# Patient Record
Sex: MALE | Race: WHITE | Employment: OTHER | URBAN - METROPOLITAN AREA
[De-identification: names, ages, dates, MRNs, and addresses within clinical notes are randomized per-mention and may not be internally consistent; named-entity substitution may affect disease eponyms.]

---

## 2023-10-05 PROBLEM — F17.200 TOBACCO DEPENDENCE WITH CURRENT USE: Status: ACTIVE | Noted: 2023-10-05

## 2023-10-05 PROBLEM — E07.9 THYROID MASS: Status: ACTIVE | Noted: 2023-10-05

## 2023-10-05 PROBLEM — Z89.512 STATUS POST BELOW-KNEE AMPUTATION OF LEFT LOWER EXTREMITY (MULTI): Status: ACTIVE | Noted: 2023-10-05

## 2023-10-05 PROBLEM — E78.2 MIXED HYPERLIPIDEMIA: Status: ACTIVE | Noted: 2023-10-05

## 2023-10-05 PROBLEM — I25.10 CORONARY ARTERY DISEASE INVOLVING NATIVE CORONARY ARTERY WITHOUT ANGINA PECTORIS: Status: ACTIVE | Noted: 2023-10-05

## 2023-10-05 PROBLEM — I50.21: Status: ACTIVE | Noted: 2023-10-05

## 2023-10-05 PROBLEM — I73.9 PAD (PERIPHERAL ARTERY DISEASE) (CMS-HCC): Status: ACTIVE | Noted: 2023-10-05

## 2023-10-05 RX ORDER — TORSEMIDE 20 MG/1
20 TABLET ORAL DAILY PRN
COMMUNITY
End: 2024-03-26 | Stop reason: HOSPADM

## 2023-10-05 RX ORDER — LINAGLIPTIN 5 MG/1
5 TABLET, FILM COATED ORAL DAILY
Status: ON HOLD | COMMUNITY
End: 2024-04-18 | Stop reason: SINTOL

## 2023-10-05 RX ORDER — LISINOPRIL 5 MG/1
5 TABLET ORAL NIGHTLY
COMMUNITY
End: 2024-03-26 | Stop reason: HOSPADM

## 2023-10-05 RX ORDER — OMEPRAZOLE 40 MG/1
40 CAPSULE, DELAYED RELEASE ORAL 2 TIMES DAILY
COMMUNITY
End: 2024-03-26 | Stop reason: HOSPADM

## 2023-10-05 RX ORDER — CARVEDILOL 12.5 MG/1
12.5 TABLET ORAL 2 TIMES DAILY
Status: ON HOLD | COMMUNITY
End: 2024-04-18 | Stop reason: ALTCHOICE

## 2023-10-05 RX ORDER — LISINOPRIL 40 MG/1
1 TABLET ORAL DAILY
COMMUNITY
End: 2023-10-30 | Stop reason: HOSPADM

## 2023-10-05 RX ORDER — ACETAMINOPHEN 500 MG
500 TABLET ORAL EVERY 8 HOURS PRN
COMMUNITY
End: 2024-05-01 | Stop reason: HOSPADM

## 2023-10-05 RX ORDER — METFORMIN HYDROCHLORIDE 1000 MG/1
1000 TABLET ORAL DAILY
COMMUNITY
End: 2023-10-30 | Stop reason: HOSPADM

## 2023-10-05 RX ORDER — SPIRONOLACTONE 25 MG/1
1 TABLET ORAL DAILY
COMMUNITY
End: 2023-10-30 | Stop reason: HOSPADM

## 2023-10-05 RX ORDER — ACETAMINOPHEN 500 MG
5 TABLET ORAL NIGHTLY
COMMUNITY

## 2023-10-05 RX ORDER — ASPIRIN 81 MG/1
1 TABLET ORAL DAILY
COMMUNITY
End: 2024-05-01 | Stop reason: HOSPADM

## 2023-10-05 RX ORDER — ATORVASTATIN CALCIUM 80 MG/1
1 TABLET, FILM COATED ORAL DAILY
COMMUNITY
End: 2024-05-01 | Stop reason: HOSPADM

## 2023-10-05 RX ORDER — GABAPENTIN 400 MG/1
400 CAPSULE ORAL 3 TIMES DAILY
Status: ON HOLD | COMMUNITY
End: 2024-03-21 | Stop reason: SDUPTHER

## 2023-10-05 RX ORDER — METFORMIN HYDROCHLORIDE 1000 MG/1
0.5 TABLET ORAL
COMMUNITY
End: 2024-03-26 | Stop reason: HOSPADM

## 2023-10-24 ENCOUNTER — HOSPITAL ENCOUNTER (EMERGENCY)
Facility: HOSPITAL | Age: 53
Discharge: LEFT BEFORE DISCHARGE | End: 2023-10-24
Attending: EMERGENCY MEDICINE
Payer: MEDICAID

## 2023-10-24 ENCOUNTER — APPOINTMENT (OUTPATIENT)
Dept: RADIOLOGY | Facility: HOSPITAL | Age: 53
End: 2023-10-24
Payer: MEDICAID

## 2023-10-24 VITALS
DIASTOLIC BLOOD PRESSURE: 74 MMHG | TEMPERATURE: 97.3 F | HEIGHT: 69 IN | OXYGEN SATURATION: 99 % | SYSTOLIC BLOOD PRESSURE: 109 MMHG | WEIGHT: 122 LBS | BODY MASS INDEX: 18.07 KG/M2 | HEART RATE: 102 BPM | RESPIRATION RATE: 18 BRPM

## 2023-10-24 DIAGNOSIS — R74.8 ELEVATION OF CARDIAC ENZYMES: ICD-10-CM

## 2023-10-24 DIAGNOSIS — R73.9 HYPERGLYCEMIA: ICD-10-CM

## 2023-10-24 DIAGNOSIS — R93.89 ABNORMAL CT OF THE CHEST: ICD-10-CM

## 2023-10-24 DIAGNOSIS — I50.9 ACUTE ON CHRONIC CONGESTIVE HEART FAILURE, UNSPECIFIED HEART FAILURE TYPE (MULTI): Primary | ICD-10-CM

## 2023-10-24 LAB
ALBUMIN SERPL BCP-MCNC: 3.7 G/DL (ref 3.4–5)
ALP SERPL-CCNC: 119 U/L (ref 33–120)
ALT SERPL W P-5'-P-CCNC: 22 U/L (ref 10–52)
ANION GAP SERPL CALC-SCNC: 16 MMOL/L (ref 10–20)
APPEARANCE UR: CLEAR
AST SERPL W P-5'-P-CCNC: 17 U/L (ref 9–39)
BASOPHILS # BLD AUTO: 0.03 X10*3/UL (ref 0–0.1)
BASOPHILS NFR BLD AUTO: 0.4 %
BILIRUB SERPL-MCNC: 1.6 MG/DL (ref 0–1.2)
BILIRUB UR STRIP.AUTO-MCNC: NEGATIVE MG/DL
BNP SERPL-MCNC: 2112 PG/ML (ref 0–99)
BUN SERPL-MCNC: 36 MG/DL (ref 6–23)
CALCIUM SERPL-MCNC: 9.3 MG/DL (ref 8.6–10.3)
CARDIAC TROPONIN I PNL SERPL HS: 254 NG/L (ref 0–20)
CARDIAC TROPONIN I PNL SERPL HS: 281 NG/L (ref 0–20)
CHLORIDE SERPL-SCNC: 83 MMOL/L (ref 98–107)
CO2 SERPL-SCNC: 32 MMOL/L (ref 21–32)
COLOR UR: ABNORMAL
CREAT SERPL-MCNC: 1.12 MG/DL (ref 0.5–1.3)
D DIMER PPP FEU-MCNC: 1299 NG/ML FEU
EOSINOPHIL # BLD AUTO: 0.01 X10*3/UL (ref 0–0.7)
EOSINOPHIL NFR BLD AUTO: 0.1 %
ERYTHROCYTE [DISTWIDTH] IN BLOOD BY AUTOMATED COUNT: 17.4 % (ref 11.5–14.5)
GFR SERPL CREATININE-BSD FRML MDRD: 79 ML/MIN/1.73M*2
GLUCOSE SERPL-MCNC: 576 MG/DL (ref 74–99)
GLUCOSE UR STRIP.AUTO-MCNC: ABNORMAL MG/DL
HCT VFR BLD AUTO: 28.6 % (ref 41–52)
HGB BLD-MCNC: 9.5 G/DL (ref 13.5–17.5)
HOLD SPECIMEN: NORMAL
IMM GRANULOCYTES # BLD AUTO: 0.02 X10*3/UL (ref 0–0.7)
IMM GRANULOCYTES NFR BLD AUTO: 0.2 % (ref 0–0.9)
KETONES UR STRIP.AUTO-MCNC: NEGATIVE MG/DL
LEUKOCYTE ESTERASE UR QL STRIP.AUTO: NEGATIVE
LIPASE SERPL-CCNC: 25 U/L (ref 9–82)
LYMPHOCYTES # BLD AUTO: 1.51 X10*3/UL (ref 1.2–4.8)
LYMPHOCYTES NFR BLD AUTO: 18.3 %
MAGNESIUM SERPL-MCNC: 2.01 MG/DL (ref 1.6–2.4)
MCH RBC QN AUTO: 23.3 PG (ref 26–34)
MCHC RBC AUTO-ENTMCNC: 33.2 G/DL (ref 32–36)
MCV RBC AUTO: 70 FL (ref 80–100)
MONOCYTES # BLD AUTO: 0.51 X10*3/UL (ref 0.1–1)
MONOCYTES NFR BLD AUTO: 6.2 %
NEUTROPHILS # BLD AUTO: 6.15 X10*3/UL (ref 1.2–7.7)
NEUTROPHILS NFR BLD AUTO: 74.8 %
NITRITE UR QL STRIP.AUTO: NEGATIVE
NRBC BLD-RTO: 0 /100 WBCS (ref 0–0)
PH UR STRIP.AUTO: 8 [PH]
PLATELET # BLD AUTO: 354 X10*3/UL (ref 150–450)
PMV BLD AUTO: 10 FL (ref 7.5–11.5)
POTASSIUM SERPL-SCNC: 3.8 MMOL/L (ref 3.5–5.3)
PROT SERPL-MCNC: 7 G/DL (ref 6.4–8.2)
PROT UR STRIP.AUTO-MCNC: NEGATIVE MG/DL
RBC # BLD AUTO: 4.08 X10*6/UL (ref 4.5–5.9)
RBC # UR STRIP.AUTO: NEGATIVE /UL
SARS-COV-2 RNA RESP QL NAA+PROBE: NOT DETECTED
SODIUM SERPL-SCNC: 127 MMOL/L (ref 136–145)
SP GR UR STRIP.AUTO: 1.01
UROBILINOGEN UR STRIP.AUTO-MCNC: <2 MG/DL
WBC # BLD AUTO: 8.2 X10*3/UL (ref 4.4–11.3)

## 2023-10-24 PROCEDURE — 36415 COLL VENOUS BLD VENIPUNCTURE: CPT | Performed by: EMERGENCY MEDICINE

## 2023-10-24 PROCEDURE — 2550000001 HC RX 255 CONTRASTS: Performed by: EMERGENCY MEDICINE

## 2023-10-24 PROCEDURE — 85025 COMPLETE CBC W/AUTO DIFF WBC: CPT | Performed by: EMERGENCY MEDICINE

## 2023-10-24 PROCEDURE — 81003 URINALYSIS AUTO W/O SCOPE: CPT | Performed by: EMERGENCY MEDICINE

## 2023-10-24 PROCEDURE — 83880 ASSAY OF NATRIURETIC PEPTIDE: CPT | Performed by: EMERGENCY MEDICINE

## 2023-10-24 PROCEDURE — 84484 ASSAY OF TROPONIN QUANT: CPT | Performed by: EMERGENCY MEDICINE

## 2023-10-24 PROCEDURE — 87635 SARS-COV-2 COVID-19 AMP PRB: CPT | Performed by: EMERGENCY MEDICINE

## 2023-10-24 PROCEDURE — 99284 EMERGENCY DEPT VISIT MOD MDM: CPT | Mod: CS | Performed by: EMERGENCY MEDICINE

## 2023-10-24 PROCEDURE — 85379 FIBRIN DEGRADATION QUANT: CPT | Performed by: EMERGENCY MEDICINE

## 2023-10-24 PROCEDURE — 2500000001 HC RX 250 WO HCPCS SELF ADMINISTERED DRUGS (ALT 637 FOR MEDICARE OP): Performed by: EMERGENCY MEDICINE

## 2023-10-24 PROCEDURE — 71275 CT ANGIOGRAPHY CHEST: CPT | Mod: FR

## 2023-10-24 PROCEDURE — 71045 X-RAY EXAM CHEST 1 VIEW: CPT | Mod: FOREIGN READ | Performed by: RADIOLOGY

## 2023-10-24 PROCEDURE — 71275 CT ANGIOGRAPHY CHEST: CPT | Mod: FOREIGN READ | Performed by: RADIOLOGY

## 2023-10-24 PROCEDURE — 99285 EMERGENCY DEPT VISIT HI MDM: CPT | Mod: 25,CS

## 2023-10-24 PROCEDURE — 83690 ASSAY OF LIPASE: CPT | Performed by: EMERGENCY MEDICINE

## 2023-10-24 PROCEDURE — 71045 X-RAY EXAM CHEST 1 VIEW: CPT | Mod: FY,FR

## 2023-10-24 PROCEDURE — 83735 ASSAY OF MAGNESIUM: CPT | Performed by: EMERGENCY MEDICINE

## 2023-10-24 PROCEDURE — 80053 COMPREHEN METABOLIC PANEL: CPT | Performed by: EMERGENCY MEDICINE

## 2023-10-24 RX ORDER — NAPROXEN SODIUM 220 MG/1
324 TABLET, FILM COATED ORAL ONCE
Status: COMPLETED | OUTPATIENT
Start: 2023-10-24 | End: 2023-10-24

## 2023-10-24 RX ORDER — DEXTROSE 50 % IN WATER (D50W) INTRAVENOUS SYRINGE
25
Status: DISCONTINUED | OUTPATIENT
Start: 2023-10-24 | End: 2023-10-24 | Stop reason: HOSPADM

## 2023-10-24 RX ORDER — IPRATROPIUM BROMIDE AND ALBUTEROL SULFATE 2.5; .5 MG/3ML; MG/3ML
3 SOLUTION RESPIRATORY (INHALATION)
Status: DISCONTINUED | OUTPATIENT
Start: 2023-10-24 | End: 2023-10-24 | Stop reason: HOSPADM

## 2023-10-24 RX ORDER — DEXTROSE MONOHYDRATE 100 MG/ML
0.3 INJECTION, SOLUTION INTRAVENOUS ONCE AS NEEDED
Status: DISCONTINUED | OUTPATIENT
Start: 2023-10-24 | End: 2023-10-24 | Stop reason: HOSPADM

## 2023-10-24 RX ADMIN — IOHEXOL 75 ML: 350 INJECTION, SOLUTION INTRAVENOUS at 14:47

## 2023-10-24 RX ADMIN — ASPIRIN 324 MG: 81 TABLET, CHEWABLE ORAL at 15:04

## 2023-10-24 ASSESSMENT — COLUMBIA-SUICIDE SEVERITY RATING SCALE - C-SSRS
2. HAVE YOU ACTUALLY HAD ANY THOUGHTS OF KILLING YOURSELF?: NO
1. IN THE PAST MONTH, HAVE YOU WISHED YOU WERE DEAD OR WISHED YOU COULD GO TO SLEEP AND NOT WAKE UP?: NO
6. HAVE YOU EVER DONE ANYTHING, STARTED TO DO ANYTHING, OR PREPARED TO DO ANYTHING TO END YOUR LIFE?: NO

## 2023-10-24 ASSESSMENT — LIFESTYLE VARIABLES
HAVE YOU EVER FELT YOU SHOULD CUT DOWN ON YOUR DRINKING: NO
EVER HAD A DRINK FIRST THING IN THE MORNING TO STEADY YOUR NERVES TO GET RID OF A HANGOVER: NO
HAVE PEOPLE ANNOYED YOU BY CRITICIZING YOUR DRINKING: NO
EVER FELT BAD OR GUILTY ABOUT YOUR DRINKING: NO
REASON UNABLE TO ASSESS: NO

## 2023-10-24 ASSESSMENT — PAIN SCALES - GENERAL
PAINLEVEL_OUTOF10: 0 - NO PAIN
PAINLEVEL_OUTOF10: 0 - NO PAIN

## 2023-10-24 ASSESSMENT — PAIN - FUNCTIONAL ASSESSMENT: PAIN_FUNCTIONAL_ASSESSMENT: 0-10

## 2023-10-24 NOTE — ED NOTES
Pt declined insulin shot and wanted to leave ed. Pt was a and o x3.     Jamey Powers RN  10/24/23 6287

## 2023-10-24 NOTE — DISCHARGE INSTRUCTIONS
Follow up with your primary care doctor and cardiology. Return to the ER if symptoms worsen or change. We recommended admission as we are concerned for abnormal findings related to your heart and your symptoms. You have chosen to leave against medical advice. Risk of leaving against medical advice include death, disability, worsening symptoms, and delayed diagnosis. If you change  your mind at anytime you can return to the ER for further evaluation.

## 2023-10-24 NOTE — ED PROVIDER NOTES
53-year-old male presents emergency department with chief complaint of shortness of breath.  Patient reports his symptoms have been progressive since last night.  He states they are worse when he lies flat.  He also reports chest congestion.  He denies fevers.  He reports dry cough.  Denies any chest pain.  No abdominal pain, nausea, vomiting, dysuria, diarrhea, black or bloody stools.  He does report some constipation.  He reports tobacco use.  Patient also states that he believes he has increased fluid on him.  He states that he uses his torsemide as needed and took 2 doses since last night.  Patient admits to tobacco use. Chart review shows significant past medical history for CHF, peripheral arterial disease, left BKA, GERD, hyperlipidemia, anxiety, and diabetes.  He is not on any anticoagulants.  Denies history of DVT or PE.      History provided by:  Patient   used: No           ------------------------------------------------------------------------------------------------------------------------------------------    VS: As documented in the triage note and EMR flowsheet from this visit were reviewed.    Review of Systems  Constitutional: no fever, chills reports malaise  Eyes: no redness, discharge, pain  HENT: no sore throat, nose bleeds, admits to congestion  Cardiovascular: no chest pain, palpitations reports feeling his legs are more swollen than normal  Respiratory: Admits to shortness of breath, dry cough, and chest congestion  GI: no nausea, diarrhea, pain, vomiting,  BRBPR, melena reports constipation  : no dysuria, frequency, hematuria  Musculoskeletal: no neck pain, stiffness,  no joint deformity, swelling patient has left BKA  Skin: no rash, erythema, wounds  Neurological: no headache, dizziness, lightheadedness, weakness, numbness, or tingling  Psychiatric: no suicidal thoughts, confusion, agitation  Metabolic: no polyuria or polydipsia  Hematologic: no increased bleeding or  bruising  Immunology: No immunocompromise state    [facility]  Nursing notes reviewed and confirmed by me.  Chart review performed including medications, allergies, and medical, surgical, and family history  Visit Vitals  /74 (BP Location: Left arm, Patient Position: Lying)   Pulse 102   Temp 36.3 °C (97.3 °F) (Temporal)   Resp 18     Physical Exam   No past medical history on file.   No past surgical history on file.   Social History     Socioeconomic History    Marital status: Single     Spouse name: Not on file    Number of children: Not on file    Years of education: Not on file    Highest education level: Not on file   Occupational History    Not on file   Tobacco Use    Smoking status: Not on file    Smokeless tobacco: Not on file   Substance and Sexual Activity    Alcohol use: Not on file    Drug use: Not on file    Sexual activity: Not on file   Other Topics Concern    Not on file   Social History Narrative    Not on file     Social Determinants of Health     Financial Resource Strain: Not on file   Food Insecurity: Not on file   Transportation Needs: Not on file   Physical Activity: Not on file   Stress: Not on file   Social Connections: Not on file   Intimate Partner Violence: Not on file   Housing Stability: Not on file      ------------------------------------------------------------------------------------------------------------------------------------------  CT angio chest for pulmonary embolism   Final Result   1.No evidence of acute pulmonary embolus or thoracic aortic   aneurysm or dissection.   2.Mild cardiomegaly with no pericardial effusion.   3.Mediastinal adenopathy. Follow-up is suggested.   4.Enlarged heterogeneous right lobe of the thyroid. Recommend   correlation with thyroid ultrasound.   5.There is a 5 x 2 mm perifissural nodule along in the right   middle lobe adjacent to the minor fissure, likely benign.   Signed by Radha Casanova,       XR chest 1 view   Final Result   Borderline  heart size. No acute cardiopulmonary abnormality.   Signed by Evans Amaya MD         Labs Reviewed   CBC WITH AUTO DIFFERENTIAL - Abnormal       Result Value    WBC 8.2      nRBC 0.0      RBC 4.08 (*)     Hemoglobin 9.5 (*)     Hematocrit 28.6 (*)     MCV 70 (*)     MCH 23.3 (*)     MCHC 33.2      RDW 17.4 (*)     Platelets 354      MPV 10.0      Neutrophils % 74.8      Immature Granulocytes %, Automated 0.2      Lymphocytes % 18.3      Monocytes % 6.2      Eosinophils % 0.1      Basophils % 0.4      Neutrophils Absolute 6.15      Immature Granulocytes Absolute, Automated 0.02      Lymphocytes Absolute 1.51      Monocytes Absolute 0.51      Eosinophils Absolute 0.01      Basophils Absolute 0.03     COMPREHENSIVE METABOLIC PANEL - Abnormal    Glucose 576 (*)     Sodium 127 (*)     Potassium 3.8      Chloride 83 (*)     Bicarbonate 32      Anion Gap 16      Urea Nitrogen 36 (*)     Creatinine 1.12      eGFR 79      Calcium 9.3      Albumin 3.7      Alkaline Phosphatase 119      Total Protein 7.0      AST 17      Bilirubin, Total 1.6 (*)     ALT 22     D-DIMER, VTE EXCLUSION - Abnormal    D-Dimer, Quantitative VTE Exclusion 1,299 (*)     Narrative:     The VTE Exclusion D-Dimer assay is reported in ng/mL Fibrinogen Equivalent Units (FEU).    Per 's instructions for use, a value of less than 500 ng/mL (FEU) may help to exclude DVT or PE in outpatients when the assay is used with a clinical pretest probability assessment.(AEM[facility] must utilize and document eCalc 'Wells Score Deep Vein Thrombosis Risk' for DVT exclusion only. Emergency Department should utilize  Guidelines for Emergency Department Use of the VTE Exclusion D-Dimer and Clinical Pretest probability assessment model for DVT or PE exclusion.)   B-TYPE NATRIURETIC PEPTIDE - Abnormal    BNP 2,112 (*)     Narrative:        <100 pg/mL - Heart failure unlikely  100-299 pg/mL - Intermediate probability of acute heart                  failure  exacerbation. Correlate with clinical                  context and patient history.    >=300 pg/mL - Heart Failure likely. Correlate with clinical                  context and patient history.    BNP testing is performed using different testing methodology at [facility] than at other system hospitals. Direct result comparisons should only be made within the same method.      SERIAL TROPONIN-INITIAL - Abnormal    Troponin I, High Sensitivity 254 (*)     Narrative:     Less than 99th percentile of normal range cutoff-  Female and children under 18 years old <14 ng/L; Male <21 ng/L: Negative  Repeat testing should be performed if clinically indicated.     Female and children under 18 years old 14-50 ng/L; Male 21-50 ng/L:  Consistent with possible cardiac damage and possible increased clinical   risk. Serial measurements may help to assess extent of myocardial damage.     >50 ng/L: Consistent with cardiac damage, increased clinical risk and  myocardial infarction. Serial measurements may help assess extent of   myocardial damage.      NOTE: Children less than 1 year old may have higher baseline troponin   levels and results should be interpreted in conjunction with the overall   clinical context.     NOTE: Troponin I testing is performed using a different   testing methodology at [facility] than at other   [facility]. Direct result comparisons should only   be made within the same method.   URINALYSIS WITH REFLEX MICROSCOPIC AND CULTURE - Abnormal    Color, Urine Straw      Appearance, Urine Clear      Specific Gravity, Urine 1.012      pH, Urine 8.0      Protein, Urine NEGATIVE      Glucose, Urine >=500 (3+) (*)     Blood, Urine NEGATIVE      Ketones, Urine NEGATIVE      Bilirubin, Urine NEGATIVE      Urobilinogen, Urine <2.0      Nitrite, Urine NEGATIVE      Leukocyte Esterase, Urine NEGATIVE     SERIAL TROPONIN, 1 HOUR - Abnormal    Troponin I, High Sensitivity 281 (*)      Narrative:     Less than 99th percentile of normal range cutoff-  Female and children under 18 years old <14 ng/L; Male <21 ng/L: Negative  Repeat testing should be performed if clinically indicated.     Female and children under 18 years old 14-50 ng/L; Male 21-50 ng/L:  Consistent with possible cardiac damage and possible increased clinical   risk. Serial measurements may help to assess extent of myocardial damage.     >50 ng/L: Consistent with cardiac damage, increased clinical risk and  myocardial infarction. Serial measurements may help assess extent of   myocardial damage.      NOTE: Children less than 1 year old may have higher baseline troponin   levels and results should be interpreted in conjunction with the overall   clinical context.     NOTE: Troponin I testing is performed using a different   testing methodology at [facility] than at [facility]. Direct result comparisons should only   be made within the same method.   MAGNESIUM - Normal    Magnesium 2.01     LIPASE - Normal    Lipase 25      Narrative:     Venipuncture immediately after or during the administration of Metamizole may lead to falsely low results. Testing should be performed immediately prior to Metamizole dosing.   SARS-COV-2 PCR, SYMPTOMATIC - Normal    Coronavirus 2019, PCR Not Detected      Narrative:     This assay has received FDA Emergency Use Authorization (EUA) and is only authorized for the duration of time that circumstances exist to justify the authorization of the emergency use of in vitro diagnostic tests for the detection of SARS-CoV-2 virus and/or diagnosis of COVID-19 infection under section 564(b)(1) of the Act, 21 U.S.C. 360bbb-3(b)(1). This assay is an in vitro diagnostic nucleic acid amplification test for the qualitative detection of SARS-CoV-2 from nasopharyngeal specimens and has been validated for use at [facility]. Negative results do not preclude COVID-19  infections and should not be used as the sole basis for diagnosis, treatment, or other management decisions.     TROPONIN SERIES- (INITIAL, 1 HR)    Narrative:     The following orders were created for panel order Troponin I Series, High Sensitivity (0, 1 HR).  Procedure                               Abnormality         Status                     ---------                               -----------         ------                     Troponin I, High Sensiti...[[id number]]  Abnormal            Final result               Troponin, High Sensitivi...[[id number]]  Abnormal            Final result                 Please view results for these tests on the individual orders.   URINALYSIS WITH REFLEX MICROSCOPIC AND CULTURE    Narrative:     The following orders were created for panel order Urinalysis with Reflex Microscopic and Culture.  Procedure                               Abnormality         Status                     ---------                               -----------         ------                     Urinalysis with Reflex M...[[id number]]  Abnormal            Final result               Extra Urine Gray Tube[[id number]]                            Final result                 Please view results for these tests on the individual orders.   EXTRA URINE GRAY TUBE    Extra Tube Hold for add-ons.          Medical Decision Making  EKG interpreted by ED physician: Sinus tachycardia rate of 107.  DC within normal range.  QRS and QTc do appear to be prolonged at 130 and 536.  No significant ST elevations or depressions.  No significant Q waves.  Good R wave progression.  Normal axis.  EKG is consistent with a branch block.     Repeat EKG interpreted by ED physician: Sinus tachycardia with a rate of 106.  DC within normal range.  QRS and QTc are prolonged and EKG is consistent with a right bundle branch block.  Occasional PVC appreciated.  No significant ST elevations or depressions.  No significant Q waves.  Good R wave  progression.  Normal axis.  EKG is not significantly changed when compared to previous.    53-year-old male presents emergency department with complaints of shortness of breath, cough, chest congestion, and reported worsening peripheral edema.  Given his presenting complaints a thorough work-up was obtained.  COVID testing negative.  BNP was significantly elevated at greater than 2000.  Chest imaging did not show significant pulmonary edema though I do suspect CHF is contributing to the patient's symptoms.  D-dimer was also significantly elevated subsequent CT PE study does not show pulmonary embolus, but identified mediastinal adenopathy, lung nodule, and enlarged thyroid.  I advised patient of these findings and he states that he has been advised of these in the past.  I recommended follow-up with his primary care doctor regarding these abnormalities.  CBC shows mild anemia at baseline and no significant leukocytosis patient does not have findings of sepsis.  UA does not show obvious finding of infection.  Cardiac enzyme was significantly elevated at 254 and increasing to 281.  Patient given aspirin.  CMP shows significant leukocytosis, but without other significant metabolic abnormalities.  No findings concerning for DKA.  Lipase within normal range I do not suspect pancreatitis.  Given the patient's elevated cardiac enzyme as well as findings of elevated BNP I am concerned for CHF exacerbation as well as possible NSTEMI.  I discussed these findings with the patient and recommended admission to the hospital for further cardiac evaluation.  However, patient declined this stating he does not wish to be admitted to the hospital and prefers natural treatments that he does at home.  I did offer patient insulin subcu for further treatment of his hyperglycemia which she initially was agreeable to, but nursing staff states he declined.  Patient wishes to leave AMA.  He/she appeared rational, alert, appropriate, and  exhibited no signs/symptoms of psychosis or suicidal ideation.  Patient has the capacity to make this medical decision.  Patient was aware of his/her suspected diagnosis as suggested by the initial screening exam and acknowledged their understanding for my recommendations (hospitalization, transfer, further testing, medical treatment).  Risks of refusal of recommended care were disclosed to the patient including, but not limited to death, permanent mental or physical impairment, loss of current lifestyle or paralysis.  Welcomed to return to the ED at any time regardless of their ability to pay and was provided follow up instructions.  The patient will leave A at this time.          Diagnoses as of 10/24/23 1714   Abnormal CT of the chest   Hyperglycemia   Elevation of cardiac enzymes   Acute on chronic congestive heart failure, unspecified heart failure type (CMS/HCC)      1. Acute on chronic congestive heart failure, unspecified heart failure type (CMS/HCC)        2. Abnormal CT of the chest        3. Hyperglycemia        4. Elevation of cardiac enzymes           Procedures     This note was dictated using dragon software and may contain errors related to dictation interpretation errors.      Luis Davies, DO  10/24/23 1714

## 2023-10-25 ENCOUNTER — APPOINTMENT (OUTPATIENT)
Dept: RADIOLOGY | Facility: HOSPITAL | Age: 53
End: 2023-10-25
Payer: MEDICAID

## 2023-10-25 ENCOUNTER — HOSPITAL ENCOUNTER (INPATIENT)
Facility: HOSPITAL | Age: 53
LOS: 1 days | Discharge: LEFT BEFORE DISCHARGE | End: 2023-10-25
Attending: STUDENT IN AN ORGANIZED HEALTH CARE EDUCATION/TRAINING PROGRAM | Admitting: INTERNAL MEDICINE
Payer: MEDICAID

## 2023-10-25 VITALS
BODY MASS INDEX: 18.07 KG/M2 | RESPIRATION RATE: 148 BRPM | TEMPERATURE: 97.9 F | SYSTOLIC BLOOD PRESSURE: 110 MMHG | OXYGEN SATURATION: 97 % | HEIGHT: 69 IN | DIASTOLIC BLOOD PRESSURE: 71 MMHG | WEIGHT: 122 LBS | HEART RATE: 104 BPM

## 2023-10-25 DIAGNOSIS — J44.1 COPD EXACERBATION (MULTI): Primary | ICD-10-CM

## 2023-10-25 DIAGNOSIS — I50.9 ACUTE ON CHRONIC CONGESTIVE HEART FAILURE, UNSPECIFIED HEART FAILURE TYPE (MULTI): ICD-10-CM

## 2023-10-25 PROBLEM — N17.9 AKI (ACUTE KIDNEY INJURY) (CMS-HCC): Status: ACTIVE | Noted: 2023-10-25

## 2023-10-25 PROBLEM — F41.1 GAD (GENERALIZED ANXIETY DISORDER): Status: ACTIVE | Noted: 2023-03-10

## 2023-10-25 PROBLEM — E11.9 TYPE 2 DIABETES MELLITUS (MULTI): Status: ACTIVE | Noted: 2019-05-29

## 2023-10-25 PROBLEM — I10 PRIMARY HYPERTENSION: Status: ACTIVE | Noted: 2019-05-29

## 2023-10-25 PROBLEM — I70.229 CRITICAL LOWER LIMB ISCHEMIA (MULTI): Status: ACTIVE | Noted: 2021-10-26

## 2023-10-25 PROBLEM — R10.13 DYSPEPSIA: Status: ACTIVE | Noted: 2021-08-13

## 2023-10-25 PROBLEM — F43.10 PTSD (POST-TRAUMATIC STRESS DISORDER): Status: ACTIVE | Noted: 2023-03-10

## 2023-10-25 PROBLEM — J44.9 CHRONIC OBSTRUCTIVE PULMONARY DISEASE, UNSPECIFIED (MULTI): Status: ACTIVE | Noted: 2022-08-23

## 2023-10-25 PROBLEM — F33.2 SEVERE EPISODE OF RECURRENT MAJOR DEPRESSIVE DISORDER, WITHOUT PSYCHOTIC FEATURES (MULTI): Status: ACTIVE | Noted: 2023-03-10

## 2023-10-25 PROBLEM — F43.25 ACUTE ADJUSTMENT DISORDER WITH MIXED DISTURBANCE OF EMOTIONS AND CONDUCT: Status: ACTIVE | Noted: 2022-12-12

## 2023-10-25 PROBLEM — K21.9 GERD (GASTROESOPHAGEAL REFLUX DISEASE): Status: ACTIVE | Noted: 2021-08-12

## 2023-10-25 LAB
ANION GAP SERPL CALC-SCNC: 20 MMOL/L (ref 10–20)
BASOPHILS # BLD AUTO: 0.03 X10*3/UL (ref 0–0.1)
BASOPHILS NFR BLD AUTO: 0.3 %
BNP SERPL-MCNC: 2131 PG/ML (ref 0–99)
BUN SERPL-MCNC: 47 MG/DL (ref 6–23)
CALCIUM SERPL-MCNC: 9.6 MG/DL (ref 8.6–10.3)
CARDIAC TROPONIN I PNL SERPL HS: 460 NG/L (ref 0–20)
CARDIAC TROPONIN I PNL SERPL HS: 693 NG/L (ref 0–20)
CHLORIDE SERPL-SCNC: 81 MMOL/L (ref 98–107)
CO2 SERPL-SCNC: 28 MMOL/L (ref 21–32)
CREAT SERPL-MCNC: 1.5 MG/DL (ref 0.5–1.3)
EOSINOPHIL # BLD AUTO: 0.01 X10*3/UL (ref 0–0.7)
EOSINOPHIL NFR BLD AUTO: 0.1 %
ERYTHROCYTE [DISTWIDTH] IN BLOOD BY AUTOMATED COUNT: 17.4 % (ref 11.5–14.5)
GFR SERPL CREATININE-BSD FRML MDRD: 55 ML/MIN/1.73M*2
GLUCOSE BLD MANUAL STRIP-MCNC: 381 MG/DL (ref 74–99)
GLUCOSE BLD MANUAL STRIP-MCNC: 512 MG/DL (ref 74–99)
GLUCOSE SERPL-MCNC: 581 MG/DL (ref 74–99)
HCT VFR BLD AUTO: 31.1 % (ref 41–52)
HGB BLD-MCNC: 10.1 G/DL (ref 13.5–17.5)
IMM GRANULOCYTES # BLD AUTO: 0.04 X10*3/UL (ref 0–0.7)
IMM GRANULOCYTES NFR BLD AUTO: 0.4 % (ref 0–0.9)
LYMPHOCYTES # BLD AUTO: 1.53 X10*3/UL (ref 1.2–4.8)
LYMPHOCYTES NFR BLD AUTO: 15.7 %
MAGNESIUM SERPL-MCNC: 2.04 MG/DL (ref 1.6–2.4)
MCH RBC QN AUTO: 23.2 PG (ref 26–34)
MCHC RBC AUTO-ENTMCNC: 32.5 G/DL (ref 32–36)
MCV RBC AUTO: 71 FL (ref 80–100)
MONOCYTES # BLD AUTO: 0.58 X10*3/UL (ref 0.1–1)
MONOCYTES NFR BLD AUTO: 5.9 %
NEUTROPHILS # BLD AUTO: 7.57 X10*3/UL (ref 1.2–7.7)
NEUTROPHILS NFR BLD AUTO: 77.6 %
NRBC BLD-RTO: 0 /100 WBCS (ref 0–0)
PLATELET # BLD AUTO: 372 X10*3/UL (ref 150–450)
PMV BLD AUTO: 10 FL (ref 7.5–11.5)
POTASSIUM SERPL-SCNC: 4.2 MMOL/L (ref 3.5–5.3)
RBC # BLD AUTO: 4.36 X10*6/UL (ref 4.5–5.9)
SODIUM SERPL-SCNC: 125 MMOL/L (ref 136–145)
WBC # BLD AUTO: 9.8 X10*3/UL (ref 4.4–11.3)

## 2023-10-25 PROCEDURE — 36415 COLL VENOUS BLD VENIPUNCTURE: CPT | Performed by: STUDENT IN AN ORGANIZED HEALTH CARE EDUCATION/TRAINING PROGRAM

## 2023-10-25 PROCEDURE — 94640 AIRWAY INHALATION TREATMENT: CPT

## 2023-10-25 PROCEDURE — 85025 COMPLETE CBC W/AUTO DIFF WBC: CPT | Performed by: STUDENT IN AN ORGANIZED HEALTH CARE EDUCATION/TRAINING PROGRAM

## 2023-10-25 PROCEDURE — 2500000001 HC RX 250 WO HCPCS SELF ADMINISTERED DRUGS (ALT 637 FOR MEDICARE OP): Performed by: STUDENT IN AN ORGANIZED HEALTH CARE EDUCATION/TRAINING PROGRAM

## 2023-10-25 PROCEDURE — 83735 ASSAY OF MAGNESIUM: CPT | Performed by: STUDENT IN AN ORGANIZED HEALTH CARE EDUCATION/TRAINING PROGRAM

## 2023-10-25 PROCEDURE — 71046 X-RAY EXAM CHEST 2 VIEWS: CPT | Mod: FOREIGN READ | Performed by: RADIOLOGY

## 2023-10-25 PROCEDURE — 2500000004 HC RX 250 GENERAL PHARMACY W/ HCPCS (ALT 636 FOR OP/ED): Performed by: NURSE PRACTITIONER

## 2023-10-25 PROCEDURE — 96375 TX/PRO/DX INJ NEW DRUG ADDON: CPT

## 2023-10-25 PROCEDURE — 1200000002 HC GENERAL ROOM WITH TELEMETRY DAILY

## 2023-10-25 PROCEDURE — G0378 HOSPITAL OBSERVATION PER HR: HCPCS

## 2023-10-25 PROCEDURE — 96365 THER/PROPH/DIAG IV INF INIT: CPT

## 2023-10-25 PROCEDURE — 71046 X-RAY EXAM CHEST 2 VIEWS: CPT | Mod: FY

## 2023-10-25 PROCEDURE — 2500000002 HC RX 250 W HCPCS SELF ADMINISTERED DRUGS (ALT 637 FOR MEDICARE OP, ALT 636 FOR OP/ED): Performed by: STUDENT IN AN ORGANIZED HEALTH CARE EDUCATION/TRAINING PROGRAM

## 2023-10-25 PROCEDURE — 82947 ASSAY GLUCOSE BLOOD QUANT: CPT

## 2023-10-25 PROCEDURE — 83880 ASSAY OF NATRIURETIC PEPTIDE: CPT | Performed by: STUDENT IN AN ORGANIZED HEALTH CARE EDUCATION/TRAINING PROGRAM

## 2023-10-25 PROCEDURE — 99285 EMERGENCY DEPT VISIT HI MDM: CPT | Mod: 25 | Performed by: STUDENT IN AN ORGANIZED HEALTH CARE EDUCATION/TRAINING PROGRAM

## 2023-10-25 PROCEDURE — 2500000002 HC RX 250 W HCPCS SELF ADMINISTERED DRUGS (ALT 637 FOR MEDICARE OP, ALT 636 FOR OP/ED): Performed by: NURSE PRACTITIONER

## 2023-10-25 PROCEDURE — 99223 1ST HOSP IP/OBS HIGH 75: CPT | Performed by: NURSE PRACTITIONER

## 2023-10-25 PROCEDURE — 80048 BASIC METABOLIC PNL TOTAL CA: CPT | Performed by: STUDENT IN AN ORGANIZED HEALTH CARE EDUCATION/TRAINING PROGRAM

## 2023-10-25 PROCEDURE — 2500000004 HC RX 250 GENERAL PHARMACY W/ HCPCS (ALT 636 FOR OP/ED): Performed by: STUDENT IN AN ORGANIZED HEALTH CARE EDUCATION/TRAINING PROGRAM

## 2023-10-25 PROCEDURE — 96366 THER/PROPH/DIAG IV INF ADDON: CPT

## 2023-10-25 PROCEDURE — 84484 ASSAY OF TROPONIN QUANT: CPT | Performed by: STUDENT IN AN ORGANIZED HEALTH CARE EDUCATION/TRAINING PROGRAM

## 2023-10-25 RX ORDER — IPRATROPIUM BROMIDE AND ALBUTEROL SULFATE 2.5; .5 MG/3ML; MG/3ML
3 SOLUTION RESPIRATORY (INHALATION)
Status: DISCONTINUED | OUTPATIENT
Start: 2023-10-25 | End: 2023-10-25

## 2023-10-25 RX ORDER — INSULIN LISPRO 100 [IU]/ML
0-10 INJECTION, SOLUTION INTRAVENOUS; SUBCUTANEOUS
Status: DISCONTINUED | OUTPATIENT
Start: 2023-10-25 | End: 2023-10-25 | Stop reason: HOSPADM

## 2023-10-25 RX ORDER — DEXTROSE 50 % IN WATER (D50W) INTRAVENOUS SYRINGE
25
Status: DISCONTINUED | OUTPATIENT
Start: 2023-10-25 | End: 2023-10-25

## 2023-10-25 RX ORDER — DEXTROSE 50 % IN WATER (D50W) INTRAVENOUS SYRINGE
25
Status: DISCONTINUED | OUTPATIENT
Start: 2023-10-25 | End: 2023-10-25 | Stop reason: HOSPADM

## 2023-10-25 RX ORDER — MAGNESIUM SULFATE HEPTAHYDRATE 40 MG/ML
2 INJECTION, SOLUTION INTRAVENOUS ONCE
Status: COMPLETED | OUTPATIENT
Start: 2023-10-25 | End: 2023-10-25

## 2023-10-25 RX ORDER — IPRATROPIUM BROMIDE AND ALBUTEROL SULFATE 2.5; .5 MG/3ML; MG/3ML
3 SOLUTION RESPIRATORY (INHALATION) 3 TIMES DAILY
Status: DISCONTINUED | OUTPATIENT
Start: 2023-10-25 | End: 2023-10-25 | Stop reason: HOSPADM

## 2023-10-25 RX ORDER — DEXTROSE MONOHYDRATE 100 MG/ML
0.3 INJECTION, SOLUTION INTRAVENOUS ONCE AS NEEDED
Status: DISCONTINUED | OUTPATIENT
Start: 2023-10-25 | End: 2023-10-25 | Stop reason: HOSPADM

## 2023-10-25 RX ORDER — INSULIN LISPRO 100 [IU]/ML
0-5 INJECTION, SOLUTION INTRAVENOUS; SUBCUTANEOUS
Status: DISCONTINUED | OUTPATIENT
Start: 2023-10-25 | End: 2023-10-25

## 2023-10-25 RX ORDER — IPRATROPIUM BROMIDE AND ALBUTEROL SULFATE 2.5; .5 MG/3ML; MG/3ML
6 SOLUTION RESPIRATORY (INHALATION) ONCE
Status: COMPLETED | OUTPATIENT
Start: 2023-10-25 | End: 2023-10-25

## 2023-10-25 RX ORDER — ALBUTEROL SULFATE 0.83 MG/ML
2.5 SOLUTION RESPIRATORY (INHALATION) EVERY 6 HOURS PRN
Status: DISCONTINUED | OUTPATIENT
Start: 2023-10-25 | End: 2023-10-25 | Stop reason: HOSPADM

## 2023-10-25 RX ORDER — FUROSEMIDE 10 MG/ML
60 INJECTION INTRAMUSCULAR; INTRAVENOUS ONCE
Status: COMPLETED | OUTPATIENT
Start: 2023-10-25 | End: 2023-10-25

## 2023-10-25 RX ORDER — DEXTROSE MONOHYDRATE 100 MG/ML
0.3 INJECTION, SOLUTION INTRAVENOUS ONCE AS NEEDED
Status: DISCONTINUED | OUTPATIENT
Start: 2023-10-25 | End: 2023-10-25

## 2023-10-25 RX ORDER — PREDNISONE 20 MG/1
40 TABLET ORAL DAILY
Status: DISCONTINUED | OUTPATIENT
Start: 2023-10-25 | End: 2023-10-25 | Stop reason: HOSPADM

## 2023-10-25 RX ORDER — POTASSIUM CHLORIDE 1.5 G/1.58G
40 POWDER, FOR SOLUTION ORAL ONCE
Status: COMPLETED | OUTPATIENT
Start: 2023-10-25 | End: 2023-10-25

## 2023-10-25 RX ADMIN — MAGNESIUM SULFATE HEPTAHYDRATE 2 G: 40 INJECTION, SOLUTION INTRAVENOUS at 04:30

## 2023-10-25 RX ADMIN — INSULIN LISPRO 5 UNITS: 100 INJECTION, SOLUTION INTRAVENOUS; SUBCUTANEOUS at 09:28

## 2023-10-25 RX ADMIN — METHYLPREDNISOLONE SODIUM SUCCINATE 125 MG: 125 INJECTION, POWDER, FOR SOLUTION INTRAMUSCULAR; INTRAVENOUS at 02:57

## 2023-10-25 RX ADMIN — FUROSEMIDE 60 MG: 10 INJECTION, SOLUTION INTRAMUSCULAR; INTRAVENOUS at 04:28

## 2023-10-25 RX ADMIN — IPRATROPIUM BROMIDE AND ALBUTEROL SULFATE 6 ML: 2.5; .5 SOLUTION RESPIRATORY (INHALATION) at 02:57

## 2023-10-25 RX ADMIN — IPRATROPIUM BROMIDE AND ALBUTEROL SULFATE 3 ML: 2.5; .5 SOLUTION RESPIRATORY (INHALATION) at 08:50

## 2023-10-25 RX ADMIN — PREDNISONE 40 MG: 20 TABLET ORAL at 09:25

## 2023-10-25 RX ADMIN — POTASSIUM CHLORIDE 40 MEQ: 1.5 POWDER, FOR SOLUTION ORAL at 04:30

## 2023-10-25 RX ADMIN — INSULIN HUMAN 10 UNITS: 100 INJECTION, SOLUTION PARENTERAL at 04:26

## 2023-10-25 RX ADMIN — INSULIN LISPRO 10 UNITS: 100 INJECTION, SOLUTION INTRAVENOUS; SUBCUTANEOUS at 13:30

## 2023-10-25 ASSESSMENT — PAIN SCALES - GENERAL: PAINLEVEL_OUTOF10: 0 - NO PAIN

## 2023-10-25 ASSESSMENT — COLUMBIA-SUICIDE SEVERITY RATING SCALE - C-SSRS
2. HAVE YOU ACTUALLY HAD ANY THOUGHTS OF KILLING YOURSELF?: NO
6. HAVE YOU EVER DONE ANYTHING, STARTED TO DO ANYTHING, OR PREPARED TO DO ANYTHING TO END YOUR LIFE?: NO
1. IN THE PAST MONTH, HAVE YOU WISHED YOU WERE DEAD OR WISHED YOU COULD GO TO SLEEP AND NOT WAKE UP?: NO

## 2023-10-25 ASSESSMENT — LIFESTYLE VARIABLES
EVER HAD A DRINK FIRST THING IN THE MORNING TO STEADY YOUR NERVES TO GET RID OF A HANGOVER: NO
HAVE YOU EVER FELT YOU SHOULD CUT DOWN ON YOUR DRINKING: NO
EVER FELT BAD OR GUILTY ABOUT YOUR DRINKING: NO
HAVE PEOPLE ANNOYED YOU BY CRITICIZING YOUR DRINKING: NO
REASON UNABLE TO ASSESS: NO

## 2023-10-25 ASSESSMENT — PAIN - FUNCTIONAL ASSESSMENT: PAIN_FUNCTIONAL_ASSESSMENT: 0-10

## 2023-10-25 NOTE — ED PROVIDER NOTES
HPI   Chief Complaint   Patient presents with    Shortness of Breath     Pt seen earlier today for same complaint. Pt states he cannot breath when he lays down. Pt in no respiratory distress at this time. Pt will randomly force himself to hyperventilate stating he is short of breath and then after 30 seconds breaths normally.       Patient was seen earlier yesterday and came back to the emergency department with complaints of persistent shortness of breath.  Has not been compliant with his torsemide.  Tells me he has a history of pulmonary edema and congestive heart failure.  States that he does not take any anticoagulation currently.  No history of DVT or PE.  Denies any new leg swelling.  He has had left low knee amputation from nonhealing ulcerations.  Otherwise is a diabetic as well from what he tells me.  Still persistent smoker and used to smoke 2 packs of cigarettes per day.  Does not do any breathing treatments or inhaler therapy at home.                          No data recorded                Patient History   Past Medical History:   Diagnosis Date    CHF (congestive heart failure) (CMS/[facility])     Diabetes mellitus (CMS/[facility])      History reviewed. No pertinent surgical history.  No family history on file.  Social History     Tobacco Use    Smoking status: Every Day     Packs/day: .5     Types: Cigarettes    Smokeless tobacco: Never   Substance Use Topics    Alcohol use: Never    Drug use: Never       Physical Exam   ED Triage Vitals [10/25/23 0005]   Temp Heart Rate Resp BP   36.6 °C (97.9 °F) (!) 115 20 124/64      SpO2 Temp Source Heart Rate Source Patient Position   98 % Temporal -- Sitting      BP Location FiO2 (%)     Right arm --       Physical Exam  Vitals and nursing note reviewed.   Constitutional:       General: He is not in acute distress.     Appearance: He is well-developed.   HENT:      Head: Normocephalic and atraumatic.      Right Ear: External ear normal.      Left Ear: External ear normal.    Eyes:      Extraocular Movements: Extraocular movements intact.      Conjunctiva/sclera: Conjunctivae normal.      Pupils: Pupils are equal, round, and reactive to light.   Neck:      Comments: No JVD  Cardiovascular:      Rate and Rhythm: Normal rate and regular rhythm.      Heart sounds: No murmur heard.  Pulmonary:      Effort: Pulmonary effort is normal. No respiratory distress.      Breath sounds: Examination of the right-middle field reveals rhonchi and rales. Examination of the left-middle field reveals rhonchi and rales. Examination of the right-lower field reveals rhonchi and rales. Examination of the left-lower field reveals rhonchi and rales. Rhonchi and rales present.      Comments: Saturating in the mid to high 90s on room air.  No conversational dyspnea.  Abdominal:      Palpations: Abdomen is soft.      Tenderness: There is no abdominal tenderness. There is no guarding or rebound.   Musculoskeletal:         General: No swelling.      Cervical back: Neck supple.      Comments: Left below-knee amputation.  Peripheral pulses in the right lower extremity and upper extremities are palpable and symmetric.   Skin:     General: Skin is warm and dry.      Capillary Refill: Capillary refill takes less than 2 seconds.   Neurological:      General: No focal deficit present.      Mental Status: He is alert and oriented to person, place, and time. Mental status is at baseline.   Psychiatric:         Mood and Affect: Mood normal.         ED Course & MDM   Diagnoses as of 10/25/23 0541   COPD exacerbation (CMS/HCC)   Acute on chronic congestive heart failure, unspecified heart failure type (CMS/HCC)       Medical Decision Making    Patient's work-up is consistent with CHF exacerbation.  However, I was also able to auscultate rhonchi on his examination I gave him Solu-Medrol, breathing treatments and magnesium with improvement of his symptoms.  His initial EKG interpreted by myself as limited by baseline artifact.   However, it did show a sinus tachycardic rhythm with heart rate 108,  6 repeat is 132 QTc 514.  There was occasional PVCs noted.  It showed normal axis.  No STEMI criteria noted.  I repeated the patient's EKG and it shows similar heart rate intervals without dynamic change.  Baseline artifact has resolved.  No STEMI criteria noted on this EKG.  T wave inversions noted in leads V1 through V3 likely consistent with left atrial enlargement.  He did have a echocardiogram done several months ago that showed an EF of approximately 40 to 45%.  I diurese the patient with Lasix 60 mg IV in the ER.  Not requiring oxygen therapy.  Given potassium supplementation and also given low-dose subcutaneous insulin for hyperglycemia.  Has a normal anion gap and bicarb and does not show signs consistent with DKA.    Cardiac enzymes are elevated and trending up but otherwise is asymptomatic from a chest pain point of view.  I believe this is more type II demand ischemia from his CHF.  Discussed the case with the hospitalist will be admitting at this time.  Remains hemodynamically stable.  Procedure  Procedures     Nurys Carrasco MD  10/25/23 5153

## 2023-10-25 NOTE — H&P
Medical Group History and Physical    ASSESSMENT & PLAN:       Acute on chronic systolic congestive heart failure   - Acute decompensation, volume overload, weight gain ? elevated BNP, elevated troponin, no complaints of chest pain, EKG stable, consult cardiology.  - Prescribed torsemide per cardiology - refuses to take regularly; last/first dose on 10/24  - stat Lasix 60 IV   - Daily dosing of diuretics -home meds: torsemide 20mg po  - Decrease home BB by 50% for hospital admission  - Strict I's and O's and daily weights   - Send sputum culture  - low-sodium diet  - monitor renal function  - Last echo:  EF 40%, with only moderate regurgitation of the mitral valve.    PIPPA  Likely d/t volume overload caused by acute congestive heart failure exacerbation  - Monitor renal function during aggressive diuresis  - Send urine electrolytes  - Baseline creatinine ~1.1; on admission creatinine 1.5  - Strict I's and O's, daily weights    IDDM 2  - Accu-Checks and SSI  - Hold home oral diabetic meds  - A1c   - Diabetic diet    Nicotine dependence  Encourage smoking cessation    VTE Prophylaxis: Heparin SQ, SCDs and ambulation    Ofe Lewis, SHANDA-CNP    HISTORY OF PRESENT ILLNESS:   Chief Complaint: Shortness of breath with exertion    History Of Present Illness:    Hitesh Jurado is a 53 y.o. male with a significant past medical history of acute systolic heart failure, PAD s/p left BKA, CAD, HLD, thyroid disease, nicotine dependency, HTN, DM 2, PTSD, and JONA who presents to [facility] ED with complaints of worsening shortness of breath over the past several days, denies any fever or chills.  Endorses orthopnea, denies peripheral edema, wheezing, frequent cough but  does have increased thin, clear sputum production. Follows with cardiology outpatient for heart failure, however does not take medications as prescribed, patient is more interested in homeopathic medicine.    VSS, sinus tachycardia rates in the 110s, BP  stable, labs show evidence of hyperglycemia glucose 581 without evidence of DKA/HHNK, sodium 125 [pseudohyponatremia], BUN 47, creatinine 1.50, GFR 55 - PIPPA is present; BNP 2,131, magnesium within normal limits, CBC with differential does not show evidence of leukocytosis, H&H 10/31 platelets 372; troponin [id number] respectively - trend to plateau, EKG does not show ischemic changes, rate is well controlled      Review of systems: 10 point review of systems is otherwise negative except as mentioned above.    PAST HISTORIES:       Past Medical History:  Medical Problems       Problem List       * (Principal) COPD exacerbation (CMS/[facility])    ACC/AHA stage C acute systolic heart failure (CMS/[facility])    Status post below-knee amputation of left lower extremity (CMS/[facility])    PAD (peripheral artery disease) (CMS/[facility])    Coronary artery disease involving native coronary artery without angina pectoris    Mixed hyperlipidemia    Thyroid mass    Tobacco dependence with current use    PIPPA (acute kidney injury) (CMS/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)    Overview Signed 10/25/2023  5:33 AM by HEENA Marin     Formatting of this note might be different from the original. Comment on above: CONGESTIVE HEART FAILURE         GERD (gastroesophageal reflux disease)    Dyspepsia    Overview Signed 10/25/2023  5:33 AM by HEENA Marin     Formatting of this note might be different from the original. Added automatically from request for surgery [id number]         Critical lower limb ischemia (CMS/[facility])    Overview Signed 10/25/2023  5:33 AM by HEENA Marin     Formatting of this note might be different from the original. Added automatically from request for surgery [id number] Formatting of this note might be different from the original. Formatting of this note might be different from the original. Added automatically  "from request for surgery [id number]         Chronic obstructive pulmonary disease, unspecified (CMS/HCC)    Primary hypertension    PTSD (post-traumatic stress disorder)    JONA (generalized anxiety disorder)    Severe episode of recurrent major depressive disorder, without psychotic features (CMS/HCC)    Type 2 diabetes mellitus (CMS/HCC)           Past Surgical History:  History reviewed. No pertinent surgical history.       Social History:  He reports that he has been smoking cigarettes. He has been smoking an average of .5 packs per day. He has never used smokeless tobacco. He reports that he does not drink alcohol and does not use drugs.    Family History:  No family history on file.     Allergies:  Amoxicillin    OBJECTIVE:       Last Recorded Vitals:  Vitals:    10/25/23 0005 10/25/23 0241 10/25/23 0332   BP: 124/64 133/73 128/80   BP Location: Right arm Left arm Left arm   Patient Position: Sitting Sitting    Pulse: (!) 115 110 (!) 116   Resp: 20 18 18   Temp: 36.6 °C (97.9 °F)     TempSrc: Temporal     SpO2: 98% 97% 94%   Weight: 55.3 kg (122 lb)     Height: 1.753 m (5' 9\")         Last I/O:  No intake/output data recorded.    Physical Exam  Vitals and nursing note reviewed.   Constitutional:       General: He is awake.      Appearance: Normal appearance.   HENT:      Head: Normocephalic and atraumatic.      Nose: Nose normal.      Mouth/Throat:      Mouth: Mucous membranes are dry.   Eyes:      Extraocular Movements: Extraocular movements intact.      Conjunctiva/sclera: Conjunctivae normal.      Pupils: Pupils are equal, round, and reactive to light.   Cardiovascular:      Rate and Rhythm: Normal rate and regular rhythm.      Pulses: Normal pulses.      Heart sounds: Normal heart sounds.   Pulmonary:      Effort: Pulmonary effort is normal.      Breath sounds: Normal breath sounds.      Comments: Diminished bases bilaterally, not hypoxic or requiring supplemental O2 at this time.  Abdominal:      General: " Abdomen is flat. Bowel sounds are normal.      Palpations: Abdomen is soft.   Musculoskeletal:         General: Normal range of motion.      Cervical back: Normal range of motion and neck supple.      Right lower leg: No edema.   Skin:     General: Skin is warm and dry.      Capillary Refill: Capillary refill takes less than 2 seconds.   Neurological:      General: No focal deficit present.      Mental Status: He is alert and oriented to person, place, and time. Mental status is at baseline.   Psychiatric:         Mood and Affect: Mood normal.         Behavior: Behavior normal. Behavior is cooperative.         Thought Content: Thought content normal.         Judgment: Judgment normal.           Scheduled Medications     PRN Medications  PRN medications: dextrose 10 % in water (D10W), dextrose, glucagon  Continuous Medications       Outpatient Medications:  Prior to Admission medications    Medication Sig Start Date End Date Taking? Authorizing Provider   acetaminophen (Tylenol) 500 mg tablet Take 1 tablet (500 mg) by mouth every 8 hours if needed.    Historical Provider, MD   aspirin 81 mg EC tablet Take 1 tablet (81 mg) by mouth once daily.    Historical Provider, MD   atorvastatin (Lipitor) 80 mg tablet Take 1 tablet (80 mg) by mouth once daily.    Historical Provider, MD   carvedilol (Coreg) 12.5 mg tablet Take 1 tablet (12.5 mg) by mouth twice a day.    Historical Provider, MD   empagliflozin (Jardiance) 10 mg Take 1 tablet (10 mg) by mouth once daily.    Historical Provider, MD   gabapentin (Neurontin) 400 mg capsule Take 1 capsule (400 mg) by mouth 3 times a day.    Historical Provider, MD   linaGLIPtin (Tradjenta) 5 mg tablet Take 1 tablet (5 mg) by mouth once daily.    Historical Provider, MD   linaGLIPtin (Tradjenta) 5 mg tablet Take 1 tablet (5 mg) by mouth once daily.    Historical Provider, MD   lisinopril 40 mg tablet Take 1 tablet (40 mg) by mouth once daily.    Historical Provider, MD   lisinopril 5  mg tablet Take 1 tablet (5 mg) by mouth once daily.    Historical Provider, MD   melatonin 5 mg tablet Take 1 tablet (5 mg) by mouth once daily at bedtime.    Historical Provider, MD   metFORMIN (Glucophage) 1,000 mg tablet Take 0.5 tablets (500 mg) by mouth twice a day.    Historical Provider, MD   metFORMIN (Glucophage) 1,000 mg tablet Take 1 tablet (1,000 mg) by mouth once daily.    Historical Provider, MD   omeprazole (PriLOSEC) 40 mg DR capsule Take 1 capsule (40 mg) by mouth twice a day.    Historical Provider, MD   spironolactone (Aldactone) 25 mg tablet Take 1 tablet (25 mg) by mouth once daily.    Historical Provider, MD   torsemide (Demadex) 20 mg tablet Take 1 tablet (20 mg) by mouth once daily as needed.    Historical Provider, MD       LABS AND IMAGING:     Labs:  Results for orders placed or performed during the hospital encounter of 10/25/23 (from the past 24 hour(s))   CBC and Auto Differential   Result Value Ref Range    WBC 9.8 4.4 - 11.3 x10*3/uL    nRBC 0.0 0.0 - 0.0 /100 WBCs    RBC 4.36 (L) 4.50 - 5.90 x10*6/uL    Hemoglobin 10.1 (L) 13.5 - 17.5 g/dL    Hematocrit 31.1 (L) 41.0 - 52.0 %    MCV 71 (L) 80 - 100 fL    MCH 23.2 (L) 26.0 - 34.0 pg    MCHC 32.5 32.0 - 36.0 g/dL    RDW 17.4 (H) 11.5 - 14.5 %    Platelets 372 150 - 450 x10*3/uL    MPV 10.0 7.5 - 11.5 fL    Neutrophils % 77.6 40.0 - 80.0 %    Immature Granulocytes %, Automated 0.4 0.0 - 0.9 %    Lymphocytes % 15.7 13.0 - 44.0 %    Monocytes % 5.9 2.0 - 10.0 %    Eosinophils % 0.1 0.0 - 6.0 %    Basophils % 0.3 0.0 - 2.0 %    Neutrophils Absolute 7.57 1.20 - 7.70 x10*3/uL    Immature Granulocytes Absolute, Automated 0.04 0.00 - 0.70 x10*3/uL    Lymphocytes Absolute 1.53 1.20 - 4.80 x10*3/uL    Monocytes Absolute 0.58 0.10 - 1.00 x10*3/uL    Eosinophils Absolute 0.01 0.00 - 0.70 x10*3/uL    Basophils Absolute 0.03 0.00 - 0.10 x10*3/uL   B-type natriuretic peptide   Result Value Ref Range    BNP 2,131 (H) 0 - 99 pg/mL   Basic metabolic  panel   Result Value Ref Range    Glucose 581 (HH) 74 - 99 mg/dL    Sodium 125 (L) 136 - 145 mmol/L    Potassium 4.2 3.5 - 5.3 mmol/L    Chloride 81 (L) 98 - 107 mmol/L    Bicarbonate 28 21 - 32 mmol/L    Anion Gap 20 10 - 20 mmol/L    Urea Nitrogen 47 (H) 6 - 23 mg/dL    Creatinine 1.50 (H) 0.50 - 1.30 mg/dL    eGFR 55 (L) >60 mL/min/1.73m*2    Calcium 9.6 8.6 - 10.3 mg/dL   Troponin I, High Sensitivity, Initial   Result Value Ref Range    Troponin I, High Sensitivity 460 (HH) 0 - 20 ng/L   Troponin, High Sensitivity, 1 Hour   Result Value Ref Range    Troponin I, High Sensitivity 693 (HH) 0 - 20 ng/L   Magnesium   Result Value Ref Range    Magnesium 2.04 1.60 - 2.40 mg/dL        Imaging:  XR chest 2 views   Final Result   Above findings likely related to interstitial edema from CHF.  This   has progressed since previous exam.   Signed by Mayur Melo DO

## 2023-10-26 ENCOUNTER — APPOINTMENT (OUTPATIENT)
Dept: RADIOLOGY | Facility: HOSPITAL | Age: 53
End: 2023-10-26
Payer: MEDICAID

## 2023-10-26 ENCOUNTER — HOSPITAL ENCOUNTER (OUTPATIENT)
Dept: CARDIOLOGY | Facility: HOSPITAL | Age: 53
Discharge: HOME | End: 2023-10-26
Payer: MEDICAID

## 2023-10-26 ENCOUNTER — HOSPITAL ENCOUNTER (INPATIENT)
Facility: HOSPITAL | Age: 53
LOS: 4 days | Discharge: HOME | End: 2023-10-30
Attending: STUDENT IN AN ORGANIZED HEALTH CARE EDUCATION/TRAINING PROGRAM | Admitting: STUDENT IN AN ORGANIZED HEALTH CARE EDUCATION/TRAINING PROGRAM
Payer: MEDICAID

## 2023-10-26 DIAGNOSIS — R73.9 HYPERGLYCEMIA: ICD-10-CM

## 2023-10-26 DIAGNOSIS — J03.90 TONSILLITIS: ICD-10-CM

## 2023-10-26 DIAGNOSIS — I50.9 ACUTE ON CHRONIC CONGESTIVE HEART FAILURE, UNSPECIFIED HEART FAILURE TYPE (MULTI): ICD-10-CM

## 2023-10-26 DIAGNOSIS — I21.4 NSTEMI (NON-ST ELEVATED MYOCARDIAL INFARCTION) (MULTI): ICD-10-CM

## 2023-10-26 DIAGNOSIS — E05.90 HYPERTHYROIDISM: ICD-10-CM

## 2023-10-26 DIAGNOSIS — R79.89 ELEVATED TROPONIN: Primary | ICD-10-CM

## 2023-10-26 LAB
ALBUMIN SERPL BCP-MCNC: 3.5 G/DL (ref 3.4–5)
ALP SERPL-CCNC: 104 U/L (ref 33–120)
ALT SERPL W P-5'-P-CCNC: 22 U/L (ref 10–52)
ANION GAP SERPL CALC-SCNC: 15 MMOL/L (ref 10–20)
AST SERPL W P-5'-P-CCNC: 19 U/L (ref 9–39)
ATRIAL RATE: 113 BPM
BASOPHILS # BLD AUTO: 0.01 X10*3/UL (ref 0–0.1)
BASOPHILS NFR BLD AUTO: 0.1 %
BILIRUB SERPL-MCNC: 1.6 MG/DL (ref 0–1.2)
BNP SERPL-MCNC: 2850 PG/ML (ref 0–99)
BUN SERPL-MCNC: 64 MG/DL (ref 6–23)
CALCIUM SERPL-MCNC: 8.7 MG/DL (ref 8.6–10.3)
CARDIAC TROPONIN I PNL SERPL HS: 1417 NG/L (ref 0–20)
CARDIAC TROPONIN I PNL SERPL HS: 1590 NG/L (ref 0–20)
CARDIAC TROPONIN I PNL SERPL HS: 1590 NG/L (ref 0–20)
CHLORIDE SERPL-SCNC: 81 MMOL/L (ref 98–107)
CHLORIDE UR-SCNC: <15 MMOL/L
CHLORIDE/CREATININE (MMOL/G) IN URINE: NORMAL
CO2 SERPL-SCNC: 30 MMOL/L (ref 21–32)
CREAT SERPL-MCNC: 1.83 MG/DL (ref 0.5–1.3)
CREAT UR-MCNC: 39.1 MG/DL (ref 20–370)
EOSINOPHIL # BLD AUTO: 0 X10*3/UL (ref 0–0.7)
EOSINOPHIL NFR BLD AUTO: 0 %
ERYTHROCYTE [DISTWIDTH] IN BLOOD BY AUTOMATED COUNT: 17.4 % (ref 11.5–14.5)
GFR SERPL CREATININE-BSD FRML MDRD: 44 ML/MIN/1.73M*2
GLUCOSE BLD MANUAL STRIP-MCNC: 219 MG/DL (ref 74–99)
GLUCOSE BLD MANUAL STRIP-MCNC: 260 MG/DL (ref 74–99)
GLUCOSE BLD MANUAL STRIP-MCNC: 273 MG/DL (ref 74–99)
GLUCOSE BLD MANUAL STRIP-MCNC: 273 MG/DL (ref 74–99)
GLUCOSE BLD MANUAL STRIP-MCNC: 287 MG/DL (ref 74–99)
GLUCOSE BLD MANUAL STRIP-MCNC: 506 MG/DL (ref 74–99)
GLUCOSE SERPL-MCNC: 577 MG/DL (ref 74–99)
HCT VFR BLD AUTO: 27.3 % (ref 41–52)
HGB BLD-MCNC: 9 G/DL (ref 13.5–17.5)
IMM GRANULOCYTES # BLD AUTO: 0.05 X10*3/UL (ref 0–0.7)
IMM GRANULOCYTES NFR BLD AUTO: 0.4 % (ref 0–0.9)
LYMPHOCYTES # BLD AUTO: 1.2 X10*3/UL (ref 1.2–4.8)
LYMPHOCYTES NFR BLD AUTO: 9.1 %
MCH RBC QN AUTO: 23.3 PG (ref 26–34)
MCHC RBC AUTO-ENTMCNC: 33 G/DL (ref 32–36)
MCV RBC AUTO: 71 FL (ref 80–100)
MONOCYTES # BLD AUTO: 1.15 X10*3/UL (ref 0.1–1)
MONOCYTES NFR BLD AUTO: 8.8 %
NEUTROPHILS # BLD AUTO: 10.73 X10*3/UL (ref 1.2–7.7)
NEUTROPHILS NFR BLD AUTO: 81.6 %
NRBC BLD-RTO: 0 /100 WBCS (ref 0–0)
P AXIS: 99 DEGREES
P OFFSET: 188 MS
P ONSET: 137 MS
PLATELET # BLD AUTO: 357 X10*3/UL (ref 150–450)
PMV BLD AUTO: 10.4 FL (ref 7.5–11.5)
POTASSIUM SERPL-SCNC: 5.1 MMOL/L (ref 3.5–5.3)
POTASSIUM UR-SCNC: 37 MMOL/L
POTASSIUM/CREAT UR-RTO: 95 MMOL/G CREAT
PR INTERVAL: 156 MS
PROT SERPL-MCNC: 6.5 G/DL (ref 6.4–8.2)
Q ONSET: 215 MS
QRS COUNT: 18 BEATS
QRS DURATION: 128 MS
QT INTERVAL: 368 MS
QTC CALCULATION(BAZETT): 504 MS
QTC FREDERICIA: 454 MS
R AXIS: 47 DEGREES
RBC # BLD AUTO: 3.87 X10*6/UL (ref 4.5–5.9)
SODIUM SERPL-SCNC: 121 MMOL/L (ref 136–145)
SODIUM UR-SCNC: 24 MMOL/L
SODIUM/CREAT UR-RTO: 61 MMOL/G CREAT
T AXIS: -49 DEGREES
T OFFSET: 399 MS
T4 FREE SERPL-MCNC: 3.97 NG/DL (ref 0.61–1.12)
TSH SERPL-ACNC: <0.01 MIU/L (ref 0.44–3.98)
VENTRICULAR RATE: 113 BPM
WBC # BLD AUTO: 13.1 X10*3/UL (ref 4.4–11.3)

## 2023-10-26 PROCEDURE — 84484 ASSAY OF TROPONIN QUANT: CPT | Performed by: STUDENT IN AN ORGANIZED HEALTH CARE EDUCATION/TRAINING PROGRAM

## 2023-10-26 PROCEDURE — 96375 TX/PRO/DX INJ NEW DRUG ADDON: CPT

## 2023-10-26 PROCEDURE — 83880 ASSAY OF NATRIURETIC PEPTIDE: CPT | Performed by: STUDENT IN AN ORGANIZED HEALTH CARE EDUCATION/TRAINING PROGRAM

## 2023-10-26 PROCEDURE — 85025 COMPLETE CBC W/AUTO DIFF WBC: CPT | Performed by: STUDENT IN AN ORGANIZED HEALTH CARE EDUCATION/TRAINING PROGRAM

## 2023-10-26 PROCEDURE — 2500000004 HC RX 250 GENERAL PHARMACY W/ HCPCS (ALT 636 FOR OP/ED): Performed by: STUDENT IN AN ORGANIZED HEALTH CARE EDUCATION/TRAINING PROGRAM

## 2023-10-26 PROCEDURE — 93010 ELECTROCARDIOGRAM REPORT: CPT | Performed by: INTERNAL MEDICINE

## 2023-10-26 PROCEDURE — 99285 EMERGENCY DEPT VISIT HI MDM: CPT | Performed by: STUDENT IN AN ORGANIZED HEALTH CARE EDUCATION/TRAINING PROGRAM

## 2023-10-26 PROCEDURE — 96374 THER/PROPH/DIAG INJ IV PUSH: CPT

## 2023-10-26 PROCEDURE — 84133 ASSAY OF URINE POTASSIUM: CPT | Performed by: NURSE PRACTITIONER

## 2023-10-26 PROCEDURE — 2500000002 HC RX 250 W HCPCS SELF ADMINISTERED DRUGS (ALT 637 FOR MEDICARE OP, ALT 636 FOR OP/ED): Performed by: STUDENT IN AN ORGANIZED HEALTH CARE EDUCATION/TRAINING PROGRAM

## 2023-10-26 PROCEDURE — 84481 FREE ASSAY (FT-3): CPT | Mod: ELYLAB | Performed by: NURSE PRACTITIONER

## 2023-10-26 PROCEDURE — 82947 ASSAY GLUCOSE BLOOD QUANT: CPT

## 2023-10-26 PROCEDURE — 99223 1ST HOSP IP/OBS HIGH 75: CPT | Performed by: NURSE PRACTITIONER

## 2023-10-26 PROCEDURE — 71045 X-RAY EXAM CHEST 1 VIEW: CPT | Mod: FY,FR

## 2023-10-26 PROCEDURE — 71045 X-RAY EXAM CHEST 1 VIEW: CPT | Mod: FOREIGN READ | Performed by: RADIOLOGY

## 2023-10-26 PROCEDURE — 94640 AIRWAY INHALATION TREATMENT: CPT | Performed by: STUDENT IN AN ORGANIZED HEALTH CARE EDUCATION/TRAINING PROGRAM

## 2023-10-26 PROCEDURE — 99233 SBSQ HOSP IP/OBS HIGH 50: CPT | Performed by: STUDENT IN AN ORGANIZED HEALTH CARE EDUCATION/TRAINING PROGRAM

## 2023-10-26 PROCEDURE — 99223 1ST HOSP IP/OBS HIGH 75: CPT | Performed by: INTERNAL MEDICINE

## 2023-10-26 PROCEDURE — 84443 ASSAY THYROID STIM HORMONE: CPT | Performed by: NURSE PRACTITIONER

## 2023-10-26 PROCEDURE — 84439 ASSAY OF FREE THYROXINE: CPT | Performed by: NURSE PRACTITIONER

## 2023-10-26 PROCEDURE — 36415 COLL VENOUS BLD VENIPUNCTURE: CPT | Performed by: STUDENT IN AN ORGANIZED HEALTH CARE EDUCATION/TRAINING PROGRAM

## 2023-10-26 PROCEDURE — 94640 AIRWAY INHALATION TREATMENT: CPT

## 2023-10-26 PROCEDURE — 80053 COMPREHEN METABOLIC PANEL: CPT | Performed by: STUDENT IN AN ORGANIZED HEALTH CARE EDUCATION/TRAINING PROGRAM

## 2023-10-26 PROCEDURE — 1200000002 HC GENERAL ROOM WITH TELEMETRY DAILY

## 2023-10-26 PROCEDURE — 93005 ELECTROCARDIOGRAM TRACING: CPT

## 2023-10-26 RX ORDER — INSULIN LISPRO 100 [IU]/ML
0-10 INJECTION, SOLUTION INTRAVENOUS; SUBCUTANEOUS
Status: DISCONTINUED | OUTPATIENT
Start: 2023-10-26 | End: 2023-10-30 | Stop reason: HOSPADM

## 2023-10-26 RX ORDER — FUROSEMIDE 10 MG/ML
40 INJECTION INTRAMUSCULAR; INTRAVENOUS ONCE
Status: COMPLETED | OUTPATIENT
Start: 2023-10-26 | End: 2023-10-26

## 2023-10-26 RX ORDER — SODIUM CHLORIDE 9 MG/ML
75 INJECTION, SOLUTION INTRAVENOUS CONTINUOUS
Status: DISCONTINUED | OUTPATIENT
Start: 2023-10-26 | End: 2023-10-27

## 2023-10-26 RX ORDER — DEXTROSE 50 % IN WATER (D50W) INTRAVENOUS SYRINGE
25
Status: DISCONTINUED | OUTPATIENT
Start: 2023-10-26 | End: 2023-10-30 | Stop reason: HOSPADM

## 2023-10-26 RX ORDER — DEXTROSE MONOHYDRATE 100 MG/ML
0.3 INJECTION, SOLUTION INTRAVENOUS ONCE AS NEEDED
Status: DISCONTINUED | OUTPATIENT
Start: 2023-10-26 | End: 2023-10-30 | Stop reason: HOSPADM

## 2023-10-26 RX ORDER — IPRATROPIUM BROMIDE AND ALBUTEROL SULFATE 2.5; .5 MG/3ML; MG/3ML
3 SOLUTION RESPIRATORY (INHALATION) 3 TIMES DAILY
Status: DISCONTINUED | OUTPATIENT
Start: 2023-10-26 | End: 2023-10-29

## 2023-10-26 RX ORDER — SPIRONOLACTONE 25 MG/1
25 TABLET ORAL DAILY
Status: DISCONTINUED | OUTPATIENT
Start: 2023-10-26 | End: 2023-10-29

## 2023-10-26 RX ORDER — ACETAMINOPHEN 500 MG
5 TABLET ORAL NIGHTLY
Status: DISCONTINUED | OUTPATIENT
Start: 2023-10-26 | End: 2023-10-30 | Stop reason: HOSPADM

## 2023-10-26 RX ORDER — ALBUTEROL SULFATE 0.83 MG/ML
2.5 SOLUTION RESPIRATORY (INHALATION) EVERY 6 HOURS PRN
Status: DISCONTINUED | OUTPATIENT
Start: 2023-10-26 | End: 2023-10-28

## 2023-10-26 RX ORDER — PANTOPRAZOLE SODIUM 40 MG/1
40 TABLET, DELAYED RELEASE ORAL
Status: DISCONTINUED | OUTPATIENT
Start: 2023-10-27 | End: 2023-10-30 | Stop reason: HOSPADM

## 2023-10-26 RX ORDER — ALBUTEROL SULFATE 0.83 MG/ML
2.5 SOLUTION RESPIRATORY (INHALATION) EVERY 6 HOURS PRN
Status: DISCONTINUED | OUTPATIENT
Start: 2023-10-26 | End: 2023-10-30 | Stop reason: HOSPADM

## 2023-10-26 RX ORDER — ATORVASTATIN CALCIUM 80 MG/1
80 TABLET, FILM COATED ORAL DAILY
Status: DISCONTINUED | OUTPATIENT
Start: 2023-10-26 | End: 2023-10-30 | Stop reason: HOSPADM

## 2023-10-26 RX ORDER — CARVEDILOL 12.5 MG/1
12.5 TABLET ORAL
Status: DISCONTINUED | OUTPATIENT
Start: 2023-10-26 | End: 2023-10-29

## 2023-10-26 RX ORDER — ASPIRIN 81 MG/1
81 TABLET ORAL DAILY
Status: DISCONTINUED | OUTPATIENT
Start: 2023-10-26 | End: 2023-10-30 | Stop reason: HOSPADM

## 2023-10-26 RX ORDER — GABAPENTIN 400 MG/1
400 CAPSULE ORAL 3 TIMES DAILY
Status: DISCONTINUED | OUTPATIENT
Start: 2023-10-26 | End: 2023-10-29

## 2023-10-26 RX ORDER — FUROSEMIDE 10 MG/ML
40 INJECTION INTRAMUSCULAR; INTRAVENOUS EVERY 12 HOURS
Status: DISCONTINUED | OUTPATIENT
Start: 2023-10-26 | End: 2023-10-29

## 2023-10-26 RX ADMIN — IPRATROPIUM BROMIDE AND ALBUTEROL SULFATE 3 ML: 2.5; .5 SOLUTION RESPIRATORY (INHALATION) at 15:24

## 2023-10-26 RX ADMIN — IPRATROPIUM BROMIDE AND ALBUTEROL SULFATE 3 ML: 2.5; .5 SOLUTION RESPIRATORY (INHALATION) at 19:33

## 2023-10-26 RX ADMIN — FUROSEMIDE 40 MG: 10 INJECTION, SOLUTION INTRAMUSCULAR; INTRAVENOUS at 04:52

## 2023-10-26 RX ADMIN — INSULIN HUMAN 10 UNITS: 100 INJECTION, SOLUTION PARENTERAL at 04:52

## 2023-10-26 SDOH — SOCIAL STABILITY: SOCIAL INSECURITY: ARE THERE ANY APPARENT SIGNS OF INJURIES/BEHAVIORS THAT COULD BE RELATED TO ABUSE/NEGLECT?: NO

## 2023-10-26 SDOH — SOCIAL STABILITY: SOCIAL INSECURITY: HAVE YOU HAD THOUGHTS OF HARMING ANYONE ELSE?: NO

## 2023-10-26 SDOH — SOCIAL STABILITY: SOCIAL INSECURITY: HAS ANYONE EVER THREATENED TO HURT YOUR FAMILY OR YOUR PETS?: NO

## 2023-10-26 SDOH — SOCIAL STABILITY: SOCIAL INSECURITY: WERE YOU ABLE TO COMPLETE ALL THE BEHAVIORAL HEALTH SCREENINGS?: YES

## 2023-10-26 SDOH — SOCIAL STABILITY: SOCIAL INSECURITY: DOES ANYONE TRY TO KEEP YOU FROM HAVING/CONTACTING OTHER FRIENDS OR DOING THINGS OUTSIDE YOUR HOME?: NO

## 2023-10-26 SDOH — SOCIAL STABILITY: SOCIAL INSECURITY: DO YOU FEEL ANYONE HAS EXPLOITED OR TAKEN ADVANTAGE OF YOU FINANCIALLY OR OF YOUR PERSONAL PROPERTY?: NO

## 2023-10-26 SDOH — SOCIAL STABILITY: SOCIAL INSECURITY: ARE YOU OR HAVE YOU BEEN THREATENED OR ABUSED PHYSICALLY, EMOTIONALLY, OR SEXUALLY BY ANYONE?: NO

## 2023-10-26 SDOH — SOCIAL STABILITY: SOCIAL INSECURITY: ABUSE: ADULT

## 2023-10-26 SDOH — SOCIAL STABILITY: SOCIAL INSECURITY: DO YOU FEEL UNSAFE GOING BACK TO THE PLACE WHERE YOU ARE LIVING?: NO

## 2023-10-26 ASSESSMENT — ACTIVITIES OF DAILY LIVING (ADL)
WALKS IN HOME: NEEDS ASSISTANCE
DRESSING YOURSELF: INDEPENDENT
PATIENT'S MEMORY ADEQUATE TO SAFELY COMPLETE DAILY ACTIVITIES?: YES
TOILETING: INDEPENDENT
HEARING - RIGHT EAR: FUNCTIONAL
FEEDING YOURSELF: INDEPENDENT
LACK_OF_TRANSPORTATION: NO
ASSISTIVE_DEVICE: WHEELCHAIR;PROSTHESIS
GROOMING: INDEPENDENT
HEARING - LEFT EAR: FUNCTIONAL
ADEQUATE_TO_COMPLETE_ADL: YES
JUDGMENT_ADEQUATE_SAFELY_COMPLETE_DAILY_ACTIVITIES: YES
BATHING: INDEPENDENT

## 2023-10-26 ASSESSMENT — PAIN SCALES - GENERAL
PAINLEVEL_OUTOF10: 0 - NO PAIN
PAINLEVEL_OUTOF10: 0 - NO PAIN
PAINLEVEL_OUTOF10: 2
PAINLEVEL_OUTOF10: 2

## 2023-10-26 ASSESSMENT — COGNITIVE AND FUNCTIONAL STATUS - GENERAL
DAILY ACTIVITIY SCORE: 24
WALKING IN HOSPITAL ROOM: A LITTLE
PATIENT BASELINE BEDBOUND: NO
CLIMB 3 TO 5 STEPS WITH RAILING: A LITTLE
MOBILITY SCORE: 21
STANDING UP FROM CHAIR USING ARMS: A LITTLE
WALKING IN HOSPITAL ROOM: A LITTLE
CLIMB 3 TO 5 STEPS WITH RAILING: A LITTLE
STANDING UP FROM CHAIR USING ARMS: A LITTLE
DAILY ACTIVITIY SCORE: 24
MOBILITY SCORE: 21

## 2023-10-26 ASSESSMENT — LIFESTYLE VARIABLES
HOW OFTEN DO YOU HAVE 6 OR MORE DRINKS ON ONE OCCASION: NEVER
HAVE PEOPLE ANNOYED YOU BY CRITICIZING YOUR DRINKING: NO
EVER FELT BAD OR GUILTY ABOUT YOUR DRINKING: NO
EVER HAD A DRINK FIRST THING IN THE MORNING TO STEADY YOUR NERVES TO GET RID OF A HANGOVER: NO
SKIP TO QUESTIONS 9-10: 1
REASON UNABLE TO ASSESS: YES
AUDIT-C TOTAL SCORE: 0
HOW MANY STANDARD DRINKS CONTAINING ALCOHOL DO YOU HAVE ON A TYPICAL DAY: PATIENT DOES NOT DRINK
HAVE YOU EVER FELT YOU SHOULD CUT DOWN ON YOUR DRINKING: NO
AUDIT-C TOTAL SCORE: 0
HOW OFTEN DO YOU HAVE A DRINK CONTAINING ALCOHOL: NEVER

## 2023-10-26 ASSESSMENT — COLUMBIA-SUICIDE SEVERITY RATING SCALE - C-SSRS

## 2023-10-26 ASSESSMENT — PAIN - FUNCTIONAL ASSESSMENT
PAIN_FUNCTIONAL_ASSESSMENT: 0-10

## 2023-10-26 ASSESSMENT — PATIENT HEALTH QUESTIONNAIRE - PHQ9
1. LITTLE INTEREST OR PLEASURE IN DOING THINGS: NOT AT ALL
SUM OF ALL RESPONSES TO PHQ9 QUESTIONS 1 & 2: 0
2. FEELING DOWN, DEPRESSED OR HOPELESS: NOT AT ALL

## 2023-10-26 NOTE — CONSULTS
Reason For Consult  Short of breath    History Of Present Illness  Hitesh Jurado is a 53 y.o. male presenting with history of congestive heart failure and history of smoking admitted with increased shortness of breath..     Past Medical History  He has a past medical history of CHF (congestive heart failure) (CMS/[facility]) and Diabetes mellitus (CMS/[facility]).    Surgical History  Left below-knee amputation, left femoropopliteal bypass, all teeth extraction.      Social History  He reports that he has been smoking cigarettes. He has been smoking an average of .5 packs per day. He has never used smokeless tobacco. He reports that he does not drink alcohol and does not use drugs.    Family History  No family history on file.     Allergies  Amoxicillin    Review of Systems  Review of Systems   Constitutional: Negative.    HENT:  Positive for congestion.    Eyes: Negative.    Respiratory:  Positive for shortness of breath.    Cardiovascular: Negative.    Gastrointestinal: Negative.    Endocrine: Positive for heat intolerance.   Genitourinary: Negative.    Musculoskeletal:  Positive for arthralgias.   Allergic/Immunologic: Negative.    Neurological:  Positive for weakness.   Hematological:  Positive for adenopathy.   Psychiatric/Behavioral:  Positive for sleep disturbance.       Physical Exam  Vitals and nursing note reviewed.   Constitutional:       Appearance: Normal appearance.   HENT:      Head: Normocephalic.      Nose: Nose normal.      Mouth/Throat:      Mouth: Mucous membranes are dry.   Eyes:      Extraocular Movements: Extraocular movements intact.      Pupils: Pupils are equal, round, and reactive to light.   Cardiovascular:      Rate and Rhythm: Tachycardia present.      Pulses: Normal pulses.   Pulmonary:      Breath sounds: Rales present.   Abdominal:      Palpations: Abdomen is soft.   Musculoskeletal:         General: Normal range of motion.      Cervical back: Normal range of motion and neck supple.  "  Skin:     General: Skin is dry.   Neurological:      General: No focal deficit present.      Mental Status: He is alert and oriented to person, place, and time. Mental status is at baseline.   Psychiatric:         Mood and Affect: Mood normal.         Behavior: Behavior normal.           Last Recorded Vitals  Blood pressure 131/74, pulse 98, temperature 36.6 °C (97.9 °F), resp. rate 20, height 1.753 m (5' 9\"), weight 52.1 kg (114 lb 13.8 oz), SpO2 94 %.    Relevant Results  Results for orders placed or performed during the hospital encounter of 10/26/23 (from the past 96 hour(s))   POCT GLUCOSE   Result Value Ref Range    POCT Glucose 506 (H) 74 - 99 mg/dL   CBC and Auto Differential   Result Value Ref Range    WBC 13.1 (H) 4.4 - 11.3 x10*3/uL    nRBC 0.0 0.0 - 0.0 /100 WBCs    RBC 3.87 (L) 4.50 - 5.90 x10*6/uL    Hemoglobin 9.0 (L) 13.5 - 17.5 g/dL    Hematocrit 27.3 (L) 41.0 - 52.0 %    MCV 71 (L) 80 - 100 fL    MCH 23.3 (L) 26.0 - 34.0 pg    MCHC 33.0 32.0 - 36.0 g/dL    RDW 17.4 (H) 11.5 - 14.5 %    Platelets 357 150 - 450 x10*3/uL    MPV 10.4 7.5 - 11.5 fL    Neutrophils % 81.6 40.0 - 80.0 %    Immature Granulocytes %, Automated 0.4 0.0 - 0.9 %    Lymphocytes % 9.1 13.0 - 44.0 %    Monocytes % 8.8 2.0 - 10.0 %    Eosinophils % 0.0 0.0 - 6.0 %    Basophils % 0.1 0.0 - 2.0 %    Neutrophils Absolute 10.73 (H) 1.20 - 7.70 x10*3/uL    Immature Granulocytes Absolute, Automated 0.05 0.00 - 0.70 x10*3/uL    Lymphocytes Absolute 1.20 1.20 - 4.80 x10*3/uL    Monocytes Absolute 1.15 (H) 0.10 - 1.00 x10*3/uL    Eosinophils Absolute 0.00 0.00 - 0.70 x10*3/uL    Basophils Absolute 0.01 0.00 - 0.10 x10*3/uL   Comprehensive metabolic panel   Result Value Ref Range    Glucose 577 (HH) 74 - 99 mg/dL    Sodium 121 (L) 136 - 145 mmol/L    Potassium 5.1 3.5 - 5.3 mmol/L    Chloride 81 (L) 98 - 107 mmol/L    Bicarbonate 30 21 - 32 mmol/L    Anion Gap 15 10 - 20 mmol/L    Urea Nitrogen 64 (H) 6 - 23 mg/dL    Creatinine 1.83 (H) " 0.50 - 1.30 mg/dL    eGFR 44 (L) >60 mL/min/1.73m*2    Calcium 8.7 8.6 - 10.3 mg/dL    Albumin 3.5 3.4 - 5.0 g/dL    Alkaline Phosphatase 104 33 - 120 U/L    Total Protein 6.5 6.4 - 8.2 g/dL    AST 19 9 - 39 U/L    Bilirubin, Total 1.6 (H) 0.0 - 1.2 mg/dL    ALT 22 10 - 52 U/L   B-Type Natriuretic Peptide   Result Value Ref Range    BNP 2,850 (H) 0 - 99 pg/mL   Troponin I, High Sensitivity   Result Value Ref Range    Troponin I, High Sensitivity 1,590 (HH) 0 - 20 ng/L   Troponin I, High Sensitivity   Result Value Ref Range    Troponin I, High Sensitivity 1,417 (HH) 0 - 20 ng/L   POCT GLUCOSE   Result Value Ref Range    POCT Glucose 287 (H) 74 - 99 mg/dL   POCT GLUCOSE   Result Value Ref Range    POCT Glucose 219 (H) 74 - 99 mg/dL   POCT GLUCOSE   Result Value Ref Range    POCT Glucose 273 (H) 74 - 99 mg/dL   POCT GLUCOSE   Result Value Ref Range    POCT Glucose 260 (H) 74 - 99 mg/dL   Urine electrolytes   Result Value Ref Range    Sodium, Urine Random 24 mmol/L    Sodium/Creatinine Ratio 61 Not established. mmol/g Creat    Potassium, Urine Random 37 mmol/L    Potassium/Creatinine Ratio 95 Not established mmol/g Creat    Chloride, Urine Random <15 mmol/L    Chloride/Creatinine Ratio      Creatinine, Urine Random 39.1 20.0 - 370.0 mg/dL   Troponin I, High Sensitivity   Result Value Ref Range    Troponin I, High Sensitivity 1,590 (HH) 0 - 20 ng/L   TSH with reflex to Free T4 if abnormal   Result Value Ref Range    Thyroid Stimulating Hormone <0.01 (L) 0.44 - 3.98 mIU/L   Thyroxine, Free   Result Value Ref Range    Thyroxine, Free 3.97 (H) 0.61 - 1.12 ng/dL    XR chest 1 view    Result Date: 10/26/2023  STUDY: Chest Radiograph;  10/26/2023 2:28 AM INDICATION: Shortness of breath. COMPARISON: 10/25/2023 XR Chest two view ACCESSION NUMBER(S): [id number] ORDERING CLINICIAN: LO SHAW TECHNIQUE:  Frontal chest was obtained at 2:28 hours. FINDINGS: CARDIOMEDIASTINAL SILHOUETTE: Cardiomediastinal silhouette is  borderline in size, unchanged.  LUNGS: Lungs are clear.  ABDOMEN: No remarkable upper abdominal findings.  BONES: No acute osseous changes.    Stable borderline heart size.  Clear lungs.  No evidence of infiltrate or edema. Signed by Yandel Slade MD    XR chest 2 views    Result Date: 10/25/2023  STUDY: Chest Radiographs;  10/25/2023 at 2:55 AM INDICATION: Evaluate for cardiomegaly and worsening pulmonary edema. COMPARISON: CTA chest 10/24/2023, XR chest 10/24/2023, XR chest 11/13/2022, XR chest 2/28/2022. ACCESSION NUMBER(S): [id number] ORDERING CLINICIAN: RAJ OWEN TECHNIQUE:  Frontal and lateral chest. FINDINGS: CARDIOMEDIASTINAL SILHOUETTE: Cardiomediastinal silhouette is enlarged.  Central pulmonary vasculature is slightly prominent.  There are interstitial changes noted throughout both lungs. No localized consolidation is appreciated. ABDOMEN: No remarkable upper abdominal findings.  BONES: No acute osseous changes.    Above findings likely related to interstitial edema from CHF.  This has progressed since previous exam. Signed by Mayur Melo DO    CT angio chest for pulmonary embolism    Result Date: 10/24/2023  STUDY: CT Angiogram of the Chest; 10/24/2023 2:49 PM. INDICATION: Shortness of breath and elevated D-dimer; Evaluate for pulmonary embolism. COMPARISON: Chest radiograph performed the same date. ACCESSION NUMBER(S): [id number] ORDERING CLINICIAN: MARTHA HODGE TECHNIQUE:  CTA of the chest was performed with intravenous contrast. Images are reviewed and processed at a workstation according to the CT angiogram protocol with 3-D and/or MIP post processing imaging generated.  Omnipaque 350, 75 mL was administered intravenously. Automated mA/kV exposure control was utilized and patient examination was performed in strict accordance with principles of ALARA. FINDINGS: Pulmonary arteries are adequately opacified without acute or chronic filling defects.  The thoracic aorta is normal in course  and caliber without dissection or aneurysm. There are scattered vascular calcifications along the arterial tree The heart is mildly enlarged with no pericardial effusion.  Prominent mediastinal lymph nodes are noted with a short axis diameter of up to 1.5 cm. The esophagus is partly air-filled. There is no pleural effusion, pleural thickening, or pneumothorax. The airways are patent. There is a 5 x 2 mm perifissural nodule along in the right middle lobe adjacent to the minor fissure. The right lobe of the thyroid is enlarged and heterogeneous. The thyroid is not included in entirety. Within the upper abdomen there are foci of increased attenuation in both kidneys suggesting possible nonobstructing calculi., The larger in the upper pole of the left kidney measures about 5 mm in diameter. The kidneys are not included in entirety. There are vascular calcifications along the arterial tree.  No suspicious bony lesions.    1.No evidence of acute pulmonary embolus or thoracic aortic aneurysm or dissection. 2.Mild cardiomegaly with no pericardial effusion. 3.Mediastinal adenopathy. Follow-up is suggested. 4.Enlarged heterogeneous right lobe of the thyroid. Recommend correlation with thyroid ultrasound. 5.There is a 5 x 2 mm perifissural nodule along in the right middle lobe adjacent to the minor fissure, likely benign. Signed by Radha Casanova DO    XR chest 1 view    Result Date: 10/24/2023  STUDY: Chest Radiograph;  10/24/2023 13:15 PM. INDICATION: Shortness of breath. COMPARISON: CXR 11/13/2022 ACCESSION NUMBER(S): [id number] ORDERING CLINICIAN: MARTHA HODGE TECHNIQUE:  Frontal chest was obtained at 13:15 hours. FINDINGS: CARDIOMEDIASTINAL SILHOUETTE: The heart is borderline in size.  LUNGS: Lungs are clear.  ABDOMEN: No remarkable upper abdominal findings.  BONES: No acute osseous changes.    Borderline heart size. No acute cardiopulmonary abnormality. Signed by Evans Amaya MD       Current Facility-Administered  Medications:     albuterol 2.5 mg /3 mL (0.083 %) nebulizer solution 2.5 mg, 2.5 mg, nebulization, q6h PRN, Howard Puga MD    albuterol 2.5 mg /3 mL (0.083 %) nebulizer solution 2.5 mg, 2.5 mg, nebulization, q6h PRN, Howard Puga MD    aspirin EC tablet 81 mg, 81 mg, oral, Daily, Howard Puga MD    atorvastatin (Lipitor) tablet 80 mg, 80 mg, oral, Daily, Howard Puga MD    carvedilol (Coreg) tablet 12.5 mg, 12.5 mg, oral, BID with meals, Howard Puga MD    dextrose 10 % in water (D10W) infusion, 0.3 g/kg/hr, intravenous, Once PRN, Howard Puga MD    dextrose 50 % injection 25 g, 25 g, intravenous, q15 min PRN, Howard Puga MD    empagliflozin (Jardiance) tablet 10 mg, 10 mg, oral, Daily, Howard Puga MD    [Held by provider] furosemide (Lasix) injection 40 mg, 40 mg, intravenous, q12h, Howard Puga MD    gabapentin (Neurontin) capsule 400 mg, 400 mg, oral, TID, Howard Puga MD    glucagon (Glucagen) injection 1 mg, 1 mg, intramuscular, q15 min PRN, Howard Puga MD    insulin lispro (HumaLOG) injection 0-10 Units, 0-10 Units, subcutaneous, TID with meals, Howard Puga MD    ipratropium-albuteroL (Duo-Neb) 0.5-2.5 mg/3 mL nebulizer solution 3 mL, 3 mL, nebulization, TID, Howard Puga MD, 3 mL at 10/26/23 1933    linaGLIPtin (Tradjenta) tablet 5 mg, 5 mg, oral, Daily, Howard Puga MD    melatonin tablet 5 mg, 5 mg, oral, Nightly, Howard Puga MD    [START ON 10/27/2023] pantoprazole (ProtoNix) EC tablet 40 mg, 40 mg, oral, Daily before breakfast, Howard Puga MD    perflutren lipid microspheres (Definity) injection 0.5-10 mL of dilution, 0.5-10 mL of dilution, intravenous, Once in imaging, Howard Puga MD    perflutcar protein A microsphere (Optison) injection 0.5 mL, 0.5 mL, intravenous, Once in imaging, Howard Puga MD    sodium chloride 0.9% infusion, 75 mL/hr, intravenous, Continuous, SHANDA Lima-CNP    [Held by  provider] spironolactone (Aldactone) tablet 25 mg, 25 mg, oral, Daily, Howard Puga MD    sulfur hexafluoride microsphr (Lumason) injection 24.28 mg, 2 mL, intravenous, Once in imaging, Howard Puga MD   Assessment/Plan   Acute systolic congestive heart failure patient seen by cardiology on diuretic.  Increased troponin non-ST elevation MI.  Smoking cessation discussed with the patient patient heavy smoker smoked more than 2 pack/day.  Dyspnea on exertion follow-up in the office for complete work-up for obstructive and restrictive lung disease.  Right middle lobe 5 x 2 mm lung nodule follow-up CT scan of the chest patient advised to follow-up in the office for follow-up.  Hypertension.  Dyslipidemia.  Coronary disease.  Diabetes.  Peripheral artery disease.  Left below-knee amputation.  Chronic kidney disease /acute kidney injury.  Hyperthyroidism.  GERD.  DVT prophylaxis.  PT OT.  P.o. diet.    Shamir Sagastume MD

## 2023-10-26 NOTE — H&P
" Medical Group History and Physical    ASSESSMENT & PLAN:     NSTEMI d/t demand ischemia c/b Acute on chronic congestive heart failure  PIPPA on CKD  - baseline Cr 1.1, now 1.83 GFR 44, limit nephro toxic meds, and renally dose any antibiotics if needed  - Consult cardiology - heart failure navigator  - BNP 2100 now 2850, also with worsening renal function  - Send sputum  - Lasix 40 IV ordered in ED,   - Consulted Nephrology for volume management - will need orders for daily diuresis  - non-compliant w HF meds  - troponin [id number] respectively  - decrease BB by 50% for hospital admission  - Send urine electrolytes  - Strict I's and O's, daily weights  - trend labs: trop, renal function, and     DM2 with hyperglycemia  - Pseudohyponatremia [121] d/t glucose [577]  - Accu-Chek and SSI  - Diabetic diet  - Hold oral diabetic medications as patient is likely noncompliant due to financial instability    Nicotine dependence  -Encourage smoking cessation, offer replacement    VTE Prophylaxis: Heparin subcu    SHANDA Marin-CNP    HISTORY OF PRESENT ILLNESS:   Chief Complaint: Shortness of breath    History Of Present Illness:    Hitesh Jurado is a 53 y.o. male with a significant past medical history of NSTEMI, IDDM2, acute on chronic heart failure, PAD, CAD, HLD, nicotine dependence, PTSD, JONA presents for the third day in a row to the emergency room with shortness of breath.  Send sputum, review prior studies, trend labs.    Assessed patient on 10/25 at 5:30 AM at which point he was admitted under internal medicine service, but left AMA because \"he needed to gather supplies\" he is now ready for admission and has his filtered water jug with him, he is not able to drink the water here.  Last echo EF is 40%, with only moderate regurgitation of the mitral valve, home dosing of diuretics show he should be taking: torsemide 20 mg p.o. however patient is noncompliant with medications, encourage daily weights, diet " modification, and fluid restriction.     Mr. Jurado is again presenting with worsening PIPPA; creatinine baseline 1.1 today 1.83 GFR 44; consider nephrology consult for management of fluid volume complicated by CHF, limit nephrotoxic agents, and renally dose antibiotics.     Poorly controlled diabetes, hyperglycemic on presentation w glucose in 500-600s responds well to insulin not in DKA,           Review of systems: 10 point review of systems is otherwise negative except as mentioned above.    PAST HISTORIES:       Past Medical History:  Medical Problems       Problem List       * (Principal) NSTEMI (non-ST elevated myocardial infarction) (CMS/[facility])    ACC/AHA stage C acute systolic heart failure (CMS/[facility])    Status post below-knee amputation of left lower extremity (CMS/HCC)    PAD (peripheral artery disease) (CMS/HCC)    Coronary artery disease involving native coronary artery without angina pectoris    Mixed hyperlipidemia    Thyroid mass    Tobacco dependence with current use    PIPPA (acute kidney injury) (CMS/[facility])    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 Comment on above: CONGESTIVE HEART FAILURE         GERD (gastroesophageal reflux disease)    Dyspepsia    Overview Signed 10/25/2023  5:33 AM by HEENA Marin     Formatting of this note might be different from the original. Added automatically from request for surgery [id number]         Critical lower limb ischemia (CMS/[facility])    Overview Signed 10/25/2023  5:33 AM by HEENA Marin     Formatting of this note might be different from the original. Added automatically from request for surgery [id number] Formatting of this note might be different from the original. Formatting of this note might be different from the original. Added  "automatically from request for surgery [id number]         Chronic obstructive pulmonary disease, unspecified (CMS/HCC)    Primary hypertension    PTSD (post-traumatic stress disorder)    JONA (generalized anxiety disorder)    Severe episode of recurrent major depressive disorder, without psychotic features (CMS/HCC)    Type 2 diabetes mellitus (CMS/HCC)    COPD exacerbation (CMS/HCC)           Past Surgical History:  History reviewed. No pertinent surgical history.       Social History:  He reports that he has been smoking cigarettes. He has been smoking an average of .5 packs per day. He has never used smokeless tobacco. He reports that he does not drink alcohol and does not use drugs.    Family History:  No family history on file.     Allergies:  Amoxicillin    OBJECTIVE:       Last Recorded Vitals:  Vitals:    10/26/23 0200   BP: 102/69   BP Location: Right arm   Patient Position: Sitting   Pulse: 98   Resp: 20   Temp: 36.2 °C (97.2 °F)   TempSrc: Tympanic   SpO2: 98%   Weight: 55.3 kg (122 lb)   Height: 1.753 m (5' 9\")       Last I/O:  No intake/output data recorded.    Physical Exam  Vitals and nursing note reviewed.   Constitutional:       General: He is awake.      Appearance: Normal appearance.   HENT:      Head: Normocephalic and atraumatic.      Mouth/Throat:      Mouth: Mucous membranes are moist.   Eyes:      Extraocular Movements: Extraocular movements intact.      Conjunctiva/sclera: Conjunctivae normal.      Pupils: Pupils are equal, round, and reactive to light.   Cardiovascular:      Rate and Rhythm: Normal rate and regular rhythm.      Pulses: Normal pulses.      Heart sounds: Normal heart sounds.   Pulmonary:      Effort: Pulmonary effort is normal.      Breath sounds: Normal breath sounds.   Abdominal:      General: Abdomen is flat. Bowel sounds are normal.      Palpations: Abdomen is soft.   Musculoskeletal:         General: Normal range of motion.      Cervical back: Normal range of motion and neck " supple.      Right lower leg: No edema.      Comments: Left BKA   Skin:     General: Skin is warm and dry.      Capillary Refill: Capillary refill takes less than 2 seconds.   Neurological:      General: No focal deficit present.      Mental Status: He is alert and oriented to person, place, and time. Mental status is at baseline.   Psychiatric:         Mood and Affect: Mood normal.         Behavior: Behavior normal. Behavior is cooperative.         Thought Content: Thought content normal.         Judgment: Judgment normal.           Scheduled Medications    PRN Medications    Continuous Medications      Outpatient Medications:  Prior to Admission medications    Medication Sig Start Date End Date Taking? Authorizing Provider   acetaminophen (Tylenol) 500 mg tablet Take 1 tablet (500 mg) by mouth every 8 hours if needed.    Historical Provider, MD   aspirin 81 mg EC tablet Take 1 tablet (81 mg) by mouth once daily.    Historical Provider, MD   atorvastatin (Lipitor) 80 mg tablet Take 1 tablet (80 mg) by mouth once daily.    Historical Provider, MD   carvedilol (Coreg) 12.5 mg tablet Take 1 tablet (12.5 mg) by mouth twice a day.    Historical Provider, MD   empagliflozin (Jardiance) 10 mg Take 1 tablet (10 mg) by mouth once daily.    Historical Provider, MD   gabapentin (Neurontin) 400 mg capsule Take 1 capsule (400 mg) by mouth 3 times a day.    Historical Provider, MD   linaGLIPtin (Tradjenta) 5 mg tablet Take 1 tablet (5 mg) by mouth once daily.    Historical Provider, MD   linaGLIPtin (Tradjenta) 5 mg tablet Take 1 tablet (5 mg) by mouth once daily.    Historical Provider, MD   lisinopril 40 mg tablet Take 1 tablet (40 mg) by mouth once daily.    Historical Provider, MD   lisinopril 5 mg tablet Take 1 tablet (5 mg) by mouth once daily.    Historical Provider, MD   melatonin 5 mg tablet Take 1 tablet (5 mg) by mouth once daily at bedtime.    Historical Provider, MD   metFORMIN (Glucophage) 1,000 mg tablet Take 0.5  tablets (500 mg) by mouth twice a day.    Historical Provider, MD   metFORMIN (Glucophage) 1,000 mg tablet Take 1 tablet (1,000 mg) by mouth once daily.    Historical Provider, MD   omeprazole (PriLOSEC) 40 mg DR capsule Take 1 capsule (40 mg) by mouth twice a day.    Historical Provider, MD   spironolactone (Aldactone) 25 mg tablet Take 1 tablet (25 mg) by mouth once daily.    Historical Provider, MD   torsemide (Demadex) 20 mg tablet Take 1 tablet (20 mg) by mouth once daily as needed.    Historical Provider, MD       LABS AND IMAGING:     Labs:  Results for orders placed or performed during the hospital encounter of 10/26/23 (from the past 24 hour(s))   POCT GLUCOSE   Result Value Ref Range    POCT Glucose 506 (H) 74 - 99 mg/dL   CBC and Auto Differential   Result Value Ref Range    WBC 13.1 (H) 4.4 - 11.3 x10*3/uL    nRBC 0.0 0.0 - 0.0 /100 WBCs    RBC 3.87 (L) 4.50 - 5.90 x10*6/uL    Hemoglobin 9.0 (L) 13.5 - 17.5 g/dL    Hematocrit 27.3 (L) 41.0 - 52.0 %    MCV 71 (L) 80 - 100 fL    MCH 23.3 (L) 26.0 - 34.0 pg    MCHC 33.0 32.0 - 36.0 g/dL    RDW 17.4 (H) 11.5 - 14.5 %    Platelets 357 150 - 450 x10*3/uL    MPV 10.4 7.5 - 11.5 fL    Neutrophils % 81.6 40.0 - 80.0 %    Immature Granulocytes %, Automated 0.4 0.0 - 0.9 %    Lymphocytes % 9.1 13.0 - 44.0 %    Monocytes % 8.8 2.0 - 10.0 %    Eosinophils % 0.0 0.0 - 6.0 %    Basophils % 0.1 0.0 - 2.0 %    Neutrophils Absolute 10.73 (H) 1.20 - 7.70 x10*3/uL    Immature Granulocytes Absolute, Automated 0.05 0.00 - 0.70 x10*3/uL    Lymphocytes Absolute 1.20 1.20 - 4.80 x10*3/uL    Monocytes Absolute 1.15 (H) 0.10 - 1.00 x10*3/uL    Eosinophils Absolute 0.00 0.00 - 0.70 x10*3/uL    Basophils Absolute 0.01 0.00 - 0.10 x10*3/uL   Comprehensive metabolic panel   Result Value Ref Range    Glucose 577 (HH) 74 - 99 mg/dL    Sodium 121 (L) 136 - 145 mmol/L    Potassium 5.1 3.5 - 5.3 mmol/L    Chloride 81 (L) 98 - 107 mmol/L    Bicarbonate 30 21 - 32 mmol/L    Anion Gap 15 10 -  20 mmol/L    Urea Nitrogen 64 (H) 6 - 23 mg/dL    Creatinine 1.83 (H) 0.50 - 1.30 mg/dL    eGFR 44 (L) >60 mL/min/1.73m*2    Calcium 8.7 8.6 - 10.3 mg/dL    Albumin 3.5 3.4 - 5.0 g/dL    Alkaline Phosphatase 104 33 - 120 U/L    Total Protein 6.5 6.4 - 8.2 g/dL    AST 19 9 - 39 U/L    Bilirubin, Total 1.6 (H) 0.0 - 1.2 mg/dL    ALT 22 10 - 52 U/L   B-Type Natriuretic Peptide   Result Value Ref Range    BNP 2,850 (H) 0 - 99 pg/mL   Troponin I, High Sensitivity   Result Value Ref Range    Troponin I, High Sensitivity 1,590 (HH) 0 - 20 ng/L   Troponin I, High Sensitivity   Result Value Ref Range    Troponin I, High Sensitivity 1,417 (HH) 0 - 20 ng/L   POCT GLUCOSE   Result Value Ref Range    POCT Glucose 287 (H) 74 - 99 mg/dL        Imaging:  XR chest 1 view   Final Result   Stable borderline heart size.  Clear lungs.  No evidence of infiltrate   or edema.   Signed by Yandel Slade MD

## 2023-10-26 NOTE — CONSULTS
Inpatient consult to Cardiology  Consult performed by: SHANDA Lima-CNP  Consult ordered by: SHANDA Marin-CNP      Reason for consult: Heart failure  Cardiology Consult Note      Date:   10/26/2023  Patient name:  Hitesh Jurado  Date of admission:  10/26/2023  1:53 AM  MRN:   [id number]  YOB: 1970  Time of Consult:  3:08 PM    Consulting Cardiologist: SHANDA Barker, CNP  Primary Cardiologist:   Dr. BONI Naranjo    Referring Provider: Dr. Puga      Admission Diagnosis:     NSTEMI (non-ST elevated myocardial infarction) (CMS/[facility])      History of Present Illness:      Hitesh Jurado is a 53 y.o.  male patient who is being at the request of Dr. Puga for inpatient consultation of CHF. He was admitted on 10/26/2023.  Previous  cardiology records have been reviewed in detail.      Patient presented today to [facility] emergency department 10/25/2023 with chief complaint of shortness of breath and that he cannot breathe when he lays down.  Had left AGAINST MEDICAL ADVICE as he states he forgot to bring his  with him, then presented to the emergency department again on 6/26/2023 with same complaint.  He is admitted to medical service.  He denies chest pain, cough, sputum, fevers or chills palpitations, dizziness, lightheadedness, nausea, vomiting, lower extremity edema, bleeding, difficulty urinating or moving his bowels.  States he has been taking Demadex daily with no significant urine output. He had all of his teeth extracted due to poor dentition history and gingivitis September 18, 2023.  He has history of hyperthyroidism, discusses many homeopathic remedies for his multiple medical issues.  [facility] heart cardiology consult was placed for evaluation of heart failure as patient noted to have elevated troponin and BNP.  At time of cardiology consult in the afternoon of 10/26/2023  patient is without chest pain, shortness of breath.  Telemetry shows sinus tachycardia with heart rates in the low 100's.  He states that he feels better since coming to the hospital.    Per last advanced heart failure note: Patient has history of ischemic cardiomyopathy, systolic heart failure [place] Heart Association stage C follows with advanced heart failure Dr. Philip and Dr. Mauro.  Per last advanced heart failure note patient was first diagnosed with heart disease in 2019 when undergoing surgery for left leg peripheral arterial disease, EF at that time 30%.  Also has history of hyperthyroidism with previously undetectable TSH, level not known  Was evaluated by Dr. Perkins from electrophysiology standpoint 2023, declined ICD implant at that time.    Review of systems negative x12 systems other than mentioned above      Previous cardiac testin2023 transthoracic echocardiogram:  PHYSICIAN INTERPRETATION:  Left Ventricle: Left ventricular systolic function is mildly to moderately decreased, with an estimated ejection fraction of 40%. There are multiple wall motion abnormalities. The left ventricular cavity size is mildly dilated. Spectral Doppler shows a pseudonormal pattern of left ventricular diastolic filling.  Left Atrium: The left atrium is normal in size.  Right Ventricle: The right ventricle is normal in size. There is normal right ventricular global systolic function.  Right Atrium: The right atrium is normal in size.  Aortic Valve: The aortic valve is trileaflet. There is mild aortic valve thickening. There is no evidence of aortic valve stenosis.  The aortic valve dimensionless index is 0.76. There is no evidence of aortic valve regurgitation. The peak instantaneous gradient of the aortic valve is 8.5 mmHg. The mean gradient of the aortic valve is 5.0 mmHg.  Mitral Valve: The mitral valve is mildly thickened. There is no evidence of mitral valve stenosis. There is moderate mitral  valve regurgitation which is centrally directed.  Tricuspid Valve: The tricuspid valve is structurally normal. There is no evidence of tricuspid valve stenosis. There is mild tricuspid regurgitation. The Doppler estimated RVSP is within normal limits at 35.7 mmHg.  Pulmonic Valve: The pulmonic valve is not well visualized. There is no indication of pulmonic valve regurgitation.  Pericardium: There is no pericardial effusion noted.  Aorta: The aortic root is normal.  In comparison to the previous echocardiogram(s): Prior examinations are available and were reviewed for comparison purposes. Compared with echo 6/2022 LVEF has improved, mitral regurgitation improved, tricuspid valve regurgitation new.        CONCLUSIONS:  1. Left ventricular systolic function is mildly to moderately decreased with a 40% estimated ejection fraction.  2. Spectral Doppler shows a pseudonormal pattern of left ventricular diastolic filling.  3. Moderate mitral valve regurgitation.  4. There is No tricuspid stenosis.  5. RVSP within normal limits.  6. Aortic valve stenosis is not present.  7. Compared with echo 6/2022 LVEF has improved, mitral regurgitation improved, tricuspid valve regurgitation new.  8. There are multiple wall motion abnormalities.    Cardiac catheterization 2/11/2021 performed at [facility]:  Very short left main coronary artery  Left anterior descending artery diffuse mild irregularity up to 20% to 30% stenosis.  Small caliber first and second diagonal branch with severe diffuse disease  Circumflex chronically occluded mid circumflex with retrograde filling OM branch from left to left collaterals  Right coronary artery diffuse mild irregularity up to 20% narrowing, 40% distal stenosis, LVEDP 18 mmHg narrowing  Chronically occluded mid circumflex with left to left collaterals    Allergies:     Allergies   Allergen Reactions    Amoxicillin Unknown         Past Medical History:     Past Medical History:    Diagnosis Date    CHF (congestive heart failure) (CMS/[facility])     Diabetes mellitus (CMS/HCC)    Coronary artery disease  Peripheral arterial disease  Thyroid mass  Hyperlipidemia  Status post below knee amputation of left lower extremity  Tobacco dependence with current use  Ischemic cardiomyopathy with chronic systolic heart failure ACC/AHA stage C  Volvulus as a child  Hyperthyroidism  Diabetes with retinopathy  DVT associated with previous PICC line  Mitral valve regurgitation    Past Surgical History:     Left femoropopliteal bypass  Left lower extremity BKA  Teeth extraction      Family History:     Father , history of asbestosis  Mother     Social History:     Current cigarette smoker, estimates 4 to 5 cigarettes a day but admits previous 1 to 2 pack history for many years  Former heavy alcohol use states quit drinking completely 2023  Daily marijuana use  Lives alone currently in [facility], had lived [place] of [place] and several states prior to her current residence.  Has no family members that live nearby, has 2 grown children, daughter that lives in [place] and 1 son in [place]      CURRENT HOSPITAL MEDICATIONS    aspirin, 81 mg, oral, Daily  atorvastatin, 80 mg, oral, Daily  carvedilol, 12.5 mg, oral, BID with meals  empagliflozin, 10 mg, oral, Daily  furosemide, 40 mg, intravenous, q12h  gabapentin, 400 mg, oral, TID  insulin lispro, 0-10 Units, subcutaneous, TID with meals  ipratropium-albuteroL, 3 mL, nebulization, TID  linaGLIPtin, 5 mg, oral, Daily  melatonin, 5 mg, oral, Nightly  [START ON 10/27/2023] pantoprazole, 40 mg, oral, Daily before breakfast  perflutren lipid microspheres, 0.5-10 mL of dilution, intravenous, Once in imaging  perflutren protein A microsphere, 0.5 mL, intravenous, Once in imaging  spironolactone, 25 mg, oral, Daily  sulfur hexafluoride microsphr, 2 mL, intravenous, Once in imaging         Current Outpatient Medications  "  Medication Instructions    acetaminophen (TYLENOL) 500 mg, oral, Every 8 hours PRN    aspirin 81 mg EC tablet 1 tablet, oral, Daily    atorvastatin (Lipitor) 80 mg tablet 1 tablet, oral, Daily    carvedilol (COREG) 12.5 mg, oral, 2 times daily    empagliflozin (JARDIANCE) 10 mg, oral, Daily    gabapentin (NEURONTIN) 400 mg, oral, 3 times daily    linaGLIPtin (Tradjenta) 5 mg tablet 1 tablet, oral, Daily    lisinopril 40 mg tablet 1 tablet, oral, Daily    lisinopril 5 mg, oral, Daily    melatonin 5 mg, oral, Nightly    metFORMIN (Glucophage) 1,000 mg tablet 0.5 tablets, oral, 2 times daily    metFORMIN (GLUCOPHAGE) 1,000 mg, oral, Daily    omeprazole (PRILOSEC) 40 mg, oral, 2 times daily    spironolactone (Aldactone) 25 mg tablet 1 tablet, oral, Daily    torsemide (DEMADEX) 20 mg, oral, Daily PRN    Tradjenta 5 mg, oral, Daily        Review of Systems:      12 point review of systems was obtained in detail and is negative other than that detailed above.    Vital Signs:     Vitals:    10/26/23 0200 10/26/23 1319 10/26/23 1403 10/26/23 1429   BP: 102/69 112/81 112/78 (!) 128/97   BP Location: Right arm      Patient Position: Sitting      Pulse: 98 110 98    Resp: 20 18 18    Temp: 36.2 °C (97.2 °F)   36.2 °C (97.2 °F)   TempSrc: Tympanic      SpO2: 98% 97% 98%    Weight: 55.3 kg (122 lb)      Height: 1.753 m (5' 9\")        No intake or output data in the 24 hours ending 10/26/23 1508    Wt Readings from Last 4 Encounters:   10/26/23 55.3 kg (122 lb)   10/25/23 55.3 kg (122 lb)   10/24/23 55.3 kg (122 lb)   07/21/23 59 kg (130 lb)       Physical Examination:     GENERAL APPEARANCE: Thin appearing male in no acute distress.   CHEST: Symmetric and non-tender.  INTEGUMENT: Skin warm and dry  HEENT: Mucous membranes dry, edentulous   NECK: Supple, no JVD, no bruit.   NEURO/PSHCY: Alert and oriented x3; appropriate behavior and responses and responses  LUNGS: Clear to auscultation bilaterally; normal respiratory " "effort.  HEART: Rate and rhythm tachycardic 2/6, apical murmur  ABDOMEN: Soft, nontender, positive bowel sounds   MUSCULOSKELETAL: Left below the knee amputation with well-healed stump   EXTREMITIES: No lower extremity edema, left below the knee amputation.  2+ radial pulse bilateral, faint palpable right dorsalis pedis pulse       Lab:     CBC:   Lab Results   Component Value Date    WBC 13.1 (H) 10/26/2023    RBC 3.87 (L) 10/26/2023    HGB 9.0 (L) 10/26/2023    HCT 27.3 (L) 10/26/2023     10/26/2023        CMP:    Lab Results   Component Value Date     (L) 10/26/2023    K 5.1 10/26/2023    CL 81 (L) 10/26/2023    CO2 30 10/26/2023    BUN 64 (H) 10/26/2023    CREATININE 1.83 (H) 10/26/2023    GLUCOSE 577 (HH) 10/26/2023    CALCIUM 8.7 10/26/2023           BNP:   Lab Results   Component Value Date    BNP 2,850 (H) 10/26/2023        PT/INR:    No results found for: \"PROTIME\", \"INR\"    HgBA1c:    Lab Results   Component Value Date    HGBA1C 7.9 (H) 12/12/2022       BMP:  Lab Results   Component Value Date     (L) 10/26/2023    K 5.1 10/26/2023    CL 81 (L) 10/26/2023    CO2 30 10/26/2023    BUN 64 (H) 10/26/2023    CREATININE 1.83 (H) 10/26/2023       CBC:  Lab Results   Component Value Date    WBC 13.1 (H) 10/26/2023    RBC 3.87 (L) 10/26/2023    HGB 9.0 (L) 10/26/2023    HCT 27.3 (L) 10/26/2023    MCV 71 (L) 10/26/2023    MCH 23.3 (L) 10/26/2023    MCHC 33.0 10/26/2023    RDW 17.4 (H) 10/26/2023     10/26/2023    MPV 10.4 10/26/2023       Cardiac Enzymes:    Lab Results   Component Value Date    TROPHS 1,417 (HH) 10/26/2023    TROPHS 1,590 (HH) 10/26/2023    TROPHS 693 (HH) 10/25/2023       Hepatic Function Panel:    Lab Results   Component Value Date    ALKPHOS 104 10/26/2023    ALT 22 10/26/2023    AST 19 10/26/2023    PROT 6.5 10/26/2023    BILITOT 1.6 (H) 10/26/2023       Diagnostic Studies:     XR chest 1 view    Result Date: 10/26/2023  STUDY: Chest Radiograph;  10/26/2023 2:28 AM " INDICATION: Shortness of breath. COMPARISON: 10/25/2023 XR Chest two view ACCESSION NUMBER(S): [id number] ORDERING CLINICIAN: LO SHAW TECHNIQUE:  Frontal chest was obtained at 2:28 hours. FINDINGS: CARDIOMEDIASTINAL SILHOUETTE: Cardiomediastinal silhouette is borderline in size, unchanged.  LUNGS: Lungs are clear.  ABDOMEN: No remarkable upper abdominal findings.  BONES: No acute osseous changes.    Stable borderline heart size.  Clear lungs.  No evidence of infiltrate or edema. Signed by Yandel Slade MD      Radiology:     Chest Radiographs;  10/25/2023 at 2:55 AM  INDICATION:  Evaluate for cardiomegaly and worsening pulmonary edema.  COMPARISON:  CTA chest 10/24/2023, XR chest 10/24/2023, XR chest 11/13/2022, XR  chest 2/28/2022.  ACCESSION NUMBER(S):  [id number]  ORDERING CLINICIAN:  RAJ OWEN  TECHNIQUE:  Frontal and lateral chest.   FINDINGS:  CARDIOMEDIASTINAL SILHOUETTE:  Cardiomediastinal silhouette is enlarged.  Central pulmonary  vasculature is slightly prominent.  There are interstitial changes  noted throughout both lungs.  No localized consolidation is appreciated.  ABDOMEN:  No remarkable upper abdominal findings.   BONES:  No acute osseous changes.  IMPRESSION:  Above findings likely related to interstitial edema from CHF.  This  has progressed since previous exam.  Signed by Mayur Melo DO    CT angiogram of chest 10/24/2023:  IMPRESSION:  1.No evidence of acute pulmonary embolus or thoracic aortic  aneurysm or dissection.  2.Mild cardiomegaly with no pericardial effusion.  3.Mediastinal adenopathy. Follow-up is suggested.  4.Enlarged heterogeneous right lobe of the thyroid. Recommend  correlation with thyroid ultrasound.  5.There is a 5 x 2 mm perifissural nodule along in the right  middle lobe adjacent to the minor fissure, likely benign.  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    Chronic congestive heart failure (CMS/[facility])    GERD (gastroesophageal reflux disease)    Dyspepsia    Critical lower limb ischemia (CMS/[facility])    Chronic obstructive pulmonary disease, unspecified (CMS/HCC)    Primary hypertension    PTSD (post-traumatic stress disorder)    JONA (generalized anxiety disorder)    Severe episode of recurrent major depressive disorder, without psychotic features (CMS/[facility])    Type 2 diabetes mellitus (CMS/[facility])    COPD exacerbation (CMS/HCC)    NSTEMI (non-ST elevated myocardial infarction) (CMS/HCC)    Elevated troponin       Assessment:   Chronic systolic heart failure, last EF 40% per echocardiogram July 2023  Ischemic cardiomyopathy  Elevated troponin  Hyponatremia  History of coronary artery disease with occluded circumflex artery  Acute on chronic kidney disease, nephrology has been consulted by primary service  Mitral valve regurgitation  Hyperglycemia, history of diabetes mellitus  History of hyperthyroidism  Peripheral arterial disease with previous left below the knee amputation  COPD with exacerbation  Chronic tobacco use    Plan:   Patient was seen and evaluated in conjunction with Dr. Garcia.  Appears dry on physical examination.  Denies chest pain.  Recommendations:  IV fluids with normal saline at 75 cc an hour overnight  Monitor telemetry  Strict intake output, daily weight  Monitor renal function  Continue beta-blocker, aspirin, atorvastatin, Jardiance  Consult pulmonary for evaluation of COPD  Diabetes management per primary  Obtain TSH, free T3  Katty Rosales,  CNP  Electronically signed by HEENA Lima, on 10/26/2023 at 3:08 PM     Radha Note 10/26/23  I have personally reviewed and edited note to reflect my history, physical, review of testing, assessment, and plan.  Review of systems are negative, noncontributory, as previous mentioned x12 systems.  Exam: Heart-regular, systolic ejection murmur left sternal border, lungs-decreased breath sounds throughout, neck-no JVD extremities-left below the knee amputation with mild stump edema.  No edema right lower extremity   I personally reviewed EKG and chest x-ray:  October 26 chest x-ray:  no acute cardiopulmonary disease  October 26 EKG: Sinus tachycardia, right bundle branch block, PVC, left atrial argument    Assessment:  Dyspnea  Elevated troponin  Chronic systolic congestive heart failure  Ischemic cardiomyopathy EF 40%  Coronary artery disease/chronically occluded left circumflex  Acute on chronic renal failure  Hyponatremia  Mitral valve regurgitation  COPD  Active tobacco abuse  Anemia  Peripheral arterial disease  Diabetes    Recommendations:  Patient does not appear to be volume overloaded on exam.  There is no pulmonary edema or pleural effusions on chest x-ray to explain dyspnea.  Cardiac biomarkers are elevated.  No apparent symptoms of angina.  No acute ischemic EKG abnormalities.  Patient has acute renal failure.  He was using his Demadex this past week 5 days without improvement of symptoms and likely contributed to his dehydration and acute renal failure.  Patient also received contrast for CTA chest which revealed no pulmonary embolus, no pulmonary edema.  At this time we will stop diuretics.  Start normal saline 75 cc/h.  Patient is hyponatremic.  Consult nephrology.  Suspect dyspnea is pulmonary in etiology possible COPD exacerbation.  Consult pulmonary.  Advised smoking cessation.  Continue medical therapy with  aspirin, 81 mg, oral, Daily  atorvastatin, 80 mg, oral, Daily  carvedilol, 12.5  mg, oral, BID with meals  Jardiance  Consider ischemic evaluation.    MLanger

## 2023-10-26 NOTE — PROGRESS NOTES
"Hitesh Jurado is a 53 y.o. male on day 0 of admission presenting with NSTEMI (non-ST elevated myocardial infarction) (CMS/[facility]).    Subjective   Patient seen and examined.  He is upset about the fact that his blood sugar is running high and he has not eaten anything.  Denies any chest pain, shortness of breath is improving, no fevers or chills.       Objective     Physical Exam  Constitutional:       Appearance: Normal appearance. He is ill-appearing.   HENT:      Head: Normocephalic and atraumatic.   Cardiovascular:      Rate and Rhythm: Normal rate and regular rhythm.      Heart sounds: No murmur heard.  Pulmonary:      Effort: No respiratory distress.      Breath sounds: No stridor.      Comments: Decreased breathing sounds bilaterally  Abdominal:      General: There is no distension.      Palpations: Abdomen is soft.      Tenderness: There is no abdominal tenderness.   Musculoskeletal:      Comments: Left below the knee amputation   Neurological:      General: No focal deficit present.      Mental Status: He is alert.   Psychiatric:      Comments: Agitated         Last Recorded Vitals  Blood pressure 112/81, pulse 110, temperature 36.2 °C (97.2 °F), temperature source Tympanic, resp. rate 18, height 1.753 m (5' 9\"), weight 55.3 kg (122 lb), SpO2 97 %.  Intake/Output last 3 Shifts:  No intake/output data recorded.    Relevant Results                             Assessment/Plan   Principal Problem:    NSTEMI (non-ST elevated myocardial infarction) (CMS/[facility])  Active Problems:    Elevated troponin    52-year-old male with past medical history of peripheral vascular disease, smoking, chronic kidney disease, coronary artery disease, congestive heart failure, diabetes type 2, status post amputation of left lower extremity admitted with acute CHF exacerbation and NSTEMI    His blood pressure is running low, creatinine is slightly elevated  We will hold lisinopril  Monitor blood pressure  Troponin is trending down " and is not complaining of chest pain  We will avoid anticoagulation for now  Cardiology consulted  We will hold torsemide, start Lasix 40 mg IV twice daily  Chest x-ray was consistent with pulmonary edema  Nephrology has also been consulted  Repeat echocardiogram  Input output monitoring  We will send another set of troponin  Observe on telemetry  Blood sugars are running high, he has a history of noncompliance, will restart Januvia, linagliptin and start insulin sliding scale  Monitor blood sugars, diabetic diet  For heart failure we will resume Coreg, spironolactone  Was counseled regarding smoking  DVT prophylaxis           Howard Puga MD

## 2023-10-27 ENCOUNTER — APPOINTMENT (OUTPATIENT)
Dept: CARDIOLOGY | Facility: HOSPITAL | Age: 53
End: 2023-10-27
Payer: MEDICAID

## 2023-10-27 LAB
25(OH)D3 SERPL-MCNC: 61 NG/ML (ref 30–100)
AMPHETAMINES UR QL SCN: ABNORMAL
ANION GAP SERPL CALC-SCNC: 19 MMOL/L (ref 10–20)
APPEARANCE UR: CLEAR
BARBITURATES UR QL SCN: ABNORMAL
BENZODIAZ UR QL SCN: ABNORMAL
BILIRUB UR STRIP.AUTO-MCNC: NEGATIVE MG/DL
BUN SERPL-MCNC: 57 MG/DL (ref 6–23)
BZE UR QL SCN: ABNORMAL
CALCIUM SERPL-MCNC: 9.1 MG/DL (ref 8.6–10.3)
CANNABINOIDS UR QL SCN: ABNORMAL
CHLORIDE SERPL-SCNC: 88 MMOL/L (ref 98–107)
CO2 SERPL-SCNC: 31 MMOL/L (ref 21–32)
COLOR UR: YELLOW
CREAT SERPL-MCNC: 1.55 MG/DL (ref 0.5–1.3)
CREAT UR-MCNC: 30 MG/DL (ref 20–370)
EJECTION FRACTION APICAL 4 CHAMBER: 15.8
ERYTHROCYTE [DISTWIDTH] IN BLOOD BY AUTOMATED COUNT: 17.9 % (ref 11.5–14.5)
FENTANYL+NORFENTANYL UR QL SCN: ABNORMAL
GFR SERPL CREATININE-BSD FRML MDRD: 53 ML/MIN/1.73M*2
GLUCOSE BLD MANUAL STRIP-MCNC: 131 MG/DL (ref 74–99)
GLUCOSE BLD MANUAL STRIP-MCNC: 161 MG/DL (ref 74–99)
GLUCOSE BLD MANUAL STRIP-MCNC: 379 MG/DL (ref 74–99)
GLUCOSE BLD MANUAL STRIP-MCNC: 424 MG/DL (ref 74–99)
GLUCOSE SERPL-MCNC: 135 MG/DL (ref 74–99)
GLUCOSE UR STRIP.AUTO-MCNC: NEGATIVE MG/DL
HCT VFR BLD AUTO: 29.2 % (ref 41–52)
HGB BLD-MCNC: 9.5 G/DL (ref 13.5–17.5)
HOLD SPECIMEN: NORMAL
HOLD SPECIMEN: NORMAL
KETONES UR STRIP.AUTO-MCNC: NEGATIVE MG/DL
LEUKOCYTE ESTERASE UR QL STRIP.AUTO: NEGATIVE
MCH RBC QN AUTO: 23.3 PG (ref 26–34)
MCHC RBC AUTO-ENTMCNC: 32.5 G/DL (ref 32–36)
MCV RBC AUTO: 72 FL (ref 80–100)
MICROALBUMIN UR-MCNC: 12.1 MG/L
MICROALBUMIN/CREAT UR: 40.3 UG/MG CREAT
NITRITE UR QL STRIP.AUTO: NEGATIVE
NRBC BLD-RTO: 0 /100 WBCS (ref 0–0)
OPIATES UR QL SCN: ABNORMAL
OXYCODONE+OXYMORPHONE UR QL SCN: ABNORMAL
PCP UR QL SCN: ABNORMAL
PH UR STRIP.AUTO: 7 [PH]
PLATELET # BLD AUTO: 347 X10*3/UL (ref 150–450)
PMV BLD AUTO: 9.9 FL (ref 7.5–11.5)
POTASSIUM SERPL-SCNC: 3.9 MMOL/L (ref 3.5–5.3)
PROT UR STRIP.AUTO-MCNC: NEGATIVE MG/DL
RBC # BLD AUTO: 4.08 X10*6/UL (ref 4.5–5.9)
RBC # UR STRIP.AUTO: NEGATIVE /UL
SODIUM SERPL-SCNC: 134 MMOL/L (ref 136–145)
SP GR UR STRIP.AUTO: 1.01
T3FREE SERPL-MCNC: 7.8 PG/ML (ref 2.3–4.2)
TSH SERPL-ACNC: <0.01 MIU/L (ref 0.44–3.98)
UROBILINOGEN UR STRIP.AUTO-MCNC: 4 MG/DL
WBC # BLD AUTO: 10.4 X10*3/UL (ref 4.4–11.3)

## 2023-10-27 PROCEDURE — 93005 ELECTROCARDIOGRAM TRACING: CPT

## 2023-10-27 PROCEDURE — 36415 COLL VENOUS BLD VENIPUNCTURE: CPT | Performed by: INTERNAL MEDICINE

## 2023-10-27 PROCEDURE — 99233 SBSQ HOSP IP/OBS HIGH 50: CPT | Performed by: STUDENT IN AN ORGANIZED HEALTH CARE EDUCATION/TRAINING PROGRAM

## 2023-10-27 PROCEDURE — 82947 ASSAY GLUCOSE BLOOD QUANT: CPT

## 2023-10-27 PROCEDURE — 2500000002 HC RX 250 W HCPCS SELF ADMINISTERED DRUGS (ALT 637 FOR MEDICARE OP, ALT 636 FOR OP/ED): Performed by: STUDENT IN AN ORGANIZED HEALTH CARE EDUCATION/TRAINING PROGRAM

## 2023-10-27 PROCEDURE — 94640 AIRWAY INHALATION TREATMENT: CPT

## 2023-10-27 PROCEDURE — 93306 TTE W/DOPPLER COMPLETE: CPT

## 2023-10-27 PROCEDURE — 2500000004 HC RX 250 GENERAL PHARMACY W/ HCPCS (ALT 636 FOR OP/ED): Performed by: STUDENT IN AN ORGANIZED HEALTH CARE EDUCATION/TRAINING PROGRAM

## 2023-10-27 PROCEDURE — 82043 UR ALBUMIN QUANTITATIVE: CPT | Performed by: INTERNAL MEDICINE

## 2023-10-27 PROCEDURE — 1200000002 HC GENERAL ROOM WITH TELEMETRY DAILY

## 2023-10-27 PROCEDURE — 96372 THER/PROPH/DIAG INJ SC/IM: CPT | Performed by: STUDENT IN AN ORGANIZED HEALTH CARE EDUCATION/TRAINING PROGRAM

## 2023-10-27 PROCEDURE — 84443 ASSAY THYROID STIM HORMONE: CPT | Performed by: INTERNAL MEDICINE

## 2023-10-27 PROCEDURE — 93306 TTE W/DOPPLER COMPLETE: CPT | Performed by: INTERNAL MEDICINE

## 2023-10-27 PROCEDURE — 85027 COMPLETE CBC AUTOMATED: CPT | Performed by: STUDENT IN AN ORGANIZED HEALTH CARE EDUCATION/TRAINING PROGRAM

## 2023-10-27 PROCEDURE — 80307 DRUG TEST PRSMV CHEM ANLYZR: CPT | Performed by: STUDENT IN AN ORGANIZED HEALTH CARE EDUCATION/TRAINING PROGRAM

## 2023-10-27 PROCEDURE — 80048 BASIC METABOLIC PNL TOTAL CA: CPT | Performed by: STUDENT IN AN ORGANIZED HEALTH CARE EDUCATION/TRAINING PROGRAM

## 2023-10-27 PROCEDURE — 2500000001 HC RX 250 WO HCPCS SELF ADMINISTERED DRUGS (ALT 637 FOR MEDICARE OP): Performed by: NURSE PRACTITIONER

## 2023-10-27 PROCEDURE — 99232 SBSQ HOSP IP/OBS MODERATE 35: CPT | Performed by: INTERNAL MEDICINE

## 2023-10-27 PROCEDURE — 93010 ELECTROCARDIOGRAM REPORT: CPT | Performed by: INTERNAL MEDICINE

## 2023-10-27 PROCEDURE — 81003 URINALYSIS AUTO W/O SCOPE: CPT | Performed by: INTERNAL MEDICINE

## 2023-10-27 PROCEDURE — 2500000004 HC RX 250 GENERAL PHARMACY W/ HCPCS (ALT 636 FOR OP/ED): Performed by: INTERNAL MEDICINE

## 2023-10-27 PROCEDURE — 82306 VITAMIN D 25 HYDROXY: CPT | Performed by: INTERNAL MEDICINE

## 2023-10-27 PROCEDURE — 36415 COLL VENOUS BLD VENIPUNCTURE: CPT | Performed by: STUDENT IN AN ORGANIZED HEALTH CARE EDUCATION/TRAINING PROGRAM

## 2023-10-27 RX ORDER — SODIUM CHLORIDE 9 MG/ML
55 INJECTION, SOLUTION INTRAVENOUS CONTINUOUS
Status: DISCONTINUED | OUTPATIENT
Start: 2023-10-27 | End: 2023-10-28

## 2023-10-27 RX ORDER — LISINOPRIL 5 MG/1
5 TABLET ORAL DAILY
Status: DISCONTINUED | OUTPATIENT
Start: 2023-10-27 | End: 2023-10-29

## 2023-10-27 RX ORDER — CALCIUM CARBONATE 200(500)MG
500 TABLET,CHEWABLE ORAL 4 TIMES DAILY PRN
Status: DISCONTINUED | OUTPATIENT
Start: 2023-10-27 | End: 2023-10-30 | Stop reason: HOSPADM

## 2023-10-27 RX ORDER — METHIMAZOLE 5 MG/1
10 TABLET ORAL EVERY 8 HOURS SCHEDULED
Status: DISCONTINUED | OUTPATIENT
Start: 2023-10-27 | End: 2023-10-29

## 2023-10-27 RX ADMIN — IPRATROPIUM BROMIDE AND ALBUTEROL SULFATE 3 ML: 2.5; .5 SOLUTION RESPIRATORY (INHALATION) at 07:50

## 2023-10-27 RX ADMIN — INSULIN LISPRO 10 UNITS: 100 INJECTION, SOLUTION INTRAVENOUS; SUBCUTANEOUS at 11:56

## 2023-10-27 RX ADMIN — IPRATROPIUM BROMIDE AND ALBUTEROL SULFATE 3 ML: 2.5; .5 SOLUTION RESPIRATORY (INHALATION) at 20:49

## 2023-10-27 RX ADMIN — PERFLUTREN 8 ML OF DILUTION: 6.52 INJECTION, SUSPENSION INTRAVENOUS at 09:55

## 2023-10-27 RX ADMIN — INSULIN LISPRO 2 UNITS: 100 INJECTION, SOLUTION INTRAVENOUS; SUBCUTANEOUS at 17:26

## 2023-10-27 RX ADMIN — SODIUM CHLORIDE 55 ML/HR: 9 INJECTION, SOLUTION INTRAVENOUS at 15:09

## 2023-10-27 RX ADMIN — IPRATROPIUM BROMIDE AND ALBUTEROL SULFATE 3 ML: 2.5; .5 SOLUTION RESPIRATORY (INHALATION) at 16:29

## 2023-10-27 RX ADMIN — ANTACID TABLETS 500 MG: 500 TABLET, CHEWABLE ORAL at 21:11

## 2023-10-27 ASSESSMENT — COGNITIVE AND FUNCTIONAL STATUS - GENERAL
DAILY ACTIVITIY SCORE: 24
STANDING UP FROM CHAIR USING ARMS: A LITTLE
WALKING IN HOSPITAL ROOM: A LITTLE
DAILY ACTIVITIY SCORE: 24
CLIMB 3 TO 5 STEPS WITH RAILING: TOTAL
CLIMB 3 TO 5 STEPS WITH RAILING: TOTAL
WALKING IN HOSPITAL ROOM: A LITTLE
MOBILITY SCORE: 20
MOBILITY SCORE: 19

## 2023-10-27 ASSESSMENT — PAIN SCALES - GENERAL
PAINLEVEL_OUTOF10: 0 - NO PAIN
PAINLEVEL_OUTOF10: 0 - NO PAIN

## 2023-10-27 ASSESSMENT — PAIN - FUNCTIONAL ASSESSMENT: PAIN_FUNCTIONAL_ASSESSMENT: 0-10

## 2023-10-27 NOTE — CONSULTS
Nutrition Note  Reason for Assessment  Reason for Assessment: Dietitian discretion (Low BMI)   Pt unavailable - out of room at time of attempted assessment. All hx obtained via chart review.     Subjective      Food and Nutrient History: Diet hx unknown. No documented meal intakes in EMR this admission. Unable to assess for GI symptoms and difficulty with chewing/swallowing.  Vitamin/Herbal Supplement Use: herbs and alternative medicines, per CHF nurse navigator note    Objective   Per Flowsheet Percent Meal intake:    Dietary Orders (From admission, onward)       Start     Ordered    10/27/23 1245  Oral nutritional supplements  Until discontinued        Question Answer Comment   Deliver with Breakfast    Deliver with Dinner    Select supplement: Glucerna Shake        10/27/23 1244    10/26/23 1520  Adult diet Regular  Diet effective now        Comments: soft   Question:  Diet type  Answer:  Regular    10/26/23 1520    10/26/23 1519  May Participate in Room Service  Once        Question:  .  Answer:  Yes    10/26/23 1519                  Independent  Results from last 7 days   Lab Units 10/27/23  0605 10/26/23  0309 10/25/23  0400 10/25/23  0240 10/24/23  1308   GLUCOSE mg/dL 135* 577*  --  581* 576*   SODIUM mmol/L 134* 121*  --  125* 127*   POTASSIUM mmol/L 3.9 5.1  --  4.2 3.8   CHLORIDE mmol/L 88* 81*  --  81* 83*   CO2 mmol/L 31 30  --  28 32   BUN mg/dL 57* 64*  --  47* 36*   CREATININE mg/dL 1.55* 1.83*  --  1.50* 1.12   EGFR mL/min/1.73m*2 53* 44*  --  55* 79   CALCIUM mg/dL 9.1 8.7  --  9.6 9.3   MAGNESIUM mg/dL  --   --  2.04  --  2.01     Lab Results   Component Value Date    HGBA1C 7.9 (H) 12/12/2022    HGBA1C 7.6 (A) 06/24/2022    HGBA1C 7.2 (H) 01/25/2022     Results from last 7 days   Lab Units 10/27/23  1102 10/27/23  0611 10/26/23  2018 10/26/23  1613 10/26/23  1259 10/26/23  1112 10/26/23  0529 10/26/23  0154   POCT GLUCOSE mg/dL 379* 131* 273* 260* 273* 219* 287* 506*     GI per  "flowsheet:  Gastrointestinal  Gastrointestinal (WDL): Within Defined Limits  Last bowel movement documented:    PMH: Reviewed   Allergies: Amoxicillin     Anthropometrics:  Height: 175.3 cm (5' 9.02\")  Weight: 50.7 kg (111 lb 12.4 oz)  BMI (Calculated): 16.5         IBW: 72.7 kg  %IBW:           Weight History / % Weight Change: Wt hx per EMR records: 57.2 kg on 6/30/23; 59 kg on 7/21/23; 55.3 kg on 10/24/23. Pt with 4.6 kg (8.3%) wt loss x 3 days - question accuracy of measured weights as pt is unlikely to have lost nearly 5 kg in weight unless it is fluid related (pt is on diuretics).  Significant Weight Loss:  (Unable to determine)    Estimated Nutritional Needs:  Total Energy Estimated Needs (kCal): 1775 kCal  Total Estimated Energy Need per Day (kCal/kg): 35 kCal/kg  Method for Estimating Needs: ABW    Total Protein Estimated Needs (g): 61 g  Total Protein Estimated Needs (g/kg): 1.2 g/kg  Method for Estimating Needs: ABW    Total Fluid Estimated Needs (mL): 1521 mL  Total Fluid Estimated Needs (mL/kg): 30 mL/kg  Method for Estimating Needs: ABW    Nutrition Focused Physical Findings:   Orbital Fat Pads: Defer (pt unavailable)    Temporalis: Defer (pt unavailable)    Edema  Edema: none    Skin: Negative  Pain Score: 0 - No pain     Nutrition Diagnosis   Patient has Malnutrition Diagnosis: No (insufficient nutrition hx available to determine)    Patient has Nutrition Diagnosis: Yes  New  Nutrition Diagnosis 1: Underweight  Related to (1): suspected inability to meet increased energy demands of chronic illness  As Evidenced by (1): BMI adjusted for amputation = 17.5 kg/m2                                         Intervention:     Interventions: Meals and snacks, Medical food supplement  Meals and Snacks: Energy-modified diet  Goal: Encourage intake of meals and snacks  Medical Food Supplement: Commercial beverage  Goal: Encourage intake of supplements  Additional Interventions: Reweigh at least every 3-5 " days  Individualized Nutrition Prescription Provided for : Glucerna BID (provides 220 kcal, 10 g protein per serving).    Nutrition Monitoring and Evaluation   Weights  Labs  PO intake    Goals: oral intake >75% of meals, consume oral nutrition supplement, BG  mg/dL, labs WNL, maintain stable weight, and gradual weight gain  Progress towards goals: Partially Met    Education Documentation  No documentation found.    Will assess education needs at follow up.               Time Spent (min): 30 minutes  Last Date of Nutrition Visit: 10/27/23  Nutrition Follow-Up Needed?: 3-5 days  Follow up Comment: Glucerna BID

## 2023-10-27 NOTE — PROGRESS NOTES
"Hitesh Jurado is a 53 y.o. male on day 1 of admission presenting with NSTEMI (non-ST elevated myocardial infarction) (CMS/HCC).  Acute kidney injury and COPD with bronchospasm.    Subjective   Shortness of breath better as per patient.     Objective   Patient is not in acute distress on room air O2 saturation stays mid 90s.    Review of Systems   Review of Systems  Review of Systems   Constitutional: Negative.    HENT:  Positive for congestion.    Eyes: Negative.    Respiratory:  Positive for shortness of breath.    Cardiovascular: Negative.    Gastrointestinal: Negative.    Endocrine: Positive for heat intolerance.   Genitourinary: Negative.    Musculoskeletal:  Positive for arthralgias.   Allergic/Immunologic: Negative.    Neurological:  Positive for weakness.   Hematological:  Positive for adenopathy.   Psychiatric/Behavioral:  Positive for sleep disturbance.  Vital Signs  Blood pressure 119/70, pulse (!) 115, temperature 36.7 °C (98.1 °F), temperature source Temporal, resp. rate 18, height 1.753 m (5' 9.02\"), weight 50.7 kg (111 lb 12.4 oz), SpO2 100 %.    Physical Exam   Appearance: Normal appearance.   HENT:      Head: Normocephalic.      Nose: Nose normal.      Mouth/Throat:      Mouth: Mucous membranes are dry.   Eyes:      Extraocular Movements: Extraocular movements intact.      Pupils: Pupils are equal, round, and reactive to light.   Cardiovascular:      Rate and Rhythm: Tachycardia present.      Pulses: Normal pulses.   Pulmonary:      Breath sounds: Rales present.   Abdominal:      Palpations: Abdomen is soft.   Musculoskeletal:         General: Normal range of motion.      Cervical back: Normal range of motion and neck supple.   Skin:     General: Skin is dry.   Neurological:      General: No focal deficit present.      Mental Status: He is alert and oriented to person, place, and time. Mental status is at baseline.   Psychiatric:         Mood and Affect: Mood normal.         Behavior: Behavior " normal.  Allergic/Immu  Oxygen Therapynologic: Negative.    Neurological:  Positive for weakness.   Hematological:  Positive for adenopathy.   Psychiatric/Behavioral:  Positive for sleep disturbance.  SpO2: 100 %  Medical Gas Therapy: None (Room air)          Intake/Output  I/O last 3 completed shifts:  In: 771.8 (15.2 mL/kg) [P.O.:615; I.V.:156.8 (3.1 mL/kg)]  Out: - (0 mL/kg)   Weight: 50.7 kg     Lines and Tubes:  Peripheral IV 10/26/23 20 G Distal;Upper;Right Arm (Active)   Placement Date/Time: 10/26/23 0452   Size (Gauge): 20 G  Orientation: Distal;Upper;Right  Location: Arm   Number of days: 1       Results for orders placed or performed during the hospital encounter of 10/26/23 (from the past 96 hour(s))   POCT GLUCOSE   Result Value Ref Range    POCT Glucose 506 (H) 74 - 99 mg/dL   CBC and Auto Differential   Result Value Ref Range    WBC 13.1 (H) 4.4 - 11.3 x10*3/uL    nRBC 0.0 0.0 - 0.0 /100 WBCs    RBC 3.87 (L) 4.50 - 5.90 x10*6/uL    Hemoglobin 9.0 (L) 13.5 - 17.5 g/dL    Hematocrit 27.3 (L) 41.0 - 52.0 %    MCV 71 (L) 80 - 100 fL    MCH 23.3 (L) 26.0 - 34.0 pg    MCHC 33.0 32.0 - 36.0 g/dL    RDW 17.4 (H) 11.5 - 14.5 %    Platelets 357 150 - 450 x10*3/uL    MPV 10.4 7.5 - 11.5 fL    Neutrophils % 81.6 40.0 - 80.0 %    Immature Granulocytes %, Automated 0.4 0.0 - 0.9 %    Lymphocytes % 9.1 13.0 - 44.0 %    Monocytes % 8.8 2.0 - 10.0 %    Eosinophils % 0.0 0.0 - 6.0 %    Basophils % 0.1 0.0 - 2.0 %    Neutrophils Absolute 10.73 (H) 1.20 - 7.70 x10*3/uL    Immature Granulocytes Absolute, Automated 0.05 0.00 - 0.70 x10*3/uL    Lymphocytes Absolute 1.20 1.20 - 4.80 x10*3/uL    Monocytes Absolute 1.15 (H) 0.10 - 1.00 x10*3/uL    Eosinophils Absolute 0.00 0.00 - 0.70 x10*3/uL    Basophils Absolute 0.01 0.00 - 0.10 x10*3/uL   Comprehensive metabolic panel   Result Value Ref Range    Glucose 577 (HH) 74 - 99 mg/dL    Sodium 121 (L) 136 - 145 mmol/L    Potassium 5.1 3.5 - 5.3 mmol/L    Chloride 81 (L) 98 - 107  mmol/L    Bicarbonate 30 21 - 32 mmol/L    Anion Gap 15 10 - 20 mmol/L    Urea Nitrogen 64 (H) 6 - 23 mg/dL    Creatinine 1.83 (H) 0.50 - 1.30 mg/dL    eGFR 44 (L) >60 mL/min/1.73m*2    Calcium 8.7 8.6 - 10.3 mg/dL    Albumin 3.5 3.4 - 5.0 g/dL    Alkaline Phosphatase 104 33 - 120 U/L    Total Protein 6.5 6.4 - 8.2 g/dL    AST 19 9 - 39 U/L    Bilirubin, Total 1.6 (H) 0.0 - 1.2 mg/dL    ALT 22 10 - 52 U/L   B-Type Natriuretic Peptide   Result Value Ref Range    BNP 2,850 (H) 0 - 99 pg/mL   Troponin I, High Sensitivity   Result Value Ref Range    Troponin I, High Sensitivity 1,590 (HH) 0 - 20 ng/L   Troponin I, High Sensitivity   Result Value Ref Range    Troponin I, High Sensitivity 1,417 (HH) 0 - 20 ng/L   POCT GLUCOSE   Result Value Ref Range    POCT Glucose 287 (H) 74 - 99 mg/dL   POCT GLUCOSE   Result Value Ref Range    POCT Glucose 219 (H) 74 - 99 mg/dL   POCT GLUCOSE   Result Value Ref Range    POCT Glucose 273 (H) 74 - 99 mg/dL   POCT GLUCOSE   Result Value Ref Range    POCT Glucose 260 (H) 74 - 99 mg/dL   Urine electrolytes   Result Value Ref Range    Sodium, Urine Random 24 mmol/L    Sodium/Creatinine Ratio 61 Not established. mmol/g Creat    Potassium, Urine Random 37 mmol/L    Potassium/Creatinine Ratio 95 Not established mmol/g Creat    Chloride, Urine Random <15 mmol/L    Chloride/Creatinine Ratio      Creatinine, Urine Random 39.1 20.0 - 370.0 mg/dL   Troponin I, High Sensitivity   Result Value Ref Range    Troponin I, High Sensitivity 1,590 (HH) 0 - 20 ng/L   TSH with reflex to Free T4 if abnormal   Result Value Ref Range    Thyroid Stimulating Hormone <0.01 (L) 0.44 - 3.98 mIU/L   Thyroxine, Free   Result Value Ref Range    Thyroxine, Free 3.97 (H) 0.61 - 1.12 ng/dL   Triiodothyronine, Free   Result Value Ref Range    Triiodothyronine, Free 7.8 (H) 2.3 - 4.2 pg/mL   POCT GLUCOSE   Result Value Ref Range    POCT Glucose 273 (H) 74 - 99 mg/dL   CBC   Result Value Ref Range    WBC 10.4 4.4 - 11.3  x10*3/uL    nRBC 0.0 0.0 - 0.0 /100 WBCs    RBC 4.08 (L) 4.50 - 5.90 x10*6/uL    Hemoglobin 9.5 (L) 13.5 - 17.5 g/dL    Hematocrit 29.2 (L) 41.0 - 52.0 %    MCV 72 (L) 80 - 100 fL    MCH 23.3 (L) 26.0 - 34.0 pg    MCHC 32.5 32.0 - 36.0 g/dL    RDW 17.9 (H) 11.5 - 14.5 %    Platelets 347 150 - 450 x10*3/uL    MPV 9.9 7.5 - 11.5 fL   Basic Metabolic Panel   Result Value Ref Range    Glucose 135 (H) 74 - 99 mg/dL    Sodium 134 (L) 136 - 145 mmol/L    Potassium 3.9 3.5 - 5.3 mmol/L    Chloride 88 (L) 98 - 107 mmol/L    Bicarbonate 31 21 - 32 mmol/L    Anion Gap 19 10 - 20 mmol/L    Urea Nitrogen 57 (H) 6 - 23 mg/dL    Creatinine 1.55 (H) 0.50 - 1.30 mg/dL    eGFR 53 (L) >60 mL/min/1.73m*2    Calcium 9.1 8.6 - 10.3 mg/dL   SST TOP   Result Value Ref Range    Extra Tube Hold for add-ons.    POCT GLUCOSE   Result Value Ref Range    POCT Glucose 131 (H) 74 - 99 mg/dL   Transthoracic Echo (TTE) Complete   Result Value Ref Range    LV A4C EF 15.8    POCT GLUCOSE   Result Value Ref Range    POCT Glucose 379 (H) 74 - 99 mg/dL   TSH   Result Value Ref Range    Thyroid Stimulating Hormone <0.01 (L) 0.44 - 3.98 mIU/L   Vitamin D 25-Hydroxy,Total (for eval of Vitamin D levels)   Result Value Ref Range    Vitamin D, 25-Hydroxy, Total 61 30 - 100 ng/mL   POCT GLUCOSE   Result Value Ref Range    POCT Glucose 161 (H) 74 - 99 mg/dL      Relevant Results  Recent Results (from the past 24 hour(s))   POCT GLUCOSE    Collection Time: 10/26/23  8:18 PM   Result Value Ref Range    POCT Glucose 273 (H) 74 - 99 mg/dL   CBC    Collection Time: 10/27/23  6:05 AM   Result Value Ref Range    WBC 10.4 4.4 - 11.3 x10*3/uL    nRBC 0.0 0.0 - 0.0 /100 WBCs    RBC 4.08 (L) 4.50 - 5.90 x10*6/uL    Hemoglobin 9.5 (L) 13.5 - 17.5 g/dL    Hematocrit 29.2 (L) 41.0 - 52.0 %    MCV 72 (L) 80 - 100 fL    MCH 23.3 (L) 26.0 - 34.0 pg    MCHC 32.5 32.0 - 36.0 g/dL    RDW 17.9 (H) 11.5 - 14.5 %    Platelets 347 150 - 450 x10*3/uL    MPV 9.9 7.5 - 11.5 fL   Basic  Metabolic Panel    Collection Time: 10/27/23  6:05 AM   Result Value Ref Range    Glucose 135 (H) 74 - 99 mg/dL    Sodium 134 (L) 136 - 145 mmol/L    Potassium 3.9 3.5 - 5.3 mmol/L    Chloride 88 (L) 98 - 107 mmol/L    Bicarbonate 31 21 - 32 mmol/L    Anion Gap 19 10 - 20 mmol/L    Urea Nitrogen 57 (H) 6 - 23 mg/dL    Creatinine 1.55 (H) 0.50 - 1.30 mg/dL    eGFR 53 (L) >60 mL/min/1.73m*2    Calcium 9.1 8.6 - 10.3 mg/dL   SST TOP    Collection Time: 10/27/23  6:05 AM   Result Value Ref Range    Extra Tube Hold for add-ons.    POCT GLUCOSE    Collection Time: 10/27/23  6:11 AM   Result Value Ref Range    POCT Glucose 131 (H) 74 - 99 mg/dL   Transthoracic Echo (TTE) Complete    Collection Time: 10/27/23 10:39 AM   Result Value Ref Range    LV A4C EF 15.8    POCT GLUCOSE    Collection Time: 10/27/23 11:02 AM   Result Value Ref Range    POCT Glucose 379 (H) 74 - 99 mg/dL   TSH    Collection Time: 10/27/23  2:40 PM   Result Value Ref Range    Thyroid Stimulating Hormone <0.01 (L) 0.44 - 3.98 mIU/L   Vitamin D 25-Hydroxy,Total (for eval of Vitamin D levels)    Collection Time: 10/27/23  2:40 PM   Result Value Ref Range    Vitamin D, 25-Hydroxy, Total 61 30 - 100 ng/mL   POCT GLUCOSE    Collection Time: 10/27/23  4:14 PM   Result Value Ref Range    POCT Glucose 161 (H) 74 - 99 mg/dL        Radiology:  Transthoracic Echo (TTE) Complete    Result Date: 10/27/2023          [place] [id number]  Tel [phone] Fax [phone] TRANSTHORACIC ECHOCARDIOGRAM REPORT  Patient Name:      BROOK Diane Physician:    [id number] Jamey Alejandra DO Study Date:        10/27/2023           Ordering Provider:    [id number] VANESSA CASPER MRN/PID:           [id number]             Fellow: Accession#:        [id number]         Nurse:                Stan Phelps RN Date of  Birth/Age: 1970 / 53 years  Sonographer:          Lauren SEWELL Gender:            M                    Additional Staff: Height:            175.26 cm            Admit Date:           10/26/2023 Weight:            55.34 kg             Admission Status:     Inpatient -                                                               Routine BSA:               1.67 m2              Department Location:  [facility]                                                               Echo Lab Blood Pressure: 128 /97 mmHg Study Type:    TRANSTHORACIC ECHO (TTE) COMPLETE Diagnosis/ICD: Non ST elevation (NSTEMI) myocardial infarction-I21.4 Indication:    NSTEMI CPT Codes:     Echo Complete w Full [id number] Patient History: Smoker:            Current. Pertinent History: CAD, HTN, Hyperlipidemia and PAD. Study Detail: The following Echo studies were performed: 2D, M-Mode, Doppler and               color flow. Technically challenging study due to prominent lung               artifact. Definity used as a contrast agent for endocardial border               definition. Total contrast used for this procedure was 2 mL via IV               push. The patient was awake.  PHYSICIAN INTERPRETATION: Left Ventricle: Left ventricular systolic function is severely decreased, with an estimated ejection fraction of 15-20%. There are multiple wall motion abnormalities. The left ventricular cavity size is normal. Spectral Doppler shows a pseudonormal pattern of left ventricular diastolic filling. LV Wall Scoring: The entire lateral wall, mid and apical inferior wall, apical septal segment, and apex are hypokinetic. All remaining scored segments are normal. Left Atrium: The left atrium is mildly dilated. Right Ventricle: The right ventricle is normal in size. There is normal right ventricular global systolic function. Right Atrium: The right atrium is normal in size. Aortic  Valve: The aortic valve appears structurally normal. The aortic valve appears tricuspid. There is mild aortic valve cusp calcification. There is evidence of mildly elevated transaortic gradients consistent with sclerosis of the aortic valve. There is no evidence of aortic valve regurgitation. The peak instantaneous gradient of the aortic valve is 7.6 mmHg. The mean gradient of the aortic valve is 5.0 mmHg. Mitral Valve: The mitral valve is normal in structure. There is no evidence of mitral valve stenosis. There is normal mitral valve leaflet mobility. There is mild mitral annular calcification. There is moderate to severe mitral valve regurgitation. Tricuspid Valve: The tricuspid valve is structurally normal. There is normal tricuspid valve leaflet mobility. There is moderate to severe tricuspid regurgitation. Pulmonic Valve: The pulmonic valve is structurally normal. There is no indication of pulmonic valve regurgitation. Pericardium: There is no pericardial effusion noted. Aorta: The aortic root is normal. Pulmonary Artery: Pulmonary hypertension is present. The tricuspid regurgitant velocity is 3.77 m/s, and with an estimated right atrial pressure of 8 mmHg, the estimated pulmonary artery pressure is severely elevated with the RVSP at 64.9 mmHg. Systemic Veins: The inferior vena cava appears to be of normal size. In comparison to the previous echocardiogram(s): Prior examinations are available and were reviewed for comparison purposes. The left ventricular function is significantly worse. The left ventricular diastolic function is unchanged.  CONCLUSIONS:  1. Left ventricular systolic function is severely decreased with a 15-20% estimated ejection fraction.  2. Entire lateral wall, mid and apical inferior wall, apical septal segment, and apex are abnormal.  3. Spectral Doppler shows a pseudonormal pattern of left ventricular diastolic filling.  4. There is no evidence of mitral valve stenosis.  5. Moderate to  severe mitral valve regurgitation.  6. Moderate to severe tricuspid regurgitation.  7. Aortic valve sclerosis.  8. Pulmonary hypertension is present.  9. Severely elevated pulmonary artery pressure. 10. There are multiple wall motion abnormalities. RECOMMENDATIONS: Technically suboptimal and limited study, therefore accuracy of above interpretation could be substantially diminished. Clinical correlation is advised. Consider additional imaging modalities if clinically indicated.  QUANTITATIVE DATA SUMMARY: 2D MEASUREMENTS:                           Normal Ranges: Ao Root d:     3.20 cm    (2.0-3.7cm) LAs:           4.60 cm    (2.7-4.0cm) IVSd:          1.00 cm    (0.6-1.1cm) LVPWd:         0.95 cm    (0.6-1.1cm) LVIDd:         5.28 cm    (3.9-5.9cm) LVIDs:         4.89 cm LV Mass Index: 114.9 g/m2 LV % FS        7.4 % LA VOLUME:                               Normal Ranges: LA Vol A4C:        49.4 ml    (22+/-6mL/m2) LA Vol A2C:        81.7 ml LA Vol BP:         68.8 ml LA Vol Index A4C:  29.5ml/m2 LA Vol Index A2C:  48.8 ml/m2 LA Vol Index BP:   41.1 ml/m2 LA Area A4C:       17.3 cm2 LA Area A2C:       24.1 cm2 LA Major Axis A4C: 5.2 cm LA Major Axis A2C: 6.0 cm LA Volume Index:   39.5 ml/m2 LA Vol A4C:        47.0 ml LA Vol A2C:        78.0 ml RA VOLUME BY A/L METHOD:                               Normal Ranges: RA Vol A4C:        48.5 ml    (8.3-19.5ml) RA Vol Index A4C:  29.0 ml/m2 RA Area A4C:       15.5 cm2 RA Major Axis A4C: 4.2 cm AORTA MEASUREMENTS:                    Normal Ranges: Asc Ao, d: 2.40 cm (2.1-3.4cm) LV SYSTOLIC FUNCTION BY 2D PLANIMETRY (MOD):                     Normal Ranges: EF-A4C View: 15.8 % (>=55%) EF-A2C View: 15.4 % EF-Biplane:  16.4 % LV DIASTOLIC FUNCTION:                        Normal Ranges: MV Peak E:    1.20 m/s (0.7-1.2 m/s) MV Peak A:    1.21 m/s (0.42-0.7 m/s) E/A Ratio:    0.99     (1.0-2.2) MV lateral e' 0.08 m/s MV medial e'  0.05 m/s E/e' Ratio:   15.90    (<8.0) MITRAL  VALVE:                 Normal Ranges: MV DT: 143 msec (150-240msec) MITRAL INSUFFICIENCY:                           Normal Ranges: PISA Radius:  0.9 cm MR VTI:       117.00 cm MR Vmax:      498.00 cm/s MR Alias Marbin: 30.3 cm/s MR Volume:    36.23 ml MR Flow Rt:   154.21 ml/s MR EROA:      0.31 cm2 AORTIC VALVE:                                   Normal Ranges: AoV Vmax:                1.38 m/s (<=1.7m/s) AoV Peak P.6 mmHg (<20mmHg) AoV Mean P.0 mmHg (1.7-11.5mmHg) LVOT Max Marbin:            0.69 m/s (<=1.1m/s) AoV VTI:                 19.30 cm (18-25cm) LVOT VTI:                8.11 cm LVOT Diameter:           1.90 cm  (1.8-2.4cm) AoV Area, VTI:           1.19 cm2 (2.5-5.5cm2) AoV Area,Vmax:           1.41 cm2 (2.5-4.5cm2) AoV Dimensionless Index: 0.42  RIGHT VENTRICLE: RV Basal 3.90 cm RV Mid   3.30 cm RV Major 8.2 cm TAPSE:   16.1 mm RV s'    0.11 m/s TRICUSPID VALVE/RVSP:                             Normal Ranges: Peak TR Velocity: 3.77 m/s RV Syst Pressure: 64.9 mmHg (< 30mmHg) IVC Diam:         1.68 cm PULMONIC VALVE:                         Normal Ranges: PV Accel Time: 71 msec  (>120ms) PV Max Marbin:    1.1 m/s  (0.6-0.9m/s) PV Max P.8 mmHg  [id number] Jamey Alejandra DO Electronically signed on 10/27/2023 at 4:13:45 PM  Wall Scoring  ** Final **     XR chest 1 view    Result Date: 10/26/2023  STUDY: Chest Radiograph;  10/26/2023 2:28 AM INDICATION: Shortness of breath. COMPARISON: 10/25/2023 XR Chest two view ACCESSION NUMBER(S): [id number] ORDERING CLINICIAN: JAMEY SHAW TECHNIQUE:  Frontal chest was obtained at 2:28 hours. FINDINGS: CARDIOMEDIASTINAL SILHOUETTE: Cardiomediastinal silhouette is borderline in size, unchanged.  LUNGS: Lungs are clear.  ABDOMEN: No remarkable upper abdominal findings.  BONES: No acute osseous changes.    Stable borderline heart size.  Clear lungs.  No evidence of infiltrate or edema. Signed by Yandel Slade MD   Transthoracic Echo (TTE)  Complete    Result Date: 10/27/2023          [place] [id number]  Tel [phone] Fax [phone] TRANSTHORACIC ECHOCARDIOGRAM REPORT  Patient Name:      BROOK GUZMAN JETHRO Reading Physician:    [id number] Jamey Alejandra  Study Date:        10/27/2023           Ordering Provider:    [id number] VANESSA CASPER MRN/PID:           [id number]             Fellow: Accession#:        [id number]         Nurse:                Stan Phelps RN Date of Birth/Age: 1970 / 53 years  Sonographer:          Lauren SEWELL Gender:            M                    Additional Staff: Height:            175.26 cm            Admit Date:           10/26/2023 Weight:            55.34 kg             Admission Status:     Inpatient -                                                               Routine BSA:               1.67 m2              Department Location:  [facility]                                                               Echo Lab Blood Pressure: 128 /97 mmHg Study Type:    TRANSTHORACIC ECHO (TTE) COMPLETE Diagnosis/ICD: Non ST elevation (NSTEMI) myocardial infarction-I21.4 Indication:    NSTEMI CPT Codes:     Echo Complete w Full Doppler-[id number] Patient History: Smoker:            Current. Pertinent History: CAD, HTN, Hyperlipidemia and PAD. Study Detail: The following Echo studies were performed: 2D, M-Mode, Doppler and               color flow. Technically challenging study due to prominent lung               artifact. Definity used as a contrast agent for endocardial border               definition. Total contrast used for this procedure was 2 mL via IV               push. The patient was awake.  PHYSICIAN INTERPRETATION: Left Ventricle: Left ventricular systolic function is severely decreased,  with an estimated ejection fraction of 15-20%. There are multiple wall motion abnormalities. The left ventricular cavity size is normal. Spectral Doppler shows a pseudonormal pattern of left ventricular diastolic filling. LV Wall Scoring: The entire lateral wall, mid and apical inferior wall, apical septal segment, and apex are hypokinetic. All remaining scored segments are normal. Left Atrium: The left atrium is mildly dilated. Right Ventricle: The right ventricle is normal in size. There is normal right ventricular global systolic function. Right Atrium: The right atrium is normal in size. Aortic Valve: The aortic valve appears structurally normal. The aortic valve appears tricuspid. There is mild aortic valve cusp calcification. There is evidence of mildly elevated transaortic gradients consistent with sclerosis of the aortic valve. There is no evidence of aortic valve regurgitation. The peak instantaneous gradient of the aortic valve is 7.6 mmHg. The mean gradient of the aortic valve is 5.0 mmHg. Mitral Valve: The mitral valve is normal in structure. There is no evidence of mitral valve stenosis. There is normal mitral valve leaflet mobility. There is mild mitral annular calcification. There is moderate to severe mitral valve regurgitation. Tricuspid Valve: The tricuspid valve is structurally normal. There is normal tricuspid valve leaflet mobility. There is moderate to severe tricuspid regurgitation. Pulmonic Valve: The pulmonic valve is structurally normal. There is no indication of pulmonic valve regurgitation. Pericardium: There is no pericardial effusion noted. Aorta: The aortic root is normal. Pulmonary Artery: Pulmonary hypertension is present. The tricuspid regurgitant velocity is 3.77 m/s, and with an estimated right atrial pressure of 8 mmHg, the estimated pulmonary artery pressure is severely elevated with the RVSP at 64.9 mmHg. Systemic Veins: The inferior vena cava appears to be of normal size. In  comparison to the previous echocardiogram(s): Prior examinations are available and were reviewed for comparison purposes. The left ventricular function is significantly worse. The left ventricular diastolic function is unchanged.  CONCLUSIONS:  1. Left ventricular systolic function is severely decreased with a 15-20% estimated ejection fraction.  2. Entire lateral wall, mid and apical inferior wall, apical septal segment, and apex are abnormal.  3. Spectral Doppler shows a pseudonormal pattern of left ventricular diastolic filling.  4. There is no evidence of mitral valve stenosis.  5. Moderate to severe mitral valve regurgitation.  6. Moderate to severe tricuspid regurgitation.  7. Aortic valve sclerosis.  8. Pulmonary hypertension is present.  9. Severely elevated pulmonary artery pressure. 10. There are multiple wall motion abnormalities. RECOMMENDATIONS: Technically suboptimal and limited study, therefore accuracy of above interpretation could be substantially diminished. Clinical correlation is advised. Consider additional imaging modalities if clinically indicated.  QUANTITATIVE DATA SUMMARY: 2D MEASUREMENTS:                           Normal Ranges: Ao Root d:     3.20 cm    (2.0-3.7cm) LAs:           4.60 cm    (2.7-4.0cm) IVSd:          1.00 cm    (0.6-1.1cm) LVPWd:         0.95 cm    (0.6-1.1cm) LVIDd:         5.28 cm    (3.9-5.9cm) LVIDs:         4.89 cm LV Mass Index: 114.9 g/m2 LV % FS        7.4 % LA VOLUME:                               Normal Ranges: LA Vol A4C:        49.4 ml    (22+/-6mL/m2) LA Vol A2C:        81.7 ml LA Vol BP:         68.8 ml LA Vol Index A4C:  29.5ml/m2 LA Vol Index A2C:  48.8 ml/m2 LA Vol Index BP:   41.1 ml/m2 LA Area A4C:       17.3 cm2 LA Area A2C:       24.1 cm2 LA Major Axis A4C: 5.2 cm LA Major Axis A2C: 6.0 cm LA Volume Index:   39.5 ml/m2 LA Vol A4C:        47.0 ml LA Vol A2C:        78.0 ml RA VOLUME BY A/L METHOD:                               Normal Ranges: RA Vol  A4C:        48.5 ml    (8.3-19.5ml) RA Vol Index A4C:  29.0 ml/m2 RA Area A4C:       15.5 cm2 RA Major Axis A4C: 4.2 cm AORTA MEASUREMENTS:                    Normal Ranges: Asc Ao, d: 2.40 cm (2.1-3.4cm) LV SYSTOLIC FUNCTION BY 2D PLANIMETRY (MOD):                     Normal Ranges: EF-A4C View: 15.8 % (>=55%) EF-A2C View: 15.4 % EF-Biplane:  16.4 % LV DIASTOLIC FUNCTION:                        Normal Ranges: MV Peak E:    1.20 m/s (0.7-1.2 m/s) MV Peak A:    1.21 m/s (0.42-0.7 m/s) E/A Ratio:    0.99     (1.0-2.2) MV lateral e' 0.08 m/s MV medial e'  0.05 m/s E/e' Ratio:   15.90    (<8.0) MITRAL VALVE:                 Normal Ranges: MV DT: 143 msec (150-240msec) MITRAL INSUFFICIENCY:                           Normal Ranges: PISA Radius:  0.9 cm MR VTI:       117.00 cm MR Vmax:      498.00 cm/s MR Alias Marbin: 30.3 cm/s MR Volume:    36.23 ml MR Flow Rt:   154.21 ml/s MR EROA:      0.31 cm2 AORTIC VALVE:                                   Normal Ranges: AoV Vmax:                1.38 m/s (<=1.7m/s) AoV Peak P.6 mmHg (<20mmHg) AoV Mean P.0 mmHg (1.7-11.5mmHg) LVOT Max Marbin:            0.69 m/s (<=1.1m/s) AoV VTI:                 19.30 cm (18-25cm) LVOT VTI:                8.11 cm LVOT Diameter:           1.90 cm  (1.8-2.4cm) AoV Area, VTI:           1.19 cm2 (2.5-5.5cm2) AoV Area,Vmax:           1.41 cm2 (2.5-4.5cm2) AoV Dimensionless Index: 0.42  RIGHT VENTRICLE: RV Basal 3.90 cm RV Mid   3.30 cm RV Major 8.2 cm TAPSE:   16.1 mm RV s'    0.11 m/s TRICUSPID VALVE/RVSP:                             Normal Ranges: Peak TR Velocity: 3.77 m/s RV Syst Pressure: 64.9 mmHg (< 30mmHg) IVC Diam:         1.68 cm PULMONIC VALVE:                         Normal Ranges: PV Accel Time: 71 msec  (>120ms) PV Max Marbin:    1.1 m/s  (0.6-0.9m/s) PV Max P.8 mmHg  [id number] Jamey Alejandra DO Electronically signed on 10/27/2023 at 4:13:45 PM  Wall Scoring  ** Final **     XR chest 1 view    Result Date:  10/26/2023  STUDY: Chest Radiograph;  10/26/2023 2:28 AM INDICATION: Shortness of breath. COMPARISON: 10/25/2023 XR Chest two view ACCESSION NUMBER(S): [id number] ORDERING CLINICIAN: LO SHAW TECHNIQUE:  Frontal chest was obtained at 2:28 hours. FINDINGS: CARDIOMEDIASTINAL SILHOUETTE: Cardiomediastinal silhouette is borderline in size, unchanged.  LUNGS: Lungs are clear.  ABDOMEN: No remarkable upper abdominal findings.  BONES: No acute osseous changes.    Stable borderline heart size.  Clear lungs.  No evidence of infiltrate or edema. Signed by Yandel Slade MD    XR chest 2 views    Result Date: 10/25/2023  STUDY: Chest Radiographs;  10/25/2023 at 2:55 AM INDICATION: Evaluate for cardiomegaly and worsening pulmonary edema. COMPARISON: CTA chest 10/24/2023, XR chest 10/24/2023, XR chest 11/13/2022, XR chest 2/28/2022. ACCESSION NUMBER(S): [id number] ORDERING CLINICIAN: RAJ OWEN TECHNIQUE:  Frontal and lateral chest. FINDINGS: CARDIOMEDIASTINAL SILHOUETTE: Cardiomediastinal silhouette is enlarged.  Central pulmonary vasculature is slightly prominent.  There are interstitial changes noted throughout both lungs. No localized consolidation is appreciated. ABDOMEN: No remarkable upper abdominal findings.  BONES: No acute osseous changes.    Above findings likely related to interstitial edema from CHF.  This has progressed since previous exam. Signed by Mayur Melo,     CT angio chest for pulmonary embolism    Result Date: 10/24/2023  STUDY: CT Angiogram of the Chest; 10/24/2023 2:49 PM. INDICATION: Shortness of breath and elevated D-dimer; Evaluate for pulmonary embolism. COMPARISON: Chest radiograph performed the same date. ACCESSION NUMBER(S): [id number] ORDERING CLINICIAN: MARTHA HODGE TECHNIQUE:  CTA of the chest was performed with intravenous contrast. Images are reviewed and processed at a workstation according to the CT angiogram protocol with 3-D and/or MIP post processing imaging  generated.  Omnipaque 350, 75 mL was administered intravenously. Automated mA/kV exposure control was utilized and patient examination was performed in strict accordance with principles of ALARA. FINDINGS: Pulmonary arteries are adequately opacified without acute or chronic filling defects.  The thoracic aorta is normal in course and caliber without dissection or aneurysm. There are scattered vascular calcifications along the arterial tree The heart is mildly enlarged with no pericardial effusion.  Prominent mediastinal lymph nodes are noted with a short axis diameter of up to 1.5 cm. The esophagus is partly air-filled. There is no pleural effusion, pleural thickening, or pneumothorax. The airways are patent. There is a 5 x 2 mm perifissural nodule along in the right middle lobe adjacent to the minor fissure. The right lobe of the thyroid is enlarged and heterogeneous. The thyroid is not included in entirety. Within the upper abdomen there are foci of increased attenuation in both kidneys suggesting possible nonobstructing calculi., The larger in the upper pole of the left kidney measures about 5 mm in diameter. The kidneys are not included in entirety. There are vascular calcifications along the arterial tree.  No suspicious bony lesions.    1.No evidence of acute pulmonary embolus or thoracic aortic aneurysm or dissection. 2.Mild cardiomegaly with no pericardial effusion. 3.Mediastinal adenopathy. Follow-up is suggested. 4.Enlarged heterogeneous right lobe of the thyroid. Recommend correlation with thyroid ultrasound. 5.There is a 5 x 2 mm perifissural nodule along in the right middle lobe adjacent to the minor fissure, likely benign. Signed by Radha Casanova DO    XR chest 1 view    Result Date: 10/24/2023  STUDY: Chest Radiograph;  10/24/2023 13:15 PM. INDICATION: Shortness of breath. COMPARISON: CXR 11/13/2022 ACCESSION NUMBER(S): [id number] ORDERING CLINICIAN: MARTHA HODGE TECHNIQUE:  Frontal chest was  obtained at 13:15 hours. FINDINGS: CARDIOMEDIASTINAL SILHOUETTE: The heart is borderline in size.  LUNGS: Lungs are clear.  ABDOMEN: No remarkable upper abdominal findings.  BONES: No acute osseous changes.    Borderline heart size. No acute cardiopulmonary abnormality. Signed by Eavns Amaya MD    Principal Problem:    NSTEMI (non-ST elevated myocardial infarction) (CMS/Formerly [facility])  Active Problems:    Elevated troponin      Assessment/Plan      Acute systolic congestive heart failure patient seen by cardiology on diuretic.  Increased troponin non-ST elevation MI.  Cardiology on the case.  Smoking cessation discussed with the patient patient heavy smoker smoked more than 2 pack/day.  Dyspnea on exertion follow-up in the office for complete work-up for obstructive and restrictive lung disease.  Right middle lobe 5 x 2 mm lung nodule follow-up CT scan of the chest patient advised to follow-up in the office for follow-up.  Hypertension.  Dyslipidemia.  Coronary disease.  Diabetes.  Peripheral artery disease.  Left below-knee amputation.  Chronic kidney disease /acute kidney injury.  Renal on the case.  Hyperthyroidism.  GERD.  DVT prophylaxis.  PT OT.  P.o. diet  On discharge follow-up for pulmonary in my office .  Shamir Sagastume MD

## 2023-10-27 NOTE — NURSING NOTE
CHF Clinical Nurse Navigator Documentation  Congestive Heart Failure disease education was performed by the Clinical Nurse Navigator with a good understanding: yes  CHF signs and symptoms discussed and when to call cardiologist?  yes  Living With Heart Failure Education booklet?  no  Controlling Heart Failure at Home Education? yes  CHF Education Teaching Tool? yes  ABELINO education modules assigned?  no  Home medication usage?  yes  Nutrition Education? Yes-low sodium   Fluid Restriction Education? yes  Daily Weight Education? Yes-daily weight log provided   Cardiovascular Rehab Referral ordered?  no  Follow-up with Cardiologist after discharge education? yes  Comments: Met with patient at bedside for heart failure education. Patient verbalized understanding, however, he said he is here for diagnostic purposes only and then he will decide on his treatment. He does his won research and finds his own answers. He is non-compliant with his medications, except torsemide which he takes prn, and believes the poison him. He uses herbs and alternative medicine. He is non-compliant with his follow up appointment. His cardiologist is Dr. Mauro and he also sees Dr. Philip. He uses transport through his insurance when he does go to appointments. He lives alone. He smokes about 4 cigarettes a day. Advised smoking cessation. Provided my contact information should any questions or concerns arise.

## 2023-10-27 NOTE — PROGRESS NOTES
[facility] Progress Note               Rounding Cardiologist:  Jamey Garcia DO, MD   Primary Cardiologist: Norris  Date:  10/27/2023  Patient:  Hitesh Jurado  YOB: 1970  MRN:  [id number]   Admit Date:  10/26/2023      SUBJECTIVE    Hitesh Jurado is a 53 y.o.  male patient who is being at the request of Dr. Puga for inpatient consultation of CHF. He was admitted on 10/26/2023.  Previous  cardiology records have been reviewed in detail.       Patient presented today to [facility] emergency department 10/25/2023 with chief complaint of shortness of breath and that he cannot breathe when he lays down.  Had left AGAINST MEDICAL ADVICE as he states he forgot to bring his  with him, then presented to the emergency department again on 6/26/2023 with same complaint.  He is admitted to medical service.  He denies chest pain, cough, sputum, fevers or chills palpitations, dizziness, lightheadedness, nausea, vomiting, lower extremity edema, bleeding, difficulty urinating or moving his bowels.  States he has been taking Demadex daily with no significant urine output. He had all of his teeth extracted due to poor dentition history and gingivitis September 18, 2023.  He has history of hyperthyroidism, discusses many homeopathic remedies for his multiple medical issues.  [facility] heart cardiology consult was placed for evaluation of heart failure as patient noted to have elevated troponin and BNP.  At time of cardiology consult in the afternoon of 10/26/2023 patient is without chest pain, shortness of breath.  Telemetry shows sinus tachycardia with heart rates in the low 100's.  He states that he feels better since coming to the hospital.     Per last advanced heart failure note: Patient has history of ischemic cardiomyopathy, systolic heart failure [place] Heart Association stage C follows with advanced heart failure Dr. Philip  and Dr. Mauro.  Per last advanced heart failure note patient was first diagnosed with heart disease in 2019 when undergoing surgery for left leg peripheral arterial disease, EF at that time 30%.  Also has history of hyperthyroidism with previously undetectable TSH, level not known  Was evaluated by Dr. Perkins from electrophysiology standpoint 2023, declined ICD implant at that time.     Review of systems negative x12 systems other than mentioned above        Previous cardiac testin2023 transthoracic echocardiogram:  PHYSICIAN INTERPRETATION:  Left Ventricle: Left ventricular systolic function is mildly to moderately decreased, with an estimated ejection fraction of 40%. There are multiple wall motion abnormalities. The left ventricular cavity size is mildly dilated. Spectral Doppler shows a pseudonormal pattern of left ventricular diastolic filling.  Left Atrium: The left atrium is normal in size.  Right Ventricle: The right ventricle is normal in size. There is normal right ventricular global systolic function.  Right Atrium: The right atrium is normal in size.  Aortic Valve: The aortic valve is trileaflet. There is mild aortic valve thickening. There is no evidence of aortic valve stenosis.  The aortic valve dimensionless index is 0.76. There is no evidence of aortic valve regurgitation. The peak instantaneous gradient of the aortic valve is 8.5 mmHg. The mean gradient of the aortic valve is 5.0 mmHg.  Mitral Valve: The mitral valve is mildly thickened. There is no evidence of mitral valve stenosis. There is moderate mitral valve regurgitation which is centrally directed.  Tricuspid Valve: The tricuspid valve is structurally normal. There is no evidence of tricuspid valve stenosis. There is mild tricuspid regurgitation. The Doppler estimated RVSP is within normal limits at 35.7 mmHg.  Pulmonic Valve: The pulmonic valve is not well visualized. There is no indication of pulmonic valve  regurgitation.  Pericardium: There is no pericardial effusion noted.  Aorta: The aortic root is normal.  In comparison to the previous echocardiogram(s): Prior examinations are available and were reviewed for comparison purposes. Compared with echo 6/2022 LVEF has improved, mitral regurgitation improved, tricuspid valve regurgitation new.        CONCLUSIONS:  1. Left ventricular systolic function is mildly to moderately decreased with a 40% estimated ejection fraction.  2. Spectral Doppler shows a pseudonormal pattern of left ventricular diastolic filling.  3. Moderate mitral valve regurgitation.  4. There is No tricuspid stenosis.  5. RVSP within normal limits.  6. Aortic valve stenosis is not present.  7. Compared with echo 6/2022 LVEF has improved, mitral regurgitation improved, tricuspid valve regurgitation new.  8. There are multiple wall motion abnormalities.     Cardiac catheterization 2/11/2021 performed at [facility]:  Very short left main coronary artery  Left anterior descending artery diffuse mild irregularity up to 20% to 30% stenosis.  Small caliber first and second diagonal branch with severe diffuse disease  Circumflex chronically occluded mid circumflex with retrograde filling OM branch from left to left collaterals  Right coronary artery diffuse mild irregularity up to 20% narrowing, 40% distal stenosis, LVEDP 18 mmHg narrowing  Chronically occluded mid circumflex with left to left collaterals    October 27 2023  Patient is feeling much better.  He has significant improvement of his breathing with pulmonary treatments.  He denies chest pain, palpitations, nausea, vomiting.  Less edema of his lower extremity stump.      VITALS     Vitals:    10/27/23 0400 10/27/23 0618 10/27/23 0735 10/27/23 0746   BP: 128/88  118/69    BP Location:       Patient Position:       Pulse: 102  (!) 111    Resp: 18  18    Temp: 36.7 °C (98.1 °F)  36.6 °C (97.9 °F)    TempSrc:   Temporal    SpO2: 98%   98% 97%   Weight:  50.7 kg (111 lb 12.4 oz)     Height:           Intake/Output Summary (Last 24 hours) at 10/27/2023 0938  Last data filed at 10/26/2023 2200  Gross per 24 hour   Intake 240 ml   Output --   Net 240 ml           PHYSICAL EXAM   PHYSICAL EXAMINATION:  GENERAL:  Well developed, well nourished, in no acute distress.  CHEST:  Symmetric and nontender.  NEURO/PSYCH:  Alert and oriented times three with approppriate behavior and responses.  NECK:  Supple, no JVD, no bruit.  LUNGS:  Clear to auscultation bilaterally, normal respiratory effort.  HEART:  Rate and rhythm regular with no evident murmur, no gallop appreciated.        There are no rubs, clicks or heaves.  EXTREMITIES:  Warm with good color, no clubbing or cyanosis.  There is no edema noted.  PERIPHERAL VASCULAR:  Pulses present and equally palpable; 2+ throughout.              CBC:   Lab Results   Component Value Date    WBC 10.4 10/27/2023    RBC 4.08 (L) 10/27/2023    HGB 9.5 (L) 10/27/2023    HCT 29.2 (L) 10/27/2023     10/27/2023       CMP:    Lab Results   Component Value Date     (L) 10/27/2023    K 3.9 10/27/2023    CL 88 (L) 10/27/2023    CO2 31 10/27/2023    BUN 57 (H) 10/27/2023    CREATININE 1.55 (H) 10/27/2023    GLUCOSE 135 (H) 10/27/2023    CALCIUM 9.1 10/27/2023       Hepatic Function Panel:    Lab Results   Component Value Date    ALKPHOS 104 10/26/2023    ALT 22 10/26/2023    AST 19 10/26/2023    PROT 6.5 10/26/2023    BILITOT 1.6 (H) 10/26/2023           TSH:    Lab Results   Component Value Date    TSH <0.01 (L) 10/26/2023             RADIOLOGY     XR chest 1 view   Final Result   Stable borderline heart size.  Clear lungs.  No evidence of infiltrate   or edema.   Signed by Yandel Slade MD      Transthoracic Echo (TTE) Complete    (Results Pending)       [unfilled]      CURRENT MEDICATIONS    aspirin, 81 mg, oral, Daily  atorvastatin, 80 mg, oral, Daily  carvedilol, 12.5 mg, oral, BID with meals  empagliflozin, 10  mg, oral, Daily  [Held by provider] furosemide, 40 mg, intravenous, q12h  gabapentin, 400 mg, oral, TID  insulin lispro, 0-10 Units, subcutaneous, TID with meals  ipratropium-albuteroL, 3 mL, nebulization, TID  linaGLIPtin, 5 mg, oral, Daily  melatonin, 5 mg, oral, Nightly  methIMAzole, 10 mg, oral, q8h TIFFANIE  pantoprazole, 40 mg, oral, Daily before breakfast  perflutren lipid microspheres, 0.5-10 mL of dilution, intravenous, Once in imaging  perflutren protein A microsphere, 0.5 mL, intravenous, Once in imaging  [Held by provider] spironolactone, 25 mg, oral, Daily  sulfur hexafluoride microsphr, 2 mL, intravenous, Once in imaging             ASSESSMENT   COPD exacerbation  Elevated troponin-demand ischemia secondary to respiratory distress  Chronic systolic congestive heart failure  Ischemic cardiomyopathy EF 40%  Coronary artery disease/chronically occluded left circumflex  Acute on chronic renal failure  Hyponatremia  Mitral valve regurgitation  Active tobacco abuse  Anemia  Peripheral arterial disease  Diabetes      PLAN       Patient does not appear to be volume overloaded on exam.  There is no pulmonary edema or pleural effusions on chest x-ray to explain dyspnea.  Cardiac biomarkers are elevated.  No apparent symptoms of angina.  No acute ischemic EKG abnormalities.  Patient has acute renal failure.  He was using his Demadex this past week 5 days without improvement of symptoms and likely contributed to his dehydration and acute renal failure.  Patient also received contrast for CTA chest which revealed no pulmonary embolus, no pulmonary edema.  We held diuretics.   Suspect dyspnea is pulmonary in etiology possible COPD exacerbation.  Consult pulmonary.  Advised smoking cessation.  No symptoms of angina.  Advised medical therapy for coronary artery disease.  Continue medical therapy with  aspirin, 81 mg, oral, Daily  atorvastatin, 80 mg, oral, Daily  carvedilol, 12.5 mg, oral, BID with meals  Jardiance 10 mg  daily  Lisinopril 5 mg daily  Demadex as needed    No further inpatient cardiac evaluation necessary at this time  Sign off cardiology.  Please call with any change in cardiac status.  Follow-up with Dr. Philip after discharge    Please do not hesitate to call with questions.  Electronically signed by Jamey Garcia DO, [facility] on 10/27/2023 at 9:38 AM

## 2023-10-27 NOTE — PROGRESS NOTES
"Hitesh Jurado is a 53 y.o. male on day 1 of admission presenting with NSTEMI (non-ST elevated myocardial infarction) (CMS/Formerly [facility]).    Subjective   Patient seen and examined.  Patient he is denying any shortness of breath, chest pain, feels good, no fevers, chills, nausea, vomiting.       Objective     Physical Exam  Constitutional:       Appearance: Normal appearance.   HENT:      Head: Normocephalic and atraumatic.   Cardiovascular:      Rate and Rhythm: Normal rate and regular rhythm.      Heart sounds:      No gallop.   Pulmonary:      Effort: Pulmonary effort is normal. No respiratory distress.      Comments: Decreased breathing sounds bilaterally no crepitations  Abdominal:      Palpations: Abdomen is soft.      Tenderness: There is no abdominal tenderness. There is no guarding.   Neurological:      General: No focal deficit present.      Mental Status: He is alert.         Last Recorded Vitals  Blood pressure 119/70, pulse (!) 115, temperature 36.7 °C (98.1 °F), resp. rate 18, height 1.753 m (5' 9.02\"), weight 50.7 kg (111 lb 12.4 oz), SpO2 94 %.  Intake/Output last 3 Shifts:  I/O last 3 completed shifts:  In: 240 (4.7 mL/kg) [P.O.:240]  Out: - (0 mL/kg)   Weight: 50.7 kg     Relevant Results           This patient currently has cardiac telemetry ordered; if you would like to modify or discontinue the telemetry order, click here to go to the orders activity to modify/discontinue the order.                 Assessment/Plan   Principal Problem:    NSTEMI (non-ST elevated myocardial infarction) (CMS/Formerly [facility])  Active Problems:    Elevated troponin    Troponins have trended down  As per cardiology most likely type II MI/demand ischemia  Not complaining of any chest pain  No ischemia work-up inpatient at this point  Diuretics were discontinued due to elevated creatinine  Was given IV fluids overnight, I will stop.  Sodium went up to 134, his initial sodium which was low at 122 was partially due to elevated blood " glucose of greater than 500 so if we correct for pseudohyponatremia we are still waiting acceptable range of sodium correction  We will continue to monitor  Keep off diuretics for now  Nephrology consulted   Creatinine is down to 1.55  His thyroid functions showed possible hypothyroidism, patient is aware of this fact  Patient believes in some different kind of treatment, I will consult endocrinology did start the patient on methimazole  Blood sugars have improved  Continue current medical management  Supportive care, symptomatic treatment  DVT prophylaxis            Howard Puga MD

## 2023-10-27 NOTE — CONSULTS
[facility]  Consult Note           Reason for Consult: Acute kidney injury with no known chronic kidney disease baseline creatinine should be considered as 1.1 with GFR above 60 however 11 months ago there is documentation in the patient database that she did sustain acute kidney injury and recover creatinine peaked at 1.7 at that time  Requesting Physician:  Dr. Puga   Chief Complaint: Shortness of breath  History Obtained From:  patient, electronic medical record    History of Present Ilness:    53 y.o. year old male who presented to the emergency department for further evaluation and management of shortness of breath the patient was not clear in his information he was mentally occupied with several ideas and distracted what has been mentioned above and what follow has been obtained from the chart review clinical laboratory assessment did show that the patient have acute kidney injury and admitted for further evaluation and management  Past Medical History:    Past Medical History:   Diagnosis Date    CHF (congestive heart failure) (CMS/[facility])     Diabetes mellitus (CMS/[facility])         Past Surgical History:    History reviewed. No pertinent surgical history.     Home Medications:    No current facility-administered medications on file prior to encounter.     Current Outpatient Medications on File Prior to Encounter   Medication Sig Dispense Refill    acetaminophen (Tylenol) 500 mg tablet Take 1 tablet (500 mg) by mouth every 8 hours if needed.      aspirin 81 mg EC tablet Take 1 tablet (81 mg) by mouth once daily.      atorvastatin (Lipitor) 80 mg tablet Take 1 tablet (80 mg) by mouth once daily.      carvedilol (Coreg) 12.5 mg tablet Take 1 tablet (12.5 mg) by mouth twice a day.      empagliflozin (Jardiance) 10 mg Take 1 tablet (10 mg) by mouth once daily.      gabapentin (Neurontin) 400 mg capsule Take 1 capsule (400 mg) by mouth 3 times a day.      linaGLIPtin (Tradjenta) 5 mg tablet Take 1 tablet  (5 mg) by mouth once daily.      linaGLIPtin (Tradjenta) 5 mg tablet Take 1 tablet (5 mg) by mouth once daily.      lisinopril 40 mg tablet Take 1 tablet (40 mg) by mouth once daily.      lisinopril 5 mg tablet Take 1 tablet (5 mg) by mouth once daily.      melatonin 5 mg tablet Take 1 tablet (5 mg) by mouth once daily at bedtime.      metFORMIN (Glucophage) 1,000 mg tablet Take 0.5 tablets (500 mg) by mouth twice a day.      metFORMIN (Glucophage) 1,000 mg tablet Take 1 tablet (1,000 mg) by mouth once daily.      omeprazole (PriLOSEC) 40 mg DR capsule Take 1 capsule (40 mg) by mouth twice a day.      spironolactone (Aldactone) 25 mg tablet Take 1 tablet (25 mg) by mouth once daily.      torsemide (Demadex) 20 mg tablet Take 1 tablet (20 mg) by mouth once daily as needed.         Allergies:  Amoxicillin    Social History:    Social History     Socioeconomic History    Marital status: Single     Spouse name: Not on file    Number of children: Not on file    Years of education: Not on file    Highest education level: Not on file   Occupational History    Not on file   Tobacco Use    Smoking status: Every Day     Packs/day: .5     Types: Cigarettes    Smokeless tobacco: Never   Substance and Sexual Activity    Alcohol use: Never    Drug use: Never    Sexual activity: Defer   Other Topics Concern    Not on file   Social History Narrative    Not on file     Social Determinants of Health     Financial Resource Strain: Low Risk  (10/26/2023)    Overall Financial Resource Strain (CARDIA)     Difficulty of Paying Living Expenses: Not hard at all   Food Insecurity: Not on file   Transportation Needs: No Transportation Needs (10/26/2023)    PRAPARE - Transportation     Lack of Transportation (Medical): No     Lack of Transportation (Non-Medical): No   Physical Activity: Not on file   Stress: Not on file   Social Connections: Not on file   Intimate Partner Violence: Not on file   Housing Stability: High Risk (10/26/2023)     "Housing Stability Vital Sign     Unable to Pay for Housing in the Last Year: No     Number of Places Lived in the Last Year: 3     Unstable Housing in the Last Year: No       Family History:   No family history on file.    Review of Systems:   The patient denies any fever or chills no productive nonproductive cough no nausea emesis or diarrhea no dysuria no near syncope or syncope no chest pain no dyspnea orthopnea presumed nocturnal dyspnea no any other organ system related symptoms      Physical exam:   Constitutional:  VITALS:  /70   Pulse (!) 115   Temp 36.7 °C (98.1 °F)   Resp 18   Ht 1.753 m (5' 9.02\")   Wt 50.7 kg (111 lb 12.4 oz)   SpO2 94%   BMI 16.50 kg/m²      Wt Readings from Last 3 Encounters:   10/27/23 50.7 kg (111 lb 12.4 oz)   10/25/23 55.3 kg (122 lb)   10/24/23 55.3 kg (122 lb)      Gen: alert, awake, nad  Head: atraumatic, normocephalic  Skin: no rash, turgor wnl  Heent:  eomi, mmm  Neck: no bruits or jvd noted, thyroid normal  Lungs:  clear to auscultation  Heart:  regular rate and rhythm, no murmurs  Abdomen:  +bs, soft, nt, nd, no hepatosplenomegaly   Extremities: no edema  Psychiatric: mood and affect appropriate; judgement and insight intact  Musculoskeletal:  Rom, muscular strength intact; digits, nails normal    Data/  CBC:   Lab Results   Component Value Date    WBC 10.4 10/27/2023    RBC 4.08 (L) 10/27/2023    HGB 9.5 (L) 10/27/2023    HCT 29.2 (L) 10/27/2023    MCV 72 (L) 10/27/2023    MCH 23.3 (L) 10/27/2023    MCHC 32.5 10/27/2023    RDW 17.9 (H) 10/27/2023     10/27/2023    MPV 9.9 10/27/2023     BMP:    Lab Results   Component Value Date     (L) 10/27/2023    K 3.9 10/27/2023    CL 88 (L) 10/27/2023    CO2 31 10/27/2023    BUN 57 (H) 10/27/2023    CREATININE 1.55 (H) 10/27/2023    CALCIUM 9.1 10/27/2023    GLUCOSE 135 (H) 10/27/2023     XR chest 1 view  Narrative: STUDY:  Chest Radiograph;  10/26/2023 2:28 AM  INDICATION:  Shortness of " "breath.  COMPARISON:  10/25/2023 XR Chest two view  ACCESSION NUMBER(S):  [id number]  ORDERING CLINICIAN:  LO SHAW  TECHNIQUE:  Frontal chest was obtained at 2:28 hours.  FINDINGS:  CARDIOMEDIASTINAL SILHOUETTE:  Cardiomediastinal silhouette is borderline in size, unchanged.     LUNGS:  Lungs are clear.     ABDOMEN:  No remarkable upper abdominal findings.     BONES:  No acute osseous changes.  Impression: Stable borderline heart size.  Clear lungs.  No evidence of infiltrate  or edema.  Signed by Yandel Slade MD    LFT:   Lab Results   Component Value Date    AST 19 10/26/2023    ALT 22 10/26/2023    ALKPHOS 104 10/26/2023    BILITOT 1.6 (H) 10/26/2023      Urinalysis: No results found for: \"PRIMITIVO\", \"PROTUR\", \"GLUCOSEU\", \"BLOODU\", \"KETONESU\", \"BILIRUBINU\", \"NITRITEU\", \"LEUKOCYTESU\", \"UTPCR\"   Imaging: XR chest 1 view  Narrative: STUDY:  Chest Radiograph;  10/26/2023 2:28 AM  INDICATION:  Shortness of breath.  COMPARISON:  10/25/2023 XR Chest two view  ACCESSION NUMBER(S):  [id number]  ORDERING CLINICIAN:  LO SHAW  TECHNIQUE:  Frontal chest was obtained at 2:28 hours.  FINDINGS:  CARDIOMEDIASTINAL SILHOUETTE:  Cardiomediastinal silhouette is borderline in size, unchanged.     LUNGS:  Lungs are clear.     ABDOMEN:  No remarkable upper abdominal findings.     BONES:  No acute osseous changes.  Impression: Stable borderline heart size.  Clear lungs.  No evidence of infiltrate  or edema.  Signed by Yandel Sldae MD           Assessment/  53 y.o. yo male admitted with subjective complaint of shortness of breath admitted with a diagnosis of acute kidney injury.  Renal comorbidities before admission of  diabetes mellitus .  Pertinent preadmission renal medications of metformin lisinopril torsemide and spironolactone denied any nonsteroidal anti-inflammatory.  Imaging of kidneys has not been ordered.  Urinalysis has not yet ordered   assessment of renal issues include:  Acute kidney injury " multifactorial prerenal azotemia and contrast-induced nephropathy stage II patient did receive a CT scan angio to rule out PE no available  Azotemia:  Baseline Cr 1.1.  CKD stage 2 most likely diabetic nephropathy  Blood pressure: blood pressure controlled  Acid / base / electrolytes: Acid base status ok and no significant electrolyte issues  Volume status:hypovolemic  Proteinuria:urine tests ordered  Anemia:  Observed anemia requiring further management  Ca/Phos/PTH: No available information        Plan/  IV fluid 1 cc/kg/h for the next 24 hours as therapeutic intervention in addition to hold metformin  Diagnostic renal ultrasound urinalysis Spot urine for protein and creatinine  Outpatient follow up from renal standpoint: Dr. Crabtree    Thank you for the consultation.  Please do not hesitate to call with questions.    Francis Crabtree MD

## 2023-10-27 NOTE — CONSULTS
"Inpatient consult to Endocrinology  Consult performed by: Denise Lynch MD  Consult ordered by: Howard Puga MD          Assessment/Plan       Diabetes mellitus type 2 suboptimal control  The patient's lipids were quite elevated and coming in at 576 however have improved to the 100s.  He did also get a dose of prednisone 40 mg once.  It appears there is a monitor issue as per the history in the chart he used to be on Jardiance and linagliptin which might be expensive but he is also on metformin which could be continued even if he leaves as an outpatient as it is less expensive and will help control his glucoses to some extent.  Other cheaper medications can be started once his glucose are more stable and his condition and other medications including steroids are more stable.        Reason For Consult  Uncontrolled diabetes type 2 improving    History Of Present Illness  Hitesh Jurado is a 53 y.o. male with  has a past medical history of CHF (congestive heart failure) (CMS/[facility]) and Diabetes mellitus (CMS/[facility]). presenting with the patient given with extremely elevated glucose of over 500 with pseudohyponatremia.  Since then he has improved glucoses down to the 200s and some in the 100s.  The rest of the HPI is as below.Hitesh Jurado is a 53 y.o. male with a significant past medical history of NSTEMI, IDDM2, acute on chronic heart failure, PAD, CAD, HLD, nicotine dependence, PTSD, JONA presents for the third day in a row to the emergency room with shortness of breath.  Send sputum, review prior studies, trend labs.     Assessed patient on 10/25 at 5:30 AM at which point he was admitted under internal medicine service, but left AMA because \"he needed to gather supplies\" he is now ready for admission and has his filtered water jug with him, he is not able to drink the water here.  Last echo EF is 40%, with only moderate regurgitation of the mitral valve, home dosing of diuretics show he should be " taking: torsemide 20 mg p.o. however patient is noncompliant with medications, encourage daily weights, diet modification, and fluid restriction.      Mr. Jurado is again presenting with worsening PIPPA; creatinine baseline 1.1 today 1.83 GFR 44; consider nephrology consult for management of fluid volume complicated by CHF, limit nephrotoxic agents, and renally dose antibiotics.      Poorly controlled diabetes, hyperglycemic on presentation w glucose in 500-600s responds well to insulin not in DKA,          Current Facility-Administered Medications   Medication Dose Route Frequency Provider Last Rate Last Admin    albuterol 2.5 mg /3 mL (0.083 %) nebulizer solution 2.5 mg  2.5 mg nebulization q6h PRN Howard Puga MD        albuterol 2.5 mg /3 mL (0.083 %) nebulizer solution 2.5 mg  2.5 mg nebulization q6h PRN Howard Puga MD        aspirin EC tablet 81 mg  81 mg oral Daily Howard Puga MD        atorvastatin (Lipitor) tablet 80 mg  80 mg oral Daily Howard Puga MD        carvedilol (Coreg) tablet 12.5 mg  12.5 mg oral BID with meals Howard Puga MD        dextrose 10 % in water (D10W) infusion  0.3 g/kg/hr intravenous Once PRN Howard Puga MD        dextrose 50 % injection 25 g  25 g intravenous q15 min PRN Howard Puga MD        empagliflozin (Jardiance) tablet 10 mg  10 mg oral Daily Howard Puga MD        [Held by provider] furosemide (Lasix) injection 40 mg  40 mg intravenous q12h Howard Puga MD        gabapentin (Neurontin) capsule 400 mg  400 mg oral TID Howard Puga MD        glucagon (Glucagen) injection 1 mg  1 mg intramuscular q15 min PRN Howard Puga MD        insulin lispro (HumaLOG) injection 0-10 Units  0-10 Units subcutaneous TID with meals Howard Puga MD        ipratropium-albuteroL (Duo-Neb) 0.5-2.5 mg/3 mL nebulizer solution 3 mL  3 mL nebulization TID Howard Puga MD   3 mL at 10/27/23 0750    linaGLIPtin (Tradjenta) tablet 5 mg  5  mg oral Daily Howard Puga MD        melatonin tablet 5 mg  5 mg oral Nightly Howard Puga MD        methIMAzole (Tapazole) tablet 10 mg  10 mg oral q8h TIFFANIE Howard Puga MD        pantoprazole (ProtoNix) EC tablet 40 mg  40 mg oral Daily before breakfast Howard Puga MD        perflutren lipid microspheres (Definity) injection 0.5-10 mL of dilution  0.5-10 mL of dilution intravenous Once in imaging Howard Puga MD        perflutren protein A microsphere (Optison) injection 0.5 mL  0.5 mL intravenous Once in imaging Howard Puga MD        [Held by provider] spironolactone (Aldactone) tablet 25 mg  25 mg oral Daily Howard Puga MD        sulfur hexafluoride microsphr (Lumason) injection 24.28 mg  2 mL intravenous Once in imaging Howard Puga MD           No current facility-administered medications on file prior to encounter.     Current Outpatient Medications on File Prior to Encounter   Medication Sig Dispense Refill    acetaminophen (Tylenol) 500 mg tablet Take 1 tablet (500 mg) by mouth every 8 hours if needed.      aspirin 81 mg EC tablet Take 1 tablet (81 mg) by mouth once daily.      atorvastatin (Lipitor) 80 mg tablet Take 1 tablet (80 mg) by mouth once daily.      carvedilol (Coreg) 12.5 mg tablet Take 1 tablet (12.5 mg) by mouth twice a day.      empagliflozin (Jardiance) 10 mg Take 1 tablet (10 mg) by mouth once daily.      gabapentin (Neurontin) 400 mg capsule Take 1 capsule (400 mg) by mouth 3 times a day.      linaGLIPtin (Tradjenta) 5 mg tablet Take 1 tablet (5 mg) by mouth once daily.      linaGLIPtin (Tradjenta) 5 mg tablet Take 1 tablet (5 mg) by mouth once daily.      lisinopril 40 mg tablet Take 1 tablet (40 mg) by mouth once daily.      lisinopril 5 mg tablet Take 1 tablet (5 mg) by mouth once daily.      melatonin 5 mg tablet Take 1 tablet (5 mg) by mouth once daily at bedtime.      metFORMIN (Glucophage) 1,000 mg tablet Take 0.5 tablets (500 mg) by mouth  twice a day.      metFORMIN (Glucophage) 1,000 mg tablet Take 1 tablet (1,000 mg) by mouth once daily.      omeprazole (PriLOSEC) 40 mg DR capsule Take 1 capsule (40 mg) by mouth twice a day.      spironolactone (Aldactone) 25 mg tablet Take 1 tablet (25 mg) by mouth once daily.      torsemide (Demadex) 20 mg tablet Take 1 tablet (20 mg) by mouth once daily as needed.            Review of systems: 10 point review of systems is otherwise negative except as mentioned above.      Past Medical History  He has a past medical history of CHF (congestive heart failure) (CMS/[facility]CC) and Diabetes mellitus (CMS/HCC).    Surgical History  He has no past surgical history on file.     Social History  He reports that he has been smoking cigarettes. He has been smoking an average of .5 packs per day. He has never used smokeless tobacco. He reports that he does not drink alcohol and does not use drugs.    Family History  No family history on file.     Allergies  Amoxicillin    Review of Systems as above otherwise negative  Per HPI rest negative  Past Medical History:   Diagnosis Date    CHF (congestive heart failure) (CMS/HCC)     Diabetes mellitus (CMS/[facility])        History reviewed. No pertinent surgical history.    Social History     Socioeconomic History    Marital status: Single     Spouse name: Not on file    Number of children: Not on file    Years of education: Not on file    Highest education level: Not on file   Occupational History    Not on file   Tobacco Use    Smoking status: Every Day     Packs/day: .5     Types: Cigarettes    Smokeless tobacco: Never   Substance and Sexual Activity    Alcohol use: Never    Drug use: Never    Sexual activity: Defer   Other Topics Concern    Not on file   Social History Narrative    Not on file     Social Determinants of Health     Financial Resource Strain: Low Risk  (10/26/2023)    Overall Financial Resource Strain (CARDIA)     Difficulty of Paying Living Expenses: Not hard at all   Food  "Insecurity: Not on file   Transportation Needs: No Transportation Needs (10/26/2023)    PRAPARE - Transportation     Lack of Transportation (Medical): No     Lack of Transportation (Non-Medical): No   Physical Activity: Not on file   Stress: Not on file   Social Connections: Not on file   Intimate Partner Violence: Not on file   Housing Stability: High Risk (10/26/2023)    Housing Stability Vital Sign     Unable to Pay for Housing in the Last Year: No     Number of Places Lived in the Last Year: 3     Unstable Housing in the Last Year: No        Physical Exam awake alert     ROS, PMH, FH/SH, surgical history and allergies have been reviewed.    Last Recorded Vitals  Blood pressure 118/69, pulse (!) 111, temperature 36.6 °C (97.9 °F), temperature source Temporal, resp. rate 18, height 1.753 m (5' 9\"), weight 50.7 kg (111 lb 12.4 oz), SpO2 97 %.    Relevant Results  Results from last 7 days   Lab Units 10/27/23  0611 10/27/23  0605 10/26/23  2018 10/26/23  1613 10/26/23  1259 10/26/23  1112 10/26/23  0529 10/26/23  0309 10/25/23  0806 10/25/23  0240 10/24/23  1308   POCT GLUCOSE mg/dL 131*  --  273* 260* 273* 219*   < >  --    < >  --   --    GLUCOSE mg/dL  --  135*  --   --   --   --   --  577*  --  581* 576*    < > = values in this interval not displayed.     Lab Results   Component Value Date    HGBA1C 7.9 (H) 12/12/2022          Kevin Lynch MD FACE  Office adryan  [facility]  No department found  [facility]  [facility]  [facility]  [facility] 10  [facility] 10    10/21 - 10/22 10/22 0700 - 10/23 0659 10/23 0700 - 10/24 0659 10/24 0700 - 10/25 0659 10/25 0700 - 10/26 0659 10/26 0700 - 10/27 0659 10/27    8 hr:  15-23 23-07 07-15 15-23 23-07 07-15 15-23 23-07 07-15 15-23 23-07 07-15 15-23 23-07 07-15 15-23 23-07 07-15        Glucose (mg/dl)    Accucheck "   Fasting Glucose   Random Glucose     0 [id number] 0 [id number]    Glucose    FS glucose                     FS glucose     Random glucose          576  581   577   135   Random glucose     Fasting glucose                     Fasting glucose     Blood glucose                     Blood glucose     POCT glucose             512  287 273 273 131   POCT glucose     Potassium    Potassium, Blood          3.8  4.2   5.1   3.9   Potassium, Blood     Medication Dosing    insulin lispro SUBQ (Units)             15        insulin lispro SUBQ (Units)     insulin regular, human INJ (Units)            10   10      insulin regular, human INJ (Units)     Insulins    Dose (units/kg/hr) Insulin                     Dose (units/kg/hr) Insulin     Dose (units/hr) Insulin                     Dose (units/hr) Insulin     insulin regular (Units)                     insulin regular (Units)     Bolus (units) Insulin                     Bolus (units) Insulin     Rate Insulin                     Rate Insulin     Diet    Carbs eaten (g)                     Carbs eaten (g)     Carb intake Insulin Pump                     Carb intake Insulin Pump     Percent of meal eaten                     Percent of meal eaten     Tube Feeding                     Tube Feeding     Intake (mL)                     Intake (mL)     Hypoglycemia treatment                     Hypoglycemia treatment     Hypoglycemia intake                     Hypoglycemia intake     Glucocorticoids    methylPREDNISolone sod succinate (PF) recon soln (mg) (mg)            125         methylPREDNISolone sod succinate (PF) recon soln (mg) (mg)     PredniSONE ORAL (mg)             40        PredniSONE ORAL (mg)     IV Medications            phone - [phone]  Fax - [phone]  Address: [address] [address]  Address: [address] [address] [facility] [address]  10/27/2023  9:19 AM

## 2023-10-28 ENCOUNTER — APPOINTMENT (OUTPATIENT)
Dept: RADIOLOGY | Facility: HOSPITAL | Age: 53
End: 2023-10-28
Payer: MEDICAID

## 2023-10-28 LAB
ANION GAP SERPL CALC-SCNC: 15 MMOL/L (ref 10–20)
ANION GAP SERPL CALC-SCNC: 18 MMOL/L (ref 10–20)
BUN SERPL-MCNC: 38 MG/DL (ref 6–23)
BUN SERPL-MCNC: 41 MG/DL (ref 6–23)
CALCIUM SERPL-MCNC: 8.5 MG/DL (ref 8.6–10.3)
CALCIUM SERPL-MCNC: 9 MG/DL (ref 8.6–10.3)
CHLORIDE SERPL-SCNC: 86 MMOL/L (ref 98–107)
CHLORIDE SERPL-SCNC: 86 MMOL/L (ref 98–107)
CO2 SERPL-SCNC: 22 MMOL/L (ref 21–32)
CO2 SERPL-SCNC: 25 MMOL/L (ref 21–32)
CREAT SERPL-MCNC: 1.07 MG/DL (ref 0.5–1.3)
CREAT SERPL-MCNC: 1.25 MG/DL (ref 0.5–1.3)
ERYTHROCYTE [DISTWIDTH] IN BLOOD BY AUTOMATED COUNT: 17.2 % (ref 11.5–14.5)
EST. AVERAGE GLUCOSE BLD GHB EST-MCNC: 203 MG/DL
GFR SERPL CREATININE-BSD FRML MDRD: 69 ML/MIN/1.73M*2
GFR SERPL CREATININE-BSD FRML MDRD: 83 ML/MIN/1.73M*2
GLUCOSE BLD MANUAL STRIP-MCNC: 137 MG/DL (ref 74–99)
GLUCOSE BLD MANUAL STRIP-MCNC: 269 MG/DL (ref 74–99)
GLUCOSE BLD MANUAL STRIP-MCNC: 329 MG/DL (ref 74–99)
GLUCOSE BLD MANUAL STRIP-MCNC: 88 MG/DL (ref 74–99)
GLUCOSE BLD MANUAL STRIP-MCNC: 98 MG/DL (ref 74–99)
GLUCOSE SERPL-MCNC: 132 MG/DL (ref 74–99)
GLUCOSE SERPL-MCNC: 371 MG/DL (ref 74–99)
HBA1C MFR BLD: 8.7 %
HCT VFR BLD AUTO: 29 % (ref 41–52)
HGB BLD-MCNC: 9.4 G/DL (ref 13.5–17.5)
MCH RBC QN AUTO: 23.3 PG (ref 26–34)
MCHC RBC AUTO-ENTMCNC: 32.4 G/DL (ref 32–36)
MCV RBC AUTO: 72 FL (ref 80–100)
NRBC BLD-RTO: 0 /100 WBCS (ref 0–0)
PLATELET # BLD AUTO: 322 X10*3/UL (ref 150–450)
PMV BLD AUTO: 10.4 FL (ref 7.5–11.5)
POTASSIUM SERPL-SCNC: 3.9 MMOL/L (ref 3.5–5.3)
POTASSIUM SERPL-SCNC: 4.2 MMOL/L (ref 3.5–5.3)
RBC # BLD AUTO: 4.04 X10*6/UL (ref 4.5–5.9)
SODIUM SERPL-SCNC: 122 MMOL/L (ref 136–145)
SODIUM SERPL-SCNC: 122 MMOL/L (ref 136–145)
WBC # BLD AUTO: 9 X10*3/UL (ref 4.4–11.3)

## 2023-10-28 PROCEDURE — 36415 COLL VENOUS BLD VENIPUNCTURE: CPT | Performed by: STUDENT IN AN ORGANIZED HEALTH CARE EDUCATION/TRAINING PROGRAM

## 2023-10-28 PROCEDURE — 99233 SBSQ HOSP IP/OBS HIGH 50: CPT | Performed by: STUDENT IN AN ORGANIZED HEALTH CARE EDUCATION/TRAINING PROGRAM

## 2023-10-28 PROCEDURE — 94640 AIRWAY INHALATION TREATMENT: CPT

## 2023-10-28 PROCEDURE — 94640 AIRWAY INHALATION TREATMENT: CPT | Performed by: STUDENT IN AN ORGANIZED HEALTH CARE EDUCATION/TRAINING PROGRAM

## 2023-10-28 PROCEDURE — 2500000004 HC RX 250 GENERAL PHARMACY W/ HCPCS (ALT 636 FOR OP/ED): Performed by: INTERNAL MEDICINE

## 2023-10-28 PROCEDURE — 2500000002 HC RX 250 W HCPCS SELF ADMINISTERED DRUGS (ALT 637 FOR MEDICARE OP, ALT 636 FOR OP/ED): Performed by: STUDENT IN AN ORGANIZED HEALTH CARE EDUCATION/TRAINING PROGRAM

## 2023-10-28 PROCEDURE — 80048 BASIC METABOLIC PNL TOTAL CA: CPT | Performed by: STUDENT IN AN ORGANIZED HEALTH CARE EDUCATION/TRAINING PROGRAM

## 2023-10-28 PROCEDURE — 83036 HEMOGLOBIN GLYCOSYLATED A1C: CPT | Mod: ELYLAB | Performed by: STUDENT IN AN ORGANIZED HEALTH CARE EDUCATION/TRAINING PROGRAM

## 2023-10-28 PROCEDURE — 80048 BASIC METABOLIC PNL TOTAL CA: CPT | Performed by: INTERNAL MEDICINE

## 2023-10-28 PROCEDURE — 71045 X-RAY EXAM CHEST 1 VIEW: CPT | Mod: FY

## 2023-10-28 PROCEDURE — 2500000001 HC RX 250 WO HCPCS SELF ADMINISTERED DRUGS (ALT 637 FOR MEDICARE OP): Performed by: STUDENT IN AN ORGANIZED HEALTH CARE EDUCATION/TRAINING PROGRAM

## 2023-10-28 PROCEDURE — 71045 X-RAY EXAM CHEST 1 VIEW: CPT | Performed by: RADIOLOGY

## 2023-10-28 PROCEDURE — 2500000001 HC RX 250 WO HCPCS SELF ADMINISTERED DRUGS (ALT 637 FOR MEDICARE OP): Performed by: INTERNAL MEDICINE

## 2023-10-28 PROCEDURE — 82947 ASSAY GLUCOSE BLOOD QUANT: CPT

## 2023-10-28 PROCEDURE — 96372 THER/PROPH/DIAG INJ SC/IM: CPT | Performed by: STUDENT IN AN ORGANIZED HEALTH CARE EDUCATION/TRAINING PROGRAM

## 2023-10-28 PROCEDURE — 85027 COMPLETE CBC AUTOMATED: CPT | Performed by: STUDENT IN AN ORGANIZED HEALTH CARE EDUCATION/TRAINING PROGRAM

## 2023-10-28 PROCEDURE — 1200000002 HC GENERAL ROOM WITH TELEMETRY DAILY

## 2023-10-28 RX ORDER — ALPRAZOLAM 0.25 MG/1
0.25 TABLET ORAL 2 TIMES DAILY PRN
Status: DISCONTINUED | OUTPATIENT
Start: 2023-10-28 | End: 2023-10-30 | Stop reason: HOSPADM

## 2023-10-28 RX ORDER — INSULIN GLARGINE 100 [IU]/ML
20 INJECTION, SOLUTION SUBCUTANEOUS NIGHTLY
Status: DISCONTINUED | OUTPATIENT
Start: 2023-10-28 | End: 2023-10-29

## 2023-10-28 RX ORDER — ALUMINUM HYDROXIDE, MAGNESIUM HYDROXIDE, AND SIMETHICONE 1200; 120; 1200 MG/30ML; MG/30ML; MG/30ML
20 SUSPENSION ORAL 4 TIMES DAILY PRN
Status: DISCONTINUED | OUTPATIENT
Start: 2023-10-28 | End: 2023-10-30 | Stop reason: HOSPADM

## 2023-10-28 RX ORDER — AZITHROMYCIN 250 MG/1
500 TABLET, FILM COATED ORAL
Status: DISCONTINUED | OUTPATIENT
Start: 2023-10-28 | End: 2023-10-30 | Stop reason: HOSPADM

## 2023-10-28 RX ADMIN — ASPIRIN 81 MG: 81 TABLET, COATED ORAL at 09:19

## 2023-10-28 RX ADMIN — IPRATROPIUM BROMIDE AND ALBUTEROL SULFATE 3 ML: 2.5; .5 SOLUTION RESPIRATORY (INHALATION) at 20:04

## 2023-10-28 RX ADMIN — GABAPENTIN 400 MG: 400 CAPSULE ORAL at 17:04

## 2023-10-28 RX ADMIN — LISINOPRIL 5 MG: 5 TABLET ORAL at 09:19

## 2023-10-28 RX ADMIN — ALPRAZOLAM 0.25 MG: 0.25 TABLET ORAL at 06:52

## 2023-10-28 RX ADMIN — EMPAGLIFLOZIN 10 MG: 10 TABLET, FILM COATED ORAL at 09:20

## 2023-10-28 RX ADMIN — SODIUM CHLORIDE 500 ML: 9 INJECTION, SOLUTION INTRAVENOUS at 21:25

## 2023-10-28 RX ADMIN — CARVEDILOL 12.5 MG: 12.5 TABLET, FILM COATED ORAL at 17:03

## 2023-10-28 RX ADMIN — IPRATROPIUM BROMIDE AND ALBUTEROL SULFATE 3 ML: 2.5; .5 SOLUTION RESPIRATORY (INHALATION) at 14:44

## 2023-10-28 RX ADMIN — ATORVASTATIN CALCIUM 80 MG: 80 TABLET, FILM COATED ORAL at 20:51

## 2023-10-28 RX ADMIN — IPRATROPIUM BROMIDE AND ALBUTEROL SULFATE 3 ML: 2.5; .5 SOLUTION RESPIRATORY (INHALATION) at 07:59

## 2023-10-28 RX ADMIN — AZITHROMYCIN 500 MG: 250 TABLET, FILM COATED ORAL at 09:54

## 2023-10-28 RX ADMIN — GABAPENTIN 400 MG: 400 CAPSULE ORAL at 09:20

## 2023-10-28 RX ADMIN — ALUMINUM HYDROXIDE, MAGNESIUM HYDROXIDE, AND SIMETHICONE 20 ML: 200; 200; 20 SUSPENSION ORAL at 11:40

## 2023-10-28 RX ADMIN — INSULIN GLARGINE 20 UNITS: 100 INJECTION, SOLUTION SUBCUTANEOUS at 20:51

## 2023-10-28 RX ADMIN — Medication 5 MG: at 20:51

## 2023-10-28 RX ADMIN — METHIMAZOLE 10 MG: 5 TABLET ORAL at 17:04

## 2023-10-28 RX ADMIN — ALBUTEROL SULFATE 2.5 MG: 2.5 SOLUTION RESPIRATORY (INHALATION) at 02:19

## 2023-10-28 RX ADMIN — METHIMAZOLE 10 MG: 5 TABLET ORAL at 05:54

## 2023-10-28 RX ADMIN — INSULIN LISPRO 8 UNITS: 100 INJECTION, SOLUTION INTRAVENOUS; SUBCUTANEOUS at 06:52

## 2023-10-28 RX ADMIN — CARVEDILOL 12.5 MG: 12.5 TABLET, FILM COATED ORAL at 09:19

## 2023-10-28 ASSESSMENT — PAIN SCALES - GENERAL: PAINLEVEL_OUTOF10: 0 - NO PAIN

## 2023-10-28 NOTE — PROGRESS NOTES
"Hitesh Jurado is a 53 y.o. male on day 2 of admission presenting with NSTEMI (non-ST elevated myocardial infarction) (CMS/HCC).    Subjective   Patient seen and examined.  Says that his throat is clogged and hurting and is having difficulty breathing.  No fevers or chills.  He is medically ready to to get treated today.       Objective     Physical Exam  Physical Exam  Constitutional:       Appearance: Normal appearance.   HENT:      Head: Normocephalic and atraumatic.   Cardiovascular:      Rate and Rhythm: Normal rate and regular rhythm.      Heart sounds:      No gallop.   Pulmonary:      Effort: Pulmonary effort is normal. No respiratory distress.      Comments: Decreased breathing sounds bilaterally no crepitations  Abdominal:      Palpations: Abdomen is soft.      Tenderness: There is no abdominal tenderness. There is no guarding.   Neurological:      General: No focal deficit present.      Mental Status: He is alert.   Last Recorded Vitals  Blood pressure 124/75, pulse 109, temperature 36.5 °C (97.7 °F), temperature source Temporal, resp. rate 20, height 1.753 m (5' 9.02\"), weight 50.7 kg (111 lb 12.4 oz), SpO2 98 %.  Intake/Output last 3 Shifts:  I/O last 3 completed shifts:  In: 854.3 (16.8 mL/kg) [P.O.:615; I.V.:239.3 (4.7 mL/kg)]  Out: 700 (13.8 mL/kg) [Urine:700 (0.4 mL/kg/hr)]  Weight: 50.7 kg     Relevant Results           This patient currently has cardiac telemetry ordered; if you would like to modify or discontinue the telemetry order, click here to go to the orders activity to modify/discontinue the order.                 Assessment/Plan   Principal Problem:    NSTEMI (non-ST elevated myocardial infarction) (CMS/HCC)  Active Problems:    Elevated troponin      Patient was started on normal saline yesterday by nephrology  Sodium has gone down to 122  Part of it is pseudo secondary to hyperglycemia  Patient has history of noncompliance and has been refusing medications  Blood sugar is " rising  We will add Lantus 20 units at bedtime  Started on methimazole for hyperthyroidism  Endo has been consulted  We will stop normal saline  Keep off diuretics for now  Creatinine is stable  Patient has been refusing medications, he has some delusional believes as well  We will consult psych repeat labs tomorrow  Started on azithromycin for tonsillitis  Sore throat lozenges  Supportive care, symptomatic treatment  DVT prophylaxis             Howard Puga MD

## 2023-10-28 NOTE — CONSULTS
Consult was requested for refusal of medication and to rule out delusional belief.  Patient was surprised to see Psychiatrist getting involved in the care without the team explaining to him.  He politely refused to talk to me saying that he is mentally stable and he does not need any psychiatry assessment.  Patient also reported that he has the right to discuss with the doctor and express his believes about the medication treatment.  He said that he never refused to cooperate with any of the care that was recommended for him.  He agreed to work with the doctors and the treatment team.  He also agreed to let the nurses know if he needed any psychiatric intervention or if he changes his mind about the psych eval.    Please reconsult if needed since he is not cooperating at this time      10/28/23 at 3:47 PM - Salazar Khoury MD

## 2023-10-28 NOTE — PROGRESS NOTES
Renal Progress Note      Assessment/  53 y.o. yo male admitted with subjective complaint of shortness of breath admitted with a diagnosis of acute kidney injury.  Renal comorbidities before admission of  diabetes mellitus .  Pertinent preadmission renal medications of metformin lisinopril torsemide and spironolactone denied any nonsteroidal anti-inflammatory.  Imaging of kidneys has not been ordered.  Urinalysis has not yet ordered   assessment of renal issues include:  Acute kidney injury multifactorial prerenal azotemia and contrast-induced nephropathy stage II patient did receive a CT scan angio to rule out PE no available now resolving  Azotemia:  Baseline Cr 1.1.  CKD stage 2 most likely diabetic nephropathy  Blood pressure: blood pressure controlled  Acid / base / electrolytes: Acid base status ok and no significant electrolyte issues patient sodium 122 corrected to blood sugar of 371 128  Volume status:hypovolemic to euvolemic   Proteinuria: No significant proteinuria bland urine sediment anemia:  Observed anemia requiring further management  Ca/Phos/PTH: No available information           Plan/  Clinical laboratory assess the patient if stable he can be discharged to follow-up on an outpatient basis according to his lab work  Subjective:   Admit Date: 10/26/2023    Interval History: Patient seen and examined uneventful night alert follow commands in no apparent pain or distress      Medications:   Scheduled Meds:aspirin, 81 mg, oral, Daily  atorvastatin, 80 mg, oral, Daily  azithromycin, 500 mg, oral, q24h TIFFANIE  carvedilol, 12.5 mg, oral, BID with meals  empagliflozin, 10 mg, oral, Daily  [Held by provider] furosemide, 40 mg, intravenous, q12h  gabapentin, 400 mg, oral, TID  insulin glargine, 20 Units, subcutaneous, Nightly  insulin lispro, 0-10 Units, subcutaneous, TID with meals  ipratropium-albuteroL, 3 mL, nebulization, TID  linaGLIPtin, 5 mg, oral, Daily  lisinopril, 5 mg, oral, Daily  melatonin, 5 mg,  "oral, Nightly  methIMAzole, 10 mg, oral, q8h TIFFANIE  pantoprazole, 40 mg, oral, Daily before breakfast  perflutren protein A microsphere, 0.5 mL, intravenous, Once in imaging  [Held by provider] spironolactone, 25 mg, oral, Daily  sulfur hexafluoride microsphr, 2 mL, intravenous, Once in imaging      Continuous Infusions:     CBC:   Lab Results   Component Value Date    HGB 9.4 (L) 10/28/2023    HGB 9.5 (L) 10/27/2023    WBC 9.0 10/28/2023    WBC 10.4 10/27/2023     10/28/2023     10/27/2023      Anemia:  No results found for: \"FERRITIN\", \"IRON\", \"TIBC\"   BMP:    Lab Results   Component Value Date     (L) 10/28/2023     (L) 10/27/2023    K 4.2 10/28/2023    K 3.9 10/27/2023    CL 86 (L) 10/28/2023    CL 88 (L) 10/27/2023    CO2 22 10/28/2023    CO2 31 10/27/2023    BUN 41 (H) 10/28/2023    BUN 57 (H) 10/27/2023    CREATININE 1.25 10/28/2023    CREATININE 1.55 (H) 10/27/2023      Bone disease:   Lab Results   Component Value Date    VITD25 61 10/27/2023      Urinalysis:    Lab Results   Component Value Date    PRIMITIVO 7.0 10/27/2023    PROTUR NEGATIVE 10/27/2023    GLUCOSEU NEGATIVE 10/27/2023    BLOODU NEGATIVE 10/27/2023    KETONESU NEGATIVE 10/27/2023    BILIRUBINU NEGATIVE 10/27/2023    NITRITEU NEGATIVE 10/27/2023    LEUKOCYTESU NEGATIVE 10/27/2023        Objective:   Vitals: /62 (BP Location: Left arm, Patient Position: Sitting)   Pulse 86   Temp 35.9 °C (96.6 °F) (Temporal)   Resp 20   Ht 1.753 m (5' 9.02\")   Wt 50.7 kg (111 lb 12.4 oz)   SpO2 100%   BMI 16.50 kg/m²    Wt Readings from Last 3 Encounters:   10/27/23 50.7 kg (111 lb 12.4 oz)   10/25/23 55.3 kg (122 lb)   10/24/23 55.3 kg (122 lb)      24HR INTAKE/OUTPUT:    Intake/Output Summary (Last 24 hours) at 10/28/2023 1434  Last data filed at 10/27/2023 2040  Gross per 24 hour   Intake 239.25 ml   Output 700 ml   Net -460.75 ml     Admission weight:  Weight: 55.3 kg (122 lb)      Constitutional:  Alert, awake, no apparent " distress   Skin:normal, no rash  HEENT:sclera anicteric.  Head atraumatic normocephalic  Neck:supple with no thyromegally  Cardiovascular:  S1, S2 without m/r/g   Respiratory:  CTA B without w/r/r   Abdomen: +bs, soft, nt  Ext: no LE edema  Musculoskeletal:Intact  Neuro:Alert and oriented with no deficit      Electronically signed by Francis Crabtree MD on 10/28/2023 at 2:34 PM

## 2023-10-28 NOTE — NURSING NOTE
2030: Pt refusing all PO medication, also requesting to have IVF discontinued overnight.     0200: Pt c/o SOB without chest pain. Lung sounds diminished and unchanged from previous assessment. RT at bedside with respiratory treatment. SpO2 WNL, Dr. Bello notified and at bedside to assess. CXR ordered and complete.

## 2023-10-28 NOTE — PROGRESS NOTES
"Hitesh Jurado is a 53 y.o. male on day 2 of admission presenting with NSTEMI (non-ST elevated myocardial infarction) (CMS/HCC).    Subjective   Sore throat     Objective   Not in acute distress.  ROS  Review of Systems   Review of Systems   Constitutional: Negative.    HENT:  Positive for congestion.    Eyes: Negative.    Respiratory:  Positive for shortness of breath.    Cardiovascular: Negative.    Gastrointestinal: Negative.    Endocrine: Positive for heat intolerance.   Genitourinary: Negative.    Musculoskeletal:  Positive for arthralgias.   Allergic/Immunologic: Negative.    Neurological:  Positive for weakness.   Hematological:  Positive for adenopathy.   Psychiatric/Behavioral:  Positive for sleep disturbance.  Vital Signs  Blood pressure 104/66, pulse 79, temperature 36.3 °C (97.3 °F), temperature source Temporal, resp. rate 20, height 1.753 m (5' 9.02\"), weight 50.7 kg (111 lb 12.4 oz), SpO2 99 %.    Physical Exam  Physical Exam    Appearance: Normal appearance.   HENT:      Head: Normocephalic.      Nose: Nose normal.      Mouth/Throat:      Mouth: Mucous membranes are dry.   Eyes:      Extraocular Movements: Extraocular movements intact.      Pupils: Pupils are equal, round, and reactive to light.   Cardiovascular:      Rate and Rhythm: Tachycardia present.      Pulses: Normal pulses.   Pulmonary:      Breath sounds: Rales present.   Abdominal:      Palpations: Abdomen is soft.   Musculoskeletal:         General: Normal range of motion.      Cervical back: Normal range of motion and neck supple.   Skin:     General: Skin is dry.   Neurological:      General: No focal deficit present.      Mental Status: He is alert and oriented to person, place, and time. Mental status is at baseline.   Psychiatric:         Mood and Affect: Mood normal.         Behavior: Behavior normal.  Oxygen Therapy  SpO2: 99 %  Medical Gas Therapy: None (Room air)          Intake/Output  I/O last 3 completed shifts:  In: " 614.3 (12.1 mL/kg) [P.O.:375; I.V.:239.3 (4.7 mL/kg)]  Out: 700 (13.8 mL/kg) [Urine:700 (0.4 mL/kg/hr)]  Weight: 50.7 kg     Lines and Tubes:  Peripheral IV 10/26/23 20 G Distal;Upper;Right Arm (Active)   Placement Date/Time: 10/26/23 0452   Size (Gauge): 20 G  Orientation: Distal;Upper;Right  Location: Arm   Number of days: 2       Results for orders placed or performed during the hospital encounter of 10/26/23 (from the past 96 hour(s))   POCT GLUCOSE   Result Value Ref Range    POCT Glucose 506 (H) 74 - 99 mg/dL   CBC and Auto Differential   Result Value Ref Range    WBC 13.1 (H) 4.4 - 11.3 x10*3/uL    nRBC 0.0 0.0 - 0.0 /100 WBCs    RBC 3.87 (L) 4.50 - 5.90 x10*6/uL    Hemoglobin 9.0 (L) 13.5 - 17.5 g/dL    Hematocrit 27.3 (L) 41.0 - 52.0 %    MCV 71 (L) 80 - 100 fL    MCH 23.3 (L) 26.0 - 34.0 pg    MCHC 33.0 32.0 - 36.0 g/dL    RDW 17.4 (H) 11.5 - 14.5 %    Platelets 357 150 - 450 x10*3/uL    MPV 10.4 7.5 - 11.5 fL    Neutrophils % 81.6 40.0 - 80.0 %    Immature Granulocytes %, Automated 0.4 0.0 - 0.9 %    Lymphocytes % 9.1 13.0 - 44.0 %    Monocytes % 8.8 2.0 - 10.0 %    Eosinophils % 0.0 0.0 - 6.0 %    Basophils % 0.1 0.0 - 2.0 %    Neutrophils Absolute 10.73 (H) 1.20 - 7.70 x10*3/uL    Immature Granulocytes Absolute, Automated 0.05 0.00 - 0.70 x10*3/uL    Lymphocytes Absolute 1.20 1.20 - 4.80 x10*3/uL    Monocytes Absolute 1.15 (H) 0.10 - 1.00 x10*3/uL    Eosinophils Absolute 0.00 0.00 - 0.70 x10*3/uL    Basophils Absolute 0.01 0.00 - 0.10 x10*3/uL   Comprehensive metabolic panel   Result Value Ref Range    Glucose 577 (HH) 74 - 99 mg/dL    Sodium 121 (L) 136 - 145 mmol/L    Potassium 5.1 3.5 - 5.3 mmol/L    Chloride 81 (L) 98 - 107 mmol/L    Bicarbonate 30 21 - 32 mmol/L    Anion Gap 15 10 - 20 mmol/L    Urea Nitrogen 64 (H) 6 - 23 mg/dL    Creatinine 1.83 (H) 0.50 - 1.30 mg/dL    eGFR 44 (L) >60 mL/min/1.73m*2    Calcium 8.7 8.6 - 10.3 mg/dL    Albumin 3.5 3.4 - 5.0 g/dL    Alkaline Phosphatase 104 33 -  120 U/L    Total Protein 6.5 6.4 - 8.2 g/dL    AST 19 9 - 39 U/L    Bilirubin, Total 1.6 (H) 0.0 - 1.2 mg/dL    ALT 22 10 - 52 U/L   B-Type Natriuretic Peptide   Result Value Ref Range    BNP 2,850 (H) 0 - 99 pg/mL   Troponin I, High Sensitivity   Result Value Ref Range    Troponin I, High Sensitivity 1,590 (HH) 0 - 20 ng/L   Troponin I, High Sensitivity   Result Value Ref Range    Troponin I, High Sensitivity 1,417 (HH) 0 - 20 ng/L   POCT GLUCOSE   Result Value Ref Range    POCT Glucose 287 (H) 74 - 99 mg/dL   POCT GLUCOSE   Result Value Ref Range    POCT Glucose 219 (H) 74 - 99 mg/dL   POCT GLUCOSE   Result Value Ref Range    POCT Glucose 273 (H) 74 - 99 mg/dL   POCT GLUCOSE   Result Value Ref Range    POCT Glucose 260 (H) 74 - 99 mg/dL   Urine electrolytes   Result Value Ref Range    Sodium, Urine Random 24 mmol/L    Sodium/Creatinine Ratio 61 Not established. mmol/g Creat    Potassium, Urine Random 37 mmol/L    Potassium/Creatinine Ratio 95 Not established mmol/g Creat    Chloride, Urine Random <15 mmol/L    Chloride/Creatinine Ratio      Creatinine, Urine Random 39.1 20.0 - 370.0 mg/dL   Troponin I, High Sensitivity   Result Value Ref Range    Troponin I, High Sensitivity 1,590 (HH) 0 - 20 ng/L   TSH with reflex to Free T4 if abnormal   Result Value Ref Range    Thyroid Stimulating Hormone <0.01 (L) 0.44 - 3.98 mIU/L   Thyroxine, Free   Result Value Ref Range    Thyroxine, Free 3.97 (H) 0.61 - 1.12 ng/dL   Triiodothyronine, Free   Result Value Ref Range    Triiodothyronine, Free 7.8 (H) 2.3 - 4.2 pg/mL   POCT GLUCOSE   Result Value Ref Range    POCT Glucose 273 (H) 74 - 99 mg/dL   CBC   Result Value Ref Range    WBC 10.4 4.4 - 11.3 x10*3/uL    nRBC 0.0 0.0 - 0.0 /100 WBCs    RBC 4.08 (L) 4.50 - 5.90 x10*6/uL    Hemoglobin 9.5 (L) 13.5 - 17.5 g/dL    Hematocrit 29.2 (L) 41.0 - 52.0 %    MCV 72 (L) 80 - 100 fL    MCH 23.3 (L) 26.0 - 34.0 pg    MCHC 32.5 32.0 - 36.0 g/dL    RDW 17.9 (H) 11.5 - 14.5 %    Platelets  347 150 - 450 x10*3/uL    MPV 9.9 7.5 - 11.5 fL   Basic Metabolic Panel   Result Value Ref Range    Glucose 135 (H) 74 - 99 mg/dL    Sodium 134 (L) 136 - 145 mmol/L    Potassium 3.9 3.5 - 5.3 mmol/L    Chloride 88 (L) 98 - 107 mmol/L    Bicarbonate 31 21 - 32 mmol/L    Anion Gap 19 10 - 20 mmol/L    Urea Nitrogen 57 (H) 6 - 23 mg/dL    Creatinine 1.55 (H) 0.50 - 1.30 mg/dL    eGFR 53 (L) >60 mL/min/1.73m*2    Calcium 9.1 8.6 - 10.3 mg/dL   SST TOP   Result Value Ref Range    Extra Tube Hold for add-ons.    POCT GLUCOSE   Result Value Ref Range    POCT Glucose 131 (H) 74 - 99 mg/dL   Transthoracic Echo (TTE) Complete   Result Value Ref Range    LV A4C EF 15.8    POCT GLUCOSE   Result Value Ref Range    POCT Glucose 379 (H) 74 - 99 mg/dL   TSH   Result Value Ref Range    Thyroid Stimulating Hormone <0.01 (L) 0.44 - 3.98 mIU/L   Vitamin D 25-Hydroxy,Total (for eval of Vitamin D levels)   Result Value Ref Range    Vitamin D, 25-Hydroxy, Total 61 30 - 100 ng/mL   POCT GLUCOSE   Result Value Ref Range    POCT Glucose 161 (H) 74 - 99 mg/dL   Drug Screen, Urine   Result Value Ref Range    Amphetamine Screen, Urine Presumptive Negative Presumptive Negative    Barbiturate Screen, Urine Presumptive Negative Presumptive Negative    Benzodiazepines Screen, Urine Presumptive Negative Presumptive Negative    Cannabinoid Screen, Urine Presumptive Positive (A) Presumptive Negative    Cocaine Metabolite Screen, Urine Presumptive Negative Presumptive Negative    Fentanyl Screen, Urine Presumptive Negative Presumptive Negative    Opiate Screen, Urine Presumptive Negative Presumptive Negative    Oxycodone Screen, Urine Presumptive Negative Presumptive Negative    PCP Screen, Urine Presumptive Negative Presumptive Negative   Albumin, Urine Random   Result Value Ref Range    Albumin, Urine Random 12.1 Not established mg/L    Creatinine, Urine Random 30.0 20.0 - 370.0 mg/dL    Albumin/Creatine Ratio 40.3 (H) <30.0 ug/mg Creat   Urinalysis  with Reflex Microscopic and Culture   Result Value Ref Range    Color, Urine Yellow Straw, Yellow    Appearance, Urine Clear Clear    Specific Gravity, Urine 1.006 1.005 - 1.035    pH, Urine 7.0 5.0, 5.5, 6.0, 6.5, 7.0, 7.5, 8.0    Protein, Urine NEGATIVE NEGATIVE mg/dL    Glucose, Urine NEGATIVE NEGATIVE mg/dL    Blood, Urine NEGATIVE NEGATIVE    Ketones, Urine NEGATIVE NEGATIVE mg/dL    Bilirubin, Urine NEGATIVE NEGATIVE    Urobilinogen, Urine 4.0 (N) <2.0 mg/dL    Nitrite, Urine NEGATIVE NEGATIVE    Leukocyte Esterase, Urine NEGATIVE NEGATIVE   Extra Urine Gray Tube   Result Value Ref Range    Extra Tube Hold for add-ons.    POCT GLUCOSE   Result Value Ref Range    POCT Glucose 424 (H) 74 - 99 mg/dL   POCT GLUCOSE   Result Value Ref Range    POCT Glucose 329 (H) 74 - 99 mg/dL   CBC   Result Value Ref Range    WBC 9.0 4.4 - 11.3 x10*3/uL    nRBC 0.0 0.0 - 0.0 /100 WBCs    RBC 4.04 (L) 4.50 - 5.90 x10*6/uL    Hemoglobin 9.4 (L) 13.5 - 17.5 g/dL    Hematocrit 29.0 (L) 41.0 - 52.0 %    MCV 72 (L) 80 - 100 fL    MCH 23.3 (L) 26.0 - 34.0 pg    MCHC 32.4 32.0 - 36.0 g/dL    RDW 17.2 (H) 11.5 - 14.5 %    Platelets 322 150 - 450 x10*3/uL    MPV 10.4 7.5 - 11.5 fL   Basic Metabolic Panel   Result Value Ref Range    Glucose 371 (H) 74 - 99 mg/dL    Sodium 122 (L) 136 - 145 mmol/L    Potassium 4.2 3.5 - 5.3 mmol/L    Chloride 86 (L) 98 - 107 mmol/L    Bicarbonate 22 21 - 32 mmol/L    Anion Gap 18 10 - 20 mmol/L    Urea Nitrogen 41 (H) 6 - 23 mg/dL    Creatinine 1.25 0.50 - 1.30 mg/dL    eGFR 69 >60 mL/min/1.73m*2    Calcium 9.0 8.6 - 10.3 mg/dL   POCT GLUCOSE   Result Value Ref Range    POCT Glucose 88 74 - 99 mg/dL   POCT GLUCOSE   Result Value Ref Range    POCT Glucose 98 74 - 99 mg/dL   Basic metabolic panel   Result Value Ref Range    Glucose 132 (H) 74 - 99 mg/dL    Sodium 122 (L) 136 - 145 mmol/L    Potassium 3.9 3.5 - 5.3 mmol/L    Chloride 86 (L) 98 - 107 mmol/L    Bicarbonate 25 21 - 32 mmol/L    Anion Gap 15 10 -  20 mmol/L    Urea Nitrogen 38 (H) 6 - 23 mg/dL    Creatinine 1.07 0.50 - 1.30 mg/dL    eGFR 83 >60 mL/min/1.73m*2    Calcium 8.5 (L) 8.6 - 10.3 mg/dL   POCT GLUCOSE   Result Value Ref Range    POCT Glucose 137 (H) 74 - 99 mg/dL   SST TOP   Result Value Ref Range    Extra Tube Hold for add-ons.         Relevant Results  Recent Results (from the past 24 hour(s))   Drug Screen, Urine    Collection Time: 10/27/23  9:13 PM   Result Value Ref Range    Amphetamine Screen, Urine Presumptive Negative Presumptive Negative    Barbiturate Screen, Urine Presumptive Negative Presumptive Negative    Benzodiazepines Screen, Urine Presumptive Negative Presumptive Negative    Cannabinoid Screen, Urine Presumptive Positive (A) Presumptive Negative    Cocaine Metabolite Screen, Urine Presumptive Negative Presumptive Negative    Fentanyl Screen, Urine Presumptive Negative Presumptive Negative    Opiate Screen, Urine Presumptive Negative Presumptive Negative    Oxycodone Screen, Urine Presumptive Negative Presumptive Negative    PCP Screen, Urine Presumptive Negative Presumptive Negative   Albumin, Urine Random    Collection Time: 10/27/23  9:13 PM   Result Value Ref Range    Albumin, Urine Random 12.1 Not established mg/L    Creatinine, Urine Random 30.0 20.0 - 370.0 mg/dL    Albumin/Creatine Ratio 40.3 (H) <30.0 ug/mg Creat   Urinalysis with Reflex Microscopic and Culture    Collection Time: 10/27/23  9:13 PM   Result Value Ref Range    Color, Urine Yellow Straw, Yellow    Appearance, Urine Clear Clear    Specific Gravity, Urine 1.006 1.005 - 1.035    pH, Urine 7.0 5.0, 5.5, 6.0, 6.5, 7.0, 7.5, 8.0    Protein, Urine NEGATIVE NEGATIVE mg/dL    Glucose, Urine NEGATIVE NEGATIVE mg/dL    Blood, Urine NEGATIVE NEGATIVE    Ketones, Urine NEGATIVE NEGATIVE mg/dL    Bilirubin, Urine NEGATIVE NEGATIVE    Urobilinogen, Urine 4.0 (N) <2.0 mg/dL    Nitrite, Urine NEGATIVE NEGATIVE    Leukocyte Esterase, Urine NEGATIVE NEGATIVE   Extra Urine Treviño  Tube    Collection Time: 10/27/23  9:13 PM   Result Value Ref Range    Extra Tube Hold for add-ons.    POCT GLUCOSE    Collection Time: 10/27/23  9:48 PM   Result Value Ref Range    POCT Glucose 424 (H) 74 - 99 mg/dL   POCT GLUCOSE    Collection Time: 10/28/23  6:31 AM   Result Value Ref Range    POCT Glucose 329 (H) 74 - 99 mg/dL   CBC    Collection Time: 10/28/23  6:58 AM   Result Value Ref Range    WBC 9.0 4.4 - 11.3 x10*3/uL    nRBC 0.0 0.0 - 0.0 /100 WBCs    RBC 4.04 (L) 4.50 - 5.90 x10*6/uL    Hemoglobin 9.4 (L) 13.5 - 17.5 g/dL    Hematocrit 29.0 (L) 41.0 - 52.0 %    MCV 72 (L) 80 - 100 fL    MCH 23.3 (L) 26.0 - 34.0 pg    MCHC 32.4 32.0 - 36.0 g/dL    RDW 17.2 (H) 11.5 - 14.5 %    Platelets 322 150 - 450 x10*3/uL    MPV 10.4 7.5 - 11.5 fL   Basic Metabolic Panel    Collection Time: 10/28/23  6:58 AM   Result Value Ref Range    Glucose 371 (H) 74 - 99 mg/dL    Sodium 122 (L) 136 - 145 mmol/L    Potassium 4.2 3.5 - 5.3 mmol/L    Chloride 86 (L) 98 - 107 mmol/L    Bicarbonate 22 21 - 32 mmol/L    Anion Gap 18 10 - 20 mmol/L    Urea Nitrogen 41 (H) 6 - 23 mg/dL    Creatinine 1.25 0.50 - 1.30 mg/dL    eGFR 69 >60 mL/min/1.73m*2    Calcium 9.0 8.6 - 10.3 mg/dL   POCT GLUCOSE    Collection Time: 10/28/23 11:33 AM   Result Value Ref Range    POCT Glucose 88 74 - 99 mg/dL   POCT GLUCOSE    Collection Time: 10/28/23 12:07 PM   Result Value Ref Range    POCT Glucose 98 74 - 99 mg/dL   Basic metabolic panel    Collection Time: 10/28/23  3:02 PM   Result Value Ref Range    Glucose 132 (H) 74 - 99 mg/dL    Sodium 122 (L) 136 - 145 mmol/L    Potassium 3.9 3.5 - 5.3 mmol/L    Chloride 86 (L) 98 - 107 mmol/L    Bicarbonate 25 21 - 32 mmol/L    Anion Gap 15 10 - 20 mmol/L    Urea Nitrogen 38 (H) 6 - 23 mg/dL    Creatinine 1.07 0.50 - 1.30 mg/dL    eGFR 83 >60 mL/min/1.73m*2    Calcium 8.5 (L) 8.6 - 10.3 mg/dL   POCT GLUCOSE    Collection Time: 10/28/23  4:01 PM   Result Value Ref Range    POCT Glucose 137 (H) 74 - 99 mg/dL    SST TOP    Collection Time: 10/28/23  5:01 PM   Result Value Ref Range    Extra Tube Hold for add-ons.     XR chest 1 view    Result Date: 10/28/2023  Interpreted By:  Finkelstein, Evan, STUDY: XR CHEST 1 VIEW;  10/28/2023 3:23 am   INDICATION: Signs/Symptoms:Shortness of breath.   COMPARISON: Chest radiograph 10/26/2023   ACCESSION NUMBER(S): [id number]   ORDERING CLINICIAN: SILAS NEGRO   FINDINGS:     CARDIOMEDIASTINAL SILHOUETTE: Cardiomediastinal silhouette is stable in size and configuration.   LUNGS: No pulmonary consolidation, pleural effusion or pneumothorax.   ABDOMEN: No remarkable upper abdominal findings.   BONES: No acute osseous abnormality.       No radiographic evidence of acute cardiopulmonary pathology.   MACRO: None.   Signed by: Evan Finkelstein 10/28/2023 3:34 AM Dictation workstation:   [id number]    Transthoracic Echo (TTE) Complete    Result Date: 10/27/2023          [place] [id number]  Tel [phone] Fax [phone] TRANSTHORACIC ECHOCARDIOGRAM REPORT  Patient Name:      BROOK THOMAS MORELOS Reading Physician:    [id number] Jamey Alejandra DO Study Date:        10/27/2023           Ordering Provider:    [id number] VANESSA CASPER MRN/PID:           [id number]             Fellow: Accession#:        [id number]         Nurse:                Stan Phelps RN Date of Birth/Age: 1970 / 53 years  Sonographer:          Lauren SEWELL Gender:            M                    Additional Staff: Height:            175.26 cm            Admit Date:           10/26/2023 Weight:            55.34 kg             Admission Status:     Inpatient -                                                               Routine BSA:               1.67 m2              Department Location:   [facility]                                                               Echo Lab Blood Pressure: 128 /97 mmHg Study Type:    TRANSTHORACIC ECHO (TTE) COMPLETE Diagnosis/ICD: Non ST elevation (NSTEMI) myocardial infarction-I21.4 Indication:    NSTEMI CPT Codes:     Echo Complete w Full [id number] Patient History: Smoker:            Current. Pertinent History: CAD, HTN, Hyperlipidemia and PAD. Study Detail: The following Echo studies were performed: 2D, M-Mode, Doppler and               color flow. Technically challenging study due to prominent lung               artifact. Definity used as a contrast agent for endocardial border               definition. Total contrast used for this procedure was 2 mL via IV               push. The patient was awake.  PHYSICIAN INTERPRETATION: Left Ventricle: Left ventricular systolic function is severely decreased, with an estimated ejection fraction of 15-20%. There are multiple wall motion abnormalities. The left ventricular cavity size is normal. Spectral Doppler shows a pseudonormal pattern of left ventricular diastolic filling. LV Wall Scoring: The entire lateral wall, mid and apical inferior wall, apical septal segment, and apex are hypokinetic. All remaining scored segments are normal. Left Atrium: The left atrium is mildly dilated. Right Ventricle: The right ventricle is normal in size. There is normal right ventricular global systolic function. Right Atrium: The right atrium is normal in size. Aortic Valve: The aortic valve appears structurally normal. The aortic valve appears tricuspid. There is mild aortic valve cusp calcification. There is evidence of mildly elevated transaortic gradients consistent with sclerosis of the aortic valve. There is no evidence of aortic valve regurgitation. The peak instantaneous gradient of the aortic valve is 7.6 mmHg. The mean gradient of the aortic valve is 5.0 mmHg. Mitral Valve: The mitral valve is normal in structure. There is  no evidence of mitral valve stenosis. There is normal mitral valve leaflet mobility. There is mild mitral annular calcification. There is moderate to severe mitral valve regurgitation. Tricuspid Valve: The tricuspid valve is structurally normal. There is normal tricuspid valve leaflet mobility. There is moderate to severe tricuspid regurgitation. Pulmonic Valve: The pulmonic valve is structurally normal. There is no indication of pulmonic valve regurgitation. Pericardium: There is no pericardial effusion noted. Aorta: The aortic root is normal. Pulmonary Artery: Pulmonary hypertension is present. The tricuspid regurgitant velocity is 3.77 m/s, and with an estimated right atrial pressure of 8 mmHg, the estimated pulmonary artery pressure is severely elevated with the RVSP at 64.9 mmHg. Systemic Veins: The inferior vena cava appears to be of normal size. In comparison to the previous echocardiogram(s): Prior examinations are available and were reviewed for comparison purposes. The left ventricular function is significantly worse. The left ventricular diastolic function is unchanged.  CONCLUSIONS:  1. Left ventricular systolic function is severely decreased with a 15-20% estimated ejection fraction.  2. Entire lateral wall, mid and apical inferior wall, apical septal segment, and apex are abnormal.  3. Spectral Doppler shows a pseudonormal pattern of left ventricular diastolic filling.  4. There is no evidence of mitral valve stenosis.  5. Moderate to severe mitral valve regurgitation.  6. Moderate to severe tricuspid regurgitation.  7. Aortic valve sclerosis.  8. Pulmonary hypertension is present.  9. Severely elevated pulmonary artery pressure. 10. There are multiple wall motion abnormalities. RECOMMENDATIONS: Technically suboptimal and limited study, therefore accuracy of above interpretation could be substantially diminished. Clinical correlation is advised. Consider additional imaging modalities if clinically  indicated.  QUANTITATIVE DATA SUMMARY: 2D MEASUREMENTS:                           Normal Ranges: Ao Root d:     3.20 cm    (2.0-3.7cm) LAs:           4.60 cm    (2.7-4.0cm) IVSd:          1.00 cm    (0.6-1.1cm) LVPWd:         0.95 cm    (0.6-1.1cm) LVIDd:         5.28 cm    (3.9-5.9cm) LVIDs:         4.89 cm LV Mass Index: 114.9 g/m2 LV % FS        7.4 % LA VOLUME:                               Normal Ranges: LA Vol A4C:        49.4 ml    (22+/-6mL/m2) LA Vol A2C:        81.7 ml LA Vol BP:         68.8 ml LA Vol Index A4C:  29.5ml/m2 LA Vol Index A2C:  48.8 ml/m2 LA Vol Index BP:   41.1 ml/m2 LA Area A4C:       17.3 cm2 LA Area A2C:       24.1 cm2 LA Major Axis A4C: 5.2 cm LA Major Axis A2C: 6.0 cm LA Volume Index:   39.5 ml/m2 LA Vol A4C:        47.0 ml LA Vol A2C:        78.0 ml RA VOLUME BY A/L METHOD:                               Normal Ranges: RA Vol A4C:        48.5 ml    (8.3-19.5ml) RA Vol Index A4C:  29.0 ml/m2 RA Area A4C:       15.5 cm2 RA Major Axis A4C: 4.2 cm AORTA MEASUREMENTS:                    Normal Ranges: Asc Ao, d: 2.40 cm (2.1-3.4cm) LV SYSTOLIC FUNCTION BY 2D PLANIMETRY (MOD):                     Normal Ranges: EF-A4C View: 15.8 % (>=55%) EF-A2C View: 15.4 % EF-Biplane:  16.4 % LV DIASTOLIC FUNCTION:                        Normal Ranges: MV Peak E:    1.20 m/s (0.7-1.2 m/s) MV Peak A:    1.21 m/s (0.42-0.7 m/s) E/A Ratio:    0.99     (1.0-2.2) MV lateral e' 0.08 m/s MV medial e'  0.05 m/s E/e' Ratio:   15.90    (<8.0) MITRAL VALVE:                 Normal Ranges: MV DT: 143 msec (150-240msec) MITRAL INSUFFICIENCY:                           Normal Ranges: PISA Radius:  0.9 cm MR VTI:       117.00 cm MR Vmax:      498.00 cm/s MR Alias Marbin: 30.3 cm/s MR Volume:    36.23 ml MR Flow Rt:   154.21 ml/s MR EROA:      0.31 cm2 AORTIC VALVE:                                   Normal Ranges: AoV Vmax:                1.38 m/s (<=1.7m/s) AoV Peak P.6 mmHg (<20mmHg) AoV Mean PG:              5.0 mmHg (1.7-11.5mmHg) LVOT Max Marbin:            0.69 m/s (<=1.1m/s) AoV VTI:                 19.30 cm (18-25cm) LVOT VTI:                8.11 cm LVOT Diameter:           1.90 cm  (1.8-2.4cm) AoV Area, VTI:           1.19 cm2 (2.5-5.5cm2) AoV Area,Vmax:           1.41 cm2 (2.5-4.5cm2) AoV Dimensionless Index: 0.42  RIGHT VENTRICLE: RV Basal 3.90 cm RV Mid   3.30 cm RV Major 8.2 cm TAPSE:   16.1 mm RV s'    0.11 m/s TRICUSPID VALVE/RVSP:                             Normal Ranges: Peak TR Velocity: 3.77 m/s RV Syst Pressure: 64.9 mmHg (< 30mmHg) IVC Diam:         1.68 cm PULMONIC VALVE:                         Normal Ranges: PV Accel Time: 71 msec  (>120ms) PV Max Marbin:    1.1 m/s  (0.6-0.9m/s) PV Max P.8 mmHg  [id number] Jamey Alejandra DO Electronically signed on 10/27/2023 at 4:13:45 PM  Wall Scoring  ** Final **     XR chest 1 view    Result Date: 10/26/2023  STUDY: Chest Radiograph;  10/26/2023 2:28 AM INDICATION: Shortness of breath. COMPARISON: 10/25/2023 XR Chest two view ACCESSION NUMBER(S): [id number] ORDERING CLINICIAN: JAMEY SHAW TECHNIQUE:  Frontal chest was obtained at 2:28 hours. FINDINGS: CARDIOMEDIASTINAL SILHOUETTE: Cardiomediastinal silhouette is borderline in size, unchanged.  LUNGS: Lungs are clear.  ABDOMEN: No remarkable upper abdominal findings.  BONES: No acute osseous changes.    Stable borderline heart size.  Clear lungs.  No evidence of infiltrate or edema. Signed by Yandel Slade MD    XR chest 2 views    Result Date: 10/25/2023  STUDY: Chest Radiographs;  10/25/2023 at 2:55 AM INDICATION: Evaluate for cardiomegaly and worsening pulmonary edema. COMPARISON: CTA chest 10/24/2023, XR chest 10/24/2023, XR chest 2022, XR chest 2022. ACCESSION NUMBER(S): [id number] ORDERING CLINICIAN: RAJ OWEN TECHNIQUE:  Frontal and lateral chest. FINDINGS: CARDIOMEDIASTINAL SILHOUETTE: Cardiomediastinal silhouette is enlarged.  Central pulmonary vasculature is slightly prominent.   There are interstitial changes noted throughout both lungs. No localized consolidation is appreciated. ABDOMEN: No remarkable upper abdominal findings.  BONES: No acute osseous changes.    Above findings likely related to interstitial edema from CHF.  This has progressed since previous exam. Signed by Mayur Melo, DO    CT angio chest for pulmonary embolism    Result Date: 10/24/2023  STUDY: CT Angiogram of the Chest; 10/24/2023 2:49 PM. INDICATION: Shortness of breath and elevated D-dimer; Evaluate for pulmonary embolism. COMPARISON: Chest radiograph performed the same date. ACCESSION NUMBER(S): [id number] ORDERING CLINICIAN: MARTHA HODGE TECHNIQUE:  CTA of the chest was performed with intravenous contrast. Images are reviewed and processed at a workstation according to the CT angiogram protocol with 3-D and/or MIP post processing imaging generated.  Omnipaque 350, 75 mL was administered intravenously. Automated mA/kV exposure control was utilized and patient examination was performed in strict accordance with principles of ALARA. FINDINGS: Pulmonary arteries are adequately opacified without acute or chronic filling defects.  The thoracic aorta is normal in course and caliber without dissection or aneurysm. There are scattered vascular calcifications along the arterial tree The heart is mildly enlarged with no pericardial effusion.  Prominent mediastinal lymph nodes are noted with a short axis diameter of up to 1.5 cm. The esophagus is partly air-filled. There is no pleural effusion, pleural thickening, or pneumothorax. The airways are patent. There is a 5 x 2 mm perifissural nodule along in the right middle lobe adjacent to the minor fissure. The right lobe of the thyroid is enlarged and heterogeneous. The thyroid is not included in entirety. Within the upper abdomen there are foci of increased attenuation in both kidneys suggesting possible nonobstructing calculi., The larger in the upper pole of the left  kidney measures about 5 mm in diameter. The kidneys are not included in entirety. There are vascular calcifications along the arterial tree.  No suspicious bony lesions.    1.No evidence of acute pulmonary embolus or thoracic aortic aneurysm or dissection. 2.Mild cardiomegaly with no pericardial effusion. 3.Mediastinal adenopathy. Follow-up is suggested. 4.Enlarged heterogeneous right lobe of the thyroid. Recommend correlation with thyroid ultrasound. 5.There is a 5 x 2 mm perifissural nodule along in the right middle lobe adjacent to the minor fissure, likely benign. Signed by Radha Casanova DO    XR chest 1 view    Result Date: 10/24/2023  STUDY: Chest Radiograph;  10/24/2023 13:15 PM. INDICATION: Shortness of breath. COMPARISON: CXR 11/13/2022 ACCESSION NUMBER(S): [id number] ORDERING CLINICIAN: MARTHA HODGE TECHNIQUE:  Frontal chest was obtained at 13:15 hours. FINDINGS: CARDIOMEDIASTINAL SILHOUETTE: The heart is borderline in size.  LUNGS: Lungs are clear.  ABDOMEN: No remarkable upper abdominal findings.  BONES: No acute osseous changes.    Borderline heart size. No acute cardiopulmonary abnormality. Signed by Evans Amaya MD      Radiology:  Transthoracic Echo (TTE) Complete    Result Date: 10/27/2023          [place] [id number]  Tel [phone] Fax [phone] TRANSTHORACIC ECHOCARDIOGRAM REPORT  Patient Name:      BROOK THOMAS MORELOS Reading Physician:    [id number] Jamey Alejandra DO Study Date:        10/27/2023           Ordering Provider:    [id number] VANESSA CASPER MRN/PID:           [id number]             Fellow: Accession#:        [id number]         Nurse:                Stan Phelps RN Date of Birth/Age: 1970 / 53 years  Sonographer:          Lauren SEWELL Gender:             M                    Additional Staff: Height:            175.26 cm            Admit Date:           10/26/2023 Weight:            55.34 kg             Admission Status:     Inpatient -                                                               Routine BSA:               1.67 m2              Department Location:  [facility]                                                               Echo Lab Blood Pressure: 128 /97 mmHg Study Type:    TRANSTHORACIC ECHO (TTE) COMPLETE Diagnosis/ICD: Non ST elevation (NSTEMI) myocardial infarction-I21.4 Indication:    NSTEMI CPT Codes:     Echo Complete w Full Doppler-[id number] Patient History: Smoker:            Current. Pertinent History: CAD, HTN, Hyperlipidemia and PAD. Study Detail: The following Echo studies were performed: 2D, M-Mode, Doppler and               color flow. Technically challenging study due to prominent lung               artifact. Definity used as a contrast agent for endocardial border               definition. Total contrast used for this procedure was 2 mL via IV               push. The patient was awake.  PHYSICIAN INTERPRETATION: Left Ventricle: Left ventricular systolic function is severely decreased, with an estimated ejection fraction of 15-20%. There are multiple wall motion abnormalities. The left ventricular cavity size is normal. Spectral Doppler shows a pseudonormal pattern of left ventricular diastolic filling. LV Wall Scoring: The entire lateral wall, mid and apical inferior wall, apical septal segment, and apex are hypokinetic. All remaining scored segments are normal. Left Atrium: The left atrium is mildly dilated. Right Ventricle: The right ventricle is normal in size. There is normal right ventricular global systolic function. Right Atrium: The right atrium is normal in size. Aortic Valve: The aortic valve appears structurally normal. The aortic valve appears tricuspid. There is mild aortic valve cusp calcification. There is  evidence of mildly elevated transaortic gradients consistent with sclerosis of the aortic valve. There is no evidence of aortic valve regurgitation. The peak instantaneous gradient of the aortic valve is 7.6 mmHg. The mean gradient of the aortic valve is 5.0 mmHg. Mitral Valve: The mitral valve is normal in structure. There is no evidence of mitral valve stenosis. There is normal mitral valve leaflet mobility. There is mild mitral annular calcification. There is moderate to severe mitral valve regurgitation. Tricuspid Valve: The tricuspid valve is structurally normal. There is normal tricuspid valve leaflet mobility. There is moderate to severe tricuspid regurgitation. Pulmonic Valve: The pulmonic valve is structurally normal. There is no indication of pulmonic valve regurgitation. Pericardium: There is no pericardial effusion noted. Aorta: The aortic root is normal. Pulmonary Artery: Pulmonary hypertension is present. The tricuspid regurgitant velocity is 3.77 m/s, and with an estimated right atrial pressure of 8 mmHg, the estimated pulmonary artery pressure is severely elevated with the RVSP at 64.9 mmHg. Systemic Veins: The inferior vena cava appears to be of normal size. In comparison to the previous echocardiogram(s): Prior examinations are available and were reviewed for comparison purposes. The left ventricular function is significantly worse. The left ventricular diastolic function is unchanged.  CONCLUSIONS:  1. Left ventricular systolic function is severely decreased with a 15-20% estimated ejection fraction.  2. Entire lateral wall, mid and apical inferior wall, apical septal segment, and apex are abnormal.  3. Spectral Doppler shows a pseudonormal pattern of left ventricular diastolic filling.  4. There is no evidence of mitral valve stenosis.  5. Moderate to severe mitral valve regurgitation.  6. Moderate to severe tricuspid regurgitation.  7. Aortic valve sclerosis.  8. Pulmonary hypertension is  present.  9. Severely elevated pulmonary artery pressure. 10. There are multiple wall motion abnormalities. RECOMMENDATIONS: Technically suboptimal and limited study, therefore accuracy of above interpretation could be substantially diminished. Clinical correlation is advised. Consider additional imaging modalities if clinically indicated.  QUANTITATIVE DATA SUMMARY: 2D MEASUREMENTS:                           Normal Ranges: Ao Root d:     3.20 cm    (2.0-3.7cm) LAs:           4.60 cm    (2.7-4.0cm) IVSd:          1.00 cm    (0.6-1.1cm) LVPWd:         0.95 cm    (0.6-1.1cm) LVIDd:         5.28 cm    (3.9-5.9cm) LVIDs:         4.89 cm LV Mass Index: 114.9 g/m2 LV % FS        7.4 % LA VOLUME:                               Normal Ranges: LA Vol A4C:        49.4 ml    (22+/-6mL/m2) LA Vol A2C:        81.7 ml LA Vol BP:         68.8 ml LA Vol Index A4C:  29.5ml/m2 LA Vol Index A2C:  48.8 ml/m2 LA Vol Index BP:   41.1 ml/m2 LA Area A4C:       17.3 cm2 LA Area A2C:       24.1 cm2 LA Major Axis A4C: 5.2 cm LA Major Axis A2C: 6.0 cm LA Volume Index:   39.5 ml/m2 LA Vol A4C:        47.0 ml LA Vol A2C:        78.0 ml RA VOLUME BY A/L METHOD:                               Normal Ranges: RA Vol A4C:        48.5 ml    (8.3-19.5ml) RA Vol Index A4C:  29.0 ml/m2 RA Area A4C:       15.5 cm2 RA Major Axis A4C: 4.2 cm AORTA MEASUREMENTS:                    Normal Ranges: Asc Ao, d: 2.40 cm (2.1-3.4cm) LV SYSTOLIC FUNCTION BY 2D PLANIMETRY (MOD):                     Normal Ranges: EF-A4C View: 15.8 % (>=55%) EF-A2C View: 15.4 % EF-Biplane:  16.4 % LV DIASTOLIC FUNCTION:                        Normal Ranges: MV Peak E:    1.20 m/s (0.7-1.2 m/s) MV Peak A:    1.21 m/s (0.42-0.7 m/s) E/A Ratio:    0.99     (1.0-2.2) MV lateral e' 0.08 m/s MV medial e'  0.05 m/s E/e' Ratio:   15.90    (<8.0) MITRAL VALVE:                 Normal Ranges: MV DT: 143 msec (150-240msec) MITRAL INSUFFICIENCY:                           Normal Ranges: PISA Radius:   0.9 cm MR VTI:       117.00 cm MR Vmax:      498.00 cm/s MR Alias Marbin: 30.3 cm/s MR Volume:    36.23 ml MR Flow Rt:   154.21 ml/s MR EROA:      0.31 cm2 AORTIC VALVE:                                   Normal Ranges: AoV Vmax:                1.38 m/s (<=1.7m/s) AoV Peak P.6 mmHg (<20mmHg) AoV Mean P.0 mmHg (1.7-11.5mmHg) LVOT Max Marbin:            0.69 m/s (<=1.1m/s) AoV VTI:                 19.30 cm (18-25cm) LVOT VTI:                8.11 cm LVOT Diameter:           1.90 cm  (1.8-2.4cm) AoV Area, VTI:           1.19 cm2 (2.5-5.5cm2) AoV Area,Vmax:           1.41 cm2 (2.5-4.5cm2) AoV Dimensionless Index: 0.42  RIGHT VENTRICLE: RV Basal 3.90 cm RV Mid   3.30 cm RV Major 8.2 cm TAPSE:   16.1 mm RV s'    0.11 m/s TRICUSPID VALVE/RVSP:                             Normal Ranges: Peak TR Velocity: 3.77 m/s RV Syst Pressure: 64.9 mmHg (< 30mmHg) IVC Diam:         1.68 cm PULMONIC VALVE:                         Normal Ranges: PV Accel Time: 71 msec  (>120ms) PV Max Marbin:    1.1 m/s  (0.6-0.9m/s) PV Max P.8 mmHg  [id number] Jamey Aleajndra DO Electronically signed on 10/27/2023 at 4:13:45 PM  Wall Scoring  ** Final **     XR chest 1 view    Result Date: 10/26/2023  STUDY: Chest Radiograph;  10/26/2023 2:28 AM INDICATION: Shortness of breath. COMPARISON: 10/25/2023 XR Chest two view ACCESSION NUMBER(S): [id number] ORDERING CLINICIAN: JAMEY SHAW TECHNIQUE:  Frontal chest was obtained at 2:28 hours. FINDINGS: CARDIOMEDIASTINAL SILHOUETTE: Cardiomediastinal silhouette is borderline in size, unchanged.  LUNGS: Lungs are clear.  ABDOMEN: No remarkable upper abdominal findings.  BONES: No acute osseous changes.    Stable borderline heart size.  Clear lungs.  No evidence of infiltrate or edema. Signed by Yandel Slade MD     Current Facility-Administered Medications:     albuterol 2.5 mg /3 mL (0.083 %) nebulizer solution 2.5 mg, 2.5 mg, nebulization, q6h PRN, Howard Puga MD, 2.5 mg at  10/28/23 0219    ALPRAZolam (Xanax) tablet 0.25 mg, 0.25 mg, oral, BID PRN, Ilda Bello MD, 0.25 mg at 10/28/23 0652    alum-mag hydroxide-simeth (Mylanta) 200-200-20 mg/5 mL oral suspension 20 mL, 20 mL, oral, 4x daily PRN, Howard Puga MD, 20 mL at 10/28/23 1140    aspirin EC tablet 81 mg, 81 mg, oral, Daily, Howard Puga MD, 81 mg at 10/28/23 0919    atorvastatin (Lipitor) tablet 80 mg, 80 mg, oral, Daily, Howard Puga MD    azithromycin (Zithromax) tablet 500 mg, 500 mg, oral, q24h TIFFANIE, Howard Puga MD, 500 mg at 10/28/23 0954    benzocaine-menthol (Cepastat Sore Throat) 15-3.6 mg lozenge 1 lozenge, 1 lozenge, Mouth/Throat, q2h PRN, Howard Puga MD    calcium carbonate (Tums) chewable tablet 500 mg, 500 mg, oral, 4x daily PRN, Perla Callaway, APRN-CNP, 500 mg at 10/27/23 2111    carvedilol (Coreg) tablet 12.5 mg, 12.5 mg, oral, BID with meals, Howard Puga MD, 12.5 mg at 10/28/23 1703    dextrose 10 % in water (D10W) infusion, 0.3 g/kg/hr, intravenous, Once PRN, Howard Puga MD    dextrose 50 % injection 25 g, 25 g, intravenous, q15 min PRN, Howard Puga MD    empagliflozin (Jardiance) tablet 10 mg, 10 mg, oral, Daily, Howard Puga MD, 10 mg at 10/28/23 0920    [Held by provider] furosemide (Lasix) injection 40 mg, 40 mg, intravenous, q12h, Howard Puga MD    gabapentin (Neurontin) capsule 400 mg, 400 mg, oral, TID, Howard Puga MD, 400 mg at 10/28/23 1704    glucagon (Glucagen) injection 1 mg, 1 mg, intramuscular, q15 min PRN, Howard Puga MD    insulin glargine (Lantus) injection 20 Units, 20 Units, subcutaneous, Nightly, Howard Puga MD    insulin lispro (HumaLOG) injection 0-10 Units, 0-10 Units, subcutaneous, TID with meals, Howard Puga MD, 8 Units at 10/28/23 0652    ipratropium-albuteroL (Duo-Neb) 0.5-2.5 mg/3 mL nebulizer solution 3 mL, 3 mL, nebulization, TID, Howard Puga MD, 3 mL at 10/28/23 1444    linaGLIPtin (Tradjenta)  tablet 5 mg, 5 mg, oral, Daily, Howard Puga MD    lisinopril tablet 5 mg, 5 mg, oral, Daily, Jamey Garcia DO, 5 mg at 10/28/23 0919    melatonin tablet 5 mg, 5 mg, oral, Nightly, Howard Puga MD    methIMAzole (Tapazole) tablet 10 mg, 10 mg, oral, q8h TIFFANIE, Howard Puga MD, 10 mg at 10/28/23 1704    pantoprazole (ProtoNix) EC tablet 40 mg, 40 mg, oral, Daily before breakfast, Howard Puga MD    perflutren protein A microsphere (Optison) injection 0.5 mL, 0.5 mL, intravenous, Once in imaging, Howard Puga MD    [Held by provider] spironolactone (Aldactone) tablet 25 mg, 25 mg, oral, Daily, Howard Puga MD    sulfur hexafluoride microsphr (Lumason) injection 24.28 mg, 2 mL, intravenous, Once in imaging, Howard Puga MD   Principal Problem:    NSTEMI (non-ST elevated myocardial infarction) (CMS/HCC)  Active Problems:    Elevated troponin      Assessment/Plan      Acute systolic congestive heart failure patient seen by cardiology on diuretic.  Increased troponin non-ST elevation MI.  Cardiology on the case.  Smoking cessation discussed with the patient patient heavy smoker smoked more than 2 pack/day.  Dyspnea on exertion follow-up in the office for complete work-up for obstructive and restrictive lung disease.  Right middle lobe 5 x 2 mm lung nodule follow-up CT scan of the chest patient advised to follow-up in the office for follow-up.  Discussed with the patient again.  Hypertension.  Dyslipidemia.  Coronary disease.  Diabetes.  Peripheral artery disease.  Left below-knee amputation.  Chronic kidney disease /acute kidney injury.  Renal on the case.  Hyperthyroidism.  GERD.  DVT prophylaxis.  PT OT.  P.o. diet  On discharge follow-up for pulmonary in my offic  Patient refused psych evaluation.    Shamir Sagastume MD

## 2023-10-29 LAB
ANION GAP SERPL CALC-SCNC: 13 MMOL/L (ref 10–20)
BUN SERPL-MCNC: 36 MG/DL (ref 6–23)
CALCIUM SERPL-MCNC: 8.6 MG/DL (ref 8.6–10.3)
CHLORIDE SERPL-SCNC: 89 MMOL/L (ref 98–107)
CO2 SERPL-SCNC: 26 MMOL/L (ref 21–32)
CREAT SERPL-MCNC: 1.13 MG/DL (ref 0.5–1.3)
GFR SERPL CREATININE-BSD FRML MDRD: 78 ML/MIN/1.73M*2
GLUCOSE BLD MANUAL STRIP-MCNC: 122 MG/DL (ref 74–99)
GLUCOSE BLD MANUAL STRIP-MCNC: 169 MG/DL (ref 74–99)
GLUCOSE BLD MANUAL STRIP-MCNC: 304 MG/DL (ref 74–99)
GLUCOSE BLD MANUAL STRIP-MCNC: 387 MG/DL (ref 74–99)
GLUCOSE BLD MANUAL STRIP-MCNC: 42 MG/DL (ref 74–99)
GLUCOSE BLD MANUAL STRIP-MCNC: 48 MG/DL (ref 74–99)
GLUCOSE BLD MANUAL STRIP-MCNC: 88 MG/DL (ref 74–99)
GLUCOSE SERPL-MCNC: 127 MG/DL (ref 74–99)
HOLD SPECIMEN: NORMAL
HOLD SPECIMEN: NORMAL
POTASSIUM SERPL-SCNC: 3.5 MMOL/L (ref 3.5–5.3)
SODIUM SERPL-SCNC: 124 MMOL/L (ref 136–145)

## 2023-10-29 PROCEDURE — 2500000005 HC RX 250 GENERAL PHARMACY W/O HCPCS: Performed by: STUDENT IN AN ORGANIZED HEALTH CARE EDUCATION/TRAINING PROGRAM

## 2023-10-29 PROCEDURE — 82947 ASSAY GLUCOSE BLOOD QUANT: CPT

## 2023-10-29 PROCEDURE — 2500000005 HC RX 250 GENERAL PHARMACY W/O HCPCS: Performed by: HOSPITALIST

## 2023-10-29 PROCEDURE — 2500000001 HC RX 250 WO HCPCS SELF ADMINISTERED DRUGS (ALT 637 FOR MEDICARE OP): Performed by: STUDENT IN AN ORGANIZED HEALTH CARE EDUCATION/TRAINING PROGRAM

## 2023-10-29 PROCEDURE — 36415 COLL VENOUS BLD VENIPUNCTURE: CPT | Performed by: INTERNAL MEDICINE

## 2023-10-29 PROCEDURE — 2500000001 HC RX 250 WO HCPCS SELF ADMINISTERED DRUGS (ALT 637 FOR MEDICARE OP): Performed by: NURSE PRACTITIONER

## 2023-10-29 PROCEDURE — 99233 SBSQ HOSP IP/OBS HIGH 50: CPT | Performed by: HOSPITALIST

## 2023-10-29 PROCEDURE — 94640 AIRWAY INHALATION TREATMENT: CPT | Performed by: STUDENT IN AN ORGANIZED HEALTH CARE EDUCATION/TRAINING PROGRAM

## 2023-10-29 PROCEDURE — 82374 ASSAY BLOOD CARBON DIOXIDE: CPT | Performed by: INTERNAL MEDICINE

## 2023-10-29 PROCEDURE — 2500000002 HC RX 250 W HCPCS SELF ADMINISTERED DRUGS (ALT 637 FOR MEDICARE OP, ALT 636 FOR OP/ED): Performed by: STUDENT IN AN ORGANIZED HEALTH CARE EDUCATION/TRAINING PROGRAM

## 2023-10-29 PROCEDURE — 2500000004 HC RX 250 GENERAL PHARMACY W/ HCPCS (ALT 636 FOR OP/ED): Performed by: INTERNAL MEDICINE

## 2023-10-29 PROCEDURE — 1200000002 HC GENERAL ROOM WITH TELEMETRY DAILY

## 2023-10-29 PROCEDURE — 2500000001 HC RX 250 WO HCPCS SELF ADMINISTERED DRUGS (ALT 637 FOR MEDICARE OP): Performed by: HOSPITALIST

## 2023-10-29 RX ORDER — LISINOPRIL 2.5 MG/1
2.5 TABLET ORAL DAILY
Status: DISCONTINUED | OUTPATIENT
Start: 2023-10-30 | End: 2023-10-30 | Stop reason: HOSPADM

## 2023-10-29 RX ORDER — CARVEDILOL 6.25 MG/1
6.25 TABLET ORAL
Status: DISCONTINUED | OUTPATIENT
Start: 2023-10-29 | End: 2023-10-30 | Stop reason: HOSPADM

## 2023-10-29 RX ORDER — METHIMAZOLE 10 MG/1
20 TABLET ORAL EVERY 8 HOURS SCHEDULED
Qty: 90 TABLET | Refills: 0 | Status: SHIPPED | OUTPATIENT
Start: 2023-10-29

## 2023-10-29 RX ORDER — DEXTROSE 50 % IN WATER (D50W) INTRAVENOUS SYRINGE
12.5 ONCE
Status: COMPLETED | OUTPATIENT
Start: 2023-10-29 | End: 2023-10-29

## 2023-10-29 RX ORDER — INSULIN GLARGINE 100 [IU]/ML
10 INJECTION, SOLUTION SUBCUTANEOUS NIGHTLY
Status: DISCONTINUED | OUTPATIENT
Start: 2023-10-29 | End: 2023-10-29

## 2023-10-29 RX ORDER — AZITHROMYCIN 500 MG/1
500 TABLET, FILM COATED ORAL
Qty: 5 TABLET | Refills: 0 | Status: SHIPPED | OUTPATIENT
Start: 2023-10-30 | End: 2024-05-01 | Stop reason: HOSPADM

## 2023-10-29 RX ORDER — METHIMAZOLE 5 MG/1
20 TABLET ORAL EVERY 8 HOURS SCHEDULED
Status: DISCONTINUED | OUTPATIENT
Start: 2023-10-29 | End: 2023-10-30 | Stop reason: HOSPADM

## 2023-10-29 RX ADMIN — SODIUM CHLORIDE 1000 ML: 9 INJECTION, SOLUTION INTRAVENOUS at 00:12

## 2023-10-29 RX ADMIN — ANTACID TABLETS 500 MG: 500 TABLET, CHEWABLE ORAL at 21:02

## 2023-10-29 RX ADMIN — METHIMAZOLE 10 MG: 5 TABLET ORAL at 09:37

## 2023-10-29 RX ADMIN — EMPAGLIFLOZIN 10 MG: 10 TABLET, FILM COATED ORAL at 09:37

## 2023-10-29 RX ADMIN — DEXTROSE MONOHYDRATE 25 G: 25 INJECTION, SOLUTION INTRAVENOUS at 15:37

## 2023-10-29 RX ADMIN — AZITHROMYCIN 500 MG: 250 TABLET, FILM COATED ORAL at 09:37

## 2023-10-29 RX ADMIN — METHIMAZOLE 20 MG: 5 TABLET ORAL at 17:12

## 2023-10-29 RX ADMIN — METHIMAZOLE 10 MG: 5 TABLET ORAL at 00:58

## 2023-10-29 RX ADMIN — DEXTROSE MONOHYDRATE 25 G: 25 INJECTION, SOLUTION INTRAVENOUS at 05:34

## 2023-10-29 RX ADMIN — IPRATROPIUM BROMIDE AND ALBUTEROL SULFATE 3 ML: 2.5; .5 SOLUTION RESPIRATORY (INHALATION) at 07:35

## 2023-10-29 RX ADMIN — ATORVASTATIN CALCIUM 80 MG: 80 TABLET, FILM COATED ORAL at 09:38

## 2023-10-29 RX ADMIN — DEXTROSE MONOHYDRATE 12.5 G: 500 INJECTION PARENTERAL at 15:45

## 2023-10-29 RX ADMIN — Medication 5 MG: at 21:01

## 2023-10-29 RX ADMIN — INSULIN LISPRO 8 UNITS: 100 INJECTION, SOLUTION INTRAVENOUS; SUBCUTANEOUS at 11:56

## 2023-10-29 RX ADMIN — ASPIRIN 81 MG: 81 TABLET, COATED ORAL at 09:38

## 2023-10-29 ASSESSMENT — COGNITIVE AND FUNCTIONAL STATUS - GENERAL
CLIMB 3 TO 5 STEPS WITH RAILING: TOTAL
WALKING IN HOSPITAL ROOM: A LITTLE
DAILY ACTIVITIY SCORE: 24
MOBILITY SCORE: 20

## 2023-10-29 ASSESSMENT — PAIN SCALES - GENERAL: PAINLEVEL_OUTOF10: 0 - NO PAIN

## 2023-10-29 NOTE — PROGRESS NOTES
"Hitesh Jurado is a 53 y.o. male on day 3 of admission presenting with NSTEMI (non-ST elevated myocardial infarction) (CMS/HCC).  Uncontrolled blood sugar and low blood pressure being managed by hospitalist    Subjective   Patient states she wants to sleep     Objective   Patient seen lying in bed stable not in any distress.  ROS  Review of Systems   Constitutional: Negative.    HENT:  Positive for congestion.    Eyes: Negative.    Respiratory:  Positive for shortness of breath.    Cardiovascular: Negative.    Gastrointestinal: Negative.    Endocrine: Positive for heat intolerance.   Genitourinary: Negative.    Musculoskeletal:  Positive for arthralgias.   Allergic/Immunologic: Negative.    Neurological:  Positive for weakness.   Hematological:  Positive for adenopathy.   Psychiatric/Behavioral:  Positive for sleep disturbance  Vital Signs  Blood pressure 108/64, pulse 79, temperature 35.4 °C (95.7 °F), temperature source Temporal, resp. rate 18, height 1.753 m (5' 9.02\"), weight 55.3 kg (121 lb 14.6 oz), SpO2 98 %.    Physical Exam    Appearance: Normal appearance.   HENT:      Head: Normocephalic.      Nose: Nose normal.      Mouth/Throat:      Mouth: Mucous membranes are dry.   Eyes:      Extraocular Movements: Extraocular movements intact.      Pupils: Pupils are equal, round, and reactive to light.   Cardiovascular:      Rate and Rhythm: Tachycardia present.      Pulses: Normal pulses.   Pulmonary:      Breath sounds: Rales present.   Abdominal:      Palpations: Abdomen is soft.   Musculoskeletal:         General: Normal range of motion.      Cervical back: Normal range of motion and neck supple.   Skin:     General: Skin is dry.   Neurological:      General: No focal deficit present.      Mental Status: He is alert and oriented to person, place, and time. Mental status is at baseline.   Psychiatric:         Mood and Affect: Mood normal.         Behavior: Behavior normal  Oxygen Therapy  SpO2: 98 " %  Medical Gas Therapy: None (Room air)          Intake/Output  I/O last 3 completed shifts:  In: 1582.5 (28.6 mL/kg) [I.V.:82.5 (1.5 mL/kg); IV Piggyback:1500]  Out: 700 (12.7 mL/kg) [Urine:700 (0.4 mL/kg/hr)]  Weight: 55.3 kg     Lines and Tubes:  Peripheral IV 10/29/23 18 G Left;Upper;Ventral;Anterior Arm (Active)   Placement Date/Time: 10/29/23 0010   Size (Gauge): 18 G  Orientation: Left;Upper;Ventral;Anterior  Location: Arm  Patient Tolerance: Age appropriate   Number of days: 0       Results for orders placed or performed during the hospital encounter of 10/26/23 (from the past 96 hour(s))   POCT GLUCOSE   Result Value Ref Range    POCT Glucose 506 (H) 74 - 99 mg/dL   CBC and Auto Differential   Result Value Ref Range    WBC 13.1 (H) 4.4 - 11.3 x10*3/uL    nRBC 0.0 0.0 - 0.0 /100 WBCs    RBC 3.87 (L) 4.50 - 5.90 x10*6/uL    Hemoglobin 9.0 (L) 13.5 - 17.5 g/dL    Hematocrit 27.3 (L) 41.0 - 52.0 %    MCV 71 (L) 80 - 100 fL    MCH 23.3 (L) 26.0 - 34.0 pg    MCHC 33.0 32.0 - 36.0 g/dL    RDW 17.4 (H) 11.5 - 14.5 %    Platelets 357 150 - 450 x10*3/uL    MPV 10.4 7.5 - 11.5 fL    Neutrophils % 81.6 40.0 - 80.0 %    Immature Granulocytes %, Automated 0.4 0.0 - 0.9 %    Lymphocytes % 9.1 13.0 - 44.0 %    Monocytes % 8.8 2.0 - 10.0 %    Eosinophils % 0.0 0.0 - 6.0 %    Basophils % 0.1 0.0 - 2.0 %    Neutrophils Absolute 10.73 (H) 1.20 - 7.70 x10*3/uL    Immature Granulocytes Absolute, Automated 0.05 0.00 - 0.70 x10*3/uL    Lymphocytes Absolute 1.20 1.20 - 4.80 x10*3/uL    Monocytes Absolute 1.15 (H) 0.10 - 1.00 x10*3/uL    Eosinophils Absolute 0.00 0.00 - 0.70 x10*3/uL    Basophils Absolute 0.01 0.00 - 0.10 x10*3/uL   Comprehensive metabolic panel   Result Value Ref Range    Glucose 577 (HH) 74 - 99 mg/dL    Sodium 121 (L) 136 - 145 mmol/L    Potassium 5.1 3.5 - 5.3 mmol/L    Chloride 81 (L) 98 - 107 mmol/L    Bicarbonate 30 21 - 32 mmol/L    Anion Gap 15 10 - 20 mmol/L    Urea Nitrogen 64 (H) 6 - 23 mg/dL     Creatinine 1.83 (H) 0.50 - 1.30 mg/dL    eGFR 44 (L) >60 mL/min/1.73m*2    Calcium 8.7 8.6 - 10.3 mg/dL    Albumin 3.5 3.4 - 5.0 g/dL    Alkaline Phosphatase 104 33 - 120 U/L    Total Protein 6.5 6.4 - 8.2 g/dL    AST 19 9 - 39 U/L    Bilirubin, Total 1.6 (H) 0.0 - 1.2 mg/dL    ALT 22 10 - 52 U/L   B-Type Natriuretic Peptide   Result Value Ref Range    BNP 2,850 (H) 0 - 99 pg/mL   Troponin I, High Sensitivity   Result Value Ref Range    Troponin I, High Sensitivity 1,590 (HH) 0 - 20 ng/L   Troponin I, High Sensitivity   Result Value Ref Range    Troponin I, High Sensitivity 1,417 (HH) 0 - 20 ng/L   POCT GLUCOSE   Result Value Ref Range    POCT Glucose 287 (H) 74 - 99 mg/dL   POCT GLUCOSE   Result Value Ref Range    POCT Glucose 219 (H) 74 - 99 mg/dL   POCT GLUCOSE   Result Value Ref Range    POCT Glucose 273 (H) 74 - 99 mg/dL   POCT GLUCOSE   Result Value Ref Range    POCT Glucose 260 (H) 74 - 99 mg/dL   Urine electrolytes   Result Value Ref Range    Sodium, Urine Random 24 mmol/L    Sodium/Creatinine Ratio 61 Not established. mmol/g Creat    Potassium, Urine Random 37 mmol/L    Potassium/Creatinine Ratio 95 Not established mmol/g Creat    Chloride, Urine Random <15 mmol/L    Chloride/Creatinine Ratio      Creatinine, Urine Random 39.1 20.0 - 370.0 mg/dL   Troponin I, High Sensitivity   Result Value Ref Range    Troponin I, High Sensitivity 1,590 (HH) 0 - 20 ng/L   TSH with reflex to Free T4 if abnormal   Result Value Ref Range    Thyroid Stimulating Hormone <0.01 (L) 0.44 - 3.98 mIU/L   Thyroxine, Free   Result Value Ref Range    Thyroxine, Free 3.97 (H) 0.61 - 1.12 ng/dL   Triiodothyronine, Free   Result Value Ref Range    Triiodothyronine, Free 7.8 (H) 2.3 - 4.2 pg/mL   POCT GLUCOSE   Result Value Ref Range    POCT Glucose 273 (H) 74 - 99 mg/dL   CBC   Result Value Ref Range    WBC 10.4 4.4 - 11.3 x10*3/uL    nRBC 0.0 0.0 - 0.0 /100 WBCs    RBC 4.08 (L) 4.50 - 5.90 x10*6/uL    Hemoglobin 9.5 (L) 13.5 - 17.5 g/dL     Hematocrit 29.2 (L) 41.0 - 52.0 %    MCV 72 (L) 80 - 100 fL    MCH 23.3 (L) 26.0 - 34.0 pg    MCHC 32.5 32.0 - 36.0 g/dL    RDW 17.9 (H) 11.5 - 14.5 %    Platelets 347 150 - 450 x10*3/uL    MPV 9.9 7.5 - 11.5 fL   Basic Metabolic Panel   Result Value Ref Range    Glucose 135 (H) 74 - 99 mg/dL    Sodium 134 (L) 136 - 145 mmol/L    Potassium 3.9 3.5 - 5.3 mmol/L    Chloride 88 (L) 98 - 107 mmol/L    Bicarbonate 31 21 - 32 mmol/L    Anion Gap 19 10 - 20 mmol/L    Urea Nitrogen 57 (H) 6 - 23 mg/dL    Creatinine 1.55 (H) 0.50 - 1.30 mg/dL    eGFR 53 (L) >60 mL/min/1.73m*2    Calcium 9.1 8.6 - 10.3 mg/dL   SST TOP   Result Value Ref Range    Extra Tube Hold for add-ons.    POCT GLUCOSE   Result Value Ref Range    POCT Glucose 131 (H) 74 - 99 mg/dL   Transthoracic Echo (TTE) Complete   Result Value Ref Range    LV A4C EF 15.8    POCT GLUCOSE   Result Value Ref Range    POCT Glucose 379 (H) 74 - 99 mg/dL   TSH   Result Value Ref Range    Thyroid Stimulating Hormone <0.01 (L) 0.44 - 3.98 mIU/L   Vitamin D 25-Hydroxy,Total (for eval of Vitamin D levels)   Result Value Ref Range    Vitamin D, 25-Hydroxy, Total 61 30 - 100 ng/mL   POCT GLUCOSE   Result Value Ref Range    POCT Glucose 161 (H) 74 - 99 mg/dL   Drug Screen, Urine   Result Value Ref Range    Amphetamine Screen, Urine Presumptive Negative Presumptive Negative    Barbiturate Screen, Urine Presumptive Negative Presumptive Negative    Benzodiazepines Screen, Urine Presumptive Negative Presumptive Negative    Cannabinoid Screen, Urine Presumptive Positive (A) Presumptive Negative    Cocaine Metabolite Screen, Urine Presumptive Negative Presumptive Negative    Fentanyl Screen, Urine Presumptive Negative Presumptive Negative    Opiate Screen, Urine Presumptive Negative Presumptive Negative    Oxycodone Screen, Urine Presumptive Negative Presumptive Negative    PCP Screen, Urine Presumptive Negative Presumptive Negative   Albumin, Urine Random   Result Value Ref Range     Albumin, Urine Random 12.1 Not established mg/L    Creatinine, Urine Random 30.0 20.0 - 370.0 mg/dL    Albumin/Creatine Ratio 40.3 (H) <30.0 ug/mg Creat   Urinalysis with Reflex Microscopic and Culture   Result Value Ref Range    Color, Urine Yellow Straw, Yellow    Appearance, Urine Clear Clear    Specific Gravity, Urine 1.006 1.005 - 1.035    pH, Urine 7.0 5.0, 5.5, 6.0, 6.5, 7.0, 7.5, 8.0    Protein, Urine NEGATIVE NEGATIVE mg/dL    Glucose, Urine NEGATIVE NEGATIVE mg/dL    Blood, Urine NEGATIVE NEGATIVE    Ketones, Urine NEGATIVE NEGATIVE mg/dL    Bilirubin, Urine NEGATIVE NEGATIVE    Urobilinogen, Urine 4.0 (N) <2.0 mg/dL    Nitrite, Urine NEGATIVE NEGATIVE    Leukocyte Esterase, Urine NEGATIVE NEGATIVE   Extra Urine Gray Tube   Result Value Ref Range    Extra Tube Hold for add-ons.    POCT GLUCOSE   Result Value Ref Range    POCT Glucose 424 (H) 74 - 99 mg/dL   POCT GLUCOSE   Result Value Ref Range    POCT Glucose 329 (H) 74 - 99 mg/dL   CBC   Result Value Ref Range    WBC 9.0 4.4 - 11.3 x10*3/uL    nRBC 0.0 0.0 - 0.0 /100 WBCs    RBC 4.04 (L) 4.50 - 5.90 x10*6/uL    Hemoglobin 9.4 (L) 13.5 - 17.5 g/dL    Hematocrit 29.0 (L) 41.0 - 52.0 %    MCV 72 (L) 80 - 100 fL    MCH 23.3 (L) 26.0 - 34.0 pg    MCHC 32.4 32.0 - 36.0 g/dL    RDW 17.2 (H) 11.5 - 14.5 %    Platelets 322 150 - 450 x10*3/uL    MPV 10.4 7.5 - 11.5 fL   Basic Metabolic Panel   Result Value Ref Range    Glucose 371 (H) 74 - 99 mg/dL    Sodium 122 (L) 136 - 145 mmol/L    Potassium 4.2 3.5 - 5.3 mmol/L    Chloride 86 (L) 98 - 107 mmol/L    Bicarbonate 22 21 - 32 mmol/L    Anion Gap 18 10 - 20 mmol/L    Urea Nitrogen 41 (H) 6 - 23 mg/dL    Creatinine 1.25 0.50 - 1.30 mg/dL    eGFR 69 >60 mL/min/1.73m*2    Calcium 9.0 8.6 - 10.3 mg/dL   Hemoglobin A1c   Result Value Ref Range    Hemoglobin A1C 8.7 (H) see below %    Estimated Average Glucose 203 Not Established mg/dL   POCT GLUCOSE   Result Value Ref Range    POCT Glucose 88 74 - 99 mg/dL   POCT  GLUCOSE   Result Value Ref Range    POCT Glucose 98 74 - 99 mg/dL   Basic metabolic panel   Result Value Ref Range    Glucose 132 (H) 74 - 99 mg/dL    Sodium 122 (L) 136 - 145 mmol/L    Potassium 3.9 3.5 - 5.3 mmol/L    Chloride 86 (L) 98 - 107 mmol/L    Bicarbonate 25 21 - 32 mmol/L    Anion Gap 15 10 - 20 mmol/L    Urea Nitrogen 38 (H) 6 - 23 mg/dL    Creatinine 1.07 0.50 - 1.30 mg/dL    eGFR 83 >60 mL/min/1.73m*2    Calcium 8.5 (L) 8.6 - 10.3 mg/dL   POCT GLUCOSE   Result Value Ref Range    POCT Glucose 137 (H) 74 - 99 mg/dL   SST TOP   Result Value Ref Range    Extra Tube Hold for add-ons.    POCT GLUCOSE   Result Value Ref Range    POCT Glucose 269 (H) 74 - 99 mg/dL   POCT GLUCOSE   Result Value Ref Range    POCT Glucose 42 (L) 74 - 99 mg/dL   Lavender Top   Result Value Ref Range    Extra Tube Hold for add-ons.    SST TOP   Result Value Ref Range    Extra Tube Hold for add-ons.    Basic metabolic panel   Result Value Ref Range    Glucose 127 (H) 74 - 99 mg/dL    Sodium 124 (L) 136 - 145 mmol/L    Potassium 3.5 3.5 - 5.3 mmol/L    Chloride 89 (L) 98 - 107 mmol/L    Bicarbonate 26 21 - 32 mmol/L    Anion Gap 13 10 - 20 mmol/L    Urea Nitrogen 36 (H) 6 - 23 mg/dL    Creatinine 1.13 0.50 - 1.30 mg/dL    eGFR 78 >60 mL/min/1.73m*2    Calcium 8.6 8.6 - 10.3 mg/dL   POCT GLUCOSE   Result Value Ref Range    POCT Glucose 122 (H) 74 - 99 mg/dL   POCT GLUCOSE   Result Value Ref Range    POCT Glucose 304 (H) 74 - 99 mg/dL   POCT GLUCOSE   Result Value Ref Range    POCT Glucose 88 74 - 99 mg/dL   POCT GLUCOSE   Result Value Ref Range    POCT Glucose 48 (L) 74 - 99 mg/dL   POCT GLUCOSE   Result Value Ref Range    POCT Glucose 169 (H) 74 - 99 mg/dL        Relevant Results  Recent Results (from the past 24 hour(s))   SST TOP    Collection Time: 10/28/23  5:01 PM   Result Value Ref Range    Extra Tube Hold for add-ons.    POCT GLUCOSE    Collection Time: 10/28/23  8:04 PM   Result Value Ref Range    POCT Glucose 269 (H) 74 -  99 mg/dL   POCT GLUCOSE    Collection Time: 10/29/23  5:28 AM   Result Value Ref Range    POCT Glucose 42 (L) 74 - 99 mg/dL   Lavender Top    Collection Time: 10/29/23  5:49 AM   Result Value Ref Range    Extra Tube Hold for add-ons.    SST TOP    Collection Time: 10/29/23  5:49 AM   Result Value Ref Range    Extra Tube Hold for add-ons.    Basic metabolic panel    Collection Time: 10/29/23  5:50 AM   Result Value Ref Range    Glucose 127 (H) 74 - 99 mg/dL    Sodium 124 (L) 136 - 145 mmol/L    Potassium 3.5 3.5 - 5.3 mmol/L    Chloride 89 (L) 98 - 107 mmol/L    Bicarbonate 26 21 - 32 mmol/L    Anion Gap 13 10 - 20 mmol/L    Urea Nitrogen 36 (H) 6 - 23 mg/dL    Creatinine 1.13 0.50 - 1.30 mg/dL    eGFR 78 >60 mL/min/1.73m*2    Calcium 8.6 8.6 - 10.3 mg/dL   POCT GLUCOSE    Collection Time: 10/29/23  5:50 AM   Result Value Ref Range    POCT Glucose 122 (H) 74 - 99 mg/dL   POCT GLUCOSE    Collection Time: 10/29/23 11:11 AM   Result Value Ref Range    POCT Glucose 304 (H) 74 - 99 mg/dL   POCT GLUCOSE    Collection Time: 10/29/23  2:19 PM   Result Value Ref Range    POCT Glucose 88 74 - 99 mg/dL   POCT GLUCOSE    Collection Time: 10/29/23  3:33 PM   Result Value Ref Range    POCT Glucose 48 (L) 74 - 99 mg/dL   POCT GLUCOSE    Collection Time: 10/29/23  3:49 PM   Result Value Ref Range    POCT Glucose 169 (H) 74 - 99 mg/dL      XR chest 1 view    Result Date: 10/28/2023  Interpreted By:  Finkelstein, Evan, STUDY: XR CHEST 1 VIEW;  10/28/2023 3:23 am   INDICATION: Signs/Symptoms:Shortness of breath.   COMPARISON: Chest radiograph 10/26/2023   ACCESSION NUMBER(S): [id number]   ORDERING CLINICIAN: SILAS NEGRO   FINDINGS:     CARDIOMEDIASTINAL SILHOUETTE: Cardiomediastinal silhouette is stable in size and configuration.   LUNGS: No pulmonary consolidation, pleural effusion or pneumothorax.   ABDOMEN: No remarkable upper abdominal findings.   BONES: No acute osseous abnormality.       No radiographic evidence of acute  cardiopulmonary pathology.   MACRO: None.   Signed by: Evan Finkelstein 10/28/2023 3:34 AM Dictation workstation:   [id number]    Transthoracic Echo (TTE) Complete    Result Date: 10/27/2023          [place] [address]  Tel [phone] Fax [phone] TRANSTHORACIC ECHOCARDIOGRAM REPORT  Patient Name:      BROOK GUZMAN JETHRO Reading Physician:    [id number] Jamey Alejandra DO Study Date:        10/27/2023           Ordering Provider:    [id number] VANESSA CASPER MRN/PID:           [id number]             Fellow: Accession#:        [id number]         Nurse:                Stan Phelps RN Date of Birth/Age: 1970 / 53 years  Sonographer:          Lauren SEWELL Gender:            M                    Additional Staff: Height:            175.26 cm            Admit Date:           10/26/2023 Weight:            55.34 kg             Admission Status:     Inpatient -                                                               Routine BSA:               1.67 m2              Department Location:  [facility]                                                               Echo Lab Blood Pressure: 128 /97 mmHg Study Type:    TRANSTHORACIC ECHO (TTE) COMPLETE Diagnosis/ICD: Non ST elevation (NSTEMI) myocardial infarction-I21.4 Indication:    NSTEMI CPT Codes:     Echo Complete w Full Doppler-[id number] Patient History: Smoker:            Current. Pertinent History: CAD, HTN, Hyperlipidemia and PAD. Study Detail: The following Echo studies were performed: 2D, M-Mode, Doppler and               color flow. Technically challenging study due to prominent lung               artifact. Definity used as a contrast agent for endocardial border               definition. Total contrast used for this procedure was  2 mL via IV               push. The patient was awake.  PHYSICIAN INTERPRETATION: Left Ventricle: Left ventricular systolic function is severely decreased, with an estimated ejection fraction of 15-20%. There are multiple wall motion abnormalities. The left ventricular cavity size is normal. Spectral Doppler shows a pseudonormal pattern of left ventricular diastolic filling. LV Wall Scoring: The entire lateral wall, mid and apical inferior wall, apical septal segment, and apex are hypokinetic. All remaining scored segments are normal. Left Atrium: The left atrium is mildly dilated. Right Ventricle: The right ventricle is normal in size. There is normal right ventricular global systolic function. Right Atrium: The right atrium is normal in size. Aortic Valve: The aortic valve appears structurally normal. The aortic valve appears tricuspid. There is mild aortic valve cusp calcification. There is evidence of mildly elevated transaortic gradients consistent with sclerosis of the aortic valve. There is no evidence of aortic valve regurgitation. The peak instantaneous gradient of the aortic valve is 7.6 mmHg. The mean gradient of the aortic valve is 5.0 mmHg. Mitral Valve: The mitral valve is normal in structure. There is no evidence of mitral valve stenosis. There is normal mitral valve leaflet mobility. There is mild mitral annular calcification. There is moderate to severe mitral valve regurgitation. Tricuspid Valve: The tricuspid valve is structurally normal. There is normal tricuspid valve leaflet mobility. There is moderate to severe tricuspid regurgitation. Pulmonic Valve: The pulmonic valve is structurally normal. There is no indication of pulmonic valve regurgitation. Pericardium: There is no pericardial effusion noted. Aorta: The aortic root is normal. Pulmonary Artery: Pulmonary hypertension is present. The tricuspid regurgitant velocity is 3.77 m/s, and with an estimated right atrial pressure of 8 mmHg, the  estimated pulmonary artery pressure is severely elevated with the RVSP at 64.9 mmHg. Systemic Veins: The inferior vena cava appears to be of normal size. In comparison to the previous echocardiogram(s): Prior examinations are available and were reviewed for comparison purposes. The left ventricular function is significantly worse. The left ventricular diastolic function is unchanged.  CONCLUSIONS:  1. Left ventricular systolic function is severely decreased with a 15-20% estimated ejection fraction.  2. Entire lateral wall, mid and apical inferior wall, apical septal segment, and apex are abnormal.  3. Spectral Doppler shows a pseudonormal pattern of left ventricular diastolic filling.  4. There is no evidence of mitral valve stenosis.  5. Moderate to severe mitral valve regurgitation.  6. Moderate to severe tricuspid regurgitation.  7. Aortic valve sclerosis.  8. Pulmonary hypertension is present.  9. Severely elevated pulmonary artery pressure. 10. There are multiple wall motion abnormalities. RECOMMENDATIONS: Technically suboptimal and limited study, therefore accuracy of above interpretation could be substantially diminished. Clinical correlation is advised. Consider additional imaging modalities if clinically indicated.  QUANTITATIVE DATA SUMMARY: 2D MEASUREMENTS:                           Normal Ranges: Ao Root d:     3.20 cm    (2.0-3.7cm) LAs:           4.60 cm    (2.7-4.0cm) IVSd:          1.00 cm    (0.6-1.1cm) LVPWd:         0.95 cm    (0.6-1.1cm) LVIDd:         5.28 cm    (3.9-5.9cm) LVIDs:         4.89 cm LV Mass Index: 114.9 g/m2 LV % FS        7.4 % LA VOLUME:                               Normal Ranges: LA Vol A4C:        49.4 ml    (22+/-6mL/m2) LA Vol A2C:        81.7 ml LA Vol BP:         68.8 ml LA Vol Index A4C:  29.5ml/m2 LA Vol Index A2C:  48.8 ml/m2 LA Vol Index BP:   41.1 ml/m2 LA Area A4C:       17.3 cm2 LA Area A2C:       24.1 cm2 LA Major Axis A4C: 5.2 cm LA Major Axis A2C: 6.0 cm LA  Volume Index:   39.5 ml/m2 LA Vol A4C:        47.0 ml LA Vol A2C:        78.0 ml RA VOLUME BY A/L METHOD:                               Normal Ranges: RA Vol A4C:        48.5 ml    (8.3-19.5ml) RA Vol Index A4C:  29.0 ml/m2 RA Area A4C:       15.5 cm2 RA Major Axis A4C: 4.2 cm AORTA MEASUREMENTS:                    Normal Ranges: Asc Ao, d: 2.40 cm (2.1-3.4cm) LV SYSTOLIC FUNCTION BY 2D PLANIMETRY (MOD):                     Normal Ranges: EF-A4C View: 15.8 % (>=55%) EF-A2C View: 15.4 % EF-Biplane:  16.4 % LV DIASTOLIC FUNCTION:                        Normal Ranges: MV Peak E:    1.20 m/s (0.7-1.2 m/s) MV Peak A:    1.21 m/s (0.42-0.7 m/s) E/A Ratio:    0.99     (1.0-2.2) MV lateral e' 0.08 m/s MV medial e'  0.05 m/s E/e' Ratio:   15.90    (<8.0) MITRAL VALVE:                 Normal Ranges: MV DT: 143 msec (150-240msec) MITRAL INSUFFICIENCY:                           Normal Ranges: PISA Radius:  0.9 cm MR VTI:       117.00 cm MR Vmax:      498.00 cm/s MR Alias Marbin: 30.3 cm/s MR Volume:    36.23 ml MR Flow Rt:   154.21 ml/s MR EROA:      0.31 cm2 AORTIC VALVE:                                   Normal Ranges: AoV Vmax:                1.38 m/s (<=1.7m/s) AoV Peak P.6 mmHg (<20mmHg) AoV Mean P.0 mmHg (1.7-11.5mmHg) LVOT Max Marbin:            0.69 m/s (<=1.1m/s) AoV VTI:                 19.30 cm (18-25cm) LVOT VTI:                8.11 cm LVOT Diameter:           1.90 cm  (1.8-2.4cm) AoV Area, VTI:           1.19 cm2 (2.5-5.5cm2) AoV Area,Vmax:           1.41 cm2 (2.5-4.5cm2) AoV Dimensionless Index: 0.42  RIGHT VENTRICLE: RV Basal 3.90 cm RV Mid   3.30 cm RV Major 8.2 cm TAPSE:   16.1 mm RV s'    0.11 m/s TRICUSPID VALVE/RVSP:                             Normal Ranges: Peak TR Velocity: 3.77 m/s RV Syst Pressure: 64.9 mmHg (< 30mmHg) IVC Diam:         1.68 cm PULMONIC VALVE:                         Normal Ranges: PV Accel Time: 71 msec  (>120ms) PV Max Marbin:    1.1 m/s  (0.6-0.9m/s) PV Max PG:      4.8 mmHg  [id number] Jamey Alejandra DO Electronically signed on 10/27/2023 at 4:13:45 PM  Wall Scoring  ** Final **     XR chest 1 view    Result Date: 10/26/2023  STUDY: Chest Radiograph;  10/26/2023 2:28 AM INDICATION: Shortness of breath. COMPARISON: 10/25/2023 XR Chest two view ACCESSION NUMBER(S): [id number] ORDERING CLINICIAN: JAMEY SHAW TECHNIQUE:  Frontal chest was obtained at 2:28 hours. FINDINGS: CARDIOMEDIASTINAL SILHOUETTE: Cardiomediastinal silhouette is borderline in size, unchanged.  LUNGS: Lungs are clear.  ABDOMEN: No remarkable upper abdominal findings.  BONES: No acute osseous changes.    Stable borderline heart size.  Clear lungs.  No evidence of infiltrate or edema. Signed by Yandel Slade MD    XR chest 2 views    Result Date: 10/25/2023  STUDY: Chest Radiographs;  10/25/2023 at 2:55 AM INDICATION: Evaluate for cardiomegaly and worsening pulmonary edema. COMPARISON: CTA chest 10/24/2023, XR chest 10/24/2023, XR chest 11/13/2022, XR chest 2/28/2022. ACCESSION NUMBER(S): [id number] ORDERING CLINICIAN: RAJ OWEN TECHNIQUE:  Frontal and lateral chest. FINDINGS: CARDIOMEDIASTINAL SILHOUETTE: Cardiomediastinal silhouette is enlarged.  Central pulmonary vasculature is slightly prominent.  There are interstitial changes noted throughout both lungs. No localized consolidation is appreciated. ABDOMEN: No remarkable upper abdominal findings.  BONES: No acute osseous changes.    Above findings likely related to interstitial edema from CHF.  This has progressed since previous exam. Signed by Mayur Melo DO    CT angio chest for pulmonary embolism    Result Date: 10/24/2023  STUDY: CT Angiogram of the Chest; 10/24/2023 2:49 PM. INDICATION: Shortness of breath and elevated D-dimer; Evaluate for pulmonary embolism. COMPARISON: Chest radiograph performed the same date. ACCESSION NUMBER(S): [id number] ORDERING CLINICIAN: MARTHA HODGE TECHNIQUE:  CTA of the chest was performed with  intravenous contrast. Images are reviewed and processed at a workstation according to the CT angiogram protocol with 3-D and/or MIP post processing imaging generated.  Omnipaque 350, 75 mL was administered intravenously. Automated mA/kV exposure control was utilized and patient examination was performed in strict accordance with principles of ALARA. FINDINGS: Pulmonary arteries are adequately opacified without acute or chronic filling defects.  The thoracic aorta is normal in course and caliber without dissection or aneurysm. There are scattered vascular calcifications along the arterial tree The heart is mildly enlarged with no pericardial effusion.  Prominent mediastinal lymph nodes are noted with a short axis diameter of up to 1.5 cm. The esophagus is partly air-filled. There is no pleural effusion, pleural thickening, or pneumothorax. The airways are patent. There is a 5 x 2 mm perifissural nodule along in the right middle lobe adjacent to the minor fissure. The right lobe of the thyroid is enlarged and heterogeneous. The thyroid is not included in entirety. Within the upper abdomen there are foci of increased attenuation in both kidneys suggesting possible nonobstructing calculi., The larger in the upper pole of the left kidney measures about 5 mm in diameter. The kidneys are not included in entirety. There are vascular calcifications along the arterial tree.  No suspicious bony lesions.    1.No evidence of acute pulmonary embolus or thoracic aortic aneurysm or dissection. 2.Mild cardiomegaly with no pericardial effusion. 3.Mediastinal adenopathy. Follow-up is suggested. 4.Enlarged heterogeneous right lobe of the thyroid. Recommend correlation with thyroid ultrasound. 5.There is a 5 x 2 mm perifissural nodule along in the right middle lobe adjacent to the minor fissure, likely benign. Signed by Radha Casanova DO    XR chest 1 view    Result Date: 10/24/2023  STUDY: Chest Radiograph;  10/24/2023 13:15 PM.  INDICATION: Shortness of breath. COMPARISON: CXR 11/13/2022 ACCESSION NUMBER(S): [id number] ORDERING CLINICIAN: MARTHA HODGE TECHNIQUE:  Frontal chest was obtained at 13:15 hours. FINDINGS: CARDIOMEDIASTINAL SILHOUETTE: The heart is borderline in size.  LUNGS: Lungs are clear.  ABDOMEN: No remarkable upper abdominal findings.  BONES: No acute osseous changes.    Borderline heart size. No acute cardiopulmonary abnormality. Signed by Evans Amaya MD    Radiology:  Transthoracic Echo (TTE) Complete    Result Date: 10/27/2023          [place] [id number]  Tel [phone] Fax [phone] TRANSTHORACIC ECHOCARDIOGRAM REPORT  Patient Name:      BROOK MORELOS Reading Physician:    [id number] Jamey Alejandra DO Study Date:        10/27/2023           Ordering Provider:    [id number] VANESSA CASPER MRN/PID:           [id number]             Fellow: Accession#:        [id number]         Nurse:                Stan Phelps RN Date of Birth/Age: 1970 / 53 years  Sonographer:          Lauren SEWELL Gender:            M                    Additional Staff: Height:            175.26 cm            Admit Date:           10/26/2023 Weight:            55.34 kg             Admission Status:     Inpatient -                                                               Routine BSA:               1.67 m2              Department Location:  [facility]                                                               Echo Lab Blood Pressure: 128 /97 mmHg Study Type:    TRANSTHORACIC ECHO (TTE) COMPLETE Diagnosis/ICD: Non ST elevation (NSTEMI) myocardial infarction-I21.4 Indication:    NSTEMI CPT Codes:     Echo Complete w Full [id number] Patient History: Smoker:            Current. Pertinent History:  CAD, HTN, Hyperlipidemia and PAD. Study Detail: The following Echo studies were performed: 2D, M-Mode, Doppler and               color flow. Technically challenging study due to prominent lung               artifact. Definity used as a contrast agent for endocardial border               definition. Total contrast used for this procedure was 2 mL via IV               push. The patient was awake.  PHYSICIAN INTERPRETATION: Left Ventricle: Left ventricular systolic function is severely decreased, with an estimated ejection fraction of 15-20%. There are multiple wall motion abnormalities. The left ventricular cavity size is normal. Spectral Doppler shows a pseudonormal pattern of left ventricular diastolic filling. LV Wall Scoring: The entire lateral wall, mid and apical inferior wall, apical septal segment, and apex are hypokinetic. All remaining scored segments are normal. Left Atrium: The left atrium is mildly dilated. Right Ventricle: The right ventricle is normal in size. There is normal right ventricular global systolic function. Right Atrium: The right atrium is normal in size. Aortic Valve: The aortic valve appears structurally normal. The aortic valve appears tricuspid. There is mild aortic valve cusp calcification. There is evidence of mildly elevated transaortic gradients consistent with sclerosis of the aortic valve. There is no evidence of aortic valve regurgitation. The peak instantaneous gradient of the aortic valve is 7.6 mmHg. The mean gradient of the aortic valve is 5.0 mmHg. Mitral Valve: The mitral valve is normal in structure. There is no evidence of mitral valve stenosis. There is normal mitral valve leaflet mobility. There is mild mitral annular calcification. There is moderate to severe mitral valve regurgitation. Tricuspid Valve: The tricuspid valve is structurally normal. There is normal tricuspid valve leaflet mobility. There is moderate to severe tricuspid regurgitation. Pulmonic Valve: The  pulmonic valve is structurally normal. There is no indication of pulmonic valve regurgitation. Pericardium: There is no pericardial effusion noted. Aorta: The aortic root is normal. Pulmonary Artery: Pulmonary hypertension is present. The tricuspid regurgitant velocity is 3.77 m/s, and with an estimated right atrial pressure of 8 mmHg, the estimated pulmonary artery pressure is severely elevated with the RVSP at 64.9 mmHg. Systemic Veins: The inferior vena cava appears to be of normal size. In comparison to the previous echocardiogram(s): Prior examinations are available and were reviewed for comparison purposes. The left ventricular function is significantly worse. The left ventricular diastolic function is unchanged.  CONCLUSIONS:  1. Left ventricular systolic function is severely decreased with a 15-20% estimated ejection fraction.  2. Entire lateral wall, mid and apical inferior wall, apical septal segment, and apex are abnormal.  3. Spectral Doppler shows a pseudonormal pattern of left ventricular diastolic filling.  4. There is no evidence of mitral valve stenosis.  5. Moderate to severe mitral valve regurgitation.  6. Moderate to severe tricuspid regurgitation.  7. Aortic valve sclerosis.  8. Pulmonary hypertension is present.  9. Severely elevated pulmonary artery pressure. 10. There are multiple wall motion abnormalities. RECOMMENDATIONS: Technically suboptimal and limited study, therefore accuracy of above interpretation could be substantially diminished. Clinical correlation is advised. Consider additional imaging modalities if clinically indicated.  QUANTITATIVE DATA SUMMARY: 2D MEASUREMENTS:                           Normal Ranges: Ao Root d:     3.20 cm    (2.0-3.7cm) LAs:           4.60 cm    (2.7-4.0cm) IVSd:          1.00 cm    (0.6-1.1cm) LVPWd:         0.95 cm    (0.6-1.1cm) LVIDd:         5.28 cm    (3.9-5.9cm) LVIDs:         4.89 cm LV Mass Index: 114.9 g/m2 LV % FS        7.4 % LA VOLUME:                                Normal Ranges: LA Vol A4C:        49.4 ml    (22+/-6mL/m2) LA Vol A2C:        81.7 ml LA Vol BP:         68.8 ml LA Vol Index A4C:  29.5ml/m2 LA Vol Index A2C:  48.8 ml/m2 LA Vol Index BP:   41.1 ml/m2 LA Area A4C:       17.3 cm2 LA Area A2C:       24.1 cm2 LA Major Axis A4C: 5.2 cm LA Major Axis A2C: 6.0 cm LA Volume Index:   39.5 ml/m2 LA Vol A4C:        47.0 ml LA Vol A2C:        78.0 ml RA VOLUME BY A/L METHOD:                               Normal Ranges: RA Vol A4C:        48.5 ml    (8.3-19.5ml) RA Vol Index A4C:  29.0 ml/m2 RA Area A4C:       15.5 cm2 RA Major Axis A4C: 4.2 cm AORTA MEASUREMENTS:                    Normal Ranges: Asc Ao, d: 2.40 cm (2.1-3.4cm) LV SYSTOLIC FUNCTION BY 2D PLANIMETRY (MOD):                     Normal Ranges: EF-A4C View: 15.8 % (>=55%) EF-A2C View: 15.4 % EF-Biplane:  16.4 % LV DIASTOLIC FUNCTION:                        Normal Ranges: MV Peak E:    1.20 m/s (0.7-1.2 m/s) MV Peak A:    1.21 m/s (0.42-0.7 m/s) E/A Ratio:    0.99     (1.0-2.2) MV lateral e' 0.08 m/s MV medial e'  0.05 m/s E/e' Ratio:   15.90    (<8.0) MITRAL VALVE:                 Normal Ranges: MV DT: 143 msec (150-240msec) MITRAL INSUFFICIENCY:                           Normal Ranges: PISA Radius:  0.9 cm MR VTI:       117.00 cm MR Vmax:      498.00 cm/s MR Alias Marbin: 30.3 cm/s MR Volume:    36.23 ml MR Flow Rt:   154.21 ml/s MR EROA:      0.31 cm2 AORTIC VALVE:                                   Normal Ranges: AoV Vmax:                1.38 m/s (<=1.7m/s) AoV Peak P.6 mmHg (<20mmHg) AoV Mean P.0 mmHg (1.7-11.5mmHg) LVOT Max Marbin:            0.69 m/s (<=1.1m/s) AoV VTI:                 19.30 cm (18-25cm) LVOT VTI:                8.11 cm LVOT Diameter:           1.90 cm  (1.8-2.4cm) AoV Area, VTI:           1.19 cm2 (2.5-5.5cm2) AoV Area,Vmax:           1.41 cm2 (2.5-4.5cm2) AoV Dimensionless Index: 0.42  RIGHT VENTRICLE: RV Basal 3.90 cm RV Mid   3.30 cm RV  Major 8.2 cm TAPSE:   16.1 mm RV s'    0.11 m/s TRICUSPID VALVE/RVSP:                             Normal Ranges: Peak TR Velocity: 3.77 m/s RV Syst Pressure: 64.9 mmHg (< 30mmHg) IVC Diam:         1.68 cm PULMONIC VALVE:                         Normal Ranges: PV Accel Time: 71 msec  (>120ms) PV Max Marbin:    1.1 m/s  (0.6-0.9m/s) PV Max P.8 mmHg  [id number] Jamey Alejandra DO Electronically signed on 10/27/2023 at 4:13:45 PM  Wall Scoring  ** Final **     XR chest 1 view    Result Date: 10/26/2023  STUDY: Chest Radiograph;  10/26/2023 2:28 AM INDICATION: Shortness of breath. COMPARISON: 10/25/2023 XR Chest two view ACCESSION NUMBER(S): [id number] ORDERING CLINICIAN: JAMEY SHAW TECHNIQUE:  Frontal chest was obtained at 2:28 hours. FINDINGS: CARDIOMEDIASTINAL SILHOUETTE: Cardiomediastinal silhouette is borderline in size, unchanged.  LUNGS: Lungs are clear.  ABDOMEN: No remarkable upper abdominal findings.  BONES: No acute osseous changes.    Stable borderline heart size.  Clear lungs.  No evidence of infiltrate or edema. Signed by Yandel Slade MD     Current Facility-Administered Medications:     albuterol 2.5 mg /3 mL (0.083 %) nebulizer solution 2.5 mg, 2.5 mg, nebulization, q6h PRN, Howard Puga MD, 2.5 mg at 10/28/23 0219    ALPRAZolam (Xanax) tablet 0.25 mg, 0.25 mg, oral, BID PRN, Ilda Bello MD, 0.25 mg at 10/28/23 0652    alum-mag hydroxide-simeth (Mylanta) 200-200-20 mg/5 mL oral suspension 20 mL, 20 mL, oral, 4x daily PRN, Howard Puga MD, 20 mL at 10/28/23 1140    aspirin EC tablet 81 mg, 81 mg, oral, Daily, Howard Puga MD, 81 mg at 10/29/23 0938    atorvastatin (Lipitor) tablet 80 mg, 80 mg, oral, Daily, Howard Puga MD, 80 mg at 10/29/23 0938    azithromycin (Zithromax) tablet 500 mg, 500 mg, oral, q24h TIFFANIE, Howard Puga MD, 500 mg at 10/29/23 0937    benzocaine-menthol (Cepastat Sore Throat) 15-3.6 mg lozenge 1 lozenge, 1 lozenge, Mouth/Throat, q2h PRN, Howard FENTON  MD Edd    calcium carbonate (Tums) chewable tablet 500 mg, 500 mg, oral, 4x daily PRN, Perla Callaway, APRN-CNP, 500 mg at 10/27/23 2111    carvedilol (Coreg) tablet 6.25 mg, 6.25 mg, oral, BID with meals, Nhan Montero MD    dextrose 10 % in water (D10W) infusion, 0.3 g/kg/hr, intravenous, Once PRN, Howard Puga MD    dextrose 50 % injection 25 g, 25 g, intravenous, q15 min PRN, Howard Puga MD, 25 g at 10/29/23 1537    empagliflozin (Jardiance) tablet 10 mg, 10 mg, oral, Daily, Howard Puga MD, 10 mg at 10/29/23 0937    gabapentin (Neurontin) capsule 400 mg, 400 mg, oral, TID, Howard Puga MD, 400 mg at 10/28/23 1704    glucagon (Glucagen) injection 1 mg, 1 mg, intramuscular, q15 min PRN, Howard Puga MD    insulin lispro (HumaLOG) injection 0-10 Units, 0-10 Units, subcutaneous, TID with meals, Howard Puga MD, 8 Units at 10/29/23 1156    ipratropium-albuteroL (Duo-Neb) 0.5-2.5 mg/3 mL nebulizer solution 3 mL, 3 mL, nebulization, TID, Howard Puga MD, 3 mL at 10/29/23 0735    linaGLIPtin (Tradjenta) tablet 5 mg, 5 mg, oral, Daily, Howard Puga MD    [START ON 10/30/2023] lisinopril tablet 2.5 mg, 2.5 mg, oral, Daily, Nhan Montero MD    melatonin tablet 5 mg, 5 mg, oral, Nightly, Howard Puga MD, 5 mg at 10/28/23 2051    methIMAzole (Tapazole) tablet 20 mg, 20 mg, oral, q8h TIFFANIE, Nhan Montero MD    pantoprazole (ProtoNix) EC tablet 40 mg, 40 mg, oral, Daily before breakfast, Howard Puga MD    perflutren protein A microsphere (Optison) injection 0.5 mL, 0.5 mL, intravenous, Once in imaging, Howard Puga MD    sulfur hexafluoride microsphr (Lumason) injection 24.28 mg, 2 mL, intravenous, Once in imaging, Howard Puga MD   Principal Problem:    NSTEMI (non-ST elevated myocardial infarction) (CMS/[facility])  Active Problems:    Elevated troponin      Assessment/Plan    Acute systolic congestive heart failure patient seen by cardiology on  diuretic.  Increased troponin non-ST elevation MI.  Cardiology on the case.  Smoking cessation discussed with the patient patient heavy smoker smoked more than 2 pack/day.  Dyspnea on exertion follow-up in the office for complete work-up for obstructive and restrictive lung disease.  Right middle lobe 5 x 2 mm lung nodule follow-up CT scan of the chest patient advised to follow-up in the office for follow-up.  Discussed with the patient again.  Hypertension.  Dyslipidemia.  Coronary disease.  Diabetes.  Peripheral artery disease.  Left below-knee amputation.  Chronic kidney disease /acute kidney injury.  Renal on the case.  Hyperthyroidism.  GERD.  DVT prophylaxis.  PT OT.  P.o. diet  On discharge follow-up for pulmonary in my offic  Patient refused psych evaluation  Low blood sugar and low blood pressure and being managed by hospitalist patient awake and alert.    Shamir Sagastume MD

## 2023-10-29 NOTE — CARE PLAN
The patient's goals for the shift include      The clinical goals for the shift include patient systolic BP will be >90 by end of shift    Over the shift, the patient did make progress toward the following goals.

## 2023-10-29 NOTE — PROGRESS NOTES
Cardiology Progress Note  Patient: Hitesh Jurado  Unit/Bed: 1011/1011-A  YOB: 1970  MRN: [id number]  Acct: [id number]   Admitting Diagnosis: Hyperglycemia [R73.9]  Elevated troponin [R79.89]  NSTEMI (non-ST elevated myocardial infarction) (CMS/[facility]) [I21.4]  Acute on chronic congestive heart failure, unspecified heart failure type (CMS/[facility]) [I50.9]  Date:  10/26/2023  Hospital Day: 2  Attending: Howard Puga MD       Complaint:  Chief Complaint   Patient presents with    Hyperglycemia     Pt has hyperglycemia, pt is non complaint with medicine. Pt was in hospital yesterday and left AMA.           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    Chronic congestive heart failure (CMS/[facility])    GERD (gastroesophageal reflux disease)    Dyspepsia    Critical lower limb ischemia (CMS/[facility])    Chronic obstructive pulmonary disease, unspecified (CMS/[facility])    Primary hypertension    PTSD (post-traumatic stress disorder)    JONA (generalized anxiety disorder)    Severe episode of recurrent major depressive disorder, without psychotic features (CMS/[facility])    Type 2 diabetes mellitus (CMS/[facility])    COPD exacerbation (CMS/[facility])    NSTEMI (non-ST elevated myocardial infarction) (CMS/[facility])    Elevated troponin      Hypothyroidism uncontrolled  Diabetes mellitus type 2 uncontrolled  Noncompliant refusing treatments  NSTEMI     Plan:  Patient has been started on methimazole 10 mg 3 times daily for hypothyroidism it appears he has had this for a long time he has  not been taking his medications as prescribed it is unclear how he could be helped without medications.  Diabetes type 2 uncontrolled once again the patient has not been taking medications as prescribed he has been given a little more recently has started taking his medications including empagliflozin and sliding scale insulin and will also be on Lantus 20 units daily as his glucoses are running quite elevated after admission in the 400s        SUBJECTIVE    The patient has refused medications many of them over the last few days since admission.  He is also being seen by psychiatry but did not want to comply with the consultation.  Appears to have his own believes        VITALS      Vitals:    10/28/23 1559 10/28/23 2003 10/28/23 2004 10/28/23 2318   BP: 104/66 84/51 75/51 (!) 74/47   BP Location: Left arm Left leg Right arm Left arm   Patient Position: Sitting   Lying   Pulse: 79 75 73 71   Resp: 20 16  16   Temp: 36.3 °C (97.3 °F) 36.6 °C (97.9 °F)  36.5 °C (97.7 °F)   TempSrc: Temporal Temporal  Temporal   SpO2: 99% 99%  98%   Weight:       Height:           Intake/Output Summary (Last 24 hours) at 10/28/2023 2344  Last data filed at 10/28/2023 2225  Gross per 24 hour   Intake 500 ml   Output --   Net 500 ml      Wt Readings from Last 4 Encounters:   10/27/23 50.7 kg (111 lb 12.4 oz)   10/25/23 55.3 kg (122 lb)   10/24/23 55.3 kg (122 lb)   07/21/23 59 kg (130 lb)        Allergies:  Allergies   Allergen Reactions    Amoxicillin Unknown        PHYSICAL EXAM   Physical Exam      LABS   Magnesium:  Results from last 7 days   Lab Units 10/25/23  0400 10/24/23  1308   MAGNESIUM mg/dL 2.04 2.01     Glucose (mg/dl)    Accucheck   Fasting Glucose   Random Glucose     0 [id number] 0 [id number]    Glucose    FS glucose                Random glucose    135   371  132       Fasting glucose                Blood glucose                POCT glucose  273 273 131 379 424 329 98 269       Potassium    Potassium, Blood     3.9   4.2  3.9       Medication Dosing    Empagliflozin ORAL (mg)        10        insulin glargine,hum.rec.anlog SUBQ (Units)         20       insulin lispro SUBQ (Units)     10 2 8         Insulins              Lab Review  Lab Results   Component Value Date    BILITOT 1.6 (H) 10/26/2023    CALCIUM 8.5 (L) 10/28/2023    CO2 25 10/28/2023    CL 86 (L) 10/28/2023    CREATININE 1.07 10/28/2023    GLUCOSE 132 (H) 10/28/2023    ALKPHOS 104 10/26/2023    K 3.9 10/28/2023    PROT 6.5 10/26/2023     (L) 10/28/2023    AST 19 10/26/2023    ALT 22 10/26/2023    BUN 38 (H) 10/28/2023    ANIONGAP 15 10/28/2023    MG 2.04 10/25/2023    PHOS 2.9 11/16/2022    ALBUMIN 3.5 10/26/2023    LIPASE 25 10/24/2023    GFRMALE 86 11/16/2022     Lab Results   Component Value Date    TRIG 94 06/24/2022    CHOL 109 06/24/2022    HDL 36.2 (A) 06/24/2022     Lab Results   Component Value Date    HGBA1C 8.7 (H) 10/28/2023    HGBA1C 7.9 (H) 12/12/2022    HGBA1C 7.6 (A) 06/24/2022     The ASCVD Risk score (Duc FLORES, et al., 2019) failed to calculate for the following reasons:    The patient has a prior MI or stroke diagnosis   Lab Results   Component Value Date    HGBA1C 8.7 (H) 10/28/2023    HGBA1C 7.9 (H) 12/12/2022    HGBA1C 7.6 (A) 06/24/2022     (L) 10/28/2023    K 3.9 10/28/2023    CL 86 (L) 10/28/2023    CO2 25 10/28/2023    BUN 38 (H) 10/28/2023    CREATININE 1.07 10/28/2023    CALCIUM 8.5 (L) 10/28/2023    ALBUMIN 3.5 10/26/2023    PROT 6.5 10/26/2023    BILITOT 1.6 (H) 10/26/2023    ALKPHOS 104 10/26/2023    ALT 22 10/26/2023    AST 19 10/26/2023    GLUCOSE 132 (H) 10/28/2023    CHOL 109 06/24/2022    TRIG 94 06/24/2022    HDL 36.2 (A) 06/24/2022        aspirin, 81 mg, oral, Daily  atorvastatin, 80 mg, oral, Daily  azithromycin, 500 mg, oral, q24h TIFFANIE  carvedilol, 12.5 mg, oral, BID with meals  empagliflozin, 10 mg, oral, Daily  [Held by provider] furosemide, 40 mg, intravenous, q12h  gabapentin, 400 mg, oral, TID  insulin  glargine, 20 Units, subcutaneous, Nightly  insulin lispro, 0-10 Units, subcutaneous, TID with meals  ipratropium-albuteroL, 3 mL, nebulization, TID  linaGLIPtin, 5 mg, oral, Daily  lisinopril, 5 mg, oral, Daily  melatonin, 5 mg, oral, Nightly  methIMAzole, 10 mg, oral, q8h TIFFANIE  pantoprazole, 40 mg, oral, Daily before breakfast  perflutren protein A microsphere, 0.5 mL, intravenous, Once in imaging  sodium chloride, 1,000 mL, intravenous, Once  [Held by provider] spironolactone, 25 mg, oral, Daily  sulfur hexafluoride microsphr, 2 mL, intravenous, Once in imaging             Electronically signed by Denise Lynch MD on 10/28/2023 at 11:44 PM

## 2023-10-29 NOTE — NURSING NOTE
"  Problem: Suicidal Behavior  Goal: Suicidal Behavior is Absent or Managed  Description  Therapeutic Goals:  1. Tamra will develop and identify coping strategies. Stressors include: medical issues (DM2)  2. Tamra will participate in milieu activities and psychiatric assessment.  3. Tamra will complete a coping plan prior to d/c.  4. No signs or symptoms of med AEs will be observed or reported.  5. Tamra will express understanding of follow-up care plan and scheduled medication regimen as prescribed.  6. Tamra will report a decrease in SI/depressive symptoms.  7. VS will be within the ordered parameters and pt will deny pain.  8. Tamra will self-manage BG checks as well as administer insulin under RN supervision.   9. Tamra will note and self report symptoms of hyper and/or hypoglycemia as well as report CHOs consumed.    PROGRAM CONTRACT FOR:   Tamra  You are on a program contract, not the phase system.   You will get \"OKs\" (points) from staff after each hour based on your behaviors  Each OK is worth a point to earn privileges. To earn an \"OK\" :        Engage in unit programming.    Show respect to yourself, peers, staff, and family.    If you are having urges to harm yourself or others- please seek out staff for support or tell us what your needs are    If you are struggling to be positive, talk with staff about your frustrations.     Keep swearing to a minimum.     Keep appropriate boundaries with peers and staff     Practice learning new coping skills. Coping skills that are helpful:   *Painting                                                *Writing/Reading/Drawing  *Listening to music     Group .    Attend all groups and participate in all activities unless excused by nurse.    Follow group rules; do not distract others.     ** You will be unable to redeem a privilege if you receive a \"not-ok\" for the 2 hours prior to time of request      Activity to earn:    Individual Movie: 5 OKs  Special Craft: 5 OKs  Large Muscle " Dr peter doc haloed regarding pts blood pressure being low received order to bolus pt with 500cc ns   Room (30 min): 5 OKs  Pt can only go to [facility] Room if elopement precautions removed.  Goal is for pt to work towards this goal per team plan.           Outcome: No Change    NURSING ASSESSMENT    MENTAL HEALTH  Pt denies SI/SIB/HI/hallucinations/anxiety/depression this shift. Flat, blunted affect, mood is calm and pleasant.  Pt did not shower this shift.  Pt complains of N/V, upset stomach, and dizziness. Vitals and BGL rechecked.    Pt was active and visible in the milieu today.     Appetite     11/04/19 0830 11/04/19 1300   Intake (mL)   Intake (%) other (see comments)  (skipped eggs; ate dom crackers & 1/2 milk) 100%   Appetite Good Good   Carbohydrate Intake (gm) 112 gm  (breakfast + 3 dom crackers + 0.5 carton of skim milk) 35 gm       Sleep = 8hours    MEDICAL CONCERNS  Diabetes - New orders from Peds Endo, please see their notes.   BGL = 135 & 162    Braces - pt's mother Michelle will bring in dental rubberbands for pt's braces. Mom said it may or may not be necessary and will check with pt's orthodontist.     PRNS this shift  Zofran 4mg ODT     VITAL SIGNS     11/04/19 0857 11/04/19 1101   Vital Signs   Temp 98.1  F (36.7  C)  --    Heart Rate 101 105   /47 (!) 116/36  (red cuff)   Oxygen Therapy   SpO2 99 %  --    Pain/Comfort   Patient Currently in Pain denies  --      PLAN  Will continue with plan of care. Please see notes from Peds endo about pt's updated insulin medication.  Peds endo recommend pt to eat carb free snacks.  Diabetes educator (IP consult) will visit pt either Tuesday or Wednesday to teach pt how to use her insulin pen. Pt is knowledgeable on using the insulin pens already, but is willing to learn more about managing her insulin.

## 2023-10-29 NOTE — NURSING NOTE
"Patient bp now 79/52 map 61. Patient is asymptomatic otherwise states that he is \"just tired\".  Secure chat out to Dr Bello at this time for updated orders. Dr Bello stated he will come see patient.       0120: Dr Bello in to see patient at this time. Patient bp 88/53 map 65 on dynamap machine. Dr bello manually checked patient bp with 100/65 result. Will continue current plan of care at this time   "

## 2023-10-29 NOTE — PROGRESS NOTES
Renal Progress Note    Assessment/  53 y.o. yo male admitted with subjective complaint of shortness of breath admitted with a diagnosis of acute kidney injury.  Renal comorbidities before admission of  diabetes mellitus .  Pertinent preadmission renal medications of metformin lisinopril torsemide and spironolactone denied any nonsteroidal anti-inflammatory.  Imaging of kidneys has not been ordered.  Urinalysis has not yet ordered   assessment of renal issues include:  Acute kidney injury multifactorial prerenal azotemia and contrast-induced nephropathy stage II patient did receive a CT scan angio to rule out PE no available now resolving  Azotemia:  Baseline Cr 1.1.  CKD stage 2 most likely diabetic nephropathy  Blood pressure: blood pressure controlled  Acid / base / electrolytes: Hyponatremia inpatient clinically euvolemic electrolyte issues patient sodium 124   volume status:hypovolemic to euvolemic   Proteinuria: No significant proteinuria bland urine sediment anemia:  Observed anemia requiring further management  Ca/Phos/PTH: No available information           Plan/  We will check urine osmolarity serum osmolarity and urine sodium with a.m. labs     Admit Date: 10/26/2023    Interval History: Patient seen and examined uneventful night denied any uremic related or fluid volume overload related symptoms hypoglycemic      Medications:   Scheduled Meds:aspirin, 81 mg, oral, Daily  atorvastatin, 80 mg, oral, Daily  azithromycin, 500 mg, oral, q24h TIFFANIE  carvedilol, 6.25 mg, oral, BID with meals  empagliflozin, 10 mg, oral, Daily  gabapentin, 400 mg, oral, TID  insulin lispro, 0-10 Units, subcutaneous, TID with meals  ipratropium-albuteroL, 3 mL, nebulization, TID  linaGLIPtin, 5 mg, oral, Daily  [START ON 10/30/2023] lisinopril, 2.5 mg, oral, Daily  melatonin, 5 mg, oral, Nightly  methIMAzole, 20 mg, oral, q8h TIFFANIE  pantoprazole, 40 mg, oral, Daily before breakfast  perflutren protein A microsphere, 0.5 mL,  "intravenous, Once in imaging  sulfur hexafluoride microsphr, 2 mL, intravenous, Once in imaging      Continuous Infusions:     CBC:   Lab Results   Component Value Date    HGB 9.4 (L) 10/28/2023    HGB 9.5 (L) 10/27/2023    WBC 9.0 10/28/2023    WBC 10.4 10/27/2023     10/28/2023     10/27/2023      Anemia:  No results found for: \"FERRITIN\", \"IRON\", \"TIBC\"   BMP:    Lab Results   Component Value Date     (L) 10/29/2023     (L) 10/28/2023    K 3.5 10/29/2023    K 3.9 10/28/2023    CL 89 (L) 10/29/2023    CL 86 (L) 10/28/2023    CO2 26 10/29/2023    CO2 25 10/28/2023    BUN 36 (H) 10/29/2023    BUN 38 (H) 10/28/2023    CREATININE 1.13 10/29/2023    CREATININE 1.07 10/28/2023      Bone disease:   Lab Results   Component Value Date    VITD25 61 10/27/2023      Urinalysis:    Lab Results   Component Value Date    PRIMITIVO 7.0 10/27/2023    PROTUR NEGATIVE 10/27/2023    GLUCOSEU NEGATIVE 10/27/2023    BLOODU NEGATIVE 10/27/2023    KETONESU NEGATIVE 10/27/2023    BILIRUBINU NEGATIVE 10/27/2023    NITRITEU NEGATIVE 10/27/2023    LEUKOCYTESU NEGATIVE 10/27/2023        Objective:   Vitals: /64 (BP Location: Right arm, Patient Position: Lying)   Pulse 79   Temp 35.4 °C (95.7 °F) (Temporal)   Resp 18   Ht 1.753 m (5' 9.02\")   Wt 55.3 kg (121 lb 14.6 oz)   SpO2 98%   BMI 18.00 kg/m²    Wt Readings from Last 3 Encounters:   10/29/23 55.3 kg (121 lb 14.6 oz)   10/25/23 55.3 kg (122 lb)   10/24/23 55.3 kg (122 lb)      24HR INTAKE/OUTPUT:    Intake/Output Summary (Last 24 hours) at 10/29/2023 1639  Last data filed at 10/29/2023 0400  Gross per 24 hour   Intake 1500 ml   Output --   Net 1500 ml     Admission weight:  Weight: 55.3 kg (122 lb)      Constitutional:  Alert, awake, no apparent distress   Skin:normal, no rash  HEENT:sclera anicteric.  Head atraumatic normocephalic  Neck:supple with no thyromegally  Cardiovascular:  S1, S2 without m/r/g   Respiratory:  CTA B without w/r/r   Abdomen: +bs, " soft, nt  Ext: no LE edema  Musculoskeletal:Intact  Neuro:Alert and oriented with no deficit      Electronically signed by Francis Crabtree MD on 10/29/2023 at 4:39 PM

## 2023-10-29 NOTE — CARE PLAN
The patient's goals for the shift include      The clinical goals for the shift include patient systolic BP will be >90 by end of shift      Problem: Diabetes  Goal: Achieve decreasing blood glucose levels by end of shift  Outcome: Progressing  Goal: Increase stability of blood glucose readings by end of shift  Outcome: Progressing  Goal: Decrease in ketones present in urine by end of shift  Outcome: Progressing  Goal: Maintain electrolyte levels within acceptable range throughout shift  Outcome: Progressing  Goal: Maintain glucose levels >70mg/dl to <250mg/dl throughout shift  Outcome: Progressing  Goal: No changes in neurological exam by end of shift  Outcome: Progressing  Goal: Learn about and adhere to nutrition recommendations by end of shift  Outcome: Progressing  Goal: Vital signs within normal range for age by end of shift  Outcome: Progressing  Goal: Increase self care and/or family involovement by end of shift  Outcome: Progressing  Goal: Receive DSME education by end of shift  Outcome: Progressing     Problem: Fall/Injury  Goal: Not fall by end of shift  Outcome: Progressing  Goal: Be free from injury by end of the shift  Outcome: Progressing  Goal: Verbalize understanding of personal risk factors for fall in the hospital  Outcome: Progressing  Goal: Verbalize understanding of risk factor reduction measures to prevent injury from fall in the home  Outcome: Progressing  Goal: Use assistive devices by end of the shift  Outcome: Progressing  Goal: Pace activities to prevent fatigue by end of the shift  Outcome: Progressing     Problem: Heart Failure  Goal: Reduction in peripheral edema within 24 hours  Outcome: Progressing  Goal: Increase self care and/or family involvement in 24 hours  Outcome: Progressing     Problem: ACS/CP/NSTEMI/STEMI  Goal: Chest pain managed (free from pain or at acceptable level)  Outcome: Progressing  Goal: Lab values return to normal range  Outcome: Progressing  Goal: Promote self  management  Outcome: Progressing  Goal: Verbalize understanding of procedures/devices  Outcome: Progressing     Problem: Skin  Goal: Decreased wound size/increased tissue granulation at next dressing change  Outcome: Progressing  Goal: Participates in plan/prevention/treatment measures  Outcome: Progressing  Goal: Prevent/manage excess moisture  Outcome: Progressing  Goal: Prevent/minimize sheer/friction injuries  Outcome: Progressing  Goal: Promote/optimize nutrition  Outcome: Progressing  Goal: Promote skin healing  Outcome: Progressing     Problem: Nutrition  Goal: Oral intake greater 75%  Outcome: Progressing  Goal: Consume prescribed supplement  Outcome: Progressing  Goal: BG  mg/dL  Outcome: Progressing  Goal: Maintain stable weight  Outcome: Progressing  Goal: Gradual weight gain  Outcome: Progressing

## 2023-10-29 NOTE — PROGRESS NOTES
Hospitalist Progress Note    Hitesh Jurado  1970  53 y.o.  [id number]    10/29/23  2:39 PM    Chief Complaint: Follow-up for abnormal cardiac enzymes, probably mild COPD exacerbation, uncontrolled diabetes, hypoglycemia, hyperthyroidism, hypotension, hyponatremia.    Subjective:  Patient seen and examined.  Initially this morning, patient feels okay and he mentioned that he is ready to go home.  Nursing staff reported that he was given 20 units of Lantus last night and his blood sugar was 42 mg/dL this morning.  Later this afternoon, patient was hypotensive again, systolic was in the 70s and he was symptomatic.  He did not receive lisinopril or Coreg this morning.  Last blood pressure was 89/54, not tachycardic, afebrile.  Medications:    Current Facility-Administered Medications:     albuterol 2.5 mg /3 mL (0.083 %) nebulizer solution 2.5 mg, 2.5 mg, nebulization, q6h PRN, Howard Puga MD, 2.5 mg at 10/28/23 0219    ALPRAZolam (Xanax) tablet 0.25 mg, 0.25 mg, oral, BID PRN, Ilda Bello MD, 0.25 mg at 10/28/23 0652    alum-mag hydroxide-simeth (Mylanta) 200-200-20 mg/5 mL oral suspension 20 mL, 20 mL, oral, 4x daily PRN, Howard Puga MD, 20 mL at 10/28/23 1140    aspirin EC tablet 81 mg, 81 mg, oral, Daily, Howard Puga MD, 81 mg at 10/29/23 0938    atorvastatin (Lipitor) tablet 80 mg, 80 mg, oral, Daily, Howard Puga MD, 80 mg at 10/29/23 0938    azithromycin (Zithromax) tablet 500 mg, 500 mg, oral, q24h TIFFANIE, Howard Puga MD, 500 mg at 10/29/23 0937    benzocaine-menthol (Cepastat Sore Throat) 15-3.6 mg lozenge 1 lozenge, 1 lozenge, Mouth/Throat, q2h PRN, Howard Puga MD    calcium carbonate (Tums) chewable tablet 500 mg, 500 mg, oral, 4x daily PRN, Perla Callaway, APRN-CNP, 500 mg at 10/27/23 2111    carvedilol (Coreg) tablet 6.25 mg, 6.25 mg, oral, BID with meals, Nhan Montero MD    dextrose 10 % in water (D10W) infusion, 0.3 g/kg/hr, intravenous, Once PRN, Howard FENTON  MD Edd    dextrose 50 % injection 25 g, 25 g, intravenous, q15 min PRN, Howard Puga MD, 25 g at 10/29/23 0534    empagliflozin (Jardiance) tablet 10 mg, 10 mg, oral, Daily, Howard Puga MD, 10 mg at 10/29/23 0937    [Held by provider] furosemide (Lasix) injection 40 mg, 40 mg, intravenous, q12h, Howard Puga MD    gabapentin (Neurontin) capsule 400 mg, 400 mg, oral, TID, Howard Puga MD, 400 mg at 10/28/23 1704    glucagon (Glucagen) injection 1 mg, 1 mg, intramuscular, q15 min PRN, Howard Puga MD    insulin glargine (Lantus) injection 10 Units, 10 Units, subcutaneous, Nightly, Nhan Montero MD    insulin lispro (HumaLOG) injection 0-10 Units, 0-10 Units, subcutaneous, TID with meals, Hoawrd Puga MD, 8 Units at 10/29/23 1156    ipratropium-albuteroL (Duo-Neb) 0.5-2.5 mg/3 mL nebulizer solution 3 mL, 3 mL, nebulization, TID, Howard Puga MD, 3 mL at 10/29/23 0735    linaGLIPtin (Tradjenta) tablet 5 mg, 5 mg, oral, Daily, Howard Puga MD    [START ON 10/30/2023] lisinopril tablet 2.5 mg, 2.5 mg, oral, Daily, Nhan Montero MD    melatonin tablet 5 mg, 5 mg, oral, Nightly, Howard Puga MD, 5 mg at 10/28/23 2051    methIMAzole (Tapazole) tablet 20 mg, 20 mg, oral, q8h TIFFANIE, Nhan Montero MD    pantoprazole (ProtoNix) EC tablet 40 mg, 40 mg, oral, Daily before breakfast, Howard Puga MD    perflutren protein A microsphere (Optison) injection 0.5 mL, 0.5 mL, intravenous, Once in imaging, Howard Puga MD    [Held by provider] spironolactone (Aldactone) tablet 25 mg, 25 mg, oral, Daily, Howard Puga MD    sulfur hexafluoride microsphr (Lumason) injection 24.28 mg, 2 mL, intravenous, Once in imaging, Howard Puga MD    Vital signs in last 24 hours:  Temp:  [35.5 °C (95.9 °F)-36.7 °C (98.1 °F)] 36 °C (96.8 °F)  Heart Rate:  [65-79] 78  Resp:  [14-20] 18  BP: ()/(40-66) 89/54    Physical Exam:  General: Awake, alert, oriented x3, no  significant distress, cooperative.  HEENT: EOM intact, PERRLA.  Neck: Supple, no JVD, no masses, no lymphadenopathy.  Chest: Diminished breath sounds bilateral, otherwise clear,  no wheezes, no crackles, no rhonchi.  Heart: Regular rate and rhythm, S1-S2 normal, no murmur, no gallops.  Abdomen: Soft, nontender, no organomegaly, no ascites, no guarding or rigidity.  Neurological: Alert and oriented x3, cranial nerves are intact, normal power and tone of 4 limbs.  Skin: No lesions, no skin rash.  Warm and dry.  Musculoskeletal: Normal, atraumatic, no obvious deformities, status below left knee amputation..  Legs: No leg edema, no clubbing or cyanosis.  Psych: Appropriate mood and behavior, over talkative.    LABS:  Lab Results   Component Value Date    WBC 9.0 10/28/2023    HGB 9.4 (L) 10/28/2023    HCT 29.0 (L) 10/28/2023    MCV 72 (L) 10/28/2023     10/28/2023     Lab Results   Component Value Date    GLUCOSE 127 (H) 10/29/2023    CALCIUM 8.6 10/29/2023     (L) 10/29/2023    K 3.5 10/29/2023    CO2 26 10/29/2023    CL 89 (L) 10/29/2023    BUN 36 (H) 10/29/2023    CREATININE 1.13 10/29/2023     Assessment and Plan:    #1 abnormal cardiac enzymes: Without symptoms of angina pectoris.  Cardiology is following.  No plan for cardiac interventions or any further testing.  Patient is on aspirin, statins, Coreg and losartan.  He has been hypotensive and symptomatic.  Plan to decrease Coreg to 6.25 mg twice a day and lisinopril 2.5 mg once a day with holding parameters, notify cardiology regarding the low blood pressure.  Patient has been noncompliant as outpatient and he has been arguing about his medication.    #2 presumed mild COPD exacerbation: He is on bronchodilators, on room air.  Plan to continue same treatment.    #3 hyponatremia: Partly due to pseudohyponatremia secondary to hyperglycemia and also due to torsemide.  Today sodium is 124, improving, plan to repeat BMP tomorrow morning.    #4 hypotension:  Patient's blood pressure has been on the lower side with symptoms.  He did not receive Coreg or lisinopril this morning.  Plan as above.    #5 uncontrolled type 2 diabetes mellitus/hypoglycemia: He was started on Lantus 20 units overnight, blood glucose was 43 this morning.  He is already on Jardiance, Tradjenta and sliding scale.  Plan to decrease Lantus to 10 units at night.    #6 hyperthyroidism: Started on methimazole 10 mg daily, plan to increase to 20 mg daily according to endocrinology.    #7 tonsillitis: On azithromycin, reported improved symptoms, no fever, no leukocytosis.    #8 DVT prophylaxis: Start subcu Lovenox.    MDM: High complexity, time spent is 51 minutes.    PRASHANTH PALACIO MD.    10/29/23  2:39 PM

## 2023-10-30 VITALS
TEMPERATURE: 97.9 F | HEART RATE: 102 BPM | OXYGEN SATURATION: 100 % | WEIGHT: 123.24 LBS | HEIGHT: 69 IN | BODY MASS INDEX: 18.25 KG/M2 | RESPIRATION RATE: 20 BRPM | SYSTOLIC BLOOD PRESSURE: 129 MMHG | DIASTOLIC BLOOD PRESSURE: 65 MMHG

## 2023-10-30 LAB
ANION GAP SERPL CALC-SCNC: 14 MMOL/L (ref 10–20)
BUN SERPL-MCNC: 29 MG/DL (ref 6–23)
CALCIUM SERPL-MCNC: 8.7 MG/DL (ref 8.6–10.3)
CHLORIDE SERPL-SCNC: 93 MMOL/L (ref 98–107)
CO2 SERPL-SCNC: 24 MMOL/L (ref 21–32)
CREAT SERPL-MCNC: 1.18 MG/DL (ref 0.5–1.3)
ERYTHROCYTE [DISTWIDTH] IN BLOOD BY AUTOMATED COUNT: 17.3 % (ref 11.5–14.5)
GFR SERPL CREATININE-BSD FRML MDRD: 74 ML/MIN/1.73M*2
GLUCOSE BLD MANUAL STRIP-MCNC: 150 MG/DL (ref 74–99)
GLUCOSE SERPL-MCNC: 146 MG/DL (ref 74–99)
HCT VFR BLD AUTO: 30.3 % (ref 41–52)
HGB BLD-MCNC: 9.8 G/DL (ref 13.5–17.5)
HOLD SPECIMEN: NORMAL
MCH RBC QN AUTO: 23.2 PG (ref 26–34)
MCHC RBC AUTO-ENTMCNC: 32.3 G/DL (ref 32–36)
MCV RBC AUTO: 72 FL (ref 80–100)
NRBC BLD-RTO: 0 /100 WBCS (ref 0–0)
OSMOLALITY SERPL: 276 MOSM/KG (ref 280–300)
PLATELET # BLD AUTO: 389 X10*3/UL (ref 150–450)
PMV BLD AUTO: 10.6 FL (ref 7.5–11.5)
POTASSIUM SERPL-SCNC: 3.4 MMOL/L (ref 3.5–5.3)
RBC # BLD AUTO: 4.23 X10*6/UL (ref 4.5–5.9)
SODIUM SERPL-SCNC: 128 MMOL/L (ref 136–145)
WBC # BLD AUTO: 16 X10*3/UL (ref 4.4–11.3)

## 2023-10-30 PROCEDURE — 85027 COMPLETE CBC AUTOMATED: CPT | Performed by: HOSPITALIST

## 2023-10-30 PROCEDURE — 80048 BASIC METABOLIC PNL TOTAL CA: CPT | Performed by: INTERNAL MEDICINE

## 2023-10-30 PROCEDURE — 83930 ASSAY OF BLOOD OSMOLALITY: CPT | Mod: ELYLAB | Performed by: INTERNAL MEDICINE

## 2023-10-30 PROCEDURE — 82947 ASSAY GLUCOSE BLOOD QUANT: CPT

## 2023-10-30 PROCEDURE — 99239 HOSP IP/OBS DSCHRG MGMT >30: CPT | Performed by: HOSPITALIST

## 2023-10-30 PROCEDURE — 36415 COLL VENOUS BLD VENIPUNCTURE: CPT | Mod: ELYLAB | Performed by: INTERNAL MEDICINE

## 2023-10-30 PROCEDURE — 2500000001 HC RX 250 WO HCPCS SELF ADMINISTERED DRUGS (ALT 637 FOR MEDICARE OP): Performed by: HOSPITALIST

## 2023-10-30 PROCEDURE — 2500000002 HC RX 250 W HCPCS SELF ADMINISTERED DRUGS (ALT 637 FOR MEDICARE OP, ALT 636 FOR OP/ED): Performed by: STUDENT IN AN ORGANIZED HEALTH CARE EDUCATION/TRAINING PROGRAM

## 2023-10-30 PROCEDURE — 2500000001 HC RX 250 WO HCPCS SELF ADMINISTERED DRUGS (ALT 637 FOR MEDICARE OP): Performed by: STUDENT IN AN ORGANIZED HEALTH CARE EDUCATION/TRAINING PROGRAM

## 2023-10-30 PROCEDURE — 36415 COLL VENOUS BLD VENIPUNCTURE: CPT | Performed by: INTERNAL MEDICINE

## 2023-10-30 RX ADMIN — ATORVASTATIN CALCIUM 80 MG: 80 TABLET, FILM COATED ORAL at 08:55

## 2023-10-30 RX ADMIN — METHIMAZOLE 20 MG: 5 TABLET ORAL at 08:56

## 2023-10-30 RX ADMIN — METHIMAZOLE 20 MG: 5 TABLET ORAL at 01:15

## 2023-10-30 RX ADMIN — ASPIRIN 81 MG: 81 TABLET, COATED ORAL at 08:55

## 2023-10-30 RX ADMIN — AZITHROMYCIN 500 MG: 250 TABLET, FILM COATED ORAL at 08:55

## 2023-10-30 NOTE — DISCHARGE SUMMARY
Discharge summary.    Hitesh Jurado  53 y.o.  1970  [id number]    Date of admission: 10/26/2023   Date of discharge: 10/30/2023     Discharge Diagnosis:  Abnormal cardiac enzymes.  Presumed mild COPD exacerbation.  Hyponatremia, attributed to pseudohyponatremia secondary to hyperglycemia and diuretics.  Hypotension, improved.  Uncontrolled type 2 diabetes mellitus.  Hypothyroidism.  Tonsillitis.    Problem list:  Patient Active Problem List    Diagnosis Date Noted    NSTEMI (non-ST elevated myocardial infarction) (CMS/HCC) 10/26/2023    Elevated troponin 10/26/2023    PIPPA (acute kidney injury) (CMS/HCC) 10/25/2023    COPD exacerbation (CMS/HCC) 10/25/2023    Status post below-knee amputation of left lower extremity (CMS/HCC) 10/05/2023    PAD (peripheral artery disease) (CMS/HCC) 10/05/2023    ACC/AHA stage C acute systolic heart failure (CMS/HCC) 10/05/2023    Coronary artery disease involving native coronary artery without angina pectoris 10/05/2023    Mixed hyperlipidemia 10/05/2023    Thyroid mass 10/05/2023    Tobacco dependence with current use 10/05/2023    PTSD (post-traumatic stress disorder) 03/10/2023    JONA (generalized anxiety disorder) 03/10/2023    Severe episode of recurrent major depressive disorder, without psychotic features (CMS/HCC) 03/10/2023    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 12/12/2022    Chronic obstructive pulmonary disease, unspecified (CMS/HCC) 08/23/2022    Chronic congestive heart failure (CMS/HCC) 03/04/2022    Critical lower limb ischemia (CMS/HCC) 10/26/2021    Dyspepsia 08/13/2021    GERD (gastroesophageal reflux disease) 08/12/2021    Primary hypertension 05/29/2019    Type 2 diabetes mellitus (CMS/[facility]) 05/29/2019     Discharge  medications:     Your medication list        START taking these medications        Instructions Last Dose Given Next Dose Due   azithromycin 500 mg tablet  Commonly known as: Zithromax      Take 1 tablet (500 mg) by mouth once every 24 hours. Do not start before October 30, 2023.       methIMAzole 10 mg tablet  Commonly known as: Tapazole      Take 2 tablets (20 mg) by mouth every 8 hours.              CHANGE how you take these medications        Instructions Last Dose Given Next Dose Due   Tradjenta 5 mg tablet  Generic drug: linaGLIPtin  What changed: Another medication with the same name was removed. Continue taking this medication, and follow the directions you see here.           lisinopril 5 mg tablet  What changed: Another medication with the same name was removed. Continue taking this medication, and follow the directions you see here.           metFORMIN 1,000 mg tablet  Commonly known as: Glucophage  What changed: Another medication with the same name was removed. Continue taking this medication, and follow the directions you see here.                  CONTINUE taking these medications        Instructions Last Dose Given Next Dose Due   acetaminophen 500 mg tablet  Commonly known as: Tylenol           aspirin 81 mg EC tablet           atorvastatin 80 mg tablet  Commonly known as: Lipitor           carvedilol 12.5 mg tablet  Commonly known as: Coreg           gabapentin 400 mg capsule  Commonly known as: Neurontin           Jardiance 10 mg  Generic drug: empagliflozin           melatonin 5 mg tablet           omeprazole 40 mg DR capsule  Commonly known as: PriLOSEC           torsemide 20 mg tablet  Commonly known as: Demadex                  STOP taking these medications      spironolactone 25 mg tablet  Commonly known as: Aldactone                  Where to Get Your Medications        These medications were sent to [facility]  - [place], [place] - [address]  [address], [place] [address]       Phone: [phone]   azithromycin 500 mg tablet  methIMAzole 10 mg tablet       Hospital Course  This is a 53 years old male patient presented to the emergency room because of shortness of breath and weakness.  Patient has been noncompliant and is not taking his home medications as prescribed.  He takes what ever he thinks that it is good for him and he does not take the rest of his medications.  He was found to have elevated troponin without symptoms of angina.  Cardiology was consulted and recommended no need for further cardiac testing and this abnormal cardiac enzymes attributed to acute kidney injury.  Shortness of breath attributed to presumed mild COPD exacerbation for which she was treated by bronchodilators and he remained on room air.  He was found to hyponatremia which is attributed to pseudohyponatremia secondary to hyperglycemia and secondary to torsemide.  Sodium improved after stopping the torsemide.  Blood glucose was very labile during this admission.  It went up to 400 mg/dL for which patient was started on Lantus 20 units and next morning, patient's blood glucose was down to mid 40s.  Endocrinology was consulted and recommended to discontinue Lantus and keep patient on oral hypoglycemic medications.  His blood pressure also has been fluctuating about down, was placed on Coreg 12.5 mg twice a day and lisinopril 5 mg daily by cardiology.  Doses of Coreg and lisinopril held and his blood pressure actually started to go up.  Patient kept choosing and picking what medication he will take and what he will not.  He does have a history of hyperthyroidism and he is supposed to be on methimazole but he was not taking it.  His TSH was very low.  Endocrinology consulted and started patient on methimazole 10 mg daily.  Patient complained of sore throat and discomfort, found to have tonsillitis and he was started on Zithromax.  After stopping the Lantus, patient's blood glucose improved.  Blood pressure  "stabilized.  Sodium upon discharge was 128 which is improved compared to admission.  With IV fluids, kidney function improved and returned back to his baseline.  Patient discharged home in stable medical condition, discharged on methimazole 20 mg daily as per endocrine recommendations, discharged on Zithromax 5 mg once a day to complete total 5 days of treatment, discharged on Coreg 12.5 mg twice a day, lisinopril 5 mg daily, continued on his other previous home medications without any changes, instructed to follow-up with PCP in 1 week.    Vital signs:  Visit Vitals  /65 (BP Location: Left arm, Patient Position: Sitting)   Pulse 102   Temp 36.6 °C (97.9 °F) (Tympanic)   Resp 20   Ht 1.753 m (5' 9.02\")   Wt 55.9 kg (123 lb 3.8 oz)   SpO2 100%   BMI 18.19 kg/m²   Smoking Status Every Day   BSA 1.65 m²     Discharge physical exam:  Physical Exam:  General: Awake, alert, oriented x3, no significant distress, cooperative.  HEENT: EOM intact, PERRLA.  Neck: Supple, no JVD, no masses, no lymphadenopathy.  Chest: Diminished breath sounds bilateral, otherwise clear,  no wheezes, no crackles, no rhonchi.  Heart: Regular rate and rhythm, S1-S2 normal, no murmur, no gallops.  Abdomen: Soft, nontender, no organomegaly, no ascites, no guarding or rigidity.  Neurological: Alert and oriented x3, cranial nerves are intact, normal power and tone of 4 limbs.  Skin: No lesions, no skin rash.  Warm and dry.  Musculoskeletal: Normal, atraumatic, no obvious deformities, status below left knee amputation..  Legs: No leg edema, no clubbing or cyanosis.  Psych: Appropriate mood and behavior, over talkative.    Labs:  Lab Results   Component Value Date    WBC 16.0 (H) 10/30/2023    HGB 9.8 (L) 10/30/2023    HCT 30.3 (L) 10/30/2023    MCV 72 (L) 10/30/2023     10/30/2023     Lab Results   Component Value Date    GLUCOSE 146 (H) 10/30/2023    CALCIUM 8.7 10/30/2023     (L) 10/30/2023    K 3.4 (L) 10/30/2023    CO2 24 " 10/30/2023    CL 93 (L) 10/30/2023    BUN 29 (H) 10/30/2023    CREATININE 1.18 10/30/2023     Disposition: Home self-care.    Time spent on discharge and discharge summary is 35 minutes.    Nhan Montero MD  10/30/23  11:36 AM

## 2023-11-01 ENCOUNTER — APPOINTMENT (OUTPATIENT)
Dept: CARDIOLOGY | Facility: HOSPITAL | Age: 53
End: 2023-11-01
Payer: MEDICAID

## 2023-11-01 ENCOUNTER — APPOINTMENT (OUTPATIENT)
Dept: RADIOLOGY | Facility: HOSPITAL | Age: 53
End: 2023-11-01
Payer: MEDICAID

## 2023-11-01 ENCOUNTER — HOSPITAL ENCOUNTER (OUTPATIENT)
Facility: HOSPITAL | Age: 53
Setting detail: OBSERVATION
Discharge: LEFT BEFORE DISCHARGE | End: 2023-11-01
Attending: EMERGENCY MEDICINE | Admitting: INTERNAL MEDICINE
Payer: MEDICAID

## 2023-11-01 VITALS
BODY MASS INDEX: 16.88 KG/M2 | DIASTOLIC BLOOD PRESSURE: 71 MMHG | TEMPERATURE: 97.8 F | HEART RATE: 95 BPM | RESPIRATION RATE: 18 BRPM | OXYGEN SATURATION: 98 % | HEIGHT: 69 IN | WEIGHT: 114 LBS | SYSTOLIC BLOOD PRESSURE: 126 MMHG

## 2023-11-01 DIAGNOSIS — E11.65 HYPERGLYCEMIA DUE TO DIABETES MELLITUS (MULTI): Primary | ICD-10-CM

## 2023-11-01 DIAGNOSIS — I50.9 CONGESTIVE HEART FAILURE, UNSPECIFIED HF CHRONICITY, UNSPECIFIED HEART FAILURE TYPE (MULTI): ICD-10-CM

## 2023-11-01 DIAGNOSIS — E87.1 HYPONATREMIA: ICD-10-CM

## 2023-11-01 DIAGNOSIS — R06.02 SHORTNESS OF BREATH AT REST: ICD-10-CM

## 2023-11-01 PROBLEM — J44.9 COPD (CHRONIC OBSTRUCTIVE PULMONARY DISEASE) (MULTI): Status: ACTIVE | Noted: 2023-11-01

## 2023-11-01 LAB
ALBUMIN SERPL BCP-MCNC: 3.4 G/DL (ref 3.4–5)
ALP SERPL-CCNC: 225 U/L (ref 33–120)
ALT SERPL W P-5'-P-CCNC: 76 U/L (ref 10–52)
ANION GAP BLDV CALCULATED.4IONS-SCNC: 12 MMOL/L (ref 10–25)
ANION GAP SERPL CALC-SCNC: 18 MMOL/L (ref 10–20)
AST SERPL W P-5'-P-CCNC: 91 U/L (ref 9–39)
B-OH-BUTYR SERPL-SCNC: 2.52 MMOL/L (ref 0.02–0.27)
BASE EXCESS BLDV CALC-SCNC: -0.9 MMOL/L (ref -2–3)
BASOPHILS # BLD AUTO: 0.01 X10*3/UL (ref 0–0.1)
BASOPHILS NFR BLD AUTO: 0.1 %
BILIRUB DIRECT SERPL-MCNC: 0.8 MG/DL (ref 0–0.3)
BILIRUB SERPL-MCNC: 1.7 MG/DL (ref 0–1.2)
BNP SERPL-MCNC: 3463 PG/ML (ref 0–99)
BODY TEMPERATURE: ABNORMAL
BUN SERPL-MCNC: 33 MG/DL (ref 6–23)
CA-I BLDV-SCNC: 1.09 MMOL/L (ref 1.1–1.33)
CALCIUM SERPL-MCNC: 8.7 MG/DL (ref 8.6–10.3)
CARDIAC TROPONIN I PNL SERPL HS: 546 NG/L (ref 0–20)
CARDIAC TROPONIN I PNL SERPL HS: 642 NG/L (ref 0–20)
CARDIAC TROPONIN I PNL SERPL HS: 754 NG/L (ref 0–20)
CHLORIDE BLDV-SCNC: 95 MMOL/L (ref 98–107)
CHLORIDE SERPL-SCNC: 90 MMOL/L (ref 98–107)
CO2 SERPL-SCNC: 24 MMOL/L (ref 21–32)
CREAT SERPL-MCNC: 1.35 MG/DL (ref 0.5–1.3)
CRITICAL CALL TIME: 443
CRITICAL CALLED BY: ABNORMAL
CRITICAL CALLED TO: ABNORMAL
CRITICAL READ BACK: ABNORMAL
EOSINOPHIL # BLD AUTO: 0 X10*3/UL (ref 0–0.7)
EOSINOPHIL NFR BLD AUTO: 0 %
ERYTHROCYTE [DISTWIDTH] IN BLOOD BY AUTOMATED COUNT: 18.6 % (ref 11.5–14.5)
GFR SERPL CREATININE-BSD FRML MDRD: 63 ML/MIN/1.73M*2
GLUCOSE BLD MANUAL STRIP-MCNC: 298 MG/DL (ref 74–99)
GLUCOSE BLD MANUAL STRIP-MCNC: 312 MG/DL (ref 74–99)
GLUCOSE BLDV-MCNC: 323 MG/DL (ref 74–99)
GLUCOSE SERPL-MCNC: 319 MG/DL (ref 74–99)
HCO3 BLDV-SCNC: 23.4 MMOL/L (ref 22–26)
HCT VFR BLD AUTO: 28.4 % (ref 41–52)
HCT VFR BLD EST: 28 % (ref 41–52)
HGB BLD-MCNC: 9.2 G/DL (ref 13.5–17.5)
HGB BLDV-MCNC: 9.3 G/DL (ref 13.5–17.5)
IMM GRANULOCYTES # BLD AUTO: 0.05 X10*3/UL (ref 0–0.7)
IMM GRANULOCYTES NFR BLD AUTO: 0.4 % (ref 0–0.9)
INHALED O2 CONCENTRATION: 21 %
INR PPP: 1.2 (ref 0.9–1.1)
LACTATE BLDV-SCNC: 2.7 MMOL/L (ref 0.4–2)
LACTATE BLDV-SCNC: 4.6 MMOL/L (ref 0.4–2)
LACTATE SERPL-SCNC: 2.1 MMOL/L (ref 0.4–2)
LACTATE SERPL-SCNC: 2.1 MMOL/L (ref 0.4–2)
LIPASE SERPL-CCNC: 24 U/L (ref 9–82)
LYMPHOCYTES # BLD AUTO: 1.14 X10*3/UL (ref 1.2–4.8)
LYMPHOCYTES NFR BLD AUTO: 9.3 %
MCH RBC QN AUTO: 23.4 PG (ref 26–34)
MCHC RBC AUTO-ENTMCNC: 32.4 G/DL (ref 32–36)
MCV RBC AUTO: 72 FL (ref 80–100)
MONOCYTES # BLD AUTO: 0.66 X10*3/UL (ref 0.1–1)
MONOCYTES NFR BLD AUTO: 5.4 %
NEUTROPHILS # BLD AUTO: 10.42 X10*3/UL (ref 1.2–7.7)
NEUTROPHILS NFR BLD AUTO: 84.8 %
NRBC BLD-RTO: 0 /100 WBCS (ref 0–0)
OXYHGB MFR BLDV: 37 % (ref 45–75)
PCO2 BLDV: 36 MM HG (ref 41–51)
PH BLDV: 7.42 PH (ref 7.33–7.43)
PLATELET # BLD AUTO: 394 X10*3/UL (ref 150–450)
PMV BLD AUTO: 10.9 FL (ref 7.5–11.5)
PO2 BLDV: 26 MM HG (ref 35–45)
POTASSIUM BLDV-SCNC: 4.3 MMOL/L (ref 3.5–5.3)
POTASSIUM SERPL-SCNC: 3.8 MMOL/L (ref 3.5–5.3)
PROT SERPL-MCNC: 6.2 G/DL (ref 6.4–8.2)
PROTHROMBIN TIME: 13.5 SECONDS (ref 9.8–12.8)
RBC # BLD AUTO: 3.94 X10*6/UL (ref 4.5–5.9)
SAO2 % BLDV: 37 % (ref 45–75)
SARS-COV-2 RNA RESP QL NAA+PROBE: NOT DETECTED
SODIUM BLDV-SCNC: 126 MMOL/L (ref 136–145)
SODIUM SERPL-SCNC: 128 MMOL/L (ref 136–145)
WBC # BLD AUTO: 12.3 X10*3/UL (ref 4.4–11.3)

## 2023-11-01 PROCEDURE — 71045 X-RAY EXAM CHEST 1 VIEW: CPT | Performed by: SURGERY

## 2023-11-01 PROCEDURE — 83605 ASSAY OF LACTIC ACID: CPT | Performed by: PHYSICIAN ASSISTANT

## 2023-11-01 PROCEDURE — 36415 COLL VENOUS BLD VENIPUNCTURE: CPT | Performed by: PHYSICIAN ASSISTANT

## 2023-11-01 PROCEDURE — 71250 CT THORAX DX C-: CPT

## 2023-11-01 PROCEDURE — 71045 X-RAY EXAM CHEST 1 VIEW: CPT | Mod: FY

## 2023-11-01 PROCEDURE — 99223 1ST HOSP IP/OBS HIGH 75: CPT | Performed by: INTERNAL MEDICINE

## 2023-11-01 PROCEDURE — 71250 CT THORAX DX C-: CPT | Performed by: RADIOLOGY

## 2023-11-01 PROCEDURE — 2500000004 HC RX 250 GENERAL PHARMACY W/ HCPCS (ALT 636 FOR OP/ED): Performed by: PHYSICIAN ASSISTANT

## 2023-11-01 PROCEDURE — 82010 KETONE BODYS QUAN: CPT | Performed by: PHYSICIAN ASSISTANT

## 2023-11-01 PROCEDURE — 84484 ASSAY OF TROPONIN QUANT: CPT | Performed by: PHYSICIAN ASSISTANT

## 2023-11-01 PROCEDURE — 82248 BILIRUBIN DIRECT: CPT | Performed by: PHYSICIAN ASSISTANT

## 2023-11-01 PROCEDURE — 96375 TX/PRO/DX INJ NEW DRUG ADDON: CPT

## 2023-11-01 PROCEDURE — 85025 COMPLETE CBC W/AUTO DIFF WBC: CPT | Performed by: PHYSICIAN ASSISTANT

## 2023-11-01 PROCEDURE — 84132 ASSAY OF SERUM POTASSIUM: CPT | Performed by: PHYSICIAN ASSISTANT

## 2023-11-01 PROCEDURE — 84295 ASSAY OF SERUM SODIUM: CPT | Performed by: PHYSICIAN ASSISTANT

## 2023-11-01 PROCEDURE — 96374 THER/PROPH/DIAG INJ IV PUSH: CPT

## 2023-11-01 PROCEDURE — 93010 ELECTROCARDIOGRAM REPORT: CPT | Performed by: INTERNAL MEDICINE

## 2023-11-01 PROCEDURE — 93005 ELECTROCARDIOGRAM TRACING: CPT

## 2023-11-01 PROCEDURE — 84484 ASSAY OF TROPONIN QUANT: CPT | Performed by: STUDENT IN AN ORGANIZED HEALTH CARE EDUCATION/TRAINING PROGRAM

## 2023-11-01 PROCEDURE — 83690 ASSAY OF LIPASE: CPT | Performed by: PHYSICIAN ASSISTANT

## 2023-11-01 PROCEDURE — G0378 HOSPITAL OBSERVATION PER HR: HCPCS

## 2023-11-01 PROCEDURE — 99285 EMERGENCY DEPT VISIT HI MDM: CPT | Mod: 25,27 | Performed by: EMERGENCY MEDICINE

## 2023-11-01 PROCEDURE — 82435 ASSAY OF BLOOD CHLORIDE: CPT | Performed by: PHYSICIAN ASSISTANT

## 2023-11-01 PROCEDURE — 82947 ASSAY GLUCOSE BLOOD QUANT: CPT | Mod: 59

## 2023-11-01 PROCEDURE — 2500000002 HC RX 250 W HCPCS SELF ADMINISTERED DRUGS (ALT 637 FOR MEDICARE OP, ALT 636 FOR OP/ED): Performed by: INTERNAL MEDICINE

## 2023-11-01 PROCEDURE — 83880 ASSAY OF NATRIURETIC PEPTIDE: CPT | Performed by: PHYSICIAN ASSISTANT

## 2023-11-01 PROCEDURE — 87635 SARS-COV-2 COVID-19 AMP PRB: CPT | Performed by: PHYSICIAN ASSISTANT

## 2023-11-01 PROCEDURE — 85610 PROTHROMBIN TIME: CPT | Performed by: PHYSICIAN ASSISTANT

## 2023-11-01 RX ORDER — ACETAMINOPHEN 325 MG/1
650 TABLET ORAL EVERY 4 HOURS PRN
Status: DISCONTINUED | OUTPATIENT
Start: 2023-11-01 | End: 2023-11-01 | Stop reason: HOSPADM

## 2023-11-01 RX ORDER — ONDANSETRON HYDROCHLORIDE 2 MG/ML
4 INJECTION, SOLUTION INTRAVENOUS ONCE
Status: DISCONTINUED | OUTPATIENT
Start: 2023-11-01 | End: 2023-11-01

## 2023-11-01 RX ORDER — TORSEMIDE 20 MG/1
20 TABLET ORAL DAILY
Status: DISCONTINUED | OUTPATIENT
Start: 2023-11-01 | End: 2023-11-01 | Stop reason: HOSPADM

## 2023-11-01 RX ORDER — MAGNESIUM SULFATE HEPTAHYDRATE 40 MG/ML
2 INJECTION, SOLUTION INTRAVENOUS ONCE
Status: COMPLETED | OUTPATIENT
Start: 2023-11-01 | End: 2023-11-01

## 2023-11-01 RX ORDER — ACETAMINOPHEN 160 MG/5ML
650 SOLUTION ORAL EVERY 4 HOURS PRN
Status: DISCONTINUED | OUTPATIENT
Start: 2023-11-01 | End: 2023-11-01 | Stop reason: HOSPADM

## 2023-11-01 RX ORDER — ATORVASTATIN CALCIUM 80 MG/1
80 TABLET, FILM COATED ORAL NIGHTLY
Status: DISCONTINUED | OUTPATIENT
Start: 2023-11-01 | End: 2023-11-01 | Stop reason: HOSPADM

## 2023-11-01 RX ORDER — IPRATROPIUM BROMIDE AND ALBUTEROL SULFATE 2.5; .5 MG/3ML; MG/3ML
3 SOLUTION RESPIRATORY (INHALATION) ONCE
Status: DISCONTINUED | OUTPATIENT
Start: 2023-11-01 | End: 2023-11-01

## 2023-11-01 RX ORDER — ACETAMINOPHEN 650 MG/1
650 SUPPOSITORY RECTAL EVERY 4 HOURS PRN
Status: DISCONTINUED | OUTPATIENT
Start: 2023-11-01 | End: 2023-11-01 | Stop reason: HOSPADM

## 2023-11-01 RX ORDER — ENOXAPARIN SODIUM 100 MG/ML
40 INJECTION SUBCUTANEOUS EVERY 24 HOURS
Status: DISCONTINUED | OUTPATIENT
Start: 2023-11-01 | End: 2023-11-01 | Stop reason: HOSPADM

## 2023-11-01 RX ORDER — INSULIN LISPRO 100 [IU]/ML
0-10 INJECTION, SOLUTION INTRAVENOUS; SUBCUTANEOUS
Status: DISCONTINUED | OUTPATIENT
Start: 2023-11-01 | End: 2023-11-01 | Stop reason: HOSPADM

## 2023-11-01 RX ORDER — DEXTROSE MONOHYDRATE 100 MG/ML
0.3 INJECTION, SOLUTION INTRAVENOUS ONCE AS NEEDED
Status: DISCONTINUED | OUTPATIENT
Start: 2023-11-01 | End: 2023-11-01 | Stop reason: HOSPADM

## 2023-11-01 RX ORDER — LISINOPRIL 5 MG/1
5 TABLET ORAL DAILY
Status: DISCONTINUED | OUTPATIENT
Start: 2023-11-01 | End: 2023-11-01 | Stop reason: HOSPADM

## 2023-11-01 RX ORDER — FUROSEMIDE 10 MG/ML
40 INJECTION INTRAMUSCULAR; INTRAVENOUS ONCE
Status: COMPLETED | OUTPATIENT
Start: 2023-11-01 | End: 2023-11-01

## 2023-11-01 RX ORDER — METHIMAZOLE 5 MG/1
20 TABLET ORAL EVERY 8 HOURS SCHEDULED
Status: DISCONTINUED | OUTPATIENT
Start: 2023-11-01 | End: 2023-11-01 | Stop reason: HOSPADM

## 2023-11-01 RX ORDER — CARVEDILOL 6.25 MG/1
6.25 TABLET ORAL
Status: DISCONTINUED | OUTPATIENT
Start: 2023-11-01 | End: 2023-11-01 | Stop reason: HOSPADM

## 2023-11-01 RX ORDER — AZITHROMYCIN 250 MG/1
500 TABLET, FILM COATED ORAL
Status: DISCONTINUED | OUTPATIENT
Start: 2023-11-01 | End: 2023-11-01 | Stop reason: HOSPADM

## 2023-11-01 RX ORDER — ASPIRIN 81 MG/1
81 TABLET ORAL DAILY
Status: DISCONTINUED | OUTPATIENT
Start: 2023-11-01 | End: 2023-11-01 | Stop reason: HOSPADM

## 2023-11-01 RX ORDER — DEXTROSE 50 % IN WATER (D50W) INTRAVENOUS SYRINGE
25
Status: DISCONTINUED | OUTPATIENT
Start: 2023-11-01 | End: 2023-11-01 | Stop reason: HOSPADM

## 2023-11-01 RX ADMIN — MAGNESIUM SULFATE HEPTAHYDRATE 2 G: 40 INJECTION, SOLUTION INTRAVENOUS at 05:46

## 2023-11-01 RX ADMIN — FUROSEMIDE 40 MG: 10 INJECTION, SOLUTION INTRAMUSCULAR; INTRAVENOUS at 05:46

## 2023-11-01 RX ADMIN — AZITHROMYCIN 500 MG: 250 TABLET, FILM COATED ORAL at 09:50

## 2023-11-01 RX ADMIN — INSULIN LISPRO 6 UNITS: 100 INJECTION, SOLUTION INTRAVENOUS; SUBCUTANEOUS at 11:14

## 2023-11-01 SDOH — SOCIAL STABILITY: SOCIAL INSECURITY: ARE YOU OR HAVE YOU BEEN THREATENED OR ABUSED PHYSICALLY, EMOTIONALLY, OR SEXUALLY BY ANYONE?: NO

## 2023-11-01 SDOH — SOCIAL STABILITY: SOCIAL INSECURITY: HAS ANYONE EVER THREATENED TO HURT YOUR FAMILY OR YOUR PETS?: NO

## 2023-11-01 SDOH — SOCIAL STABILITY: SOCIAL INSECURITY: ARE THERE ANY APPARENT SIGNS OF INJURIES/BEHAVIORS THAT COULD BE RELATED TO ABUSE/NEGLECT?: NO

## 2023-11-01 SDOH — SOCIAL STABILITY: SOCIAL INSECURITY: HAVE YOU HAD THOUGHTS OF HARMING ANYONE ELSE?: NO

## 2023-11-01 SDOH — SOCIAL STABILITY: SOCIAL INSECURITY: DO YOU FEEL ANYONE HAS EXPLOITED OR TAKEN ADVANTAGE OF YOU FINANCIALLY OR OF YOUR PERSONAL PROPERTY?: NO

## 2023-11-01 SDOH — SOCIAL STABILITY: SOCIAL INSECURITY: WERE YOU ABLE TO COMPLETE ALL THE BEHAVIORAL HEALTH SCREENINGS?: YES

## 2023-11-01 SDOH — SOCIAL STABILITY: SOCIAL INSECURITY: DOES ANYONE TRY TO KEEP YOU FROM HAVING/CONTACTING OTHER FRIENDS OR DOING THINGS OUTSIDE YOUR HOME?: NO

## 2023-11-01 SDOH — SOCIAL STABILITY: SOCIAL INSECURITY: ABUSE: ADULT

## 2023-11-01 SDOH — SOCIAL STABILITY: SOCIAL INSECURITY: DO YOU FEEL UNSAFE GOING BACK TO THE PLACE WHERE YOU ARE LIVING?: NO

## 2023-11-01 ASSESSMENT — PATIENT HEALTH QUESTIONNAIRE - PHQ9
SUM OF ALL RESPONSES TO PHQ9 QUESTIONS 1 & 2: 0
1. LITTLE INTEREST OR PLEASURE IN DOING THINGS: NOT AT ALL
2. FEELING DOWN, DEPRESSED OR HOPELESS: NOT AT ALL

## 2023-11-01 ASSESSMENT — COLUMBIA-SUICIDE SEVERITY RATING SCALE - C-SSRS
2. HAVE YOU ACTUALLY HAD ANY THOUGHTS OF KILLING YOURSELF?: NO
1. IN THE PAST MONTH, HAVE YOU WISHED YOU WERE DEAD OR WISHED YOU COULD GO TO SLEEP AND NOT WAKE UP?: NO
6. HAVE YOU EVER DONE ANYTHING, STARTED TO DO ANYTHING, OR PREPARED TO DO ANYTHING TO END YOUR LIFE?: NO
6. HAVE YOU EVER DONE ANYTHING, STARTED TO DO ANYTHING, OR PREPARED TO DO ANYTHING TO END YOUR LIFE?: NO
1. IN THE PAST MONTH, HAVE YOU WISHED YOU WERE DEAD OR WISHED YOU COULD GO TO SLEEP AND NOT WAKE UP?: NO
2. HAVE YOU ACTUALLY HAD ANY THOUGHTS OF KILLING YOURSELF?: NO

## 2023-11-01 ASSESSMENT — COGNITIVE AND FUNCTIONAL STATUS - GENERAL
MOBILITY SCORE: 20
PATIENT BASELINE BEDBOUND: NO
CLIMB 3 TO 5 STEPS WITH RAILING: A LOT
DAILY ACTIVITIY SCORE: 24
WALKING IN HOSPITAL ROOM: A LOT

## 2023-11-01 ASSESSMENT — ACTIVITIES OF DAILY LIVING (ADL)
ASSISTIVE_DEVICE: PROSTHESIS;WHEELCHAIR
LACK_OF_TRANSPORTATION: NO
HEARING - LEFT EAR: FUNCTIONAL
ADEQUATE_TO_COMPLETE_ADL: YES
WALKS IN HOME: NEEDS ASSISTANCE
HEARING - RIGHT EAR: FUNCTIONAL
TOILETING: INDEPENDENT
GROOMING: INDEPENDENT
DRESSING YOURSELF: INDEPENDENT
FEEDING YOURSELF: INDEPENDENT
LACK_OF_TRANSPORTATION: NO
BATHING: INDEPENDENT
JUDGMENT_ADEQUATE_SAFELY_COMPLETE_DAILY_ACTIVITIES: YES
PATIENT'S MEMORY ADEQUATE TO SAFELY COMPLETE DAILY ACTIVITIES?: YES

## 2023-11-01 ASSESSMENT — LIFESTYLE VARIABLES
EVER FELT BAD OR GUILTY ABOUT YOUR DRINKING: NO
HOW OFTEN DO YOU HAVE 6 OR MORE DRINKS ON ONE OCCASION: NEVER
PRESCIPTION_ABUSE_PAST_12_MONTHS: NO
AUDIT-C TOTAL SCORE: 0
HAVE PEOPLE ANNOYED YOU BY CRITICIZING YOUR DRINKING: NO
SKIP TO QUESTIONS 9-10: 1
HAVE YOU EVER FELT YOU SHOULD CUT DOWN ON YOUR DRINKING: NO
HOW OFTEN DO YOU HAVE A DRINK CONTAINING ALCOHOL: NEVER
REASON UNABLE TO ASSESS: NO
SUBSTANCE_ABUSE_PAST_12_MONTHS: NO
EVER HAD A DRINK FIRST THING IN THE MORNING TO STEADY YOUR NERVES TO GET RID OF A HANGOVER: NO
AUDIT-C TOTAL SCORE: 0
HOW MANY STANDARD DRINKS CONTAINING ALCOHOL DO YOU HAVE ON A TYPICAL DAY: PATIENT DOES NOT DRINK

## 2023-11-01 ASSESSMENT — PAIN SCALES - GENERAL: PAINLEVEL_OUTOF10: 0 - NO PAIN

## 2023-11-01 ASSESSMENT — PAIN - FUNCTIONAL ASSESSMENT: PAIN_FUNCTIONAL_ASSESSMENT: 0-10

## 2023-11-01 NOTE — TREATMENT PLAN
Patient seen and evaluated at bedside.  Feels like breathing is improving and nearing baseline.  Denying any chest pain, lightheadedness or dizziness.  Troponins trended and continue to uptrend, although not as high as noted on prior admission.  Will consult cardiology for evaluation.  We will attempt to maximize medications for GDMT rest of care as per H&P.  Full note to follow on 11/2/2023.  Anticipate discharge in the next 24 to 48 hours pending further clinical improvement/cardiology clearance.

## 2023-11-01 NOTE — CARE PLAN
The patient's goals for the shift include      The clinical goals for the shift include      Over the shift, the patient made progress toward the following goals.    Problem: Respiratory  Goal: Minimal/no exertional discomfort or dyspnea this shift  Outcome: Progressing

## 2023-11-01 NOTE — H&P
Medical Group History and Physical    ASSESSMENT/PLAN:     Shortness of breath  COPD  Elevated troponin  -probably multifactorial; his BNP is elevated but he is not clearly in acute CHF clinically; he does not sound as though he is in COPD exacerbation  -CT chest showing emphysematous changes no definite pneumothorax; prominent mediastinal lymph nodes; enlarged right thyroid lobe with numerous hyperdense nodules  -got 40IV lasix in ED; will resume oral torsemide today  -PRN nebs; hold off on steroids particularly given recent difficulty managing sugars  -consider cards eval again for ischemic eval; trop is lower than at last admit but perhaps his dyspnea is anginal equivalent  -cont asa/statin  -repeat troponin    Ischemic cardiomyopathy  Chronic systolic CHF  -EF ~40% per last echo  -resume home coreg, other meds once verified  -holding acei/arb for now    Hyperthyroidism  Thyroid nodules  -cont methimazole  -outpatient endo follow up    DM2 with hyperglycemia  -cont correctional insulin and follow sugars for now; was up and down last admission and saw endocrine at that time  -beta hydroxy butyrate is elevated but labs otherwise not c/w DKA; suspect poor intake playing a role    Tonsillitis  -resume azithromycin (did not get as outpt)  -still has some tonsillar discharge    PIPPA  -baseline probably around 1, 1.31 today  -got lasix, resume torsemide  -follow BMP        VTE Prophylaxis: Lovenox      HISTORY OF PRESENT ILLNESS:   Chief Complaint: short of breath    History Of Present Illness:    Hitesh Jurado is a 53 y.o. male with a significant past medical history of CAD, ischemic cardiomyopathy with ejection fraction 40 to 45%,, type 2 diabetes, CHF, PAD, hyperlipidemia, anxiety, who is presenting to the emergency department with shortness of breath among other complaints.  He was just admitted to the hospital and discharged a couple of days ago for similar presentation.  At time he had elevated cardiac  "enzymes and cardiology was consulted but did not recommend any further ischemic intervention at that time.  He had been feeling better but once getting home he says that he is sinew to have cough and shortness of breath.  Particularly he had bad shortness of breath when he lie down earlier tonight that was associated with extreme anxiety.  He thinks he may be he had an anxiety attack but he was not sure so he came to the hospital.  Cough is productive of minimal sputum.  He has been nauseous and had occasional vomiting.  He does report constipation.  Since he had his teeth removed he says he has trouble getting food down.  Otherwise he denies any overt chest pain and.  No fevers.       Review of systems: 10 point review of systems is otherwise negative except as mentioned above.    PAST HISTORIES:       Past Medical History:  He has a past medical history of CHF (congestive heart failure) (CMS/[facility]) and Diabetes mellitus (CMS/[facility]).    Past Surgical History:  He has no past surgical history on file.      Social History:  He reports that he has been smoking cigarettes. He has been smoking an average of .5 packs per day. He has never used smokeless tobacco. He reports that he does not drink alcohol and does not use drugs.    Family History:  No family history on file.     Allergies:  Amoxicillin      OBJECTIVE:       Last Recorded Vitals:  Vitals:    11/01/23 0300 11/01/23 0504   BP: 115/78 125/82   BP Location: Right arm Left arm   Patient Position: Lying Sitting   Pulse: 104 106   Resp: 18 18   Temp: 36.7 °C (98 °F)    TempSrc: Oral    SpO2: 100% 100%   Weight: 51.7 kg (114 lb)    Height: 1.753 m (5' 9\")        Last I/O:  No intake/output data recorded.    Physical Exam  Vitals reviewed.   Constitutional:       Appearance: Normal appearance.   HENT:      Head: Normocephalic and atraumatic.      Nose: Nose normal.      Mouth/Throat:      Mouth: Mucous membranes are moist.      Pharynx: Oropharynx is clear.   Eyes:      " Extraocular Movements: Extraocular movements intact.      Conjunctiva/sclera: Conjunctivae normal.      Pupils: Pupils are equal, round, and reactive to light.   Cardiovascular:      Rate and Rhythm: Regular rhythm.      Heart sounds: No murmur heard.     No gallop.   Pulmonary:      Effort: Pulmonary effort is normal. No respiratory distress.      Breath sounds: Normal breath sounds. No wheezing, rhonchi or rales.   Abdominal:      General: Abdomen is flat. Bowel sounds are normal. There is no distension.      Palpations: Abdomen is soft.      Tenderness: There is no abdominal tenderness. There is no guarding or rebound.   Musculoskeletal:         General: Normal range of motion.      Comments: L BKA   Skin:     General: Skin is warm and dry.   Neurological:      General: No focal deficit present.      Mental Status: He is alert and oriented to person, place, and time.      Cranial Nerves: No cranial nerve deficit.      Sensory: No sensory deficit.      Motor: No weakness.   Psychiatric:         Mood and Affect: Mood normal.         Behavior: Behavior normal.           Inpatient Medications:  ipratropium-albuteroL, 3 mL, nebulization, Once  ondansetron, 4 mg, intravenous, Once          Outpatient Medications:  Prior to Admission medications    Medication Sig Start Date End Date Taking? Authorizing Provider   acetaminophen (Tylenol) 500 mg tablet Take 1 tablet (500 mg) by mouth every 8 hours if needed.    Historical Provider, MD   aspirin 81 mg EC tablet Take 1 tablet (81 mg) by mouth once daily.    Historical Provider, MD   atorvastatin (Lipitor) 80 mg tablet Take 1 tablet (80 mg) by mouth once daily.    Historical Provider, MD   azithromycin (Zithromax) 500 mg tablet Take 1 tablet (500 mg) by mouth once every 24 hours. Do not start before October 30, 2023. 10/30/23   Nhan Montero MD   carvedilol (Coreg) 12.5 mg tablet Take 1 tablet (12.5 mg) by mouth twice a day.    Historical Provider, MD   empagliflozin  (Jardiance) 10 mg Take 1 tablet (10 mg) by mouth once daily.    Historical Provider, MD   gabapentin (Neurontin) 400 mg capsule Take 1 capsule (400 mg) by mouth 3 times a day.    Historical Provider, MD   linaGLIPtin (Tradjenta) 5 mg tablet Take 1 tablet (5 mg) by mouth once daily.    Historical Provider, MD   lisinopril 5 mg tablet Take 1 tablet (5 mg) by mouth once daily.    Historical Provider, MD   melatonin 5 mg tablet Take 1 tablet (5 mg) by mouth once daily at bedtime.    Historical Provider, MD   metFORMIN (Glucophage) 1,000 mg tablet Take 0.5 tablets (500 mg) by mouth twice a day.    Historical Provider, MD   methIMAzole (Tapazole) 10 mg tablet Take 2 tablets (20 mg) by mouth every 8 hours. 10/29/23   Nhan Montero MD   omeprazole (PriLOSEC) 40 mg DR capsule Take 1 capsule (40 mg) by mouth twice a day.    Historical Provider, MD   torsemide (Demadex) 20 mg tablet Take 1 tablet (20 mg) by mouth once daily as needed.    Historical Provider, MD   linaGLIPtin (Tradjenta) 5 mg tablet Take 1 tablet (5 mg) by mouth once daily.  10/30/23  Historical Provider, MD   lisinopril 40 mg tablet Take 1 tablet (40 mg) by mouth once daily.  10/30/23  Historical Provider, MD   metFORMIN (Glucophage) 1,000 mg tablet Take 1 tablet (1,000 mg) by mouth once daily.  10/30/23  Historical Provider, MD   spironolactone (Aldactone) 25 mg tablet Take 1 tablet (25 mg) by mouth once daily.  10/30/23  Historical Provider, MD       LABS AND IMAGING:     Last Labs:  CBC - 11/1/2023:  3:41 AM  12.3 9.2 394    28.4      CMP - 11/1/2023:  4:40 AM  8.7 6.2 91 --- 1.7   2.9 3.4 76 225      PTT - No results in last year.  1.2   13.5 _     Troponin I, High Sensitivity   Date/Time Value Ref Range Status   11/01/2023 04:40  (HH) 0 - 20 ng/L Final   10/26/2023 05:28 PM 1,590 (HH) 0 - 20 ng/L Final     Comment:     Previous result verified on 10/26/2023 0405 on specimen/case [id number] called with component [facility] for procedure Troponin  I, High Sensitivity with value 1,590 ng/L.   10/26/2023 05:17 AM 1,417 (HH) 0 - 20 ng/L Final     Comment:     Previous result verified on 10/26/2023 0405 on specimen/case [id number] called with component [facility] for procedure Troponin I, High Sensitivity with value 1,590 ng/L.     BNP   Date/Time Value Ref Range Status   11/01/2023 04:40 AM 3,463 (H) 0 - 99 pg/mL Final   10/26/2023 03:09 AM 2,850 (H) 0 - 99 pg/mL Final     Hemoglobin A1C   Date/Time Value Ref Range Status   10/28/2023 10:09 AM 8.7 (H) see below % Final   12/12/2022 04:30 AM 7.9 (H) 4.3 - 5.6 % Final     Comment:     American Diabetes Association guidelines indicate that patients with HgbA1c in the range 5.7-6.4% are at increased risk for development of diabetes, and intervention by lifestyle modification may be beneficial. HgbA1c greater or equal to 6.5% is considered diagnostic of diabetes.   06/24/2022 02:37 AM 7.6 (A) % Final     Comment:          Diagnosis of Diabetes-Adults   Non-Diabetic: < or = 5.6%   Increased risk for developing diabetes: 5.7-6.4%   Diagnostic of diabetes: > or = 6.5%  .       Monitoring of Diabetes                Age (y)     Therapeutic Goal (%)   Adults:          >18           <7.0   Pediatrics:    13-18           <7.5                   7-12           <8.0                   0- 6            7.5-8.5   American Diabetes Association. Diabetes Care 33(S1), Jan 2010.     01/25/2022 09:58 AM 7.2 (H) 4.0 - 5.6 % Final   10/27/2021 02:26 AM 9.3 (H) 4.0 - 5.6 % Final     VLDL   Date/Time Value Ref Range Status   06/24/2022 02:37 AM 19 0 - 40 mg/dL Final

## 2023-11-01 NOTE — DISCHARGE SUMMARY
Discharge Diagnosis  COPD (chronic obstructive pulmonary disease) (CMS/[facility])    Issues Requiring Follow-Up  Troponin elevations    Discharge Meds     Your medication list        CONTINUE taking these medications        Instructions Last Dose Given Next Dose Due   acetaminophen 500 mg tablet  Commonly known as: Tylenol           aspirin 81 mg EC tablet           atorvastatin 80 mg tablet  Commonly known as: Lipitor           azithromycin 500 mg tablet  Commonly known as: Zithromax      Take 1 tablet (500 mg) by mouth once every 24 hours. Do not start before October 30, 2023.       carvedilol 12.5 mg tablet  Commonly known as: Coreg           gabapentin 400 mg capsule  Commonly known as: Neurontin           Jardiance 10 mg  Generic drug: empagliflozin           lisinopril 5 mg tablet           melatonin 5 mg tablet           metFORMIN 1,000 mg tablet  Commonly known as: Glucophage           methIMAzole 10 mg tablet  Commonly known as: Tapazole      Take 2 tablets (20 mg) by mouth every 8 hours.       omeprazole 40 mg DR capsule  Commonly known as: PriLOSEC           torsemide 20 mg tablet  Commonly known as: Demadex           Tradjenta 5 mg tablet  Generic drug: linaGLIPtin                    Test Results Pending At Discharge  Pending Labs       No current pending labs.            Hospital Course   Patient left hospital AMA. Discussed necessity of continued monitoring of renal function and up trending troponins and explained risks not limited to worsening cardiac function, renal failure, and death. Patient acknowledged risks and preferred to leave hospital at this time. Patient appeared to have decision making capacity. Hospital course otherwise as per H&P.     Pertinent Physical Exam At Time of Discharge  Physical Exam    Outpatient Follow-Up  Future Appointments   Date Time Provider Department Center   12/14/2023 11:00 AM Chavo Ashby MD [id number]         Mukesh Benoit MD

## 2023-11-01 NOTE — ED PROVIDER NOTES
HPI   Chief Complaint   Patient presents with   • Hyperglycemia     Glucose 425 per EMS       This is a 53-year-old male presents from home by EMS with report of generalized weakness and elevated blood glucose.  Patient states she was just discharged on October 30 and reports that he has been able to take his medications because he cannot get to the pharmacy to pick them up.  He reports that he was discontinued off of his Lantus by endocrinology and was placed on metformin.  The patient states that who has no prescriptions at home to take.  He complains of intermittent chest pain with shortness of breath for the past 24 hours.  He does have a history of COPD but states he quit smoking yesterday.  He does admit to occasional smoking marijuana.  I did review the patient's EMR discharge note which shows that endocrinology was consulted and they recommended discontinue Lantus and stay on the oral hypoglycemic medications, he was seen by cardiology and was placed on Coreg 12.5 mg twice a day and lisinopril 5 mg daily.  He was started on Zithromax for his sore throat.  His diagnosis upon discharge was abnormal cardiac enzymes, COPD exacerbation, hyponatremia, hypotension, uncontrolled type 2 diabetes, hypothyroidism and tonsillitis. I did review the patient's cardiac work-up from his most recent admission and on October 26 he underwent a transthoracic echo results read left ventricular systolic function is severely decreased with a 15 to 20% estimate ejection fraction, the entire lateral wall mid and apical inferior wall apical septal segment and apex are abnormal, spectral Doppler flow shows a pseudonormal pattern of left ventricular diastolic filling, moderate to severe mitral valve regurgitation, moderate to severe tricuspid regurgitation no evidence of mitral valve stenosis, aortic valve sclerosis, pulmonary hypertension is present, severely elevated pulmonary artery pressure with multiple wall motion  abnormalities.      History provided by:  Patient, medical records and EMS personnel   used: No                        No data recorded                Patient History   Past Medical History:   Diagnosis Date   • CHF (congestive heart failure) (CMS/[facility])    • Diabetes mellitus (CMS/[facility])      History reviewed. No pertinent surgical history.  No family history on file.  Social History     Tobacco Use   • Smoking status: Every Day     Packs/day: .5     Types: Cigarettes   • Smokeless tobacco: Never   Substance Use Topics   • Alcohol use: Never   • Drug use: Never       Physical Exam   ED Triage Vitals [11/01/23 0300]   Temp Heart Rate Resp BP   -- 104 18 115/78      SpO2 Temp Source Heart Rate Source Patient Position   100 % Oral Monitor Lying      BP Location FiO2 (%)     Right arm --       Physical Exam  Vitals and nursing note reviewed.   Constitutional:       General: He is awake.      Appearance: Normal appearance. He is well-developed, well-groomed and underweight. He is not ill-appearing, toxic-appearing or diaphoretic.   HENT:      Head: Normocephalic and atraumatic.      Right Ear: Tympanic membrane, ear canal and external ear normal.      Left Ear: Tympanic membrane, ear canal and external ear normal.      Nose: Nose normal.      Mouth/Throat:      Mouth: Mucous membranes are moist.      Pharynx: Oropharyngeal exudate present.      Comments: There is no drooling stridor or trismus uvula midline  Eyes:      General: No scleral icterus.     Conjunctiva/sclera: Conjunctivae normal.      Pupils: Pupils are equal, round, and reactive to light.   Cardiovascular:      Rate and Rhythm: Normal rate and regular rhythm.   Pulmonary:      Effort: Pulmonary effort is normal. No respiratory distress.      Breath sounds: Normal breath sounds. No wheezing, rhonchi or rales.   Abdominal:      General: Abdomen is flat. Bowel sounds are normal.      Palpations: Abdomen is soft. There is no mass.       Tenderness: There is no abdominal tenderness.      Hernia: No hernia is present.   Musculoskeletal:         General: Normal range of motion.      Cervical back: Normal range of motion and neck supple.      Comments: Left-sided below the knee amputation   Lymphadenopathy:      Cervical: No cervical adenopathy.   Skin:     General: Skin is warm and dry.      Capillary Refill: Capillary refill takes less than 2 seconds.   Neurological:      General: No focal deficit present.      Mental Status: He is alert and oriented to person, place, and time. Mental status is at baseline.   Psychiatric:         Mood and Affect: Mood normal.         Behavior: Behavior normal. Behavior is cooperative.         Thought Content: Thought content normal.         Judgment: Judgment normal.         ED Course & MDM   ED Course as of 11/01/23 0545   Wed Nov 01, 2023   0544 Patient's chest x-ray interpretation shows a new small left apical pneumothorax less than 10% of volume.  Lungs are hyperinflated and hyperlucent as before compatible with emphysema/COPD with diffuse interstitial curly B-lines which may relate to chronic changes versus CHF.  Stat noncontrast CT scan of the chest was ordered, results reviewed and emphysematous changes scattered throughout the lungs with no definite pneumothorax appreciated.  Atelectasis versus scarring in the left lower lobe.  Nonspecific prominent mediastinal lymph nodes largest measuring up to 1.1 cm.  Enlarged right thyroid lobe with numerous hypodense nodules largest measuring up to 2.3 cm.  Findings similar to previous CT scan.  CBC white count 12.3 hemoglobin 9.2 hematocrit 28.4 platelet count was 394.  Beta-hydroxybutyrate 2.52, sodium was low 128, glucose 319, chloride 98 BUN 33, creatinine 1.35 with a GFR of 63, troponin was 546 which is improved from previous troponin from 6 days ago when it was 1590, BNP 3463 which is worse from 6 days ago when it was 2850, lactic is 2.1.  I have ordered a 250 cc  bolus of normal saline, albuterol aerosol treatments.  Initial impression was COPD exacerbation, elevated BNP, hyperglycemia, hyponatremia, generalized weakness [RS]      ED Course User Index  [RS] Perez Carter PA-C         Diagnoses as of 11/01/23 0545   Hyperglycemia due to diabetes mellitus (CMS/HCC)   Congestive heart failure, unspecified HF chronicity, unspecified heart failure type (CMS/HCC)   Shortness of breath at rest   Hyponatremia       Medical Decision Making  Heart rate 104 respirations 18 /78 pulse ox is 100% on room air  I have ordered IV access  Zofran 4 mg IV push, I have ordered lab work including a blood gas venous full panel and a portable chest x-ray.    EKG was interpreted by me at 0321 hrs. sinus tachycardia with frequent PVCs, rate 102 NM was 146 QRS is 138 QT was 402 QTc was 523 when compared to previous EKGs is unchanged, it does show right bundle branch block which is consistent with previous EKGs.    Patient's chest x-ray interpretation shows a new small left apical pneumothorax less than 10% of volume.  Lungs are hyperinflated and hyperlucent as before compatible with emphysema/COPD with diffuse interstitial curly B-lines which may relate to chronic changes versus CHF.  Stat noncontrast CT scan of the chest was ordered, results reviewed and emphysematous changes scattered throughout the lungs with no definite pneumothorax appreciated.  Atelectasis versus scarring in the left lower lobe.  Nonspecific prominent mediastinal lymph nodes largest measuring up to 1.1 cm.  Enlarged right thyroid lobe with numerous hypodense nodules largest measuring up to 2.3 cm.  Findings similar to previous CT scan.  CBC white count 12.3 hemoglobin 9.2 hematocrit 28.4 platelet count was 394.  Beta-hydroxybutyrate 2.52, sodium was low 128, glucose 319, chloride 98 BUN 33, creatinine 1.35 with a GFR of 63, troponin was 546 which is improved from previous troponin from 6 days ago when it was 1590, BNP  3463 which is worse from 6 days ago when it was 2850, lactic is 2.1.  I have ordered a 250 cc bolus of normal saline, albuterol aerosol treatments.  Initial impression was COPD exacerbation, elevated BNP, hyperglycemia, hyponatremia, generalized weakness        Procedure  Procedures     Perez Carter PA-C  11/01/23 0568

## 2023-11-01 NOTE — PROGRESS NOTES
11/01/23 1540   Discharge Planning   Living Arrangements Alone   Assistance Needed indpendent in adls and iadls, and transfer to/ from wheelcahir   Type of Residence Private residence  (apt. 1 level 1st floor, no maribel)   Number of Stairs to Enter Residence 0   Number of Stairs Within Residence 0   Who is requesting discharge planning? Provider   Home or Post Acute Services None   Patient expects to be discharged to: home   Does the patient need discharge transport arranged? Yes   RoundTrip coordination needed? Yes  (uber)   Has discharge transport been arranged? No   Financial Resource Strain   How hard is it for you to pay for the very basics like food, housing, medical care, and heating? Not hard   Housing Stability   In the last 12 months, was there a time when you were not able to pay the mortgage or rent on time? N   In the last 12 months, was there a time when you did not have a steady place to sleep or slept in a shelter (including now)? N   Transportation Needs   In the past 12 months, has lack of transportation kept you from medical appointments or from getting medications? no  (has insurance coverage for ride to all medica appts.)   In the past 12 months, has lack of transportation kept you from meetings, work, or from getting things needed for daily living? No   Patient Choice   Provider Choice list and CMS website (https://medicare.gov/care-compare#search) for post-acute Quality and Resource Measure Data were provided and reviewed with: Other (Comment)  (N/A)     Will go home needs.  Petroleum and other additives in medications. Avoids medications. Pt reports he has an important dental appt at 11:00am tomorrow he cannot miss. Wants to go home today. Informed him we could uber him to appt tomorrow. Pt needs seen by cardiology. He reports he will come back after his appt. But will go home today. Dr uche ramirez.

## 2023-11-02 NOTE — NURSING NOTE
Follow up phone call made by [facility] Clinical Nurse Navigator. Patient was discharged 10/30/23 and then readmitted on 11/1/23 and chose to leave [place]. Spoke with patient today who says he is doing fine and has no intentions of coming back to [facility]. 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

## 2023-11-03 ENCOUNTER — APPOINTMENT (OUTPATIENT)
Dept: CARDIOLOGY | Facility: CLINIC | Age: 53
End: 2023-11-03
Payer: MEDICAID

## 2023-11-05 LAB
ATRIAL RATE: 101 BPM
ATRIAL RATE: 107 BPM
P AXIS: 74 DEGREES
P AXIS: 81 DEGREES
P OFFSET: 182 MS
P OFFSET: 189 MS
P ONSET: 130 MS
P ONSET: 138 MS
PR INTERVAL: 146 MS
PR INTERVAL: 154 MS
Q ONSET: 207 MS
Q ONSET: 211 MS
QRS COUNT: 17 BEATS
QRS COUNT: 18 BEATS
QRS DURATION: 130 MS
QRS DURATION: 150 MS
QT INTERVAL: 396 MS
QT INTERVAL: 400 MS
QTC CALCULATION(BAZETT): 518 MS
QTC CALCULATION(BAZETT): 528 MS
QTC FREDERICIA: 476 MS
QTC FREDERICIA: 480 MS
R AXIS: 47 DEGREES
R AXIS: 9 DEGREES
T AXIS: -23 DEGREES
T AXIS: -35 DEGREES
T OFFSET: 407 MS
T OFFSET: 409 MS
VENTRICULAR RATE: 101 BPM
VENTRICULAR RATE: 107 BPM

## 2023-11-12 ENCOUNTER — HOSPITAL ENCOUNTER (OUTPATIENT)
Dept: CARDIOLOGY | Facility: HOSPITAL | Age: 53
Discharge: HOME | End: 2023-11-12
Payer: MEDICAID

## 2023-11-12 PROCEDURE — 93005 ELECTROCARDIOGRAM TRACING: CPT

## 2023-11-12 PROCEDURE — 93005 ELECTROCARDIOGRAM TRACING: CPT | Mod: 59

## 2023-12-07 ENCOUNTER — OFFICE VISIT (OUTPATIENT)
Dept: ENDOCRINOLOGY | Age: 53
End: 2023-12-07
Payer: MEDICAID

## 2023-12-07 VITALS
OXYGEN SATURATION: 99 % | HEIGHT: 70 IN | HEART RATE: 92 BPM | DIASTOLIC BLOOD PRESSURE: 67 MMHG | WEIGHT: 160 LBS | SYSTOLIC BLOOD PRESSURE: 103 MMHG | BODY MASS INDEX: 22.9 KG/M2

## 2023-12-07 DIAGNOSIS — E05.90 HYPERTHYROIDISM: Primary | ICD-10-CM

## 2023-12-07 PROCEDURE — 99214 OFFICE O/P EST MOD 30 MIN: CPT | Performed by: PHYSICIAN ASSISTANT

## 2023-12-07 RX ORDER — LANCETS 30 GAUGE
1 EACH MISCELLANEOUS 3 TIMES DAILY
COMMUNITY
Start: 2023-03-13

## 2023-12-07 RX ORDER — METHIMAZOLE 10 MG/1
20 TABLET ORAL DAILY
COMMUNITY
Start: 2023-10-29

## 2023-12-07 RX ORDER — TORSEMIDE 20 MG/1
20 TABLET ORAL DAILY PRN
COMMUNITY
Start: 2023-03-13

## 2023-12-07 RX ORDER — IBUPROFEN 800 MG/1
800 TABLET ORAL EVERY 6 HOURS PRN
COMMUNITY
Start: 2023-09-18

## 2023-12-07 RX ORDER — GLUCOSAM/CHON-MSM1/C/MANG/BOSW 500-416.6
TABLET ORAL
COMMUNITY
Start: 2023-12-05

## 2023-12-07 RX ORDER — CLOPIDOGREL BISULFATE 75 MG/1
75 TABLET ORAL DAILY
COMMUNITY
Start: 2023-12-05

## 2023-12-07 RX ORDER — ISOPROPYL ALCOHOL 0.75 G/1
SWAB TOPICAL
COMMUNITY
Start: 2023-12-05

## 2023-12-07 RX ORDER — METOPROLOL SUCCINATE 25 MG/1
50 TABLET, EXTENDED RELEASE ORAL DAILY
COMMUNITY
Start: 2023-11-14

## 2023-12-07 RX ORDER — INSULIN GLARGINE 100 [IU]/ML
INJECTION, SOLUTION SUBCUTANEOUS
COMMUNITY
Start: 2023-12-05

## 2023-12-07 RX ORDER — METHIMAZOLE 10 MG/1
10 TABLET ORAL 2 TIMES DAILY
Qty: 60 TABLET | Refills: 3 | Status: CANCELLED | OUTPATIENT
Start: 2023-12-07

## 2023-12-07 RX ORDER — ATORVASTATIN CALCIUM 80 MG/1
80 TABLET, FILM COATED ORAL DAILY
COMMUNITY
Start: 2022-04-19

## 2023-12-07 RX ORDER — PEN NEEDLE, DIABETIC 32GX 5/32"
NEEDLE, DISPOSABLE MISCELLANEOUS
COMMUNITY
Start: 2023-12-05

## 2023-12-07 RX ORDER — LISINOPRIL 5 MG/1
5 TABLET ORAL DAILY
COMMUNITY

## 2023-12-07 RX ORDER — CHOLECALCIFEROL (VITAMIN D3) 125 MCG
5 CAPSULE ORAL NIGHTLY
COMMUNITY
Start: 2021-02-17

## 2023-12-07 RX ORDER — ASPIRIN 81 MG/1
81 TABLET ORAL DAILY
COMMUNITY
Start: 2022-06-27

## 2023-12-07 ASSESSMENT — ENCOUNTER SYMPTOMS
NAUSEA: 0
SORE THROAT: 0
COUGH: 0
DIARRHEA: 0
RHINORRHEA: 0
SHORTNESS OF BREATH: 0
SINUS PRESSURE: 0
VOMITING: 0
WHEEZING: 0
ABDOMINAL PAIN: 0

## 2023-12-07 NOTE — PATIENT INSTRUCTIONS
Continue methimazole 20 mg by mouth twice daily   Thyroid US ordered  Repeat labs every 6 weeks  Follow-up in 3 months

## 2023-12-07 NOTE — PROGRESS NOTES
12/7/2023    Assessment:       Diagnosis Orders   1. Hyperthyroidism  TSH    T4, Free    Thyroid Peroxidase Antibody    US HEAD NECK SOFT TISSUE THYROID        PLAN:     Continue methimazole 20 mg by mouth twice daily   Thyroid US ordered  Repeat labs every 6 weeks  Follow-up in 3 months      Orders Placed This Encounter   Procedures    US HEAD NECK SOFT TISSUE THYROID     Standing Status:   Future     Standing Expiration Date:   12/7/2024     Order Specific Question:   Reason for exam:     Answer:   hyperthyroid with large goiter    TSH     Standing Status:   Standing     Number of Occurrences:   20     Standing Expiration Date:   12/7/2024    T4, Free     Standing Status:   Standing     Number of Occurrences:   20     Standing Expiration Date:   12/7/2024    Thyroid Peroxidase Antibody     Standing Status:   Future     Standing Expiration Date:   12/7/2024     No orders of the defined types were placed in this encounter. No follow-ups on file. Subjective:     Chief Complaint   Patient presents with    New Patient    Thyroid Problem     Vitals:    12/07/23 1128   BP: 103/67   Pulse: 92   SpO2: 99%   Weight: 72.6 kg (160 lb)   Height: 1.778 m (5' 10\")     Wt Readings from Last 3 Encounters:   12/07/23 72.6 kg (160 lb)     BP Readings from Last 3 Encounters:   12/07/23 103/67     Casi Alvarado is a 80-year-old male presents today for evaluations of his hyperthyroidism. He denies heart palpitations, unintentional weight loss. Or worsening of his anxiety. He was recently discharged from [facility] 11/2023 following admission for PVD with revascularization and chronic foot ulcer. During that admission thyroid labs were drawn and he was noted to be be hyperthyroid. He was started on methimazole. Repeat labs showed improved TSH and free T4. Thyroid Problem  Presents for initial visit. The condition has lasted for 3 months. Patient reports no cold intolerance, diarrhea, fatigue, heat intolerance or palpitations.  Past

## 2023-12-13 LAB — HOLD SPECIMEN: NORMAL

## 2023-12-14 ENCOUNTER — APPOINTMENT (OUTPATIENT)
Dept: ENDOCRINOLOGY | Facility: HOSPITAL | Age: 53
End: 2023-12-14
Payer: MEDICAID

## 2024-03-15 ENCOUNTER — HOSPITAL ENCOUNTER (INPATIENT)
Facility: HOSPITAL | Age: 54
LOS: 11 days | Discharge: NURSING, REHAB OR LONG-TERM CARE | End: 2024-03-26
Attending: STUDENT IN AN ORGANIZED HEALTH CARE EDUCATION/TRAINING PROGRAM | Admitting: STUDENT IN AN ORGANIZED HEALTH CARE EDUCATION/TRAINING PROGRAM
Payer: MEDICAID

## 2024-03-15 ENCOUNTER — APPOINTMENT (OUTPATIENT)
Dept: RADIOLOGY | Facility: HOSPITAL | Age: 54
End: 2024-03-15
Payer: MEDICAID

## 2024-03-15 ENCOUNTER — APPOINTMENT (OUTPATIENT)
Dept: CARDIOLOGY | Facility: HOSPITAL | Age: 54
End: 2024-03-15
Payer: MEDICAID

## 2024-03-15 DIAGNOSIS — I70.235 ATHEROSCLEROSIS OF NATIVE ARTERIES OF RIGHT LEG WITH ULCERATION OF OTHER PART OF FOOT (MULTI): ICD-10-CM

## 2024-03-15 DIAGNOSIS — E87.6 HYPOKALEMIA: ICD-10-CM

## 2024-03-15 DIAGNOSIS — E16.2 HYPOGLYCEMIA: ICD-10-CM

## 2024-03-15 DIAGNOSIS — J44.9 CHRONIC OBSTRUCTIVE PULMONARY DISEASE, UNSPECIFIED COPD TYPE (MULTI): ICD-10-CM
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
## 2024-03-15 DIAGNOSIS — I70.392 OTHER ATHEROSCLEROSIS OF UNSPECIFIED TYPE OF BYPASS GRAFT(S) OF THE EXTREMITIES, LEFT LEG (CMS-HCC): ICD-10-CM

## 2024-03-15 DIAGNOSIS — J44.1 COPD EXACERBATION (MULTI): ICD-10-CM

## 2024-03-15 DIAGNOSIS — N17.9 AKI (ACUTE KIDNEY INJURY) (CMS-HCC): ICD-10-CM

## 2024-03-15 DIAGNOSIS — I73.9 PAD (PERIPHERAL ARTERY DISEASE) (CMS-HCC): ICD-10-CM

## 2024-03-15 DIAGNOSIS — I50.9 ACUTE ON CHRONIC CONGESTIVE HEART FAILURE, UNSPECIFIED HEART FAILURE TYPE (MULTI): ICD-10-CM

## 2024-03-15 DIAGNOSIS — J90 PLEURAL EFFUSION ON RIGHT: ICD-10-CM

## 2024-03-15 DIAGNOSIS — I50.43 ACUTE ON CHRONIC COMBINED SYSTOLIC (CONGESTIVE) AND DIASTOLIC (CONGESTIVE) HEART FAILURE (MULTI): Primary | ICD-10-CM

## 2024-03-15 DIAGNOSIS — I70.229 CRITICAL LOWER LIMB ISCHEMIA (MULTI): ICD-10-CM

## 2024-03-15 DIAGNOSIS — I50.22 CHRONIC SYSTOLIC CONGESTIVE HEART FAILURE (MULTI): ICD-10-CM

## 2024-03-15 LAB
ALBUMIN SERPL BCP-MCNC: 3.2 G/DL (ref 3.4–5)
ALP SERPL-CCNC: 137 U/L (ref 33–120)
ALT SERPL W P-5'-P-CCNC: 38 U/L (ref 10–52)
AMMONIA PLAS-SCNC: 18 UMOL/L (ref 16–53)
ANION GAP SERPL CALC-SCNC: 14 MMOL/L (ref 10–20)
APTT PPP: 36 SECONDS (ref 27–38)
AST SERPL W P-5'-P-CCNC: 63 U/L (ref 9–39)
BASOPHILS # BLD AUTO: 0.03 X10*3/UL (ref 0–0.1)
BASOPHILS NFR BLD AUTO: 0.3 %
BILIRUB DIRECT SERPL-MCNC: 0.7 MG/DL (ref 0–0.3)
BILIRUB SERPL-MCNC: 1.5 MG/DL (ref 0–1.2)
BNP SERPL-MCNC: 4248 PG/ML (ref 0–99)
BUN SERPL-MCNC: 44 MG/DL (ref 6–23)
CALCIUM SERPL-MCNC: 8.6 MG/DL (ref 8.6–10.3)
CHLORIDE SERPL-SCNC: 96 MMOL/L (ref 98–107)
CO2 SERPL-SCNC: 31 MMOL/L (ref 21–32)
CREAT SERPL-MCNC: 1.54 MG/DL (ref 0.5–1.3)
EGFRCR SERPLBLD CKD-EPI 2021: 54 ML/MIN/1.73M*2
EOSINOPHIL # BLD AUTO: 0.02 X10*3/UL (ref 0–0.7)
EOSINOPHIL NFR BLD AUTO: 0.2 %
ERYTHROCYTE [DISTWIDTH] IN BLOOD BY AUTOMATED COUNT: 18.4 % (ref 11.5–14.5)
GLUCOSE BLD MANUAL STRIP-MCNC: 129 MG/DL (ref 74–99)
GLUCOSE BLD MANUAL STRIP-MCNC: 168 MG/DL (ref 74–99)
GLUCOSE BLD MANUAL STRIP-MCNC: 42 MG/DL (ref 74–99)
GLUCOSE SERPL-MCNC: 54 MG/DL (ref 74–99)
HCT VFR BLD AUTO: 30.4 % (ref 41–52)
HGB BLD-MCNC: 9.3 G/DL (ref 13.5–17.5)
IMM GRANULOCYTES # BLD AUTO: 0.06 X10*3/UL (ref 0–0.7)
IMM GRANULOCYTES NFR BLD AUTO: 0.5 % (ref 0–0.9)
INR PPP: 1.3 (ref 0.9–1.1)
LACTATE SERPL-SCNC: 1.3 MMOL/L (ref 0.4–2)
LYMPHOCYTES # BLD AUTO: 1.16 X10*3/UL (ref 1.2–4.8)
LYMPHOCYTES NFR BLD AUTO: 9.9 %
MAGNESIUM SERPL-MCNC: 1.91 MG/DL (ref 1.6–2.4)
MCH RBC QN AUTO: 20 PG (ref 26–34)
MCHC RBC AUTO-ENTMCNC: 30.6 G/DL (ref 32–36)
MCV RBC AUTO: 65 FL (ref 80–100)
MONOCYTES # BLD AUTO: 0.71 X10*3/UL (ref 0.1–1)
MONOCYTES NFR BLD AUTO: 6 %
NEUTROPHILS # BLD AUTO: 9.78 X10*3/UL (ref 1.2–7.7)
NEUTROPHILS NFR BLD AUTO: 83.1 %
NRBC BLD-RTO: 0 /100 WBCS (ref 0–0)
PLATELET # BLD AUTO: 429 X10*3/UL (ref 150–450)
POTASSIUM SERPL-SCNC: 2.4 MMOL/L (ref 3.5–5.3)
PROT SERPL-MCNC: 7.5 G/DL (ref 6.4–8.2)
PROTHROMBIN TIME: 15.1 SECONDS (ref 9.8–12.8)
RBC # BLD AUTO: 4.66 X10*6/UL (ref 4.5–5.9)
SODIUM SERPL-SCNC: 139 MMOL/L (ref 136–145)
WBC # BLD AUTO: 11.8 X10*3/UL (ref 4.4–11.3)

## 2024-03-15 PROCEDURE — 2500000004 HC RX 250 GENERAL PHARMACY W/ HCPCS (ALT 636 FOR OP/ED): Performed by: STUDENT IN AN ORGANIZED HEALTH CARE EDUCATION/TRAINING PROGRAM

## 2024-03-15 PROCEDURE — 85730 THROMBOPLASTIN TIME PARTIAL: CPT | Performed by: STUDENT IN AN ORGANIZED HEALTH CARE EDUCATION/TRAINING PROGRAM

## 2024-03-15 PROCEDURE — 99222 1ST HOSP IP/OBS MODERATE 55: CPT | Performed by: STUDENT IN AN ORGANIZED HEALTH CARE EDUCATION/TRAINING PROGRAM

## 2024-03-15 PROCEDURE — 99285 EMERGENCY DEPT VISIT HI MDM: CPT | Mod: 25

## 2024-03-15 PROCEDURE — 82248 BILIRUBIN DIRECT: CPT | Performed by: STUDENT IN AN ORGANIZED HEALTH CARE EDUCATION/TRAINING PROGRAM

## 2024-03-15 PROCEDURE — 2500000005 HC RX 250 GENERAL PHARMACY W/O HCPCS: Performed by: STUDENT IN AN ORGANIZED HEALTH CARE EDUCATION/TRAINING PROGRAM

## 2024-03-15 PROCEDURE — 2500000001 HC RX 250 WO HCPCS SELF ADMINISTERED DRUGS (ALT 637 FOR MEDICARE OP): Performed by: STUDENT IN AN ORGANIZED HEALTH CARE EDUCATION/TRAINING PROGRAM

## 2024-03-15 PROCEDURE — 96375 TX/PRO/DX INJ NEW DRUG ADDON: CPT

## 2024-03-15 PROCEDURE — 93005 ELECTROCARDIOGRAM TRACING: CPT

## 2024-03-15 PROCEDURE — 73590 X-RAY EXAM OF LOWER LEG: CPT | Mod: LT

## 2024-03-15 PROCEDURE — 96366 THER/PROPH/DIAG IV INF ADDON: CPT

## 2024-03-15 PROCEDURE — 36415 COLL VENOUS BLD VENIPUNCTURE: CPT | Performed by: STUDENT IN AN ORGANIZED HEALTH CARE EDUCATION/TRAINING PROGRAM

## 2024-03-15 PROCEDURE — 73590 X-RAY EXAM OF LOWER LEG: CPT | Mod: LEFT SIDE | Performed by: RADIOLOGY

## 2024-03-15 PROCEDURE — 85610 PROTHROMBIN TIME: CPT | Performed by: STUDENT IN AN ORGANIZED HEALTH CARE EDUCATION/TRAINING PROGRAM

## 2024-03-15 PROCEDURE — 96365 THER/PROPH/DIAG IV INF INIT: CPT

## 2024-03-15 PROCEDURE — 83605 ASSAY OF LACTIC ACID: CPT | Performed by: STUDENT IN AN ORGANIZED HEALTH CARE EDUCATION/TRAINING PROGRAM

## 2024-03-15 PROCEDURE — 83735 ASSAY OF MAGNESIUM: CPT | Performed by: STUDENT IN AN ORGANIZED HEALTH CARE EDUCATION/TRAINING PROGRAM

## 2024-03-15 PROCEDURE — 85025 COMPLETE CBC W/AUTO DIFF WBC: CPT | Performed by: STUDENT IN AN ORGANIZED HEALTH CARE EDUCATION/TRAINING PROGRAM

## 2024-03-15 PROCEDURE — 1200000002 HC GENERAL ROOM WITH TELEMETRY DAILY

## 2024-03-15 PROCEDURE — 71045 X-RAY EXAM CHEST 1 VIEW: CPT

## 2024-03-15 PROCEDURE — 80202 ASSAY OF VANCOMYCIN: CPT | Performed by: PHARMACIST

## 2024-03-15 PROCEDURE — 83880 ASSAY OF NATRIURETIC PEPTIDE: CPT | Performed by: STUDENT IN AN ORGANIZED HEALTH CARE EDUCATION/TRAINING PROGRAM

## 2024-03-15 PROCEDURE — 82947 ASSAY GLUCOSE BLOOD QUANT: CPT

## 2024-03-15 PROCEDURE — 80053 COMPREHEN METABOLIC PANEL: CPT | Performed by: STUDENT IN AN ORGANIZED HEALTH CARE EDUCATION/TRAINING PROGRAM

## 2024-03-15 PROCEDURE — 71045 X-RAY EXAM CHEST 1 VIEW: CPT | Performed by: RADIOLOGY

## 2024-03-15 PROCEDURE — 82140 ASSAY OF AMMONIA: CPT | Performed by: STUDENT IN AN ORGANIZED HEALTH CARE EDUCATION/TRAINING PROGRAM

## 2024-03-15 PROCEDURE — 87040 BLOOD CULTURE FOR BACTERIA: CPT | Mod: ELYLAB | Performed by: STUDENT IN AN ORGANIZED HEALTH CARE EDUCATION/TRAINING PROGRAM

## 2024-03-15 RX ORDER — SPIRONOLACTONE 25 MG/1
25 TABLET ORAL DAILY
COMMUNITY
End: 2024-03-26 | Stop reason: HOSPADM

## 2024-03-15 RX ORDER — FUROSEMIDE 20 MG/1
60 TABLET ORAL 2 TIMES DAILY
COMMUNITY
End: 2024-05-01 | Stop reason: HOSPADM

## 2024-03-15 RX ORDER — VANCOMYCIN HYDROCHLORIDE 1 G/20ML
INJECTION, POWDER, LYOPHILIZED, FOR SOLUTION INTRAVENOUS DAILY PRN
Status: DISCONTINUED | OUTPATIENT
Start: 2024-03-15 | End: 2024-03-26 | Stop reason: HOSPADM

## 2024-03-15 RX ORDER — ONDANSETRON 4 MG/1
4 TABLET, ORALLY DISINTEGRATING ORAL EVERY 8 HOURS PRN
Status: DISCONTINUED | OUTPATIENT
Start: 2024-03-15 | End: 2024-03-26 | Stop reason: HOSPADM

## 2024-03-15 RX ORDER — MUPIROCIN 20 MG/G
OINTMENT TOPICAL
Status: ON HOLD | COMMUNITY
End: 2024-04-18 | Stop reason: ALTCHOICE

## 2024-03-15 RX ORDER — METOPROLOL SUCCINATE 25 MG/1
25 TABLET, EXTENDED RELEASE ORAL DAILY
COMMUNITY

## 2024-03-15 RX ORDER — POTASSIUM CHLORIDE 1.5 G/1.58G
20 POWDER, FOR SOLUTION ORAL 2 TIMES DAILY
Status: DISCONTINUED | OUTPATIENT
Start: 2024-03-15 | End: 2024-03-19

## 2024-03-15 RX ORDER — ACETAMINOPHEN 650 MG/1
650 SUPPOSITORY RECTAL EVERY 4 HOURS PRN
Status: DISCONTINUED | OUTPATIENT
Start: 2024-03-15 | End: 2024-03-26 | Stop reason: HOSPADM

## 2024-03-15 RX ORDER — INSULIN LISPRO 100 [IU]/ML
INJECTION, SOLUTION INTRAVENOUS; SUBCUTANEOUS
COMMUNITY
End: 2024-05-01 | Stop reason: HOSPADM

## 2024-03-15 RX ORDER — ENOXAPARIN SODIUM 100 MG/ML
40 INJECTION SUBCUTANEOUS EVERY 24 HOURS
Status: DISCONTINUED | OUTPATIENT
Start: 2024-03-15 | End: 2024-03-26 | Stop reason: HOSPADM

## 2024-03-15 RX ORDER — ONDANSETRON HYDROCHLORIDE 2 MG/ML
4 INJECTION, SOLUTION INTRAVENOUS EVERY 8 HOURS PRN
Status: DISCONTINUED | OUTPATIENT
Start: 2024-03-15 | End: 2024-03-26 | Stop reason: HOSPADM

## 2024-03-15 RX ORDER — ACETAMINOPHEN 325 MG/1
650 TABLET ORAL EVERY 4 HOURS PRN
Status: DISCONTINUED | OUTPATIENT
Start: 2024-03-15 | End: 2024-03-26 | Stop reason: HOSPADM

## 2024-03-15 RX ORDER — DEXTROSE 50 % IN WATER (D50W) INTRAVENOUS SYRINGE
12.5 ONCE
Status: COMPLETED | OUTPATIENT
Start: 2024-03-15 | End: 2024-03-15

## 2024-03-15 RX ORDER — SPIRONOLACTONE 25 MG/1
25 TABLET ORAL DAILY
Status: DISCONTINUED | OUTPATIENT
Start: 2024-03-16 | End: 2024-03-26 | Stop reason: HOSPADM

## 2024-03-15 RX ORDER — METRONIDAZOLE 500 MG/100ML
500 INJECTION, SOLUTION INTRAVENOUS ONCE
Status: COMPLETED | OUTPATIENT
Start: 2024-03-15 | End: 2024-03-15

## 2024-03-15 RX ORDER — POTASSIUM CHLORIDE 14.9 MG/ML
20 INJECTION INTRAVENOUS
Status: COMPLETED | OUTPATIENT
Start: 2024-03-15 | End: 2024-03-15

## 2024-03-15 RX ORDER — METOPROLOL SUCCINATE 25 MG/1
25 TABLET, EXTENDED RELEASE ORAL DAILY
Status: DISCONTINUED | OUTPATIENT
Start: 2024-03-16 | End: 2024-03-26 | Stop reason: HOSPADM

## 2024-03-15 RX ORDER — INSULIN GLARGINE 100 [IU]/ML
8 INJECTION, SOLUTION SUBCUTANEOUS NIGHTLY
COMMUNITY
End: 2024-03-26 | Stop reason: HOSPADM

## 2024-03-15 RX ORDER — METFORMIN HYDROCHLORIDE 500 MG/1
500 TABLET ORAL
Status: DISCONTINUED | OUTPATIENT
Start: 2024-03-16 | End: 2024-03-19

## 2024-03-15 RX ORDER — PANTOPRAZOLE SODIUM 40 MG/10ML
40 INJECTION, POWDER, LYOPHILIZED, FOR SOLUTION INTRAVENOUS
Status: DISCONTINUED | OUTPATIENT
Start: 2024-03-16 | End: 2024-03-26 | Stop reason: HOSPADM

## 2024-03-15 RX ORDER — TALC
3 POWDER (GRAM) TOPICAL DAILY
Status: DISCONTINUED | OUTPATIENT
Start: 2024-03-15 | End: 2024-03-22

## 2024-03-15 RX ORDER — GUAIFENESIN/DEXTROMETHORPHAN 100-10MG/5
5 SYRUP ORAL EVERY 4 HOURS PRN
Status: DISCONTINUED | OUTPATIENT
Start: 2024-03-15 | End: 2024-03-26 | Stop reason: HOSPADM

## 2024-03-15 RX ORDER — POTASSIUM CHLORIDE 20 MEQ/1
20 TABLET, EXTENDED RELEASE ORAL 3 TIMES DAILY
COMMUNITY
End: 2024-05-01 | Stop reason: HOSPADM

## 2024-03-15 RX ORDER — GUAIFENESIN 600 MG/1
600 TABLET, EXTENDED RELEASE ORAL EVERY 12 HOURS PRN
Status: DISCONTINUED | OUTPATIENT
Start: 2024-03-15 | End: 2024-03-26 | Stop reason: HOSPADM

## 2024-03-15 RX ORDER — ACETAMINOPHEN 160 MG/5ML
650 SOLUTION ORAL EVERY 4 HOURS PRN
Status: DISCONTINUED | OUTPATIENT
Start: 2024-03-15 | End: 2024-03-26 | Stop reason: HOSPADM

## 2024-03-15 RX ORDER — FUROSEMIDE 10 MG/ML
40 INJECTION INTRAMUSCULAR; INTRAVENOUS 2 TIMES DAILY
Status: DISCONTINUED | OUTPATIENT
Start: 2024-03-15 | End: 2024-03-17

## 2024-03-15 RX ORDER — DOCUSATE SODIUM 100 MG/1
100 CAPSULE, LIQUID FILLED ORAL 2 TIMES DAILY
Status: DISCONTINUED | OUTPATIENT
Start: 2024-03-15 | End: 2024-03-26 | Stop reason: HOSPADM

## 2024-03-15 RX ORDER — VANCOMYCIN HYDROCHLORIDE 1 G/200ML
1000 INJECTION, SOLUTION INTRAVENOUS EVERY 24 HOURS
Status: DISCONTINUED | OUTPATIENT
Start: 2024-03-15 | End: 2024-03-18 | Stop reason: ALTCHOICE

## 2024-03-15 RX ORDER — PANTOPRAZOLE SODIUM 40 MG/1
40 TABLET, DELAYED RELEASE ORAL
Status: DISCONTINUED | OUTPATIENT
Start: 2024-03-16 | End: 2024-03-26 | Stop reason: HOSPADM

## 2024-03-15 RX ORDER — CLOPIDOGREL BISULFATE 75 MG/1
75 TABLET ORAL DAILY
COMMUNITY
End: 2024-05-01 | Stop reason: HOSPADM

## 2024-03-15 RX ORDER — METHIMAZOLE 5 MG/1
5 TABLET ORAL DAILY
Status: DISCONTINUED | OUTPATIENT
Start: 2024-03-16 | End: 2024-03-26 | Stop reason: HOSPADM

## 2024-03-15 RX ADMIN — METRONIDAZOLE 500 MG: 500 INJECTION, SOLUTION INTRAVENOUS at 12:48

## 2024-03-15 RX ADMIN — DOCUSATE SODIUM 100 MG: 100 CAPSULE, LIQUID FILLED ORAL at 20:00

## 2024-03-15 RX ADMIN — POTASSIUM CHLORIDE 20 MEQ: 1.5 POWDER, FOR SOLUTION ORAL at 10:29

## 2024-03-15 RX ADMIN — Medication 3 MG: at 19:59

## 2024-03-15 RX ADMIN — DEXTROSE MONOHYDRATE 12.5 G: 25 INJECTION, SOLUTION INTRAVENOUS at 12:01

## 2024-03-15 RX ADMIN — POTASSIUM CHLORIDE 20 MEQ: 1.5 POWDER, FOR SOLUTION ORAL at 14:45

## 2024-03-15 RX ADMIN — POTASSIUM CHLORIDE 20 MEQ: 14.9 INJECTION, SOLUTION INTRAVENOUS at 10:28

## 2024-03-15 RX ADMIN — POTASSIUM CHLORIDE 20 MEQ: 1.5 POWDER, FOR SOLUTION ORAL at 20:00

## 2024-03-15 RX ADMIN — POTASSIUM CHLORIDE 20 MEQ: 14.9 INJECTION, SOLUTION INTRAVENOUS at 14:44

## 2024-03-15 RX ADMIN — CEFEPIME 2 G: 2 INJECTION, POWDER, FOR SOLUTION INTRAVENOUS at 12:10

## 2024-03-15 SDOH — SOCIAL STABILITY: SOCIAL INSECURITY: WERE YOU ABLE TO COMPLETE ALL THE BEHAVIORAL HEALTH SCREENINGS?: YES

## 2024-03-15 SDOH — SOCIAL STABILITY: SOCIAL INSECURITY: HAS ANYONE EVER THREATENED TO HURT YOUR FAMILY OR YOUR PETS?: NO

## 2024-03-15 SDOH — SOCIAL STABILITY: SOCIAL INSECURITY: DO YOU FEEL ANYONE HAS EXPLOITED OR TAKEN ADVANTAGE OF YOU FINANCIALLY OR OF YOUR PERSONAL PROPERTY?: NO

## 2024-03-15 SDOH — SOCIAL STABILITY: SOCIAL INSECURITY: DO YOU FEEL UNSAFE GOING BACK TO THE PLACE WHERE YOU ARE LIVING?: NO

## 2024-03-15 SDOH — SOCIAL STABILITY: SOCIAL INSECURITY: ARE THERE ANY APPARENT SIGNS OF INJURIES/BEHAVIORS THAT COULD BE RELATED TO ABUSE/NEGLECT?: NO

## 2024-03-15 SDOH — SOCIAL STABILITY: SOCIAL INSECURITY: HAVE YOU HAD THOUGHTS OF HARMING ANYONE ELSE?: NO

## 2024-03-15 SDOH — SOCIAL STABILITY: SOCIAL INSECURITY: DOES ANYONE TRY TO KEEP YOU FROM HAVING/CONTACTING OTHER FRIENDS OR DOING THINGS OUTSIDE YOUR HOME?: NO

## 2024-03-15 SDOH — SOCIAL STABILITY: SOCIAL INSECURITY: ABUSE: ADULT

## 2024-03-15 SDOH — SOCIAL STABILITY: SOCIAL INSECURITY: ARE YOU OR HAVE YOU BEEN THREATENED OR ABUSED PHYSICALLY, EMOTIONALLY, OR SEXUALLY BY ANYONE?: NO

## 2024-03-15 ASSESSMENT — LIFESTYLE VARIABLES
SKIP TO QUESTIONS 9-10: 1
EVER HAD A DRINK FIRST THING IN THE MORNING TO STEADY YOUR NERVES TO GET RID OF A HANGOVER: NO
HAVE YOU EVER FELT YOU SHOULD CUT DOWN ON YOUR DRINKING: NO
AUDIT-C TOTAL SCORE: 0
AUDIT-C TOTAL SCORE: 0
HOW OFTEN DO YOU HAVE A DRINK CONTAINING ALCOHOL: NEVER
HAVE PEOPLE ANNOYED YOU BY CRITICIZING YOUR DRINKING: NO
HOW MANY STANDARD DRINKS CONTAINING ALCOHOL DO YOU HAVE ON A TYPICAL DAY: PATIENT DOES NOT DRINK
EVER FELT BAD OR GUILTY ABOUT YOUR DRINKING: NO
HOW OFTEN DO YOU HAVE 6 OR MORE DRINKS ON ONE OCCASION: NEVER

## 2024-03-15 ASSESSMENT — COGNITIVE AND FUNCTIONAL STATUS - GENERAL
DRESSING REGULAR LOWER BODY CLOTHING: A LITTLE
TURNING FROM BACK TO SIDE WHILE IN FLAT BAD: A LITTLE
STANDING UP FROM CHAIR USING ARMS: A LITTLE
CLIMB 3 TO 5 STEPS WITH RAILING: TOTAL
TOILETING: A LITTLE
MOVING FROM LYING ON BACK TO SITTING ON SIDE OF FLAT BED WITH BEDRAILS: A LITTLE
PERSONAL GROOMING: A LITTLE
MOBILITY SCORE: 15
HELP NEEDED FOR BATHING: A LITTLE
MOVING TO AND FROM BED TO CHAIR: A LITTLE
DAILY ACTIVITIY SCORE: 19
PATIENT BASELINE BEDBOUND: NO
WALKING IN HOSPITAL ROOM: A LOT
DRESSING REGULAR UPPER BODY CLOTHING: A LITTLE

## 2024-03-15 ASSESSMENT — COLUMBIA-SUICIDE SEVERITY RATING SCALE - C-SSRS
1. IN THE PAST MONTH, HAVE YOU WISHED YOU WERE DEAD OR WISHED YOU COULD GO TO SLEEP AND NOT WAKE UP?: NO
6. HAVE YOU EVER DONE ANYTHING, STARTED TO DO ANYTHING, OR PREPARED TO DO ANYTHING TO END YOUR LIFE?: NO
6. HAVE YOU EVER DONE ANYTHING, STARTED TO DO ANYTHING, OR PREPARED TO DO ANYTHING TO END YOUR LIFE?: NO
2. HAVE YOU ACTUALLY HAD ANY THOUGHTS OF KILLING YOURSELF?: NO
1. IN THE PAST MONTH, HAVE YOU WISHED YOU WERE DEAD OR WISHED YOU COULD GO TO SLEEP AND NOT WAKE UP?: NO
2. HAVE YOU ACTUALLY HAD ANY THOUGHTS OF KILLING YOURSELF?: NO

## 2024-03-15 ASSESSMENT — ACTIVITIES OF DAILY LIVING (ADL)
JUDGMENT_ADEQUATE_SAFELY_COMPLETE_DAILY_ACTIVITIES: YES
HEARING - RIGHT EAR: FUNCTIONAL
GROOMING: INDEPENDENT
ADEQUATE_TO_COMPLETE_ADL: YES
WALKS IN HOME: NEEDS ASSISTANCE
HEARING - LEFT EAR: FUNCTIONAL
TOILETING: INDEPENDENT
BATHING: INDEPENDENT
FEEDING YOURSELF: INDEPENDENT
PATIENT'S MEMORY ADEQUATE TO SAFELY COMPLETE DAILY ACTIVITIES?: YES
DRESSING YOURSELF: INDEPENDENT
ASSISTIVE_DEVICE: WHEELCHAIR
LACK_OF_TRANSPORTATION: NO

## 2024-03-15 ASSESSMENT — PAIN DESCRIPTION - ORIENTATION
ORIENTATION_2: RIGHT
ORIENTATION: LEFT

## 2024-03-15 ASSESSMENT — PATIENT HEALTH QUESTIONNAIRE - PHQ9
2. FEELING DOWN, DEPRESSED OR HOPELESS: NOT AT ALL
1. LITTLE INTEREST OR PLEASURE IN DOING THINGS: NOT AT ALL
SUM OF ALL RESPONSES TO PHQ9 QUESTIONS 1 & 2: 0

## 2024-03-15 ASSESSMENT — PAIN SCALES - GENERAL
PAINLEVEL_OUTOF10: 10 - WORST POSSIBLE PAIN
PAINLEVEL_OUTOF10: 10 - WORST POSSIBLE PAIN
PAINLEVEL_OUTOF10: 8

## 2024-03-15 ASSESSMENT — PAIN - FUNCTIONAL ASSESSMENT
PAIN_FUNCTIONAL_ASSESSMENT: 0-10
PAIN_FUNCTIONAL_ASSESSMENT: 0-10

## 2024-03-15 ASSESSMENT — PAIN DESCRIPTION - LOCATION
LOCATION: LEG
LOCATION_2: HIP

## 2024-03-15 ASSESSMENT — PAIN DESCRIPTION - PAIN TYPE: TYPE: ACUTE PAIN

## 2024-03-15 NOTE — Clinical Note
6300mL drained from RLQ, gauze and tegaderm to site of paracentesis. Pt tolerated well. Procedure report to bedside RN.

## 2024-03-15 NOTE — H&P
Medical Group History and Physical  ASSESSMENT & PLAN:     #Large right-sided pleural effusion  #Hypokalemia  #Leukocytosis in the setting of left BKA wounds concerning for possible infection.   #acute on chronic HFrEF   #microcytic anemia  #Hypoglycemia  - discussed patient with interventional radiology with plan to possibly drain large right pleural effusion today.  Depends on radiologist schedules and if we can fit patient in if not will have patient possibly drained over the weekend by either pulmonology or in ICU depending on progression.  -Will go ahead and start diuresing patient with Lasix 40 mg twice daily will adjust as needed.  -  Will go ahead and consult cardiology  - will obtain echocardiogram  - Place patient on telemetry and monitor  - continue to monitor replete electrolytes  - send hemoglobin A1c and monitor blood glucose.  Will initiate sliding scale insulin  -   Will continue broad-spectrum antibiotics.  Will have  wound care evaluate patient and consult infectious disease if needed.  -  Will continue to monitor patient's hemoglobin with plan to send iron studies given microcytosis.  - renally dose all medication    VTE Prophylaxis:  Lovenox renally dosed  Diet: Cardiac diet  CODE STATUS: Full code      ---Of note, this documentation is completed using the Dragon Dictation system (voice recognition software). There may be spelling and/or grammatical errors that were not corrected prior to final submission.---    Izzy Early MD    HISTORY OF PRESENT ILLNESS:   Chief Complaint: Fall at home, shortness of breath    History Of Present Illness:    Hitesh Jurado is a 53 y.o. male with a significant past medical history of  HFrEF, PAD  status post left BKA, diabetes type 2  complicated by chronic nonhealing wounds and lower extremities who was presenting after a fall from his wheelchair while attempting to transfer.   There is no loss of consciousness.  he is complaining of pain in the  left BKA stump and hips.  Denies any current chest pain fevers or chills.  Does endorse some shortness of breath.  Of note patient was recently at the [facility] last week where he was found to have hypokalemia and left AMA prior to potassium repletion.      In the emergency room notable findings were blood glucose at 54, potassium at 2.4, BUN/creatinine 44/1.54, BNP 4248, white count at 11.8 with neutrophils at 9.78, hemoglobin at 9.3 with MCV of 65.  x-ray chest showed large right basilar pleural effusion occupying greater than 50% of the hemithoracic volume with overlying atelectasis and mild pulmonary vascular congestion.   X-ray of the left tibia-fibula showed BKA changes with tiny patellar osteophytes, small knee joint effusion, small foreign bodies lateral to the knee joint are still present and osteopenia.  patient was given cefepime and Flagyl as well as dextrose and potassium chloride and started on vancomycin and admitted for further evaluation and treatment.     Review of systems: 10 point review of systems is otherwise negative except as mentioned above.    PAST HISTORIES:     Past Medical History:  He has a past medical history of CHF (congestive heart failure) (CMS/[facility]) and Diabetes mellitus (CMS/[facility]).    Past Surgical History:  He has no past surgical history on file.      Social History:  He reports that he has been smoking cigarettes. He has been smoking an average of .5 packs per day. He has never used smokeless tobacco. He reports that he does not drink alcohol and does not use drugs.    Family History:  No family history on file.     Allergies:  Amoxicillin    OBJECTIVE:     Last Recorded Vitals:  Vitals:    03/15/24 1100 03/15/24 1215 03/15/24 1230 03/15/24 1320   BP: 126/82  122/82 107/66   BP Location: Right arm  Right arm    Patient Position: Lying  Lying Lying   Pulse: 86 86 86 89   Resp: 18 18 19 18   Temp:       TempSrc:       SpO2: 96% 96% 95% (!) 93%   Weight:       Height:          Last I/O:  No intake/output data recorded.    Physical Exam  Constitutional:       General: He is in acute distress.      Appearance: He is ill-appearing.   HENT:      Head: Normocephalic.      Mouth/Throat:      Mouth: Mucous membranes are dry.   Eyes:      Pupils: Pupils are equal, round, and reactive to light.   Cardiovascular:      Rate and Rhythm: Normal rate. Rhythm irregular.      Comments:  diminished pulses diffusely  Pulmonary:      Effort: Pulmonary effort is normal.   Abdominal:      General: Bowel sounds are normal.      Palpations: Abdomen is soft.   Musculoskeletal:         General: Swelling and tenderness present. Normal range of motion.      Right lower leg: Edema present.      Left lower leg: Edema present.      Comments:  erythema and both legs concerning for cellulitis excoriated stump left BKA   Skin:     General: Skin is dry.      Capillary Refill: Capillary refill takes less than 2 seconds.      Coloration: Skin is pale.   Neurological:      Mental Status: He is disoriented.      Motor: Weakness present.   Psychiatric:      Comments:  pain which of proportion with exam         Scheduled Medications  docusate sodium, 100 mg, oral, BID  enoxaparin, 40 mg, subcutaneous, q24h  furosemide, 40 mg, intravenous, BID  melatonin, 3 mg, oral, Daily  [START ON 3/16/2024] pantoprazole, 40 mg, oral, Daily before breakfast   Or  [START ON 3/16/2024] pantoprazole, 40 mg, intravenous, Daily before breakfast  potassium chloride, 20 mEq, oral, BID  potassium chloride, 20 mEq, intravenous, q2h  vancomycin, 1.5 g, intravenous, Once      PRN Medications  PRN medications: acetaminophen **OR** acetaminophen **OR** acetaminophen, acetaminophen **OR** acetaminophen **OR** acetaminophen, benzocaine-menthol, dextromethorphan-guaifenesin, guaiFENesin, ondansetron ODT **OR** ondansetron, oxygen  Continuous Medications       Outpatient Medications:  Prior to Admission medications    Medication Sig Start Date End Date  Taking? Authorizing Provider   acetaminophen (Tylenol) 500 mg tablet Take 1 tablet (500 mg) by mouth every 8 hours if needed.    Historical Provider, MD   aspirin 81 mg EC tablet Take 1 tablet (81 mg) by mouth once daily.    Historical Provider, MD   atorvastatin (Lipitor) 80 mg tablet Take 1 tablet (80 mg) by mouth once daily.    Historical Provider, MD   azithromycin (Zithromax) 500 mg tablet Take 1 tablet (500 mg) by mouth once every 24 hours. Do not start before October 30, 2023. 10/30/23   Nhan Montero MD   carvedilol (Coreg) 12.5 mg tablet Take 1 tablet (12.5 mg) by mouth twice a day.    Historical Provider, MD   empagliflozin (Jardiance) 10 mg Take 1 tablet (10 mg) by mouth once daily.    Historical Provider, MD   gabapentin (Neurontin) 400 mg capsule Take 1 capsule (400 mg) by mouth 3 times a day.    Historical Provider, MD   linaGLIPtin (Tradjenta) 5 mg tablet Take 1 tablet (5 mg) by mouth once daily.    Historical Provider, MD   lisinopril 5 mg tablet Take 1 tablet (5 mg) by mouth once daily.    Historical Provider, MD   melatonin 5 mg tablet Take 1 tablet (5 mg) by mouth once daily at bedtime.    Historical Provider, MD   metFORMIN (Glucophage) 1,000 mg tablet Take 0.5 tablets (500 mg) by mouth twice a day.    Historical Provider, MD   methIMAzole (Tapazole) 10 mg tablet Take 2 tablets (20 mg) by mouth every 8 hours. 10/29/23   Nhan Montero MD   omeprazole (PriLOSEC) 40 mg DR capsule Take 1 capsule (40 mg) by mouth twice a day.    Historical Provider, MD   torsemide (Demadex) 20 mg tablet Take 1 tablet (20 mg) by mouth once daily as needed.    Historical Provider, MD       LABS AND IMAGING:     Labs:  Results from last 7 days   Lab Units 03/15/24  0905   WBC AUTO x10*3/uL 11.8*   RBC AUTO x10*6/uL 4.66   HEMOGLOBIN g/dL 9.3*   HEMATOCRIT % 30.4*   MCV fL 65*   MCH pg 20.0*   MCHC g/dL 30.6*   RDW % 18.4*   PLATELETS AUTO x10*3/uL 429     Results from last 7 days   Lab Units 03/15/24  0905    SODIUM mmol/L 139   POTASSIUM mmol/L 2.4*   CHLORIDE mmol/L 96*   CO2 mmol/L 31   BUN mg/dL 44*   CREATININE mg/dL 1.54*   GLUCOSE mg/dL 54*   PROTEIN TOTAL g/dL 7.5   CALCIUM mg/dL 8.6   BILIRUBIN TOTAL mg/dL 1.5*   ALK PHOS U/L 137*   AST U/L 63*   ALT U/L 38     Results from last 7 days   Lab Units 03/15/24  0905   MAGNESIUM mg/dL 1.91           Imaging:  XR chest 1 view  Narrative: Interpreted By:  Artur Santos,   STUDY:  XR CHEST 1 VIEW;  3/15/2024 10:36 am      INDICATION:  Signs/Symptoms:fall.      COMPARISON:  11/01/2023      ACCESSION NUMBER(S):  [id number]      ORDERING CLINICIAN:  LO SHAW      FINDINGS:  Large right basilar pleural effusion occupying greater than 50% of  the hemithoracic volume. Overlying atelectasis. Left lung grossly  clear without pleural effusion. Cardiomegaly suspected. Aortic  atherosclerosis. Mild pulmonary vascular congestion.      Impression: Large right pleural effusion.      MACRO:  None      Signed by: Artur Santos 3/15/2024 11:33 AM  Dictation workstation:   [id number]  XR tibia fibula left 2 views  Narrative: Interpreted By:  Artur Santos,   STUDY:  XR TIBIA FIBULA LEFT 2 VIEWS;  3/15/2024 10:36 am      INDICATION:  Signs/Symptoms:fall, previous below-knee amputation, pain at left  stump.      COMPARISON:  06/23/2022.      ACCESSION NUMBER(S):  [id number]      ORDERING CLINICIAN:  LO SHAW      FINDINGS:  Post below-knee amputation changes. No acute fracture or dislocation  identified. Tiny patellar osteophytes. Small knee joint effusion  suspected. Tiny metallic foreign bodies lateral to the knee joint are  still present. Osteopenia.      Atherosclerosis.      Impression: Intact left tibia and fibula, post below-knee amputation.      MACRO:  None      Signed by: Artur Santos 3/15/2024 11:33 AM  Dictation workstation:   [id number]  ECG 12 lead  Sinus rhythm with Premature supraventricular complexes and with occasional Premature ventricular  complexes  Right bundle branch block  Possible Lateral infarct , age undetermined  Abnormal ECG  When compared with ECG of 01-NOV-2023 12:19,  Premature supraventricular complexes are now Present  Borderline criteria for Lateral infarct are now Present    See ED provider note for full interpretation and clinical correlation

## 2024-03-15 NOTE — Clinical Note
Per Dr. Schoenberger no pneumothorax on CXR, pt still stating pain 9/10. Retook vitals and they are stable, 117/72, 84HR, 97% on 3L nc, RR 18. Pt taken back to floor with 2 transporters and myself to monitor pt, report given to MACKENZIE Cardona. Pt resting in bed once in room, still states pain is there in chest, slightly improved, aware as the lung expands it is normal to have this chest pain.

## 2024-03-15 NOTE — Clinical Note
Taken to xray via bed, pt having some coughing, vitals stable, states pain is in right chest, can't take a deep breath. Dr Schoenberger updated, waiting for xray

## 2024-03-15 NOTE — CONSULTS
Chief Complaint: Fall at home, shortness of breath     History Of Present Illness:    Hitesh Jurado is a 53 y.o. male with a significant past medical history of  HFrEF, PAD  status post left BKA, diabetes type 2  complicated by chronic nonhealing wounds and lower extremities who was presenting after a fall from his wheelchair while attempting to transfer.   There is no loss of consciousness.  he is complaining of pain in the left BKA stump and hips.  Denies any current chest pain fevers or chills.  Does endorse some shortness of breath.  Of note patient was recently at the [facility] last week where he was found to have hypokalemia and left AMA prior to potassium repletion.       In the emergency room notable findings were blood glucose at 54, potassium at 2.4, BUN/creatinine 44/1.54, BNP 4248, white count at 11.8 with neutrophils at 9.78, hemoglobin at 9.3 with MCV of 65.  x-ray chest showed large right basilar pleural effusion occupying greater than 50% of the hemithoracic volume with overlying atelectasis and mild pulmonary vascular congestion.   X-ray of the left tibia-fibula showed BKA changes with tiny patellar osteophytes, small knee joint effusion, small foreign bodies lateral to the knee joint are still present and osteopenia.  patient was given cefepime and Flagyl as well as dextrose and potassium chloride and started on vancomycin and admitted for further evaluation and treatment.    I was asked to evaluate and follow from a pulmonary perspective.  Chart reviewed and patient examined.  Various labs x-rays EKG etc. have been reviewed.  Patient apparently has longstanding smoker having smoked up to 2 packs of cigarettes per day in the past.  Currently still smokes half a pack a day.  He lives at home despite multiple medical problems as noted above.  Presents to the ER after he fell from wheelchair attempting transfer.  He has redness of the left leg stump and suspected to have infection.  He  has multiple lab abnormalities as noted above.  Chest x-ray shows a large right pleural effusion.  Patient denies any fevers chills night sweats or weight loss denies significant cough sputum production wheezing or hemoptysis.     Review of systems: 10 point review of systems is otherwise negative except as mentioned above.     Past Medical History:  He has a past medical history of CHF (congestive heart failure) (CMS/[facility]) and Diabetes mellitus (CMS/[facility]).     Past Surgical History:  He has no past surgical history on file.      Social History:  He reports that he has been smoking cigarettes. He has been smoking an average of .5 packs per day. He has never used smokeless tobacco. He reports that he does not drink alcohol and does not use drugs.     Family History:  Family History   No family history on file.        Allergies:  Amoxicillin     Last Recorded Vitals:  Vitals          Vitals:     03/15/24 1100 03/15/24 1215 03/15/24 1230 03/15/24 1320   BP: 126/82   122/82 107/66   BP Location: Right arm   Right arm     Patient Position: Lying   Lying Lying   Pulse: 86 86 86 89   Resp: 18 18 19 18   Temp:           TempSrc:           SpO2: 96% 96% 95% (!) 93%   Weight:           Height:                 Last I/O:  No intake/output data recorded.     Physical Exam  Constitutional:       General: He is in acute distress.      Appearance: He is ill-appearing.   HENT:      Head: Normocephalic.      Mouth/Throat:      Mouth: Mucous membranes are dry.   Eyes:      Pupils: Pupils are equal, round, and reactive to light.   Cardiovascular:      Rate and Rhythm: Normal rate. Rhythm irregular.      Comments:  diminished pulses diffusely  Pulmonary:      Effort: Pulmonary effort is normal.   Abdominal:      General: Bowel sounds are normal.      Palpations: Abdomen is soft.   Musculoskeletal:         General: Swelling and tenderness present. Normal range of motion.      Right lower leg: Edema present.      Left lower leg: Edema  present.      Comments:  erythema and both legs concerning for cellulitis excoriated stump left BKA   Skin:     General: Skin is dry.      Capillary Refill: Capillary refill takes less than 2 seconds.      Coloration: Skin is pale.   Neurological:      Mental Status: He is disoriented.      Motor: Weakness present.   Psychiatric:      Comments:  pain which of proportion with exam            Scheduled Medications  docusate sodium, 100 mg, oral, BID  enoxaparin, 40 mg, subcutaneous, q24h  furosemide, 40 mg, intravenous, BID  melatonin, 3 mg, oral, Daily  [START ON 3/16/2024] pantoprazole, 40 mg, oral, Daily before breakfast   Or  [START ON 3/16/2024] pantoprazole, 40 mg, intravenous, Daily before breakfast  potassium chloride, 20 mEq, oral, BID  potassium chloride, 20 mEq, intravenous, q2h  vancomycin, 1.5 g, intravenous, Once        PRN Medications  PRN medications: acetaminophen **OR** acetaminophen **OR** acetaminophen, acetaminophen **OR** acetaminophen **OR** acetaminophen, benzocaine-menthol, dextromethorphan-guaifenesin, guaiFENesin, ondansetron ODT **OR** ondansetron, oxygen  Continuous Medications     Outpatient Medications:          Prior to Admission medications    Medication Sig Start Date End Date Taking? Authorizing Provider   acetaminophen (Tylenol) 500 mg tablet Take 1 tablet (500 mg) by mouth every 8 hours if needed.       Historical Provider, MD   aspirin 81 mg EC tablet Take 1 tablet (81 mg) by mouth once daily.       Historical Provider, MD   atorvastatin (Lipitor) 80 mg tablet Take 1 tablet (80 mg) by mouth once daily.       Historical Provider, MD   azithromycin (Zithromax) 500 mg tablet Take 1 tablet (500 mg) by mouth once every 24 hours. Do not start before October 30, 2023. 10/30/23     Nhan Montero MD   carvedilol (Coreg) 12.5 mg tablet Take 1 tablet (12.5 mg) by mouth twice a day.       Historical Provider, MD   empagliflozin (Jardiance) 10 mg Take 1 tablet (10 mg) by mouth once daily.        Historical Provider, MD   gabapentin (Neurontin) 400 mg capsule Take 1 capsule (400 mg) by mouth 3 times a day.       Historical Provider, MD   linaGLIPtin (Tradjenta) 5 mg tablet Take 1 tablet (5 mg) by mouth once daily.       Historical Provider, MD   lisinopril 5 mg tablet Take 1 tablet (5 mg) by mouth once daily.       Historical Provider, MD   melatonin 5 mg tablet Take 1 tablet (5 mg) by mouth once daily at bedtime.       Historical Provider, MD   metFORMIN (Glucophage) 1,000 mg tablet Take 0.5 tablets (500 mg) by mouth twice a day.       Historical Provider, MD   methIMAzole (Tapazole) 10 mg tablet Take 2 tablets (20 mg) by mouth every 8 hours. 10/29/23     Nhan Montero MD   omeprazole (PriLOSEC) 40 mg DR capsule Take 1 capsule (40 mg) by mouth twice a day.       Historical Provider, MD   torsemide (Demadex) 20 mg tablet Take 1 tablet (20 mg) by mouth once daily as needed.       Historical Provider, MD      Labs:       Results from last 7 days   Lab Units 03/15/24  0905   WBC AUTO x10*3/uL 11.8*   RBC AUTO x10*6/uL 4.66   HEMOGLOBIN g/dL 9.3*   HEMATOCRIT % 30.4*   MCV fL 65*   MCH pg 20.0*   MCHC g/dL 30.6*   RDW % 18.4*   PLATELETS AUTO x10*3/uL 429           Results from last 7 days   Lab Units 03/15/24  0905   SODIUM mmol/L 139   POTASSIUM mmol/L 2.4*   CHLORIDE mmol/L 96*   CO2 mmol/L 31   BUN mg/dL 44*   CREATININE mg/dL 1.54*   GLUCOSE mg/dL 54*   PROTEIN TOTAL g/dL 7.5   CALCIUM mg/dL 8.6   BILIRUBIN TOTAL mg/dL 1.5*   ALK PHOS U/L 137*   AST U/L 63*   ALT U/L 38           Results from last 7 days   Lab Units 03/15/24  0905   MAGNESIUM mg/dL 1.91             Imaging:  XR chest 1 view  Narrative: Interpreted By:  Artur Santos,   STUDY:  XR CHEST 1 VIEW;  3/15/2024 10:36 am      INDICATION:  Signs/Symptoms:fall.      COMPARISON:  11/01/2023      ACCESSION NUMBER(S):  [id number]      ORDERING CLINICIAN:  LO SHAW      FINDINGS:  Large right basilar pleural effusion occupying greater  than 50% of  the hemithoracic volume. Overlying atelectasis. Left lung grossly  clear without pleural effusion. Cardiomegaly suspected. Aortic  atherosclerosis. Mild pulmonary vascular congestion.      Impression: Large right pleural effusion.      MACRO:  None      Signed by: Artur Santos 3/15/2024 11:33 AM  Dictation workstation:   [id number]  XR tibia fibula left 2 views  Narrative: Interpreted By:  Artur Santos,   STUDY:  XR TIBIA FIBULA LEFT 2 VIEWS;  3/15/2024 10:36 am      INDICATION:  Signs/Symptoms:fall, previous below-knee amputation, pain at left  stump.      COMPARISON:  06/23/2022.      ACCESSION NUMBER(S):  [id number]      ORDERING CLINICIAN:  LO SHAW      FINDINGS:  Post below-knee amputation changes. No acute fracture or dislocation  identified. Tiny patellar osteophytes. Small knee joint effusion  suspected. Tiny metallic foreign bodies lateral to the knee joint are  still present. Osteopenia.      Atherosclerosis.      Impression: Intact left tibia and fibula, post below-knee amputation.      MACRO:  None      Signed by: Artur Santos 3/15/2024 11:33 AM  Dictation workstation:   [id number]  ECG 12 lead  Sinus rhythm with Premature supraventricular complexes and with occasional Premature ventricular complexes  Right bundle branch block  Possible Lateral infarct , age undetermined  Abnormal ECG  When compared with ECG of 01-NOV-2023 12:19,  Premature supraventricular complexes are now Present  Borderline criteria for Lateral infarct are now Present\    Assessment/plan:-   #Large right-sided pleural effusion; Differential diagnosis being parapneumonic, malignancy and from congestive heart failure/nephrotic syndrome  # Probable COPD without significant acute exacerbation   # Probable pneumonia right lung  # Nicotine dependency    #Hypokalemia  #Leukocytosis in the setting of left BKA wounds concerning for possible infection.   #acute on chronic HFrEF   #microcytic anemia  #Hypoglycemia  -  Dr. Early discussed patient with interventional radiology with plan to possibly drain large right pleural effusion today.  Depends on radiologist schedules and if we can fit patient in if not will have patient possibly drained over the weekend by either pulmonology or in ICU depending on progression.  -Will go ahead and start diuresing patient with Lasix 40 mg twice daily will adjust as needed.  -  Agree with  consult cardiology  - will obtain echocardiogram  - Place patient on telemetry and monitor  - continue to monitor replete electrolytes  - send hemoglobin A1c and monitor blood glucose.  Will initiate sliding scale insulin  -   Will continue broad-spectrum antibiotics.  Agree with  consult wound care   -  Will continue to monitor patient's hemoglobin with plan to send iron studies given microcytosis.  - renally dose all medication       VTE Prophylaxis:  Lovenox renally dosed  Diet: Cardiac diet  CODE STATUS: Full code    Pulmonary comments:-Agree with broad-spectrum antibiotics used for right above-the-knee stump infection (cefepime and metronidazole) which should also cover for pneumonia as well.  Will give as needed bronchodilator nebs for presumptive COPD.  Agree with supplemental oxygen for hypoxia.  Patient counseled to quit smoking cigarettes completely.  Patient will need right thoracentesis in a couple of days.  Pulmonary will continue to monitor this patient with you and I thank you for the referral.    Huy Ricci MD

## 2024-03-15 NOTE — ED PROVIDER NOTES
HPI   Chief Complaint   Patient presents with    Fall     From home, fell in shower       53-year-old male brought in by EMS from home after fall.  History of CHF with reduced EF, diabetes, peripheral vascular disease, prior left-sided below-knee amputation, chronic wounds.  Patient typically gets around in a wheelchair, states that he fell when he was getting out of the shower.  Denies loss of consciousness or hitting his head.  Complains of pain on his left stump, hips.  Patient reports he is on Plavix.  Review of records show recent visits at [facility], most recently seen in the ED last week for hypokalemia, left AMA prior to full potassium repletion.      History provided by:  EMS personnel, patient and medical records                      [place]ine Coma Scale Score: 15                     Patient History   Past Medical History:   Diagnosis Date    CHF (congestive heart failure) (CMS/[facility])     Diabetes mellitus (CMS/[facility])      No past surgical history on file.  No family history on file.  Social History     Tobacco Use    Smoking status: Every Day     Packs/day: .5     Types: Cigarettes    Smokeless tobacco: Never   Substance Use Topics    Alcohol use: Never    Drug use: Never       Physical Exam   ED Triage Vitals [03/15/24 0843]   Temperature Heart Rate Respirations BP   36.6 °C (97.9 °F) 84 20 121/75      Pulse Ox Temp Source Heart Rate Source Patient Position   (!) 93 % Temporal Monitor Lying      BP Location FiO2 (%)     Left arm --       Physical Exam  Vitals and nursing note reviewed.   Constitutional:       General: He is not in acute distress.  HENT:      Head: Atraumatic.      Mouth/Throat:      Mouth: Mucous membranes are moist.      Pharynx: Oropharynx is clear.   Cardiovascular:      Rate and Rhythm: Normal rate and regular rhythm.      Pulses: Normal pulses.   Pulmonary:      Effort: Pulmonary effort is normal. No respiratory distress.      Breath sounds: Normal breath sounds.   Abdominal:       General: There is distension.      Palpations: Abdomen is soft.      Tenderness: There is abdominal tenderness.   Musculoskeletal:      Cervical back: Neck supple.      Comments: Left BKA   Skin:     General: Skin is warm and dry.      Comments: Chronic nonhealing ulcer on dorsal aspect of right foot.  No surrounding erythema, no discharge.  Left stump with large area of sloughed skin with surrounding superficial shallow ulcers, mild erythema.   Neurological:      Mental Status: He is alert and oriented to person, place, and time. Mental status is at baseline.      Cranial Nerves: No cranial nerve deficit.      Motor: No weakness.   Psychiatric:         Mood and Affect: Mood normal.         ED Course & MDM   ED Course as of 03/15/24 1217   Fri Mar 15, 2024   1018 BNP(!): 4,248 [MK]   1018 POTASSIUM(!!): 2.4 [MK]   1018 Creatinine(!): 1.54 [MK]   1018 GLUCOSE(!!): 54 [MK]      ED Course User Index  [MK] Jamey Xavier MD         Diagnoses as of 03/15/24 1217   Fall, initial encounter   Hypokalemia   Hypoglycemia   Acute on chronic congestive heart failure, unspecified heart failure type (CMS/HCC)   PIPPA (acute kidney injury) (CMS/HCC)   Acute on chronic combined systolic (congestive) and diastolic (congestive) heart failure (CMS/HCC)   Pleural effusion on right     XR chest 1 view   Final Result   Large right pleural effusion.        MACRO:   None        Signed by: Artur Santos 3/15/2024 11:33 AM   Dictation workstation:   [id number]      XR tibia fibula left 2 views   Final Result   Intact left tibia and fibula, post below-knee amputation.        MACRO:   None        Signed by: Artur Santos 3/15/2024 11:33 AM   Dictation workstation:   [id number]        Labs Reviewed   CBC WITH AUTO DIFFERENTIAL - Abnormal       Result Value    WBC 11.8 (*)     nRBC 0.0      RBC 4.66      Hemoglobin 9.3 (*)     Hematocrit 30.4 (*)     MCV 65 (*)     MCH 20.0 (*)     MCHC 30.6 (*)     RDW 18.4 (*)     Platelets 429       Neutrophils % 83.1      Immature Granulocytes %, Automated 0.5      Lymphocytes % 9.9      Monocytes % 6.0      Eosinophils % 0.2      Basophils % 0.3      Neutrophils Absolute 9.78 (*)     Immature Granulocytes Absolute, Automated 0.06      Lymphocytes Absolute 1.16 (*)     Monocytes Absolute 0.71      Eosinophils Absolute 0.02      Basophils Absolute 0.03     BASIC METABOLIC PANEL - Abnormal    Glucose 54 (*)     Sodium 139      Potassium 2.4 (*)     Chloride 96 (*)     Bicarbonate 31      Anion Gap 14      Urea Nitrogen 44 (*)     Creatinine 1.54 (*)     eGFR 54 (*)     Calcium 8.6     HEPATIC FUNCTION PANEL - Abnormal    Albumin 3.2 (*)     Bilirubin, Total 1.5 (*)     Bilirubin, Direct 0.7 (*)     Alkaline Phosphatase 137 (*)     ALT 38      AST 63 (*)     Total Protein 7.5     PROTIME-INR - Abnormal    Protime 15.1 (*)     INR 1.3 (*)    B-TYPE NATRIURETIC PEPTIDE - Abnormal    BNP 4,248 (*)     Narrative:        <100 pg/mL - Heart failure unlikely  100-299 pg/mL - Intermediate probability of acute heart                  failure exacerbation. Correlate with clinical                  context and patient history.    >=300 pg/mL - Heart Failure likely. Correlate with clinical                  context and patient history.    BNP testing is performed using different testing methodology at [facility] than at other [facility]. Direct result comparisons should only be made within the same method.      POCT GLUCOSE - Abnormal    POCT Glucose 42 (*)    MAGNESIUM - Normal    Magnesium 1.91     LACTATE - Normal    Lactate 1.3      Narrative:     Venipuncture immediately after or during the administration of Metamizole may lead to falsely low results. Testing should be performed immediately  prior to Metamizole dosing.   AMMONIA - Normal    Ammonia 18     APTT - Normal    aPTT 36      Narrative:     The APTT is no longer used for monitoring Unfractionated Heparin Therapy. For monitoring Heparin Therapy,  use the Heparin Assay.   BLOOD CULTURE   BLOOD CULTURE       Medical Decision Making  53-year-old male with multiple medical comorbidities including CHF, diabetes, peripheral vascular disease, status post left BKA, chronic nonhealing wounds.  Patient presenting after fall while attempting to transfer to wheelchair.  No head trauma or LOC.  Diagnostic workup performed including labs and x-rays.  Patient has increasing abdominal distention likely related to chronic CHF.  Concern for possible developing infection and patient's stump on the left BKA.  Patient's lab work demonstrates mild leukocytosis with normal serum lactate.  Blood cultures will be obtained and patient started empirically on antibiotics for coverage.  Patient also significantly hypokalemic on workup.  Ordered p.o. and IV repletion of potassium.  Patient also with hypoglycemia, treated with half amp of D50.  Patient's chest x-ray demonstrating large right-sided pleural effusion that will likely need intervention.  Patient requiring supplemental O2 via nasal cannula due to low O2 saturations on room air around 90%.  Anticipate admission given patient's multiple medical issues.    Problems Addressed:  Acute on chronic combined systolic (congestive) and diastolic (congestive) heart failure (CMS/HCC): chronic illness or injury with exacerbation, progression, or side effects of treatment  PIPPA (acute kidney injury) (CMS/HCC): acute illness or injury  Fall, initial encounter: acute illness or injury  Hypoglycemia: acute illness or injury  Hypokalemia: acute illness or injury  Pleural effusion on right: acute illness or injury    Amount and/or Complexity of Data Reviewed  Labs: ordered. Decision-making details documented in ED Course.     Details: Labs reviewed demonstrating mild leukocytosis of 11.8, baseline H&H.  Metabolic panel with slightly worse renal function with creatinine of 1.54 and GFR of 54.  Potassium 2.4.  Patient hypoglycemic with a glucose of  54.  Mildly elevated alk phos and total bilirubin.  Low albumin at 3.2.  BNP significantly elevated above 4000.  Normal serum lactate.  Radiology: ordered.     Details: Single view chest x-ray interpreted by myself demonstrating a large right-sided pleural effusion.  X-ray tip/fib without acute findings, below-knee amputation.  ECG/medicine tests: ordered and independent interpretation performed. Decision-making details documented in ED Course.     Details: Twelve-lead ECG obtained at 0843 by my interpretation demonstrates sinus rhythm with PVCs, right bundle branch block morphology, rate of 85, no STEMI  Discussion of management or test interpretation with external provider(s): Case discussed with hospitalist for admission.        Procedure  Procedures     Jamey Xavier MD  03/15/24 1217       Jamey Xavier MD  03/15/24 1215

## 2024-03-15 NOTE — Clinical Note
Procedure completed pt tolerated well, 9/10 pain in front of right chest, no SOB, vaseline gauze and tegaderm in place

## 2024-03-16 ENCOUNTER — APPOINTMENT (OUTPATIENT)
Dept: CARDIOLOGY | Facility: HOSPITAL | Age: 54
End: 2024-03-16
Payer: MEDICAID

## 2024-03-16 LAB
ALBUMIN SERPL BCP-MCNC: 2.6 G/DL (ref 3.4–5)
ALP SERPL-CCNC: 125 U/L (ref 33–120)
ALT SERPL W P-5'-P-CCNC: 29 U/L (ref 10–52)
ANION GAP SERPL CALC-SCNC: 14 MMOL/L (ref 10–20)
AST SERPL W P-5'-P-CCNC: 47 U/L (ref 9–39)
ATRIAL RATE: 85 BPM
BILIRUB SERPL-MCNC: 1.5 MG/DL (ref 0–1.2)
BUN SERPL-MCNC: 44 MG/DL (ref 6–23)
CALCIUM SERPL-MCNC: 7.7 MG/DL (ref 8.6–10.3)
CHLORIDE SERPL-SCNC: 96 MMOL/L (ref 98–107)
CO2 SERPL-SCNC: 30 MMOL/L (ref 21–32)
CREAT SERPL-MCNC: 1.61 MG/DL (ref 0.5–1.3)
EGFRCR SERPLBLD CKD-EPI 2021: 51 ML/MIN/1.73M*2
ERYTHROCYTE [DISTWIDTH] IN BLOOD BY AUTOMATED COUNT: 18.3 % (ref 11.5–14.5)
GLUCOSE BLD MANUAL STRIP-MCNC: 329 MG/DL (ref 74–99)
GLUCOSE SERPL-MCNC: 355 MG/DL (ref 74–99)
HCT VFR BLD AUTO: 27.4 % (ref 41–52)
HGB BLD-MCNC: 8.3 G/DL (ref 13.5–17.5)
HOLD SPECIMEN: NORMAL
MAGNESIUM SERPL-MCNC: 1.89 MG/DL (ref 1.6–2.4)
MCH RBC QN AUTO: 19.6 PG (ref 26–34)
MCHC RBC AUTO-ENTMCNC: 30.3 G/DL (ref 32–36)
MCV RBC AUTO: 65 FL (ref 80–100)
NRBC BLD-RTO: 0 /100 WBCS (ref 0–0)
P AXIS: 51 DEGREES
P OFFSET: 172 MS
P ONSET: 136 MS
PLATELET # BLD AUTO: 402 X10*3/UL (ref 150–450)
POTASSIUM SERPL-SCNC: 3.8 MMOL/L (ref 3.5–5.3)
PR INTERVAL: 136 MS
PROT SERPL-MCNC: 6.6 G/DL (ref 6.4–8.2)
Q ONSET: 204 MS
QRS COUNT: 14 BEATS
QRS DURATION: 148 MS
QT INTERVAL: 402 MS
QTC CALCULATION(BAZETT): 478 MS
QTC FREDERICIA: 451 MS
R AXIS: -18 DEGREES
RBC # BLD AUTO: 4.23 X10*6/UL (ref 4.5–5.9)
SODIUM SERPL-SCNC: 136 MMOL/L (ref 136–145)
T AXIS: -3 DEGREES
T OFFSET: 405 MS
VANCOMYCIN SERPL-MCNC: <2 UG/ML (ref 5–20)
VANCOMYCIN SERPL-MCNC: <2 UG/ML (ref 5–20)
VENTRICULAR RATE: 85 BPM
WBC # BLD AUTO: 14.4 X10*3/UL (ref 4.4–11.3)

## 2024-03-16 PROCEDURE — 99232 SBSQ HOSP IP/OBS MODERATE 35: CPT | Performed by: STUDENT IN AN ORGANIZED HEALTH CARE EDUCATION/TRAINING PROGRAM

## 2024-03-16 PROCEDURE — 1200000002 HC GENERAL ROOM WITH TELEMETRY DAILY

## 2024-03-16 PROCEDURE — 85027 COMPLETE CBC AUTOMATED: CPT | Performed by: STUDENT IN AN ORGANIZED HEALTH CARE EDUCATION/TRAINING PROGRAM

## 2024-03-16 PROCEDURE — 2500000001 HC RX 250 WO HCPCS SELF ADMINISTERED DRUGS (ALT 637 FOR MEDICARE OP): Performed by: STUDENT IN AN ORGANIZED HEALTH CARE EDUCATION/TRAINING PROGRAM

## 2024-03-16 PROCEDURE — 83735 ASSAY OF MAGNESIUM: CPT | Performed by: STUDENT IN AN ORGANIZED HEALTH CARE EDUCATION/TRAINING PROGRAM

## 2024-03-16 PROCEDURE — 82947 ASSAY GLUCOSE BLOOD QUANT: CPT

## 2024-03-16 PROCEDURE — 80053 COMPREHEN METABOLIC PANEL: CPT | Performed by: STUDENT IN AN ORGANIZED HEALTH CARE EDUCATION/TRAINING PROGRAM

## 2024-03-16 PROCEDURE — 80202 ASSAY OF VANCOMYCIN: CPT | Performed by: PHARMACIST

## 2024-03-16 PROCEDURE — 36415 COLL VENOUS BLD VENIPUNCTURE: CPT | Performed by: STUDENT IN AN ORGANIZED HEALTH CARE EDUCATION/TRAINING PROGRAM

## 2024-03-16 PROCEDURE — 2500000004 HC RX 250 GENERAL PHARMACY W/ HCPCS (ALT 636 FOR OP/ED): Performed by: STUDENT IN AN ORGANIZED HEALTH CARE EDUCATION/TRAINING PROGRAM

## 2024-03-16 PROCEDURE — 2500000002 HC RX 250 W HCPCS SELF ADMINISTERED DRUGS (ALT 637 FOR MEDICARE OP, ALT 636 FOR OP/ED): Performed by: STUDENT IN AN ORGANIZED HEALTH CARE EDUCATION/TRAINING PROGRAM

## 2024-03-16 PROCEDURE — 99223 1ST HOSP IP/OBS HIGH 75: CPT | Performed by: INTERNAL MEDICINE

## 2024-03-16 RX ADMIN — FUROSEMIDE 40 MG: 10 INJECTION, SOLUTION INTRAMUSCULAR; INTRAVENOUS at 16:27

## 2024-03-16 RX ADMIN — METFORMIN HYDROCHLORIDE 500 MG: 500 TABLET, FILM COATED ORAL at 08:19

## 2024-03-16 RX ADMIN — PANTOPRAZOLE SODIUM 40 MG: 40 TABLET, DELAYED RELEASE ORAL at 06:17

## 2024-03-16 RX ADMIN — DOCUSATE SODIUM 100 MG: 100 CAPSULE, LIQUID FILLED ORAL at 20:16

## 2024-03-16 RX ADMIN — METHIMAZOLE 5 MG: 5 TABLET ORAL at 08:25

## 2024-03-16 RX ADMIN — ENOXAPARIN SODIUM 40 MG: 40 INJECTION SUBCUTANEOUS at 14:49

## 2024-03-16 RX ADMIN — DOCUSATE SODIUM 100 MG: 100 CAPSULE, LIQUID FILLED ORAL at 08:19

## 2024-03-16 RX ADMIN — METOPROLOL SUCCINATE 25 MG: 25 TABLET, EXTENDED RELEASE ORAL at 08:19

## 2024-03-16 RX ADMIN — Medication 3 MG: at 20:16

## 2024-03-16 RX ADMIN — POTASSIUM CHLORIDE 20 MEQ: 1.5 POWDER, FOR SOLUTION ORAL at 08:19

## 2024-03-16 RX ADMIN — POTASSIUM CHLORIDE 20 MEQ: 1.5 POWDER, FOR SOLUTION ORAL at 20:16

## 2024-03-16 RX ADMIN — CEFEPIME 2 G: 2 INJECTION, POWDER, FOR SOLUTION INTRAVENOUS at 11:37

## 2024-03-16 RX ADMIN — SPIRONOLACTONE 25 MG: 25 TABLET ORAL at 08:20

## 2024-03-16 ASSESSMENT — COGNITIVE AND FUNCTIONAL STATUS - GENERAL
MOVING FROM LYING ON BACK TO SITTING ON SIDE OF FLAT BED WITH BEDRAILS: A LITTLE
TOILETING: A LITTLE
STANDING UP FROM CHAIR USING ARMS: A LITTLE
DRESSING REGULAR UPPER BODY CLOTHING: A LITTLE
PERSONAL GROOMING: A LITTLE
HELP NEEDED FOR BATHING: A LITTLE
MOVING TO AND FROM BED TO CHAIR: A LITTLE
DAILY ACTIVITIY SCORE: 19
MOVING FROM LYING ON BACK TO SITTING ON SIDE OF FLAT BED WITH BEDRAILS: A LITTLE
DAILY ACTIVITIY SCORE: 19
STANDING UP FROM CHAIR USING ARMS: A LITTLE
TURNING FROM BACK TO SIDE WHILE IN FLAT BAD: A LITTLE
DRESSING REGULAR LOWER BODY CLOTHING: A LITTLE
TURNING FROM BACK TO SIDE WHILE IN FLAT BAD: A LITTLE
MOBILITY SCORE: 15
WALKING IN HOSPITAL ROOM: A LOT
DRESSING REGULAR LOWER BODY CLOTHING: A LITTLE
MOBILITY SCORE: 15
DRESSING REGULAR UPPER BODY CLOTHING: A LITTLE
WALKING IN HOSPITAL ROOM: A LOT
MOVING TO AND FROM BED TO CHAIR: A LITTLE
HELP NEEDED FOR BATHING: A LITTLE
PERSONAL GROOMING: A LITTLE
CLIMB 3 TO 5 STEPS WITH RAILING: TOTAL
TOILETING: A LITTLE
CLIMB 3 TO 5 STEPS WITH RAILING: TOTAL

## 2024-03-16 ASSESSMENT — PAIN SCALES - GENERAL
PAINLEVEL_OUTOF10: 0 - NO PAIN
PAINLEVEL_OUTOF10: 0 - NO PAIN

## 2024-03-16 ASSESSMENT — PAIN - FUNCTIONAL ASSESSMENT: PAIN_FUNCTIONAL_ASSESSMENT: 0-10

## 2024-03-16 NOTE — CONSULTS
Inpatient consult to Cardiology  Consult performed by: Juan Argueta MD  Consult ordered by: Izzy Early MD  Reason for consult: Congestive heart failure      Cardiology Consult Note      Date:   3/16/2024  Patient name:  Hitesh Jurado  Date of admission:  3/15/2024  8:34 AM  MRN:   [id number]  YOB: 1970  Time of Consult:  2:29 PM    Consulting Cardiologist: Dr. Juan Argueta      Primary Cardiologist:   Dr. Raquel Gupta, [facility]         Admission Diagnosis:     Acute on chronic combined systolic (congestive) and diastolic (congestive) heart failure (CMS/HCC)      History of Present Illness:      Hitesh Jurado is a 53 y.o.  male patient who is being at the request of Dr. Izzy Early for inpatient consultation of  congestive heart failure . He was admitted on 3/15/2024.  Previous [facility], [facility], and [facility] records have been reviewed.   The patient is a very poor historian.       He has a history of atherosclerotic heart disease and ischemic cardiomyopathy.  He was initially diagnosed with HFrEF at [facility] in February 2021.  Cardiac catheterization showed total occlusion of the circumflex with left to left collaterals.  The LAD had 30% stenosis with very small caliber diagonal branches and severe diffuse disease.  The RCA had 20 to 40% stenosis.  Estimated LV ejection fraction was 30%.  Echocardiogram February 2021 showed an estimated LV ejection action 30% with mild mitral and tricuspid regurgitation.  Estimated RVSP 50 to 60 mmHg.  He has a history of peripheral vascular disease with previous right to left femorofemoral bypass.  He had right common iliac angioplasty and external iliac artery stenting in 2019.  He has insulin-dependent diabetes mellitus and history of diabetic foot ulcers.  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  He has hypertension, hyperlipidemia, tobacco use, and chronic kidney disease.  He was last seen  by [facility] cardiology (Dr. Patrick) In March 2021.  At that time it was recommended he undergo cardiac MRI to evaluate for viability with consideration of ICD implant.  He was on carvedilol, lisinopril, and spironolactone.  SGLT2 inhibitor was not recommended at that time secondary to peripheral vascular disease.    He was admitted to [facility] January 15, 2024 with acute on chronic systolic congestive heart failure.  He was also treated for bilateral pneumonia.  He was noted to have anasarca/ascites.  Patient was noted to be noncompliant with taking his medications and going to follow-up office visits.  He was treated with IV diuretics and 5 days of IV antibiotics.  He was discharged on furosemide 60 mg twice daily.  He was also on aspirin, Plavix, atorvastatin, insulin, metformin, and Toprol XL.  Lisinopril was discontinued.  He was scheduled to see Dr. Wojciech [facility] on January 30, 2024 but left without being seen.  He is scheduled to see Dr. Raquel Gupta with [facility] cardiology on April 18, 2024.  He states he avoids seeing PAs or nurse practitioners.  He follows with Dr. Geno Mix for nephrology at .  He was seen in the emergency department in [place] last week for hypokalemia and reported brief seizure activity.    He was brought to the emergency department yesterday by EMS after he fell at home.  He states he typically gets around in a wheelchair and fell when he was getting out of the shower.  He denies loss of consciousness.  He states he has had significant weight gain and feels bloated.  He states he has large amounts of fluid trapped in his belly but is concerned about getting rid of it due to possible harm to his kidneys.  He was noted to have stable vital signs with exception of oxygen saturation 93%.  Chronic nonhealing ulcer of the right foot was noted.  Possible infection of the left BKA stump.  BNP was 4248 with potassium 2.4 and creatinine 1.54.  Hemoglobin 9.3.   Chest x-ray showed a large right pleural effusion.  He was hypoglycemic and was given D50.  Potassium was supplemented.  He was admitted to telemetry and initiated on intravenous furosemide.  He has been started on empiric antibiotics.  He is scheduled undergo thoracentesis.    The patient currently sitting up in the chair.  He denies any chest pain or worsening shortness of breath.  He currently is refusing IV furosemide.  He states he would like to consult with a nephrologist before having further medication adjustments. He denies any orthopnea or PND.  He denies any palpitations, lightheadedness, near-syncope, or syncope.  He denies any fever, chills, or cough.  He denies any nausea, vomiting, or diaphoresis.  He denies any hemoptysis, hematemesis, melena, or hematochezia.      Allergies:     Allergies   Allergen Reactions    Amoxicillin Unknown         Past Medical History:     Past Medical History:   Diagnosis Date    CHF (congestive heart failure) (CMS/[facility])     Diabetes mellitus (CMS/[facility])        Past Surgical History:     No past surgical history on file.    Family History:     No family history on file.    Social History:     Social History     Tobacco Use    Smoking status: Every Day     Packs/day: .25     Types: Cigarettes    Smokeless tobacco: Never   Substance Use Topics    Alcohol use: Never    Drug use: Never       CURRENT HOSPITAL MEDICATIONS    cefepime, 2 g, intravenous, q24h  docusate sodium, 100 mg, oral, BID  enoxaparin, 40 mg, subcutaneous, q24h  furosemide, 40 mg, intravenous, BID  melatonin, 3 mg, oral, Daily  metFORMIN, 500 mg, oral, Daily with breakfast  methIMAzole, 5 mg, oral, Daily  metoprolol succinate XL, 25 mg, oral, Daily  pantoprazole, 40 mg, oral, Daily before breakfast   Or  pantoprazole, 40 mg, intravenous, Daily before breakfast  perflutren lipid microspheres, 0.5-10 mL of dilution, intravenous, Once in imaging  perflutren protein A microsphere, 0.5 mL, intravenous, Once in  imaging  potassium chloride, 20 mEq, oral, BID  spironolactone, 25 mg, oral, Daily  sulfur hexafluoride microsphr, 2 mL, intravenous, Once in imaging  vancomycin, 1,000 mg, intravenous, q24h         Current Outpatient Medications   Medication Instructions    acetaminophen (TYLENOL) 500 mg, oral, Every 8 hours PRN    aspirin 81 mg EC tablet 1 tablet, oral, Daily    atorvastatin (Lipitor) 80 mg tablet 1 tablet, oral, Daily    azithromycin (ZITHROMAX) 500 mg, oral, Every 24 hours scheduled    carvedilol (COREG) 12.5 mg, oral, 2 times daily    clopidogrel (PLAVIX) 75 mg, oral, Daily    empagliflozin (JARDIANCE) 10 mg, oral, Daily    furosemide (LASIX) 60 mg, oral, 2 times daily    gabapentin (NEURONTIN) 400 mg, oral, 3 times daily    insulin lispro (HumaLOG) 100 unit/mL injection subcutaneous, 3 times daily with meals, Take as directed per insulin instructions.    Lantus U-100 Insulin 8 Units, subcutaneous, Nightly, Take as directed per insulin instructions.    lisinopril 5 mg, oral, Nightly    melatonin 5 mg, oral, Nightly    metFORMIN (Glucophage) 1,000 mg tablet 0.5 tablets, oral, Daily with breakfast    methIMAzole (TAPAZOLE) 20 mg, oral, Every 8 hours scheduled    metoprolol succinate XL (TOPROL-XL) 25 mg, oral, Daily, Do not crush or chew.    mupirocin (Bactroban) 2 % ointment Topical, 3 times daily RT    omeprazole (PRILOSEC) 40 mg, oral, 2 times daily    potassium chloride CR 20 mEq ER tablet 20 mEq, oral, 3 times daily, Do not crush or chew. For 10 days    spironolactone (ALDACTONE) 25 mg, oral, Daily    torsemide (DEMADEX) 20 mg, oral, Daily PRN    Tradjenta 5 mg, oral, Daily        Review of Systems:      12 point review of systems was obtained in detail and is negative other than that detailed above.      Vital Signs:     Vitals:    03/15/24 2355 03/16/24 0814 03/16/24 0836 03/16/24 1417   BP: 122/87 111/67  118/60   BP Location:       Patient Position: Sitting   Sitting   Pulse: 96 100  90   Resp: 18 16  18    Temp: 37.1 °C (98.8 °F) 36.6 °C (97.9 °F)  36.8 °C (98.2 °F)   TempSrc: Temporal      SpO2: 96% 96% 93% 99%   Weight:       Height:           Intake/Output Summary (Last 24 hours) at 3/16/2024 1429  Last data filed at 3/16/2024 1153  Gross per 24 hour   Intake --   Output 450 ml   Net -450 ml       Wt Readings from Last 4 Encounters:   03/15/24 68 kg (150 lb)   10/30/23 55.9 kg (123 lb 3.8 oz)   07/21/23 59 kg (130 lb)   06/30/23 57.2 kg (126 lb)       Physical Examination:     GENERAL APPEARANCE: Well developed, chronically ill-appearing, in no acute distress.  CHEST: Symmetric and non-tender.  INTEGUMENT: Skin warm and dry, without gross excoriationis or lesions.  HEENT: No gross abnormalities of conjunctiva, teeth, gums, oral mucosa  NECK: Supple, positive JVD, no bruit. Thyroid not palpable. Carotid upstrokes normal.  NEURO/PSHCY: Alert and oriented x3.  LUNGS: Decreased breath sounds at bases right greater than left  HEART: Rate and rhythm regular with occasional ectopy; I/VI DEVON LSB gallop appreciated. There are no rubs, clicks or heaves. PMI nondisplaced.  ABDOMEN: Firm and distended, nontender.  EXTREMITIES: Erythema of the left BKA stump and right foot.  PERIPHERAL VASCULAR: Diminished pulses throughout.      Lab:     CBC:   Lab Results   Component Value Date    WBC 14.4 (H) 03/16/2024    RBC 4.23 (L) 03/16/2024    HGB 8.3 (L) 03/16/2024    HCT 27.4 (L) 03/16/2024     03/16/2024        CMP:    Lab Results   Component Value Date     03/16/2024    K 3.8 03/16/2024    CL 96 (L) 03/16/2024    CO2 30 03/16/2024    BUN 44 (H) 03/16/2024    CREATININE 1.61 (H) 03/16/2024    GLUCOSE 355 (H) 03/16/2024    CALCIUM 7.7 (L) 03/16/2024       Magnesium:    Lab Results   Component Value Date    MG 1.89 03/16/2024       BNP:   Lab Results   Component Value Date    BNP 4,248 (H) 03/15/2024        PT/INR:    Lab Results   Component Value Date    PROTIME 15.1 (H) 03/15/2024    INR 1.3 (H) 03/15/2024        HgBA1c:    Lab Results   Component Value Date    HGBA1C 9.0 (H) 01/16/2024       BMP:  Lab Results   Component Value Date     03/16/2024     03/15/2024    K 3.8 03/16/2024    K 2.4 (LL) 03/15/2024    CL 96 (L) 03/16/2024    CL 96 (L) 03/15/2024    CO2 30 03/16/2024    CO2 31 03/15/2024    BUN 44 (H) 03/16/2024    BUN 44 (H) 03/15/2024    CREATININE 1.61 (H) 03/16/2024    CREATININE 1.54 (H) 03/15/2024       CBC:  Lab Results   Component Value Date    WBC 14.4 (H) 03/16/2024    WBC 11.8 (H) 03/15/2024    RBC 4.23 (L) 03/16/2024    RBC 4.66 03/15/2024    HGB 8.3 (L) 03/16/2024    HGB 9.3 (L) 03/15/2024    HCT 27.4 (L) 03/16/2024    HCT 30.4 (L) 03/15/2024    MCV 65 (L) 03/16/2024    MCV 65 (L) 03/15/2024    MCH 19.6 (L) 03/16/2024    MCH 20.0 (L) 03/15/2024    MCHC 30.3 (L) 03/16/2024    MCHC 30.6 (L) 03/15/2024    RDW 18.3 (H) 03/16/2024    RDW 18.4 (H) 03/15/2024     03/16/2024     03/15/2024       Cardiac Enzymes:    Lab Results   Component Value Date    TROPHS 754 (HH) 11/01/2023    TROPHS 642 (HH) 11/01/2023    TROPHS 546 (HH) 11/01/2023       Hepatic Function Panel:    Lab Results   Component Value Date    ALKPHOS 125 (H) 03/16/2024    ALT 29 03/16/2024    AST 47 (H) 03/16/2024    PROT 6.6 03/16/2024    BILITOT 1.5 (H) 03/16/2024    BILIDIR 0.7 (H) 03/15/2024       Diagnostic Studies:     ECG 12 lead  Result Date: 3/16/2024    Sinus rhythm with Premature supraventricular complexes and with occasional Premature ventricular complexes Right bundle branch block Possible Lateral infarct , age undetermined Abnormal ECG When compared with ECG of 01-NOV-2023 12:19, Premature supraventricular complexes are now Present Borderline criteria for Lateral infarct are now Present See ED provider note for full interpretation and clinical correlation Confirmed by Lisa Naidu ([id number]) on 3/16/2024 11:49:50 AM      Radiology:     XR chest 1 view   Final Result   Large right pleural effusion.         MACRO:   None        Signed by: Artur Santos 3/15/2024 11:33 AM   Dictation workstation:   [id number]      XR tibia fibula left 2 views   Final Result   Intact left tibia and fibula, post below-knee amputation.        MACRO:   None        Signed by: Artur Santos 3/15/2024 11:33 AM   Dictation workstation:   [id number]      Transthoracic Echo (TTE) Complete    (Results Pending)       Problem List:     Patient Active Problem List   Diagnosis    Status post below-knee amputation of left lower extremity (CMS/[facility])    PAD (peripheral artery disease) (CMS/[facility])    ACC/AHA stage C acute systolic heart failure (CMS/[facility])    Coronary artery disease involving native coronary artery without angina pectoris    Mixed hyperlipidemia    Thyroid mass    Tobacco dependence with current use    PIPPA (acute kidney injury) (CMS/[facility])    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    Chronic congestive heart failure (CMS/[facility])    GERD (gastroesophageal reflux disease)    Dyspepsia    Critical lower limb ischemia (CMS/[facility])    Chronic obstructive pulmonary disease, unspecified (CMS/[facility])    Primary hypertension    PTSD (post-traumatic stress disorder)    JONA (generalized anxiety disorder)    Severe episode of recurrent major depressive disorder, without psychotic features (CMS/[facility])    Type 2 diabetes mellitus (CMS/[facility])    COPD exacerbation (CMS/[facility])    NSTEMI (non-ST elevated myocardial infarction) (CMS/[facility])    Elevated troponin    COPD (chronic obstructive pulmonary disease) (CMS/[facility])    Acute on chronic combined systolic (congestive) and diastolic (congestive) heart failure (CMS/[facility])       Assessment:           1.  Acute on chronic systolic congestive heart failure.  Documented to have severe LV dysfunction in the past with estimated LV ejection fraction of 25 to 30%.  Patient has been managed with various medications including Toprol-XL, Jardiance, Aldactone, and diuretics.  He has not been felt to be a  good candidate for Entresto.  He has been noncompliant with taking medications and going to some of his follow-up visits.    2.  Atherosclerotic heart disease with underlying ischemic cardiomyopathy.  Cardiac catheterization in 2021 showing total occlusion of the circumflex and severe distal LAD disease.    3.  Large right pleural effusion.    4.  Hypokalemia.    5.  Severe peripheral vascular disease.  Status post left below-knee amputation.  Status post femorofemoral bypass July 2019 along with additional angioplasty procedures.  Currently with leukocytosis and possible infection of the left BKA stump.    6.  Chronic kidney disease.    7.  Insulin-dependent diabetes mellitus.    8.  Primary hypertension.    9.  COPD related to longstanding tobacco abuse.    10.  Mixed hyperlipidemia.      Plan:     Monitor on telemetry.    Agree with IV furosemide 40 mg twice daily.  As noted patient currently is opting against using this.    Review echocardiogram once completed.    Restart carvedilol 6.25 mg twice daily.  He was previously taking Jardiance and low-dose lisinopril.    Agree with right thoracentesis.    Nephrology evaluation.    Further recommendations to follow.    Thank you for the opportunity to participate in the care of your patient.  Please do not hesitate to call if you have any questions.    Electronically signed by Juan Argueta MD, on 3/16/2024 at 2:29 PM

## 2024-03-16 NOTE — CARE PLAN
The patient's goals for the shift include      The clinical goals for the shift include free from falls and injury    Problem: Fall/Injury  Goal: Not fall by end of shift  Outcome: Progressing  Goal: Be free from injury by end of the shift  Outcome: Progressing  Goal: Verbalize understanding of personal risk factors for fall in the hospital  Outcome: Progressing  Goal: Verbalize understanding of risk factor reduction measures to prevent injury from fall in the home  Outcome: Progressing  Goal: Use assistive devices by end of the shift  Outcome: Progressing  Goal: Pace activities to prevent fatigue by end of the shift  Outcome: Progressing     Problem: Skin  Goal: Decreased wound size/increased tissue granulation at next dressing change  Outcome: Progressing  Goal: Participates in plan/prevention/treatment measures  Outcome: Progressing  Goal: Prevent/manage excess moisture  Outcome: Progressing  Goal: Prevent/minimize sheer/friction injuries  Outcome: Progressing  Goal: Promote/optimize nutrition  Outcome: Progressing  Goal: Promote skin healing  Outcome: Progressing

## 2024-03-16 NOTE — CONSULTS
[facility] Nephrology  Consult Note           Reason for Consult:  PIPPA on CKD  Requesting Physician:  Dr. Early     Chief Complaint:  Fall, SOB   History Obtained From:  patient, electronic medical record    History of Present Ilness:    53 y.o. male with history s/f HFrEF, PAD s/p LT BKA, T2DM c/b chronic non-healing wounds on Les who presented for a fall while attempting to transfer. Associated w/ pain in LT BKA stump and hips. Was recently [facility] last week for hypokalemia and pt left AMA. On presentation pt hemodynamically stable. Scr 1.35 in 11/23 and on presentation Scr 1.54. BNP 4248. Cxr showed large RT basilar pleural effusion occupying greater than 50% of the hemithoracic volume w/ overlyging atelectasis and mild pulmonary vascular congestion. X-ray of the left tibia-fibula showed BKA changes with tiny patellar osteophytes, small knee joint effusion, small foreign bodies lateral to the knee joint are still present and osteopenia. Pt started on diuretics. No contrast administered.     Past Medical History:    Past Medical History:   Diagnosis Date    CHF (congestive heart failure) (CMS/[facility])     Diabetes mellitus (CMS/[facility])        Past Surgical History:    No past surgical history on file.    Home Medications:    No current facility-administered medications on file prior to encounter.     Current Outpatient Medications on File Prior to Encounter   Medication Sig Dispense Refill    acetaminophen (Tylenol) 500 mg tablet Take 1 tablet (500 mg) by mouth every 8 hours if needed.      aspirin 81 mg EC tablet Take 1 tablet (81 mg) by mouth once daily.      atorvastatin (Lipitor) 80 mg tablet Take 1 tablet (80 mg) by mouth once daily.      azithromycin (Zithromax) 500 mg tablet Take 1 tablet (500 mg) by mouth once every 24 hours. Do not start before October 30, 2023. (Patient not taking: Reported on 3/15/2024) 5 tablet 0    carvedilol (Coreg) 12.5 mg tablet Take 1 tablet (12.5 mg) by mouth twice a day.      clopidogrel  (Plavix) 75 mg tablet Take 1 tablet (75 mg) by mouth once daily.      empagliflozin (Jardiance) 10 mg Take 1 tablet (10 mg) by mouth once daily.      furosemide (Lasix) 20 mg tablet Take 3 tablets (60 mg) by mouth 2 times a day.      gabapentin (Neurontin) 400 mg capsule Take 1 capsule (400 mg) by mouth 3 times a day.      insulin glargine (Lantus U-100 Insulin) 100 unit/mL injection Inject 8 Units under the skin once daily at bedtime. Take as directed per insulin instructions.      insulin lispro (HumaLOG) 100 unit/mL injection Inject under the skin 3 times a day with meals. Take as directed per insulin instructions.      linaGLIPtin (Tradjenta) 5 mg tablet Take 1 tablet (5 mg) by mouth once daily.      lisinopril 5 mg tablet Take 1 tablet (5 mg) by mouth once daily at bedtime.      melatonin 5 mg tablet Take 1 tablet (5 mg) by mouth once daily at bedtime.      metFORMIN (Glucophage) 1,000 mg tablet Take 0.5 tablets (500 mg) by mouth once daily with breakfast.      methIMAzole (Tapazole) 10 mg tablet Take 2 tablets (20 mg) by mouth every 8 hours. (Patient taking differently: Take 0.5 tablets (5 mg) by mouth once daily. 2.5 tabs) 90 tablet 0    metoprolol succinate XL (Toprol-XL) 25 mg 24 hr tablet Take 1 tablet (25 mg) by mouth once daily. Do not crush or chew.      mupirocin (Bactroban) 2 % ointment Apply topically 3 times a day.      omeprazole (PriLOSEC) 40 mg DR capsule Take 1 capsule (40 mg) by mouth twice a day.      potassium chloride CR 20 mEq ER tablet Take 1 tablet (20 mEq) by mouth 3 times a day. Do not crush or chew. For 10 days      spironolactone (Aldactone) 25 mg tablet Take 1 tablet (25 mg) by mouth once daily.      torsemide (Demadex) 20 mg tablet Take 1 tablet (20 mg) by mouth once daily as needed.         Allergies:  Amoxicillin    Social History:    Social History     Socioeconomic History    Marital status: Single     Spouse name: Not on file    Number of children: Not on file    Years of  "education: Not on file    Highest education level: Not on file   Occupational History    Not on file   Tobacco Use    Smoking status: Every Day     Packs/day: .25     Types: Cigarettes    Smokeless tobacco: Never   Substance and Sexual Activity    Alcohol use: Never    Drug use: Never    Sexual activity: Defer   Other Topics Concern    Not on file   Social History Narrative    Not on file     Social Determinants of Health     Financial Resource Strain: Low Risk  (3/15/2024)    Overall Financial Resource Strain (CARDIA)     Difficulty of Paying Living Expenses: Not very hard   Food Insecurity: Not on file   Transportation Needs: No Transportation Needs (3/15/2024)    PRAPARE - Transportation     Lack of Transportation (Medical): No     Lack of Transportation (Non-Medical): No   Physical Activity: Not on file   Stress: Not on file   Social Connections: Not on file   Intimate Partner Violence: Not on file   Housing Stability: Low Risk  (3/15/2024)    Housing Stability Vital Sign     Unable to Pay for Housing in the Last Year: No     Number of Places Lived in the Last Year: 1     Unstable Housing in the Last Year: No       Family History:   No family history on file.    Review of Systems:   Unable to obtain due to altered mental status      Physical exam:   Constitutional:  VITALS:  /60 (Patient Position: Sitting)   Pulse 90   Temp 36.8 °C (98.2 °F)   Resp 18   Ht 1.753 m (5' 9\")   Wt 68 kg (150 lb)   SpO2 99%   BMI 22.15 kg/m²     General: alert, in no apparent distress  HEENT: normocephalic, atraumatic, anicteric  Neck: supple, no mass  Lungs: non-labored respirations, clear to auscultation bilaterally  Heart: irregular rhythm, no murmurs or rubs  Abdomen: soft, non-tender, non-distended  MSK: no joint swelling or tenderness  Ext: no cyanosis, ++ peripheral edema  Neuro: alert and oriented, no gross abnormalities  Psych: normal mood and affect    Data/  CBC:   Lab Results   Component Value Date    WBC " 14.4 (H) 03/16/2024    RBC 4.23 (L) 03/16/2024    HGB 8.3 (L) 03/16/2024    HCT 27.4 (L) 03/16/2024    MCV 65 (L) 03/16/2024    MCH 19.6 (L) 03/16/2024    MCHC 30.3 (L) 03/16/2024    RDW 18.3 (H) 03/16/2024     03/16/2024     BMP:    Lab Results   Component Value Date     03/16/2024    K 3.8 03/16/2024    CL 96 (L) 03/16/2024    CO2 30 03/16/2024    BUN 44 (H) 03/16/2024    CREATININE 1.61 (H) 03/16/2024    CALCIUM 7.7 (L) 03/16/2024    GLUCOSE 355 (H) 03/16/2024     Assessment:  53 y.o. male with history s/f HFrEF, PAD s/p LT BKA, T2DM c/b chronic non-healing wounds on Les who presented for a fall while attempting to transfer.     PIPPA on CKD stage II: baseline Scr ~1.2 w/ eGFR high 60s, tends to get recurrent AKIs  Hypokalemia   Large RT pleural effusion  Anemia   Hypoalbuminemia   Transaminitis     Plan:  - K repletion  - continue aldactone and lasix 40 mg IV BID     Thank you for the consultation. Will continue to follow  Please do not hesitate to call with questions.    Malgorzata Williamson MD

## 2024-03-16 NOTE — PROGRESS NOTES
Hitesh Jurado is a 53 y.o. male on day 1 of admission presenting with Acute on chronic combined systolic (congestive) and diastolic (congestive) heart failure (CMS/HCC).      Subjective     Patient seen and assessed at bedside.  Patient very distended and complaining of shortness of breath as well as abdominal pain and distention..  Reporting that he had a fall where he injured upper left quadrant of abdomen and is having intermittent pain.   fall occurred several weeks ago.      Objective     Last Recorded Vitals  /60 (Patient Position: Sitting)   Pulse 90   Temp 36.8 °C (98.2 °F)   Resp 18   Wt 68 kg (150 lb)   SpO2 99%   Intake/Output last 3 Shifts:    Intake/Output Summary (Last 24 hours) at 3/16/2024 1531  Last data filed at 3/16/2024 1153  Gross per 24 hour   Intake --   Output 450 ml   Net -450 ml       Admission Weight  Weight: 68 kg (150 lb) (03/15/24 0843)    Daily Weight  03/15/24 : 68 kg (150 lb)    Image Results  ECG 12 lead  Sinus rhythm with Premature supraventricular complexes and with occasional Premature ventricular complexes  Right bundle branch block  Possible Lateral infarct , age undetermined  Abnormal ECG  When compared with ECG of 01-NOV-2023 12:19,  Premature supraventricular complexes are now Present  Borderline criteria for Lateral infarct are now Present    See ED provider note for full interpretation and clinical correlation  Confirmed by Lisa Naidu ([id number]) on 3/16/2024 11:49:50 AM      Physical Exam    onstitutional:       General: He is in acute distress.      Appearance: He is ill-appearing.   HENT:      Head: Normocephalic.      Mouth/Throat:      Mouth: Mucous membranes are dry.   Eyes:      Pupils: Pupils are equal, round, and reactive to light.   Cardiovascular:      Rate and Rhythm: Normal rate. Rhythm irregular.      Comments:  diminished pulses diffusely  Pulmonary:      Effort: Pulmonary effort is normal.   Abdominal:      General: Bowel sounds are  normal.      Palpations: Abdomen is soft.   Musculoskeletal:         General: Swelling and tenderness present. Normal range of motion.      Right lower leg: Edema present.      Left lower leg: Edema present.      Comments:  erythema and both legs concerning for cellulitis excoriated stump left BKA   Skin:     General: Skin is dry.      Capillary Refill: Capillary refill takes less than 2 seconds.      Coloration: Skin is pale.   Neurological:      Mental Status: He is disoriented.      Motor: Weakness present.   Psychiatric:      Comments:  pain which of proportion with exam           Relevant Results      Results for orders placed or performed during the hospital encounter of 03/15/24 (from the past 24 hour(s))   POCT GLUCOSE   Result Value Ref Range    POCT Glucose 168 (H) 74 - 99 mg/dL   Vancomycin   Result Value Ref Range    Vancomycin <2.0 (L) 5.0 - 20.0 ug/mL   CBC   Result Value Ref Range    WBC 14.4 (H) 4.4 - 11.3 x10*3/uL    nRBC 0.0 0.0 - 0.0 /100 WBCs    RBC 4.23 (L) 4.50 - 5.90 x10*6/uL    Hemoglobin 8.3 (L) 13.5 - 17.5 g/dL    Hematocrit 27.4 (L) 41.0 - 52.0 %    MCV 65 (L) 80 - 100 fL    MCH 19.6 (L) 26.0 - 34.0 pg    MCHC 30.3 (L) 32.0 - 36.0 g/dL    RDW 18.3 (H) 11.5 - 14.5 %    Platelets 402 150 - 450 x10*3/uL   Comprehensive metabolic panel   Result Value Ref Range    Glucose 355 (H) 74 - 99 mg/dL    Sodium 136 136 - 145 mmol/L    Potassium 3.8 3.5 - 5.3 mmol/L    Chloride 96 (L) 98 - 107 mmol/L    Bicarbonate 30 21 - 32 mmol/L    Anion Gap 14 10 - 20 mmol/L    Urea Nitrogen 44 (H) 6 - 23 mg/dL    Creatinine 1.61 (H) 0.50 - 1.30 mg/dL    eGFR 51 (L) >60 mL/min/1.73m*2    Calcium 7.7 (L) 8.6 - 10.3 mg/dL    Albumin 2.6 (L) 3.4 - 5.0 g/dL    Alkaline Phosphatase 125 (H) 33 - 120 U/L    Total Protein 6.6 6.4 - 8.2 g/dL    AST 47 (H) 9 - 39 U/L    Bilirubin, Total 1.5 (H) 0.0 - 1.2 mg/dL    ALT 29 10 - 52 U/L   Magnesium   Result Value Ref Range    Magnesium 1.89 1.60 - 2.40 mg/dL   Vancomycin   Result  Value Ref Range    Vancomycin <2.0 (L) 5.0 - 20.0 ug/mL   SST TOP   Result Value Ref Range    Extra Tube Hold for add-ons.    POCT GLUCOSE   Result Value Ref Range    POCT Glucose 329 (H) 74 - 99 mg/dL              Assessment/Plan        Principal Problem:    Acute on chronic combined systolic (congestive) and diastolic (congestive) heart failure (CMS/HCC)     #Large right-sided pleural effusion  #Hypokalemia  #Leukocytosis in the setting of left BKA wounds concerning for possible infection.   #acute on chronic HFrEF   #microcytic anemia  #Hypoglycemia  - discussed patient with interventional radiology with plan to possibly drain large right pleural effusion today.  Depends on radiologist schedules and if we can fit patient in if not will have patient possibly drained over the weekend by either pulmonology or in ICU depending on progression.  -Will go ahead and start diuresing patient with Lasix 40 mg twice daily will adjust as needed.  -  Will go ahead and consult cardiology  - will obtain echocardiogram  - Place patient on telemetry and monitor  - continue to monitor replete electrolytes  - send hemoglobin A1c and monitor blood glucose.  Will initiate sliding scale insulin  -   Will continue broad-spectrum antibiotics.  Will have  wound care evaluate patient and consult infectious disease if needed.  -  Will continue to monitor patient's hemoglobin with plan to send iron studies given microcytosis.  - renally dose all medication     VTE Prophylaxis:  Lovenox renally dosed  Diet: Cardiac diet  CODE STATUS: Full code    03/16/24  - patient currently being seen by pulmonology.  no significant dates are recommendations apart from adding DuoNebs to regimen.  -   Patient was also started on vancomycin due to concern for possible cellulitis however patient refusing at this point in time.  I discussed this with patient and explained dosing of vancomycin based on renal function.  Patient now agreeable.  - cardiology also  saw patient and agree with Lasix this point in time.  Patient is currently refusing.  Also recommending restarting carvedilol 6.25 mg twice daily and nephrology evaluation.  awaiting echocardiogram results  -  Nephrology consult placed.  - BUN/creatinine stable.  White count elevated to 14.4.  Will continue to monitor.   - plan for thoracentesis most likely on Monday.            Izzy Early MD

## 2024-03-16 NOTE — CONSULTS
"Vancomycin Dosing by Pharmacy- INITIAL    Hitesh Jurado is a 53 y.o. year old male who Pharmacy has been consulted for vancomycin dosing for cellulitis, skin and soft tissue. Based on the patient's indication and renal status this patient will be dosed based on a goal AUC of 400-600.     Renal function is currently stable.    Visit Vitals  /76   Pulse 83   Temp 36.6 °C (97.9 °F)   Resp 17        Lab Results   Component Value Date    CREATININE 1.54 (H) 03/15/2024    CREATININE 1.35 (H) 11/01/2023    CREATININE 1.18 10/30/2023    CREATININE 1.13 10/29/2023        Patient weight is No results found for: \"PTWEIGHT\"    No results found for: \"CULTURE\"     No intake/output data recorded.  [unfilled]    Lab Results   Component Value Date    PATIENTTEMP  11/01/2023      Comment:      NOTE: Patient Results are Not Corrected for Temperature    PATIENTTEMP 37.0 11/14/2022    PATIENTTEMP 37.0 11/13/2022          Assessment/Plan     Patient will not be given a loading dose.  Will initiate vancomycin maintenance,  1000 mg every 24 hours.    This dosing regimen is predicted by [employer] to result in the following pharmacokinetic parameters:  Loading dose: N/A  Regimen: 1000 mg IV every 24 hours.  Start time: 21:00 on 03/15/2024  Exposure target: AUC24 (range)400-600 mg/L.hr   AUC24,ss: 403 mg/L.hr  Probability of AUC24 > 400: 51 %  Ctrough,ss: 11.9 mg/L  Probability of Ctrough,ss > 20: 9 %  Probability of nephrotoxicity (Lodise DAYANARA 2009): 7 %      Follow-up level will be ordered on 03/16/24 at 0000 and 0500 unless clinically indicated sooner.  Will continue to monitor renal function daily while on vancomycin and order serum creatinine at least every 48 hours if not already ordered.  Follow for continued vancomycin needs, clinical response, and signs/symptoms of toxicity.       Caitlin De [facility]       "

## 2024-03-16 NOTE — PROGRESS NOTES
Hitesh Jurado is a 53 y.o. male on day 1 of admission presenting with Acute on chronic combined systolic (congestive) and diastolic (congestive) heart failure (CMS/HCC).      Chief Complaint: Fall at home, shortness of breath     History Of Present Illness:    Hitesh Jurado is a 53 y.o. male with a significant past medical history of  HFrEF, PAD  status post left BKA, diabetes type 2  complicated by chronic nonhealing wounds and lower extremities who was presenting after a fall from his wheelchair while attempting to transfer.   There is no loss of consciousness.  he is complaining of pain in the left BKA stump and hips.  Denies any current chest pain fevers or chills.  Does endorse some shortness of breath.  Of note patient was recently at the [facility] last week where he was found to have hypokalemia and left AMA prior to potassium repletion.       In the emergency room notable findings were blood glucose at 54, potassium at 2.4, BUN/creatinine 44/1.54, BNP 4248, white count at 11.8 with neutrophils at 9.78, hemoglobin at 9.3 with MCV of 65.  x-ray chest showed large right basilar pleural effusion occupying greater than 50% of the hemithoracic volume with overlying atelectasis and mild pulmonary vascular congestion.   X-ray of the left tibia-fibula showed BKA changes with tiny patellar osteophytes, small knee joint effusion, small foreign bodies lateral to the knee joint are still present and osteopenia.  patient was given cefepime and Flagyl as well as dextrose and potassium chloride and started on vancomycin and admitted for further evaluation and treatment.     Recent events have been noted.  Patient per RN refusing a lot of medications and interventions including Lasix and vancomycin.  1 out of 4 cultures come back positive for gram-positive cocci.             Vitals:    03/15/24 1100 03/15/24 1215 03/15/24 1230 03/15/24 1320   BP: 126/82  122/82 107/66   BP Location: Right arm  Right arm     Patient Position: Lying  Lying Lying   Pulse: 86 86 86 89   Resp: 18 18 19 18   Temp:       TempSrc:       SpO2: 96% 96% 95% (!) 93%   Weight:       Height:             Last I/O:  No intake/output data recorded.     Physical Exam  Constitutional:       General: He is in acute distress.      Appearance: He is ill-appearing.   HENT:      Head: Normocephalic.      Mouth/Throat:      Mouth: Mucous membranes are dry.   Eyes:      Pupils: Pupils are equal, round, and reactive to light.   Cardiovascular:      Rate and Rhythm: Normal rate. Rhythm irregular.      Comments:  diminished pulses diffusely  Pulmonary:      Effort: Pulmonary effort is normal.   Abdominal:      General: Bowel sounds are normal.      Palpations: Abdomen is soft.   Musculoskeletal:         General: Swelling and tenderness present. Normal range of motion.      Right lower leg: Edema present.      Left lower leg: Edema present.      Comments:  erythema and both legs concerning for cellulitis excoriated stump left BKA   Skin:     General: Skin is dry.      Capillary Refill: Capillary refill takes less than 2 seconds.      Coloration: Skin is pale.   Neurological:      Mental Status: He is disoriented.      Motor: Weakness present.   Psychiatric:      Comments:  pain which of proportion with exam            Scheduled Medications  docusate sodium, 100 mg, oral, BID  enoxaparin, 40 mg, subcutaneous, q24h  furosemide, 40 mg, intravenous, BID  melatonin, 3 mg, oral, Daily  [START ON 3/16/2024] pantoprazole, 40 mg, oral, Daily before breakfast   Or  [START ON 3/16/2024] pantoprazole, 40 mg, intravenous, Daily before breakfast  potassium chloride, 20 mEq, oral, BID  potassium chloride, 20 mEq, intravenous, q2h  vancomycin, 1.5 g, intravenous, Once        PRN Medications  PRN medications: acetaminophen **OR** acetaminophen **OR** acetaminophen, acetaminophen **OR** acetaminophen **OR** acetaminophen, benzocaine-menthol, dextromethorphan-guaifenesin,  guaiFENesin, ondansetron ODT **OR** ondansetron, oxygen  Continuous Medications     Outpatient Medications:                Prior to Admission medications    Medication Sig Start Date End Date Taking? Authorizing Provider   acetaminophen (Tylenol) 500 mg tablet Take 1 tablet (500 mg) by mouth every 8 hours if needed.       Historical Provider, MD   aspirin 81 mg EC tablet Take 1 tablet (81 mg) by mouth once daily.       Historical Provider, MD   atorvastatin (Lipitor) 80 mg tablet Take 1 tablet (80 mg) by mouth once daily.       Historical Provider, MD   azithromycin (Zithromax) 500 mg tablet Take 1 tablet (500 mg) by mouth once every 24 hours. Do not start before October 30, 2023. 10/30/23     Nhan Montero MD   carvedilol (Coreg) 12.5 mg tablet Take 1 tablet (12.5 mg) by mouth twice a day.       Historical Provider, MD   empagliflozin (Jardiance) 10 mg Take 1 tablet (10 mg) by mouth once daily.       Historical Provider, MD   gabapentin (Neurontin) 400 mg capsule Take 1 capsule (400 mg) by mouth 3 times a day.       Historical Provider, MD   linaGLIPtin (Tradjenta) 5 mg tablet Take 1 tablet (5 mg) by mouth once daily.       Historical Provider, MD   lisinopril 5 mg tablet Take 1 tablet (5 mg) by mouth once daily.       Historical Provider, MD   melatonin 5 mg tablet Take 1 tablet (5 mg) by mouth once daily at bedtime.       Historical Provider, MD   metFORMIN (Glucophage) 1,000 mg tablet Take 0.5 tablets (500 mg) by mouth twice a day.       Historical Provider, MD   methIMAzole (Tapazole) 10 mg tablet Take 2 tablets (20 mg) by mouth every 8 hours. 10/29/23     Nhan Montero MD   omeprazole (PriLOSEC) 40 mg DR capsule Take 1 capsule (40 mg) by mouth twice a day.       Historical Provider, MD   torsemide (Demadex) 20 mg tablet Take 1 tablet (20 mg) by mouth once daily as needed.       Historical Provider, MD      Labs:          Results from last 7 days   Lab Units 03/15/24  0905   WBC AUTO x10*3/uL 11.8*    RBC AUTO x10*6/uL 4.66   HEMOGLOBIN g/dL 9.3*   HEMATOCRIT % 30.4*   MCV fL 65*   MCH pg 20.0*   MCHC g/dL 30.6*   RDW % 18.4*   PLATELETS AUTO x10*3/uL 429              Results from last 7 days   Lab Units 03/15/24  0905   SODIUM mmol/L 139   POTASSIUM mmol/L 2.4*   CHLORIDE mmol/L 96*   CO2 mmol/L 31   BUN mg/dL 44*   CREATININE mg/dL 1.54*   GLUCOSE mg/dL 54*   PROTEIN TOTAL g/dL 7.5   CALCIUM mg/dL 8.6   BILIRUBIN TOTAL mg/dL 1.5*   ALK PHOS U/L 137*   AST U/L 63*   ALT U/L 38              Results from last 7 days   Lab Units 03/15/24  0905   MAGNESIUM mg/dL 1.91             Imaging:  XR chest 1 view  Narrative: Interpreted By:  Artur Santos,   STUDY:  XR CHEST 1 VIEW;  3/15/2024 10:36 am      INDICATION:  Signs/Symptoms:fall.      COMPARISON:  11/01/2023      ACCESSION NUMBER(S):  [id number]      ORDERING CLINICIAN:  LO SHAW      FINDINGS:  Large right basilar pleural effusion occupying greater than 50% of  the hemithoracic volume. Overlying atelectasis. Left lung grossly  clear without pleural effusion. Cardiomegaly suspected. Aortic  atherosclerosis. Mild pulmonary vascular congestion.      Impression: Large right pleural effusion.      MACRO:  None      Signed by: Artur Santos 3/15/2024 11:33 AM  Dictation workstation:   [id number]  XR tibia fibula left 2 views  Narrative: Interpreted By:  Artur Santos,   STUDY:  XR TIBIA FIBULA LEFT 2 VIEWS;  3/15/2024 10:36 am      INDICATION:  Signs/Symptoms:fall, previous below-knee amputation, pain at left  stump.      COMPARISON:  06/23/2022.      ACCESSION NUMBER(S):  [id number]      ORDERING CLINICIAN:  LO SHAW      FINDINGS:  Post below-knee amputation changes. No acute fracture or dislocation  identified. Tiny patellar osteophytes. Small knee joint effusion  suspected. Tiny metallic foreign bodies lateral to the knee joint are  still present. Osteopenia.      Atherosclerosis.      Impression: Intact left tibia and fibula, post below-knee  amputation.      MACRO:  None      Signed by: Artur Santos 3/15/2024 11:33 AM  Dictation workstation:   [id number]  ECG 12 lead  Sinus rhythm with Premature supraventricular complexes and with occasional Premature ventricular complexes  Right bundle branch block  Possible Lateral infarct , age undetermined  Abnormal ECG  When compared with ECG of 01-NOV-2023 12:19,  Premature supraventricular complexes are now Present  Borderline criteria for Lateral infarct are now Present\     Assessment/plan:-   #Large right-sided pleural effusion; Differential diagnosis being parapneumonic, malignancy and from congestive heart failure/nephrotic syndrome  # Probable COPD without significant acute exacerbation   # Probable pneumonia right lung  # Nicotine dependency     #Hypokalemia  #Leukocytosis in the setting of left BKA wounds concerning for possible infection.   #acute on chronic HFrEF   #microcytic anemia  #Hypoglycemia  - Dr. Early discussed patient with interventional radiology with plan to possibly drain large right pleural effusion today.  Depends on radiologist schedules and if we can fit patient in if not will have patient possibly drained over the weekend by either pulmonology or in ICU depending on progression.  -Will go ahead and start diuresing patient with Lasix 40 mg twice daily will adjust as needed.  -  Agree with  consult cardiology  - will obtain echocardiogram  - Place patient on telemetry and monitor  - continue to monitor replete electrolytes  - send hemoglobin A1c and monitor blood glucose.  Will initiate sliding scale insulin  -   Will continue broad-spectrum antibiotics.  Agree with  consult wound care   -  Will continue to monitor patient's hemoglobin with plan to send iron studies given microcytosis.  - renally dose all medication        VTE Prophylaxis:  Lovenox renally dosed  Diet: Cardiac diet  CODE STATUS: Full code     Pulmonary comments:-Agree with broad-spectrum antibiotics used for right  above-the-knee stump infection (cefepime and metronidazole) which should also cover for pneumonia as well.  Will give as needed bronchodilator nebs for presumptive COPD.  Agree with supplemental oxygen for hypoxia.  Patient counseled to quit smoking cigarettes completely.  Patient will need right thoracentesis in a couple of days.  Pulmonary will continue to monitor this patient with you and I thank you for the referral.    Huy Ricci MD

## 2024-03-17 LAB
ALBUMIN SERPL BCP-MCNC: 2.8 G/DL (ref 3.4–5)
ALP SERPL-CCNC: 125 U/L (ref 33–120)
ALT SERPL W P-5'-P-CCNC: 29 U/L (ref 10–52)
ANION GAP SERPL CALC-SCNC: 16 MMOL/L (ref 10–20)
AST SERPL W P-5'-P-CCNC: 39 U/L (ref 9–39)
BILIRUB SERPL-MCNC: 1.4 MG/DL (ref 0–1.2)
BUN SERPL-MCNC: 52 MG/DL (ref 6–23)
CALCIUM SERPL-MCNC: 8 MG/DL (ref 8.6–10.3)
CHLORIDE SERPL-SCNC: 92 MMOL/L (ref 98–107)
CO2 SERPL-SCNC: 26 MMOL/L (ref 21–32)
CREAT SERPL-MCNC: 2.04 MG/DL (ref 0.5–1.3)
EGFRCR SERPLBLD CKD-EPI 2021: 38 ML/MIN/1.73M*2
ERYTHROCYTE [DISTWIDTH] IN BLOOD BY AUTOMATED COUNT: 18.5 % (ref 11.5–14.5)
GLUCOSE BLD MANUAL STRIP-MCNC: 408 MG/DL (ref 74–99)
GLUCOSE BLD MANUAL STRIP-MCNC: 461 MG/DL (ref 74–99)
GLUCOSE SERPL-MCNC: 467 MG/DL (ref 74–99)
HCT VFR BLD AUTO: 26.4 % (ref 41–52)
HGB BLD-MCNC: 7.7 G/DL (ref 13.5–17.5)
HOLD SPECIMEN: NORMAL
MCH RBC QN AUTO: 19.3 PG (ref 26–34)
MCHC RBC AUTO-ENTMCNC: 29.2 G/DL (ref 32–36)
MCV RBC AUTO: 66 FL (ref 80–100)
NRBC BLD-RTO: 0 /100 WBCS (ref 0–0)
PLATELET # BLD AUTO: 309 X10*3/UL (ref 150–450)
POTASSIUM SERPL-SCNC: 5 MMOL/L (ref 3.5–5.3)
PROT SERPL-MCNC: 6.7 G/DL (ref 6.4–8.2)
RBC # BLD AUTO: 4 X10*6/UL (ref 4.5–5.9)
SODIUM SERPL-SCNC: 129 MMOL/L (ref 136–145)
VANCOMYCIN SERPL-MCNC: 15.1 UG/ML (ref 5–20)
WBC # BLD AUTO: 12 X10*3/UL (ref 4.4–11.3)

## 2024-03-17 PROCEDURE — 2500000001 HC RX 250 WO HCPCS SELF ADMINISTERED DRUGS (ALT 637 FOR MEDICARE OP): Performed by: STUDENT IN AN ORGANIZED HEALTH CARE EDUCATION/TRAINING PROGRAM

## 2024-03-17 PROCEDURE — 36415 COLL VENOUS BLD VENIPUNCTURE: CPT

## 2024-03-17 PROCEDURE — 2500000002 HC RX 250 W HCPCS SELF ADMINISTERED DRUGS (ALT 637 FOR MEDICARE OP, ALT 636 FOR OP/ED): Performed by: STUDENT IN AN ORGANIZED HEALTH CARE EDUCATION/TRAINING PROGRAM

## 2024-03-17 PROCEDURE — 2500000004 HC RX 250 GENERAL PHARMACY W/ HCPCS (ALT 636 FOR OP/ED): Performed by: STUDENT IN AN ORGANIZED HEALTH CARE EDUCATION/TRAINING PROGRAM

## 2024-03-17 PROCEDURE — 80053 COMPREHEN METABOLIC PANEL: CPT | Performed by: STUDENT IN AN ORGANIZED HEALTH CARE EDUCATION/TRAINING PROGRAM

## 2024-03-17 PROCEDURE — 80202 ASSAY OF VANCOMYCIN: CPT

## 2024-03-17 PROCEDURE — 82947 ASSAY GLUCOSE BLOOD QUANT: CPT

## 2024-03-17 PROCEDURE — 36415 COLL VENOUS BLD VENIPUNCTURE: CPT | Performed by: STUDENT IN AN ORGANIZED HEALTH CARE EDUCATION/TRAINING PROGRAM

## 2024-03-17 PROCEDURE — 99233 SBSQ HOSP IP/OBS HIGH 50: CPT | Performed by: INTERNAL MEDICINE

## 2024-03-17 PROCEDURE — 1200000002 HC GENERAL ROOM WITH TELEMETRY DAILY

## 2024-03-17 PROCEDURE — 2500000005 HC RX 250 GENERAL PHARMACY W/O HCPCS: Performed by: NURSE PRACTITIONER

## 2024-03-17 PROCEDURE — 2500000005 HC RX 250 GENERAL PHARMACY W/O HCPCS: Performed by: STUDENT IN AN ORGANIZED HEALTH CARE EDUCATION/TRAINING PROGRAM

## 2024-03-17 PROCEDURE — 99232 SBSQ HOSP IP/OBS MODERATE 35: CPT | Performed by: STUDENT IN AN ORGANIZED HEALTH CARE EDUCATION/TRAINING PROGRAM

## 2024-03-17 PROCEDURE — 85027 COMPLETE CBC AUTOMATED: CPT | Performed by: STUDENT IN AN ORGANIZED HEALTH CARE EDUCATION/TRAINING PROGRAM

## 2024-03-17 PROCEDURE — 2500000004 HC RX 250 GENERAL PHARMACY W/ HCPCS (ALT 636 FOR OP/ED): Performed by: PHARMACIST

## 2024-03-17 RX ORDER — LIDOCAINE 560 MG/1
1 PATCH PERCUTANEOUS; TOPICAL; TRANSDERMAL DAILY
Status: DISCONTINUED | OUTPATIENT
Start: 2024-03-17 | End: 2024-03-17

## 2024-03-17 RX ORDER — METOLAZONE 5 MG/1
5 TABLET ORAL DAILY
Status: DISCONTINUED | OUTPATIENT
Start: 2024-03-17 | End: 2024-03-26 | Stop reason: HOSPADM

## 2024-03-17 RX ORDER — LIDOCAINE 560 MG/1
1 PATCH PERCUTANEOUS; TOPICAL; TRANSDERMAL DAILY
Status: DISCONTINUED | OUTPATIENT
Start: 2024-03-17 | End: 2024-03-26 | Stop reason: HOSPADM

## 2024-03-17 RX ORDER — DEXTROSE 50 % IN WATER (D50W) INTRAVENOUS SYRINGE
12.5
Status: DISCONTINUED | OUTPATIENT
Start: 2024-03-17 | End: 2024-03-26 | Stop reason: HOSPADM

## 2024-03-17 RX ORDER — INSULIN LISPRO 100 [IU]/ML
0-10 INJECTION, SOLUTION INTRAVENOUS; SUBCUTANEOUS
Status: DISCONTINUED | OUTPATIENT
Start: 2024-03-17 | End: 2024-03-20

## 2024-03-17 RX ORDER — DEXTROSE 50 % IN WATER (D50W) INTRAVENOUS SYRINGE
25
Status: DISCONTINUED | OUTPATIENT
Start: 2024-03-17 | End: 2024-03-26 | Stop reason: HOSPADM

## 2024-03-17 RX ORDER — INSULIN LISPRO 100 [IU]/ML
0-10 INJECTION, SOLUTION INTRAVENOUS; SUBCUTANEOUS
Status: DISCONTINUED | OUTPATIENT
Start: 2024-03-17 | End: 2024-03-17

## 2024-03-17 RX ADMIN — ENOXAPARIN SODIUM 40 MG: 40 INJECTION SUBCUTANEOUS at 14:28

## 2024-03-17 RX ADMIN — METOLAZONE 5 MG: 5 TABLET ORAL at 17:16

## 2024-03-17 RX ADMIN — LIDOCAINE 4% 1 PATCH: 40 PATCH TOPICAL at 20:27

## 2024-03-17 RX ADMIN — Medication 3 MG: at 20:07

## 2024-03-17 RX ADMIN — SPIRONOLACTONE 25 MG: 25 TABLET ORAL at 10:19

## 2024-03-17 RX ADMIN — INSULIN LISPRO 10 UNITS: 100 INJECTION, SOLUTION INTRAVENOUS; SUBCUTANEOUS at 17:16

## 2024-03-17 RX ADMIN — FUROSEMIDE 40 MG: 10 INJECTION, SOLUTION INTRAMUSCULAR; INTRAVENOUS at 10:14

## 2024-03-17 RX ADMIN — VANCOMYCIN HYDROCHLORIDE 1000 MG: 1 INJECTION, SOLUTION INTRAVENOUS at 10:13

## 2024-03-17 RX ADMIN — INSULIN LISPRO 10 UNITS: 100 INJECTION, SOLUTION INTRAVENOUS; SUBCUTANEOUS at 21:06

## 2024-03-17 RX ADMIN — POTASSIUM CHLORIDE 20 MEQ: 1.5 POWDER, FOR SOLUTION ORAL at 10:19

## 2024-03-17 RX ADMIN — CEFEPIME 2 G: 2 INJECTION, POWDER, FOR SOLUTION INTRAVENOUS at 12:48

## 2024-03-17 RX ADMIN — METHIMAZOLE 5 MG: 5 TABLET ORAL at 10:14

## 2024-03-17 RX ADMIN — PANTOPRAZOLE SODIUM 40 MG: 40 TABLET, DELAYED RELEASE ORAL at 06:15

## 2024-03-17 RX ADMIN — DOCUSATE SODIUM 100 MG: 100 CAPSULE, LIQUID FILLED ORAL at 20:07

## 2024-03-17 RX ADMIN — DOCUSATE SODIUM 100 MG: 100 CAPSULE, LIQUID FILLED ORAL at 10:14

## 2024-03-17 RX ADMIN — LIDOCAINE 4% 1 PATCH: 40 PATCH TOPICAL at 03:41

## 2024-03-17 RX ADMIN — METFORMIN HYDROCHLORIDE 500 MG: 500 TABLET, FILM COATED ORAL at 10:13

## 2024-03-17 RX ADMIN — HYDROMORPHONE HYDROCHLORIDE 0.4 MG: 1 INJECTION, SOLUTION INTRAMUSCULAR; INTRAVENOUS; SUBCUTANEOUS at 03:41

## 2024-03-17 RX ADMIN — FUROSEMIDE 10 MG/HR: 10 INJECTION, SOLUTION INTRAMUSCULAR; INTRAVENOUS at 14:28

## 2024-03-17 ASSESSMENT — COGNITIVE AND FUNCTIONAL STATUS - GENERAL
TOILETING: A LITTLE
STANDING UP FROM CHAIR USING ARMS: A LITTLE
PERSONAL GROOMING: A LITTLE
MOVING TO AND FROM BED TO CHAIR: A LITTLE
DRESSING REGULAR UPPER BODY CLOTHING: A LITTLE
DRESSING REGULAR LOWER BODY CLOTHING: A LITTLE
CLIMB 3 TO 5 STEPS WITH RAILING: TOTAL
MOBILITY SCORE: 15
MOVING FROM LYING ON BACK TO SITTING ON SIDE OF FLAT BED WITH BEDRAILS: A LITTLE
TURNING FROM BACK TO SIDE WHILE IN FLAT BAD: A LITTLE
HELP NEEDED FOR BATHING: A LITTLE
WALKING IN HOSPITAL ROOM: A LOT
DAILY ACTIVITIY SCORE: 19

## 2024-03-17 ASSESSMENT — PAIN SCALES - GENERAL: PAINLEVEL_OUTOF10: 0 - NO PAIN

## 2024-03-17 NOTE — CARE PLAN
The patient's goals for the shift include      The clinical goals for the shift include safety    Problem: Fall/Injury  Goal: Not fall by end of shift  Outcome: Progressing  Goal: Be free from injury by end of the shift  Outcome: Progressing  Goal: Verbalize understanding of personal risk factors for fall in the hospital  Outcome: Progressing  Goal: Verbalize understanding of risk factor reduction measures to prevent injury from fall in the home  Outcome: Progressing  Goal: Use assistive devices by end of the shift  Outcome: Progressing  Goal: Pace activities to prevent fatigue by end of the shift  Outcome: Progressing     Problem: Skin  Goal: Decreased wound size/increased tissue granulation at next dressing change  Outcome: Progressing  Goal: Participates in plan/prevention/treatment measures  Outcome: Progressing  Goal: Prevent/manage excess moisture  Outcome: Progressing  Goal: Prevent/minimize sheer/friction injuries  Outcome: Progressing  Goal: Promote/optimize nutrition  Outcome: Progressing  Goal: Promote skin healing  Outcome: Progressing

## 2024-03-17 NOTE — PROGRESS NOTES
Hitesh Jurado is a 53 y.o. male on day 2 of admission presenting with Acute on chronic combined systolic (congestive) and diastolic (congestive) heart failure (CMS/HCC).      Subjective    patient seen and assessed during rounds.  Patient had an episode overnight where he wanted to leave overnight.  However he was  seen by nocturnist who addressed patient immediate issues.   complaining of significant distention today.       Objective     Last Recorded Vitals  /74 (BP Location: Left arm, Patient Position: Sitting)   Pulse 85   Temp 36.7 °C (98.1 °F) (Temporal)   Resp 18   Wt 68 kg (150 lb)   SpO2 92%   Intake/Output last 3 Shifts:    Intake/Output Summary (Last 24 hours) at 3/17/2024 1445  Last data filed at 3/16/2024 2041  Gross per 24 hour   Intake --   Output 175 ml   Net -175 ml       Admission Weight  Weight: 68 kg (150 lb) (03/15/24 0843)    Daily Weight  03/15/24 : 68 kg (150 lb)    Image Results  ECG 12 lead  Sinus rhythm with Premature supraventricular complexes and with occasional Premature ventricular complexes  Right bundle branch block  Possible Lateral infarct , age undetermined  Abnormal ECG  When compared with ECG of 01-NOV-2023 12:19,  Premature supraventricular complexes are now Present  Borderline criteria for Lateral infarct are now Present    See ED provider note for full interpretation and clinical correlation  Confirmed by Lisa Naidu ([id number]) on 3/16/2024 11:49:50 AM      Physical Exam  Constitutional:       General: He is in acute distress.      Appearance: He is ill-appearing.   HENT:      Head: Normocephalic.      Mouth/Throat:      Mouth: Mucous membranes are dry.   Eyes:      Pupils: Pupils are equal, round, and reactive to light.   Cardiovascular:      Rate and Rhythm: Normal rate. Rhythm irregular.      Comments:  diminished pulses diffusely  Pulmonary:      Effort: Pulmonary effort is normal.   Abdominal:      General: Bowel sounds are normal.      Palpations:   Disproportionately distended and protruding.  Hard to touch.  Not significantly compressible.  Musculoskeletal:         General: Swelling and tenderness present. Normal range of motion.      Right lower leg: Edema present.      Left lower leg: Edema present.      Comments:  erythema and both legs concerning for cellulitis excoriated stump left BKA   Skin:     General: Skin is dry.      Capillary Refill: Capillary refill takes less than 2 seconds.      Coloration: Skin is pale.   Neurological:      Mental Status: He is disoriented.      Motor: Weakness present.   Psychiatric:      Comments:  pain which of proportion with exam   Relevant Results               Assessment/Plan        Principal Problem:    Acute on chronic combined systolic (congestive) and diastolic (congestive) heart failure (CMS/HCC)     #Large right-sided pleural effusion  #Hypokalemia  #Leukocytosis in the setting of left BKA wounds concerning for possible infection.   #acute on chronic HFrEF   #microcytic anemia  #Hypoglycemia  - discussed patient with interventional radiology with plan to possibly drain large right pleural effusion today.  Depends on radiologist schedules and if we can fit patient in if not will have patient possibly drained over the weekend by either pulmonology or in ICU depending on progression.  -Will go ahead and start diuresing patient with Lasix 40 mg twice daily will adjust as needed.  -  Will go ahead and consult cardiology  - will obtain echocardiogram  - Place patient on telemetry and monitor  - continue to monitor replete electrolytes  - send hemoglobin A1c and monitor blood glucose.  Will initiate sliding scale insulin  -   Will continue broad-spectrum antibiotics.  Will have  wound care evaluate patient and consult infectious disease if needed.  -  Will continue to monitor patient's hemoglobin with plan to send iron studies given microcytosis.  - renally dose all medication     VTE Prophylaxis:  Lovenox renally  dosed  Diet: Cardiac diet  CODE STATUS: Full code       03/17/24  -  Patient being seen and followed up by both cardiology and nephrology.  -  Nephrology starting patient on Lasix drip and metolazone.  Will continue to monitor.  -  Put in for abdominal ultrasound to help determine if patient is ascites is significant enough to be tomorrow.  Will discuss patient with IR in the morning to do paracentesis or thoracentesis.  -  Cardiology is on board.  Will continue to appreciate all recommendations.  awaiting echocardiogram at this point in time.   - hemoglobin at 7.7  we will continue to monitor  - started insulin sliding scale for hyperglycemia.  will continue to monitor replete electrolytes as needed    03/16/24  - patient currently being seen by pulmonology.  no significant dates are recommendations apart from adding DuoNebs to regimen.  -   Patient was also started on vancomycin due to concern for possible cellulitis however patient refusing at this point in time.  I discussed this with patient and explained dosing of vancomycin based on renal function.  Patient now agreeable.  - cardiology also saw patient and agree with Lasix this point in time.  Patient is currently refusing.  Also recommending restarting carvedilol 6.25 mg twice daily and nephrology evaluation.  awaiting echocardiogram results  -  Nephrology consult placed.  - BUN/creatinine stable.  White count elevated to 14.4.  Will continue to monitor.   - plan for thoracentesis most likely on Monday.              Izzy Early MD

## 2024-03-17 NOTE — SIGNIFICANT EVENT
"I was called to assess patient due to requesting to leave AMA.  When I arrived in the room he stated \"I do not want to be here anymore\".  I then asked what his plan was for leaving the hospital.  He stated \"I do not want to leave the hospital, I want to leave his life.\"  I then asked the patient if he had any thoughts of wanting to harm himself and he stated \"no, I want to be euthanized.\"  Explained to the patient that this is not an option.  Patient seemed frustrated at this response.  He explained to me that he is miserable and does not feel that his treatments here are improving his health.  I explained the options that he had: #1 being staying in the hospital and allowing us to treat his medical problems, #2 being leaving AGAINST MEDICAL ADVICE (with significant risks of doing so explained to him), or #3 being having a discussion in the morning regarding changing his status to comfort care.  I then explained what comfort care entails.  He stated he was not interested in comfort care option because his father was on this and he states that \"they did nothing for him and he suffered\". He then explained that his biggest request is that he would like his wheelchair from home as it is fit to his size and has a new seat cushion.  He states that he would be much more comfortable sitting in this compared to the chair or bed provided here and that this would alleviate his pain significantly.  I advised that he stay in the hospital at least until daytime when there are more resources here that could potentially figure out logistics in terms of obtaining a wheelchair here or his wheelchair from home.  Patient was agreeable to stay temporarily.    -No active suicidal ideation  -Recommend considering psychiatry versus palliative care consult in the morning  -Recommend discussing with social work/TCC/supervisor/operations regarding obtaining home wheelchair versus a wheelchair here in the hospital    Perez Lopez MD    "

## 2024-03-17 NOTE — PROGRESS NOTES
[place] thanks  [facility] Progress Note           Rounding Cardiologist:  Dr. Juan Argueta  Primary Cardiologist:  Dr. Raquel Gupta ([facility])     Date:  3/17/2024  Patient:  Hitesh Jurado  YOB: 1970  MRN:  [id number]   Admit Date:  3/15/2024      SUBJECTIVE:    Hitesh Jurado was seen and examined today.    Sitting up in the chair.     He denies any chest pain or shortness of breath.     Patient considered leaving AMA last evening.  States he can't get comfortable.    He was refusing IV furosemide but eventually agree to a dose this am.       Consult HPI:  Hitesh Jurado is a 53 y.o.  male patient who is being at the request of Dr. Izzy Early for inpatient consultation of  congestive heart failure . He was admitted on 3/15/2024.  Previous [facility], [facility], and [facility] records have been reviewed.   The patient is a very poor historian.        He has a history of atherosclerotic heart disease and ischemic cardiomyopathy.  He was initially diagnosed with HFrEF at [facility] in February 2021.  Cardiac catheterization showed total occlusion of the circumflex with left to left collaterals.  The LAD had 30% stenosis with very small caliber diagonal branches and severe diffuse disease.  The RCA had 20 to 40% stenosis.  Estimated LV ejection fraction was 30%.  Echocardiogram February 2021 showed an estimated LV ejection action 30% with mild mitral and tricuspid regurgitation.  Estimated RVSP 50 to 60 mmHg.  He has a history of peripheral vascular disease with previous right to left femorofemoral bypass.  He had right common iliac angioplasty and external iliac artery stenting in 2019.  He has insulin-dependent diabetes mellitus and history of diabetic foot ulcers.  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  He has hypertension, hyperlipidemia, tobacco use, and chronic kidney disease.  He was last seen by [facility] cardiology (Dr. Patrick) In March 2021.  At that  time it was recommended he undergo cardiac MRI to evaluate for viability with consideration of ICD implant.  He was on carvedilol, lisinopril, and spironolactone.  SGLT2 inhibitor was not recommended at that time secondary to peripheral vascular disease.     He was admitted to [facility] January 15, 2024 with acute on chronic systolic congestive heart failure.  He was also treated for bilateral pneumonia.  He was noted to have anasarca/ascites.  Patient was noted to be noncompliant with taking his medications and going to follow-up office visits.  He was treated with IV diuretics and 5 days of IV antibiotics.  He was discharged on furosemide 60 mg twice daily.  He was also on aspirin, Plavix, atorvastatin, insulin, metformin, and Toprol XL.  Lisinopril was discontinued.  He was scheduled to see Dr. Wojciech [facility] on January 30, 2024 but left without being seen.  He is scheduled to see Dr. Raquel Gupta with [facility] cardiology on April 18, 2024.  He states he avoids seeing PAs or nurse practitioners.  He follows with Dr. Geno Mix for nephrology at .  He was seen in the emergency department in [place] last week for hypokalemia and reported brief seizure activity.     He was brought to the emergency department yesterday by EMS after he fell at home.  He states he typically gets around in a wheelchair and fell when he was getting out of the shower.  He denies loss of consciousness.  He states he has had significant weight gain and feels bloated.  He states he has large amounts of fluid trapped in his belly but is concerned about getting rid of it due to possible harm to his kidneys.  He was noted to have stable vital signs with exception of oxygen saturation 93%.  Chronic nonhealing ulcer of the right foot was noted.  Possible infection of the left BKA stump.  BNP was 4248 with potassium 2.4 and creatinine 1.54.  Hemoglobin 9.3.  Chest x-ray showed a large right pleural effusion.  He was  hypoglycemic and was given D50.  Potassium was supplemented.  He was admitted to telemetry and initiated on intravenous furosemide.  He has been started on empiric antibiotics.  He is scheduled undergo thoracentesis.     The patient currently sitting up in the chair.  He denies any chest pain or worsening shortness of breath.  He currently is refusing IV furosemide.  He states he would like to consult with a nephrologist before having further medication adjustments. He denies any orthopnea or PND.  He denies any palpitations, lightheadedness, near-syncope, or syncope.  He denies any fever, chills, or cough.  He denies any nausea, vomiting, or diaphoresis.  He denies any hemoptysis, hematemesis, melena, or hematochezia.      VITALS:     Vitals:    03/16/24 1417 03/16/24 2218 03/17/24 0000 03/17/24 0830   BP: 118/60 99/68 105/68 106/74   BP Location:   Right arm Left arm   Patient Position: Sitting  Sitting Sitting   Pulse: 90 86 83 85   Resp: 18  18    Temp: 36.8 °C (98.2 °F) 37.1 °C (98.8 °F) 37.2 °C (99 °F) 36.7 °C (98.1 °F)   TempSrc:   Temporal Temporal   SpO2: 99% 95% 98% 92%   Weight:       Height:           Intake/Output Summary (Last 24 hours) at 3/17/2024 1317  Last data filed at 3/16/2024 2041  Gross per 24 hour   Intake --   Output 175 ml   Net -175 ml       Wt Readings from Last 4 Encounters:   03/15/24 68 kg (150 lb)   10/30/23 55.9 kg (123 lb 3.8 oz)   07/21/23 59 kg (130 lb)   06/30/23 57.2 kg (126 lb)       CURRENT HOSPITAL MEDICATIONS:   cefepime, 2 g, intravenous, q24h  docusate sodium, 100 mg, oral, BID  enoxaparin, 40 mg, subcutaneous, q24h  furosemide, 40 mg, intravenous, BID  lidocaine, 1 patch, transdermal, Daily  melatonin, 3 mg, oral, Daily  metFORMIN, 500 mg, oral, Daily with breakfast  methIMAzole, 5 mg, oral, Daily  metoprolol succinate XL, 25 mg, oral, Daily  pantoprazole, 40 mg, oral, Daily before breakfast   Or  pantoprazole, 40 mg, intravenous, Daily before breakfast  perflutren lipid  microspheres, 0.5-10 mL of dilution, intravenous, Once in imaging  perflutren protein A microsphere, 0.5 mL, intravenous, Once in imaging  potassium chloride, 20 mEq, oral, BID  spironolactone, 25 mg, oral, Daily  sulfur hexafluoride microsphr, 2 mL, intravenous, Once in imaging  vancomycin, 1,000 mg, intravenous, q24h         Current Outpatient Medications   Medication Instructions    acetaminophen (TYLENOL) 500 mg, oral, Every 8 hours PRN    aspirin 81 mg EC tablet 1 tablet, oral, Daily    atorvastatin (Lipitor) 80 mg tablet 1 tablet, oral, Daily    azithromycin (ZITHROMAX) 500 mg, oral, Every 24 hours scheduled    carvedilol (COREG) 12.5 mg, oral, 2 times daily    clopidogrel (PLAVIX) 75 mg, oral, Daily    empagliflozin (JARDIANCE) 10 mg, oral, Daily    furosemide (LASIX) 60 mg, oral, 2 times daily    gabapentin (NEURONTIN) 400 mg, oral, 3 times daily    insulin lispro (HumaLOG) 100 unit/mL injection subcutaneous, 3 times daily with meals, Take as directed per insulin instructions.    Lantus U-100 Insulin 8 Units, subcutaneous, Nightly, Take as directed per insulin instructions.    lisinopril 5 mg, oral, Nightly    melatonin 5 mg, oral, Nightly    metFORMIN (Glucophage) 1,000 mg tablet 0.5 tablets, oral, Daily with breakfast    methIMAzole (TAPAZOLE) 20 mg, oral, Every 8 hours scheduled    metoprolol succinate XL (TOPROL-XL) 25 mg, oral, Daily, Do not crush or chew.    mupirocin (Bactroban) 2 % ointment Topical, 3 times daily RT    omeprazole (PRILOSEC) 40 mg, oral, 2 times daily    potassium chloride CR 20 mEq ER tablet 20 mEq, oral, 3 times daily, Do not crush or chew. For 10 days    spironolactone (ALDACTONE) 25 mg, oral, Daily    torsemide (DEMADEX) 20 mg, oral, Daily PRN    Tradjenta 5 mg, oral, Daily        PHYSICAL EXAMINATION:   GENERAL:  Well developed, well nourished, in no acute distress.  CHEST:  Symmetric and nontender.  NEURO/PSYCH:  Alert and oriented times three with approppriate behavior and  responses.  NECK:  Supple, no JVD, no bruit.  LUNGS:  Clear to auscultation bilaterally, normal respiratory effort.  HEART:  Rate and rhythm regular with occasional ectopy, I/VI DEVON LSB, no gallop appreciated.        There are no rubs, clicks or heaves.  EXTREMITIES:  Status post left BKA.  Dressing in place.   PERIPHERAL VASCULAR:  Pulses present and equally palpable; 2+ throughout.      LAB DATA:     CBC:   Results from last 7 days   Lab Units 03/17/24  0624   WBC AUTO x10*3/uL 12.0*   RBC AUTO x10*6/uL 4.00*   HEMOGLOBIN g/dL 7.7*   HEMATOCRIT % 26.4*   PLATELETS AUTO x10*3/uL 309        CMP:    Results from last 7 days   Lab Units 03/17/24  0624   SODIUM mmol/L 129*   POTASSIUM mmol/L 5.0   CHLORIDE mmol/L 92*   CO2 mmol/L 26   BUN mg/dL 52*   CREATININE mg/dL 2.04*   GLUCOSE mg/dL 467*   CALCIUM mg/dL 8.0*       Cardiac Enzymes:    Lab Results   Component Value Date    TROPHS 754 () 11/01/2023    TROPHS 642 (HH) 11/01/2023    TROPHS 546 () 11/01/2023       Magnesium:    Lab Results   Component Value Date    MG 1.89 03/16/2024       BNP:   Lab Results   Component Value Date    BNP 4,248 (H) 03/15/2024        PT/INR:    Lab Results   Component Value Date    PROTIME 15.1 (H) 03/15/2024    INR 1.3 (H) 03/15/2024       HgBA1c:    Lab Results   Component Value Date    HGBA1C 9.0 (H) 01/16/2024       CBC:  Lab Results   Component Value Date    WBC 12.0 (H) 03/17/2024    WBC 14.4 (H) 03/16/2024    WBC 11.8 (H) 03/15/2024    RBC 4.00 (L) 03/17/2024    RBC 4.23 (L) 03/16/2024    RBC 4.66 03/15/2024    HGB 7.7 (L) 03/17/2024    HGB 8.3 (L) 03/16/2024    HGB 9.3 (L) 03/15/2024    HCT 26.4 (L) 03/17/2024    HCT 27.4 (L) 03/16/2024    HCT 30.4 (L) 03/15/2024    MCV 66 (L) 03/17/2024    MCV 65 (L) 03/16/2024    MCV 65 (L) 03/15/2024    MCH 19.3 (L) 03/17/2024    MCH 19.6 (L) 03/16/2024    MCH 20.0 (L) 03/15/2024    MCHC 29.2 (L) 03/17/2024    MCHC 30.3 (L) 03/16/2024    MCHC 30.6 (L) 03/15/2024    RDW 18.5 (H) 03/17/2024     RDW 18.3 (H) 03/16/2024    RDW 18.4 (H) 03/15/2024     03/17/2024     03/16/2024     03/15/2024       BMP:  Lab Results   Component Value Date     (L) 03/17/2024     03/16/2024     03/15/2024    K 5.0 03/17/2024    K 3.8 03/16/2024    K 2.4 (LL) 03/15/2024    CL 92 (L) 03/17/2024    CL 96 (L) 03/16/2024    CL 96 (L) 03/15/2024    CO2 26 03/17/2024    CO2 30 03/16/2024    CO2 31 03/15/2024    BUN 52 (H) 03/17/2024    BUN 44 (H) 03/16/2024    BUN 44 (H) 03/15/2024    CREATININE 2.04 (H) 03/17/2024    CREATININE 1.61 (H) 03/16/2024    CREATININE 1.54 (H) 03/15/2024       Hepatic Function Panel:    Lab Results   Component Value Date    ALKPHOS 125 (H) 03/17/2024    ALT 29 03/17/2024    AST 39 03/17/2024    PROT 6.7 03/17/2024    BILITOT 1.4 (H) 03/17/2024    BILIDIR 0.7 (H) 03/15/2024       DIAGNOSTIC TESTING:     ECG 12 lead    Result Date: 3/16/2024  Sinus rhythm with Premature supraventricular complexes and with occasional Premature ventricular complexes Right bundle branch block Possible Lateral infarct , age undetermined Abnormal ECG When compared with ECG of 01-NOV-2023 12:19, Premature supraventricular complexes are now Present Borderline criteria for Lateral infarct are now Present See ED provider note for full interpretation and clinical correlation Confirmed by Lisa Naidu ([id number]) on 3/16/2024 11:49:50 AM    XR chest 1 view    Result Date: 3/15/2024  Interpreted By:  Artur Santos, STUDY: XR CHEST 1 VIEW;  3/15/2024 10:36 am   INDICATION: Signs/Symptoms:fall.   COMPARISON: 11/01/2023   ACCESSION NUMBER(S): [id number]   ORDERING CLINICIAN: LO SHAW   FINDINGS: Large right basilar pleural effusion occupying greater than 50% of the hemithoracic volume. Overlying atelectasis. Left lung grossly clear without pleural effusion. Cardiomegaly suspected. Aortic atherosclerosis. Mild pulmonary vascular congestion.       Large right pleural effusion.   MACRO: None    Signed by: Artur Santos 3/15/2024 11:33 AM Dictation workstation:   [id number]    XR tibia fibula left 2 views    Result Date: 3/15/2024  Interpreted By:  Artur Santos, STUDY: XR TIBIA FIBULA LEFT 2 VIEWS;  3/15/2024 10:36 am   INDICATION: Signs/Symptoms:fall, previous below-knee amputation, pain at left stump.   COMPARISON: 06/23/2022.   ACCESSION NUMBER(S): [id number]   ORDERING CLINICIAN: LO SHAW   FINDINGS: Post below-knee amputation changes. No acute fracture or dislocation identified. Tiny patellar osteophytes. Small knee joint effusion suspected. Tiny metallic foreign bodies lateral to the knee joint are still present. Osteopenia.   Atherosclerosis.       Intact left tibia and fibula, post below-knee amputation.   MACRO: None   Signed by: Artur Santos 3/15/2024 11:33 AM Dictation workstation:   [id number]       RADIOLOGY:     XR chest 1 view   Final Result   Large right pleural effusion.        MACRO:   None        Signed by: Artur Santos 3/15/2024 11:33 AM   Dictation workstation:   [id number]      XR tibia fibula left 2 views   Final Result   Intact left tibia and fibula, post below-knee amputation.        MACRO:   None        Signed by: Artur Santos 3/15/2024 11:33 AM   Dictation workstation:   [id number]      Transthoracic Echo (TTE) Complete    (Results Pending)       PROBLEM LIST     Patient Active Problem List   Diagnosis    Status post below-knee amputation of left lower extremity (CMS/HCC)    PAD (peripheral artery disease) (CMS/[facility])    ACC/AHA stage C acute systolic heart failure (CMS/HCC)    Coronary artery disease involving native coronary artery without angina pectoris    Mixed hyperlipidemia    Thyroid mass    Tobacco dependence with current use    PIPPA (acute kidney injury) (CMS/HCC)    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    Chronic congestive heart failure (CMS/HCC)    GERD (gastroesophageal reflux disease)    Dyspepsia    Critical lower  limb ischemia (CMS/[facility])    Chronic obstructive pulmonary disease, unspecified (CMS/[facility])    Primary hypertension    PTSD (post-traumatic stress disorder)    JONA (generalized anxiety disorder)    Severe episode of recurrent major depressive disorder, without psychotic features (CMS/[facility])    Type 2 diabetes mellitus (CMS/[facility])    COPD exacerbation (CMS/[facility])    NSTEMI (non-ST elevated myocardial infarction) (CMS/[facility])    Elevated troponin    COPD (chronic obstructive pulmonary disease) (CMS/[facility])    Acute on chronic combined systolic (congestive) and diastolic (congestive) heart failure (CMS/[facility])       ASSESSMENT:     1.  Acute on chronic systolic congestive heart failure.  Documented to have severe LV dysfunction in the past with estimated LV ejection fraction of 25 to 30%.  Patient has been managed with various medications including Toprol-XL, Jardiance, Aldactone, and diuretics.  He has not been felt to be a good candidate for Entresto.  He has been noncompliant with taking medications and going to some of his follow-up visits.     2.  Atherosclerotic heart disease with underlying ischemic cardiomyopathy.  Cardiac catheterization in 2021 showing total occlusion of the circumflex and severe distal LAD disease.     3.  Large right pleural effusion.     4.  Hypokalemia.     5.  Severe peripheral vascular disease.  Status post left below-knee amputation.  Status post femorofemoral bypass July 2019 along with additional angioplasty procedures.  Currently with leukocytosis and possible infection of the left BKA stump.     6.  Chronic kidney disease.     7.  Insulin-dependent diabetes mellitus.     8.  Primary hypertension.     9.  COPD related to longstanding tobacco abuse.     10.  Mixed hyperlipidemia.    PLAN:     Continue Toprol-XL.    Started on Aldactone and IV furosemide by Dr. Williamson.     Jardiance was previously discontinued.    Echocardiogram has been ordered and will be reviewed once completed tomorrow.    Patient  is not a good candidate for ICD implant secondary to ongoing infection.  In addition he stated during this admission that his preference would be to be euthanized so that he could leave this earth.      He wants his diuretics to be managed by nephrology as he is most concerned about his kidneys.    He will follow-up with [facility] cardiology upon discharge.  No new cardiac recommendations.    Electronically signed by Juan Argueta MD, on 3/17/2024 at 1:17 PM

## 2024-03-17 NOTE — PROGRESS NOTES
Nephrology Progress Note    Assessment:  53 y.o. male with history s/f HFrEF, PAD s/p LT BKA, T2DM c/b chronic non-healing wounds on Les who presented for a fall while attempting to transfer.      PIPPA on CKD stage II: likely in setting of CRS, baseline Scr ~1.2 w/ eGFR high 60s, tends to get recurrent AKIs  Hypokalemia: corrected   Large RT pleural effusion  Anemia   Hypoalbuminemia   Transaminitis      Plan:  - changing lasix to gtt at 10 mg/hr   - adding metolazone 5 mg daily       Subjective:  Admit Date: 3/15/2024    Interval History: hypotensive episodes, function worsening     Medications:  Scheduled Meds:cefepime, 2 g, intravenous, q24h  docusate sodium, 100 mg, oral, BID  enoxaparin, 40 mg, subcutaneous, q24h  lidocaine, 1 patch, transdermal, Daily  melatonin, 3 mg, oral, Daily  metFORMIN, 500 mg, oral, Daily with breakfast  methIMAzole, 5 mg, oral, Daily  metOLazone, 5 mg, oral, Daily  metoprolol succinate XL, 25 mg, oral, Daily  pantoprazole, 40 mg, oral, Daily before breakfast   Or  pantoprazole, 40 mg, intravenous, Daily before breakfast  perflutren lipid microspheres, 0.5-10 mL of dilution, intravenous, Once in imaging  perflutren protein A microsphere, 0.5 mL, intravenous, Once in imaging  potassium chloride, 20 mEq, oral, BID  spironolactone, 25 mg, oral, Daily  sulfur hexafluoride microsphr, 2 mL, intravenous, Once in imaging  vancomycin, 1,000 mg, intravenous, q24h      Continuous Infusions:furosemide, 10 mg/hr        CBC:   Lab Results   Component Value Date    WBC 12.0 (H) 03/17/2024    RBC 4.00 (L) 03/17/2024    HGB 7.7 (L) 03/17/2024    HCT 26.4 (L) 03/17/2024    MCV 66 (L) 03/17/2024    MCH 19.3 (L) 03/17/2024    MCHC 29.2 (L) 03/17/2024    RDW 18.5 (H) 03/17/2024     03/17/2024     BMP:    Lab Results   Component Value Date     (L) 03/17/2024    K 5.0 03/17/2024    CL 92 (L) 03/17/2024    CO2 26 03/17/2024    BUN 52 (H) 03/17/2024    CREATININE 2.04 (H) 03/17/2024    CALCIUM 8.0  "(L) 03/17/2024    GLUCOSE 467 (HH) 03/17/2024       Objective:  Vitals: /74 (BP Location: Left arm, Patient Position: Sitting)   Pulse 85   Temp 36.7 °C (98.1 °F) (Temporal)   Resp 18   Ht 1.753 m (5' 9\")   Wt 68 kg (150 lb)   SpO2 92%   BMI 22.15 kg/m²    Wt Readings from Last 3 Encounters:   03/15/24 68 kg (150 lb)   10/30/23 55.9 kg (123 lb 3.8 oz)   07/21/23 59 kg (130 lb)      24HR INTAKE/OUTPUT:    Intake/Output Summary (Last 24 hours) at 3/17/2024 1344  Last data filed at 3/16/2024 2041  Gross per 24 hour   Intake --   Output 175 ml   Net -175 ml       General: alert, in no apparent distress  HEENT: normocephalic, atraumatic, anicteric  Lungs: non-labored respirations, clear to auscultation bilaterally  Heart: regular rate and rhythm, no murmurs or rubs  Abdomen: soft, non-tender, severely distended   Ext: no cyanosis, +++ RLE peripheral edema, LT BKA  Neuro: alert and oriented, no gross abnormalities        Electronically signed by Malgorzata Williamson MD, MD              "

## 2024-03-17 NOTE — PROGRESS NOTES
"Vancomycin Dosing by Pharmacy- FOLLOW UP    Hitesh Jurado is a 53 y.o. year old male who Pharmacy has been consulted for vancomycin dosing for osteomyelitis/septic arthritis. Based on the patient's indication and renal status this patient is being dosed based on a goal AUC of 400-600.     Renal function is currently declining.    Current vancomycin dose: 1000 mg given every 24 hours    Estimated vancomycin AUC on current dose: 509 mg/L.hr     Visit Vitals  /74 (BP Location: Left arm, Patient Position: Sitting)   Pulse 85   Temp 36.7 °C (98.1 °F) (Temporal)   Resp 18        Lab Results   Component Value Date    CREATININE 2.04 (H) 03/17/2024    CREATININE 1.61 (H) 03/16/2024    CREATININE 1.54 (H) 03/15/2024    CREATININE 1.35 (H) 11/01/2023        Patient weight is No results found for: \"PTWEIGHT\"    No results found for: \"CULTURE\"     I/O last 3 completed shifts:  In: - (0 mL/kg)   Out: 625 (9.2 mL/kg) [Urine:625 (0.3 mL/kg/hr)]  Weight: 68 kg   [unfilled]    Lab Results   Component Value Date    PATIENTTEMP  11/01/2023      Comment:      NOTE: Patient Results are Not Corrected for Temperature    PATIENTTEMP 37.0 11/14/2022    PATIENTTEMP 37.0 11/13/2022        Assessment/Plan    Within goal AUC range. Continue current vancomycin regimen. Pt refused dose on 3/16. 1st dose given on 3/17. Will evaluate based on this dose.    This dosing regimen is predicted by [employer] to result in the following pharmacokinetic parameters:  Loading dose: N/A  Regimen: 1000 mg IV every 24 hours.  Start time: 10:13 on 03/18/2024  Exposure target: AUC24 (range)400-600 mg/L.hr   AUC24,ss: 509 mg/L.hr  Probability of AUC24 > 400: 77 %  Ctrough,ss: 16.1 mg/L  Probability of Ctrough,ss > 20: 29 %  Probability of nephrotoxicity (Lodise DAYANARA 2009): 11 %      The next level will be obtained on 3/18/24 at 1st AM. May be obtained sooner if clinically indicated.   Will continue to monitor renal function daily while on vancomycin " and order serum creatinine at least every 48 hours if not already ordered.  Follow for continued vancomycin needs, clinical response, and signs/symptoms of toxicity.       Gabby Mathews, PharmD

## 2024-03-17 NOTE — PROGRESS NOTES
Hitesh Jurado is a 53 y.o. male on day 2 of admission presenting with Acute on chronic combined systolic (congestive) and diastolic (congestive) heart failure (CMS/HCC).      Chief Complaint: Fall at home, shortness of breath     History Of Present Illness:    Hitesh Jurado is a 53 y.o. male with a significant past medical history of  HFrEF, PAD  status post left BKA, diabetes type 2  complicated by chronic nonhealing wounds and lower extremities who was presenting after a fall from his wheelchair while attempting to transfer.   There is no loss of consciousness.  he is complaining of pain in the left BKA stump and hips.  Denies any current chest pain fevers or chills.  Does endorse some shortness of breath.  Of note patient was recently at the [facility] last week where he was found to have hypokalemia and left AMA prior to potassium repletion.       In the emergency room notable findings were blood glucose at 54, potassium at 2.4, BUN/creatinine 44/1.54, BNP 4248, white count at 11.8 with neutrophils at 9.78, hemoglobin at 9.3 with MCV of 65.  x-ray chest showed large right basilar pleural effusion occupying greater than 50% of the hemithoracic volume with overlying atelectasis and mild pulmonary vascular congestion.   X-ray of the left tibia-fibula showed BKA changes with tiny patellar osteophytes, small knee joint effusion, small foreign bodies lateral to the knee joint are still present and osteopenia.  patient was given cefepime and Flagyl as well as dextrose and potassium chloride and started on vancomycin and admitted for further evaluation and treatment.     Recent events have been noted.  Patient per RN refusing a lot of medications and interventions including Lasix and vancomycin.  1 out of 4 cultures come back positive for gram-positive cocci.             Vitals:    03/15/24 1100 03/15/24 1215 03/15/24 1230 03/15/24 1320   BP: 126/82  122/82 107/66   BP Location: Right arm  Right arm     Patient Position: Lying  Lying Lying   Pulse: 86 86 86 89   Resp: 18 18 19 18   Temp:       TempSrc:       SpO2: 96% 96% 95% (!) 93%   Weight:       Height:             Last I/O:  No intake/output data recorded.     Physical Exam  Constitutional:       General: He is in acute distress.      Appearance: He is ill-appearing.   HENT:      Head: Normocephalic.      Mouth/Throat:      Mouth: Mucous membranes are dry.   Eyes:      Pupils: Pupils are equal, round, and reactive to light.   Cardiovascular:      Rate and Rhythm: Normal rate. Rhythm irregular.      Comments:  diminished pulses diffusely  Pulmonary:      Effort: Pulmonary effort is normal.   Abdominal:      General: Bowel sounds are normal.      Palpations: Abdomen is soft.   Musculoskeletal:         General: Swelling and tenderness present. Normal range of motion.      Right lower leg: Edema present.      Left lower leg: Edema present.      Comments:  erythema and both legs concerning for cellulitis excoriated stump left BKA   Skin:     General: Skin is dry.      Capillary Refill: Capillary refill takes less than 2 seconds.      Coloration: Skin is pale.   Neurological:      Mental Status: He is disoriented.      Motor: Weakness present.   Psychiatric:      Comments:  pain which of proportion with exam            Scheduled Medications  docusate sodium, 100 mg, oral, BID  enoxaparin, 40 mg, subcutaneous, q24h  furosemide, 40 mg, intravenous, BID  melatonin, 3 mg, oral, Daily  [START ON 3/16/2024] pantoprazole, 40 mg, oral, Daily before breakfast   Or  [START ON 3/16/2024] pantoprazole, 40 mg, intravenous, Daily before breakfast  potassium chloride, 20 mEq, oral, BID  potassium chloride, 20 mEq, intravenous, q2h  vancomycin, 1.5 g, intravenous, Once        PRN Medications  PRN medications: acetaminophen **OR** acetaminophen **OR** acetaminophen, acetaminophen **OR** acetaminophen **OR** acetaminophen, benzocaine-menthol, dextromethorphan-guaifenesin,  guaiFENesin, ondansetron ODT **OR** ondansetron, oxygen  Continuous Medications     Outpatient Medications:                Prior to Admission medications    Medication Sig Start Date End Date Taking? Authorizing Provider   acetaminophen (Tylenol) 500 mg tablet Take 1 tablet (500 mg) by mouth every 8 hours if needed.       Historical Provider, MD   aspirin 81 mg EC tablet Take 1 tablet (81 mg) by mouth once daily.       Historical Provider, MD   atorvastatin (Lipitor) 80 mg tablet Take 1 tablet (80 mg) by mouth once daily.       Historical Provider, MD   azithromycin (Zithromax) 500 mg tablet Take 1 tablet (500 mg) by mouth once every 24 hours. Do not start before October 30, 2023. 10/30/23     Nhan Montero MD   carvedilol (Coreg) 12.5 mg tablet Take 1 tablet (12.5 mg) by mouth twice a day.       Historical Provider, MD   empagliflozin (Jardiance) 10 mg Take 1 tablet (10 mg) by mouth once daily.       Historical Provider, MD   gabapentin (Neurontin) 400 mg capsule Take 1 capsule (400 mg) by mouth 3 times a day.       Historical Provider, MD   linaGLIPtin (Tradjenta) 5 mg tablet Take 1 tablet (5 mg) by mouth once daily.       Historical Provider, MD   lisinopril 5 mg tablet Take 1 tablet (5 mg) by mouth once daily.       Historical Provider, MD   melatonin 5 mg tablet Take 1 tablet (5 mg) by mouth once daily at bedtime.       Historical Provider, MD   metFORMIN (Glucophage) 1,000 mg tablet Take 0.5 tablets (500 mg) by mouth twice a day.       Historical Provider, MD   methIMAzole (Tapazole) 10 mg tablet Take 2 tablets (20 mg) by mouth every 8 hours. 10/29/23     Nhan Montero MD   omeprazole (PriLOSEC) 40 mg DR capsule Take 1 capsule (40 mg) by mouth twice a day.       Historical Provider, MD   torsemide (Demadex) 20 mg tablet Take 1 tablet (20 mg) by mouth once daily as needed.       Historical Provider, MD      Labs:          Results from last 7 days   Lab Units 03/15/24  0905   WBC AUTO x10*3/uL 11.8*    RBC AUTO x10*6/uL 4.66   HEMOGLOBIN g/dL 9.3*   HEMATOCRIT % 30.4*   MCV fL 65*   MCH pg 20.0*   MCHC g/dL 30.6*   RDW % 18.4*   PLATELETS AUTO x10*3/uL 429              Results from last 7 days   Lab Units 03/15/24  0905   SODIUM mmol/L 139   POTASSIUM mmol/L 2.4*   CHLORIDE mmol/L 96*   CO2 mmol/L 31   BUN mg/dL 44*   CREATININE mg/dL 1.54*   GLUCOSE mg/dL 54*   PROTEIN TOTAL g/dL 7.5   CALCIUM mg/dL 8.6   BILIRUBIN TOTAL mg/dL 1.5*   ALK PHOS U/L 137*   AST U/L 63*   ALT U/L 38              Results from last 7 days   Lab Units 03/15/24  0905   MAGNESIUM mg/dL 1.91             Imaging:  XR chest 1 view  Narrative: Interpreted By:  Artur Santos,   STUDY:  XR CHEST 1 VIEW;  3/15/2024 10:36 am      INDICATION:  Signs/Symptoms:fall.      COMPARISON:  11/01/2023      ACCESSION NUMBER(S):  [id number]      ORDERING CLINICIAN:  LO SHAW      FINDINGS:  Large right basilar pleural effusion occupying greater than 50% of  the hemithoracic volume. Overlying atelectasis. Left lung grossly  clear without pleural effusion. Cardiomegaly suspected. Aortic  atherosclerosis. Mild pulmonary vascular congestion.      Impression: Large right pleural effusion.      MACRO:  None      Signed by: Artur Santos 3/15/2024 11:33 AM  Dictation workstation:   [id number]  XR tibia fibula left 2 views  Narrative: Interpreted By:  Artur Santos,   STUDY:  XR TIBIA FIBULA LEFT 2 VIEWS;  3/15/2024 10:36 am      INDICATION:  Signs/Symptoms:fall, previous below-knee amputation, pain at left  stump.      COMPARISON:  06/23/2022.      ACCESSION NUMBER(S):  [id number]      ORDERING CLINICIAN:  LO SHAW      FINDINGS:  Post below-knee amputation changes. No acute fracture or dislocation  identified. Tiny patellar osteophytes. Small knee joint effusion  suspected. Tiny metallic foreign bodies lateral to the knee joint are  still present. Osteopenia.      Atherosclerosis.      Impression: Intact left tibia and fibula, post below-knee  amputation.      MACRO:  None      Signed by: Arturcanelo Santos 3/15/2024 11:33 AM  Dictation workstation:   [id number]  ECG 12 lead  Sinus rhythm with Premature supraventricular complexes and with occasional Premature ventricular complexes  Right bundle branch block  Possible Lateral infarct , age undetermined  Abnormal ECG  When compared with ECG of 01-NOV-2023 12:19,  Premature supraventricular complexes are now Present  Borderline criteria for Lateral infarct are now Present\     Assessment/plan:-   #Large right-sided pleural effusion; Differential diagnosis being parapneumonic, malignancy and from congestive heart failure/nephrotic syndrome  # Probable COPD without significant acute exacerbation   # Probable pneumonia right lung  # Nicotine dependency     #Hypokalemia  #Leukocytosis in the setting of left BKA wounds concerning for possible infection.   #acute on chronic HFrEF   #microcytic anemia  #Hypoglycemia  - Dr. Early discussed patient with interventional radiology with plan to possibly drain large right pleural effusion today.  Depends on radiologist schedules and if we can fit patient in if not will have patient possibly drained over the weekend by either pulmonology or in ICU depending on progression.  -Will go ahead and start diuresing patient with Lasix 40 mg twice daily will adjust as needed.  -  Cardiology patient has been reviewed and appreciated.  - will obtain echocardiogram  - Place patient on telemetry and monitor  - continue to monitor replete electrolytes  - send hemoglobin A1c and monitor blood glucose.  Will initiate sliding scale insulin  -   Will continue broad-spectrum antibiotics.  Agree with  consult wound care   -  Will continue to monitor patient's hemoglobin with plan to send iron studies given microcytosis.  - renally dose all medication        VTE Prophylaxis:  Lovenox renally dosed  Diet: Cardiac diet  CODE STATUS: Full code     Pulmonary comments:-Agree with broad-spectrum  antibiotics used for right above-the-knee stump infection (cefepime and metronidazole) which should also cover for pneumonia as well.  Will give as needed bronchodilator nebs for presumptive COPD.  Agree with supplemental oxygen for hypoxia.  Patient counseled to quit smoking cigarettes completely.  Patient will need right thoracentesis  tomorrow by interventional radiology.  Pulmonary will continue to monitor this patient with you and I thank you for the referral.    Huy Ricci MD

## 2024-03-18 ENCOUNTER — APPOINTMENT (OUTPATIENT)
Dept: RADIOLOGY | Facility: HOSPITAL | Age: 54
End: 2024-03-18
Payer: MEDICAID

## 2024-03-18 LAB
ALBUMIN SERPL BCP-MCNC: 2.9 G/DL (ref 3.4–5)
ALP SERPL-CCNC: 135 U/L (ref 33–120)
ALT SERPL W P-5'-P-CCNC: 27 U/L (ref 10–52)
AMYLASE FLD-CCNC: 37 U/L
ANION GAP SERPL CALC-SCNC: 14 MMOL/L (ref 10–20)
AST SERPL W P-5'-P-CCNC: 33 U/L (ref 9–39)
BASOPHILS NFR FLD MANUAL: 0 %
BILIRUB SERPL-MCNC: 1.3 MG/DL (ref 0–1.2)
BLASTS NFR FLD MANUAL: 0 %
BUN SERPL-MCNC: 62 MG/DL (ref 6–23)
CALCIUM SERPL-MCNC: 8.5 MG/DL (ref 8.6–10.3)
CHLORIDE SERPL-SCNC: 93 MMOL/L (ref 98–107)
CLARITY FLD: ABNORMAL
CO2 SERPL-SCNC: 28 MMOL/L (ref 21–32)
COLOR FLD: YELLOW
CREAT SERPL-MCNC: 2.38 MG/DL (ref 0.5–1.3)
EGFRCR SERPLBLD CKD-EPI 2021: 32 ML/MIN/1.73M*2
EOSINOPHIL NFR FLD MANUAL: 0 %
ERYTHROCYTE [DISTWIDTH] IN BLOOD BY AUTOMATED COUNT: 18.1 % (ref 11.5–14.5)
GLUCOSE BLD MANUAL STRIP-MCNC: 185 MG/DL (ref 74–99)
GLUCOSE BLD MANUAL STRIP-MCNC: 186 MG/DL (ref 74–99)
GLUCOSE BLD MANUAL STRIP-MCNC: 205 MG/DL (ref 74–99)
GLUCOSE BLD MANUAL STRIP-MCNC: 365 MG/DL (ref 74–99)
GLUCOSE BLD MANUAL STRIP-MCNC: 370 MG/DL (ref 74–99)
GLUCOSE FLD-MCNC: 263 MG/DL
GLUCOSE SERPL-MCNC: 229 MG/DL (ref 74–99)
HCT VFR BLD AUTO: 25.6 % (ref 41–52)
HGB BLD-MCNC: 7.9 G/DL (ref 13.5–17.5)
HOLD SPECIMEN: NORMAL
IMMATURE GRANULOCYTES IN FLUID: 0 %
LACTATE SERPL-SCNC: 1.5 MMOL/L (ref 0.4–2)
LDH FLD L TO P-CCNC: 71 U/L
LYMPHOCYTES NFR FLD MANUAL: 12 %
MCH RBC QN AUTO: 20.1 PG (ref 26–34)
MCHC RBC AUTO-ENTMCNC: 30.9 G/DL (ref 32–36)
MCV RBC AUTO: 65 FL (ref 80–100)
MONOS+MACROS NFR FLD MANUAL: 55 %
NEUTROPHILS NFR FLD MANUAL: 33 %
NRBC BLD-RTO: 0 /100 WBCS (ref 0–0)
OTHER CELLS NFR FLD MANUAL: 0 %
PATH REVIEW-CELL CT,FLUID: NORMAL
PH FLD: 7.43 [PH]
PLASMA CELLS NFR FLD MANUAL: 0 %
PLATELET # BLD AUTO: 368 X10*3/UL (ref 150–450)
POTASSIUM SERPL-SCNC: 4.2 MMOL/L (ref 3.5–5.3)
PROT FLD-MCNC: 2.7 G/DL
PROT SERPL-MCNC: 7.2 G/DL (ref 6.4–8.2)
RBC # BLD AUTO: 3.94 X10*6/UL (ref 4.5–5.9)
RBC # FLD AUTO: 1000 /UL
SODIUM SERPL-SCNC: 131 MMOL/L (ref 136–145)
TOTAL CELLS COUNTED FLD: 100
TRIGL FLD-MCNC: 18 MG/DL
VANCOMYCIN SERPL-MCNC: 10.7 UG/ML (ref 5–20)
WBC # BLD AUTO: 14.1 X10*3/UL (ref 4.4–11.3)
WBC # FLD AUTO: 851 /UL

## 2024-03-18 PROCEDURE — 2500000004 HC RX 250 GENERAL PHARMACY W/ HCPCS (ALT 636 FOR OP/ED): Performed by: STUDENT IN AN ORGANIZED HEALTH CARE EDUCATION/TRAINING PROGRAM

## 2024-03-18 PROCEDURE — 76700 US EXAM ABDOM COMPLETE: CPT

## 2024-03-18 PROCEDURE — 83986 ASSAY PH BODY FLUID NOS: CPT | Mod: ELYLAB | Performed by: INTERNAL MEDICINE

## 2024-03-18 PROCEDURE — 80202 ASSAY OF VANCOMYCIN: CPT

## 2024-03-18 PROCEDURE — 2500000001 HC RX 250 WO HCPCS SELF ADMINISTERED DRUGS (ALT 637 FOR MEDICARE OP): Performed by: STUDENT IN AN ORGANIZED HEALTH CARE EDUCATION/TRAINING PROGRAM

## 2024-03-18 PROCEDURE — 82945 GLUCOSE OTHER FLUID: CPT | Mod: ELYLAB | Performed by: INTERNAL MEDICINE

## 2024-03-18 PROCEDURE — 99232 SBSQ HOSP IP/OBS MODERATE 35: CPT | Performed by: NURSE PRACTITIONER

## 2024-03-18 PROCEDURE — 89051 BODY FLUID CELL COUNT: CPT | Performed by: INTERNAL MEDICINE

## 2024-03-18 PROCEDURE — 82947 ASSAY GLUCOSE BLOOD QUANT: CPT

## 2024-03-18 PROCEDURE — 76705 ECHO EXAM OF ABDOMEN: CPT | Performed by: RADIOLOGY

## 2024-03-18 PROCEDURE — 93971 EXTREMITY STUDY: CPT

## 2024-03-18 PROCEDURE — 88104 CYTOPATH FL NONGYN SMEARS: CPT | Performed by: PATHOLOGY

## 2024-03-18 PROCEDURE — 2500000005 HC RX 250 GENERAL PHARMACY W/O HCPCS

## 2024-03-18 PROCEDURE — 84478 ASSAY OF TRIGLYCERIDES: CPT | Mod: ELYLAB | Performed by: INTERNAL MEDICINE

## 2024-03-18 PROCEDURE — 36415 COLL VENOUS BLD VENIPUNCTURE: CPT | Performed by: STUDENT IN AN ORGANIZED HEALTH CARE EDUCATION/TRAINING PROGRAM

## 2024-03-18 PROCEDURE — 2500000005 HC RX 250 GENERAL PHARMACY W/O HCPCS: Performed by: NURSE PRACTITIONER

## 2024-03-18 PROCEDURE — 88112 CYTOPATH CELL ENHANCE TECH: CPT | Performed by: PATHOLOGY

## 2024-03-18 PROCEDURE — 2720000007 HC OR 272 NO HCPCS

## 2024-03-18 PROCEDURE — 32555 ASPIRATE PLEURA W/ IMAGING: CPT | Mod: RIGHT SIDE | Performed by: RADIOLOGY

## 2024-03-18 PROCEDURE — 83605 ASSAY OF LACTIC ACID: CPT | Performed by: STUDENT IN AN ORGANIZED HEALTH CARE EDUCATION/TRAINING PROGRAM

## 2024-03-18 PROCEDURE — 87205 SMEAR GRAM STAIN: CPT | Mod: ELYLAB | Performed by: INTERNAL MEDICINE

## 2024-03-18 PROCEDURE — 32555 ASPIRATE PLEURA W/ IMAGING: CPT | Mod: RT

## 2024-03-18 PROCEDURE — 2500000004 HC RX 250 GENERAL PHARMACY W/ HCPCS (ALT 636 FOR OP/ED): Performed by: PHARMACIST

## 2024-03-18 PROCEDURE — 0W993ZZ DRAINAGE OF RIGHT PLEURAL CAVITY, PERCUTANEOUS APPROACH: ICD-10-PCS | Performed by: RADIOLOGY

## 2024-03-18 PROCEDURE — 1210000001 HC SEMI-PRIVATE ROOM DAILY

## 2024-03-18 PROCEDURE — 82150 ASSAY OF AMYLASE: CPT | Mod: ELYLAB | Performed by: INTERNAL MEDICINE

## 2024-03-18 PROCEDURE — 80053 COMPREHEN METABOLIC PANEL: CPT | Performed by: STUDENT IN AN ORGANIZED HEALTH CARE EDUCATION/TRAINING PROGRAM

## 2024-03-18 PROCEDURE — 93971 EXTREMITY STUDY: CPT | Performed by: RADIOLOGY

## 2024-03-18 PROCEDURE — 83615 LACTATE (LD) (LDH) ENZYME: CPT | Mod: ELYLAB | Performed by: INTERNAL MEDICINE

## 2024-03-18 PROCEDURE — 88305 TISSUE EXAM BY PATHOLOGIST: CPT | Performed by: PATHOLOGY

## 2024-03-18 PROCEDURE — 99232 SBSQ HOSP IP/OBS MODERATE 35: CPT | Performed by: STUDENT IN AN ORGANIZED HEALTH CARE EDUCATION/TRAINING PROGRAM

## 2024-03-18 PROCEDURE — 85027 COMPLETE CBC AUTOMATED: CPT | Performed by: STUDENT IN AN ORGANIZED HEALTH CARE EDUCATION/TRAINING PROGRAM

## 2024-03-18 PROCEDURE — 84157 ASSAY OF PROTEIN OTHER: CPT | Mod: ELYLAB | Performed by: INTERNAL MEDICINE

## 2024-03-18 PROCEDURE — C1729 CATH, DRAINAGE: HCPCS

## 2024-03-18 PROCEDURE — 71045 X-RAY EXAM CHEST 1 VIEW: CPT

## 2024-03-18 PROCEDURE — 2500000004 HC RX 250 GENERAL PHARMACY W/ HCPCS (ALT 636 FOR OP/ED): Performed by: INTERNAL MEDICINE

## 2024-03-18 PROCEDURE — 2500000004 HC RX 250 GENERAL PHARMACY W/ HCPCS (ALT 636 FOR OP/ED)

## 2024-03-18 PROCEDURE — 2500000002 HC RX 250 W HCPCS SELF ADMINISTERED DRUGS (ALT 637 FOR MEDICARE OP, ALT 636 FOR OP/ED): Performed by: STUDENT IN AN ORGANIZED HEALTH CARE EDUCATION/TRAINING PROGRAM

## 2024-03-18 PROCEDURE — 88112 CYTOPATH CELL ENHANCE TECH: CPT | Mod: TC | Performed by: INTERNAL MEDICINE

## 2024-03-18 RX ORDER — VANCOMYCIN HYDROCHLORIDE 750 MG/150ML
750 INJECTION, SOLUTION INTRAVENOUS EVERY 24 HOURS
Status: DISCONTINUED | OUTPATIENT
Start: 2024-03-18 | End: 2024-03-20

## 2024-03-18 RX ORDER — MORPHINE SULFATE 2 MG/ML
2 INJECTION, SOLUTION INTRAMUSCULAR; INTRAVENOUS
Status: DISCONTINUED | OUTPATIENT
Start: 2024-03-18 | End: 2024-03-26 | Stop reason: HOSPADM

## 2024-03-18 RX ADMIN — SPIRONOLACTONE 25 MG: 25 TABLET ORAL at 11:12

## 2024-03-18 RX ADMIN — METHIMAZOLE 5 MG: 5 TABLET ORAL at 11:12

## 2024-03-18 RX ADMIN — METFORMIN HYDROCHLORIDE 500 MG: 500 TABLET, FILM COATED ORAL at 11:12

## 2024-03-18 RX ADMIN — CEFEPIME 2 G: 2 INJECTION, POWDER, FOR SOLUTION INTRAVENOUS at 11:22

## 2024-03-18 RX ADMIN — POTASSIUM CHLORIDE 20 MEQ: 1.5 POWDER, FOR SOLUTION ORAL at 11:12

## 2024-03-18 RX ADMIN — DOCUSATE SODIUM 100 MG: 100 CAPSULE, LIQUID FILLED ORAL at 11:12

## 2024-03-18 RX ADMIN — METOPROLOL SUCCINATE 25 MG: 25 TABLET, EXTENDED RELEASE ORAL at 11:12

## 2024-03-18 RX ADMIN — Medication 3 MG: at 20:37

## 2024-03-18 RX ADMIN — MORPHINE SULFATE 2 MG: 2 INJECTION, SOLUTION INTRAMUSCULAR; INTRAVENOUS at 22:22

## 2024-03-18 RX ADMIN — POTASSIUM CHLORIDE 20 MEQ: 1.5 POWDER, FOR SOLUTION ORAL at 20:37

## 2024-03-18 RX ADMIN — VANCOMYCIN HYDROCHLORIDE 750 MG: 750 INJECTION, SOLUTION INTRAVENOUS at 13:01

## 2024-03-18 RX ADMIN — FUROSEMIDE 10 MG/HR: 10 INJECTION, SOLUTION INTRAMUSCULAR; INTRAVENOUS at 04:47

## 2024-03-18 RX ADMIN — PANTOPRAZOLE SODIUM 40 MG: 40 TABLET, DELAYED RELEASE ORAL at 06:44

## 2024-03-18 RX ADMIN — INSULIN LISPRO 4 UNITS: 100 INJECTION, SOLUTION INTRAVENOUS; SUBCUTANEOUS at 08:51

## 2024-03-18 RX ADMIN — INSULIN LISPRO 2 UNITS: 100 INJECTION, SOLUTION INTRAVENOUS; SUBCUTANEOUS at 11:26

## 2024-03-18 RX ADMIN — METOLAZONE 5 MG: 5 TABLET ORAL at 11:12

## 2024-03-18 RX ADMIN — DOCUSATE SODIUM 100 MG: 100 CAPSULE, LIQUID FILLED ORAL at 20:37

## 2024-03-18 RX ADMIN — INSULIN LISPRO 10 UNITS: 100 INJECTION, SOLUTION INTRAVENOUS; SUBCUTANEOUS at 20:37

## 2024-03-18 RX ADMIN — INSULIN LISPRO 10 UNITS: 100 INJECTION, SOLUTION INTRAVENOUS; SUBCUTANEOUS at 17:46

## 2024-03-18 RX ADMIN — LIDOCAINE 4% 1 PATCH: 40 PATCH TOPICAL at 11:23

## 2024-03-18 ASSESSMENT — PAIN SCALES - GENERAL
PAINLEVEL_OUTOF10: 0 - NO PAIN
PAINLEVEL_OUTOF10: 8
PAINLEVEL_OUTOF10: 0 - NO PAIN
PAINLEVEL_OUTOF10: 0 - NO PAIN
PAINLEVEL_OUTOF10: 9
PAINLEVEL_OUTOF10: 0 - NO PAIN

## 2024-03-18 ASSESSMENT — COGNITIVE AND FUNCTIONAL STATUS - GENERAL
PERSONAL GROOMING: A LITTLE
DRESSING REGULAR UPPER BODY CLOTHING: A LITTLE
TOILETING: A LITTLE
TURNING FROM BACK TO SIDE WHILE IN FLAT BAD: A LITTLE
MOVING TO AND FROM BED TO CHAIR: A LOT
HELP NEEDED FOR BATHING: A LITTLE
DRESSING REGULAR LOWER BODY CLOTHING: A LITTLE
STANDING UP FROM CHAIR USING ARMS: A LOT
WALKING IN HOSPITAL ROOM: A LOT
MOBILITY SCORE: 13
MOVING FROM LYING ON BACK TO SITTING ON SIDE OF FLAT BED WITH BEDRAILS: A LITTLE
CLIMB 3 TO 5 STEPS WITH RAILING: TOTAL
DAILY ACTIVITIY SCORE: 19

## 2024-03-18 ASSESSMENT — PAIN DESCRIPTION - ORIENTATION: ORIENTATION: RIGHT

## 2024-03-18 ASSESSMENT — PAIN - FUNCTIONAL ASSESSMENT
PAIN_FUNCTIONAL_ASSESSMENT: 0-10
PAIN_FUNCTIONAL_ASSESSMENT: 0-10

## 2024-03-18 ASSESSMENT — PAIN DESCRIPTION - LOCATION: LOCATION: BACK

## 2024-03-18 ASSESSMENT — ACTIVITIES OF DAILY LIVING (ADL): LACK_OF_TRANSPORTATION: YES

## 2024-03-18 NOTE — PROGRESS NOTES
"Vancomycin Dosing by Pharmacy- FOLLOW UP    Hitesh Jurado is a 53 y.o. year old male who Pharmacy has been consulted for vancomycin dosing for cellulitis, skin and soft tissue. Based on the patient's indication and renal status this patient is being dosed based on a goal AUC of 400-600.     Renal function is currently declining.    Current vancomycin dose: 1000 mg given every 24 hours    Estimated vancomycin AUC on current dose: 649 mg/L.hr     Visit Vitals  /74 (BP Location: Left arm, Patient Position: Sitting)   Pulse 85   Temp 36.6 °C (97.9 °F) (Temporal)   Resp 16        Lab Results   Component Value Date    CREATININE 2.38 (H) 03/18/2024    CREATININE 2.04 (H) 03/17/2024    CREATININE 1.61 (H) 03/16/2024    CREATININE 1.54 (H) 03/15/2024        Patient weight is No results found for: \"PTWEIGHT\"    No results found for: \"CULTURE\"     I/O last 3 completed shifts:  In: - (0 mL/kg)   Out: 225 (3.3 mL/kg) [Urine:225 (0.1 mL/kg/hr)]  Weight: 68 kg   [unfilled]    Lab Results   Component Value Date    PATIENTTEMP  11/01/2023      Comment:      NOTE: Patient Results are Not Corrected for Temperature    PATIENTTEMP 37.0 11/14/2022    PATIENTTEMP 37.0 11/13/2022        Assessment/Plan    Above goal AUC. Orders placed for new vancomcyin regimen of 750 every 24 hours to begin at 3/18 1000.    This dosing regimen is predicted by [employer] to result in the following pharmacokinetic parameters:  Loading dose: N/A  Regimen: 750 mg IV every 24 hours.  Start time: 10:13 on 03/18/2024  Exposure target: AUC24 (range)400-600 mg/L.hr   AUC24,ss: 491 mg/L.hr  Probability of AUC24 > 400: 89 %  Ctrough,ss: 15.7 mg/L  Probability of Ctrough,ss > 20: 14 %  Probability of nephrotoxicity (Lodise DAYANARA 2009): 11 %    The next level will be obtained on 3/19 at 1st am labs. May be obtained sooner if clinically indicated.   Will continue to monitor renal function daily while on vancomycin and order serum creatinine at least every " 48 hours if not already ordered.  Follow for continued vancomycin needs, clinical response, and signs/symptoms of toxicity.       Lisa Espana, [facility]

## 2024-03-18 NOTE — PROGRESS NOTES
03/18/24 1631   Discharge Planning   Living Arrangements Alone   Support Systems Family members   Assistance Needed transportation assistance, pt is wheelchair bound.  utilizes scooter as needed   Type of Residence   (apartment 1st floor)   Type of Post Acute Facility Services Skilled nursing   Patient expects to be discharged to: prefers home,  possible skilled if iv antibiotics needed.   Does the patient need discharge transport arranged? Yes   RoundTrip coordination needed? Yes   Has discharge transport been arranged? No   Transportation Needs   In the past 12 months, has lack of transportation kept you from medical appointments or from getting medications? yes   In the past 12 months, has lack of transportation kept you from meetings, work, or from getting things needed for daily living? Yes     Met with patient at bedside following thoracentesis procedure.  Per review, pt may require paracentesis tomorrow.  Wound care / Dr reyes Consulted . Pt emergency contact is Remigio hicks.  Pt follows with Dr Jaydon Collier , has prescriptions filled at [facility] in [place] and uses scooter for transportation to obtain food / prescriptions etc.  Does own cooking and cleaning.  Pt likely to dc home.  Will continue to follow pending dc needs.

## 2024-03-18 NOTE — PROGRESS NOTES
Hitesh Jurado is a 53 y.o. male on day 3 of admission presenting with Acute on chronic combined systolic (congestive) and diastolic (congestive) heart failure (CMS/HCC).      Subjective    patient in no acute distress.  Reports no worsening shortness of breath or chest pain at this point in time.  Tolerating diuresis well       Objective     Last Recorded Vitals  /80 (BP Location: Left arm, Patient Position: Lying)   Pulse 101   Temp 36.4 °C (97.5 °F) (Temporal)   Resp 22   Wt 68 kg (150 lb)   SpO2 98%   Intake/Output last 3 Shifts:    Intake/Output Summary (Last 24 hours) at 3/18/2024 1844  Last data filed at 3/18/2024 1700  Gross per 24 hour   Intake 960.87 ml   Output 2851 ml   Net -1890.13 ml       Admission Weight  Weight: 68 kg (150 lb) (03/15/24 0843)    Daily Weight  03/15/24 : 68 kg (150 lb)    Image Results  Lower extremity venous duplex right  Narrative: Interpreted By:  Schoenberger, Joseph,   STUDY:  [facility] US LOWER EXTREMITY VENOUS DUPLEX RIGHT  3/18/2024 2:08 pm      INDICATION:  52 y/o   M with  Signs/Symptoms:edema and decreased pulses in RLE.  LMP:  Unknown.      COMPARISON:  None.      ACCESSION NUMBER(S):  [id number]      ORDERING CLINICIAN:  COOKIE TAVRAEZ      TECHNIQUE:  Routine ultrasound of the  right lower extremity was performed with  duplex Doppler (color and spectral) evaluation.   Static images were  obtained for remote interpretation.      FINDINGS:  THIGH VEINS: There is nonocclusive partially calcified thrombus in  the right popliteal vein consistent with chronic venous thrombus. The  right posterior tibial and peroneal as well as superficial femoral  and common femoral veins compress normally with normal color Doppler  flow..      CALF VEINS:  The paired peroneal and posterior tibial calf veins are  patent.      Impression: .  There is some nonacute since thrombus chronic thrombus in the  right popliteal vein. Otherwise negative exam.      MACRO:  None      Signed  by: Joseph Schoenberger 3/18/2024 2:37 PM  Dictation workstation:   [id number]  US thoracentesis  Narrative: Interpreted By:  Schoenberger, Joseph,   STUDY:  US THORACENTESIS; ;  3/18/2024 10:48 am      INDICATION:  Signs/Symptoms:Dyspnea/to evaluate etiology of right pleural effusion.      COMPARISON:  None.      ACCESSION NUMBER(S):  [id number]      ORDERING CLINICIAN:  TEJINDER CHAVIRA      CONSENT:  The procedure, its indication, its risks, and alternatives were  explained to the patient who understands and consents to the  procedure. A time-out is completed prior to the procedure to confirm  patient identity and procedure to be performed.      MODALITIES:  Ultrasound      TECHNIQUE:  Targeted sonographic evaluation of the lower right chest demonstrates  a large right pleural effusion. Lowest intracostal space with  suitable percutaneous access to this pleural effusion was localized  using ultrasound. This site was marked. The marked site was then  sterilely prepped with chlorhexidine and a sterile barrier drape was  placed. Local anesthetic was administered. A 5 Green[place] sheath needle  was advanced until light green fluid was obtained. The sheath was  advanced off the needle cannula to the pleural space. 60 cc of fluid  was then aspirated into a syringe and sent to the laboratory for  studies as ordered by the referring physician. Subsequently  aspiration was performed for therapeutic thoracentesis. After a total  aspiration of 1500 cc, the patient experienced some right-sided chest  pressure. Therefore the procedure was terminated. The patient  tolerated the procedure well otherwise without other immediate  complication PICC      FINDINGS:  See discussion above      Impression: Successful ultrasound-guided diagnostic and therapeutic thoracentesis          MACRO:  None      Signed by: Joseph Schoenberger 3/18/2024 10:58 AM  Dictation workstation:   [id number]  XR chest 1 view  Narrative: Interpreted By:   Schoenberger, Joseph,   STUDY:  XR CHEST 1 VIEW;  3/18/2024 10:50 am      INDICATION:  Signs/Symptoms:Post Right Thoracentesis.      COMPARISON:  03/15/2024      ACCESSION NUMBER(S):  [id number]      ORDERING CLINICIAN:  JOSEPH SCHOENBERGER      FINDINGS:                  CARDIOMEDIASTINAL SILHOUETTE:  Cardiac silhouette remains somewhat enlarged.      LUNGS:  There is no evidence for right pneumothorax post thoracentesis.  Decreased right pleural fluid. Considerable opacity in the lower  right hemithorax consistent with residual pleural fluid with  underlying basal atelectasis.      ABDOMEN:  No remarkable upper abdominal findings.      BONES:  No acute osseous changes.      Impression: 1.  Satisfactory appearance post right thoracentesis. See discussion  above.              MACRO:  None      Signed by: Joseph Schoenberger 3/18/2024 10:56 AM  Dictation workstation:   [id number]  US abdomen complete  Narrative: Interpreted By:  Schoenberger, Joseph,   STUDY:  US ABDOMEN COMPLETE; ;  3/18/2024 9:49 am      INDICATION:  Signs/Symptoms:Distended abdomen.  Significant alcohol history.  Concern for cirrhosis may need paracentesis.      COMPARISON:  None.      ACCESSION NUMBER(S):  [id number]      ORDERING CLINICIAN:  COOKIE TVAAREZ      TECHNIQUE:  4 quadrant sonographic survey for ascites as well as sonographic  evaluation of the right upper quadrant.      FINDINGS:  The liver is normal in size measuring 17.9 cm in cephalocaudal  dimension. The capsular margins are nodular consistent with  cirrhosis. No definite focal lesion is seen.      There is no dilation of the bile ducts. The extrahepatic common duct  measures 4 mm.      The gallbladder is not well distended and incompletely evaluated.      There is no right hydronephrosis. The right kidney measures 8.2 cm in  longitudinal dimension.      There is no left hydronephrosis. The left kidney measures 7.3 cm in  longitudinal dimension.      The spleen is not  enlarged measuring 6.8 cm in cephalocaudal  dimension. It is sonographically unremarkable.      There is a small to moderate volume of peritoneal fluid      Impression: Small to moderate volume of peritoneal fluid.      Cirrhosis.          MACRO:  None      Signed by: Joseph Schoenberger 3/18/2024 10:46 AM  Dictation workstation:   [id number]      Physical Exam  Constitutional:       General: He is not in acute distress.     Appearance: He is ill-appearing.   HENT:      Head: Normocephalic.      Mouth/Throat:      Mouth: Mucous membranes are dry.   Eyes:      Pupils: Pupils are equal, round, and reactive to light.   Cardiovascular:      Rate and Rhythm: Normal rate and regular rhythm.      Pulses: Normal pulses.      Heart sounds: Normal heart sounds.   Pulmonary:      Effort: Respiratory distress present.      Breath sounds: Wheezing present.   Abdominal:      General: There is distension.      Tenderness: There is abdominal tenderness.   Musculoskeletal:         General: Swelling and tenderness present.   Skin:     General: Skin is warm.      Capillary Refill: Capillary refill takes less than 2 seconds.      Coloration: Skin is pale.   Neurological:      General: No focal deficit present.      Mental Status: He is oriented to person, place, and time. Mental status is at baseline.         Relevant Results               Assessment/Plan      Principal Problem:    Acute on chronic combined systolic (congestive) and diastolic (congestive) heart failure (CMS/HCC)    #Large right-sided pleural effusion  #Hypokalemia  #Leukocytosis in the setting of left BKA wounds concerning for possible infection.   #acute on chronic HFrEF   #microcytic anemia  #Hypoglycemia  - discussed patient with interventional radiology with plan to possibly drain large right pleural effusion today.  Depends on radiologist schedules and if we can fit patient in if not will have patient possibly drained over the weekend by either pulmonology or in  ICU depending on progression.  -Will go ahead and start diuresing patient with Lasix 40 mg twice daily will adjust as needed.  -  Will go ahead and consult cardiology  - will obtain echocardiogram  - Place patient on telemetry and monitor  - continue to monitor replete electrolytes  - send hemoglobin A1c and monitor blood glucose.  Will initiate sliding scale insulin  -   Will continue broad-spectrum antibiotics.  Will have  wound care evaluate patient and consult infectious disease if needed.  -  Will continue to monitor patient's hemoglobin with plan to send iron studies given microcytosis.  - renally dose all medication     VTE Prophylaxis:  Lovenox renally dosed  Diet: Cardiac diet  CODE STATUS: Full code     03/18/24  - thoracentesis done today with drainage of right pleural fluid.   1500 mL  of yellow-green fluid was drained from the right pleural space under ultrasound guidance.  Fluid had some atypical cells but was mainly   acute inflammatory cells with  reactive  mesothelial cells in clusters  - abdominal ultrasound was done which showed cirrhotic liver as well as small to moderate ascites.  plan for possible paracentesis tomorrow.  -  Nephrology continues to follow.  patient is on Lasix drip with rate at 50 mg/h and also on 5 mg of metolazone daily.  -    Patient continues to interfere with his own care  by refusing medication as well as tests that are vital to treatment and diagnosis.  This has been explained to patient on multiple occasions.   -  today patient refused echocardiogram.  Will continue to monitor.      03/17/24  -  Patient being seen and followed up by both cardiology and nephrology.  -  Nephrology starting patient on Lasix drip and metolazone.  Will continue to monitor.  -  Put in for abdominal ultrasound to help determine if patient is ascites is significant enough to be tomorrow.  Will discuss patient with IR in the morning to do paracentesis or thoracentesis.  -  Cardiology is on board.   Will continue to appreciate all recommendations.  awaiting echocardiogram at this point in time.   - hemoglobin at 7.7  we will continue to monitor  - started insulin sliding scale for hyperglycemia.  will continue to monitor replete electrolytes as needed     03/16/24  - patient currently being seen by pulmonology.  no significant dates are recommendations apart from adding DuoNebs to regimen.  -   Patient was also started on vancomycin due to concern for possible cellulitis however patient refusing at this point in time.  I discussed this with patient and explained dosing of vancomycin based on renal function.  Patient now agreeable.  - cardiology also saw patient and agree with Lasix this point in time.  Patient is currently refusing.  Also recommending restarting carvedilol 6.25 mg twice daily and nephrology evaluation.  awaiting echocardiogram results  -  Nephrology consult placed.  - BUN/creatinine stable.  White count elevated to 14.4.  Will continue to monitor.   - plan for thoracentesis most likely on Monday.              Izzy Early MD

## 2024-03-18 NOTE — PROGRESS NOTES
Mariposa thanks  [facility] Progress Note           Rounding Cardiologist:  Dr. Juan Argueta  Primary Cardiologist:  Dr. Raquel Gupta ([facility]     Date:  3/18/2024  Patient:  Hitesh Jurado  YOB: 1970  MRN:  [id number]   Admit Date:  3/15/2024      SUBJECTIVE:    3/18/24  Patient is awake and alert patient states that he refused to have the echo at this time.  He did have a thoracentesis took off from the right side approximately 1.5 L and fluid  Nephrology managing diuretics    3/17/24    Hitesh Jurado was seen and examined today.    Sitting up in the chair.     He denies any chest pain or shortness of breath.     Patient considered leaving AMA last evening.  States he can't get comfortable.    He was refusing IV furosemide but eventually agree to a dose this am.       Consult HPI:  Hitesh Jurado is a 53 y.o.  male patient who is being at the request of Dr. Izzy Early for inpatient consultation of  congestive heart failure . He was admitted on 3/15/2024.  Previous [facility], [facility], and [facility] records have been reviewed.   The patient is a very poor historian.        He has a history of atherosclerotic heart disease and ischemic cardiomyopathy.  He was initially diagnosed with HFrEF at [facility] in February 2021.  Cardiac catheterization showed total occlusion of the circumflex with left to left collaterals.  The LAD had 30% stenosis with very small caliber diagonal branches and severe diffuse disease.  The RCA had 20 to 40% stenosis.  Estimated LV ejection fraction was 30%.  Echocardiogram February 2021 showed an estimated LV ejection action 30% with mild mitral and tricuspid regurgitation.  Estimated RVSP 50 to 60 mmHg.  He has a history of peripheral vascular disease with previous right to left femorofemoral bypass.  He had right common iliac angioplasty and external iliac artery stenting in 2019.  He has insulin-dependent diabetes mellitus and  history of diabetic foot ulcers.  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  He has hypertension, hyperlipidemia, tobacco use, and chronic kidney disease.  He was last seen by [facility] cardiology (Dr. Patrick) In March 2021.  At that time it was recommended he undergo cardiac MRI to evaluate for viability with consideration of ICD implant.  He was on carvedilol, lisinopril, and spironolactone.  SGLT2 inhibitor was not recommended at that time secondary to peripheral vascular disease.     He was admitted to [facility] January 15, 2024 with acute on chronic systolic congestive heart failure.  He was also treated for bilateral pneumonia.  He was noted to have anasarca/ascites.  Patient was noted to be noncompliant with taking his medications and going to follow-up office visits.  He was treated with IV diuretics and 5 days of IV antibiotics.  He was discharged on furosemide 60 mg twice daily.  He was also on aspirin, Plavix, atorvastatin, insulin, metformin, and Toprol XL.  Lisinopril was discontinued.  He was scheduled to see Dr. Wojciech [facility] on January 30, 2024 but left without being seen.  He is scheduled to see Dr. Raquel Gupta with [facility] cardiology on April 18, 2024.  He states he avoids seeing PAs or nurse practitioners.  He follows with Dr. Geno Mix for nephrology at .  He was seen in the emergency department in [place] last week for hypokalemia and reported brief seizure activity.     He was brought to the emergency department yesterday by EMS after he fell at home.  He states he typically gets around in a wheelchair and fell when he was getting out of the shower.  He denies loss of consciousness.  He states he has had significant weight gain and feels bloated.  He states he has large amounts of fluid trapped in his belly but is concerned about getting rid of it due to possible harm to his kidneys.  He was noted to have stable vital signs with exception of oxygen saturation  93%.  Chronic nonhealing ulcer of the right foot was noted.  Possible infection of the left BKA stump.  BNP was 4248 with potassium 2.4 and creatinine 1.54.  Hemoglobin 9.3.  Chest x-ray showed a large right pleural effusion.  He was hypoglycemic and was given D50.  Potassium was supplemented.  He was admitted to telemetry and initiated on intravenous furosemide.  He has been started on empiric antibiotics.  He is scheduled undergo thoracentesis.     The patient currently sitting up in the chair.  He denies any chest pain or worsening shortness of breath.  He currently is refusing IV furosemide.  He states he would like to consult with a nephrologist before having further medication adjustments. He denies any orthopnea or PND.  He denies any palpitations, lightheadedness, near-syncope, or syncope.  He denies any fever, chills, or cough.  He denies any nausea, vomiting, or diaphoresis.  He denies any hemoptysis, hematemesis, melena, or hematochezia.      VITALS:     Vitals:    03/18/24 1241 03/18/24 1242 03/18/24 1300 03/18/24 1330   BP:  139/87 127/77 (!) 137/94   BP Location:  Left arm Left arm Left arm   Patient Position:  Lying Lying Lying   Pulse:  95 95 97   Resp:  22 24 24   Temp: 36.4 °C (97.5 °F) 36.4 °C (97.5 °F)     TempSrc: Temporal Temporal     SpO2: 94% 95% 100% 100%   Weight:       Height:           Intake/Output Summary (Last 24 hours) at 3/18/2024 1410  Last data filed at 3/18/2024 1152  Gross per 24 hour   Intake 810.87 ml   Output 1951 ml   Net -1140.13 ml       Wt Readings from Last 4 Encounters:   03/15/24 68 kg (150 lb)   11/01/23 51.7 kg (114 lb)   10/30/23 55.9 kg (123 lb 3.8 oz)   07/21/23 59 kg (130 lb)       CURRENT HOSPITAL MEDICATIONS:   cefepime, 2 g, intravenous, q24h  docusate sodium, 100 mg, oral, BID  enoxaparin, 40 mg, subcutaneous, q24h  insulin lispro, 0-10 Units, subcutaneous, Before meals & nightly  lidocaine, 1 patch, transdermal, Daily  melatonin, 3 mg, oral, Daily  metFORMIN,  500 mg, oral, Daily with breakfast  methIMAzole, 5 mg, oral, Daily  metOLazone, 5 mg, oral, Daily  metoprolol succinate XL, 25 mg, oral, Daily  pantoprazole, 40 mg, oral, Daily before breakfast   Or  pantoprazole, 40 mg, intravenous, Daily before breakfast  perflutren lipid microspheres, 0.5-10 mL of dilution, intravenous, Once in imaging  perflutren protein A microsphere, 0.5 mL, intravenous, Once in imaging  potassium chloride, 20 mEq, oral, BID  spironolactone, 25 mg, oral, Daily  sulfur hexafluoride microsphr, 2 mL, intravenous, Once in imaging  vancomycin, 750 mg, intravenous, q24h      furosemide, 10 mg/hr, Last Rate: 10 mg/hr (03/18/24 1227)      Current Outpatient Medications   Medication Instructions    acetaminophen (TYLENOL) 500 mg, oral, Every 8 hours PRN    aspirin 81 mg EC tablet 1 tablet, oral, Daily    atorvastatin (Lipitor) 80 mg tablet 1 tablet, oral, Daily    azithromycin (ZITHROMAX) 500 mg, oral, Every 24 hours scheduled    carvedilol (COREG) 12.5 mg, oral, 2 times daily    clopidogrel (PLAVIX) 75 mg, oral, Daily    empagliflozin (JARDIANCE) 10 mg, oral, Daily    furosemide (LASIX) 60 mg, oral, 2 times daily    gabapentin (NEURONTIN) 400 mg, oral, 3 times daily    insulin lispro (HumaLOG) 100 unit/mL injection subcutaneous, 3 times daily with meals, Take as directed per insulin instructions.    Lantus U-100 Insulin 8 Units, subcutaneous, Nightly, Take as directed per insulin instructions.    lisinopril 5 mg, oral, Nightly    melatonin 5 mg, oral, Nightly    metFORMIN (Glucophage) 1,000 mg tablet 0.5 tablets, oral, Daily with breakfast    methIMAzole (TAPAZOLE) 20 mg, oral, Every 8 hours scheduled    metoprolol succinate XL (TOPROL-XL) 25 mg, oral, Daily, Do not crush or chew.    mupirocin (Bactroban) 2 % ointment Topical, 3 times daily RT    omeprazole (PRILOSEC) 40 mg, oral, 2 times daily    potassium chloride CR 20 mEq ER tablet 20 mEq, oral, 3 times daily, Do not crush or chew. For 10 days     "spironolactone (ALDACTONE) 25 mg, oral, Daily    torsemide (DEMADEX) 20 mg, oral, Daily PRN    Tradjenta 5 mg, oral, Daily        PHYSICAL EXAMINATION:   GENERAL:  Well developed, well nourished, in no acute distress.  CHEST:  Symmetric and nontender.  NEURO/PSYCH:  Alert and oriented times three with approppriate behavior and responses.  NECK:  Supple, no JVD, no bruit.  LUNGS:  dimished bilater. Exp rhonchii  HEART:  Rate and rhythm regular with occasional ectopy, I/VI DEVON LSB, no gallop appreciated.        There are no rubs, clicks or heaves.  EXTREMITIES:  left BKA .  Faint pulses right        LAB DATA:     CBC:   Results from last 7 days   Lab Units 03/18/24  0433   WBC AUTO x10*3/uL 14.1*   RBC AUTO x10*6/uL 3.94*   HEMOGLOBIN g/dL 7.9*   HEMATOCRIT % 25.6*   PLATELETS AUTO x10*3/uL 368        CMP:    Results from last 7 days   Lab Units 03/18/24  0433   SODIUM mmol/L 131*   POTASSIUM mmol/L 4.2   CHLORIDE mmol/L 93*   CO2 mmol/L 28   BUN mg/dL 62*   CREATININE mg/dL 2.38*   GLUCOSE mg/dL 229*   CALCIUM mg/dL 8.5*       Cardiac Enzymes:    Lab Results   Component Value Date    TROPHS 754 () 11/01/2023    TROPHS 642 () 11/01/2023    TROPHS 546 () 11/01/2023       Magnesium:    Lab Results   Component Value Date    MG 1.89 03/16/2024       BNP:   Lab Results   Component Value Date    BNP 4,248 (H) 03/15/2024        PT/INR:    No results found for: \"PROTIME\", \"INR\"      HgBA1c:    Lab Results   Component Value Date    HGBA1C 9.0 (H) 01/16/2024       CBC:  Lab Results   Component Value Date    WBC 14.1 (H) 03/18/2024    WBC 12.0 (H) 03/17/2024    WBC 14.4 (H) 03/16/2024    RBC 3.94 (L) 03/18/2024    RBC 4.00 (L) 03/17/2024    RBC 4.23 (L) 03/16/2024    HGB 7.9 (L) 03/18/2024    HGB 7.7 (L) 03/17/2024    HGB 8.3 (L) 03/16/2024    HCT 25.6 (L) 03/18/2024    HCT 26.4 (L) 03/17/2024    HCT 27.4 (L) 03/16/2024    MCV 65 (L) 03/18/2024    MCV 66 (L) 03/17/2024    MCV 65 (L) 03/16/2024    MCH 20.1 (L) 03/18/2024    " MCH 19.3 (L) 03/17/2024    MCH 19.6 (L) 03/16/2024    MCHC 30.9 (L) 03/18/2024    MCHC 29.2 (L) 03/17/2024    MCHC 30.3 (L) 03/16/2024    RDW 18.1 (H) 03/18/2024    RDW 18.5 (H) 03/17/2024    RDW 18.3 (H) 03/16/2024     03/18/2024     03/17/2024     03/16/2024       BMP:  Lab Results   Component Value Date     (L) 03/18/2024     (L) 03/17/2024     03/16/2024    K 4.2 03/18/2024    K 5.0 03/17/2024    K 3.8 03/16/2024    CL 93 (L) 03/18/2024    CL 92 (L) 03/17/2024    CL 96 (L) 03/16/2024    CO2 28 03/18/2024    CO2 26 03/17/2024    CO2 30 03/16/2024    BUN 62 (H) 03/18/2024    BUN 52 (H) 03/17/2024    BUN 44 (H) 03/16/2024    CREATININE 2.38 (H) 03/18/2024    CREATININE 2.04 (H) 03/17/2024    CREATININE 1.61 (H) 03/16/2024       Hepatic Function Panel:    Lab Results   Component Value Date    ALKPHOS 135 (H) 03/18/2024    ALT 27 03/18/2024    AST 33 03/18/2024    PROT 7.2 03/18/2024    BILITOT 1.3 (H) 03/18/2024       DIAGNOSTIC TESTING:     ECG 12 lead    Result Date: 3/16/2024  Sinus rhythm with Premature supraventricular complexes and with occasional Premature ventricular complexes Right bundle branch block Possible Lateral infarct , age undetermined Abnormal ECG When compared with ECG of 01-NOV-2023 12:19, Premature supraventricular complexes are now Present Borderline criteria for Lateral infarct are now Present See ED provider note for full interpretation and clinical correlation Confirmed by Lisa Naidu ([id number]) on 3/16/2024 11:49:50 AM    XR chest 1 view    Result Date: 3/15/2024  Interpreted By:  Artur Santos, STUDY: XR CHEST 1 VIEW;  3/15/2024 10:36 am   INDICATION: Signs/Symptoms:fall.   COMPARISON: 11/01/2023   ACCESSION NUMBER(S): [id number]   ORDERING CLINICIAN: LO SHAW   FINDINGS: Large right basilar pleural effusion occupying greater than 50% of the hemithoracic volume. Overlying atelectasis. Left lung grossly clear without pleural effusion.  Cardiomegaly suspected. Aortic atherosclerosis. Mild pulmonary vascular congestion.       Large right pleural effusion.   MACRO: None   Signed by: Artur Santos 3/15/2024 11:33 AM Dictation workstation:   [id number]    XR tibia fibula left 2 views    Result Date: 3/15/2024  Interpreted By:  Artur Santos, STUDY: XR TIBIA FIBULA LEFT 2 VIEWS;  3/15/2024 10:36 am   INDICATION: Signs/Symptoms:fall, previous below-knee amputation, pain at left stump.   COMPARISON: 06/23/2022.   ACCESSION NUMBER(S): [id number]   ORDERING CLINICIAN: LO SHAW   FINDINGS: Post below-knee amputation changes. No acute fracture or dislocation identified. Tiny patellar osteophytes. Small knee joint effusion suspected. Tiny metallic foreign bodies lateral to the knee joint are still present. Osteopenia.   Atherosclerosis.       Intact left tibia and fibula, post below-knee amputation.   MACRO: None   Signed by: Artur Santos 3/15/2024 11:33 AM Dictation workstation:   [id number]       RADIOLOGY:     Lower extremity venous duplex right         XR chest 1 view   Final Result   1.  Satisfactory appearance post right thoracentesis. See discussion   above.                  MACRO:   None        Signed by: Joseph Schoenberger 3/18/2024 10:56 AM   Dictation workstation:   [id number]      US thoracentesis   Final Result   Successful ultrasound-guided diagnostic and therapeutic thoracentesis             MACRO:   None        Signed by: Joseph Schoenberger 3/18/2024 10:58 AM   Dictation workstation:   [id number]      US abdomen complete   Final Result   Small to moderate volume of peritoneal fluid.        Cirrhosis.             MACRO:   None        Signed by: Joseph Schoenberger 3/18/2024 10:46 AM   Dictation workstation:   [id number]      XR chest 1 view   Final Result   Large right pleural effusion.        MACRO:   None        Signed by: Artur Santos 3/15/2024 11:33 AM   Dictation workstation:   [id number]      XR tibia fibula left 2 views    Final Result   Intact left tibia and fibula, post below-knee amputation.        MACRO:   None        Signed by: Artur Santos 3/15/2024 11:33 AM   Dictation workstation:   [id number]      Transthoracic Echo (TTE) Complete    (Results Pending)       PROBLEM LIST     Patient Active Problem List   Diagnosis    Status post below-knee amputation of left lower extremity (CMS/[facility])    PAD (peripheral artery disease) (CMS/[facility])    ACC/AHA stage C acute systolic heart failure (CMS/[facility])    Coronary artery disease involving native coronary artery without angina pectoris    Mixed hyperlipidemia    Thyroid mass    Tobacco dependence with current use    PIPPA (acute kidney injury) (CMS/[facility])    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    Chronic congestive heart failure (CMS/[facility])    GERD (gastroesophageal reflux disease)    Dyspepsia    Critical lower limb ischemia (CMS/[facility])    Chronic obstructive pulmonary disease, unspecified (CMS/[facility])    Primary hypertension    PTSD (post-traumatic stress disorder)    JONA (generalized anxiety disorder)    Severe episode of recurrent major depressive disorder, without psychotic features (CMS/[facility])    Type 2 diabetes mellitus (CMS/[facility])    COPD exacerbation (CMS/[facility])    NSTEMI (non-ST elevated myocardial infarction) (CMS/[facility])    Elevated troponin    COPD (chronic obstructive pulmonary disease) (CMS/[facility])    Acute on chronic combined systolic (congestive) and diastolic (congestive) heart failure (CMS/[facility])       ASSESSMENT:     1.  Acute on chronic systolic congestive heart failure.  Documented to have severe LV dysfunction in the past with estimated LV ejection fraction of 25 to 30%.  Patient has been managed with various medications including Toprol-XL, Jardiance, Aldactone, and diuretics.  He has not been felt to be a good candidate for Entresto.  He has been noncompliant with taking medications and going to some of his follow-up visits.     2.  Atherosclerotic heart  disease with underlying ischemic cardiomyopathy.  Cardiac catheterization in 2021 showing total occlusion of the circumflex and severe distal LAD disease.     3.  Large right pleural effusion.     4.  Hypokalemia.     5.  Severe peripheral vascular disease.  Status post left below-knee amputation.  Status post femorofemoral bypass July 2019 along with additional angioplasty procedures.  Currently with leukocytosis and possible infection of the left BKA stump.     6.  Chronic kidney disease.     7.  Insulin-dependent diabetes mellitus.     8.  Primary hypertension.     9.  COPD related to longstanding tobacco abuse.     10.  Mixed hyperlipidemia.    PLAN:     Continue Toprol-XL.    Started on Aldactone and IV furosemide by Dr. Williamson.     Jardiance was previously discontinued.    Echocardiogram has been ordered and will be reviewed once completed tomorrow.    Patient is not a good candidate for ICD implant secondary to ongoing infection.  In addition he stated during this admission that his preference would be to be euthanized so that he could leave this earth.      He wants his diuretics to be managed by nephrology as he is most concerned about his kidneys.    He will follow[facility] with [facility] cardiology upon discharge.  No new cardiac recommendations.    3/18/23  Tele monitoring  Refused echo  Nephrology  for management for diuretics  S/p thoracentesis took off approximately 1.5 L from the right side    Electronically signed by HEENA Chau, on 3/18/2024 at 2:10 PM

## 2024-03-18 NOTE — DISCHARGE INSTRUCTIONS
HEART FAILURE EDUCATION:  1. Weigh yourself daily and record on your weight log.  2. If you gain more than 2 or 3 pounds overnight, call your cardiologist.  3. Follow a low sodium diet. No more than 2000 mg in one day, or more than 650 mg per meal.  4. Limit total fluids to no more than 8 cups (or 2 liters) per day - this includes all fluids (water, coffee, juice, milk, tea, etc.)  5. Monitor your blood pressure daily and record on your weight log.  6. Call to schedule your follow-up appointments when you get home if they were not already scheduled for you.  7. Keep your follow-up appointments! Bring your weight log with you so the doctors can see your weight trend and blood pressure readings.  8. Be sure to  any new prescriptions and take them as directed. If unsure of the medications, be sure to call your cardiologist.  9. Stay as active as you can tolerate.   10. If you notice subtle change of symptoms (slight increase in swelling, slight shortness of breath, a new intolerance to laying flat, a new cough), be sure to call your cardiologist.  11. If you have any questions or concerns or you have not heard back from the cardiologist, feel free to call Evi Haddad heart failure navigator at [phone].       Patient has a follow up wound care appointment which has already been scheduled for April 1st at 9 am with Dr. Reyes. Please arrive 20 minutes early for registration. If you can not make this appointment please call [phone] to reschedule.

## 2024-03-18 NOTE — NURSING NOTE
RN alerted for potential sepsis alert on patient. Lactate draw recommended. Dr. Early notified. Lacate ordered to be drawn.

## 2024-03-18 NOTE — PROGRESS NOTES
Physical Therapy                 Therapy Communication Note    Patient Name: Hitesh Jurado  MRN: [id number]  Today's Date: 3/18/2024 1106    Discipline: Physical Therapy    Missed Visit Reason: Missed Visit Reason:  (Patient recently returned from thoracentesis. c/o increased chest pain. RN notified to reattempt as appropriate)

## 2024-03-18 NOTE — POST-PROCEDURE NOTE
Interventional Radiology Brief Postprocedure Note     Attending: Schoenberger    Assistant: None    Diagnosis: Pleural Effusion    Description of procedure: US Right Thoracentesis     Anesthesia:  Local    Complications: None    Estimated Blood Loss: none    Medications  As of 03/18/24 1038      potassium chloride (Klor-Con) packet 20 mEq (mEq) Total dose:  120 mEq* Dosing weight:  68   *Administration not included in total     Date/Time Rate/Dose/Volume Action       03/15/24  1029 20 mEq Given      1445 20 mEq Given      2000 20 mEq Given     03/16/24  0819 20 mEq Given      2016 20 mEq Given     03/17/24  1019 20 mEq Given      2100 *20 mEq Missed               potassium chloride 20 mEq in 100 mL IV premix (mL/hr) Total volume:  Not documented* Dosing weight:  68   *Total volume has not been documented. View each administration to see the amount administered.     Date/Time Rate/Dose/Volume Action       03/15/24  1028 20 mEq - 50 mL/hr (over 120 min) New Bag      1209  (over 120 min) Stopped      1444 20 mEq - 50 mL/hr (over 120 min) New Bag      1644  (over 120 min) Stopped               dextrose 50 % injection 12.5 g (g) Total dose:  12.5 g Dosing weight:  68      Date/Time Rate/Dose/Volume Action       03/15/24  1201 12.5 g Given               cefepime (Maxipime) in dextrose 5 % water (D5W) 100 mL IV 2 g (mL/hr) Total volume:  Not documented* Dosing weight:  68   *Total volume has not been documented. View each administration to see the amount administered.     Date/Time Rate/Dose/Volume Action       03/15/24  1210 2 g - 200 mL/hr (over 30 min) New Bag      1240  (over 30 min) Stopped     03/16/24  1137 2 g - 200 mL/hr (over 30 min) New Bag      1207  (over 30 min) Stopped     03/17/24  1248 2 g - 200 mL/hr (over 30 min) New Bag      1318  (over 30 min) Stopped               vancomycin (Vancocin) in dextrose 5 % water (D5W) 500 mL IV 1.5 g (g) Total dose:  0 g* Dosing weight:  68   *Administration not included  in total     Date/Time Rate/Dose/Volume Action       03/15/24  1055 *1.5 g - 333.3 mL/hr (over 90 min) Missed               metroNIDAZOLE (Flagyl) 500 mg in NaCl (iso-os) 100 mL (mg) Total dose:  500 mg Dosing weight:  68      Date/Time Rate/Dose/Volume Action       03/15/24  1248 500 mg (over 60 min) New Bag      1348  (over 60 min) Stopped               enoxaparin (Lovenox) syringe 40 mg (mg) Total dose:  80 mg* Dosing weight:  68   *Administration not included in total     Date/Time Rate/Dose/Volume Action       03/15/24  1530 *40 mg Missed     03/16/24  1449 40 mg Given     03/17/24  1428 40 mg Given               pantoprazole (ProtoNix) EC tablet 40 mg (mg) Total dose:  120 mg Dosing weight:  68      Date/Time Rate/Dose/Volume Action       03/16/24  0617 40 mg Given     03/17/24  0615 40 mg Given     03/18/24  0644 40 mg Given               pantoprazole (ProtoNix) injection 40 mg (mg) Total dose:  Cannot be calculated* Dosing weight:  68   *Administration dose not documented     Date/Time Rate/Dose/Volume Action       03/16/24  0617 *Not included in total See Alternative     03/17/24  0615 *Not included in total See Alternative     03/18/24  0644 *Not included in total See Alternative               acetaminophen (Tylenol) tablet 650 mg (mg) Total dose:  0 mg* Dosing weight:  68   *Administration not included in total     Date/Time Rate/Dose/Volume Action       03/17/24 2000 *650 mg Missed               acetaminophen (Tylenol) oral liquid 650 mg (mg) Total dose:  Cannot be calculated* Dosing weight:  68   *Administration dose not documented     Date/Time Rate/Dose/Volume Action       03/17/24 2000 *Not included in total See Alternative               acetaminophen (Tylenol) suppository 650 mg (mg) Total dose:  Cannot be calculated* Dosing weight:  68   *Administration dose not documented     Date/Time Rate/Dose/Volume Action       03/17/24 2000 *Not included in total See Alternative               melatonin  tablet 3 mg (mg) Total dose:  9 mg Dosing weight:  68      Date/Time Rate/Dose/Volume Action       03/15/24  1959 3 mg Given     03/16/24 2016 3 mg Given     03/17/24 2007 3 mg Given               docusate sodium (Colace) capsule 100 mg (mg) Total dose:  500 mg Dosing weight:  68      Date/Time Rate/Dose/Volume Action       03/15/24  2000 100 mg Given     03/16/24 0819 100 mg Given      2016 100 mg Given     03/17/24  1014 100 mg Given      2007 100 mg Given               furosemide (Lasix) injection 40 mg (mg) Total dose:  80 mg* Dosing weight:  68   *Administration not included in total     Date/Time Rate/Dose/Volume Action       03/15/24  1530 *40 mg Missed     03/16/24  0820 *40 mg Missed      1627 40 mg Given     03/17/24  1014 40 mg Given               vancomycin (Vancocin) 1,000 mg in dextrose 5% water 200 mL (mL/hr) Total volume:  Not documented* Dosing weight:  68   *Total volume has not been documented. View each administration to see the amount administered.     Date/Time Rate/Dose/Volume Action       03/15/24  2100 *1,000 mg - 200 mL/hr (over 60 min) Missed     03/16/24  2100 *1,000 mg - 200 mL/hr (over 60 min) Missed     03/17/24  1013 1,000 mg - 200 mL/hr (over 60 min) New Bag      1113  (over 60 min) Stopped               metFORMIN (Glucophage) tablet 500 mg (mg) Total dose:  1,000 mg      Date/Time Rate/Dose/Volume Action       03/16/24 0819 500 mg Given     03/17/24  1013 500 mg Given               methIMAzole (Tapazole) tablet 5 mg (mg) Total dose:  10 mg Dosing weight:  68      Date/Time Rate/Dose/Volume Action       03/16/24  0825 5 mg Given     03/17/24  1014 5 mg Given               metoprolol succinate XL (Toprol-XL) 24 hr tablet 25 mg (mg) Total dose:  25 mg* Dosing weight:  68   *Administration not included in total     Date/Time Rate/Dose/Volume Action       03/16/24  0819 25 mg Given     03/17/24  0900 *25 mg Missed               spironolactone (Aldactone) tablet 25 mg (mg) Total dose:   50 mg Dosing weight:  68      Date/Time Rate/Dose/Volume Action       03/16/24  0820 25 mg Given     03/17/24  1019 25 mg Given               HYDROmorphone (Dilaudid) injection 0.4 mg (mg) Total dose:  0.4 mg Dosing weight:  68      Date/Time Rate/Dose/Volume Action       03/17/24  0341 0.4 mg Given               lidocaine 4 % patch 1 patch (patch) Total dose:  2 patch Dosing weight:  68      Date/Time Rate/Dose/Volume Action       03/17/24  0341 1 patch (over 720 min) Medication Applied      1541  (over 720 min) Medication Removed      2027 1 patch (over 720 min) Medication Applied               furosemide (Lasix) 500 mg in 50 mL (10 mg/mL) infusion (mg/hr) Total dose:  3,108.7 mg* Dosing weight:  68   *From user-documented volume     Date/Time Rate/Dose/Volume Action       03/17/24  1428 10 mg/hr - 1 mL/hr New Bag     03/18/24  0447 10 mg/hr - 1 mL/hr New Bag               metOLazone (Zaroxolyn) tablet 5 mg (mg) Total dose:  5 mg Dosing weight:  68      Date/Time Rate/Dose/Volume Action       03/17/24  1716 5 mg Given               insulin lispro (HumaLOG) injection 0-10 Units (Units) Total dose:  24 Units Dosing weight:  68      Date/Time Rate/Dose/Volume Action       03/17/24  1716 10 Units Given      2106 10 Units Given     03/18/24  0851 4 Units Given                   1500 ml Yellow Green fluid from right pleural space with US guidance      See detailed result report with images in PACS.    The patient tolerated the procedure well without incident or complication and is in stable condition.

## 2024-03-18 NOTE — PROGRESS NOTES
Removed dressing from right foot. Wound noted top of foot which measures 3.5cm x 1.5cm x 0.4. Wound bed has 80% yellow eschar seen Surrounding tissue has 3rd spacing fluid. Unable to dopple pedal pulse. Was able to dopple popliteal pulse fleeting. There is wheeping with multiple small open areas right lower leg. Leg is cold to touch and toes are discolored at red to light purple. Applied Vashe wet to dry and will consult Dr. Reyes for further wound care work up. There is left BKA with PI stage 2 noted base of BKA. Wound measures 5.0cm x 4.0cm x 0.2cm. Small amount of red bloody drainage seen. Wound bed is beefy red and also noted is 3rd spacing of fluid. Applied Vashe wet to dry and secured with Ace Wrap.  Wound Care Progress Note     Visit Date: 3/18/2024      Patient Name: Hitesh Jurado         MRN: [id number]                Reason for Visit: Wound Care        Wound History: Chronic     Pertinent Labs:   Albumin   Date Value Ref Range Status   03/18/2024 2.9 (L) 3.4 - 5.0 g/dL Final     Albumin, Urine Random   Date Value Ref Range Status   10/27/2023 12.1 Not established mg/L Final           Wound Assessment:           NEW                        Wound 03/15/24 Diabetic Ulcer Dorsal foot;Right (Active)   Date First Assessed/Time First Assessed: 03/15/24 2042   Present on Original Admission: Yes  Primary Wound Type: Diabetic Ulcer  Wound Location Orientation: Dorsal foot;Right   Number of days: 2       Wound 03/15/24 Pretibial Left;Proximal (Active)   Date First Assessed/Time First Assessed: 03/15/24 2044   Location: Pretibial  Wound Location Orientation: Left;Proximal   Number of days: 2       Wound 03/15/24 Pretibial Right (Active)   Date First Assessed/Time First Assessed: 03/15/24 2045   Location: Pretibial  Wound Location Orientation: Right   Number of days: 2     Wound 03/15/24 Diabetic Ulcer Dorsal foot;Right (Active)   Wound Image   03/15/24 2042   Site Assessment Red 03/15/24 2042   Keily-Wound  Assessment Erythematous 03/15/24 2042   Drainage Amount Small 03/15/24 2044   Dressing ABD;Kerlix/rolled gauze 03/15/24 2044   Dressing Changed New 03/15/24 2044   Dressing Status Dry 03/17/24 1951       Wound 03/15/24 Pretibial Left;Proximal (Active)   Wound Image   03/15/24 2044       Wound 03/15/24 Pretibial Right (Active)   Wound Image   03/15/24 2045                   Wound Team Plan: Continue with current dressing changes until seen by Dr. Reyes.     Paul Hein LPN  3/18/2024  2:31 PM

## 2024-03-18 NOTE — SIGNIFICANT EVENT
Pt arrived back from Thoracentesis. Site stable. VSS. Pt tachypnic. Pt complaining of pain on right upper chest.

## 2024-03-19 ENCOUNTER — APPOINTMENT (OUTPATIENT)
Dept: RADIOLOGY | Facility: HOSPITAL | Age: 54
End: 2024-03-19
Payer: MEDICAID

## 2024-03-19 LAB
ALBUMIN SERPL BCP-MCNC: 2.9 G/DL (ref 3.4–5)
ALP SERPL-CCNC: 135 U/L (ref 33–120)
ALT SERPL W P-5'-P-CCNC: 25 U/L (ref 10–52)
ANION GAP SERPL CALC-SCNC: 15 MMOL/L (ref 10–20)
ANION GAP SERPL CALC-SCNC: 15 MMOL/L (ref 10–20)
AST SERPL W P-5'-P-CCNC: 31 U/L (ref 9–39)
BACTERIA BLD CULT: NORMAL
BILIRUB SERPL-MCNC: 1.4 MG/DL (ref 0–1.2)
BUN SERPL-MCNC: 68 MG/DL (ref 6–23)
BUN SERPL-MCNC: 68 MG/DL (ref 6–23)
CALCIUM SERPL-MCNC: 8.4 MG/DL (ref 8.6–10.3)
CALCIUM SERPL-MCNC: 8.4 MG/DL (ref 8.6–10.3)
CHLORIDE SERPL-SCNC: 94 MMOL/L (ref 98–107)
CHLORIDE SERPL-SCNC: 94 MMOL/L (ref 98–107)
CO2 SERPL-SCNC: 29 MMOL/L (ref 21–32)
CO2 SERPL-SCNC: 29 MMOL/L (ref 21–32)
CREAT SERPL-MCNC: 2.51 MG/DL (ref 0.5–1.3)
CREAT SERPL-MCNC: 2.51 MG/DL (ref 0.5–1.3)
EGFRCR SERPLBLD CKD-EPI 2021: 30 ML/MIN/1.73M*2
EGFRCR SERPLBLD CKD-EPI 2021: 30 ML/MIN/1.73M*2
ERYTHROCYTE [DISTWIDTH] IN BLOOD BY AUTOMATED COUNT: 18.5 % (ref 11.5–14.5)
FERRITIN SERPL-MCNC: 131 NG/ML (ref 20–300)
GLUCOSE BLD MANUAL STRIP-MCNC: 222 MG/DL (ref 74–99)
GLUCOSE BLD MANUAL STRIP-MCNC: 234 MG/DL (ref 74–99)
GLUCOSE BLD MANUAL STRIP-MCNC: 241 MG/DL (ref 74–99)
GLUCOSE BLD MANUAL STRIP-MCNC: 98 MG/DL (ref 74–99)
GLUCOSE SERPL-MCNC: 126 MG/DL (ref 74–99)
GLUCOSE SERPL-MCNC: 126 MG/DL (ref 74–99)
HCT VFR BLD AUTO: 28.2 % (ref 41–52)
HGB BLD-MCNC: 8.4 G/DL (ref 13.5–17.5)
HOLD SPECIMEN: NORMAL
IRON SATN MFR SERPL: 5 % (ref 25–45)
IRON SERPL-MCNC: 15 UG/DL (ref 35–150)
MCH RBC QN AUTO: 19.4 PG (ref 26–34)
MCHC RBC AUTO-ENTMCNC: 29.8 G/DL (ref 32–36)
MCV RBC AUTO: 65 FL (ref 80–100)
NRBC BLD-RTO: 0 /100 WBCS (ref 0–0)
PLATELET # BLD AUTO: 385 X10*3/UL (ref 150–450)
POTASSIUM SERPL-SCNC: 4.6 MMOL/L (ref 3.5–5.3)
POTASSIUM SERPL-SCNC: 4.6 MMOL/L (ref 3.5–5.3)
PROT SERPL-MCNC: 7.2 G/DL (ref 6.4–8.2)
RBC # BLD AUTO: 4.33 X10*6/UL (ref 4.5–5.9)
SODIUM SERPL-SCNC: 133 MMOL/L (ref 136–145)
SODIUM SERPL-SCNC: 133 MMOL/L (ref 136–145)
TIBC SERPL-MCNC: 289 UG/DL (ref 240–445)
UIBC SERPL-MCNC: 274 UG/DL (ref 110–370)
VANCOMYCIN SERPL-MCNC: 16.7 UG/ML (ref 5–20)
WBC # BLD AUTO: 14.8 X10*3/UL (ref 4.4–11.3)

## 2024-03-19 PROCEDURE — 99221 1ST HOSP IP/OBS SF/LOW 40: CPT | Performed by: PLASTIC SURGERY

## 2024-03-19 PROCEDURE — P9047 ALBUMIN (HUMAN), 25%, 50ML: HCPCS | Mod: JW | Performed by: HOSPITALIST

## 2024-03-19 PROCEDURE — 36415 COLL VENOUS BLD VENIPUNCTURE: CPT | Performed by: STUDENT IN AN ORGANIZED HEALTH CARE EDUCATION/TRAINING PROGRAM

## 2024-03-19 PROCEDURE — 1210000001 HC SEMI-PRIVATE ROOM DAILY

## 2024-03-19 PROCEDURE — 97530 THERAPEUTIC ACTIVITIES: CPT | Mod: GO

## 2024-03-19 PROCEDURE — 2500000001 HC RX 250 WO HCPCS SELF ADMINISTERED DRUGS (ALT 637 FOR MEDICARE OP): Performed by: STUDENT IN AN ORGANIZED HEALTH CARE EDUCATION/TRAINING PROGRAM

## 2024-03-19 PROCEDURE — 2500000004 HC RX 250 GENERAL PHARMACY W/ HCPCS (ALT 636 FOR OP/ED): Mod: JW | Performed by: HOSPITALIST

## 2024-03-19 PROCEDURE — 80048 BASIC METABOLIC PNL TOTAL CA: CPT | Mod: CCI | Performed by: HOSPITALIST

## 2024-03-19 PROCEDURE — 83540 ASSAY OF IRON: CPT | Performed by: HOSPITALIST

## 2024-03-19 PROCEDURE — 82728 ASSAY OF FERRITIN: CPT | Performed by: HOSPITALIST

## 2024-03-19 PROCEDURE — 2500000005 HC RX 250 GENERAL PHARMACY W/O HCPCS: Performed by: NURSE PRACTITIONER

## 2024-03-19 PROCEDURE — 2500000002 HC RX 250 W HCPCS SELF ADMINISTERED DRUGS (ALT 637 FOR MEDICARE OP, ALT 636 FOR OP/ED): Performed by: STUDENT IN AN ORGANIZED HEALTH CARE EDUCATION/TRAINING PROGRAM

## 2024-03-19 PROCEDURE — 99232 SBSQ HOSP IP/OBS MODERATE 35: CPT | Performed by: NURSE PRACTITIONER

## 2024-03-19 PROCEDURE — 85027 COMPLETE CBC AUTOMATED: CPT | Performed by: STUDENT IN AN ORGANIZED HEALTH CARE EDUCATION/TRAINING PROGRAM

## 2024-03-19 PROCEDURE — 49083 ABD PARACENTESIS W/IMAGING: CPT | Performed by: RADIOLOGY

## 2024-03-19 PROCEDURE — 80053 COMPREHEN METABOLIC PANEL: CPT | Performed by: STUDENT IN AN ORGANIZED HEALTH CARE EDUCATION/TRAINING PROGRAM

## 2024-03-19 PROCEDURE — 0W9G3ZZ DRAINAGE OF PERITONEAL CAVITY, PERCUTANEOUS APPROACH: ICD-10-PCS | Performed by: RADIOLOGY

## 2024-03-19 PROCEDURE — 2500000004 HC RX 250 GENERAL PHARMACY W/ HCPCS (ALT 636 FOR OP/ED): Performed by: INTERNAL MEDICINE

## 2024-03-19 PROCEDURE — 86705 HEP B CORE ANTIBODY IGM: CPT | Mod: ELYLAB | Performed by: HOSPITALIST

## 2024-03-19 PROCEDURE — 99233 SBSQ HOSP IP/OBS HIGH 50: CPT | Performed by: HOSPITALIST

## 2024-03-19 PROCEDURE — 2500000005 HC RX 250 GENERAL PHARMACY W/O HCPCS: Performed by: RADIOLOGY

## 2024-03-19 PROCEDURE — 2720000007 HC OR 272 NO HCPCS

## 2024-03-19 PROCEDURE — 2500000004 HC RX 250 GENERAL PHARMACY W/ HCPCS (ALT 636 FOR OP/ED): Performed by: STUDENT IN AN ORGANIZED HEALTH CARE EDUCATION/TRAINING PROGRAM

## 2024-03-19 PROCEDURE — 97165 OT EVAL LOW COMPLEX 30 MIN: CPT | Mod: GO

## 2024-03-19 PROCEDURE — C1751 CATH, INF, PER/CENT/MIDLINE: HCPCS

## 2024-03-19 PROCEDURE — 76937 US GUIDE VASCULAR ACCESS: CPT

## 2024-03-19 PROCEDURE — 2500000004 HC RX 250 GENERAL PHARMACY W/ HCPCS (ALT 636 FOR OP/ED): Performed by: PHARMACIST

## 2024-03-19 PROCEDURE — 97161 PT EVAL LOW COMPLEX 20 MIN: CPT | Mod: GP

## 2024-03-19 PROCEDURE — 49083 ABD PARACENTESIS W/IMAGING: CPT

## 2024-03-19 PROCEDURE — 82947 ASSAY GLUCOSE BLOOD QUANT: CPT

## 2024-03-19 PROCEDURE — 80202 ASSAY OF VANCOMYCIN: CPT | Performed by: PHARMACIST

## 2024-03-19 PROCEDURE — C1729 CATH, DRAINAGE: HCPCS

## 2024-03-19 RX ORDER — LIDOCAINE HYDROCHLORIDE 10 MG/ML
5 INJECTION INFILTRATION; PERINEURAL ONCE
Status: DISCONTINUED | OUTPATIENT
Start: 2024-03-19 | End: 2024-03-26 | Stop reason: HOSPADM

## 2024-03-19 RX ORDER — ALBUMIN HUMAN 250 G/1000ML
1 SOLUTION INTRAVENOUS ONCE
Status: COMPLETED | OUTPATIENT
Start: 2024-03-19 | End: 2024-03-19

## 2024-03-19 RX ORDER — ALBUMIN HUMAN 250 G/1000ML
25 SOLUTION INTRAVENOUS ONCE
Status: DISCONTINUED | OUTPATIENT
Start: 2024-03-19 | End: 2024-03-19

## 2024-03-19 RX ORDER — ALBUMIN HUMAN 250 G/1000ML
25 SOLUTION INTRAVENOUS ONCE
Status: COMPLETED | OUTPATIENT
Start: 2024-03-20 | End: 2024-03-20

## 2024-03-19 RX ORDER — LIDOCAINE HYDROCHLORIDE 10 MG/ML
INJECTION, SOLUTION EPIDURAL; INFILTRATION; INTRACAUDAL; PERINEURAL
Status: COMPLETED | OUTPATIENT
Start: 2024-03-19 | End: 2024-03-19

## 2024-03-19 RX ADMIN — METOPROLOL SUCCINATE 25 MG: 25 TABLET, EXTENDED RELEASE ORAL at 08:19

## 2024-03-19 RX ADMIN — LIDOCAINE 4% 1 PATCH: 40 PATCH TOPICAL at 08:18

## 2024-03-19 RX ADMIN — DOCUSATE SODIUM 100 MG: 100 CAPSULE, LIQUID FILLED ORAL at 08:19

## 2024-03-19 RX ADMIN — INSULIN LISPRO 4 UNITS: 100 INJECTION, SOLUTION INTRAVENOUS; SUBCUTANEOUS at 16:12

## 2024-03-19 RX ADMIN — ENOXAPARIN SODIUM 40 MG: 40 INJECTION SUBCUTANEOUS at 15:29

## 2024-03-19 RX ADMIN — METHIMAZOLE 5 MG: 5 TABLET ORAL at 08:23

## 2024-03-19 RX ADMIN — CEFEPIME 2 G: 2 INJECTION, POWDER, FOR SOLUTION INTRAVENOUS at 12:05

## 2024-03-19 RX ADMIN — METOLAZONE 5 MG: 5 TABLET ORAL at 08:19

## 2024-03-19 RX ADMIN — VANCOMYCIN HYDROCHLORIDE 750 MG: 750 INJECTION, SOLUTION INTRAVENOUS at 10:42

## 2024-03-19 RX ADMIN — DOCUSATE SODIUM 100 MG: 100 CAPSULE, LIQUID FILLED ORAL at 20:43

## 2024-03-19 RX ADMIN — Medication 3 MG: at 20:43

## 2024-03-19 RX ADMIN — INSULIN LISPRO 4 UNITS: 100 INJECTION, SOLUTION INTRAVENOUS; SUBCUTANEOUS at 11:10

## 2024-03-19 RX ADMIN — SPIRONOLACTONE 25 MG: 25 TABLET ORAL at 08:19

## 2024-03-19 RX ADMIN — ALBUMIN HUMAN 62.5 G: 0.25 SOLUTION INTRAVENOUS at 12:48

## 2024-03-19 RX ADMIN — MORPHINE SULFATE 2 MG: 2 INJECTION, SOLUTION INTRAMUSCULAR; INTRAVENOUS at 15:29

## 2024-03-19 RX ADMIN — LIDOCAINE HYDROCHLORIDE 4 ML: 10 INJECTION, SOLUTION EPIDURAL; INFILTRATION; INTRACAUDAL; PERINEURAL at 14:22

## 2024-03-19 RX ADMIN — PANTOPRAZOLE SODIUM 40 MG: 40 TABLET, DELAYED RELEASE ORAL at 06:00

## 2024-03-19 ASSESSMENT — PAIN SCALES - GENERAL
PAINLEVEL_OUTOF10: 9
PAINLEVEL_OUTOF10: 0 - NO PAIN
PAINLEVEL_OUTOF10: 4
PAINLEVEL_OUTOF10: 0 - NO PAIN
PAINLEVEL_OUTOF10: 0 - NO PAIN
PAINLEVEL_OUTOF10: 5 - MODERATE PAIN

## 2024-03-19 ASSESSMENT — COGNITIVE AND FUNCTIONAL STATUS - GENERAL
DRESSING REGULAR LOWER BODY CLOTHING: A LOT
MOBILITY SCORE: 10
HELP NEEDED FOR BATHING: A LOT
MOVING TO AND FROM BED TO CHAIR: A LOT
STANDING UP FROM CHAIR USING ARMS: A LOT
PERSONAL GROOMING: A LITTLE
TOILETING: A LOT
TURNING FROM BACK TO SIDE WHILE IN FLAT BAD: A LOT
MOVING FROM LYING ON BACK TO SITTING ON SIDE OF FLAT BED WITH BEDRAILS: A LOT
DRESSING REGULAR UPPER BODY CLOTHING: A LOT
WALKING IN HOSPITAL ROOM: TOTAL
CLIMB 3 TO 5 STEPS WITH RAILING: TOTAL
DAILY ACTIVITIY SCORE: 15

## 2024-03-19 ASSESSMENT — PAIN - FUNCTIONAL ASSESSMENT
PAIN_FUNCTIONAL_ASSESSMENT: 0-10

## 2024-03-19 ASSESSMENT — PAIN DESCRIPTION - DESCRIPTORS: DESCRIPTORS: CRAMPING

## 2024-03-19 ASSESSMENT — ACTIVITIES OF DAILY LIVING (ADL): BATHING_ASSISTANCE: MODERATE

## 2024-03-19 ASSESSMENT — PAIN DESCRIPTION - LOCATION: LOCATION: OTHER (COMMENT)

## 2024-03-19 NOTE — PROGRESS NOTES
"Vancomycin Dosing by Pharmacy- FOLLOW UP    Hitesh Jurado is a 53 y.o. year old male who Pharmacy has been consulted for vancomycin dosing for cellulitis, skin and soft tissue. Based on the patient's indication and renal status this patient is being dosed based on a goal AUC of 400-600.     Renal function is currently declining.    Current vancomycin dose: 750 mg given every 24 hours    Estimated vancomycin AUC on current dose: 585 mg/L.hr     Visit Vitals  /79 (Patient Position: Lying)   Pulse 91   Temp 36.2 °C (97.2 °F) (Temporal)   Resp 20        Lab Results   Component Value Date    CREATININE 2.51 (H) 03/19/2024    CREATININE 2.38 (H) 03/18/2024    CREATININE 2.04 (H) 03/17/2024    CREATININE 1.61 (H) 03/16/2024        Patient weight is No results found for: \"PTWEIGHT\"    No results found for: \"CULTURE\"     I/O last 3 completed shifts:  In: 960.9 (14.1 mL/kg) [P.O.:200; I.V.:310.9 (4.6 mL/kg); IV Piggyback:450]  Out: 4151 (61 mL/kg) [Urine:2650 (1.1 mL/kg/hr); Drains:1500; Stool:1]  Weight: 68 kg   [unfilled]    Lab Results   Component Value Date    PATIENTTEMP  11/01/2023      Comment:      NOTE: Patient Results are Not Corrected for Temperature    PATIENTTEMP 37.0 11/14/2022    PATIENTTEMP 37.0 11/13/2022        Assessment/Plan    Within goal AUC range. Continue current vancomycin regimen.    This dosing regimen is predicted by [employer] to result in the following pharmacokinetic parameters:    Loading dose: N/A  Regimen: 750 mg IV every 24 hours.  Start time: 10:00 on 03/19/2024  Exposure target: AUC24 (range)400-600 mg/L.hr   AUC24,ss: 585 mg/L.hr  Probability of AUC24 > 400: 99 %  Ctrough,ss: 19.4 mg/L  Probability of Ctrough,ss > 20: 44 %  Probability of nephrotoxicity (Lodise DAYANARA 2009): 16 %    The next level will be obtained on 03/20/24 at 0500. May be obtained sooner if clinically indicated.   Will continue to monitor renal function daily while on vancomycin and order serum creatinine " at least every 48 hours if not already ordered.  Follow for continued vancomycin needs, clinical response, and signs/symptoms of toxicity.       Caitlin De, [facility]

## 2024-03-19 NOTE — NURSING NOTE
CHF Clinical Nurse Navigator Documentation  Congestive Heart Failure disease education was performed by the Clinical Nurse Navigator with a good understanding: yes  CHF signs and symptoms discussed and when to call cardiologist?  yes  Living With Heart Failure Education booklet?  no  Controlling Heart Failure at Home Education? yes  CHF Education Teaching Tool? yes  ABELINO education modules assigned?  no  Home medication usage?  yes  Nutrition Education? Yes-low sodium   Fluid Restriction Education? yes  Daily Weight Education? Yes-daily weight log provided   Cardiovascular Rehab Referral ordered?  no  Follow-up with Cardiologist after discharge education? yes  Comments: Met with patient at bedside for heart failure education. Patient verbalized understanding. Patient lives alone. He is wheelchair bound. He said he has only been in this area for about a year. He said he has seen all the doctors he is supposed to except a heart doctor since he has moved to the area. He was supposed to see one cardiologist at [facility] but he said when he went for the appointment the doctor never showed up. He said he was set up with another cardiologist at [facility] but ended up in the hospital when that appointment was. His plan is to follow up with [facility] cardiology upon discharge. Patient has an appointment set-up with Dr. Mayo cardiology at [facility] on 4/18 at 1:00pm. Will ensure patient knows about this appointment. He said he uses [facility] transport through his insurance to get to and from doctors appointments.Patient states he is compliant with all his medications. He states he quit smoking about 2 months ago and quit drinking about 2 years ago. Enforced continued smoking and drinking cessation. Explained to patient the Healthy at Home program. Patient expressed interest. Will place referral. No questions or concerns at this time. Provided my contact information should any questions or concerns arise.

## 2024-03-19 NOTE — PROGRESS NOTES
Physical Therapy    Physical Therapy Evaluation    Patient Name: Hitesh Jurado  MRN: [id number]  Today's Date: 3/19/2024   Time Calculation  Start Time: 1105  Stop Time: 1131  Time Calculation (min): 26 min    Assessment/Plan   PT Assessment  PT Assessment Results: Decreased strength, Decreased range of motion, Decreased endurance, Impaired balance, Decreased mobility, Decreased safety awareness, Impaired judgement  Rehab Prognosis: Fair  Medical Staff Made Aware: Yes  Barriers to Participation: Insight into problems, Other (Comment) (uncoperative and resistance to recommendations for safety measures)  End of Session Communication: Bedside nurse  Assessment Comment:  (Patient will benefit from skilled PT to maximze functional mobility and decrease fall risk to address the above impairements)  End of Session Patient Position: Bed, 3 rail up, Alarm off, not on at start of session (declined bed alarm, declined safe placement in the bed with scooting away from the right side to decrease fall risk, declined right bed rail being placed up for safety and lowering left side rail)  IP OR SWING BED PT PLAN  Inpatient or Swing Bed: Inpatient  PT Plan  Treatment/Interventions: Bed mobility, Transfer training, Balance training, Therapeutic exercise, Therapeutic activity, Home exercise program, Postural re-education, Positioning  PT Frequency: 2 times per week  PT Discharge Recommendations: Moderate intensity level of continued care  PT Recommended Transfer Status: Assist x1  PT - OK to Discharge: Yes (when medically cleared)    Subjective     Current Problem:  Patient Active Problem List   Diagnosis    Status post below-knee amputation of left lower extremity (CMS/[facility])    PAD (peripheral artery disease) (CMS/[facility])    ACC/AHA stage C acute systolic heart failure (CMS/[facility])    Coronary artery disease involving native coronary artery without angina pectoris    Mixed hyperlipidemia    Thyroid mass    Tobacco dependence with  current use    PIPPA (acute kidney injury) (CMS/[facility])    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    Chronic congestive heart failure (CMS/[facility])    GERD (gastroesophageal reflux disease)    Dyspepsia    Critical lower limb ischemia (CMS/HCC)    Chronic obstructive pulmonary disease, unspecified (CMS/HCC)    Primary hypertension    PTSD (post-traumatic stress disorder)    JONA (generalized anxiety disorder)    Severe episode of recurrent major depressive disorder, without psychotic features (CMS/HCC)    Type 2 diabetes mellitus (CMS/HCC)    COPD exacerbation (CMS/HCC)    NSTEMI (non-ST elevated myocardial infarction) (CMS/HCC)    Elevated troponin    COPD (chronic obstructive pulmonary disease) (CMS/[facility])    Acute on chronic combined systolic (congestive) and diastolic (congestive) heart failure (CMS/HCC)       General Visit Information:  General  Reason for Referral: impaired mobility, gait training  Referred By: Izzy Early MD 3/15/24  Past Medical History Relevant to Rehab: HFrEF, PAD s/p LT BKA, T2DM c/b chronic non-healing wounds on Les  Family/Caregiver Present: No  Co-Treatment: OT  Prior to Session Communication: Bedside nurse  Patient Position Received: Bed, 3 rail up, Alarm off, not on at start of session (declined bed alarm)  General Comment: patient presented after a fall while attempting to transfer. Large RT pleural effusion: s/p RT sided thoracentesis 1.5L taken off    Home Living:  Home Living  Type of Home: House  Lives With: Alone  Home Adaptive Equipment: Scooter, Wheelchair-manual  Home Layout: One level  Home Access: Level entry  Bathroom Shower/Tub: Walk-in shower    Prior Level of Function:  Prior Function Per Pt/Caregiver Report  Level of [place]: Independent with ADLs and functional transfers  Prior Function Comments: primary means of mobility is manual W/C. Uses 3 wheeled scooter outside of the home for long distance. Reports several falls recently.. Has a  prosthesis but does not use, does not drive    Precautions:  Precautions  Medical Precautions: Fall precautions    Vital Signs:  Vital Signs  Heart Rate: 71  SpO2: 94 %  Patient Position: Lying  Objective     Pain:  Pain Assessment  Pain Assessment: 0-10  Pain Score: 0 - No pain (at rest)    Cognition:  Cognition  Overall Cognitive Status: Within Functional Limits  Orientation Level: Oriented X4  Safety/Judgement: Exceptions to WFL (impulsive and does not follow safety recommendations)    General Assessments:  General Observation  General Observation:  (Supine in bed upon arrival. Agreeabel to session but uncooperative and declines PT safety recommendations)         Strength  Strength Comments:  (patient declined official MMT testing  of LE)           Dynamic Sitting Balance  Dynamic Sitting-Comments:  (fair with RLE and B UE support)       Functional Assessments:     Bed Mobility  Bed Mobility: Yes  Bed Mobility 1  Bed Mobility 1: Supine to sitting  Level of Assistance 1: Moderate assistance  Bed Mobility Comments 1:  (VC for UE and LE placement. HOB elevated to near 90deg with use of HR.)  Bed Mobility 2  Bed Mobility  2: Scooting  Level of Assistance 2: Moderate assistance  Bed Mobility Comments 2: slow to scoot to HOB. VC  for handplacement and for pressure relief.  Transfers  Transfer: No  Ambulation/Gait Training  Ambulation/Gait Training Performed: No  Stairs  Stairs: No       Extremity/Trunk Assessments:        RLE   RLE : Within Functional Limits       Outcome Measures:  [facility] Basic Mobility  Turning from your back to your side while in a flat bed without using bedrails: A lot  Moving from lying on your back to sitting on the side of a flat bed without using bedrails: A lot  Moving to and from bed to chair (including a wheelchair): A lot  Standing up from a chair using your arms (e.g. wheelchair or bedside chair): A lot  To walk in hospital room: Total  Climbing 3-5 steps with railing: Total  Basic Mobility  - Total Score: 10                            Goals:  Encounter Problems       Encounter Problems (Active)       PT Problem       Pt. will transfer supine/sit with CGA (Progressing)       Start:  03/19/24    Expected End:  04/02/24            Patient will transfer from bed to chair with LRAD and modA (Progressing)       Start:  03/19/24    Expected End:  04/02/24            Patient will tolerate 10 reps of therex (Progressing)       Start:  03/19/24    Expected End:  04/02/24                 Education Documentation  Mobility Training, taught by Mitali Weldon, PT at 3/19/2024  2:25 PM.  Learner: Patient  Readiness: Nonacceptance  Method: Explanation  Response: Needs Reinforcement    Education Comments  No comments found.

## 2024-03-19 NOTE — PROGRESS NOTES
Nephrology Progress Note    Assessment:  53 y.o. male with history s/f HFrEF, PAD s/p LT BKA, T2DM c/b chronic non-healing wounds on Les who presented for a fall while attempting to transfer.      PIPPA on CKD stage II: likely in setting of CRS, baseline Scr ~1.2 w/ eGFR high 60s, tends to get recurrent AKIs, worsening   Hypokalemia: corrected   Large RT pleural effusion: s/p RT sided thoracentesis 1.5L taken off   Anemia   Hypoalbuminemia   Cirrhosis       Plan:  - function slightly worse but monitor for now   - continue lasix gtt at 15 mg/hr, diuresing better   - continue metolazone 5 mg daily and aldactone 25 mg daily      Subjective:  Admit Date: 3/15/2024    Interval History: had thoracentesis this AM, on lasix gtt started yesterday     Medications:  Scheduled Meds:[START ON 3/20/2024] albumin human, 25 g, intravenous, Once  albumin human, 1 g/kg, intravenous, Once  cefepime, 2 g, intravenous, q24h  docusate sodium, 100 mg, oral, BID  enoxaparin, 40 mg, subcutaneous, q24h  insulin lispro, 0-10 Units, subcutaneous, Before meals & nightly  lidocaine, 5 mL, infiltration, Once  lidocaine, 1 patch, transdermal, Daily  melatonin, 3 mg, oral, Daily  metFORMIN, 500 mg, oral, Daily with breakfast  methIMAzole, 5 mg, oral, Daily  metOLazone, 5 mg, oral, Daily  metoprolol succinate XL, 25 mg, oral, Daily  pantoprazole, 40 mg, oral, Daily before breakfast   Or  pantoprazole, 40 mg, intravenous, Daily before breakfast  perflutren lipid microspheres, 0.5-10 mL of dilution, intravenous, Once in imaging  perflutren protein A microsphere, 0.5 mL, intravenous, Once in imaging  potassium chloride, 20 mEq, oral, BID  spironolactone, 25 mg, oral, Daily  sulfur hexafluoride microsphr, 2 mL, intravenous, Once in imaging  vancomycin, 750 mg, intravenous, q24h      Continuous Infusions:furosemide, 15 mg/hr, Last Rate: 15 mg/hr (03/18/24 1557)        CBC:   Lab Results   Component Value Date    WBC 14.8 (H) 03/19/2024    RBC 4.33 (L)  "03/19/2024    HGB 8.4 (L) 03/19/2024    HCT 28.2 (L) 03/19/2024    MCV 65 (L) 03/19/2024    MCH 19.4 (L) 03/19/2024    MCHC 29.8 (L) 03/19/2024    RDW 18.5 (H) 03/19/2024     03/19/2024     BMP:    Lab Results   Component Value Date     (L) 03/19/2024    K 4.6 03/19/2024    CL 94 (L) 03/19/2024    CO2 29 03/19/2024    BUN 68 (H) 03/19/2024    CREATININE 2.51 (H) 03/19/2024    CALCIUM 8.4 (L) 03/19/2024    GLUCOSE 126 (H) 03/19/2024       Objective:  Vitals: /71 (BP Location: Left arm, Patient Position: Sitting)   Pulse 83   Temp 36.1 °C (97 °F) (Temporal)   Resp 20   Ht 1.753 m (5' 9\")   Wt 68 kg (150 lb)   SpO2 100%   BMI 22.15 kg/m²    Wt Readings from Last 3 Encounters:   03/15/24 68 kg (150 lb)   10/30/23 55.9 kg (123 lb 3.8 oz)   07/21/23 59 kg (130 lb)      24HR INTAKE/OUTPUT:    Intake/Output Summary (Last 24 hours) at 3/19/2024 1239  Last data filed at 3/19/2024 1127  Gross per 24 hour   Intake 700 ml   Output 2600 ml   Net -1900 ml         General: alert, in no apparent distress  HEENT: normocephalic, atraumatic, anicteric  Lungs: non-labored respirations, clear to auscultation bilaterally  Heart: regular rate and rhythm, no murmurs or rubs  Abdomen: soft, non-tender, severely distended   Ext: no cyanosis, +++ RLE peripheral edema, LT BKA  Neuro: alert and oriented, no gross abnormalities        Electronically signed by Malgorzata Williamson MD, MD              "

## 2024-03-19 NOTE — DISCHARGE INSTR - APPOINTMENTS
Raquel aMyo MD  Cardiology  4/18/24 1:00pm  [address]  [facility] [address]  Phone: [phone]

## 2024-03-19 NOTE — CONSULTS
PLASTIC  SURGERY  CONSULTATION        Reason for consultation:       Open wounds left below-knee amputation, right dorsal foot right lower extremity      History of present illness:      53-year-old male fell from his wheelchair while in the shower sustaining areas of full-thickness and partial-thickness skin loss left below-knee amputation also has chronic nonhealing wounds right dorsal foot and right lower extremity        Past medical history:      Peripheral vascular disease  Diabetes type 2  Alcoholic cirrhosis  Congestive heart failure      Past surgical history:      Reviewed  Left below-knee amputation    Medications:       Cefepime  Colace  Lovenox  Insulin  Metformin  Methimazole  Zaroxolyn  Metoprolol  Pantoprazole  Spironolactone  Vancomycin  Hydromorphone  Metronidazole    ALLERGIES:      Amoxicillin        Social history:      Smokes half pack per day, states he no longer drinks alcohol      Family history:      Reviewed            Review of systems:  At least 10 systems reviewed and are otherwise negative except for as noted in the history of present illness                PHYSICAL  EXAMINATION       Height:     5 foot 9 inches               weight:         150 pounds                 Vital signs:   Temperature 36.2 pulse 91 blood pressure 123/76    General: Well-developed,   male          in no acute distress, alert and oriented ×3    HEENT:   Within normal limits    Neck:   Supple, no observable masses    Lungs: Clear to auscultation    Heart: Regular rate and rhythm    Abdomen: Soft, nontender; protuberant    Extremities: Full range of motion; full-thickness skin loss right dorsal foot wound, partial-thickness skin loss with areas of full-thickness skin loss base of below-knee amputation left side.  Wounds are otherwise clean no signs of infection    Neurological: Grossly within normal limits                Impression:      Open wound dorsum of right foot, left below-knee amputation site and  right lower extremity      Recommendation:      Start Silvadene cream dressing changes, twice daily        Thank you for the referral.    Roland Reyes, MD        *These notes are being done using Dragon voice recognition technology and may include unintended errors with respect to translation of words, typographical errors or grammar errors which may not have been identified prior to finalization of the chart note.

## 2024-03-19 NOTE — PROGRESS NOTES
Noted that patient with AMPAC of 10 in physical therapy. Called patient via telephone due to remote coverage, discussed skilled care option. He states that he as email access while in the hospital. Emailed list of facilities to [email or web address] for choice.

## 2024-03-19 NOTE — PROGRESS NOTES
"SUBJECTIVE:   Admit Date: 3/15/2024                          Interval History: Feels really out of breath, has no other active complaints   Recent events were noted.  Patient underwent right thoracentesis yesterday and 1.5 L of fluid removed.  Today had paracentesis and 6.3 L of fluid removed.  Pain a lot better after about 2 procedures.  Patient renal status is being optimized by renal service.     OBJECTIVE:   Vitals: /69   Pulse 81   Temp 36.1 °C (97 °F) (Temporal)   Resp 22   Ht 1.753 m (5' 9\")   Wt 68 kg (150 lb)   SpO2 100%   BMI 22.15 kg/m²        Wt Readings from Last 3 Encounters:   03/15/24 68 kg (150 lb)   11/01/23 51.7 kg (114 lb)   10/30/23 55.9 kg (123 lb 3.8 oz)      24HR INTAKE/OUTPUT:    Intake/Output Summary (Last 24 hours) at 3/19/2024 1419  Last data filed at 3/19/2024 1300      Gross per 24 hour   Intake 650 ml   Output 2550 ml   Net -1900 ml         PHYSICAL EXAM:   GENERAL: Laying in bed, in mild distress  HEENT: HEAD: Normocephalic atraumatic.  Neck: Supple.  Eyes: Pupils are reactive to direct light.   CVS: S1, S2 heard. Regular rate and rhythm  LUNGS: Coarse breath sounds  ABDOMEN: Soft, distended. Positive bowel sounds. No guarding or rebound appreciated.  NEUROLOGICAL: No focal neurological deficits appreciated. Cranial nerves are grossly intact.  EXTREMITIES: Left below-knee amputation.  Left stump erythema appreciated.  Right lower extremity cool to touch with an area on of ulceration appreciated  SKIN:  Grossly intact, warm and dry.        LABS/IMAGING AND MEDICATIONS:   Scheduled Meds:[START ON 3/20/2024] albumin human, 25 g, intravenous, Once  cefepime, 2 g, intravenous, q24h  docusate sodium, 100 mg, oral, BID  enoxaparin, 40 mg, subcutaneous, q24h  insulin lispro, 0-10 Units, subcutaneous, Before meals & nightly  lidocaine, 5 mL, infiltration, Once  lidocaine, 1 patch, transdermal, Daily  melatonin, 3 mg, oral, Daily  metFORMIN, 500 mg, oral, Daily with " "breakfast  methIMAzole, 5 mg, oral, Daily  metOLazone, 5 mg, oral, Daily  metoprolol succinate XL, 25 mg, oral, Daily  pantoprazole, 40 mg, oral, Daily before breakfast   Or  pantoprazole, 40 mg, intravenous, Daily before breakfast  perflutren lipid microspheres, 0.5-10 mL of dilution, intravenous, Once in imaging  perflutren protein A microsphere, 0.5 mL, intravenous, Once in imaging  potassium chloride, 20 mEq, oral, BID  spironolactone, 25 mg, oral, Daily  sulfur hexafluoride microsphr, 2 mL, intravenous, Once in imaging  vancomycin, 750 mg, intravenous, q24h        PRN Meds:PRN medications: acetaminophen **OR** acetaminophen **OR** acetaminophen, acetaminophen **OR** acetaminophen **OR** acetaminophen, benzocaine-menthol, dextromethorphan-guaifenesin, dextrose, dextrose, glucagon, guaiFENesin, morphine, ondansetron ODT **OR** ondansetron, oxygen, vancomycin         Results from last 7 days   Lab Units 03/15/24  0905   BNP pg/mL 4,248*           Results from last 7 days   Lab Units 03/15/24  0905   INR   1.3*      No lab exists for component: \"LIPIDS\"       No lab exists for component: \"URINALYSIS\"              BMP:         Results from last 7 days   Lab Units 03/19/24 0422 03/18/24 0433 03/17/24  0624   SODIUM mmol/L 133* 131* 129*   POTASSIUM mmol/L 4.6 4.2 5.0   CHLORIDE mmol/L 94* 93* 92*   CO2 mmol/L 29 28 26   BUN mg/dL 68* 62* 52*   CREATININE mg/dL 2.51* 2.38* 2.04*         CBC:         Results from last 7 days   Lab Units 03/19/24 0422 03/18/24  0433 03/17/24  0624   WBC AUTO x10*3/uL 14.8* 14.1* 12.0*   RBC AUTO x10*6/uL 4.33* 3.94* 4.00*   HEMOGLOBIN g/dL 8.4* 7.9* 7.7*   HEMATOCRIT % 28.2* 25.6* 26.4*   MCV fL 65* 65* 66*   MCH pg 19.4* 20.1* 19.3*   MCHC g/dL 29.8* 30.9* 29.2*   RDW % 18.5* 18.1* 18.5*   PLATELETS AUTO x10*3/uL 385 368 309         Cardiac Enzymes:             Hepatic Function Panel:           Results from last 7 days   Lab Units 03/19/24  0422 03/18/24  0433 03/17/24  0624 " "03/16/24  0541 03/15/24  0905   ALK PHOS U/L 135* 135* 125*   < > 137*   ALT U/L 25 27 29   < > 38   AST U/L 31 33 39   < > 63*   PROTEIN TOTAL g/dL 7.2 7.2 6.7   < > 7.5   BILIRUBIN TOTAL mg/dL 1.4* 1.3* 1.4*   < > 1.5*   BILIRUBIN DIRECT mg/dL  --   --   --   --  0.7*    < > = values in this interval not displayed.         Magnesium:       Results from last 7 days   Lab Units 03/16/24  0541   MAGNESIUM mg/dL 1.89         Pro-BNP:  No results found for: \"PROBNP\"     INR:       Results from last 7 days   Lab Units 03/15/24  0905   PROTIME seconds 15.1*   INR   1.3*               CMP:         Results from last 7 days   Lab Units 03/19/24  0422 03/18/24  0433 03/17/24  0624   SODIUM mmol/L 133* 131* 129*   POTASSIUM mmol/L 4.6 4.2 5.0   CHLORIDE mmol/L 94* 93* 92*   CO2 mmol/L 29 28 26   BUN mg/dL 68* 62* 52*   CREATININE mg/dL 2.51* 2.38* 2.04*   GLUCOSE mg/dL 126* 229* 467*   CALCIUM mg/dL 8.4* 8.5* 8.0*   PROTEIN TOTAL g/dL 7.2 7.2 6.7   BILIRUBIN TOTAL mg/dL 1.4* 1.3* 1.4*   ALK PHOS U/L 135* 135* 125*   AST U/L 31 33 39   ALT U/L 25 27 29      SSESSMENT AND PLAN:   Mr. Jurado is a 53-year-old male with past medical history of heart failure with reduced EF, peripheral vascular disease status post left BKA, chronic multiple wounds who presented to the emergency room after a with a fall.     Currently admitted for congestive heart failure exacerbation and radiographic evidence of cirrhosis.        Acute hypoxic respiratory failure  Congestive heart failure exacerbation with reduced EF  Large right-sided pleural effusion  -P/W a fall  -Chest x ray on admission showed a large left-sided pleural effusion  -Echocardiogram 10/23: Showed EF of 15 to 20%  -S/P thoracentesis on 3/18, 1.5L  -Currently remains on Lasix drip, creatinine continues to increase  -Hold Aldactone, continue to monitor intake and output  -Discontinue IV potassium tablets     Decompensated liver cirrhosis with large ascites  Possible hepatorenal " syndrome  -Patient had an ultrasound which showed cirrhosis  -No history of liver cirrhosis from his [facility] admissions  -Underwent paracentesis today, 6.3 L removed  -Continue IV albumin     Acute kidney injury on chronic kidney disease  -Multifactorial  -Discontinue spironolactone  -Continue to monitor intake and output     Microcytic anemia  -Will obtain iron studies     History of peripheral vascular disease  Status post left BKA  Infected lower extremity ulcers  -He has a history of peripheral vascular disease, underwent peripheral angiogram on 11/23 s/p RLE DCB of iliac and recanalization of R SFA on 12/4. Heparin gtt d/vinod and he was started on DAPT.  -Patient's lower extremities are cool to touch, will obtain vascular surgery consult  -Continue Vanco and cefepime, obtain ID consult  -Continue wound care     History of DVT  -Will need to review his medications to see if patient is on anticoagulation

## 2024-03-19 NOTE — PROGRESS NOTES
PROGRESS NOTE    ASSESSMENT AND PLAN:   Mr. Jurado is a 53-year-old male with past medical history of heart failure with reduced EF, peripheral vascular disease status post left BKA, chronic multiple wounds who presented to the emergency room after a with a fall.    Currently admitted for congestive heart failure exacerbation and radiographic evidence of cirrhosis.      Acute hypoxic respiratory failure  Congestive heart failure exacerbation with reduced EF  Large right-sided pleural effusion  -P/W a fall  -Chest x ray on admission showed a large left-sided pleural effusion  -Echocardiogram 10/23: Showed EF of 15 to 20%  -S/P thoracentesis on 3/18, 1.5L  -Currently remains on Lasix drip, creatinine continues to increase  -Hold Aldactone, continue to monitor intake and output  -Discontinue IV potassium tablets    Decompensated liver cirrhosis with large ascites  Possible hepatorenal syndrome  -Patient had an ultrasound which showed cirrhosis  -No history of liver cirrhosis from his [facility] admissions  -Pt admits to years of alcohol use, which is most likely the cause.  -Will obtain a hepatitis panel  -Underwent paracentesis today, 6.3 L removed  -Continue IV albumin  -GI consult.    Acute kidney injury on chronic kidney disease  ATN vs hepatorenal sydnrome  -Multifactorial  -Discontinue spironolactone  -Continue to monitor intake and output    Microcytic anemia  -Will obtain iron studies    History of peripheral vascular disease  Status post left BKA  Infected lower extremity ulcers  -He has a history of peripheral vascular disease, underwent peripheral angiogram on 11/23 s/p RLE DCB of iliac and recanalization of R SFA on 12/4. Heparin gtt d/vinod and he was started on DAPT.  -Patient's lower extremities are cool to touch, will obtain vascular surgery consult  -Continue Vanco and cefepime, obtain ID consult  -Continue wound care    Diabetes mellitus  -Continue low-dose sliding scale    History of DVT  -Will  "need to review his medications to see if patient is on anticoagulation            SUBJECTIVE:   Admit Date: 3/15/2024    Interval History: Feels really out of breath, has no other active complaints      OBJECTIVE:   Vitals: /69   Pulse 81   Temp 36.1 °C (97 °F) (Temporal)   Resp 22   Ht 1.753 m (5' 9\")   Wt 68 kg (150 lb)   SpO2 100%   BMI 22.15 kg/m²    Wt Readings from Last 3 Encounters:   03/15/24 68 kg (150 lb)   11/01/23 51.7 kg (114 lb)   10/30/23 55.9 kg (123 lb 3.8 oz)      24HR INTAKE/OUTPUT:    Intake/Output Summary (Last 24 hours) at 3/19/2024 1419  Last data filed at 3/19/2024 1300  Gross per 24 hour   Intake 650 ml   Output 2550 ml   Net -1900 ml       PHYSICAL EXAM:   GENERAL: Laying in bed, in mild distress  HEENT: HEAD: Normocephalic atraumatic.  Neck: Supple.  Eyes: Pupils are reactive to direct light.   CVS: S1, S2 heard. Regular rate and rhythm. +JVD  LUNGS: Coarse breath sounds  ABDOMEN: Soft, distended. Positive bowel sounds. No guarding or rebound appreciated.  NEUROLOGICAL: No focal neurological deficits appreciated. Cranial nerves are grossly intact.  EXTREMITIES: Left below-knee amputation.  Left stump erythema appreciated.  Right lower extremity cool to touch with an area on of ulceration appreciated  SKIN:  Grossly intact, warm and dry.      LABS/IMAGING AND MEDICATIONS:   Scheduled Meds:[START ON 3/20/2024] albumin human, 25 g, intravenous, Once  cefepime, 2 g, intravenous, q24h  docusate sodium, 100 mg, oral, BID  enoxaparin, 40 mg, subcutaneous, q24h  insulin lispro, 0-10 Units, subcutaneous, Before meals & nightly  lidocaine, 5 mL, infiltration, Once  lidocaine, 1 patch, transdermal, Daily  melatonin, 3 mg, oral, Daily  metFORMIN, 500 mg, oral, Daily with breakfast  methIMAzole, 5 mg, oral, Daily  metOLazone, 5 mg, oral, Daily  metoprolol succinate XL, 25 mg, oral, Daily  pantoprazole, 40 mg, oral, Daily before breakfast   Or  pantoprazole, 40 mg, intravenous, Daily before " "breakfast  perflutren lipid microspheres, 0.5-10 mL of dilution, intravenous, Once in imaging  perflutren protein A microsphere, 0.5 mL, intravenous, Once in imaging  potassium chloride, 20 mEq, oral, BID  spironolactone, 25 mg, oral, Daily  sulfur hexafluoride microsphr, 2 mL, intravenous, Once in imaging  vancomycin, 750 mg, intravenous, q24h      PRN Meds:PRN medications: acetaminophen **OR** acetaminophen **OR** acetaminophen, acetaminophen **OR** acetaminophen **OR** acetaminophen, benzocaine-menthol, dextromethorphan-guaifenesin, dextrose, dextrose, glucagon, guaiFENesin, morphine, ondansetron ODT **OR** ondansetron, oxygen, vancomycin    No lab exists for component: \"CBC\"   No lab exists for component: \"CMP\"   No lab exists for component: \"TROPONIN\"  Results from last 7 days   Lab Units 03/15/24  0905   BNP pg/mL 4,248*     Results from last 7 days   Lab Units 03/15/24  0905   INR  1.3*     No lab exists for component: \"LIPIDS\"       No lab exists for component: \"URINALYSIS\"          BMP:  Results from last 7 days   Lab Units 03/19/24 0422 03/18/24  0433 03/17/24  0624   SODIUM mmol/L 133* 131* 129*   POTASSIUM mmol/L 4.6 4.2 5.0   CHLORIDE mmol/L 94* 93* 92*   CO2 mmol/L 29 28 26   BUN mg/dL 68* 62* 52*   CREATININE mg/dL 2.51* 2.38* 2.04*       CBC:  Results from last 7 days   Lab Units 03/19/24  0422 03/18/24  0433 03/17/24  0624   WBC AUTO x10*3/uL 14.8* 14.1* 12.0*   RBC AUTO x10*6/uL 4.33* 3.94* 4.00*   HEMOGLOBIN g/dL 8.4* 7.9* 7.7*   HEMATOCRIT % 28.2* 25.6* 26.4*   MCV fL 65* 65* 66*   MCH pg 19.4* 20.1* 19.3*   MCHC g/dL 29.8* 30.9* 29.2*   RDW % 18.5* 18.1* 18.5*   PLATELETS AUTO x10*3/uL 385 368 309       Cardiac Enzymes:           Hepatic Function Panel:  Results from last 7 days   Lab Units 03/19/24  0422 03/18/24  0433 03/17/24  0624 03/16/24  0541 03/15/24  0905   ALK PHOS U/L 135* 135* 125*   < > 137*   ALT U/L 25 27 29   < > 38   AST U/L 31 33 39   < > 63*   PROTEIN TOTAL g/dL 7.2 7.2 6.7   " "< > 7.5   BILIRUBIN TOTAL mg/dL 1.4* 1.3* 1.4*   < > 1.5*   BILIRUBIN DIRECT mg/dL  --   --   --   --  0.7*    < > = values in this interval not displayed.       Magnesium:  Results from last 7 days   Lab Units 03/16/24  0541   MAGNESIUM mg/dL 1.89       Pro-BNP:  No results found for: \"PROBNP\"    INR:  Results from last 7 days   Lab Units 03/15/24  0905   PROTIME seconds 15.1*   INR  1.3*           CMP:  Results from last 7 days   Lab Units 03/19/24  0422 03/18/24  0433 03/17/24  0624   SODIUM mmol/L 133* 131* 129*   POTASSIUM mmol/L 4.6 4.2 5.0   CHLORIDE mmol/L 94* 93* 92*   CO2 mmol/L 29 28 26   BUN mg/dL 68* 62* 52*   CREATININE mg/dL 2.51* 2.38* 2.04*   GLUCOSE mg/dL 126* 229* 467*   CALCIUM mg/dL 8.4* 8.5* 8.0*   PROTEIN TOTAL g/dL 7.2 7.2 6.7   BILIRUBIN TOTAL mg/dL 1.4* 1.3* 1.4*   ALK PHOS U/L 135* 135* 125*   AST U/L 31 33 39   ALT U/L 25 27 29        "

## 2024-03-19 NOTE — PROGRESS NOTES
Mariposa thanks  [facility] Progress Note           Rounding Cardiologist:  Dr. Juan Argueta  Primary Cardiologist:  Dr. Raquel Gupta ([facility])     Date:  3/19/2024  Patient:  Hitesh Jurado  YOB: 1970  MRN:  [id number]   Admit Date:  3/15/2024      SUBJECTIVE:    3/19/24  Patient states that he still does feel some shortness of breath talk to him at length he is still refusing the echo at this present time.  Back in October his EF was 15 to 20%.  Nephrology is currently managing the diuretics at this time  EKG ordered    3/18/24  Patient is awake and alert patient states that he refused to have the echo at this time.  He did have a thoracentesis took off from the right side approximately 1.5 L and fluid  Nephrology managing diuretics    3/17/24    Hitesh Jurado was seen and examined today.    Sitting up in the chair.     He denies any chest pain or shortness of breath.     Patient considered leaving AMA last evening.  States he can't get comfortable.    He was refusing IV furosemide but eventually agree to a dose this am.       Consult HPI:  Hitesh Jurado is a 53 y.o.  male patient who is being at the request of Dr. Izzy Early for inpatient consultation of  congestive heart failure . He was admitted on 3/15/2024.  Previous [facility], [facility], and [facility] records have been reviewed.   The patient is a very poor historian.        He has a history of atherosclerotic heart disease and ischemic cardiomyopathy.  He was initially diagnosed with HFrEF at [facility] in February 2021.  Cardiac catheterization showed total occlusion of the circumflex with left to left collaterals.  The LAD had 30% stenosis with very small caliber diagonal branches and severe diffuse disease.  The RCA had 20 to 40% stenosis.  Estimated LV ejection fraction was 30%.  Echocardiogram February 2021 showed an estimated LV ejection action 30% with mild mitral and tricuspid regurgitation.   Estimated RVSP 50 to 60 mmHg.  He has a history of peripheral vascular disease with previous right to left femorofemoral bypass.  He had right common iliac angioplasty and external iliac artery stenting in 2019.  He has insulin-dependent diabetes mellitus and history of diabetic foot ulcers.  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  He has hypertension, hyperlipidemia, tobacco use, and chronic kidney disease.  He was last seen by [facility] cardiology (Dr. Patrick) In March 2021.  At that time it was recommended he undergo cardiac MRI to evaluate for viability with consideration of ICD implant.  He was on carvedilol, lisinopril, and spironolactone.  SGLT2 inhibitor was not recommended at that time secondary to peripheral vascular disease.     He was admitted to [facility] January 15, 2024 with acute on chronic systolic congestive heart failure.  He was also treated for bilateral pneumonia.  He was noted to have anasarca/ascites.  Patient was noted to be noncompliant with taking his medications and going to follow-up office visits.  He was treated with IV diuretics and 5 days of IV antibiotics.  He was discharged on furosemide 60 mg twice daily.  He was also on aspirin, Plavix, atorvastatin, insulin, metformin, and Toprol XL.  Lisinopril was discontinued.  He was scheduled to see Dr. Wojciech Magana [facility] on January 30, 2024 but left without being seen.  He is scheduled to see Dr. Raquel Gupta with [facility] cardiology on April 18, 2024.  He states he avoids seeing PAs or nurse practitioners.  He follows with Dr. Geno Mix for nephrology at .  He was seen in the emergency department in [place] last week for hypokalemia and reported brief seizure activity.     He was brought to the emergency department yesterday by EMS after he fell at home.  He states he typically gets around in a wheelchair and fell when he was getting out of the shower.  He denies loss of consciousness.  He states he has had  significant weight gain and feels bloated.  He states he has large amounts of fluid trapped in his belly but is concerned about getting rid of it due to possible harm to his kidneys.  He was noted to have stable vital signs with exception of oxygen saturation 93%.  Chronic nonhealing ulcer of the right foot was noted.  Possible infection of the left BKA stump.  BNP was 4248 with potassium 2.4 and creatinine 1.54.  Hemoglobin 9.3.  Chest x-ray showed a large right pleural effusion.  He was hypoglycemic and was given D50.  Potassium was supplemented.  He was admitted to telemetry and initiated on intravenous furosemide.  He has been started on empiric antibiotics.  He is scheduled undergo thoracentesis.     The patient currently sitting up in the chair.  He denies any chest pain or worsening shortness of breath.  He currently is refusing IV furosemide.  He states he would like to consult with a nephrologist before having further medication adjustments. He denies any orthopnea or PND.  He denies any palpitations, lightheadedness, near-syncope, or syncope.  He denies any fever, chills, or cough.  He denies any nausea, vomiting, or diaphoresis.  He denies any hemoptysis, hematemesis, melena, or hematochezia.      VITALS:     Vitals:    03/18/24 1530 03/18/24 1900 03/18/24 2340 03/19/24 0752   BP: 129/80 123/76 125/79 112/71   BP Location: Left arm   Left arm   Patient Position: Lying Sitting Lying Sitting   Pulse: 101 91  83   Resp: 22 20 20 20   Temp:  36.5 °C (97.7 °F) 36.2 °C (97.2 °F) 36.1 °C (97 °F)   TempSrc:  Temporal Temporal Temporal   SpO2: 98% 95% 94% 100%   Weight:       Height:           Intake/Output Summary (Last 24 hours) at 3/19/2024 1116  Last data filed at 3/19/2024 1032  Gross per 24 hour   Intake 850 ml   Output 2600 ml   Net -1750 ml       Wt Readings from Last 4 Encounters:   03/15/24 68 kg (150 lb)   10/30/23 55.9 kg (123 lb 3.8 oz)   07/21/23 59 kg (130 lb)   06/30/23 57.2 kg (126 lb)        CURRENT HOSPITAL MEDICATIONS:   cefepime, 2 g, intravenous, q24h  docusate sodium, 100 mg, oral, BID  enoxaparin, 40 mg, subcutaneous, q24h  insulin lispro, 0-10 Units, subcutaneous, Before meals & nightly  lidocaine, 5 mL, infiltration, Once  lidocaine, 1 patch, transdermal, Daily  melatonin, 3 mg, oral, Daily  metFORMIN, 500 mg, oral, Daily with breakfast  methIMAzole, 5 mg, oral, Daily  metOLazone, 5 mg, oral, Daily  metoprolol succinate XL, 25 mg, oral, Daily  pantoprazole, 40 mg, oral, Daily before breakfast   Or  pantoprazole, 40 mg, intravenous, Daily before breakfast  perflutren lipid microspheres, 0.5-10 mL of dilution, intravenous, Once in imaging  perflutren protein A microsphere, 0.5 mL, intravenous, Once in imaging  potassium chloride, 20 mEq, oral, BID  spironolactone, 25 mg, oral, Daily  sulfur hexafluoride microsphr, 2 mL, intravenous, Once in imaging  vancomycin, 750 mg, intravenous, q24h      furosemide, 15 mg/hr, Last Rate: 15 mg/hr (03/18/24 5095)      Current Outpatient Medications   Medication Instructions    acetaminophen (TYLENOL) 500 mg, oral, Every 8 hours PRN    aspirin 81 mg EC tablet 1 tablet, oral, Daily    atorvastatin (Lipitor) 80 mg tablet 1 tablet, oral, Daily    azithromycin (ZITHROMAX) 500 mg, oral, Every 24 hours scheduled    carvedilol (COREG) 12.5 mg, oral, 2 times daily    clopidogrel (PLAVIX) 75 mg, oral, Daily    empagliflozin (JARDIANCE) 10 mg, oral, Daily    furosemide (LASIX) 60 mg, oral, 2 times daily    gabapentin (NEURONTIN) 400 mg, oral, 3 times daily    insulin lispro (HumaLOG) 100 unit/mL injection subcutaneous, 3 times daily with meals, Take as directed per insulin instructions.    Lantus U-100 Insulin 8 Units, subcutaneous, Nightly, Take as directed per insulin instructions.    lisinopril 5 mg, oral, Nightly    melatonin 5 mg, oral, Nightly    metFORMIN (Glucophage) 1,000 mg tablet 0.5 tablets, oral, Daily with breakfast    methIMAzole (TAPAZOLE) 20 mg,  "oral, Every 8 hours scheduled    metoprolol succinate XL (TOPROL-XL) 25 mg, oral, Daily, Do not crush or chew.    mupirocin (Bactroban) 2 % ointment Topical, 3 times daily RT    omeprazole (PRILOSEC) 40 mg, oral, 2 times daily    potassium chloride CR 20 mEq ER tablet 20 mEq, oral, 3 times daily, Do not crush or chew. For 10 days    spironolactone (ALDACTONE) 25 mg, oral, Daily    torsemide (DEMADEX) 20 mg, oral, Daily PRN    Tradjenta 5 mg, oral, Daily        PHYSICAL EXAMINATION:   GENERAL:  Well developed, well nourished, in no acute distress.  CHEST:  Symmetric and nontender.  NEURO/PSYCH:  Alert and oriented times three with approppriate behavior and responses.  NECK:  Supple, no JVD, no bruit.  LUNGS:  dimished bilater. Exp rhonchii  HEART:  Rate and rhythm regular with occasional ectopy, I/VI DEVON LSB, no gallop appreciated.        There are no rubs, clicks or heaves.  EXTREMITIES:  left BKA .  Faint pulses right        LAB DATA:     CBC:   Results from last 7 days   Lab Units 03/19/24  0422   WBC AUTO x10*3/uL 14.8*   RBC AUTO x10*6/uL 4.33*   HEMOGLOBIN g/dL 8.4*   HEMATOCRIT % 28.2*   PLATELETS AUTO x10*3/uL 385        CMP:    Results from last 7 days   Lab Units 03/19/24  0422   SODIUM mmol/L 133*   POTASSIUM mmol/L 4.6   CHLORIDE mmol/L 94*   CO2 mmol/L 29   BUN mg/dL 68*   CREATININE mg/dL 2.51*   GLUCOSE mg/dL 126*   CALCIUM mg/dL 8.4*       Cardiac Enzymes:    Lab Results   Component Value Date    TROPHS 754 () 11/01/2023    TROPHS 642 () 11/01/2023    TROPHS 546 () 11/01/2023       Magnesium:    No results found for: \"MG\"      BNP:   Lab Results   Component Value Date    BNP 4,248 (H) 03/15/2024        PT/INR:    No results found for: \"PROTIME\", \"INR\"      HgBA1c:    Lab Results   Component Value Date    HGBA1C 9.0 (H) 01/16/2024       CBC:  Lab Results   Component Value Date    WBC 14.8 (H) 03/19/2024    WBC 14.1 (H) 03/18/2024    WBC 12.0 (H) 03/17/2024    RBC 4.33 (L) 03/19/2024    RBC 3.94 " (L) 03/18/2024    RBC 4.00 (L) 03/17/2024    HGB 8.4 (L) 03/19/2024    HGB 7.9 (L) 03/18/2024    HGB 7.7 (L) 03/17/2024    HCT 28.2 (L) 03/19/2024    HCT 25.6 (L) 03/18/2024    HCT 26.4 (L) 03/17/2024    MCV 65 (L) 03/19/2024    MCV 65 (L) 03/18/2024    MCV 66 (L) 03/17/2024    MCH 19.4 (L) 03/19/2024    MCH 20.1 (L) 03/18/2024    MCH 19.3 (L) 03/17/2024    MCHC 29.8 (L) 03/19/2024    MCHC 30.9 (L) 03/18/2024    MCHC 29.2 (L) 03/17/2024    RDW 18.5 (H) 03/19/2024    RDW 18.1 (H) 03/18/2024    RDW 18.5 (H) 03/17/2024     03/19/2024     03/18/2024     03/17/2024       BMP:  Lab Results   Component Value Date     (L) 03/19/2024     (L) 03/18/2024     (L) 03/17/2024    K 4.6 03/19/2024    K 4.2 03/18/2024    K 5.0 03/17/2024    CL 94 (L) 03/19/2024    CL 93 (L) 03/18/2024    CL 92 (L) 03/17/2024    CO2 29 03/19/2024    CO2 28 03/18/2024    CO2 26 03/17/2024    BUN 68 (H) 03/19/2024    BUN 62 (H) 03/18/2024    BUN 52 (H) 03/17/2024    CREATININE 2.51 (H) 03/19/2024    CREATININE 2.38 (H) 03/18/2024    CREATININE 2.04 (H) 03/17/2024       Hepatic Function Panel:    Lab Results   Component Value Date    ALKPHOS 135 (H) 03/19/2024    ALT 25 03/19/2024    AST 31 03/19/2024    PROT 7.2 03/19/2024    BILITOT 1.4 (H) 03/19/2024       DIAGNOSTIC TESTING:     ECG 12 lead    Result Date: 3/16/2024  Sinus rhythm with Premature supraventricular complexes and with occasional Premature ventricular complexes Right bundle branch block Possible Lateral infarct , age undetermined Abnormal ECG When compared with ECG of 01-NOV-2023 12:19, Premature supraventricular complexes are now Present Borderline criteria for Lateral infarct are now Present See ED provider note for full interpretation and clinical correlation Confirmed by Lisa Naidu ([id number]) on 3/16/2024 11:49:50 AM    XR chest 1 view    Result Date: 3/15/2024  Interpreted By:  Artur Santos, STUDY: XR CHEST 1 VIEW;  3/15/2024 10:36 am    INDICATION: Signs/Symptoms:fall.   COMPARISON: 11/01/2023   ACCESSION NUMBER(S): [id number]   ORDERING CLINICIAN: LO SHAW   FINDINGS: Large right basilar pleural effusion occupying greater than 50% of the hemithoracic volume. Overlying atelectasis. Left lung grossly clear without pleural effusion. Cardiomegaly suspected. Aortic atherosclerosis. Mild pulmonary vascular congestion.       Large right pleural effusion.   MACRO: None   Signed by: Artur Santos 3/15/2024 11:33 AM Dictation workstation:   [id number]    XR tibia fibula left 2 views    Result Date: 3/15/2024  Interpreted By:  Artur Santos, STUDY: XR TIBIA FIBULA LEFT 2 VIEWS;  3/15/2024 10:36 am   INDICATION: Signs/Symptoms:fall, previous below-knee amputation, pain at left stump.   COMPARISON: 06/23/2022.   ACCESSION NUMBER(S): [id number]   ORDERING CLINICIAN: LO SHAW   FINDINGS: Post below-knee amputation changes. No acute fracture or dislocation identified. Tiny patellar osteophytes. Small knee joint effusion suspected. Tiny metallic foreign bodies lateral to the knee joint are still present. Osteopenia.   Atherosclerosis.       Intact left tibia and fibula, post below-knee amputation.   MACRO: None   Signed by: Artur Santos 3/15/2024 11:33 AM Dictation workstation:   [id number]       RADIOLOGY:     Bedside Peripheral IV Imaging   Final Result      Lower extremity venous duplex right   Final Result   .  There is some nonacute since thrombus chronic thrombus in the   right popliteal vein. Otherwise negative exam.        MACRO:   None        Signed by: Joseph Schoenberger 3/18/2024 2:37 PM   Dictation workstation:   [id number]      XR chest 1 view   Final Result   1.  Satisfactory appearance post right thoracentesis. See discussion   above.                  MACRO:   None        Signed by: Joseph Schoenberger 3/18/2024 10:56 AM   Dictation workstation:   [id number]      US thoracentesis   Final Result   Successful ultrasound-guided  diagnostic and therapeutic thoracentesis             MACRO:   None        Signed by: Joseph Schoenberger 3/18/2024 10:58 AM   Dictation workstation:   [id number]      US abdomen complete   Final Result   Small to moderate volume of peritoneal fluid.        Cirrhosis.             MACRO:   None        Signed by: Joseph Schoenberger 3/18/2024 10:46 AM   Dictation workstation:   [id number]      XR chest 1 view   Final Result   Large right pleural effusion.        MACRO:   None        Signed by: Artur Santos 3/15/2024 11:33 AM   Dictation workstation:   [id number]      XR tibia fibula left 2 views   Final Result   Intact left tibia and fibula, post below-knee amputation.        MACRO:   None        Signed by: Artur Santos 3/15/2024 11:33 AM   Dictation workstation:   [id number]      Transthoracic Echo (TTE) Complete    (Results Pending)       PROBLEM LIST     Patient Active Problem List   Diagnosis    Status post below-knee amputation of left lower extremity (CMS/HCC)    PAD (peripheral artery disease) (CMS/[facility])    ACC/AHA stage C acute systolic heart failure (CMS/HCC)    Coronary artery disease involving native coronary artery without angina pectoris    Mixed hyperlipidemia    Thyroid mass    Tobacco dependence with current use    PIPPA (acute kidney injury) (CMS/HCC)    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    Chronic congestive heart failure (CMS/HCC)    GERD (gastroesophageal reflux disease)    Dyspepsia    Critical lower limb ischemia (CMS/HCC)    Chronic obstructive pulmonary disease, unspecified (CMS/HCC)    Primary hypertension    PTSD (post-traumatic stress disorder)    JONA (generalized anxiety disorder)    Severe episode of recurrent major depressive disorder, without psychotic features (CMS/HCC)    Type 2 diabetes mellitus (CMS/HCC)    COPD exacerbation (CMS/HCC)    NSTEMI (non-ST elevated myocardial infarction) (CMS/HCC)    Elevated troponin    COPD (chronic obstructive  pulmonary disease) (CMS/[facility])    Acute on chronic combined systolic (congestive) and diastolic (congestive) heart failure (CMS/[facility])       ASSESSMENT:     1.  Acute on chronic systolic congestive heart failure.  Documented to have severe LV dysfunction in the past with estimated LV ejection fraction of 25 to 30%.  Patient has been managed with various medications including Toprol-XL, Jardiance, Aldactone, and diuretics.  He has not been felt to be a good candidate for Entresto.  He has been noncompliant with taking medications and going to some of his follow-up visits.     2.  Atherosclerotic heart disease with underlying ischemic cardiomyopathy.  Cardiac catheterization in 2021 showing total occlusion of the circumflex and severe distal LAD disease.     3.  Large right pleural effusion.     4.  Hypokalemia.     5.  Severe peripheral vascular disease.  Status post left below-knee amputation.  Status post femorofemoral bypass July 2019 along with additional angioplasty procedures.  Currently with leukocytosis and possible infection of the left BKA stump.     6.  Chronic kidney disease.     7.  Insulin-dependent diabetes mellitus.     8.  Primary hypertension.     9.  COPD related to longstanding tobacco abuse.     10.  Mixed hyperlipidemia.    PLAN:     Continue Toprol-XL.    Started on Aldactone and IV furosemide by Dr. Williamson.     Jardiance was previously discontinued.    Echocardiogram has been ordered and will be reviewed once completed tomorrow.    Patient is not a good candidate for ICD implant secondary to ongoing infection.  In addition he stated during this admission that his preference would be to be euthanized so that he could leave this earth.      He wants his diuretics to be managed by nephrology as he is most concerned about his kidneys.    He will follow-up with [facility] cardiology upon discharge.  No new cardiac recommendations.    3/18/23  Tele monitoring  Refused echo  Nephrology  for  management for diuretics  S/p thoracentesis took off approximately 1.5 L from the right side    3/19/23  Tele monitoring  Currently on IV Lasix drip  Nephrology management of diuretic  Daily EKGs  Check magnesium level in a.m.    Electronically signed by SHANDA Chau-CNP, on 3/19/2024 at 11:16 AM

## 2024-03-19 NOTE — PROGRESS NOTES
Occupational Therapy    Evaluation    Patient Name: Hitesh Jurado  MRN: [id number]  Today's Date: 3/19/2024  Time Calculation  Start Time: 1106  Stop Time: 1134  Time Calculation (min): 28 min    Assessment  IP OT Assessment  End of Session Communication: Bedside nurse (Pt. states that he wants to be close to R EOB to sit up to urinate, however pt. unsafely close to EOB, and not safe to transition sup<>sit  without staff.  Continued below:)  End of Session Patient Position: Bed, 3 rail up, Alarm off, not on at start of session (Pt. very near R edge of bed.  Pt. declined repositioning to center of bed, R bedrail as well as bed alarm.  Pt. educated repeatedly regarding safety concerns, but continued to decline safety measures. RN notified.)  Plan:  Treatment Interventions: ADL retraining, Functional transfer training, UE strengthening/ROM, Endurance training, Patient/family training, Equipment evaluation/education, Compensatory technique education  OT Frequency: 2 times per week  OT Discharge Recommendations: Moderate intensity level of continued care  OT - OK to Discharge: Yes (when medically appropriate)    Subjective   Current Problem:  1. Acute on chronic combined systolic (congestive) and diastolic (congestive) heart failure (CMS/HCC)  CANCELED: Transthoracic Echo (TTE) Complete    CANCELED: Transthoracic Echo (TTE) Complete      2. Fall, initial encounter        3. Hypokalemia        4. Hypoglycemia        5. Acute on chronic congestive heart failure, unspecified heart failure type (CMS/HCC)  Referral to Healthy at Home Program      6. PIPPA (acute kidney injury) (CMS/HCC)        7. Pleural effusion on right        8. Critical lower limb ischemia (CMS/HCC)  Lower extremity venous duplex right    Lower extremity venous duplex right      9. Chronic systolic congestive heart failure (CMS/HCC)  Referral to Healthy at Home Program      10. Chronic obstructive pulmonary disease, unspecified COPD type (CMS/HCC)   Referral to Healthy at Home Program      11. COPD exacerbation (CMS/[facility])  Referral to Healthy at Home Program      12. PAD (peripheral artery disease) (CMS/[facility])  Vascular US ankle brachial index (MARILIA) without exercise    Vascular US ankle brachial index (MARILIA) without exercise        General:  General  Reason for Referral: ADL  Referred By: Sharath  Past Medical History Relevant to Rehab: Alcoholic cirrhosis, CHF with reduced EF 15-20%, PVD, L femorofemoral bypass, RLE angioplasty, DM, HTN, HLD, tobacco use, kidney disease, L BKA, chronic R foot and L residual limb wounds, PVD, infected LE ulcers, anemia, cardiac cath, bilat. PNA, thoracentesis on 3/18, 1.5L, 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, PTSD, generalized anxiety disorder, recurrent major depressive disorder  Co-Treatment: PT  Prior to Session Communication: Bedside nurse  Patient Position Received: Bed, 3 rail up, Alarm off, not on at start of session (Pt. refuses bed alarm)  General Comment: To ED 3/15 after falling.  Admitted for fall, reports significant weight gain and feeling bloated, CHF exacerbation, acute hypoxic respiratory failure, large R sided pleural effusion, decompensated liver cirrhosis with large ascites, possible hepatorenal syndrome, PIPPA on CKD, anemia.  Paracentesis 3/19 with 6.3L removed.  Thoracentesis on 3/18 with 1.5L removed. Hgb 8.5.  Precautions:  Medical Precautions: Fall precautions  Precautions Comment: L BKA, bilat. LE wounds  Vital Signs:     Pain:  Pain Assessment  Pain Assessment: 0-10  Pain Score: 0 - No pain    Objective   Cognition:  Overall Cognitive Status: Within Functional Limits  Orientation Level: Oriented X4  Safety/Judgement: Exceptions to WFL (Refuses/does not follow safety recommendations)           Home Living:  Home Living Comments: Lives alone.  No steps to enter.  1st floor set up.  Walk in shower with seat and safety bar.  Uses manual wheelchair and propels with UEs.  Uses  power scooter to go to grocery store.  Pt. completes squat pivot transfers on his own. Pt. reports recent increasing difficulty with transfers. Independent with ADLs and IADLs.  Has a LLE prosthesis, but has never been able to wear it.  2 recent falls.   Prior Function:     IADL History:     ADL:  Eating Assistance: Independent  Grooming Deficit: Setup  Bathing Assistance: Moderate  UE Dressing Assistance: Moderate  LE Dressing Assistance: Maximal  Toileting Assistance with Device: Maximal  Activity Tolerance:  Activity Tolerance Comments: impaired, fatigues easily  Bed Mobility/Transfers: Bed Mobility  Bed Mobility:  (Mod assist sit to sup, sup to sit and scooting at EOB. Cues for all.)    Transfers  Transfer:  (Pt. declined transfer attempt due to feeling too weak)      Functional Mobility:     Sitting Balance:  Static Sitting Balance  Static Sitting-Comment/Number of Minutes: Good (-)  Dynamic Sitting Balance  Dynamic Sitting-Comments: Fair (+)    Strength:  Strength Comments: Bilat. UE strength 4/5.       Extremities: RUE   RUE :  (AROM WFL) and LUE   LUE:  (AROM WFL)    Outcome Measures: [facility] Daily Activity  Putting on and taking off regular lower body clothing: A lot  Bathing (including washing, rinsing, drying): A lot  Putting on and taking off regular upper body clothing: A lot  Toileting, which includes using toilet, bedpan or urinal: A lot  Taking care of personal grooming such as brushing teeth: A little  Eating Meals: None  Daily Activity - Total Score: 15      Education Documentation  ADL Training, taught by Sara Bates OT at 3/19/2024  4:45 PM.  Learner: Patient  Readiness: Nonacceptance  Method: Explanation  Response: Refused Teaching  Comment: Pt. declines to adhere to safety recommendations    Education Comments  Educated on technique for bed mobility, scooting R hip back while seated EOB to avoid sitting too close to edge, due to safety concerns, scooting to center of bed in supine to  avoid falling off of EOB, not sitting up at EOB to urinate without staff assistance, not keeping pillows directly under knees.  Recommended keeping R bed rail up.  Pt. Resistant to all education provided and declined to follow safety recommendations.     Goals:   Encounter Problems       Encounter Problems (Active)       OT Goals       Min assist bed mobility.        Start:  03/19/24    Expected End:  04/02/24            Mod assist squat pivot transfers.        Start:  03/19/24    Expected End:  04/02/24            Mod assist LB dressing.        Start:  03/19/24    Expected End:  04/02/24            Independent with UE strengthening HEP to maintain UE strength for functional mobility and ADL, considering BKA and LE wounds.        Start:  03/19/24    Expected End:  04/02/24            Good dynamic sitting balance for ADL.        Start:  03/19/24    Expected End:  04/02/24

## 2024-03-19 NOTE — POST-PROCEDURE NOTE
Interventional Radiology Brief Postprocedure Note    Attending: Schoenberger    Assistant: None    Diagnosis: Ascites    Description of procedure: US Paracentesis     Anesthesia:  Local    Complications: None    Estimated Blood Loss: none    Medications  As of 03/19/24 1427      potassium chloride (Klor-Con) packet 20 mEq (mEq) Total dose:  160 mEq* Dosing weight:  68   *Administration not included in total     Date/Time Rate/Dose/Volume Action       03/15/24  1029 20 mEq Given      1445 20 mEq Given      2000 20 mEq Given     03/16/24  0819 20 mEq Given      2016 20 mEq Given     03/17/24  1019 20 mEq Given      2100 *20 mEq Missed     03/18/24  1112 20 mEq Given      2037 20 mEq Given     03/19/24  0820 *20 mEq Missed               potassium chloride 20 mEq in 100 mL IV premix (mL/hr) Total volume:  Not documented* Dosing weight:  68   *Total volume has not been documented. View each administration to see the amount administered.     Date/Time Rate/Dose/Volume Action       03/15/24  1028 20 mEq - 50 mL/hr (over 120 min) New Bag      1209  (over 120 min) Stopped      1444 20 mEq - 50 mL/hr (over 120 min) New Bag      1644  (over 120 min) Stopped               dextrose 50 % injection 12.5 g (g) Total dose:  12.5 g Dosing weight:  68      Date/Time Rate/Dose/Volume Action       03/15/24  1201 12.5 g Given               cefepime (Maxipime) in dextrose 5 % water (D5W) 100 mL IV 2 g (mL/hr) Total volume:  Not documented* Dosing weight:  68   *Total volume has not been documented. View each administration to see the amount administered.     Date/Time Rate/Dose/Volume Action       03/15/24  1210 2 g - 200 mL/hr (over 30 min) New Bag      1240  (over 30 min) Stopped               cefepime (Maxipime) in dextrose 5 % water (D5W) 100 mL IV 2 g (mL/hr) Total dose:  8 g* Dosing weight:  68   *From user-documented volume     Date/Time Rate/Dose/Volume Action       03/16/24  1137 2 g - 200 mL/hr (over 30 min) New Bag      1207   (over 30 min) Stopped     03/17/24  1248 2 g - 200 mL/hr (over 30 min) New Bag      1318  (over 30 min) Stopped     03/18/24  1122 2 g - 200 mL/hr (over 30 min) New Bag      1152 300 mL Stopped     03/19/24  1205 2 g - 200 mL/hr (over 30 min) New Bag      1235 100 mL Stopped               vancomycin (Vancocin) in dextrose 5 % water (D5W) 500 mL IV 1.5 g (g) Total dose:  0 g* Dosing weight:  68   *Administration not included in total     Date/Time Rate/Dose/Volume Action       03/15/24  1055 *1.5 g - 333.3 mL/hr (over 90 min) Missed               metroNIDAZOLE (Flagyl) 500 mg in NaCl (iso-os) 100 mL (mg) Total dose:  500 mg Dosing weight:  68      Date/Time Rate/Dose/Volume Action       03/15/24  1248 500 mg (over 60 min) New Bag      1348  (over 60 min) Stopped               enoxaparin (Lovenox) syringe 40 mg (mg) Total dose:  80 mg* Dosing weight:  68   *Administration not included in total     Date/Time Rate/Dose/Volume Action       03/15/24  1530 *40 mg Missed     03/16/24  1449 40 mg Given     03/17/24  1428 40 mg Given     03/18/24  1530 *40 mg Missed               pantoprazole (ProtoNix) EC tablet 40 mg (mg) Total dose:  160 mg Dosing weight:  68      Date/Time Rate/Dose/Volume Action       03/16/24  0617 40 mg Given     03/17/24  0615 40 mg Given     03/18/24  0644 40 mg Given     03/19/24  0600 40 mg Given               pantoprazole (ProtoNix) injection 40 mg (mg) Total dose:  Cannot be calculated* Dosing weight:  68   *Administration dose not documented     Date/Time Rate/Dose/Volume Action       03/16/24  0617 *Not included in total See Alternative     03/17/24  0615 *Not included in total See Alternative     03/18/24  0644 *Not included in total See Alternative     03/19/24  0600 *Not included in total See Alternative               acetaminophen (Tylenol) tablet 650 mg (mg) Total dose:  0 mg* Dosing weight:  68   *Administration not included in total     Date/Time Rate/Dose/Volume Action       03/17/24   2000 *650 mg Missed               acetaminophen (Tylenol) oral liquid 650 mg (mg) Total dose:  Cannot be calculated* Dosing weight:  68   *Administration dose not documented     Date/Time Rate/Dose/Volume Action       03/17/24 2000 *Not included in total See Alternative               acetaminophen (Tylenol) suppository 650 mg (mg) Total dose:  Cannot be calculated* Dosing weight:  68   *Administration dose not documented     Date/Time Rate/Dose/Volume Action       03/17/24 2000 *Not included in total See Alternative               melatonin tablet 3 mg (mg) Total dose:  12 mg Dosing weight:  68      Date/Time Rate/Dose/Volume Action       03/15/24  1959 3 mg Given     03/16/24 2016 3 mg Given     03/17/24 2007 3 mg Given     03/18/24 2037 3 mg Given               docusate sodium (Colace) capsule 100 mg (mg) Total dose:  800 mg Dosing weight:  68      Date/Time Rate/Dose/Volume Action       03/15/24  2000 100 mg Given     03/16/24  0819 100 mg Given      2016 100 mg Given     03/17/24  1014 100 mg Given      2007 100 mg Given     03/18/24  1112 100 mg Given      2037 100 mg Given     03/19/24  0819 100 mg Given               furosemide (Lasix) injection 40 mg (mg) Total dose:  80 mg* Dosing weight:  68   *Administration not included in total     Date/Time Rate/Dose/Volume Action       03/15/24  1530 *40 mg Missed     03/16/24  0820 *40 mg Missed      1627 40 mg Given     03/17/24  1014 40 mg Given               vancomycin (Vancocin) 1,000 mg in dextrose 5% water 200 mL (mL/hr) Total volume:  Not documented* Dosing weight:  68   *Total volume has not been documented. View each administration to see the amount administered.     Date/Time Rate/Dose/Volume Action       03/15/24  2100 *1,000 mg - 200 mL/hr (over 60 min) Missed     03/16/24  2100 *1,000 mg - 200 mL/hr (over 60 min) Missed     03/17/24  1013 1,000 mg - 200 mL/hr (over 60 min) New Bag      1113  (over 60 min) Stopped               metFORMIN (Glucophage)  tablet 500 mg (mg) Total dose:  1,500 mg*   *Administration not included in total     Date/Time Rate/Dose/Volume Action       03/16/24  0819 500 mg Given     03/17/24  1013 500 mg Given     03/18/24  1112 500 mg Given     03/19/24  0803 *500 mg Missed               methIMAzole (Tapazole) tablet 5 mg (mg) Total dose:  20 mg Dosing weight:  68      Date/Time Rate/Dose/Volume Action       03/16/24  0825 5 mg Given     03/17/24  1014 5 mg Given     03/18/24  1112 5 mg Given     03/19/24  0823 5 mg Given               metoprolol succinate XL (Toprol-XL) 24 hr tablet 25 mg (mg) Total dose:  75 mg* Dosing weight:  68   *Administration not included in total     Date/Time Rate/Dose/Volume Action       03/16/24  0819 25 mg Given     03/17/24  0900 *25 mg Missed     03/18/24  1112 25 mg Given     03/19/24  0819 25 mg Given               spironolactone (Aldactone) tablet 25 mg (mg) Total dose:  100 mg Dosing weight:  68      Date/Time Rate/Dose/Volume Action       03/16/24  0820 25 mg Given     03/17/24  1019 25 mg Given     03/18/24  1112 25 mg Given     03/19/24  0819 25 mg Given               HYDROmorphone (Dilaudid) injection 0.4 mg (mg) Total dose:  0.4 mg Dosing weight:  68      Date/Time Rate/Dose/Volume Action       03/17/24  0341 0.4 mg Given               lidocaine 4 % patch 1 patch (patch) Total dose:  4 patch Dosing weight:  68      Date/Time Rate/Dose/Volume Action       03/17/24  0341 1 patch (over 720 min) Medication Applied      1541  (over 720 min) Medication Removed      2027 1 patch (over 720 min) Medication Applied     03/18/24  1112  (over 720 min) Medication Removed      1123 1 patch (over 720 min) Medication Applied      2323  (over 720 min) Medication Removed     03/19/24  0818 1 patch (over 720 min) Medication Applied               furosemide (Lasix) 500 mg in 50 mL (10 mg/mL) infusion (mg/hr) Total dose:  Cannot be calculated* Dosing weight:  68   *Total user-documented volume 310.87 mL may contain  volume from other administrations     Date/Time Rate/Dose/Volume Action       03/17/24  1428 10 mg/hr - 1 mL/hr New Bag     03/18/24  0447 10 mg/hr - 1 mL/hr New Bag      1545  Stopped               furosemide (Lasix) 500 mg in 50 mL (10 mg/mL) infusion (mg/hr) Total dose:  Cannot be calculated* Dosing weight:  68   *Total user-documented volume 310.87 mL may contain volume from other administrations     Date/Time Rate/Dose/Volume Action       03/18/24  1557 15 mg/hr - 1.5 mL/hr Rate Change               metOLazone (Zaroxolyn) tablet 5 mg (mg) Total dose:  15 mg Dosing weight:  68      Date/Time Rate/Dose/Volume Action       03/17/24  1716 5 mg Given     03/18/24  1112 5 mg Given     03/19/24  0819 5 mg Given               insulin lispro (HumaLOG) injection 0-10 Units (Units) Total dose:  50 Units Dosing weight:  68      Date/Time Rate/Dose/Volume Action       03/17/24  1716 10 Units Given      2106 10 Units Given     03/18/24  0851 4 Units Given      1126 2 Units Given      1746 10 Units Given      2037 10 Units Given     03/19/24  0821 *Not included in total Missed      1110 4 Units Given               vancomycin in dextrose 5 % (Vancocin) IVPB 750 mg (mL/hr) Total dose:  1,500 mg* Dosing weight:  68   *From user-documented volume     Date/Time Rate/Dose/Volume Action       03/18/24  1301 750 mg - 200 mL/hr (over 45 min) New Bag      1346 150 mL Stopped     03/19/24  1042 750 mg - 200 mL/hr (over 45 min) New Bag      1127 150 mL Stopped               buffered lidocaine 1% injection (mL) Total volume:  5 mL      Date/Time Rate/Dose/Volume Action       03/18/24  1014 5 mL Given               morphine injection 2 mg (mg) Total dose:  2 mg Dosing weight:  68      Date/Time Rate/Dose/Volume Action       03/18/24  2222 2 mg Given               lidocaine (Xylocaine) 10 mg/mL (1 %) injection 50 mg (mL) Total volume:  0 mL* Dosing weight:  68   *Administration not included in total     Date/Time Rate/Dose/Volume Action        03/19/24  1015 *50 mg Missed               albumin human 25 % solution 62.5 g (mL/hr) Total volume:  Not documented* Dosing weight:  68   *Total volume has not been documented. View each administration to see the amount administered.     Date/Time Rate/Dose/Volume Action       03/19/24  1248 62.5 g - 250 mL/hr (over 60 min) New Bag      1348  (over 60 min) Stopped               lidocaine PF (Xylocaine) 10 mg/mL (1 %) injection (mL) Total volume:  4 mL      Date/Time Rate/Dose/Volume Action       03/19/24  1422 4 mL Given                   No specimens collected      See detailed result report with images in PACS.    The patient tolerated the procedure well without incident or complication and is in stable condition.

## 2024-03-19 NOTE — PROGRESS NOTES
Hitesh Jurado is a 53 y.o. male on day 3 of admission presenting with Acute on chronic combined systolic (congestive) and diastolic (congestive) heart failure (CMS/HCC).    Hitesh Jurado is a 53 y.o. male on day 3 of admission presenting with Acute on chronic combined systolic (congestive) and diastolic (congestive) heart failure (CMS/HCC).      Subjective   patient in no acute distress.  Reports no worsening shortness of breath or chest pain at this point in time.  Tolerating diuresis well         Objective   Last Recorded Vitals  /80 (BP Location: Left arm, Patient Position: Lying)   Pulse 101   Temp 36.4 °C (97.5 °F) (Temporal)   Resp 22   Wt 68 kg (150 lb)   SpO2 98%   Intake/Output last 3 Shifts:     Intake/Output Summary (Last 24 hours) at 3/18/2024 1844  Last data filed at 3/18/2024 1700      Gross per 24 hour   Intake 960.87 ml   Output 2851 ml   Net -1890.13 ml         Admission Weight  Weight: 68 kg (150 lb) (03/15/24 0843)     Daily Weight  03/15/24 : 68 kg (150 lb)     Image Results  Lower extremity venous duplex right  Narrative: Interpreted By:  Schoenberger, Joseph,   STUDY:  [facility] LOWER EXTREMITY VENOUS DUPLEX RIGHT  3/18/2024 2:08 pm      INDICATION:  52 y/o   M with  Signs/Symptoms:edema and decreased pulses in RLE.  LMP:  Unknown.      COMPARISON:  None.      ACCESSION NUMBER(S):  [id number]      ORDERING CLINICIAN:  COOKIE TAVAREZ      TECHNIQUE:  Routine ultrasound of the  right lower extremity was performed with  duplex Doppler (color and spectral) evaluation.   Static images were  obtained for remote interpretation.      FINDINGS:  THIGH VEINS: There is nonocclusive partially calcified thrombus in  the right popliteal vein consistent with chronic venous thrombus. The  right posterior tibial and peroneal as well as superficial femoral  and common femoral veins compress normally with normal color Doppler  flow..      CALF VEINS:  The paired peroneal and posterior  tibial calf veins are  patent.      Impression: .  There is some nonacute since thrombus chronic thrombus in the  right popliteal vein. Otherwise negative exam.      MACRO:  None      Signed by: Joseph Schoenberger 3/18/2024 2:37 PM  Dictation workstation:   [id number]  US thoracentesis  Narrative: Interpreted By:  Schoenberger, Joseph,   STUDY:  US THORACENTESIS; ;  3/18/2024 10:48 am      INDICATION:  Signs/Symptoms:Dyspnea/to evaluate etiology of right pleural effusion.      COMPARISON:  None.      ACCESSION NUMBER(S):  [id number]      ORDERING CLINICIAN:  TEJINDER CHAVIRA      CONSENT:  The procedure, its indication, its risks, and alternatives were  explained to the patient who understands and consents to the  procedure. A time-out is completed prior to the procedure to confirm  patient identity and procedure to be performed.      MODALITIES:  Ultrasound      TECHNIQUE:  Targeted sonographic evaluation of the lower right chest demonstrates  a large right pleural effusion. Lowest intracostal space with  suitable percutaneous access to this pleural effusion was localized  using ultrasound. This site was marked. The marked site was then  sterilely prepped with chlorhexidine and a sterile barrier drape was  placed. Local anesthetic was administered. A 5 Divehi sheath needle  was advanced until light green fluid was obtained. The sheath was  advanced off the needle cannula to the pleural space. 60 cc of fluid  was then aspirated into a syringe and sent to the laboratory for  studies as ordered by the referring physician. Subsequently  aspiration was performed for therapeutic thoracentesis. After a total  aspiration of 1500 cc, the patient experienced some right-sided chest  pressure. Therefore the procedure was terminated. The patient  tolerated the procedure well otherwise without other immediate  complication PICC      FINDINGS:  See discussion above      Impression: Successful ultrasound-guided diagnostic and  therapeutic thoracentesis          MACRO:  None      Signed by: Joseph Schoenberger 3/18/2024 10:58 AM  Dictation workstation:   [id number]  XR chest 1 view  Narrative: Interpreted By:  Schoenberger, Joseph,   STUDY:  XR CHEST 1 VIEW;  3/18/2024 10:50 am      INDICATION:  Signs/Symptoms:Post Right Thoracentesis.      COMPARISON:  03/15/2024      ACCESSION NUMBER(S):  [id number]      ORDERING CLINICIAN:  JOSEPH SCHOENBERGER      FINDINGS:                  CARDIOMEDIASTINAL SILHOUETTE:  Cardiac silhouette remains somewhat enlarged.      LUNGS:  There is no evidence for right pneumothorax post thoracentesis.  Decreased right pleural fluid. Considerable opacity in the lower  right hemithorax consistent with residual pleural fluid with  underlying basal atelectasis.      ABDOMEN:  No remarkable upper abdominal findings.      BONES:  No acute osseous changes.      Impression: 1.  Satisfactory appearance post right thoracentesis. See discussion  above.              MACRO:  None      Signed by: Joseph Schoenberger 3/18/2024 10:56 AM  Dictation workstation:   [id number]  US abdomen complete  Narrative: Interpreted By:  Schoenberger, Joseph,   STUDY:  US ABDOMEN COMPLETE; ;  3/18/2024 9:49 am      INDICATION:  Signs/Symptoms:Distended abdomen.  Significant alcohol history.  Concern for cirrhosis may need paracentesis.      COMPARISON:  None.      ACCESSION NUMBER(S):  [id number]      ORDERING CLINICIAN:  COOKIE TAVAREZ      TECHNIQUE:  4 quadrant sonographic survey for ascites as well as sonographic  evaluation of the right upper quadrant.      FINDINGS:  The liver is normal in size measuring 17.9 cm in cephalocaudal  dimension. The capsular margins are nodular consistent with  cirrhosis. No definite focal lesion is seen.      There is no dilation of the bile ducts. The extrahepatic common duct  measures 4 mm.      The gallbladder is not well distended and incompletely evaluated.      There is no right hydronephrosis.  The right kidney measures 8.2 cm in  longitudinal dimension.      There is no left hydronephrosis. The left kidney measures 7.3 cm in  longitudinal dimension.      The spleen is not enlarged measuring 6.8 cm in cephalocaudal  dimension. It is sonographically unremarkable.      There is a small to moderate volume of peritoneal fluid      Impression: Small to moderate volume of peritoneal fluid.      Cirrhosis.          MACRO:  None      Signed by: Joseph Schoenberger 3/18/2024 10:46 AM  Dictation workstation:   [id number]        Physical Exam  Constitutional:       General: He is not in acute distress.     Appearance: He is ill-appearing.   HENT:      Head: Normocephalic.      Mouth/Throat:      Mouth: Mucous membranes are dry.   Eyes:      Pupils: Pupils are equal, round, and reactive to light.   Cardiovascular:      Rate and Rhythm: Normal rate and regular rhythm.      Pulses: Normal pulses.      Heart sounds: Normal heart sounds.   Pulmonary:      Effort: Respiratory distress present.      Breath sounds: Wheezing present.   Abdominal:      General: There is distension.      Tenderness: There is abdominal tenderness.   Musculoskeletal:         General: Swelling and tenderness present.   Skin:     General: Skin is warm.      Capillary Refill: Capillary refill takes less than 2 seconds.      Coloration: Skin is pale.   Neurological:      General: No focal deficit present.      Mental Status: He is oriented to person, place, and time. Mental status is at baseline.       Relevant Results            Assessment/Plan   Principal Problem:    Acute on chronic combined systolic (congestive) and diastolic (congestive) heart failure (CMS/HCC)     #Large right-sided pleural effusion  #Hypokalemia  #Leukocytosis in the setting of left BKA wounds concerning for possible infection.   #acute on chronic HFrEF   #microcytic anemia  #Hypoglycemia  - discussed patient with interventional radiology with plan to possibly drain large  right pleural effusion today.  Depends on radiologist schedules and if we can fit patient in if not will have patient possibly drained over the weekend by either pulmonology or in ICU depending on progression.  -Will go ahead and start diuresing patient with Lasix 40 mg twice daily will adjust as needed.  -  Will go ahead and consult cardiology  - will obtain echocardiogram  - Place patient on telemetry and monitor  - continue to monitor replete electrolytes  - send hemoglobin A1c and monitor blood glucose.  Will initiate sliding scale insulin  -   Will continue broad-spectrum antibiotics.  Will have  wound care evaluate patient and consult infectious disease if needed.  -  Will continue to monitor patient's hemoglobin with plan to send iron studies given microcytosis.  - renally dose all medication     VTE Prophylaxis:  Lovenox renally dosed  Diet: Cardiac diet  CODE STATUS: Full code      03/18/24  - thoracentesis done today with drainage of right pleural fluid.   1500 mL  of yellow-green fluid was drained from the right pleural space under ultrasound guidance.  Fluid had some atypical cells but was mainly   acute inflammatory cells with  reactive  mesothelial cells in clusters  - abdominal ultrasound was done which showed cirrhotic liver as well as small to moderate ascites.  plan for possible paracentesis tomorrow.  -  Nephrology continues to follow.  patient is on Lasix drip with rate at 50 mg/h and also on 5 mg of metolazone daily.  -    Patient continues to interfere with his own care  by refusing medication as well as tests that are vital to treatment and diagnosis.  This has been explained to patient on multiple occasions.   -  today patient refused echocardiogram.  Will continue to monitor.     Huy Ricci MD

## 2024-03-20 ENCOUNTER — APPOINTMENT (OUTPATIENT)
Dept: CARDIOLOGY | Facility: HOSPITAL | Age: 54
End: 2024-03-20
Payer: MEDICAID

## 2024-03-20 ENCOUNTER — APPOINTMENT (OUTPATIENT)
Dept: RADIOLOGY | Facility: HOSPITAL | Age: 54
End: 2024-03-20
Payer: MEDICAID

## 2024-03-20 LAB
ALBUMIN SERPL BCP-MCNC: 2.8 G/DL (ref 3.4–5)
ALP SERPL-CCNC: 150 U/L (ref 33–120)
ALT SERPL W P-5'-P-CCNC: 21 U/L (ref 10–52)
ANION GAP SERPL CALC-SCNC: 18 MMOL/L (ref 10–20)
AST SERPL W P-5'-P-CCNC: 31 U/L (ref 9–39)
ATRIAL RATE: 88 BPM
BACTERIA BLD AEROBE CULT: ABNORMAL
BACTERIA BLD CULT: ABNORMAL
BILIRUB SERPL-MCNC: 1.9 MG/DL (ref 0–1.2)
BUN SERPL-MCNC: 77 MG/DL (ref 6–23)
CALCIUM SERPL-MCNC: 8.1 MG/DL (ref 8.6–10.3)
CERULOPLASMIN SERPL-MCNC: 30.2 MG/DL (ref 20–60)
CHLORIDE SERPL-SCNC: 90 MMOL/L (ref 98–107)
CO2 SERPL-SCNC: 24 MMOL/L (ref 21–32)
CREAT SERPL-MCNC: 2.49 MG/DL (ref 0.5–1.3)
EGFRCR SERPLBLD CKD-EPI 2021: 30 ML/MIN/1.73M*2
ERYTHROCYTE [DISTWIDTH] IN BLOOD BY AUTOMATED COUNT: 18.5 % (ref 11.5–14.5)
GLUCOSE BLD MANUAL STRIP-MCNC: 311 MG/DL (ref 74–99)
GLUCOSE BLD MANUAL STRIP-MCNC: 355 MG/DL (ref 74–99)
GLUCOSE BLD MANUAL STRIP-MCNC: 390 MG/DL (ref 74–99)
GLUCOSE BLD MANUAL STRIP-MCNC: 434 MG/DL (ref 74–99)
GLUCOSE SERPL-MCNC: 386 MG/DL (ref 74–99)
GRAM STN SPEC: ABNORMAL
HAV IGM SER QL: NONREACTIVE
HBV CORE IGM SER QL: NONREACTIVE
HBV SURFACE AG SERPL QL IA: NONREACTIVE
HCT VFR BLD AUTO: 26.9 % (ref 41–52)
HCV AB SER QL: NONREACTIVE
HGB BLD-MCNC: 8.3 G/DL (ref 13.5–17.5)
HOLD SPECIMEN: NORMAL
INR PPP: 1.7 (ref 0.9–1.1)
LABORATORY COMMENT REPORT: NORMAL
LABORATORY COMMENT REPORT: NORMAL
MAGNESIUM SERPL-MCNC: 1.8 MG/DL (ref 1.6–2.4)
MCH RBC QN AUTO: 19.2 PG (ref 26–34)
MCHC RBC AUTO-ENTMCNC: 30.9 G/DL (ref 32–36)
MCV RBC AUTO: 62 FL (ref 80–100)
NRBC BLD-RTO: 0 /100 WBCS (ref 0–0)
P AXIS: 44 DEGREES
P OFFSET: 163 MS
P ONSET: 115 MS
PATH REPORT.FINAL DX SPEC: NORMAL
PATH REPORT.GROSS SPEC: NORMAL
PATH REPORT.RELEVANT HX SPEC: NORMAL
PATH REPORT.TOTAL CANCER: NORMAL
PLATELET # BLD AUTO: 383 X10*3/UL (ref 150–450)
POTASSIUM SERPL-SCNC: 5.2 MMOL/L (ref 3.5–5.3)
PR INTERVAL: 172 MS
PROT SERPL-MCNC: 6 G/DL (ref 6.4–8.2)
PROTHROMBIN TIME: 19.3 SECONDS (ref 9.8–12.8)
Q ONSET: 201 MS
QRS COUNT: 14 BEATS
QRS DURATION: 118 MS
QT INTERVAL: 386 MS
QTC CALCULATION(BAZETT): 467 MS
QTC FREDERICIA: 438 MS
R AXIS: 0 DEGREES
RBC # BLD AUTO: 4.33 X10*6/UL (ref 4.5–5.9)
SODIUM SERPL-SCNC: 127 MMOL/L (ref 136–145)
T AXIS: 31 DEGREES
T OFFSET: 394 MS
VANCOMYCIN SERPL-MCNC: 17.6 UG/ML (ref 5–20)
VENTRICULAR RATE: 88 BPM
WBC # BLD AUTO: 15.4 X10*3/UL (ref 4.4–11.3)

## 2024-03-20 PROCEDURE — 99232 SBSQ HOSP IP/OBS MODERATE 35: CPT | Performed by: HOSPITALIST

## 2024-03-20 PROCEDURE — 99222 1ST HOSP IP/OBS MODERATE 55: CPT | Performed by: NURSE PRACTITIONER

## 2024-03-20 PROCEDURE — 2500000004 HC RX 250 GENERAL PHARMACY W/ HCPCS (ALT 636 FOR OP/ED): Mod: JZ | Performed by: HOSPITALIST

## 2024-03-20 PROCEDURE — 86038 ANTINUCLEAR ANTIBODIES: CPT | Mod: ELYLAB | Performed by: NURSE PRACTITIONER

## 2024-03-20 PROCEDURE — 86015 ACTIN ANTIBODY EACH: CPT | Mod: ELYLAB | Performed by: NURSE PRACTITIONER

## 2024-03-20 PROCEDURE — 93010 ELECTROCARDIOGRAM REPORT: CPT | Performed by: INTERNAL MEDICINE

## 2024-03-20 PROCEDURE — 83735 ASSAY OF MAGNESIUM: CPT | Performed by: NURSE PRACTITIONER

## 2024-03-20 PROCEDURE — 85027 COMPLETE CBC AUTOMATED: CPT | Performed by: STUDENT IN AN ORGANIZED HEALTH CARE EDUCATION/TRAINING PROGRAM

## 2024-03-20 PROCEDURE — 93005 ELECTROCARDIOGRAM TRACING: CPT

## 2024-03-20 PROCEDURE — 80202 ASSAY OF VANCOMYCIN: CPT | Performed by: PHARMACIST

## 2024-03-20 PROCEDURE — 82947 ASSAY GLUCOSE BLOOD QUANT: CPT

## 2024-03-20 PROCEDURE — 2500000004 HC RX 250 GENERAL PHARMACY W/ HCPCS (ALT 636 FOR OP/ED): Performed by: STUDENT IN AN ORGANIZED HEALTH CARE EDUCATION/TRAINING PROGRAM

## 2024-03-20 PROCEDURE — 86381 MITOCHONDRIAL ANTIBODY EACH: CPT | Mod: ELYLAB | Performed by: NURSE PRACTITIONER

## 2024-03-20 PROCEDURE — 82390 ASSAY OF CERULOPLASMIN: CPT | Mod: ELYLAB | Performed by: NURSE PRACTITIONER

## 2024-03-20 PROCEDURE — 93922 UPR/L XTREMITY ART 2 LEVELS: CPT

## 2024-03-20 PROCEDURE — 2500000001 HC RX 250 WO HCPCS SELF ADMINISTERED DRUGS (ALT 637 FOR MEDICARE OP): Performed by: STUDENT IN AN ORGANIZED HEALTH CARE EDUCATION/TRAINING PROGRAM

## 2024-03-20 PROCEDURE — 1210000001 HC SEMI-PRIVATE ROOM DAILY

## 2024-03-20 PROCEDURE — 2500000002 HC RX 250 W HCPCS SELF ADMINISTERED DRUGS (ALT 637 FOR MEDICARE OP, ALT 636 FOR OP/ED): Performed by: HOSPITALIST

## 2024-03-20 PROCEDURE — 93926 LOWER EXTREMITY STUDY: CPT

## 2024-03-20 PROCEDURE — 85610 PROTHROMBIN TIME: CPT | Performed by: HOSPITALIST

## 2024-03-20 PROCEDURE — 36415 COLL VENOUS BLD VENIPUNCTURE: CPT | Performed by: HOSPITALIST

## 2024-03-20 PROCEDURE — 99231 SBSQ HOSP IP/OBS SF/LOW 25: CPT | Performed by: PLASTIC SURGERY

## 2024-03-20 PROCEDURE — P9047 ALBUMIN (HUMAN), 25%, 50ML: HCPCS | Mod: JZ | Performed by: HOSPITALIST

## 2024-03-20 PROCEDURE — 80053 COMPREHEN METABOLIC PANEL: CPT | Performed by: STUDENT IN AN ORGANIZED HEALTH CARE EDUCATION/TRAINING PROGRAM

## 2024-03-20 RX ORDER — INSULIN LISPRO 100 [IU]/ML
0-15 INJECTION, SOLUTION INTRAVENOUS; SUBCUTANEOUS
Status: DISCONTINUED | OUTPATIENT
Start: 2024-03-20 | End: 2024-03-26 | Stop reason: HOSPADM

## 2024-03-20 RX ORDER — INSULIN GLARGINE 100 [IU]/ML
10 INJECTION, SOLUTION SUBCUTANEOUS NIGHTLY
Status: DISCONTINUED | OUTPATIENT
Start: 2024-03-20 | End: 2024-03-22

## 2024-03-20 RX ADMIN — ALBUMIN HUMAN 25 G: 0.25 SOLUTION INTRAVENOUS at 11:55

## 2024-03-20 RX ADMIN — PANTOPRAZOLE SODIUM 40 MG: 40 TABLET, DELAYED RELEASE ORAL at 07:00

## 2024-03-20 RX ADMIN — INSULIN LISPRO 10 UNITS: 100 INJECTION, SOLUTION INTRAVENOUS; SUBCUTANEOUS at 11:51

## 2024-03-20 RX ADMIN — INSULIN LISPRO 15 UNITS: 100 INJECTION, SOLUTION INTRAVENOUS; SUBCUTANEOUS at 17:55

## 2024-03-20 RX ADMIN — DOCUSATE SODIUM 100 MG: 100 CAPSULE, LIQUID FILLED ORAL at 20:51

## 2024-03-20 RX ADMIN — INSULIN GLARGINE 10 UNITS: 100 INJECTION, SOLUTION SUBCUTANEOUS at 20:51

## 2024-03-20 RX ADMIN — CEFEPIME 2 G: 2 INJECTION, POWDER, FOR SOLUTION INTRAVENOUS at 11:53

## 2024-03-20 RX ADMIN — METOLAZONE 5 MG: 5 TABLET ORAL at 09:34

## 2024-03-20 RX ADMIN — FUROSEMIDE 15 MG/HR: 10 INJECTION, SOLUTION INTRAMUSCULAR; INTRAVENOUS at 01:31

## 2024-03-20 RX ADMIN — METHIMAZOLE 5 MG: 5 TABLET ORAL at 09:35

## 2024-03-20 RX ADMIN — INSULIN LISPRO 10 UNITS: 100 INJECTION, SOLUTION INTRAVENOUS; SUBCUTANEOUS at 09:39

## 2024-03-20 RX ADMIN — Medication 3 MG: at 20:51

## 2024-03-20 RX ADMIN — ENOXAPARIN SODIUM 40 MG: 40 INJECTION SUBCUTANEOUS at 17:55

## 2024-03-20 ASSESSMENT — COGNITIVE AND FUNCTIONAL STATUS - GENERAL
DRESSING REGULAR UPPER BODY CLOTHING: A LOT
TURNING FROM BACK TO SIDE WHILE IN FLAT BAD: A LOT
TOILETING: A LOT
DAILY ACTIVITIY SCORE: 15
TURNING FROM BACK TO SIDE WHILE IN FLAT BAD: A LOT
STANDING UP FROM CHAIR USING ARMS: A LOT
CLIMB 3 TO 5 STEPS WITH RAILING: TOTAL
DRESSING REGULAR LOWER BODY CLOTHING: A LOT
MOVING FROM LYING ON BACK TO SITTING ON SIDE OF FLAT BED WITH BEDRAILS: A LOT
TOILETING: A LOT
MOBILITY SCORE: 10
MOVING TO AND FROM BED TO CHAIR: A LOT
HELP NEEDED FOR BATHING: A LOT
PERSONAL GROOMING: A LITTLE
MOVING TO AND FROM BED TO CHAIR: A LOT
HELP NEEDED FOR BATHING: A LOT
DRESSING REGULAR UPPER BODY CLOTHING: A LOT
MOVING FROM LYING ON BACK TO SITTING ON SIDE OF FLAT BED WITH BEDRAILS: A LOT
WALKING IN HOSPITAL ROOM: TOTAL
WALKING IN HOSPITAL ROOM: TOTAL
DAILY ACTIVITIY SCORE: 15
DRESSING REGULAR LOWER BODY CLOTHING: A LOT
MOBILITY SCORE: 10
CLIMB 3 TO 5 STEPS WITH RAILING: TOTAL
STANDING UP FROM CHAIR USING ARMS: A LOT
PERSONAL GROOMING: A LITTLE

## 2024-03-20 ASSESSMENT — PAIN SCALES - GENERAL
PAINLEVEL_OUTOF10: 0 - NO PAIN
PAINLEVEL_OUTOF10: 0 - NO PAIN

## 2024-03-20 NOTE — CARE PLAN
Problem: Fall/Injury  Goal: Not fall by end of shift  Outcome: Progressing  Goal: Be free from injury by end of the shift  Outcome: Progressing  Goal: Verbalize understanding of personal risk factors for fall in the hospital  Outcome: Progressing  Goal: Verbalize understanding of risk factor reduction measures to prevent injury from fall in the home  Outcome: Progressing  Goal: Use assistive devices by end of the shift  Outcome: Progressing  Goal: Pace activities to prevent fatigue by end of the shift  Outcome: Progressing     Problem: Skin  Goal: Decreased wound size/increased tissue granulation at next dressing change  Outcome: Progressing  Flowsheets (Taken 3/19/2024 2001)  Decreased wound size/increased tissue granulation at next dressing change: Promote sleep for wound healing  Goal: Participates in plan/prevention/treatment measures  Outcome: Progressing  Goal: Prevent/manage excess moisture  Outcome: Progressing  Goal: Prevent/minimize sheer/friction injuries  Outcome: Progressing  Goal: Promote/optimize nutrition  Outcome: Progressing  Goal: Promote skin healing  Outcome: Progressing     Problem: Heart Failure  Goal: Improved gas exchange this shift  Outcome: Progressing  Goal: Improved urinary output this shift  Outcome: Progressing  Goal: Reduction in peripheral edema within 24 hours  Outcome: Progressing  Goal: Report improvement of dyspnea/breathlessness this shift  Outcome: Progressing  Goal: Weight from fluid excess reduced over 2-3 days, then stabilize  Outcome: Progressing  Goal: Increase self care and/or family involvement in 24 hours  Outcome: Progressing   The patient's goals for the shift include      The clinical goals for the shift include Patient will remain safe and free of falls this shift

## 2024-03-20 NOTE — PROGRESS NOTES
Met with pt. This am to discuss discharge planning? Snf.  He states he lives alone , was able to care for himself and transfer himself in/out of wheelchair, he now feels weak and unable to transfer self.  List of snf facilities provided and discussed.  He wishes to stay in [facility].  [facility] is requesting [facility]. [facility]. [facility] 3. [facility].  [facility]erral sent to [facility], awaiting response.  Pt. With [employer] medicaid ins..  will need to confirm they are a provider and will require ins. Precert.

## 2024-03-20 NOTE — CARE PLAN
The patient's goals for the shift include  maintaining dressing changes    The clinical goals for the shift include Patient will remain HDS    Over the shift, the patient did not make progress toward the following goals. Barriers to progression include compliance. Recommendations to address these barriers include education.      Problem: Fall/Injury  Goal: Not fall by end of shift  Outcome: Progressing  Goal: Be free from injury by end of the shift  Outcome: Progressing  Goal: Verbalize understanding of personal risk factors for fall in the hospital  Outcome: Progressing  Goal: Verbalize understanding of risk factor reduction measures to prevent injury from fall in the home  Outcome: Progressing  Goal: Use assistive devices by end of the shift  Outcome: Progressing  Goal: Pace activities to prevent fatigue by end of the shift  Outcome: Progressing     Problem: Skin  Goal: Decreased wound size/increased tissue granulation at next dressing change  Outcome: Progressing  Goal: Participates in plan/prevention/treatment measures  Outcome: Progressing  Goal: Prevent/manage excess moisture  Outcome: Progressing  Goal: Prevent/minimize sheer/friction injuries  Outcome: Progressing  Goal: Promote/optimize nutrition  Outcome: Progressing  Goal: Promote skin healing  Outcome: Progressing     Problem: Heart Failure  Goal: Improved gas exchange this shift  Outcome: Progressing  Goal: Improved urinary output this shift  Outcome: Progressing  Goal: Reduction in peripheral edema within 24 hours  Outcome: Progressing  Goal: Report improvement of dyspnea/breathlessness this shift  Outcome: Progressing  Goal: Weight from fluid excess reduced over 2-3 days, then stabilize  Outcome: Progressing  Goal: Increase self care and/or family involvement in 24 hours  Outcome: Progressing

## 2024-03-20 NOTE — PROGRESS NOTES
"Vancomycin Dosing by Pharmacy- FOLLOW UP    Hitesh Jurado is a 53 y.o. year old male who Pharmacy has been consulted for vancomycin dosing for cellulitis, skin and soft tissue. Based on the patient's indication and renal status this patient is being dosed based on a goal AUC of 400-600.     Renal function is currently declining.    Current vancomycin dose: 750 mg given every 24 hours    Estimated vancomycin AUC on current dose: 594 mg/L.hr     Visit Vitals  /56   Pulse 91   Temp 36.1 °C (97 °F)   Resp 16        Lab Results   Component Value Date    CREATININE 2.49 (H) 03/20/2024    CREATININE 2.51 (H) 03/19/2024    CREATININE 2.51 (H) 03/19/2024    CREATININE 2.38 (H) 03/18/2024        Patient weight is No results found for: \"PTWEIGHT\"    No results found for: \"CULTURE\"     I/O last 3 completed shifts:  In: 1025 (15.1 mL/kg) [P.O.:775; IV Piggyback:250]  Out: 38734 (158 mL/kg) [Urine:4450 (1.8 mL/kg/hr); Drains:6300]  Weight: 68 kg   [unfilled]    Lab Results   Component Value Date    PATIENTTEMP  11/01/2023      Comment:      NOTE: Patient Results are Not Corrected for Temperature    PATIENTTEMP 37.0 11/14/2022    PATIENTTEMP 37.0 11/13/2022        Assessment/Plan    Upper end of AUC goal, however renal toxicity is trending around 20%. Will decrease dose to 500 mg iv q 24 hours.  This dosing regimen is predicted by [employer] to result in the following pharmacokinetic parameters:  Loading dose: N/A  Regimen: 500 mg IV every 24 hours.  Start time: 10:42 on 03/20/2024  Exposure target: AUC24 (range)400-600 mg/L.hr   AUC24,ss: 410 mg/L.hr  Probability of AUC24 > 400: 56 %  Ctrough,ss: 13.5 mg/L  Probability of Ctrough,ss > 20: 3 %  Probability of nephrotoxicity (Lodise DAYANARA 2009): 9 %      The next level will be obtained on 3-21-24 at 1st AM dose. May be obtained sooner if clinically indicated.   Will continue to monitor renal function daily while on vancomycin and order serum creatinine at least every " 48 hours if not already ordered.  Follow for continued vancomycin needs, clinical response, and signs/symptoms of toxicity.       Santo Whittington, PharmD

## 2024-03-20 NOTE — PROGRESS NOTES
Nephrology Progress Note    Assessment:  53 y.o. male with history s/f HFrEF, PAD s/p LT BKA, T2DM c/b chronic non-healing wounds on Les who presented for a fall while attempting to transfer.      PIPPA on CKD stage II: likely in setting of CRS, baseline Scr ~1.2 w/ eGFR high 60s, tends to get recurrent AKIs, worsening   Hypokalemia: corrected   Large RT pleural effusion: s/p RT sided thoracentesis 1.5L taken off   Anemia   Hypoalbuminemia   Cirrhosis c/b ascites: s/p paracentesis w/ 6300 ml off (3/19)     Plan:  - function improving, no changes to diuretics today, continue lasix gtt at 15 mg/hr     Subjective:  Admit Date: 3/15/2024    Interval History: had thoracentesis this AM, on lasix gtt started yesterday     Medications:  Scheduled Meds:albumin human, 25 g, intravenous, Once  cefepime, 2 g, intravenous, q24h  docusate sodium, 100 mg, oral, BID  enoxaparin, 40 mg, subcutaneous, q24h  insulin lispro, 0-10 Units, subcutaneous, Before meals & nightly  lidocaine, 5 mL, infiltration, Once  lidocaine, 1 patch, transdermal, Daily  melatonin, 3 mg, oral, Daily  methIMAzole, 5 mg, oral, Daily  metOLazone, 5 mg, oral, Daily  metoprolol succinate XL, 25 mg, oral, Daily  pantoprazole, 40 mg, oral, Daily before breakfast   Or  pantoprazole, 40 mg, intravenous, Daily before breakfast  perflutren lipid microspheres, 0.5-10 mL of dilution, intravenous, Once in imaging  perflutren protein A microsphere, 0.5 mL, intravenous, Once in imaging  [Held by provider] spironolactone, 25 mg, oral, Daily  sulfur hexafluoride microsphr, 2 mL, intravenous, Once in imaging  vancomycin, 500 mg, intravenous, q24h      Continuous Infusions:furosemide, 15 mg/hr, Last Rate: 15 mg/hr (03/20/24 0131)        CBC:   Lab Results   Component Value Date    WBC 15.4 (H) 03/20/2024    RBC 4.33 (L) 03/20/2024    HGB 8.3 (L) 03/20/2024    HCT 26.9 (L) 03/20/2024    MCV 62 (L) 03/20/2024    MCH 19.2 (L) 03/20/2024    MCHC 30.9 (L) 03/20/2024    RDW 18.5 (H)  "03/20/2024     03/20/2024     BMP:    Lab Results   Component Value Date     (L) 03/20/2024    K 5.2 03/20/2024    CL 90 (L) 03/20/2024    CO2 24 03/20/2024    BUN 77 (H) 03/20/2024    CREATININE 2.49 (H) 03/20/2024    CALCIUM 8.1 (L) 03/20/2024    GLUCOSE 386 (H) 03/20/2024       Objective:  Vitals: /56 (BP Location: Left arm, Patient Position: Lying)   Pulse 91   Temp 36.1 °C (97 °F) (Temporal)   Resp 16   Ht 1.753 m (5' 9\")   Wt 68 kg (150 lb)   SpO2 93%   BMI 22.15 kg/m²    Wt Readings from Last 3 Encounters:   03/15/24 68 kg (150 lb)   10/30/23 55.9 kg (123 lb 3.8 oz)   07/21/23 59 kg (130 lb)      24HR INTAKE/OUTPUT:    Intake/Output Summary (Last 24 hours) at 3/20/2024 1143  Last data filed at 3/20/2024 1040  Gross per 24 hour   Intake 875 ml   Output 9900 ml   Net -9025 ml         General: alert, in no apparent distress  HEENT: normocephalic, atraumatic, anicteric  Lungs: non-labored respirations, clear to auscultation bilaterally  Heart: regular rate and rhythm, no murmurs or rubs  Abdomen: soft, non-tender, severely distended   Ext: no cyanosis, +++ RLE peripheral edema, LT BKA  Neuro: alert and oriented, no gross abnormalities        Electronically signed by Malgorzata Williamson MD, MD              "

## 2024-03-20 NOTE — NURSING NOTE
Met with patient for education reinforcement. Discussed CHF, signs and symptoms, and when to call cardiologist. Reinforced the importance of following a Low Sodium Diet and monitoring daily weight, lower leg edema, and shortness of breath. Reviewed importance of taking prescribed medications after discharge. Reinforced importance of following up with cardiologist within a week of discharge. Patient aware he has follow up appointment with Dr. Mayo on 4/18. Reminded patient that they have my contact information should any questions or concerns arise.

## 2024-03-20 NOTE — PROGRESS NOTES
Plastic Surgery Note    Afebrile, vital signs stable  Left below knee amputation wound clean  Right dorsal foot wound clean and granulating  Impression: Open wounds bilateral lower extremity  Continue current dressing changes

## 2024-03-20 NOTE — CARE PLAN
The patient's goals for the shift include      The clinical goals for the shift include Patient will remain safe and free of falls this shift    Problem: Fall/Injury  Goal: Not fall by end of shift  Outcome: Progressing  Goal: Be free from injury by end of the shift  Outcome: Progressing  Goal: Verbalize understanding of personal risk factors for fall in the hospital  Outcome: Progressing  Goal: Verbalize understanding of risk factor reduction measures to prevent injury from fall in the home  Outcome: Progressing  Goal: Use assistive devices by end of the shift  Outcome: Progressing  Goal: Pace activities to prevent fatigue by end of the shift  Outcome: Progressing     Problem: Skin  Goal: Decreased wound size/increased tissue granulation at next dressing change  Outcome: Progressing  Goal: Participates in plan/prevention/treatment measures  Outcome: Progressing  Goal: Prevent/manage excess moisture  Outcome: Progressing  Goal: Prevent/minimize sheer/friction injuries  Outcome: Progressing  Goal: Promote/optimize nutrition  Outcome: Progressing  Goal: Promote skin healing  Outcome: Progressing

## 2024-03-20 NOTE — PROGRESS NOTES
[facility]. Requested completion of gold form as [id number] is needed for [facility] medicaid.  Physician aware

## 2024-03-20 NOTE — PROGRESS NOTES
PROGRESS NOTE    ASSESSMENT AND PLAN:   Mr. Jurado is a 53-year-old male with past medical history of heart failure with reduced EF, peripheral vascular disease status post left BKA, chronic multiple wounds who presented to the emergency room after a with a fall.     Currently admitted for congestive heart failure exacerbation and radiographic evidence of cirrhosis.        Acute hypoxic respiratory failure  Congestive heart failure exacerbation with reduced EF  Large right-sided pleural effusion  -P/W a fall  -Chest x ray on admission showed a large left-sided pleural effusion  -Echocardiogram 10/23: Showed EF of 15 to 20%  -S/P thoracentesis on 3/18, 1.5L  -Currently remains on Lasix drip, creatinine continues to increase  -Hold Aldactone, continue to monitor intake and output  -Discontinue IV potassium tablets     Decompensated liver cirrhosis with large ascites  Possible hepatorenal syndrome  -Patient had an ultrasound which showed cirrhosis  -No history of liver cirrhosis from his [facility] admissions  -Pt admits to years of alcohol use, which is most likely the cause.  -Will obtain a hepatitis panel  -Underwent paracentesis today, 6.3 L removed  -Continue IV albumin  -GI consult.     Acute kidney injury on chronic kidney disease  ATN vs hepatorenal sydnrome  Hyponatremia  -Multifactorial  -Discontinue spironolactone  -Continue to monitor intake and output  -Creatinine improved today, continue Lasix drip.     Microcytic anemia  -Will obtain iron studies     History of peripheral vascular disease  Status post left BKA  Infected lower extremity ulcers  -He has a history of peripheral vascular disease, underwent peripheral angiogram on 11/23 s/p RLE DCB of iliac and recanalization of R SFA on 12/4. Heparin gtt d/vinod and he was started on DAPT.  -Patient's lower extremities are cool to touch, will obtain vascular surgery consult  -Continue Vanco and cefepime, obtain ID consult  -Continue wound care    "  Diabetes mellitus  -Patient is noted to be hyperglycemic, start Lantus 10 units  -Continue sliding scale     History of DVT  -Will need to review his medications to see if patient is on anticoagulation        SUBJECTIVE:   Admit Date: 3/15/2024    Interval History: Feels better today, less out of breath.  Denies chest pain, shortness of breath.      OBJECTIVE:   Vitals: /56 (BP Location: Left arm, Patient Position: Lying)   Pulse 91   Temp 36.1 °C (97 °F) (Temporal)   Resp 16   Ht 1.753 m (5' 9\")   Wt 68 kg (150 lb)   SpO2 93%   BMI 22.15 kg/m²    Wt Readings from Last 3 Encounters:   03/15/24 68 kg (150 lb)   11/01/23 51.7 kg (114 lb)   10/30/23 55.9 kg (123 lb 3.8 oz)      24HR INTAKE/OUTPUT:    Intake/Output Summary (Last 24 hours) at 3/20/2024 1154  Last data filed at 3/20/2024 1040  Gross per 24 hour   Intake 875 ml   Output 9900 ml   Net -9025 ml       PHYSICAL EXAM:   GENERAL: Laying in bed, in mild distress  HEENT: HEAD: Normocephalic atraumatic.  Neck: Supple.  Eyes: Pupils are reactive to direct light.   CVS: S1, S2 heard. Regular rate and rhythm. +JVD  LUNGS: Coarse breath sounds  ABDOMEN: Soft, distended. Positive bowel sounds. No guarding or rebound appreciated.  NEUROLOGICAL: No focal neurological deficits appreciated. Cranial nerves are grossly intact.  EXTREMITIES: Left below-knee amputation.  Left stump erythema appreciated.  Right lower extremity cool to touch with an area on of ulceration appreciated  SKIN:  Grossly intact, warm and dry.    LABS/IMAGING AND MEDICATIONS:   Scheduled Meds:albumin human, 25 g, intravenous, Once  cefepime, 2 g, intravenous, q24h  docusate sodium, 100 mg, oral, BID  enoxaparin, 40 mg, subcutaneous, q24h  insulin glargine, 10 Units, subcutaneous, Nightly  insulin lispro, 0-10 Units, subcutaneous, Before meals & nightly  lidocaine, 5 mL, infiltration, Once  lidocaine, 1 patch, transdermal, Daily  melatonin, 3 mg, oral, Daily  methIMAzole, 5 mg, oral, " "Daily  metOLazone, 5 mg, oral, Daily  metoprolol succinate XL, 25 mg, oral, Daily  pantoprazole, 40 mg, oral, Daily before breakfast   Or  pantoprazole, 40 mg, intravenous, Daily before breakfast  perflutren lipid microspheres, 0.5-10 mL of dilution, intravenous, Once in imaging  perflutren protein A microsphere, 0.5 mL, intravenous, Once in imaging  [Held by provider] spironolactone, 25 mg, oral, Daily  sulfur hexafluoride microsphr, 2 mL, intravenous, Once in imaging  vancomycin, 500 mg, intravenous, q24h      PRN Meds:PRN medications: acetaminophen **OR** acetaminophen **OR** acetaminophen, acetaminophen **OR** acetaminophen **OR** acetaminophen, benzocaine-menthol, dextromethorphan-guaifenesin, dextrose, dextrose, glucagon, guaiFENesin, morphine, ondansetron ODT **OR** ondansetron, oxygen, vancomycin    No lab exists for component: \"CBC\"   No lab exists for component: \"CMP\"   No lab exists for component: \"TROPONIN\"  Results from last 7 days   Lab Units 03/15/24  0905   BNP pg/mL 4,248*     Results from last 7 days   Lab Units 03/20/24  0527   INR  1.7*     No lab exists for component: \"LIPIDS\"       No lab exists for component: \"URINALYSIS\"          BMP:  Results from last 7 days   Lab Units 03/20/24 0528 03/19/24 0422 03/18/24  0433   SODIUM mmol/L 127* 133*  133* 131*   POTASSIUM mmol/L 5.2 4.6  4.6 4.2   CHLORIDE mmol/L 90* 94*  94* 93*   CO2 mmol/L 24 29  29 28   BUN mg/dL 77* 68*  68* 62*   CREATININE mg/dL 2.49* 2.51*  2.51* 2.38*       CBC:  Results from last 7 days   Lab Units 03/20/24 0528 03/19/24 0422 03/18/24  0433   WBC AUTO x10*3/uL 15.4* 14.8* 14.1*   RBC AUTO x10*6/uL 4.33* 4.33* 3.94*   HEMOGLOBIN g/dL 8.3* 8.4* 7.9*   HEMATOCRIT % 26.9* 28.2* 25.6*   MCV fL 62* 65* 65*   MCH pg 19.2* 19.4* 20.1*   MCHC g/dL 30.9* 29.8* 30.9*   RDW % 18.5* 18.5* 18.1*   PLATELETS AUTO x10*3/uL 383 385 368       Cardiac Enzymes:           Hepatic Function Panel:  Results from last 7 days   Lab Units " "03/20/24  0528 03/19/24  0422 03/18/24  0433 03/16/24  0541 03/15/24  0905   ALK PHOS U/L 150* 135* 135*   < > 137*   ALT U/L 21 25 27   < > 38   AST U/L 31 31 33   < > 63*   PROTEIN TOTAL g/dL 6.0* 7.2 7.2   < > 7.5   BILIRUBIN TOTAL mg/dL 1.9* 1.4* 1.3*   < > 1.5*   BILIRUBIN DIRECT mg/dL  --   --   --   --  0.7*    < > = values in this interval not displayed.       Magnesium:  Results from last 7 days   Lab Units 03/20/24 0528   MAGNESIUM mg/dL 1.80       Pro-BNP:  No results found for: \"PROBNP\"    INR:  Results from last 7 days   Lab Units 03/20/24  0527 03/15/24  0905   PROTIME seconds 19.3* 15.1*   INR  1.7* 1.3*       TSH:  No results found for: \"TSH\"    Lipid Profile:        No lab exists for component: \"LABVLDL\"    HgbA1C:        Lactate Level:  No lab exists for component: \"LACTA\"    CMP:  Results from last 7 days   Lab Units 03/20/24 0528 03/19/24 0422 03/18/24  0433   SODIUM mmol/L 127* 133*  133* 131*   POTASSIUM mmol/L 5.2 4.6  4.6 4.2   CHLORIDE mmol/L 90* 94*  94* 93*   CO2 mmol/L 24 29  29 28   BUN mg/dL 77* 68*  68* 62*   CREATININE mg/dL 2.49* 2.51*  2.51* 2.38*   GLUCOSE mg/dL 386* 126*  126* 229*   CALCIUM mg/dL 8.1* 8.4*  8.4* 8.5*   PROTEIN TOTAL g/dL 6.0* 7.2 7.2   BILIRUBIN TOTAL mg/dL 1.9* 1.4* 1.3*   ALK PHOS U/L 150* 135* 135*   AST U/L 31 31 33   ALT U/L 21 25 27       Amylase:        Lipase:        ABG:        No lab exists for component: \"PO2\", \"PCO2\", \"HCO3\", \"BE\", \"O2SAT\"       "

## 2024-03-20 NOTE — NURSING NOTE
"Pt refused his vanco today.  Dr. Vinson and ID made aware.  Wound care nurse dressed his wounds - pt states he doesn't like how the nurse did it and wishes to do it himself refusing my help stating, \"I can do it better than all ya'll.\"  "

## 2024-03-20 NOTE — CONSULTS
"Department of Internal Medicine  Gastroenterology  Consult note      Reason for Consult: New onset cirrhosis on ultrasound with ascites    Chief Complaint: Fall, pain in left BKA stump and hips      History of Present Illness      The patient is a 53 y.o. male with signficant past medical history of HFrEF, PAD  status post left BKA, s/p right to left femorofemoral bypass on ASA and Plavix, diabetes type 2  complicated by chronic nonhealing wounds and lower extremities, HTN, HLD, tobacco use, CKD, history of noncompliance who was presenting after a fall from his wheelchair while attempting to transfer.     Patient is overall not the best informant.  Appears irritable during time of visit answering limited questions stating that we \" always find something wrong.\"    Patient denies any known history of liver disease or cirrhosis.  Patient reports he used to drink approximately 18 pack of beer daily for years, last drink reportedly 2 years ago per patient.  Patient reports significant weight gain and feels bloated with abdominal distention.  No nausea or vomiting.  Currently tolerating a diet.  Patient denies hematemesis, melena or hematochezia.  Patient has never had a paracentesis in the past besides this admission.  Denies any current chest pain fevers or chills.  Does endorse some shortness of breath.      Of note patient was recently at the [facility] within the last couple of weeks where he was found to have hypokalemia and left AMA prior to potassium repletion.    Patient reports he is not interested in any endoscopic evaluation at this time including EGD or colonoscopy.  Patient also currently refusing vancomycin per bedside RN.           Allergies  Amoxicillin    Current Medication    Current Facility-Administered Medications:     acetaminophen (Tylenol) tablet 650 mg, 650 mg, oral, q4h PRN **OR** acetaminophen (Tylenol) oral liquid 650 mg, 650 mg, nasogastric tube, q4h PRN **OR** acetaminophen (Tylenol) " suppository 650 mg, 650 mg, rectal, q4h PRN, Izzy Early MD    acetaminophen (Tylenol) tablet 650 mg, 650 mg, oral, q4h PRN **OR** acetaminophen (Tylenol) oral liquid 650 mg, 650 mg, oral, q4h PRN **OR** acetaminophen (Tylenol) suppository 650 mg, 650 mg, rectal, q4h PRN, Izzy Early MD    albumin human 25 % solution 25 g, 25 g, intravenous, Once, Rachelle Sosa MD    benzocaine-menthol (Cepastat Sore Throat) 15-3.6 mg lozenge 1 lozenge, 1 lozenge, Mouth/Throat, q2h PRN, Izzy Early MD    cefepime (Maxipime) in dextrose 5 % water (D5W) 100 mL IV 2 g, 2 g, intravenous, q24h, Izzy Early MD, Stopped at 03/19/24 1235    dextromethorphan-guaifenesin (Robitussin DM)  mg/5 mL oral liquid 5 mL, 5 mL, oral, q4h PRN, Izzy Early MD    dextrose 50 % injection 12.5 g, 12.5 g, intravenous, q15 min PRN, Izzy Early MD    dextrose 50 % injection 25 g, 25 g, intravenous, q15 min PRN, Izzy Early MD    docusate sodium (Colace) capsule 100 mg, 100 mg, oral, BID, Izzy Early MD, 100 mg at 03/19/24 2043    enoxaparin (Lovenox) syringe 40 mg, 40 mg, subcutaneous, q24h, Izzy Early MD, 40 mg at 03/19/24 1529    furosemide (Lasix) 500 mg in 50 mL (10 mg/mL) infusion, 15 mg/hr, intravenous, Continuous, Malgorzata Williamson MD, Last Rate: 1.5 mL/hr at 03/20/24 0131, 15 mg/hr at 03/20/24 0131    glucagon (Glucagen) injection 1 mg, 1 mg, intramuscular, q15 min PRN, Izzy Early MD    guaiFENesin (Mucinex) 12 hr tablet 600 mg, 600 mg, oral, q12h PRN, Izzy Early MD    insulin lispro (HumaLOG) injection 0-10 Units, 0-10 Units, subcutaneous, Before meals & nightly, SHANDA Diaz-CNP, 10 Units at 03/20/24 0939    lidocaine (Xylocaine) 10 mg/mL (1 %) injection 50 mg, 5 mL, infiltration, Once, Rachelle Sosa MD    lidocaine 4 % patch 1 patch, 1 patch, transdermal, Daily, SHANDA Diaz-CNP, 1 patch at 03/19/24 0818    melatonin tablet 3 mg, 3 mg, oral, Daily,  Izzy Early MD, 3 mg at 03/19/24 2043    methIMAzole (Tapazole) tablet 5 mg, 5 mg, oral, Daily, Perez Lopez MD, 5 mg at 03/20/24 0935    metOLazone (Zaroxolyn) tablet 5 mg, 5 mg, oral, Daily, Malgorzata Williamson MD, 5 mg at 03/20/24 0934    metoprolol succinate XL (Toprol-XL) 24 hr tablet 25 mg, 25 mg, oral, Daily, Perez Lopez MD, 25 mg at 03/19/24 0819    morphine injection 2 mg, 2 mg, intravenous, q3h PRN, Ilda Bello MD, 2 mg at 03/19/24 1529    ondansetron ODT (Zofran-ODT) disintegrating tablet 4 mg, 4 mg, oral, q8h PRN **OR** ondansetron (Zofran) injection 4 mg, 4 mg, intravenous, q8h PRN, Izzy Early MD    oxygen (O2) therapy, , inhalation, PRN, Izzy Early MD    pantoprazole (ProtoNix) EC tablet 40 mg, 40 mg, oral, Daily before breakfast, 40 mg at 03/20/24 0700 **OR** pantoprazole (ProtoNix) injection 40 mg, 40 mg, intravenous, Daily before breakfast, Izzy Early MD    perflutren lipid microspheres (Definity) injection 0.5-10 mL of dilution, 0.5-10 mL of dilution, intravenous, Once in imaging, Izzy Early MD    perflutren protein A microsphere (Optison) injection 0.5 mL, 0.5 mL, intravenous, Once in imaging, Izzy Early MD    [Held by provider] spironolactone (Aldactone) tablet 25 mg, 25 mg, oral, Daily, Perez Lopez MD, 25 mg at 03/19/24 0819    sulfur hexafluoride microsphr (Lumason) injection 24.28 mg, 2 mL, intravenous, Once in imaging, Izzy Early MD    vancomycin (Vancocin) in dextrose 5 % water (D5W) 100 mL  mg, 500 mg, intravenous, q24h, Santo Whittington, PharmD    vancomycin (Vancocin) placeholder, , miscellaneous, Daily PRN, Izzy Early MD    Past Medical History  Active Ambulatory Problems     Diagnosis Date Noted    Status post below-knee amputation of left lower extremity (CMS/HCC) 10/05/2023    PAD (peripheral artery disease) (CMS/HCC) 10/05/2023    ACC/AHA stage C acute systolic heart failure (CMS/HCC) 10/05/2023    Coronary artery  disease involving native coronary artery without angina pectoris 10/05/2023    Mixed hyperlipidemia 10/05/2023    Thyroid mass 10/05/2023    Tobacco dependence with current use 10/05/2023    PIPPA (acute kidney injury) (CMS/HCC) 10/25/2023    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 12/12/2022    Chronic congestive heart failure (CMS/HCC) 03/04/2022    GERD (gastroesophageal reflux disease) 08/12/2021    Dyspepsia 08/13/2021    Critical lower limb ischemia (CMS/HCC) 10/26/2021    Chronic obstructive pulmonary disease, unspecified (CMS/HCC) 08/23/2022    Primary hypertension 05/29/2019    PTSD (post-traumatic stress disorder) 03/10/2023    JONA (generalized anxiety disorder) 03/10/2023    Severe episode of recurrent major depressive disorder, without psychotic features (CMS/HCC) 03/10/2023    Type 2 diabetes mellitus (CMS/HCC) 05/29/2019    COPD exacerbation (CMS/HCC) 10/25/2023    NSTEMI (non-ST elevated myocardial infarction) (CMS/HCC) 10/26/2023    Elevated troponin 10/26/2023    COPD (chronic obstructive pulmonary disease) (CMS/HCC) 11/01/2023     Resolved Ambulatory Problems     Diagnosis Date Noted    No Resolved Ambulatory Problems     Past Medical History:   Diagnosis Date    CHF (congestive heart failure) (CMS/HCC)     Diabetes mellitus (CMS/HCC)        Past Surgical History  left BKA, s/p right to left femorofemoral bypass    Family History  No family history of stomach or colon cancer.  No family history of liver disease    Social History  TOBACCO:  reports that he has been smoking cigarettes. He has been smoking an average of .25 packs per day. He has never used smokeless tobacco.  ETOH: History of abuse for years, 18 pack beer a day, has since quit: last drink about 2 years ago  DRUGS:  reports no history of drug use.  Specifically denies IVDU  MARITAL STATUS:   OCCUPATION:    Review of Systems  All 10 systems reviewed with the patient/family and negative except for what is  "mentioned in HPI      PHYSICAL EXAM  VS: /56 (BP Location: Left arm, Patient Position: Lying)   Pulse 91   Temp 36.1 °C (97 °F) (Temporal)   Resp 16   Ht 1.753 m (5' 9\")   Wt 68 kg (150 lb)   SpO2 93%   BMI 22.15 kg/m²  Body mass index is 22.15 kg/m².  Physical Exam  Vitals reviewed.   Constitutional:       General: He is not in acute distress.     Comments: Appears irritable and anxious   HENT:      Head: Normocephalic.      Nose: Nose normal.      Mouth/Throat:      Mouth: Mucous membranes are moist.      Pharynx: Oropharynx is clear.   Eyes:      Extraocular Movements: Extraocular movements intact.      Conjunctiva/sclera: Conjunctivae normal.      Pupils: Pupils are equal, round, and reactive to light.   Cardiovascular:      Rate and Rhythm: Normal rate and regular rhythm.      Pulses: Normal pulses.      Heart sounds: Normal heart sounds.   Pulmonary:      Effort: Pulmonary effort is normal.      Comments: Decreased breath sounds  Abdominal:      General: Bowel sounds are normal. There is distension.      Tenderness: There is no guarding or rebound.   Musculoskeletal:         General: Normal range of motion.      Cervical back: Normal range of motion.      Comments: Left BKA   Skin:     General: Skin is warm and dry.      Comments: Except Right lower extremity cool to touch with an area on of ulceration    Neurological:      General: No focal deficit present.      Mental Status: He is alert and oriented to person, place, and time.      Comments: No asterixis   Psychiatric:         Behavior: Behavior normal.      Comments: Appears irritable, not willing to answer many questions          DATA  Recent blood work and relevant radiology and endoscopic studies were reviewed and discussed with the patient   Results from last 7 days   Lab Units 03/20/24  0528   WBC AUTO x10*3/uL 15.4*   RBC AUTO x10*6/uL 4.33*   HEMOGLOBIN g/dL 8.3*   HEMATOCRIT % 26.9*   MCV fL 62*   MCHC g/dL 30.9*   RDW % 18.5* "   PLATELETS AUTO x10*3/uL 383       Results from last 72 hours   Lab Units 03/20/24  0528   SODIUM mmol/L 127*   POTASSIUM mmol/L 5.2   CHLORIDE mmol/L 90*   CO2 mmol/L 24   BUN mg/dL 77*   CREATININE mg/dL 2.49*   CALCIUM mg/dL 8.1*   PROTEIN TOTAL g/dL 6.0*   BILIRUBIN TOTAL mg/dL 1.9*   ALK PHOS U/L 150*   AST U/L 31   ALT U/L 21       Results from last 72 hours   Lab Units 03/20/24  0527   INR  1.7*             RADIOLOGY REVIEW  Complete abdominal Ultrasound 3/18/24:  IMPRESSION:  Small to moderate volume of peritoneal fluid.      Cirrhosis. No hepatic lesion    ENDOSCOPIC REVIEW  No prior EGD or colonoscopy in the past        IMPRESSION/RECOMMENDATIONS  The patient is a 53 y.o. male with signficant past medical history of HFrEF, PAD  status post left BKA, s/p right to left femorofemoral bypass on ASA and Plavix, diabetes type 2  complicated by chronic nonhealing wounds and lower extremities, HTN, HLD, tobacco use, CKD, history of noncompliance who was presenting after a fall from his wheelchair while attempting to transfer.     GI consulted 3/20/24 for new cirrhosis noted on ultrasound.      #New diagnosis of cirrhosis on ultrasound possibly 2/2 EtOH.  Decompensated cirrhosis with ascites this admission s/p paracentesis 3/19/2024 with 6.3 L removed.  Discussed with lab regarding adding on ascitic fluid studies.  However, appears only therapeutic paracentesis completed yesterday (not diagnostic) with no fluid sent to the lab in order to add on ascitic fluid labs today to test for SBP. No hepatic encephalopathy.  Low albumin (2.8) and coagulopathy (1.7) consistent with new diagnosis of cirrhosis, but no thrombocytopenia, platelets WNL at 383.  Acute hepatitis panel negative.  Will start liver workup  # Right pleural effusion s/p thoracentesis 3/18/2024 - 1.5L removed. Consider hepatic hydrothorax  # PIPPA on CKD- Nephrology on board  # Microcytic anemia -hemoglobin this admission stable at 8.3 today.  (Hgb 10.1 on  10/25/2023).  No overt GI bleeding.  Patient has never had any scopes, but refuses endoscopic evaluation at this time. Iron studies not necessarily consistent with iron deficiency anemia with ferritin at 131  #History of EtOH abuse-reports drinking about 18 beers a day for years at one point, last drink about 2 years ago per patient  # Cellulitis  # Acute on chronic CHF  # Leukocytosis -prescribed antibiotics.  Currently refusing vancomycin        Plan  -Patient received IV albumin yesterday, 3/19.  Additional IV albumin ordered today per primary team  -on Lasix gtt  -paracentesis completed 3/19/2024. Unable to add on ascitic fluid labs today since no ascitic fluid saved per lab.   -low salt diet  -trend CBC, CMP and INR daily  -Meld-Na score = 29  -HCC screening outpatient with abdominal ultrasound every 6 months  -No indication for emergent/urgent endoscopic evaluation at this time. screen for esophageal varices outpatient with EGD if patient amenable  -monitor mental status for HE  -Continue to encourage alcohol cessation  -Monitor for signs of overt GI bleeding  -On antibiotics, currently on cefepime and Vanco for cellulitis  -Continue supportive care per primary team                    Plan discussed with Dr. Cazares. GI will continue to follow  Total of 75 minutes spent on visit and overall professional care of the patient.  (Electronically signed byHEENA Grier on 3/20/2024 at 10:58 AM)     Inpatient consult to Gastroenterology  Consult performed by: HEENA Grier  Consult ordered by: Rachelle Sosa MD

## 2024-03-21 ENCOUNTER — APPOINTMENT (OUTPATIENT)
Dept: CARDIOLOGY | Facility: HOSPITAL | Age: 54
End: 2024-03-21
Payer: MEDICAID

## 2024-03-21 LAB
ALBUMIN SERPL BCP-MCNC: 3.1 G/DL (ref 3.4–5)
ALP SERPL-CCNC: 154 U/L (ref 33–120)
ALT SERPL W P-5'-P-CCNC: 20 U/L (ref 10–52)
ANA SER QL HEP2 SUBST: NEGATIVE
ANION GAP SERPL CALC-SCNC: 16 MMOL/L (ref 10–20)
AST SERPL W P-5'-P-CCNC: 24 U/L (ref 9–39)
BACTERIA FLD CULT: NORMAL
BILIRUB SERPL-MCNC: 1.4 MG/DL (ref 0–1.2)
BUN SERPL-MCNC: 84 MG/DL (ref 6–23)
CALCIUM SERPL-MCNC: 8.3 MG/DL (ref 8.6–10.3)
CHLORIDE SERPL-SCNC: 88 MMOL/L (ref 98–107)
CO2 SERPL-SCNC: 31 MMOL/L (ref 21–32)
CREAT SERPL-MCNC: 2.44 MG/DL (ref 0.5–1.3)
EGFRCR SERPLBLD CKD-EPI 2021: 31 ML/MIN/1.73M*2
ERYTHROCYTE [DISTWIDTH] IN BLOOD BY AUTOMATED COUNT: 18 % (ref 11.5–14.5)
EST. AVERAGE GLUCOSE BLD GHB EST-MCNC: 212 MG/DL
GLUCOSE BLD MANUAL STRIP-MCNC: 189 MG/DL (ref 74–99)
GLUCOSE BLD MANUAL STRIP-MCNC: 206 MG/DL (ref 74–99)
GLUCOSE BLD MANUAL STRIP-MCNC: 215 MG/DL (ref 74–99)
GLUCOSE BLD MANUAL STRIP-MCNC: 303 MG/DL (ref 74–99)
GLUCOSE SERPL-MCNC: 224 MG/DL (ref 74–99)
GRAM STN SPEC: NORMAL
GRAM STN SPEC: NORMAL
HBA1C MFR BLD: 9 %
HCT VFR BLD AUTO: 23.6 % (ref 41–52)
HGB BLD-MCNC: 7.5 G/DL (ref 13.5–17.5)
HOLD SPECIMEN: NORMAL
MAGNESIUM SERPL-MCNC: 1.87 MG/DL (ref 1.6–2.4)
MCH RBC QN AUTO: 19.4 PG (ref 26–34)
MCHC RBC AUTO-ENTMCNC: 31.8 G/DL (ref 32–36)
MCV RBC AUTO: 61 FL (ref 80–100)
MITOCHONDRIA AB SER QL IF: NEGATIVE
NRBC BLD-RTO: 0 /100 WBCS (ref 0–0)
PLATELET # BLD AUTO: 383 X10*3/UL (ref 150–450)
POTASSIUM SERPL-SCNC: 4.2 MMOL/L (ref 3.5–5.3)
PROT SERPL-MCNC: 6.4 G/DL (ref 6.4–8.2)
RBC # BLD AUTO: 3.87 X10*6/UL (ref 4.5–5.9)
SMOOTH MUSCLE AB SER QL IF: NEGATIVE
SODIUM SERPL-SCNC: 131 MMOL/L (ref 136–145)
VANCOMYCIN SERPL-MCNC: 15.6 UG/ML (ref 5–20)
WBC # BLD AUTO: 11.7 X10*3/UL (ref 4.4–11.3)

## 2024-03-21 PROCEDURE — 2500000001 HC RX 250 WO HCPCS SELF ADMINISTERED DRUGS (ALT 637 FOR MEDICARE OP): Performed by: STUDENT IN AN ORGANIZED HEALTH CARE EDUCATION/TRAINING PROGRAM

## 2024-03-21 PROCEDURE — 82947 ASSAY GLUCOSE BLOOD QUANT: CPT

## 2024-03-21 PROCEDURE — 2500000002 HC RX 250 W HCPCS SELF ADMINISTERED DRUGS (ALT 637 FOR MEDICARE OP, ALT 636 FOR OP/ED): Performed by: HOSPITALIST

## 2024-03-21 PROCEDURE — 2500000004 HC RX 250 GENERAL PHARMACY W/ HCPCS (ALT 636 FOR OP/ED)

## 2024-03-21 PROCEDURE — 93010 ELECTROCARDIOGRAM REPORT: CPT | Performed by: INTERNAL MEDICINE

## 2024-03-21 PROCEDURE — 80053 COMPREHEN METABOLIC PANEL: CPT | Performed by: STUDENT IN AN ORGANIZED HEALTH CARE EDUCATION/TRAINING PROGRAM

## 2024-03-21 PROCEDURE — 99233 SBSQ HOSP IP/OBS HIGH 50: CPT | Performed by: STUDENT IN AN ORGANIZED HEALTH CARE EDUCATION/TRAINING PROGRAM

## 2024-03-21 PROCEDURE — 2500000004 HC RX 250 GENERAL PHARMACY W/ HCPCS (ALT 636 FOR OP/ED): Performed by: STUDENT IN AN ORGANIZED HEALTH CARE EDUCATION/TRAINING PROGRAM

## 2024-03-21 PROCEDURE — 99222 1ST HOSP IP/OBS MODERATE 55: CPT | Performed by: NURSE PRACTITIONER

## 2024-03-21 PROCEDURE — 80202 ASSAY OF VANCOMYCIN: CPT

## 2024-03-21 PROCEDURE — 85027 COMPLETE CBC AUTOMATED: CPT | Performed by: STUDENT IN AN ORGANIZED HEALTH CARE EDUCATION/TRAINING PROGRAM

## 2024-03-21 PROCEDURE — 93005 ELECTROCARDIOGRAM TRACING: CPT

## 2024-03-21 PROCEDURE — 99231 SBSQ HOSP IP/OBS SF/LOW 25: CPT | Performed by: PLASTIC SURGERY

## 2024-03-21 PROCEDURE — 99232 SBSQ HOSP IP/OBS MODERATE 35: CPT | Performed by: NURSE PRACTITIONER

## 2024-03-21 PROCEDURE — 83036 HEMOGLOBIN GLYCOSYLATED A1C: CPT | Mod: ELYLAB | Performed by: HOSPITALIST

## 2024-03-21 PROCEDURE — 83735 ASSAY OF MAGNESIUM: CPT | Performed by: HOSPITALIST

## 2024-03-21 PROCEDURE — 36415 COLL VENOUS BLD VENIPUNCTURE: CPT | Performed by: STUDENT IN AN ORGANIZED HEALTH CARE EDUCATION/TRAINING PROGRAM

## 2024-03-21 PROCEDURE — 87070 CULTURE OTHR SPECIMN AEROBIC: CPT | Mod: ELYLAB | Performed by: INTERNAL MEDICINE

## 2024-03-21 PROCEDURE — 1210000001 HC SEMI-PRIVATE ROOM DAILY

## 2024-03-21 PROCEDURE — 2500000004 HC RX 250 GENERAL PHARMACY W/ HCPCS (ALT 636 FOR OP/ED): Performed by: INTERNAL MEDICINE

## 2024-03-21 RX ORDER — GABAPENTIN 100 MG/1
100 CAPSULE ORAL 3 TIMES DAILY
Start: 2024-03-21 | End: 2024-05-01 | Stop reason: HOSPADM

## 2024-03-21 RX ADMIN — VANCOMYCIN HYDROCHLORIDE 500 MG: 500 INJECTION, POWDER, LYOPHILIZED, FOR SOLUTION INTRAVENOUS at 10:29

## 2024-03-21 RX ADMIN — INSULIN LISPRO 3 UNITS: 100 INJECTION, SOLUTION INTRAVENOUS; SUBCUTANEOUS at 16:57

## 2024-03-21 RX ADMIN — Medication 3 MG: at 20:03

## 2024-03-21 RX ADMIN — DOCUSATE SODIUM 100 MG: 100 CAPSULE, LIQUID FILLED ORAL at 09:09

## 2024-03-21 RX ADMIN — METOPROLOL SUCCINATE 25 MG: 25 TABLET, EXTENDED RELEASE ORAL at 09:09

## 2024-03-21 RX ADMIN — FUROSEMIDE 15 MG/HR: 10 INJECTION, SOLUTION INTRAMUSCULAR; INTRAVENOUS at 10:09

## 2024-03-21 RX ADMIN — ACETAMINOPHEN 650 MG: 325 TABLET ORAL at 20:03

## 2024-03-21 RX ADMIN — DOCUSATE SODIUM 100 MG: 100 CAPSULE, LIQUID FILLED ORAL at 20:03

## 2024-03-21 RX ADMIN — CEFEPIME 2 G: 2 INJECTION, POWDER, FOR SOLUTION INTRAVENOUS at 12:05

## 2024-03-21 RX ADMIN — INSULIN LISPRO 6 UNITS: 100 INJECTION, SOLUTION INTRAVENOUS; SUBCUTANEOUS at 09:00

## 2024-03-21 RX ADMIN — MORPHINE SULFATE 2 MG: 2 INJECTION, SOLUTION INTRAMUSCULAR; INTRAVENOUS at 20:56

## 2024-03-21 RX ADMIN — ENOXAPARIN SODIUM 40 MG: 40 INJECTION SUBCUTANEOUS at 16:36

## 2024-03-21 RX ADMIN — METHIMAZOLE 5 MG: 5 TABLET ORAL at 09:10

## 2024-03-21 RX ADMIN — PANTOPRAZOLE SODIUM 40 MG: 40 TABLET, DELAYED RELEASE ORAL at 06:01

## 2024-03-21 RX ADMIN — INSULIN GLARGINE 10 UNITS: 100 INJECTION, SOLUTION SUBCUTANEOUS at 21:12

## 2024-03-21 RX ADMIN — METOLAZONE 5 MG: 5 TABLET ORAL at 09:09

## 2024-03-21 RX ADMIN — INSULIN LISPRO 6 UNITS: 100 INJECTION, SOLUTION INTRAVENOUS; SUBCUTANEOUS at 11:58

## 2024-03-21 ASSESSMENT — COGNITIVE AND FUNCTIONAL STATUS - GENERAL
CLIMB 3 TO 5 STEPS WITH RAILING: TOTAL
MOVING TO AND FROM BED TO CHAIR: A LOT
TURNING FROM BACK TO SIDE WHILE IN FLAT BAD: A LOT
STANDING UP FROM CHAIR USING ARMS: A LOT
WALKING IN HOSPITAL ROOM: TOTAL
MOVING FROM LYING ON BACK TO SITTING ON SIDE OF FLAT BED WITH BEDRAILS: A LOT
DRESSING REGULAR UPPER BODY CLOTHING: A LOT
DRESSING REGULAR LOWER BODY CLOTHING: A LOT
DAILY ACTIVITIY SCORE: 15
PERSONAL GROOMING: A LITTLE
TOILETING: A LOT
MOBILITY SCORE: 10
HELP NEEDED FOR BATHING: A LOT

## 2024-03-21 ASSESSMENT — PAIN - FUNCTIONAL ASSESSMENT
PAIN_FUNCTIONAL_ASSESSMENT: 0-10
PAIN_FUNCTIONAL_ASSESSMENT: 0-10

## 2024-03-21 ASSESSMENT — ENCOUNTER SYMPTOMS
NAUSEA: 1
COLOR CHANGE: 1
ABDOMINAL PAIN: 1
MYALGIAS: 1
FATIGUE: 1
SHORTNESS OF BREATH: 1
ABDOMINAL DISTENTION: 1

## 2024-03-21 ASSESSMENT — PAIN SCALES - GENERAL
PAINLEVEL_OUTOF10: 10 - WORST POSSIBLE PAIN
PAINLEVEL_OUTOF10: 0 - NO PAIN
PAINLEVEL_OUTOF10: 3

## 2024-03-21 ASSESSMENT — PAIN DESCRIPTION - LOCATION
LOCATION: FOOT
LOCATION: FOOT

## 2024-03-21 NOTE — PROGRESS NOTES
Plastic Surgery Note    Afebrile, vital signs stable  Left below-knee amputation wound and right dorsal foot wound clean  Dressing changes problematic for patient  Impression: Open wound left below-knee amputation and right dorsal foot wound  Continue current dressing changes

## 2024-03-21 NOTE — PROGRESS NOTES
[facility] has complted the 07[address], [employer] informed, awaiting ins. [facility]/ [employer] med[employer].

## 2024-03-21 NOTE — PROGRESS NOTES
Medical Group Progress Note  ASSESSMENT & PLAN:     Acute on chronic sytolic CHF exacerbation   Large right-sided pleural effusion  - Echocardiogram 10/23: Showed EF of 15 to 20%  - S/P thoracentesis on 3/18, 1.5L  - Currently remains on Lasix drip per nephrology  - Hold Aldactone, continue to monitor intake and output     Decompensated liver cirrhosis with large ascites  Possible hepatorenal syndrome  - RUQ ultrasound which showed cirrhosis  - No history of liver cirrhosis from his [facility] admissions (risk factors are history of alcohol abuse)  - Hepatitis painel  - Underwent paracentesis 3/20, 6.3 L removed - albumin replaced  - GI following     Acute kidney injury on chronic kidney disease  ATN vs hepatorenal sydnrome  Hyponatremia  - Multifactorial cause of PIPPA - improving  - Discontinue spironolactone  - Creatinine improved today, continue Lasix drip.  - Nephrology following     Microcytic anemia     History of peripheral vascular disease  Status post left BKA  Infected lower extremity ulcers  - He has a history of peripheral vascular disease, underwent peripheral angiogram on 11/23 s/p RLE DCB of iliac and recanalization of R SFA on 12/4. Heparin gtt d/vinod and he was started on DAPT.  - Vascular surgery following - outpatient follow up with [facility] vascular surgeon  - ID following  - Plastic surgery following  - Continue Vanco and cefepime  - Continue local wound care     Diabetes mellitus  - Patient is noted to be hyperglycemic, start Lantus 10 units  - Continue sliding scale     History of DVT  -Will need to review his medications to see if patient is on anticoagulation    VTE Prophylaxis: Enoxaparin subcutaneous    Disposition/Daily update: Renal function is stabilizing, continue Lasix drip per nephrology.  Respiratory standpoint is also improving, weaning oxygen as tolerated.  Will need SNF on discharge.  Appreciate recommendations from vascular surgery, nephrology, ID, plastic surgery at this  time.        ---Of note, this documentation is completed using the Dragon Dictation system (voice recognition software). There may be spelling and/or grammatical errors that were not corrected prior to final submission.---    Andrea Szymanski MD    SUBJECTIVE     Patient was seen and examined bedside this morning.  He had no acute events overnight.  This morning was resting calmly states that he does continue to urinate quite well with Lasix infusion.  States his breathing is improved after the thoracentesis and paracentesis.    OBJECTIVE:     Last Recorded Vitals:  Vitals:    03/21/24 0038 03/21/24 0740 03/21/24 0909 03/21/24 1318   BP: 118/71 119/67 114/68 101/61   BP Location: Left arm Left arm  Left arm   Patient Position: Sitting Sitting  Sitting   Pulse: 100 103 103 99   Resp: 18 18  18   Temp: 36.8 °C (98.2 °F) 36.9 °C (98.4 °F)  36.5 °C (97.7 °F)   TempSrc: Temporal Temporal  Temporal   SpO2: 92% 95%  95%   Weight:       Height:         Last I/O:  I/O last 3 completed shifts:  In: 741.2 (10.9 mL/kg) [P.O.:650; I.V.:91.2 (1.3 mL/kg)]  Out: 5800 (85.2 mL/kg) [Urine:5800 (2.4 mL/kg/hr)]  Weight: 68 kg     Physical Exam  Vitals reviewed.   Constitutional:       General: He is not in acute distress.     Appearance: Normal appearance. He is normal weight. He is not ill-appearing.   HENT:      Head: Normocephalic and atraumatic.   Eyes:      Extraocular Movements: Extraocular movements intact.      Conjunctiva/sclera: Conjunctivae normal.   Cardiovascular:      Rate and Rhythm: Normal rate and regular rhythm.      Pulses: Normal pulses.      Heart sounds: Normal heart sounds.   Pulmonary:      Effort: Pulmonary effort is normal.      Breath sounds: Normal breath sounds.   Abdominal:      General: Bowel sounds are normal. There is distension.      Palpations: Abdomen is soft.      Tenderness: There is no abdominal tenderness. There is no guarding.      Comments: Abdomen slightly distended however improved.    Musculoskeletal:      Cervical back: Normal range of motion and neck supple.      Comments: Left BKA site not examined with recent dressing change.   Neurological:      General: No focal deficit present.      Mental Status: He is alert and oriented to person, place, and time. Mental status is at baseline.   Psychiatric:         Mood and Affect: Mood normal.         Behavior: Behavior normal.     Inpatient Medications:  cefepime, 2 g, intravenous, q24h  docusate sodium, 100 mg, oral, BID  enoxaparin, 40 mg, subcutaneous, q24h  insulin glargine, 10 Units, subcutaneous, Nightly  insulin lispro, 0-15 Units, subcutaneous, TID with meals  lidocaine, 5 mL, infiltration, Once  lidocaine, 1 patch, transdermal, Daily  melatonin, 3 mg, oral, Daily  methIMAzole, 5 mg, oral, Daily  metOLazone, 5 mg, oral, Daily  metoprolol succinate XL, 25 mg, oral, Daily  pantoprazole, 40 mg, oral, Daily before breakfast   Or  pantoprazole, 40 mg, intravenous, Daily before breakfast  perflutren lipid microspheres, 0.5-10 mL of dilution, intravenous, Once in imaging  perflutren protein A microsphere, 0.5 mL, intravenous, Once in imaging  [Held by provider] spironolactone, 25 mg, oral, Daily  sulfur hexafluoride microsphr, 2 mL, intravenous, Once in imaging  vancomycin, 500 mg, intravenous, q24h    PRN Medications  PRN medications: acetaminophen **OR** acetaminophen **OR** acetaminophen, acetaminophen **OR** acetaminophen **OR** acetaminophen, benzocaine-menthol, dextromethorphan-guaifenesin, dextrose, dextrose, glucagon, guaiFENesin, morphine, ondansetron ODT **OR** ondansetron, oxygen, vancomycin  Continuous Medications:  furosemide, 15 mg/hr, Last Rate: 15 mg/hr (03/21/24 1009)      LABS AND IMAGING:     Labs:  Results from last 7 days   Lab Units 03/21/24  0410 03/20/24  0528 03/19/24  0422   WBC AUTO x10*3/uL 11.7* 15.4* 14.8*   RBC AUTO x10*6/uL 3.87* 4.33* 4.33*   HEMOGLOBIN g/dL 7.5* 8.3* 8.4*   HEMATOCRIT % 23.6* 26.9* 28.2*   MCV fL  61* 62* 65*   MCH pg 19.4* 19.2* 19.4*   MCHC g/dL 31.8* 30.9* 29.8*   RDW % 18.0* 18.5* 18.5*   PLATELETS AUTO x10*3/uL 383 383 385     Results from last 7 days   Lab Units 03/21/24  0410 03/20/24  0528 03/19/24  0422   SODIUM mmol/L 131* 127* 133*  133*   POTASSIUM mmol/L 4.2 5.2 4.6  4.6   CHLORIDE mmol/L 88* 90* 94*  94*   CO2 mmol/L 31 24 29  29   BUN mg/dL 84* 77* 68*  68*   CREATININE mg/dL 2.44* 2.49* 2.51*  2.51*   GLUCOSE mg/dL 224* 386* 126*  126*   PROTEIN TOTAL g/dL 6.4 6.0* 7.2   CALCIUM mg/dL 8.3* 8.1* 8.4*  8.4*   BILIRUBIN TOTAL mg/dL 1.4* 1.9* 1.4*   ALK PHOS U/L 154* 150* 135*   AST U/L 24 31 31   ALT U/L 20 21 25     Results from last 7 days   Lab Units 03/21/24  0410 03/20/24  0528 03/16/24  0541   MAGNESIUM mg/dL 1.87 1.80 1.89         Imaging:  ECG 12 Lead  Normal sinus rhythm  Right bundle branch block  Abnormal ECG  When compared with ECG of 21-MAR-2024 07:16, (unconfirmed)  Premature ventricular complexes are no longer Present

## 2024-03-21 NOTE — PROGRESS NOTES
Physical Therapy                 Therapy Communication Note    Patient Name: Hitesh Jurado  MRN: [id number]  Today's Date: 3/21/2024     Discipline: Physical Therapy    Missed Visit Reason: (10:20, Patient refused therapy this date. Encouragement provided, but patient continued to refuse. Nursing aware.)

## 2024-03-21 NOTE — CARE PLAN
The patient's goals for the shift include      The clinical goals for the shift include Pt will remain HDS this shift    Over the shift, the patient did not make progress toward the following goals. Barriers to progression include weakness in R leg and feeling abdominal fullness. Recommendations to address these barriers include pt intervention to assist in muscle strengthening.    Problem: Fall/Injury  Goal: Use assistive devices by end of the shift  Outcome: Not Progressing

## 2024-03-21 NOTE — PROGRESS NOTES
Occupational Therapy                 Therapy Communication Note    Patient Name: Hitesh Jurado  MRN: [id number]  Today's Date: 3/21/2024     Discipline: Occupational Therapy    Missed Visit Reason: Missed Visit Reason:  (atient refused therapy this date. Encouragement provided, but patient continued to refuse. Nursing aware.))    Missed Time: Attempt 1020    Comment:

## 2024-03-21 NOTE — PROGRESS NOTES
"Hitesh Jurado is a 53 y.o. male on day 5 of admission presenting with Acute on chronic combined systolic (congestive) and diastolic (congestive) heart failure (CMS/HCC).              SUBJECTIVE:   Admit Date: 3/15/2024                          Interval History: Feels better today, less out of breath.  Denies chest pain, shortness of breath.        OBJECTIVE:   Vitals: /56 (BP Location: Left arm, Patient Position: Lying)   Pulse 91   Temp 36.1 °C (97 °F) (Temporal)   Resp 16   Ht 1.753 m (5' 9\")   Wt 68 kg (150 lb)   SpO2 93%   BMI 22.15 kg/m²        Wt Readings from Last 3 Encounters:   03/15/24 68 kg (150 lb)   11/01/23 51.7 kg (114 lb)   10/30/23 55.9 kg (123 lb 3.8 oz)      24HR INTAKE/OUTPUT:    Intake/Output Summary (Last 24 hours) at 3/20/2024 1154  Last data filed at 3/20/2024 1040      Gross per 24 hour   Intake 875 ml   Output 9900 ml   Net -9025 ml         PHYSICAL EXAM:   GENERAL: Laying in bed, in mild distress  HEENT: HEAD: Normocephalic atraumatic.  Neck: Supple.  Eyes: Pupils are reactive to direct light.   CVS: S1, S2 heard. Regular rate and rhythm. +JVD  LUNGS: Coarse breath sounds  ABDOMEN: Soft, distended. Positive bowel sounds. No guarding or rebound appreciated.  NEUROLOGICAL: No focal neurological deficits appreciated. Cranial nerves are grossly intact.  EXTREMITIES: Left below-knee amputation.  Left stump erythema appreciated.  Right lower extremity cool to touch with an area on of ulceration appreciated  SKIN:  Grossly intact, warm and dry.     LABS/IMAGING AND MEDICATIONS:   Scheduled Meds:albumin human, 25 g, intravenous, Once  cefepime, 2 g, intravenous, q24h  docusate sodium, 100 mg, oral, BID  enoxaparin, 40 mg, subcutaneous, q24h  insulin glargine, 10 Units, subcutaneous, Nightly  insulin lispro, 0-10 Units, subcutaneous, Before meals & nightly  lidocaine, 5 mL, infiltration, Once  lidocaine, 1 patch, transdermal, Daily  melatonin, 3 mg, oral, Daily  methIMAzole, 5 " mg, oral, Daily  metOLazone, 5 mg, oral, Daily  metoprolol succinate XL, 25 mg, oral, Daily  pantoprazole, 40 mg, oral, Daily before breakfast   Or  pantoprazole, 40 mg, intravenous, Daily before breakfast  perflutren lipid microspheres, 0.5-10 mL of dilution, intravenous, Once in imaging  perflutren protein A microsphere, 0.5 mL, intravenous, Once in imaging  [Held by provider] spironolactone, 25 mg, oral, Daily  sulfur hexafluoride microsphr, 2 mL, intravenous, Once in imaging  vancomycin, 500 mg, intravenous, q24h          Results from last 7 days   Lab Units 03/15/24  0905   BNP pg/mL 4,248*           Results from last 7 days   Lab Units 03/20/24  0527   INR   1.7*         BMP:         Results from last 7 days   Lab Units 03/20/24 0528 03/19/24 0422 03/18/24  0433   SODIUM mmol/L 127* 133*  133* 131*   POTASSIUM mmol/L 5.2 4.6  4.6 4.2   CHLORIDE mmol/L 90* 94*  94* 93*   CO2 mmol/L 24 29 29 28   BUN mg/dL 77* 68*  68* 62*   CREATININE mg/dL 2.49* 2.51*  2.51* 2.38*         CBC:         Results from last 7 days   Lab Units 03/20/24 0528 03/19/24 0422 03/18/24  0433   WBC AUTO x10*3/uL 15.4* 14.8* 14.1*   RBC AUTO x10*6/uL 4.33* 4.33* 3.94*   HEMOGLOBIN g/dL 8.3* 8.4* 7.9*   HEMATOCRIT % 26.9* 28.2* 25.6*   MCV fL 62* 65* 65*   MCH pg 19.2* 19.4* 20.1*   MCHC g/dL 30.9* 29.8* 30.9*   RDW % 18.5* 18.5* 18.1*   PLATELETS AUTO x10*3/uL 383 385 368         Cardiac Enzymes:             Hepatic Function Panel:           Results from last 7 days   Lab Units 03/20/24 0528 03/19/24  0422 03/18/24  0433 03/16/24  0541 03/15/24  0905   ALK PHOS U/L 150* 135* 135*   < > 137*   ALT U/L 21 25 27   < > 38   AST U/L 31 31 33   < > 63*   PROTEIN TOTAL g/dL 6.0* 7.2 7.2   < > 7.5   BILIRUBIN TOTAL mg/dL 1.9* 1.4* 1.3*   < > 1.5*   BILIRUBIN DIRECT mg/dL  --   --   --   --  0.7*    < > = values in this interval not displayed.         Magnesium:       Results from last 7 days   Lab Units 03/20/24  0528   MAGNESIUM mg/dL  "1.80         Pro-BNP:  No results found for: \"PROBNP\"     INR:        Results from last 7 days   Lab Units 03/20/24  0527 03/15/24  0905   PROTIME seconds 19.3* 15.1*   INR   1.7* 1.3*         TSH:  No results found for: \"TSH\"     Lipid Profile:         No lab exists for component: \"LABVLDL\"     HgbA1C:         Lactate Level:  No lab exists for component: \"LACTA\"     CMP:         Results from last 7 days   Lab Units 03/20/24  0528 03/19/24  0422 03/18/24  0433   SODIUM mmol/L 127* 133*  133* 131*   POTASSIUM mmol/L 5.2 4.6  4.6 4.2   CHLORIDE mmol/L 90* 94*  94* 93*   CO2 mmol/L 24 29  29 28   BUN mg/dL 77* 68*  68* 62*   CREATININE mg/dL 2.49* 2.51*  2.51* 2.38*   GLUCOSE mg/dL 386* 126*  126* 229*   CALCIUM mg/dL 8.1* 8.4*  8.4* 8.5*   PROTEIN TOTAL g/dL 6.0* 7.2 7.2   BILIRUBIN TOTAL mg/dL 1.9* 1.4* 1.3*   ALK PHOS U/L 150* 135* 135*   AST U/L 31 31 33   ALT U/L 21 25 27            ASSESSMENT AND PLAN:   Mr. Jurado is a 53-year-old male with past medical history of heart failure with reduced EF, peripheral vascular disease status post left BKA, chronic multiple wounds who presented to the emergency room after a with a fall.     Currently admitted for congestive heart failure exacerbation and radiographic evidence of cirrhosis.        Acute hypoxic respiratory failure  Congestive heart failure exacerbation with reduced EF  Large right-sided pleural effusion  -P/W a fall  -Chest x ray on admission showed a large left-sided pleural effusion  -Echocardiogram 10/23: Showed EF of 15 to 20%  -S/P thoracentesis on 3/18, 1.5L  -Currently remains on Lasix drip, creatinine continues to increase  -Hold Aldactone, continue to monitor intake and output  -Discontinue IV potassium tablets     Decompensated liver cirrhosis with large ascites  Possible hepatorenal syndrome  -Patient had an ultrasound which showed cirrhosis  -No history of liver cirrhosis from his [facility] admissions  -Pt admits to years of " alcohol use, which is most likely the cause.  -Will obtain a hepatitis panel  -Underwent paracentesis today, 6.3 L removed  -Continue IV albumin  -GI consult.     Acute kidney injury on chronic kidney disease  ATN vs hepatorenal sydnrome  Hyponatremia  -Multifactorial  -Discontinue spironolactone  -Continue to monitor intake and output  -Creatinine improved today, continue Lasix drip.     Microcytic anemia  -Will obtain iron studies     History of peripheral vascular disease  Status post left BKA  Infected lower extremity ulcers  -He has a history of peripheral vascular disease, underwent peripheral angiogram on 11/23 s/p RLE DCB of iliac and recanalization of R SFA on 12/4. Heparin gtt d/vinod and he was started on DAPT.  -Patient's lower extremities are cool to touch, will obtain vascular surgery consult  -Continue Vanco and cefepime, obtain ID consult  -Continue wound care     Diabetes mellitus  -Patient is noted to be hyperglycemic, start Lantus 10 units  -Continue sliding scale     History of DVT  -Will need to review his medications to see if patient is on anticoagulation       Huy Ricci MD

## 2024-03-21 NOTE — PROGRESS NOTES
"Vancomycin Dosing by Pharmacy- FOLLOW UP    Hitesh Jurado is a 53 y.o. year old male who Pharmacy has been consulted for vancomycin dosing for SSTI. Based on the patient's indication and renal status this patient is being dosed based on a goal AUC of 400-600.     Renal function is currently stable.    Current vancomycin dose: 500 mg given every 24 hours    Estimated vancomycin AUC on current dose: 463 mg/L.hr     Visit Vitals  /71 (BP Location: Left arm, Patient Position: Sitting)   Pulse 100   Temp 36.8 °C (98.2 °F) (Temporal)   Resp 18        Lab Results   Component Value Date    CREATININE 2.44 (H) 03/21/2024    CREATININE 2.49 (H) 03/20/2024    CREATININE 2.51 (H) 03/19/2024    CREATININE 2.51 (H) 03/19/2024        Patient weight is No results found for: \"PTWEIGHT\"    No results found for: \"CULTURE\"     I/O last 3 completed shifts:  In: 1675 (24.6 mL/kg) [P.O.:1425; IV Piggyback:250]  Out: 16119 (151.4 mL/kg) [Urine:4000 (1.6 mL/kg/hr); Drains:6300]  Weight: 68 kg   [unfilled]    Lab Results   Component Value Date    PATIENTTEMP  11/01/2023      Comment:      NOTE: Patient Results are Not Corrected for Temperature    PATIENTTEMP 37.0 11/14/2022    PATIENTTEMP 37.0 11/13/2022        Assessment/Plan    Within goal AUC range. Continue current vancomycin regimen.    This dosing regimen is predicted by [employer] to result in the following pharmacokinetic parameters:  Loading dose: N/A  Regimen: 500 mg IV every 24 hours.  Start time: 10:42 on 03/20/2024  Exposure target: AUC24 (range)400-600 mg/L.hr   AUC24,ss: 463 mg/L.hr  Probability of AUC24 > 400: 91 %  Ctrough,ss: 16 mg/L  Probability of Ctrough,ss > 20: 5 %  Probability of nephrotoxicity (Lodise DAYANARA 2009): 11 %    The next level will be obtained on 3/23/24 at 1st AM. May be obtained sooner if clinically indicated.   Will continue to monitor renal function daily while on vancomycin and order serum creatinine at least every 48 hours if not already " ordered.  Follow for continued vancomycin needs, clinical response, and signs/symptoms of toxicity.       Gabby Mathews, PharmD

## 2024-03-21 NOTE — CONSULTS
Infectious Disease    Patient Name: Hitesh Jurado  Date: 3/20/2024  YOB: 1970  Medical Record Number: [id number]        Chief Complaint   Patient presents with    Fall     From home, fell in shower         History of Present Illness:  Patient admitted after fall. Ongoing weakness, left bka with stump wound and right foot wound. Pt with non compliance issues. Has significant wounds tell me he wasn't getting the wound care he needed here and that's why they are worse. Much drainage left bka site. He had paracentesis this admit and a thoracentesis I'm told. patient was given vancomycin, cefepime and Flagyl on admission. He has been intermittently refusing therapies I'm told.       Review of Systems: All other ROS reviewed and are negative other than as stated in HPI            Social History     Tobacco Use    Smoking status: Every Day     Packs/day: .25     Types: Cigarettes    Smokeless tobacco: Never   Substance Use Topics    Alcohol use: Never    Drug use: Never         Past Medical History:   Diagnosis Date    CHF (congestive heart failure) (CMS/[facility])     Diabetes mellitus (CMS/[facility])            No past surgical history on file.        Current Facility-Administered Medications:     acetaminophen (Tylenol) tablet 650 mg, 650 mg, oral, q4h PRN **OR** acetaminophen (Tylenol) oral liquid 650 mg, 650 mg, nasogastric tube, q4h PRN **OR** acetaminophen (Tylenol) suppository 650 mg, 650 mg, rectal, q4h PRN, Izzy Early MD    acetaminophen (Tylenol) tablet 650 mg, 650 mg, oral, q4h PRN **OR** acetaminophen (Tylenol) oral liquid 650 mg, 650 mg, oral, q4h PRN **OR** acetaminophen (Tylenol) suppository 650 mg, 650 mg, rectal, q4h PRN, Izzy Early MD    benzocaine-menthol (Cepastat Sore Throat) 15-3.6 mg lozenge 1 lozenge, 1 lozenge, Mouth/Throat, q2h PRN, Izyz Early MD    cefepime (Maxipime) in dextrose 5 % water (D5W) 100 mL IV 2 g, 2 g, intravenous, q24h, Izzy Early MD, Stopped at  03/20/24 1223    dextromethorphan-guaifenesin (Robitussin DM)  mg/5 mL oral liquid 5 mL, 5 mL, oral, q4h PRN, Izzy Early MD    dextrose 50 % injection 12.5 g, 12.5 g, intravenous, q15 min PRN, Izzy Early MD    dextrose 50 % injection 25 g, 25 g, intravenous, q15 min PRN, Izzy Early MD    docusate sodium (Colace) capsule 100 mg, 100 mg, oral, BID, Izzy Early MD, 100 mg at 03/20/24 2051    enoxaparin (Lovenox) syringe 40 mg, 40 mg, subcutaneous, q24h, Izzy Early MD, 40 mg at 03/20/24 1755    furosemide (Lasix) 500 mg in 50 mL (10 mg/mL) infusion, 15 mg/hr, intravenous, Continuous, Malgorzata Williamson MD, Last Rate: 1.5 mL/hr at 03/20/24 0131, 15 mg/hr at 03/20/24 0131    glucagon (Glucagen) injection 1 mg, 1 mg, intramuscular, q15 min PRN, Izzy Early MD    guaiFENesin (Mucinex) 12 hr tablet 600 mg, 600 mg, oral, q12h PRN, Izzy Early MD    insulin glargine (Lantus) injection 10 Units, 10 Units, subcutaneous, Nightly, Rachelle Sosa MD, 10 Units at 03/20/24 2051    insulin lispro (HumaLOG) injection 0-15 Units, 0-15 Units, subcutaneous, TID with meals, Rachelle Sosa MD, 15 Units at 03/20/24 1755    lidocaine (Xylocaine) 10 mg/mL (1 %) injection 50 mg, 5 mL, infiltration, Once, Rachelle Sosa MD    lidocaine 4 % patch 1 patch, 1 patch, transdermal, Daily, Perla Callaway, APRN-CNP, 1 patch at 03/19/24 0818    melatonin tablet 3 mg, 3 mg, oral, Daily, Izzy Early MD, 3 mg at 03/20/24 2051    methIMAzole (Tapazole) tablet 5 mg, 5 mg, oral, Daily, Perez Lopez MD, 5 mg at 03/20/24 0935    metOLazone (Zaroxolyn) tablet 5 mg, 5 mg, oral, Daily, Malgorzata Williamson MD, 5 mg at 03/20/24 0934    metoprolol succinate XL (Toprol-XL) 24 hr tablet 25 mg, 25 mg, oral, Daily, Perez Lopez MD, 25 mg at 03/19/24 0819    morphine injection 2 mg, 2 mg, intravenous, q3h PRN, Ilda Bello MD, 2 mg at 03/19/24 1529    ondansetron ODT  "(Zofran-ODT) disintegrating tablet 4 mg, 4 mg, oral, q8h PRN **OR** ondansetron (Zofran) injection 4 mg, 4 mg, intravenous, q8h PRN, Izzy Early MD    oxygen (O2) therapy, , inhalation, PRN, Izzy Early MD    pantoprazole (ProtoNix) EC tablet 40 mg, 40 mg, oral, Daily before breakfast, 40 mg at 03/20/24 0700 **OR** pantoprazole (ProtoNix) injection 40 mg, 40 mg, intravenous, Daily before breakfast, Izzy Early MD    perflutren lipid microspheres (Definity) injection 0.5-10 mL of dilution, 0.5-10 mL of dilution, intravenous, Once in imaging, Izzy Early MD    perflutren protein A microsphere (Optison) injection 0.5 mL, 0.5 mL, intravenous, Once in imaging, Izzy Early MD    [Held by provider] spironolactone (Aldactone) tablet 25 mg, 25 mg, oral, Daily, Perez Lopez MD, 25 mg at 03/19/24 0819    sulfur hexafluoride microsphr (Lumason) injection 24.28 mg, 2 mL, intravenous, Once in imaging, Izzy Early MD    vancomycin (Vancocin) in dextrose 5 % water (D5W) 100 mL  mg, 500 mg, intravenous, q24h, Santo Whittington, PharmD    vancomycin (Vancocin) placeholder, , miscellaneous, Daily PRN, Izzy Early MD     Allergies   Allergen Reactions    Amoxicillin Unknown          No family history on file.      Physical Exam:    Blood pressure 103/61, pulse 101, temperature 36.4 °C (97.5 °F), temperature source Temporal, resp. rate 18, height 1.753 m (5' 9\"), weight 68 kg (150 lb), SpO2 94 %.  General: Patient appears ok at the present time. NAD  Skin: no new rashes, wound right foot dorsal surface large, full thickness, clean based, left leg bka site macerated tissue, drainage , multiple ulcers, full thickness, lots of serous yellow drainage some aroma  HEENT:  Neck is supple, No subconjunctival hemorrhages, no oral exudates  Heart: S1 S2  Lungs: clear bilaterally  Abdomen: soft, ND, NTTP,  Back :no CVA tenderness  Extrem: see above  Neuro exam: CN II-XII intact  Psych: " cooperative    Labs:  I have reviewed all lab results by electronic record, including most recent CBC, metabolic panel, and pertinent abnormalities were addressed from an infectious disease perspective.  Trends are being monitored over time.    Lab Results   Component Value Date    WBC 15.4 (H) 03/20/2024    HGB 8.3 (L) 03/20/2024    HCT 26.9 (L) 03/20/2024    MCV 62 (L) 03/20/2024     03/20/2024     Lab Results   Component Value Date    GLUCOSE 386 (H) 03/20/2024    CALCIUM 8.1 (L) 03/20/2024     (L) 03/20/2024    K 5.2 03/20/2024    CO2 24 03/20/2024    CL 90 (L) 03/20/2024    BUN 77 (H) 03/20/2024    CREATININE 2.49 (H) 03/20/2024       Radiology:  I have reviewed imaging results per electronic record and most pertinent abnormalities are being addressed from an infectious disease standpoint.            ASSESSMENT:  Problem List Items Addressed This Visit          Cardiac and Vasculature    PAD (peripheral artery disease) (CMS/[facility])    Relevant Orders    Vascular US ankle brachial index (MARILIA) without exercise (Completed)    Vascular US lower extremity arterial duplex left (Completed)    Chronic congestive heart failure (CMS/HCC)    Relevant Medications    metoprolol succinate XL (Toprol-XL) 25 mg 24 hr tablet    clopidogrel (Plavix) 75 mg tablet    metoprolol succinate XL (Toprol-XL) 24 hr tablet 25 mg    Other Relevant Orders    Referral to [facility] at Home Program    Critical lower limb ischemia (CMS/HCC)    Relevant Orders    Lower extremity venous duplex right (Completed)    Vascular US lower extremity arterial duplex left (Completed)    * (Principal) Acute on chronic combined systolic (congestive) and diastolic (congestive) heart failure (CMS/HCC) - Primary    Relevant Medications    metoprolol succinate XL (Toprol-XL) 25 mg 24 hr tablet    clopidogrel (Plavix) 75 mg tablet    metoprolol succinate XL (Toprol-XL) 24 hr tablet 25 mg       Genitourinary and Reproductive    PIPPA (acute kidney injury)  (CMS/[facility])       Pulmonary and Pneumonias    COPD exacerbation (CMS/[facility])    Relevant Orders    Referral to [facility] Home [facility]    COPD (chronic obstructive pulmonary disease) (CMS/[facility])    Relevant Orders    Referral to [facility] at Home [facility]     Other Visit Diagnoses       Fall, initial encounter        Hypokalemia        Hypoglycemia        Acute on chronic congestive heart failure, unspecified heart failure type (CMS/[facility])        Relevant Medications    metoprolol succinate XL (Toprol-XL) 25 mg 24 hr tablet    clopidogrel (Plavix) 75 mg tablet    metoprolol succinate XL (Toprol-XL) 24 hr tablet 25 mg    Other Relevant Orders    Referral to [facility] Home Program    Pleural effusion on right        Other atherosclerosis of unspecified type of bypass graft(s) of the extremities, left leg (CMS/[facility])        Atherosclerosis of native arteries of right leg with ulceration of other part of foot (CMS/[facility])               Wounds,   Multiple severe comorbidities and noncompliance history  Bacteremia 1/2 CoNS     Wound left bka site some concern of infection with aroma, skin breakdown with lots of drainage and may be from volume overload in general creating moist environment. Likely moisture associated dermatitis. Bacteremia probable contaminant, obviously with cirrhosis, spontaneous bacteremias can be more prevalent     PLAN:   Culture wound  Continue same therapy   Await fluid studies

## 2024-03-21 NOTE — PROGRESS NOTES
Nephrology Progress Note    Assessment:  53 y.o. male with history s/f HFrEF, PAD s/p LT BKA, T2DM c/b chronic non-healing wounds on Les who presented for a fall while attempting to transfer.      PIPPA on CKD stage II: likely in setting of CRS, baseline Scr ~1.2 w/ eGFR high 60s, tends to get recurrent AKIs, worsening   Hypokalemia: corrected   Large RT pleural effusion: s/p RT sided thoracentesis 1.5L taken off   Anemia   Hypoalbuminemia   Cirrhosis c/b ascites: s/p paracentesis w/ 6300 ml off (3/19)     Plan:  - function improving, no changes to diuretics today, continue lasix gtt at 15 mg/hr     Subjective:  Admit Date: 3/15/2024    Interval History: doing ok, function slowly improving, diuresing well, no complaints     Medications:  Scheduled Meds:cefepime, 2 g, intravenous, q24h  docusate sodium, 100 mg, oral, BID  enoxaparin, 40 mg, subcutaneous, q24h  insulin glargine, 10 Units, subcutaneous, Nightly  insulin lispro, 0-15 Units, subcutaneous, TID with meals  lidocaine, 5 mL, infiltration, Once  lidocaine, 1 patch, transdermal, Daily  melatonin, 3 mg, oral, Daily  methIMAzole, 5 mg, oral, Daily  metOLazone, 5 mg, oral, Daily  metoprolol succinate XL, 25 mg, oral, Daily  pantoprazole, 40 mg, oral, Daily before breakfast   Or  pantoprazole, 40 mg, intravenous, Daily before breakfast  perflutren lipid microspheres, 0.5-10 mL of dilution, intravenous, Once in imaging  perflutren protein A microsphere, 0.5 mL, intravenous, Once in imaging  [Held by provider] spironolactone, 25 mg, oral, Daily  sulfur hexafluoride microsphr, 2 mL, intravenous, Once in imaging  vancomycin, 500 mg, intravenous, q24h      Continuous Infusions:furosemide, 15 mg/hr, Last Rate: 15 mg/hr (03/21/24 1009)        CBC:   Lab Results   Component Value Date    WBC 11.7 (H) 03/21/2024    RBC 3.87 (L) 03/21/2024    HGB 7.5 (L) 03/21/2024    HCT 23.6 (L) 03/21/2024    MCV 61 (L) 03/21/2024    MCH 19.4 (L) 03/21/2024    MCHC 31.8 (L) 03/21/2024     "RDW 18.0 (H) 03/21/2024     03/21/2024     BMP:    Lab Results   Component Value Date     (L) 03/21/2024    K 4.2 03/21/2024    CL 88 (L) 03/21/2024    CO2 31 03/21/2024    BUN 84 (H) 03/21/2024    CREATININE 2.44 (H) 03/21/2024    CALCIUM 8.3 (L) 03/21/2024    GLUCOSE 224 (H) 03/21/2024       Objective:  Vitals: /68   Pulse 103   Temp 36.9 °C (98.4 °F) (Temporal)   Resp 18   Ht 1.753 m (5' 9\")   Wt 68 kg (150 lb)   SpO2 95%   BMI 22.15 kg/m²    Wt Readings from Last 3 Encounters:   03/15/24 68 kg (150 lb)   10/30/23 55.9 kg (123 lb 3.8 oz)   07/21/23 59 kg (130 lb)      24HR INTAKE/OUTPUT:    Intake/Output Summary (Last 24 hours) at 3/21/2024 1216  Last data filed at 3/21/2024 1100  Gross per 24 hour   Intake 701.22 ml   Output 3800 ml   Net -3098.78 ml         General: alert, in no apparent distress  HEENT: normocephalic, atraumatic, anicteric  Lungs: non-labored respirations, clear to auscultation bilaterally  Heart: regular rate and rhythm, no murmurs or rubs  Abdomen: soft, non-tender, severely distended   Ext: no cyanosis, +++ RLE peripheral edema, LT BKA  Neuro: alert and oriented, no gross abnormalities        Electronically signed by Malgorzata Williamson MD, MD              "

## 2024-03-21 NOTE — CONSULTS
Inpatient consult to Vascular Surgery  Consult performed by: Michelle Early, APRN-CNP  Consult ordered by: Rachelle Sosa MD  Reason for consult: LE ? Ischemia          Reason For Consult  LE ? Ischemia     History Of Present Illness  Hitesh Juardo is a 53 y.o. male with PMHx of HTN, HLD, CAD with hx of PCI, ICM with EF 15-20% (2023), valvular heart disease (mod->severe mitral and tricuspid valve regurgitation), IDDM2 with neuropathy, PAD s/p multiple lower extremity interventions and L BKA, chronic wounds, CKD, nicotine dependence, and medical noncompliance who presented to [facility] ED on 3/15 via EMS after falling while getting out of shower to wheelchair. Ultimately admitted to telemetry unit for acute on chronic heart failure associated with pleural effusion, hypokalemia, and newly diagnosed cirrhosis. Cardiology consulted for volume management and patient started on Lasix drip with excellent response (LOS FB -15L). Subsequently developed PIPPA on CKD with Cr peaking at 2.5; appears to have plateaud and now trending down. Nephrology following; remains on Lasix drip at 15mg/hr. R thoracentesis completed 3/18 yielding 1.5L. Paracentesis completed 3/19 yielding 6.3L. Plastics and wound care following; pt currently receiving BID Silvadene dressing changes. Vascular surgery was consulted for possible limb ischemia in setting of non-healing wounds.     Pt has extensive vascular history including R to L fem-fem bypass (2019, [facility]), L fem-above knee popliteal bypass (2021, [facility]), Revision of L fem-pop bypass (2022, [facility]), L BKA (2022, [facility]), and R iliac stenting (2023, [facility]).     Pt seen sitting on side of bed in conjunction with Dr. Dyer. He is a poor historian. Has wound to L BKA stump as well as dorsal aspect of R foot. Of note, dressings were just changed and were not removed for exam. Pt is uncertain if he would want to go anymore vascular procedures. Pt would need CTA abdominal aorta to better  determine what sort of revascularization is even possible following so many interventions. Pt states he would be willing to do CTA but wants it done as an outpatient as he still has significant orthopnea from his ascites. Pt follows with [facility]. Since patient would prefer to get CTA with run off as outpatient, it is recommended he follow up with his primary vascular surgeon, Dr. Singh. Should patient want a second opinion or choose to move care to , we can arrange for follow up with Dr. Dyer for possible CTA pending renal function. No vascular surgery intervention at this time. Continue wound care per Dr. Reyes. Antibiotics per ID. Pt is stable for discharge from our perspective. Thank you for this consult.     Past Medical History  He has a past medical history of CHF (congestive heart failure) (CMS/[facility]) and Diabetes mellitus (CMS/[facility]).    Surgical History  He has no past surgical history on file.     Social History  He reports that he has been smoking cigarettes. He has been smoking an average of .25 packs per day. He has never used smokeless tobacco. He reports that he does not drink alcohol and does not use drugs.    Family History  No family history on file.     Allergies  Amoxicillin    Review of Systems   Constitutional:  Positive for fatigue.   Respiratory:  Positive for shortness of breath.    Cardiovascular:  Positive for leg swelling.        + orthopnea    Gastrointestinal:  Positive for abdominal distention, abdominal pain and nausea.   Musculoskeletal:  Positive for myalgias.   Skin:  Positive for color change.   All other systems reviewed and are negative.       Physical Exam  Constitutional:       Appearance: He is normal weight.      Comments: Mild conversational dyspnea    Cardiovascular:      Rate and Rhythm: Regular rhythm. Tachycardia present.      Heart sounds: Murmur heard.   Pulmonary:      Comments: Mild conversational dyspnea    Abdominal:      Comments: Distended, tympanic.  "+BS   Musculoskeletal:      Comments: L BKA; dressing intact.   Dressing to R foot wound CDI    Neurological:      General: No focal deficit present.      Mental Status: He is alert and oriented to person, place, and time.   Psychiatric:      Comments: Cooperative with care           Last Recorded Vitals  Blood pressure 101/61, pulse 99, temperature 36.5 °C (97.7 °F), resp. rate 18, height 1.753 m (5' 9\"), weight 68 kg (150 lb), SpO2 95 %.    Relevant Results  Scheduled medications  cefepime, 2 g, intravenous, q24h  docusate sodium, 100 mg, oral, BID  enoxaparin, 40 mg, subcutaneous, q24h  insulin glargine, 10 Units, subcutaneous, Nightly  insulin lispro, 0-15 Units, subcutaneous, TID with meals  lidocaine, 5 mL, infiltration, Once  lidocaine, 1 patch, transdermal, Daily  melatonin, 3 mg, oral, Daily  methIMAzole, 5 mg, oral, Daily  metOLazone, 5 mg, oral, Daily  metoprolol succinate XL, 25 mg, oral, Daily  pantoprazole, 40 mg, oral, Daily before breakfast   Or  pantoprazole, 40 mg, intravenous, Daily before breakfast  perflutren lipid microspheres, 0.5-10 mL of dilution, intravenous, Once in imaging  perflutren protein A microsphere, 0.5 mL, intravenous, Once in imaging  [Held by provider] spironolactone, 25 mg, oral, Daily  sulfur hexafluoride microsphr, 2 mL, intravenous, Once in imaging  vancomycin, 500 mg, intravenous, q24h      Continuous medications  furosemide, 15 mg/hr, Last Rate: 15 mg/hr (03/21/24 1009)      PRN medications  PRN medications: acetaminophen **OR** acetaminophen **OR** acetaminophen, acetaminophen **OR** acetaminophen **OR** acetaminophen, benzocaine-menthol, dextromethorphan-guaifenesin, dextrose, dextrose, glucagon, guaiFENesin, morphine, ondansetron ODT **OR** ondansetron, oxygen, vancomycin    Results for orders placed or performed during the hospital encounter of 03/15/24 (from the past 24 hour(s))   POCT GLUCOSE   Result Value Ref Range    POCT Glucose 355 (H) 74 - 99 mg/dL   Vascular " US lower extremity arterial duplex left   Result Value Ref Range    BSA 1.82 m2   Vascular US ankle brachial index (MARILIA) without exercise   Result Value Ref Range    BSA 1.82 m2   POCT GLUCOSE   Result Value Ref Range    POCT Glucose 311 (H) 74 - 99 mg/dL   CBC   Result Value Ref Range    WBC 11.7 (H) 4.4 - 11.3 x10*3/uL    nRBC 0.0 0.0 - 0.0 /100 WBCs    RBC 3.87 (L) 4.50 - 5.90 x10*6/uL    Hemoglobin 7.5 (L) 13.5 - 17.5 g/dL    Hematocrit 23.6 (L) 41.0 - 52.0 %    MCV 61 (L) 80 - 100 fL    MCH 19.4 (L) 26.0 - 34.0 pg    MCHC 31.8 (L) 32.0 - 36.0 g/dL    RDW 18.0 (H) 11.5 - 14.5 %    Platelets 383 150 - 450 x10*3/uL   Comprehensive metabolic panel   Result Value Ref Range    Glucose 224 (H) 74 - 99 mg/dL    Sodium 131 (L) 136 - 145 mmol/L    Potassium 4.2 3.5 - 5.3 mmol/L    Chloride 88 (L) 98 - 107 mmol/L    Bicarbonate 31 21 - 32 mmol/L    Anion Gap 16 10 - 20 mmol/L    Urea Nitrogen 84 (H) 6 - 23 mg/dL    Creatinine 2.44 (H) 0.50 - 1.30 mg/dL    eGFR 31 (L) >60 mL/min/1.73m*2    Calcium 8.3 (L) 8.6 - 10.3 mg/dL    Albumin 3.1 (L) 3.4 - 5.0 g/dL    Alkaline Phosphatase 154 (H) 33 - 120 U/L    Total Protein 6.4 6.4 - 8.2 g/dL    AST 24 9 - 39 U/L    Bilirubin, Total 1.4 (H) 0.0 - 1.2 mg/dL    ALT 20 10 - 52 U/L   Vancomycin   Result Value Ref Range    Vancomycin 15.6 5.0 - 20.0 ug/mL   Magnesium   Result Value Ref Range    Magnesium 1.87 1.60 - 2.40 mg/dL   SST TOP   Result Value Ref Range    Extra Tube Hold for add-ons.    Hemoglobin A1c   Result Value Ref Range    Hemoglobin A1C 9.0 (H) see below %    Estimated Average Glucose 212 Not Established mg/dL   POCT GLUCOSE   Result Value Ref Range    POCT Glucose 215 (H) 74 - 99 mg/dL   ECG 12 Lead   Result Value Ref Range    Ventricular Rate 98 BPM    Atrial Rate 98 BPM    WA Interval 166 ms    QRS Duration 130 ms    QT Interval 360 ms    QTC Calculation(Bazett) 459 ms    P Axis 71 degrees    R Axis -5 degrees    T Axis -20 degrees    QRS Count 16 beats    Q Onset  216 ms    P Onset 133 ms    P Offset 185 ms    T Offset 396 ms    QTC Fredericia 424 ms   POCT GLUCOSE   Result Value Ref Range    POCT Glucose 206 (H) 74 - 99 mg/dL     ECG 12 Lead    Result Date: 3/21/2024  Normal sinus rhythm Right bundle branch block Abnormal ECG When compared with ECG of 21-MAR-2024 07:16, (unconfirmed) Premature ventricular complexes are no longer Present    Vascular US ankle brachial index (MARILIA) without exercise    Result Date: 3/20/2024  Preliminary Cardiology Report             [place] [id number]     Tel [phone] Fax [phone]            Preliminary Vascular Lab Report  [facility] US ANKLE BRACHIAL INDEX (MARILIA) WITHOUT EXERCISE  Patient Name:    BROOK THOMAS         Reading           [id number] JETHRO Sosa MD            Physician:        NIK Study Date:      3/20/2024           Ordering          [id number] [address] WESLY LEGGETT                                      Provider: MRN/PID:         [id number]            Fellow: Accession#:      [id number]        Technologist:     America Hansen RDMS,                                                        T YOB: 1970            Technologist 2: Gender:          M                   Encounter#:       [id number] Admission        Inpatient           Location          [facility] Status:                              Performed:  Diagnosis/ICD: Atherosclerosis of native arteries of right leg with ulceration                of other part of foot-I70.235 CPT Codes:     74124.52 Peripheral artery MARILIA Only Reduced Service  Pertinent History: PVD. LEFT BKA.  PRELIMINARY CONCLUSIONS:  Right Lower PVR: No evidence of arterial occlusive disease in the right lower extremity at rest. Decreased digital perfusion noted. Monophasic flow is noted in the right common femoral artery, right posterior tibial artery and right dorsalis pedis artery. Left Lower PVR: Left BKA.  Imaging & Doppler  Findings:  RIGHT Lower PVR                Pressures Ratios Right Posterior Tibial (Ankle) 116 mmHg  1.10 Right Dorsalis Pedis (Ankle)   78 mmHg   0.74 Right Digit (Great Toe)        36 mmHg   0.34                      Right     Left Brachial Pressure 105 mmHg 101 mmHg   VASCULAR PRELIMINARY REPORT completed by America Hansen RDMS on 3/20/2024 at 6:07:58 PM  ** Final **     Vascular US lower extremity arterial duplex left    Result Date: 3/20/2024  Preliminary Cardiology Report             [place] [id number]     Tel [phone] Fax [phone]        Preliminary Vascular Lab Report  [facility] US LOWER EXTREMITY ARTERIAL DUPLEX LEFT  Patient Name:    BROOK GUZMAN         Reading           [id number] Siena Escobar MD,                  JETHRO            Physician:        NIK Study Date:      3/20/2024           Ordering          [id number] JAMI TAVAREZ                                      Provider: MRN/PID:         [id number]            Fellow: Accession#:      [id number]        Technologist:     PT YOB: 1970            Technologist 2: Gender:          M                   Encounter#:       [id number] Admission        Inpatient           Location          [facility] Status:                              Performed:  Diagnosis/ICD: Other atherosclerosis of unspecified type of bypass graft(s) of                the extremities, left leg-I70.392 CPT Codes:     04025 Peripheral artery Lower arterial Duplex limited  Smoker:            Former. Pertinent History: LE Bypass graft. Pt poor historian. h/o rt to left fem/fem                    bypass and failed fem pop graft (pt unsure of when this was                    done) BKA left, wound on left stump. Per order assess left                    profunda.  PRELIMINARY CONCLUSIONS:  Left Lower Arterial: There is an occlusion documented at the superficial femoral artery proximal, superficial femoral artery mid. and  superficial femoral artery distal. Profunda mid 93.2 cm/sec, Distal 38.1 cm/sec.  Imaging & Doppler Findings:  Right                    Left  PSV                      PSV              EIA        67 cm/s              CFA        56 cm/s       Profunda Proximal 63 cm/s           Popliteal     34 cm/s VASCULAR PRELIMINARY REPORT completed by America Hansen RDMS on 3/20/2024 at 5:55:19 PM  ** Final **     ECG 12 Lead    Result Date: 3/20/2024  Normal sinus rhythm Nonspecific intraventricular conduction delay Nonspecific T wave abnormality Abnormal ECG Confirmed by Jamey Alejandra ([id number]) on 3/20/2024 2:22:15 PM    US guided abdominal paracentesis    Result Date: 3/19/2024  Interpreted By:  Schoenberger, Joseph, STUDY: US GUIDED ABDOMINAL PARACENTESIS; ;  3/19/2024 2:50 pm   INDICATION: Signs/Symptoms:paracentesis.   COMPARISON: None.   ACCESSION NUMBER(S): [id number]   ORDERING CLINICIAN: ALEXA JUNE   CONSENT: The procedure, its indication, its risks, and alternatives were explained to the patient who understands and consents to the procedure. A time-out is completed prior to the procedure to confirm patient identity and procedure to be performed.   MODALITIES: Ultrasound   TECHNIQUE: Targeted sonographic evaluation of the abdomen demonstrates suitable pocket for paracentesis in the right lower quadrant. This site was then marked.   The marked site was sterilely prepped with chlorhexidine and sterile drapes were placed. Local anesthetic was administered. A 5 Martiniquais sheath needle was advanced until straw-colored fluid was obtained. The sheath was advanced off the needle cannula into the peritoneal space. At this point aspiration was performed completion. A total of 6300 cc of fluid was aspirated. The sheath was then removed. The patient tolerated the procedure well. There was no immediate complication.   FINDINGS: See discussion above       Successful ultrasound-guided therapeutic paracentesis      MACRO: None   Signed by: Joseph Schoenberger 3/19/2024 2:53 PM Dictation workstation:   [id number]        Assessment/Plan     PAD; Nonhealing wounds  extensive vascular history including R to L fem-fem bypass (2019, [facility]), L fem-above knee popliteal bypass (2021, [facility]), Revision of L fem-pop bypass (2022, [facility]), L BKA (2022, [facility]), and R iliac stenting (2023, [facility]).  -Discussed with Dr. Dyer at bedside  -Pt is uncertain if he would pursue any further vascular surgery intervention  -Pt is uncertain if he wants to follow up with his regular vascular surgeon (Dr. Singh) or transfer care to   -Pt would need CTA with run off to determine if patient has options for further revascularization; patient agreeable to CTA but is asking to do as an outpatient as he still has significant orthopnea from ascites  -no vascular surgery intervention at this time  -Home Aspirin and Plavix are currently not ordered; would resume as able  -Statin as hepatic function allows (cirrhosis diagnosed this admission)  -wound care per Dr. Reyes  -Antibiotics per ID  -vascular surgery will sign off. Pt to follow up with his primary vascular surgeon (Dr. Singh). -Should patient choose to transfer care to  or like a second opinion he can follow up with Dr. Dyer    HTN; HLD; CAD; ICM; Valvular Heart Disease; Pleural Effusion   EF 15-20% with global hypokinesis of LV and moderate->severe mitral and tricuspid valve regurgitation.   Underwent R thoracentesis 3/18 yielding 1.5L serous fluid   -medical management per Cardiology  -Lasix drip as ordered    PIPPA on CKD  Pt with hx of CKD II with baseline Cr ~1.2. Cr 1.5 on presentation and peaked at 2.5 this admission. Appears to have plateaued and is trending down.  -Nephrology following  -Lasix drip as ordered  -Strict I&O  -follow BMP     IDDM2; Diabetic Neuropathy  Poorly controlled DM2. Hgb A1C 9% (1/2024)  -medical management per primary  -recommend strict glycemic control in setting of  nonhealing wounds     Decompensated Cirrhosis  Abdominal US revealed small to moderate volume peritoneal fluid and cirrhosis. Underwent Paracentesis 3/19 yielding 6.3L. Abdomen remains firmly distended. Pt endorsing significant orthopnea.  -GI following     I spent 60 minutes in the professional and overall care of this patient.

## 2024-03-21 NOTE — PROGRESS NOTES
"Hitesh Jurado is a 53 y.o. male on day 6 of admission presenting with Acute on chronic combined systolic (congestive) and diastolic (congestive) heart failure (CMS/HCC).    Subjective   No new complaints. Feels better since paracentesis and thoracentesis.  Less short of breath.  Denies chest pain, abdominal pain, nausea or vomiting.  No overt GI bleeding.    Objective   Constitutional:       General: He is not in acute distress.     Comments: Appears irritable and anxious   HENT:      Head: Normocephalic.      Nose: Nose normal.      Mouth/Throat:      Mouth: Mucous membranes are moist.      Pharynx: Oropharynx is clear.   Eyes:      Extraocular Movements: Extraocular movements intact.      Conjunctiva/sclera: Conjunctivae normal.      Pupils: Pupils are equal, round, and reactive to light.   Cardiovascular:      Rate and Rhythm: Normal rate and regular rhythm.      Pulses: Normal pulses.      Heart sounds: Normal heart sounds.   Pulmonary:      Effort: Pulmonary effort is normal.      Comments: Decreased breath sounds  Abdominal:      General: Bowel sounds are normal. There is distension.      Tenderness: There is no guarding or rebound.   Musculoskeletal:         General: Normal range of motion.      Cervical back: Normal range of motion.      Comments: Left BKA   Skin:     General: Skin is warm and dry.      Comments: Except Right lower extremity cool to touch with an area on of ulceration    Neurological:      General: No focal deficit present.      Mental Status: He is alert and oriented to person, place, and time.      Comments: No asterixis   Psychiatric:         Behavior: Behavior normal.      Comments: Appears irritable, not willing to answer many questions      Last Recorded Vitals  Blood pressure 101/61, pulse 99, temperature 36.5 °C (97.7 °F), resp. rate 18, height 1.753 m (5' 9\"), weight 68 kg (150 lb), SpO2 95 %.  Intake/Output last 3 Shifts:  I/O last 3 completed shifts:  In: 741.2 (10.9 mL/kg) " [P.O.:650; I.V.:91.2 (1.3 mL/kg)]  Out: 5800 (85.2 mL/kg) [Urine:5800 (2.4 mL/kg/hr)]  Weight: 68 kg     Relevant Results    US guided abdominal paracentesis    Result Date: 3/19/2024  Interpreted By:  Schoenberger, Joseph, STUDY: US GUIDED ABDOMINAL PARACENTESIS; ;  3/19/2024 2:50 pm   INDICATION: Signs/Symptoms:paracentesis.   COMPARISON: None.   ACCESSION NUMBER(S): [id number]   ORDERING CLINICIAN: ALEXA JUNE   CONSENT: The procedure, its indication, its risks, and alternatives were explained to the patient who understands and consents to the procedure. A time-out is completed prior to the procedure to confirm patient identity and procedure to be performed.   MODALITIES: Ultrasound   TECHNIQUE: Targeted sonographic evaluation of the abdomen demonstrates suitable pocket for paracentesis in the right lower quadrant. This site was then marked.   The marked site was sterilely prepped with chlorhexidine and sterile drapes were placed. Local anesthetic was administered. A 5 Armenian sheath needle was advanced until straw-colored fluid was obtained. The sheath was advanced off the needle cannula into the peritoneal space. At this point aspiration was performed completion. A total of 6300 cc of fluid was aspirated. The sheath was then removed. The patient tolerated the procedure well. There was no immediate complication.   FINDINGS: See discussion above       Successful ultrasound-guided therapeutic paracentesis     MACRO: None   Signed by: Joseph Schoenberger 3/19/2024 2:53 PM Dictation workstation:   [id number]    Bedside Peripheral IV Imaging    Result Date: 3/19/2024  These images are not reportable by radiology and will not be interpreted by  Radiologists.    US thoracentesis    Result Date: 3/18/2024  Interpreted By:  Schoenberger, Joseph, STUDY: US THORACENTESIS; ;  3/18/2024 10:48 am   INDICATION: Signs/Symptoms:Dyspnea/to evaluate etiology of right pleural effusion.   COMPARISON: None.    ACCESSION NUMBER(S): [id number]   ORDERING CLINICIAN: TEJINDER CHAVIRA   CONSENT: The procedure, its indication, its risks, and alternatives were explained to the patient who understands and consents to the procedure. A time-out is completed prior to the procedure to confirm patient identity and procedure to be performed.   MODALITIES: Ultrasound   TECHNIQUE: Targeted sonographic evaluation of the lower right chest demonstrates a large right pleural effusion. Lowest intracostal space with suitable percutaneous access to this pleural effusion was localized using ultrasound. This site was marked. The marked site was then sterilely prepped with chlorhexidine and a sterile barrier drape was placed. Local anesthetic was administered. A 5 Tristanian sheath needle was advanced until light green fluid was obtained. The sheath was advanced off the needle cannula to the pleural space. 60 cc of fluid was then aspirated into a syringe and sent to the laboratory for studies as ordered by the referring physician. Subsequently aspiration was performed for therapeutic thoracentesis. After a total aspiration of 1500 cc, the patient experienced some right-sided chest pressure. Therefore the procedure was terminated. The patient tolerated the procedure well otherwise without other immediate complication PICC   FINDINGS: See discussion above       Successful ultrasound-guided diagnostic and therapeutic thoracentesis     MACRO: None   Signed by: Joseph Schoenberger 3/18/2024 10:58 AM Dictation workstation:   [id number]    US abdomen complete    Result Date: 3/18/2024  Interpreted By:  Schoenberger, Joseph, STUDY: US ABDOMEN COMPLETE; ;  3/18/2024 9:49 am   INDICATION: Signs/Symptoms:Distended abdomen.  Significant alcohol history. Concern for cirrhosis may need paracentesis.   COMPARISON: None.   ACCESSION NUMBER(S): [id number]   ORDERING CLINICIAN: COOKIE TAVAREZ   TECHNIQUE: 4 quadrant sonographic survey for ascites as well as  sonographic evaluation of the right upper quadrant.   FINDINGS: The liver is normal in size measuring 17.9 cm in cephalocaudal dimension. The capsular margins are nodular consistent with cirrhosis. No definite focal lesion is seen.   There is no dilation of the bile ducts. The extrahepatic common duct measures 4 mm.   The gallbladder is not well distended and incompletely evaluated.   There is no right hydronephrosis. The right kidney measures 8.2 cm in longitudinal dimension.   There is no left hydronephrosis. The left kidney measures 7.3 cm in longitudinal dimension.   The spleen is not enlarged measuring 6.8 cm in cephalocaudal dimension. It is sonographically unremarkable.   There is a small to moderate volume of peritoneal fluid       Small to moderate volume of peritoneal fluid.   Cirrhosis.     MACRO: None   Signed by: Joseph Schoenberger 3/18/2024 10:46 AM Dictation workstation:   [id number]      Assessment/Plan   The patient is a 53 y.o. male with signficant past medical history of HFrEF, PAD  status post left BKA, s/p right to left femorofemoral bypass on ASA and Plavix, diabetes type 2  complicated by chronic nonhealing wounds and lower extremities, HTN, HLD, tobacco use, CKD, history of noncompliance who was presenting after a fall from his wheelchair while attempting to transfer.      GI consulted 3/20/24 for new cirrhosis noted on ultrasound.        New diagnosis of cirrhosis on ultrasound possibly 2/2 EtOH.  Decompensated cirrhosis with ascites this admission s/p paracentesis 3/19/2024 with 6.3 L removed.  Discussed with lab regarding adding on ascitic fluid studies.  However, appears only therapeutic paracentesis completed yesterday (not diagnostic) with no fluid sent to the lab in order to add on ascitic fluid labs today to test for SBP. No hepatic encephalopathy.  Low albumin (2.8) and coagulopathy (1.7) consistent with new diagnosis of cirrhosis, but no thrombocytopenia, platelets WNL at 383.   Acute hepatitis panel negative.  Will start liver workup  Right pleural effusion s/p thoracentesis 3/18/2024 - 1.5L removed. Consider hepatic hydrothorax  PIPPA on CKD- Nephrology on board  Microcytic anemia -hemoglobin this admission stable at 8.3 today.  (Hgb 10.1 on 10/25/2023).  No overt GI bleeding.  Patient has never had any scopes, but refuses endoscopic evaluation at this time. Iron studies not necessarily consistent with iron deficiency anemia with ferritin at 131.  History of EtOH abuse-reports drinking about 18 beers a day for years at one point, last drink about 2 years ago per patient  Cellulitis  Acute on chronic CHF  Leukocytosis -prescribed antibiotics.  Currently refusing vancomycin     -Patient received IV albumin yesterday, 3/19.    -on Lasix gtt, diuretics to be managed by nephrology  -paracentesis completed 3/19/2024. Unable to add on ascitic fluid labs since no ascitic fluid saved per lab.   -low salt diet  -trend CBC, CMP and INR daily  -Meld-Na score = 29  -HCC screening outpatient with abdominal ultrasound every 6 months  -No indication for emergent/urgent endoscopic evaluation at this time. screen for esophageal varices outpatient with EGD if patient amenable  -Monitor for signs of overt GI bleeding  -monitor mental status for HE  -Continue to encourage alcohol cessation  -On antibiotics, currently on cefepime and Vanco for cellulitis  -Continue supportive care per primary team  -GI will sign off for now but feel free to call with any questions or concerns.  -Patient should follow-up with GI or hepatology 2 weeks after discharge for further management of decompensated cirrhosis     Discussed with Dr. Cazares  I spent 20 minutes in the professional and overall care of this patient.      Charla Reese, APRN-CNP

## 2024-03-22 ENCOUNTER — APPOINTMENT (OUTPATIENT)
Dept: CARDIOLOGY | Facility: HOSPITAL | Age: 54
End: 2024-03-22
Payer: MEDICAID

## 2024-03-22 LAB
ALBUMIN SERPL BCP-MCNC: 3.1 G/DL (ref 3.4–5)
ALP SERPL-CCNC: 150 U/L (ref 33–120)
ALT SERPL W P-5'-P-CCNC: 20 U/L (ref 10–52)
ANION GAP SERPL CALC-SCNC: 14 MMOL/L (ref 10–20)
AST SERPL W P-5'-P-CCNC: 23 U/L (ref 9–39)
BASOPHILS # BLD AUTO: 0.02 X10*3/UL (ref 0–0.1)
BASOPHILS NFR BLD AUTO: 0.2 %
BILIRUB SERPL-MCNC: 1.3 MG/DL (ref 0–1.2)
BUN SERPL-MCNC: 84 MG/DL (ref 6–23)
CALCIUM SERPL-MCNC: 8.4 MG/DL (ref 8.6–10.3)
CHLORIDE SERPL-SCNC: 84 MMOL/L (ref 98–107)
CO2 SERPL-SCNC: 34 MMOL/L (ref 21–32)
CREAT SERPL-MCNC: 2.32 MG/DL (ref 0.5–1.3)
EGFRCR SERPLBLD CKD-EPI 2021: 33 ML/MIN/1.73M*2
EOSINOPHIL # BLD AUTO: 0.08 X10*3/UL (ref 0–0.7)
EOSINOPHIL NFR BLD AUTO: 0.7 %
ERYTHROCYTE [DISTWIDTH] IN BLOOD BY AUTOMATED COUNT: 18.2 % (ref 11.5–14.5)
GLUCOSE BLD MANUAL STRIP-MCNC: 228 MG/DL (ref 74–99)
GLUCOSE BLD MANUAL STRIP-MCNC: 300 MG/DL (ref 74–99)
GLUCOSE BLD MANUAL STRIP-MCNC: 336 MG/DL (ref 74–99)
GLUCOSE BLD MANUAL STRIP-MCNC: 363 MG/DL (ref 74–99)
GLUCOSE SERPL-MCNC: 388 MG/DL (ref 74–99)
HBV CORE IGM SER QL: NONREACTIVE
HBV SURFACE AB SER-ACNC: <3.1 MIU/ML
HCT VFR BLD AUTO: 25.2 % (ref 41–52)
HCV AB SER QL: NONREACTIVE
HGB BLD-MCNC: 8 G/DL (ref 13.5–17.5)
HOLD SPECIMEN: NORMAL
IMM GRANULOCYTES # BLD AUTO: 0.06 X10*3/UL (ref 0–0.7)
IMM GRANULOCYTES NFR BLD AUTO: 0.5 % (ref 0–0.9)
LYMPHOCYTES # BLD AUTO: 1.18 X10*3/UL (ref 1.2–4.8)
LYMPHOCYTES NFR BLD AUTO: 10.6 %
MAGNESIUM SERPL-MCNC: 1.9 MG/DL (ref 1.6–2.4)
MCH RBC QN AUTO: 19.4 PG (ref 26–34)
MCHC RBC AUTO-ENTMCNC: 31.7 G/DL (ref 32–36)
MCV RBC AUTO: 61 FL (ref 80–100)
MONOCYTES # BLD AUTO: 1.08 X10*3/UL (ref 0.1–1)
MONOCYTES NFR BLD AUTO: 9.7 %
NEUTROPHILS # BLD AUTO: 8.7 X10*3/UL (ref 1.2–7.7)
NEUTROPHILS NFR BLD AUTO: 78.3 %
NRBC BLD-RTO: 0 /100 WBCS (ref 0–0)
PLATELET # BLD AUTO: 392 X10*3/UL (ref 150–450)
POTASSIUM SERPL-SCNC: 4 MMOL/L (ref 3.5–5.3)
PROT SERPL-MCNC: 7 G/DL (ref 6.4–8.2)
RBC # BLD AUTO: 4.13 X10*6/UL (ref 4.5–5.9)
SODIUM SERPL-SCNC: 128 MMOL/L (ref 136–145)
WBC # BLD AUTO: 11.1 X10*3/UL (ref 4.4–11.3)

## 2024-03-22 PROCEDURE — 86706 HEP B SURFACE ANTIBODY: CPT | Mod: ELYLAB | Performed by: STUDENT IN AN ORGANIZED HEALTH CARE EDUCATION/TRAINING PROGRAM

## 2024-03-22 PROCEDURE — 85025 COMPLETE CBC W/AUTO DIFF WBC: CPT | Performed by: STUDENT IN AN ORGANIZED HEALTH CARE EDUCATION/TRAINING PROGRAM

## 2024-03-22 PROCEDURE — 83735 ASSAY OF MAGNESIUM: CPT | Performed by: STUDENT IN AN ORGANIZED HEALTH CARE EDUCATION/TRAINING PROGRAM

## 2024-03-22 PROCEDURE — 2500000001 HC RX 250 WO HCPCS SELF ADMINISTERED DRUGS (ALT 637 FOR MEDICARE OP): Performed by: STUDENT IN AN ORGANIZED HEALTH CARE EDUCATION/TRAINING PROGRAM

## 2024-03-22 PROCEDURE — 82947 ASSAY GLUCOSE BLOOD QUANT: CPT

## 2024-03-22 PROCEDURE — 99231 SBSQ HOSP IP/OBS SF/LOW 25: CPT | Performed by: PLASTIC SURGERY

## 2024-03-22 PROCEDURE — 86705 HEP B CORE ANTIBODY IGM: CPT | Mod: ELYLAB | Performed by: STUDENT IN AN ORGANIZED HEALTH CARE EDUCATION/TRAINING PROGRAM

## 2024-03-22 PROCEDURE — 2500000002 HC RX 250 W HCPCS SELF ADMINISTERED DRUGS (ALT 637 FOR MEDICARE OP, ALT 636 FOR OP/ED): Performed by: STUDENT IN AN ORGANIZED HEALTH CARE EDUCATION/TRAINING PROGRAM

## 2024-03-22 PROCEDURE — 2500000004 HC RX 250 GENERAL PHARMACY W/ HCPCS (ALT 636 FOR OP/ED): Performed by: STUDENT IN AN ORGANIZED HEALTH CARE EDUCATION/TRAINING PROGRAM

## 2024-03-22 PROCEDURE — 97535 SELF CARE MNGMENT TRAINING: CPT | Mod: GO,CO

## 2024-03-22 PROCEDURE — 93005 ELECTROCARDIOGRAM TRACING: CPT

## 2024-03-22 PROCEDURE — 86803 HEPATITIS C AB TEST: CPT | Mod: ELYLAB | Performed by: STUDENT IN AN ORGANIZED HEALTH CARE EDUCATION/TRAINING PROGRAM

## 2024-03-22 PROCEDURE — 2500000004 HC RX 250 GENERAL PHARMACY W/ HCPCS (ALT 636 FOR OP/ED)

## 2024-03-22 PROCEDURE — 36415 COLL VENOUS BLD VENIPUNCTURE: CPT | Performed by: STUDENT IN AN ORGANIZED HEALTH CARE EDUCATION/TRAINING PROGRAM

## 2024-03-22 PROCEDURE — 80053 COMPREHEN METABOLIC PANEL: CPT | Performed by: STUDENT IN AN ORGANIZED HEALTH CARE EDUCATION/TRAINING PROGRAM

## 2024-03-22 PROCEDURE — 93010 ELECTROCARDIOGRAM REPORT: CPT | Performed by: INTERNAL MEDICINE

## 2024-03-22 PROCEDURE — 99232 SBSQ HOSP IP/OBS MODERATE 35: CPT | Performed by: STUDENT IN AN ORGANIZED HEALTH CARE EDUCATION/TRAINING PROGRAM

## 2024-03-22 PROCEDURE — 1210000001 HC SEMI-PRIVATE ROOM DAILY

## 2024-03-22 PROCEDURE — 97530 THERAPEUTIC ACTIVITIES: CPT | Mod: GP,CQ

## 2024-03-22 PROCEDURE — 2500000004 HC RX 250 GENERAL PHARMACY W/ HCPCS (ALT 636 FOR OP/ED): Performed by: INTERNAL MEDICINE

## 2024-03-22 RX ORDER — CLOPIDOGREL BISULFATE 75 MG/1
75 TABLET ORAL DAILY
Status: DISCONTINUED | OUTPATIENT
Start: 2024-03-22 | End: 2024-03-26 | Stop reason: HOSPADM

## 2024-03-22 RX ORDER — ACETAMINOPHEN 500 MG
5 TABLET ORAL NIGHTLY
Status: DISCONTINUED | OUTPATIENT
Start: 2024-03-22 | End: 2024-03-26 | Stop reason: HOSPADM

## 2024-03-22 RX ORDER — ATORVASTATIN CALCIUM 80 MG/1
80 TABLET, FILM COATED ORAL DAILY
Status: DISCONTINUED | OUTPATIENT
Start: 2024-03-22 | End: 2024-03-26 | Stop reason: HOSPADM

## 2024-03-22 RX ORDER — LISINOPRIL 5 MG/1
5 TABLET ORAL NIGHTLY
Status: DISCONTINUED | OUTPATIENT
Start: 2024-03-22 | End: 2024-03-26 | Stop reason: HOSPADM

## 2024-03-22 RX ORDER — ASPIRIN 81 MG/1
81 TABLET ORAL DAILY
Status: DISCONTINUED | OUTPATIENT
Start: 2024-03-22 | End: 2024-03-26 | Stop reason: HOSPADM

## 2024-03-22 RX ORDER — INSULIN GLARGINE 100 [IU]/ML
20 INJECTION, SOLUTION SUBCUTANEOUS NIGHTLY
Status: DISCONTINUED | OUTPATIENT
Start: 2024-03-22 | End: 2024-03-24

## 2024-03-22 RX ORDER — INSULIN GLARGINE 100 [IU]/ML
20 INJECTION, SOLUTION SUBCUTANEOUS NIGHTLY
Start: 2024-03-22 | End: 2024-05-01 | Stop reason: HOSPADM

## 2024-03-22 RX ORDER — POTASSIUM CHLORIDE 20 MEQ/1
20 TABLET, EXTENDED RELEASE ORAL 3 TIMES DAILY
Status: DISCONTINUED | OUTPATIENT
Start: 2024-03-22 | End: 2024-03-22

## 2024-03-22 RX ADMIN — Medication 5 MG: at 22:14

## 2024-03-22 RX ADMIN — ATORVASTATIN CALCIUM 80 MG: 80 TABLET, FILM COATED ORAL at 22:14

## 2024-03-22 RX ADMIN — MORPHINE SULFATE 2 MG: 2 INJECTION, SOLUTION INTRAMUSCULAR; INTRAVENOUS at 22:15

## 2024-03-22 RX ADMIN — ENOXAPARIN SODIUM 40 MG: 40 INJECTION SUBCUTANEOUS at 14:26

## 2024-03-22 RX ADMIN — CLOPIDOGREL BISULFATE 75 MG: 75 TABLET, FILM COATED ORAL at 16:35

## 2024-03-22 RX ADMIN — CEFEPIME 2 G: 2 INJECTION, POWDER, FOR SOLUTION INTRAVENOUS at 11:38

## 2024-03-22 RX ADMIN — METHIMAZOLE 5 MG: 5 TABLET ORAL at 09:00

## 2024-03-22 RX ADMIN — INSULIN LISPRO 6 UNITS: 100 INJECTION, SOLUTION INTRAVENOUS; SUBCUTANEOUS at 16:35

## 2024-03-22 RX ADMIN — METOPROLOL SUCCINATE 25 MG: 25 TABLET, EXTENDED RELEASE ORAL at 09:00

## 2024-03-22 RX ADMIN — LISINOPRIL 5 MG: 5 TABLET ORAL at 22:14

## 2024-03-22 RX ADMIN — DOCUSATE SODIUM 100 MG: 100 CAPSULE, LIQUID FILLED ORAL at 09:00

## 2024-03-22 RX ADMIN — METOLAZONE 5 MG: 5 TABLET ORAL at 09:00

## 2024-03-22 RX ADMIN — VANCOMYCIN HYDROCHLORIDE 500 MG: 500 INJECTION, POWDER, LYOPHILIZED, FOR SOLUTION INTRAVENOUS at 10:19

## 2024-03-22 RX ADMIN — INSULIN GLARGINE 20 UNITS: 100 INJECTION, SOLUTION SUBCUTANEOUS at 22:15

## 2024-03-22 RX ADMIN — FUROSEMIDE 15 MG/HR: 10 INJECTION, SOLUTION INTRAMUSCULAR; INTRAVENOUS at 13:18

## 2024-03-22 RX ADMIN — DOCUSATE SODIUM 100 MG: 100 CAPSULE, LIQUID FILLED ORAL at 22:14

## 2024-03-22 RX ADMIN — INSULIN LISPRO 9 UNITS: 100 INJECTION, SOLUTION INTRAVENOUS; SUBCUTANEOUS at 11:38

## 2024-03-22 RX ADMIN — INSULIN LISPRO 15 UNITS: 100 INJECTION, SOLUTION INTRAVENOUS; SUBCUTANEOUS at 08:00

## 2024-03-22 RX ADMIN — PANTOPRAZOLE SODIUM 40 MG: 40 TABLET, DELAYED RELEASE ORAL at 07:00

## 2024-03-22 RX ADMIN — ASPIRIN 81 MG: 81 TABLET, COATED ORAL at 16:34

## 2024-03-22 ASSESSMENT — PAIN SCALES - GENERAL
PAINLEVEL_OUTOF10: 7
PAINLEVEL_OUTOF10: 0 - NO PAIN

## 2024-03-22 ASSESSMENT — COGNITIVE AND FUNCTIONAL STATUS - GENERAL
HELP NEEDED FOR BATHING: A LITTLE
MOVING FROM LYING ON BACK TO SITTING ON SIDE OF FLAT BED WITH BEDRAILS: A LITTLE
STANDING UP FROM CHAIR USING ARMS: A LOT
MOBILITY SCORE: 13
TOILETING: A LITTLE
TURNING FROM BACK TO SIDE WHILE IN FLAT BAD: A LOT
TOILETING: A LOT
PERSONAL GROOMING: A LITTLE
MOVING FROM LYING ON BACK TO SITTING ON SIDE OF FLAT BED WITH BEDRAILS: A LITTLE
MOVING TO AND FROM BED TO CHAIR: A LITTLE
CLIMB 3 TO 5 STEPS WITH RAILING: TOTAL
DRESSING REGULAR UPPER BODY CLOTHING: A LITTLE
DRESSING REGULAR LOWER BODY CLOTHING: A LOT
TURNING FROM BACK TO SIDE WHILE IN FLAT BAD: A LITTLE
DRESSING REGULAR LOWER BODY CLOTHING: A LOT
MOBILITY SCORE: 11
DAILY ACTIVITIY SCORE: 16
PERSONAL GROOMING: A LITTLE
STANDING UP FROM CHAIR USING ARMS: A LOT
WALKING IN HOSPITAL ROOM: TOTAL
HELP NEEDED FOR BATHING: A LOT
MOVING TO AND FROM BED TO CHAIR: A LOT
DAILY ACTIVITIY SCORE: 18
CLIMB 3 TO 5 STEPS WITH RAILING: TOTAL
WALKING IN HOSPITAL ROOM: TOTAL
DRESSING REGULAR UPPER BODY CLOTHING: A LITTLE

## 2024-03-22 ASSESSMENT — PAIN - FUNCTIONAL ASSESSMENT
PAIN_FUNCTIONAL_ASSESSMENT: 0-10

## 2024-03-22 ASSESSMENT — PAIN SCALES - WONG BAKER: WONGBAKER_NUMERICALRESPONSE: NO HURT

## 2024-03-22 ASSESSMENT — ACTIVITIES OF DAILY LIVING (ADL): HOME_MANAGEMENT_TIME_ENTRY: 23

## 2024-03-22 NOTE — PROGRESS NOTES
Hitesh Jurado is a 53 y.o. male on day 7 of admission presenting with Acute on chronic combined systolic (congestive) and diastolic (congestive) heart failure (CMS/HCC).      Subjective   Patient is a stable, no acute distress, no complaint  Renal function improving, hemoglobin improving  Nephrology following, on Lasix drip  Anticipate discharge in next 24 hours if hemodynamically stable    Objective     Last Recorded Vitals  /65 (BP Location: Left arm, Patient Position: Sitting)   Pulse 105   Temp 36.6 °C (97.9 °F) (Temporal)   Resp 18   Wt 68 kg (150 lb)   SpO2 94%   Intake/Output last 3 Shifts:    Intake/Output Summary (Last 24 hours) at 3/22/2024 1536  Last data filed at 3/22/2024 1500  Gross per 24 hour   Intake 1037.28 ml   Output 2600 ml   Net -1562.72 ml       Admission Weight  Weight: 68 kg (150 lb) (03/15/24 0843)    Daily Weight  03/15/24 : 68 kg (150 lb)    Image Results  ECG 12 Lead  Sinus rhythm with occasional Premature ventricular complexes  Right bundle branch block  Abnormal ECG  When compared with ECG of 21-MAR-2024 07:17, (unconfirmed)  Premature ventricular complexes are now Present      Physical Exam    General: Well-developed ill-appearing male, in no acute distress  HEENT: AT, NC, no JVD, no lymphadenopathy, neck supple, bilateral temporal cachexia  Lungs: Clear, no wheezing, no crackles  Cardiac: Normal S1-S2, no murmur, no gallop  Abdomen: Soft, nontender, distended, positive bowel sound  Extremities: Left BKA with ulcers on the knee, ulcer on the right knee noted, right lower extremity wrapped  Neurological: Alert awake oriented x3, sensation intact, clear speech    Assessment/Plan   Principal Problem:    Acute on chronic combined systolic (congestive) and diastolic (congestive) heart failure (CMS/HCC)    Acute on chronic sytolic CHF exacerbation   Large right-sided pleural effusion  - Echocardiogram 10/23: Showed EF of 15 to 20%  - S/P thoracentesis on 3/18, 1.5L  drained  - Cw Lasix drip per nephrology  - Hold Aldactone, continue to monitor intake and output     Decompensated liver cirrhosis with large ascites  Possible hepatorenal syndrome  - RUQ ultrasound showed cirrhosis  - No history of liver cirrhosis from his [facility] admissions (risk factors are history of alcohol abuse)  - Hepatitis panel   - Underwent paracentesis 3/20, 6.3 L removed - albumin replaced  - GI signed off, outpatient follow-up     Acute kidney injury on chronic kidney disease  ATN vs hepatorenal sydnrome  Hyponatremia  - Multifactorial cause of PIPPA - improving  - Dc Aldactone  - Cw Lasix drip  - Nephrology following     Microcytic anemia, improving     History of peripheral vascular disease  Status post left BKA  Infected lower extremity ulcers  - He has a history of peripheral vascular disease, underwent peripheral angiogram on 11/23 s/p RLE DCB of iliac and recanalization of R SFA on 12/4. Heparin gtt d/vinod and he was started on DAPT.  - Vascular surgery following - outpatient follow up with [facility] vascular surgeon  - ID following  - Plastic surgery following  - Continue Vanco and cefepime  - Continue local wound care     Diabetes mellitus  - Patient is noted to be hyperglycemic, Lantus increased to 20 units  - Continue sliding scale     History of DVT  -Not on any AC at home     VTE Prophylaxis: Enoxaparin subcutaneous     Disposition/Daily update: Renal function is stabilizing, continue Lasix drip per nephrology.  Respiratory standpoint is also improving, weaning oxygen as tolerated.  Will need SNF on discharge.  Appreciate recommendations from vascular surgery, nephrology, ID, plastic surgery at this time.    Anticipate discharge in next 24 hours if hemodynamically stable       Robson Garcia MD

## 2024-03-22 NOTE — PROGRESS NOTES
Plastic Surgery Note    Afebrile, vital signs stable  Right dorsal foot wound and left below-knee amputation wound clean and granulating  Impression: Open wound will right dorsal foot and left below-knee amputation    Continue current dressing changes

## 2024-03-22 NOTE — DISCHARGE SUMMARY
Discharge Diagnosis  Acute on chronic combined systolic (congestive) and diastolic (congestive) heart failure (CMS/HCC)  Large right-sided pleural effusion, status post paracentesis with 1500 fluid drainage  Large ascites, status post paracentesis with 6500 mL fluid drainage  PIPPA/CKD, ATN, hyponatremia  Macrocytic anemia  PVD  Infected lower extremity ulcers  DM2, insulin-dependent  History of DVT    Issues Requiring Follow-Up  PIPPA/CKD, outpatient follow-up with nephrologist  Peripheral vascular disease, outpatient follow-up with vascular surgery  CHF, outpatient follow-up with cardiologist  Decompensated cirrhosis, outpatient follow-up with GI clinic  Bilateral lower extremity wounds, outpatient follow-up with wound care clinic and ID office as needed    Discharge Meds     Your medication list        CHANGE how you take these medications        Instructions Last Dose Given Next Dose Due   gabapentin 100 mg capsule  Commonly known as: Neurontin  What changed:   medication strength  how much to take      Take 1 capsule (100 mg) by mouth 3 times a day.       insulin glargine 100 unit/mL injection  Commonly known as: Lantus  What changed: how much to take      Inject 20 Units under the skin once daily at bedtime. Take as directed per insulin instructions.              CONTINUE taking these medications        Instructions Last Dose Given Next Dose Due   acetaminophen 500 mg tablet  Commonly known as: Tylenol           aspirin 81 mg EC tablet           atorvastatin 80 mg tablet  Commonly known as: Lipitor           carvedilol 12.5 mg tablet  Commonly known as: Coreg           clopidogrel 75 mg tablet  Commonly known as: Plavix           furosemide 20 mg tablet  Commonly known as: Lasix           insulin lispro 100 unit/mL injection  Commonly known as: HumaLOG           Jardiance 10 mg  Generic drug: empagliflozin           melatonin 5 mg tablet           methIMAzole 10 mg tablet  Commonly known as: Tapazole      Take 2  tablets (20 mg) by mouth every 8 hours.       metoprolol succinate XL 25 mg 24 hr tablet  Commonly known as: Toprol-XL           mupirocin 2 % ointment  Commonly known as: Bactroban           potassium chloride CR 20 mEq ER tablet  Commonly known as: Klor-Con M20           spironolactone 25 mg tablet  Commonly known as: Aldactone           Tradjenta 5 mg tablet  Generic drug: linaGLIPtin                  STOP taking these medications      lisinopril 5 mg tablet        metFORMIN 1,000 mg tablet  Commonly known as: Glucophage        omeprazole 40 mg DR capsule  Commonly known as: PriLOSEC        torsemide 20 mg tablet  Commonly known as: Demadex               ASK your doctor about these medications        Instructions Last Dose Given Next Dose Due   azithromycin 500 mg tablet  Commonly known as: Zithromax      Take 1 tablet (500 mg) by mouth once every 24 hours. Do not start before October 30, 2023.                 Where to Get Your Medications        Information about where to get these medications is not yet available    Ask your nurse or doctor about these medications  gabapentin 100 mg capsule  insulin glargine 100 unit/mL injection         Test Results Pending At Discharge  Pending Labs       Order Current Status    Tissue/Wound Culture/Smear Preliminary result            Hospital Course  Hitesh Jurado is a 53 y.o. male with a significant past medical history of  HFrEF, PAD  status post left BKA, diabetes type 2  complicated by chronic nonhealing wounds and lower extremities who was presenting after a fall from his wheelchair while attempting to transfer.  He was also admitted for the management of following issues:    Acute on chronic sytolic CHF exacerbation, resolved  Large right-sided pleural effusion  Echocardiogram 10/23: Showed EF of 15 to 20%  -S/P thoracentesis on 3/18, 1.5L removed  -Patient was placed on Lasix drip for few days however he developed hypotension, Lasix drip was discontinued and he  was a started on IV Lasix.  Due to continuous hypotension all the diuretics were held till blood pressure improved and he was resumed on torsemide and metolazone on discharge.  -Held Aldactone, monitored intake and output, Aldactone was discontinued on discharge     Decompensated liver cirrhosis with large ascites  Possible hepatorenal syndrome  -RUQ ultrasound showed cirrhosis  -No history of liver cirrhosis from his [facility] admissions (risk factors are history of alcohol abuse)  -Hepatitis panel normal   -Underwent paracentesis 3/20, 6.3 L removed - albumin replaced  -outpatient follow up with GI clinic      Acute kidney injury on chronic kidney disease  ATN vs hepatorenal sydnrome  Hyponatremia  -Multifactorial cause of PIPPA, showed improvement overall    -Discontinued Aldactone   -monitored renal function and avoided nephrotoxic agents      Microcytic anemia  -daily checked CBC, it remained stable      History of peripheral vascular disease  Status post left BKA  Infected lower extremity ulcers  -He has a history of peripheral vascular disease, underwent peripheral angiogram on 11/23 s/p RLE DCB of iliac and recanalization of R SFA on 12/4. Heparin gtt d/vinod and he was started on DAPT.  -Vascular recommended outpatient follow up with [facility] vascular surgeon  -ID consulted, was on IV Vanco and cefepime during the hospital stay and was switched to oral doxycycline on discharge for 7 days  -Plastic surgery consulted and took care of the wounds     Diabetes mellitus  -ISS, hypoglycemia protocol, POCT glucose, DM diet, renal diet, Lantus, scheduled lispro all were considered.  Patient had fluctuations in his blood sugars during the hospital stay.     History of DVT  -Not on anticoagulation at home     VTE Prophylaxis: Was with enoxaparin subcutaneous     Currently patient is hemodynamically stable and ready for discharge to a SNF for better recovery.  He will be continued on torsemide and metolazone per  nephrology recommendation.  Aldactone was stopped, will be continued on Lantus and lispro for diabetic control.  Doxycycline was placed on discharge for 7 more days.  He will follow-up as outpatient with wound care clinic, ID office, cardiology office, GI office, vascular surgery office, nephrology office for further recommendation and medication adjustment.  He will be continued on rest of his home medication.       Pertinent Physical Exam At Time of Discharge  Physical Exam  General: Well-developed ill-appearing male, in no acute distress  HEENT: AT, NC, no JVD, no lymphadenopathy, neck supple, bilateral temporal cachexia  Lungs: Clear, no wheezing, no crackles  Cardiac: Normal S1-S2, no murmur, no gallop  Abdomen: Soft, nontender, distended, positive bowel sound  Extremities: Left BKA with ulcers on the knee, ulcer on the right knee noted, right lower extremity wrapped  Neurological: Alert awake oriented x3, sensation intact, clear speech    Outpatient Follow-Up  Future Appointments   Date Time Provider Department Center   4/1/2024  9:00 AM Roland J Reyes, MD [facility]       Time spent 40 minutes  Robson Garcia MD

## 2024-03-22 NOTE — PROGRESS NOTES
Hitesh Jurado is a 53 y.o. male on day 6 of admission presenting with Acute on chronic combined systolic (congestive) and diastolic (congestive) heart failure (CMS/HCC).    ASSESSMENT & PLAN:             Patient was seen and examined bedside this evening.  He had no acute events overnight.  This morning was resting calmly states that he does continue to urinate quite well with Lasix infusion.  States his breathing is improved after the thoracentesis and paracentesis.     Last Recorded Vitals:  Vitals          Vitals:     03/21/24 0038 03/21/24 0740 03/21/24 0909 03/21/24 1318   BP: 118/71 119/67 114/68 101/61   BP Location: Left arm Left arm   Left arm   Patient Position: Sitting Sitting   Sitting   Pulse: 100 103 103 99   Resp: 18 18   18   Temp: 36.8 °C (98.2 °F) 36.9 °C (98.4 °F)   36.5 °C (97.7 °F)   TempSrc: Temporal Temporal   Temporal   SpO2: 92% 95%   95%   Weight:           Height:                 Last I/O:  I/O last 3 completed shifts:  In: 741.2 (10.9 mL/kg) [P.O.:650; I.V.:91.2 (1.3 mL/kg)]  Out: 5800 (85.2 mL/kg) [Urine:5800 (2.4 mL/kg/hr)]  Weight: 68 kg      Physical Exam  Vitals reviewed.   Constitutional:       General: He is not in acute distress.     Appearance: Normal appearance. He is normal weight. He is not ill-appearing.   HENT:      Head: Normocephalic and atraumatic.   Eyes:      Extraocular Movements: Extraocular movements intact.      Conjunctiva/sclera: Conjunctivae normal.   Cardiovascular:      Rate and Rhythm: Normal rate and regular rhythm.      Pulses: Normal pulses.      Heart sounds: Normal heart sounds.   Pulmonary:      Effort: Pulmonary effort is normal.      Breath sounds: Normal breath sounds.   Abdominal:      General: Bowel sounds are normal. There is distension.      Palpations: Abdomen is soft.      Tenderness: There is no abdominal tenderness. There is no guarding.      Comments: Abdomen slightly distended however improved.   Musculoskeletal:      Cervical back:  Normal range of motion and neck supple.      Comments: Left BKA site not examined with recent dressing change.   Neurological:      General: No focal deficit present.      Mental Status: He is alert and oriented to person, place, and time. Mental status is at baseline.   Psychiatric:         Mood and Affect: Mood normal.         Behavior: Behavior normal.      Inpatient Medications:  cefepime, 2 g, intravenous, q24h  docusate sodium, 100 mg, oral, BID  enoxaparin, 40 mg, subcutaneous, q24h  insulin glargine, 10 Units, subcutaneous, Nightly  insulin lispro, 0-15 Units, subcutaneous, TID with meals  lidocaine, 5 mL, infiltration, Once  lidocaine, 1 patch, transdermal, Daily  melatonin, 3 mg, oral, Daily  methIMAzole, 5 mg, oral, Daily  metOLazone, 5 mg, oral, Daily  metoprolol succinate XL, 25 mg, oral, Daily  pantoprazole, 40 mg, oral, Daily before breakfast   Or  pantoprazole, 40 mg, intravenous, Daily before breakfast  perflutren lipid microspheres, 0.5-10 mL of dilution, intravenous, Once in imaging  perflutren protein A microsphere, 0.5 mL, intravenous, Once in imaging  [Held by provider] spironolactone, 25 mg, oral, Daily  sulfur hexafluoride microsphr, 2 mL, intravenous, Once in imaging  vancomycin, 500 mg, intravenous, q24h     PRN Medications  PRN medications: acetaminophen **OR** acetaminophen **OR** acetaminophen, acetaminophen **OR** acetaminophen **OR** acetaminophen, benzocaine-menthol, dextromethorphan-guaifenesin, dextrose, dextrose, glucagon, guaiFENesin, morphine, ondansetron ODT **OR** ondansetron, oxygen, vancomycin  Continuous Medications:  furosemide, 15 mg/hr, Last Rate: 15 mg/hr (03/21/24 1009)        Labs:         Results from last 7 days   Lab Units 03/21/24  0410 03/20/24  0528 03/19/24  0422   WBC AUTO x10*3/uL 11.7* 15.4* 14.8*   RBC AUTO x10*6/uL 3.87* 4.33* 4.33*   HEMOGLOBIN g/dL 7.5* 8.3* 8.4*   HEMATOCRIT % 23.6* 26.9* 28.2*   MCV fL 61* 62* 65*   MCH pg 19.4* 19.2* 19.4*   MCHC g/dL  31.8* 30.9* 29.8*   RDW % 18.0* 18.5* 18.5*   PLATELETS AUTO x10*3/uL 383 383 385             Results from last 7 days   Lab Units 03/21/24 0410 03/20/24 0528 03/19/24  0422   SODIUM mmol/L 131* 127* 133*  133*   POTASSIUM mmol/L 4.2 5.2 4.6  4.6   CHLORIDE mmol/L 88* 90* 94*  94*   CO2 mmol/L 31 24 29  29   BUN mg/dL 84* 77* 68*  68*   CREATININE mg/dL 2.44* 2.49* 2.51*  2.51*   GLUCOSE mg/dL 224* 386* 126*  126*   PROTEIN TOTAL g/dL 6.4 6.0* 7.2   CALCIUM mg/dL 8.3* 8.1* 8.4*  8.4*   BILIRUBIN TOTAL mg/dL 1.4* 1.9* 1.4*   ALK PHOS U/L 154* 150* 135*   AST U/L 24 31 31   ALT U/L 20 21 25             Results from last 7 days   Lab Units 03/21/24 0410 03/20/24  0528 03/16/24  0541   MAGNESIUM mg/dL 1.87 1.80 1.89          Imaging:  ECG 12 Lead  Normal sinus rhythm  Right bundle branch block  Abnormal ECG  When compared with ECG of 21-MAR-2024 07:16, (unconfirmed)  Premature ventricular complexes are no longer Present              Acute on chronic sytolic CHF exacerbation   Large right-sided pleural effusion  - Echocardiogram 10/23: Showed EF of 15 to 20%  - S/P thoracentesis on 3/18, 1.5L  - Currently remains on Lasix drip per nephrology  - Hold Aldactone, continue to monitor intake and output     Decompensated liver cirrhosis with large ascites  Possible hepatorenal syndrome  - RUQ ultrasound which showed cirrhosis  - No history of liver cirrhosis from his [facility] admissions (risk factors are history of alcohol abuse)  - Hepatitis panel  - Underwent paracentesis 3/20, 6.3 L removed - albumin replaced  - GI following     Acute kidney injury on chronic kidney disease  ATN vs hepatorenal sydnrome  Hyponatremia  - Multifactorial cause of PIPPA - improving  - Discontinue spironolactone  - Creatinine improved today, continue Lasix drip.  - Nephrology following     Microcytic anemia     History of peripheral vascular disease  Status post left BKA  Infected lower extremity ulcers  - He has a history of  peripheral vascular disease, underwent peripheral angiogram on 11/23 s/p RLE DCB of iliac and recanalization of R SFA on 12/4. Heparin gtt d/vinod and he was started on DAPT.  - Vascular surgery following - outpatient follow up with [facility] vascular surgeon  - ID following  - Plastic surgery following  - Continue Vanco and cefepime  - Continue local wound care     Diabetes mellitus  - Patient is noted to be hyperglycemic, start Lantus 10 units  - Continue sliding scale     History of DVT  -Will need to review his medications to see if patient is on anticoagulation     VTE Prophylaxis: Enoxaparin subcutaneous     Disposition/Daily update: Renal function is stabilizing, continue Lasix drip per nephrology.  Respiratory standpoint is also improving, weaning oxygen as tolerated.  Will need SNF on discharge.  Appreciate recommendations from vascular surgery, nephrology, ID, plastic surgery at this time.      Huy Ricci MD

## 2024-03-22 NOTE — PROGRESS NOTES
03/22/24 1523   Current Planned Discharge Disposition   Current Planned Discharge Disposition SNF  ([facility])     Ins. Precert has been obtained.  Pt. Will discharge this date to [facility] at 6:00pm via ambulance.  Pt. Aware and in agreement to transfer.    Update: discharge cancelled for today, not cleared by nephrology. Precert good through 3/23, then will require new precert.

## 2024-03-22 NOTE — PROGRESS NOTES
"Physical Therapy    Physical Therapy Treatment    Patient Name: Hitesh Jurado  MRN: [id number]  Today's Date: 3/22/2024  Time Calculation  Start Time: 1029  Stop Time: 1123  Time Calculation (min): 54 min       Assessment/Plan   End of Session Communication: Bedside nurse  End of Session Patient Position:  (Patient refused to sit up in chair.  Wanted to remain sitting EOB.  RN aware.  Call light and tray in reach.)    PT Plan  Inpatient/Swing Bed or Outpatient: Inpatient  Treatment/Interventions: Bed mobility, Transfer training, Balance training, Therapeutic exercise, Therapeutic activity, Home exercise program, Postural re-education, Positioning     PT Frequency: 2 times per week  PT Discharge Recommendations: Moderate intensity level of continued care    PT Recommended Transfer Status: Assist x1    General Visit Information:   PT  Visit  PT Received On: 03/22/24    General  Co-Treatment:  (co-treatment performed with OT to maximize patient safety and function. Two units billed for PT)  Prior to Session Communication:  (Cleared by RN for PT)  Patient Position Received:  (Patient presents sleeping in bed.  Very lethargic and slow to rouse.  Max encouragement needed for participation)    General Comment: To ED 3/15 after falling.  Admitted for fall, reports significant weight gain and feeling bloated, CHF exacerbation, acute hypoxic respiratory failure, large R sided pleural effusion, decompensated liver cirrhosis with large ascites, possible hepatorenal syndrome, PIPPA on CKD, anemia.    General Observations:   General Observation:  (L BKA; IV; no alarm)      Precautions:  Precautions  Medical Precautions: Fall precautions    Pain:  Pain Assessment  Pain Assessment: 0-10  Pain Score: 0 - No pain (c/o fatigue.  \"I haven't slept for 3 or 4 days\")    Cognition:  Cognition  Overall Cognitive Status:  (Patient initially argementative, using foul language and being rude to therapists. Required encouagement for minimal " "activity.  Mood improved as treatment progressed, however he was still not receptive to education.)  Safety/Judgement: Exceptions to WFL (Patient not receptive to safety education.)    Balance:   Static Sitting Balance  Static Sitting-Level of Assistance:  (Good)  Static Sitting-Comment/Number of Minutes:  (25 minutes)  Dynamic Sitting Balance  Dynamic Sitting-Comments:  (fair with RLE and B UE support)        Activity Tolerance:  Activity Tolerance  Endurance: Endurance does not limit participation in activity       Bed Mobility  Bed Mobility:  (supine-->sit: SBAx1  Bed flat.  Patient performs long sit, then swings LE's/hips around to EOB.  Denies dizziness.)   Scooting up towards HOB: SBA x1    Transfers  Transfer:  (Patient transferred bed<->BSC with CGAx1 for safety.  He admantly refused to use a walker, stating \"they aren't safe\".  Patient performs low pivot transfer without any LOB.  Uses arms of BSC to grab onto when transferring onto commode.  More difficulty transferring back to bed as he was moving towards his BKA side.         Outcome Measures:  [facility] Basic Mobility  Turning from your back to your side while in a flat bed without using bedrails: A little  Moving from lying on your back to sitting on the side of a flat bed without using bedrails: A little  Moving to and from bed to chair (including a wheelchair): A little  Standing up from a chair using your arms (e.g. wheelchair or bedside chair): A lot  To walk in hospital room: Total  Climbing 3-5 steps with railing: Total  Basic Mobility - Total Score: 13    Education Documentation  Mobility Training, taught by Pauly Roa PTA at 3/22/2024 12:52 PM.  Learner: Patient  Readiness: Nonacceptance  Method: Explanation, Demonstration  Response: Needs Reinforcement, No Evidence of Learning    Education Comments  Individual(s) Educated: Patient  Education Provided:  (Patient educted on the importance of participation with therapy and the importance of " adhearnce to safety instructions.  Reinforced to patient that he has already had a fall and that he can't get up on his own.  Patient Response to Education:  (Patient agrumentative; not receptive to education.     Encounter Problems       Encounter Problems (Active)       PT Problem       Pt. will transfer supine/sit with CGA (Progressing)       Start:  03/19/24    Expected End:  04/02/24            Patient will transfer from bed to chair with LRAD and modA (Progressing)       Start:  03/19/24    Expected End:  04/02/24            Patient will tolerate 10 reps of therex (Not Progressing)       Start:  03/19/24    Expected End:  04/02/24

## 2024-03-22 NOTE — PROGRESS NOTES
?  HISTORY OF PRESENT ILLNESS:    Feels ok, some pain wounds. No fever  Current Medications:    Current Facility-Administered Medications   Medication Dose Route Frequency Provider Last Rate Last Admin    acetaminophen (Tylenol) tablet 650 mg  650 mg oral q4h PRN Izzy Early MD   650 mg at 03/21/24 2003    Or    acetaminophen (Tylenol) oral liquid 650 mg  650 mg nasogastric tube q4h PRN Izzy Early MD        Or    acetaminophen (Tylenol) suppository 650 mg  650 mg rectal q4h PRN Izzy Early MD        acetaminophen (Tylenol) tablet 650 mg  650 mg oral q4h PRN Izzy Early MD        Or    acetaminophen (Tylenol) oral liquid 650 mg  650 mg oral q4h PRN Izzy Early MD        Or    acetaminophen (Tylenol) suppository 650 mg  650 mg rectal q4h PRN Izzy Early MD        benzocaine-menthol (Cepastat Sore Throat) 15-3.6 mg lozenge 1 lozenge  1 lozenge Mouth/Throat q2h PRN Izzy Early MD        cefepime (Maxipime) in dextrose 5 % water (D5W) 100 mL IV 2 g  2 g intravenous q24h Izzy Early MD   Stopped at 03/21/24 1235    dextromethorphan-guaifenesin (Robitussin DM)  mg/5 mL oral liquid 5 mL  5 mL oral q4h PRN Izzy Early MD        dextrose 50 % injection 12.5 g  12.5 g intravenous q15 min PRN Izzy Early MD        dextrose 50 % injection 25 g  25 g intravenous q15 min PRN Izzy Early MD        docusate sodium (Colace) capsule 100 mg  100 mg oral BID Izzy Early MD   100 mg at 03/21/24 2003    enoxaparin (Lovenox) syringe 40 mg  40 mg subcutaneous q24h Izzy Early MD   40 mg at 03/21/24 1636    furosemide (Lasix) 500 mg in 50 mL (10 mg/mL) infusion  15 mg/hr intravenous Continuous Malgorzata Williamson MD 1.5 mL/hr at 03/21/24 1009 15 mg/hr at 03/21/24 1009    glucagon (Glucagen) injection 1 mg  1 mg intramuscular q15 min PRN Izzy Early MD        guaiFENesin (Mucinex) 12 hr tablet 600 mg  600 mg oral q12h PRN Izzy Early MD        insulin glargine (Lantus)  injection 10 Units  10 Units subcutaneous Nightly Rachelle Sosa MD   10 Units at 03/21/24 2112    insulin lispro (HumaLOG) injection 0-15 Units  0-15 Units subcutaneous TID with meals Rachelle Sosa MD   3 Units at 03/21/24 1657    lidocaine (Xylocaine) 10 mg/mL (1 %) injection 50 mg  5 mL infiltration Once Rachelle Sosa MD        lidocaine 4 % patch 1 patch  1 patch transdermal Daily SHANDA Diaz-CNP   1 patch at 03/19/24 0818    melatonin tablet 3 mg  3 mg oral Daily Izzy Early MD   3 mg at 03/21/24 2003    methIMAzole (Tapazole) tablet 5 mg  5 mg oral Daily Perez Lopez MD   5 mg at 03/21/24 0910    metOLazone (Zaroxolyn) tablet 5 mg  5 mg oral Daily Malgorzata Williamson MD   5 mg at 03/21/24 0909    metoprolol succinate XL (Toprol-XL) 24 hr tablet 25 mg  25 mg oral Daily Perez Lopez MD   25 mg at 03/21/24 0909    morphine injection 2 mg  2 mg intravenous q3h PRN Ilda Bello MD   2 mg at 03/21/24 2056    ondansetron ODT (Zofran-ODT) disintegrating tablet 4 mg  4 mg oral q8h PRN Izzy Early MD        Or    ondansetron (Zofran) injection 4 mg  4 mg intravenous q8h PRN Izzy Early MD        oxygen (O2) therapy   inhalation PRN Izzy Early MD        pantoprazole (ProtoNix) EC tablet 40 mg  40 mg oral Daily before breakfast Izzy Early MD   40 mg at 03/21/24 0601    Or    pantoprazole (ProtoNix) injection 40 mg  40 mg intravenous Daily before breakfast Izzy Early MD        perflutren lipid microspheres (Definity) injection 0.5-10 mL of dilution  0.5-10 mL of dilution intravenous Once in imaging Izzy Early MD        perflutren protein A microsphere (Optison) injection 0.5 mL  0.5 mL intravenous Once in imaging Izzy Early MD        [Held by provider] spironolactone (Aldactone) tablet 25 mg  25 mg oral Daily Perez Lopez MD   25 mg at 03/19/24 0819    sulfur hexafluoride microsphr (Lumason) injection 24.28 mg  2 mL  "intravenous Once in imaging Izzy Early MD        vancomycin (Vancocin) in dextrose 5 % water (D5W) 100 mL  mg  500 mg intravenous q24h Santo Whittington, PharmD   Stopped at 03/21/24 105    vancomycin (Vancocin) placeholder   miscellaneous Daily PRN Izzy Early MD            Allergies:    Allergies   Allergen Reactions    Amoxicillin Unknown          Review of Systems  14 system review is negative other than HPI     /68   Pulse 91   Temp 36.4 °C (97.5 °F)   Resp 20   Ht 1.753 m (5' 9\")   Wt 68 kg (150 lb)   SpO2 96%   BMI 22.15 kg/m²    Physical Exam:  General: Patient appears ok at the present time. NAD  Skin: no new rashes, wound right foot dorsal surface large, full thickness, clean based, left leg bka site macerated tissue, drainage , multiple ulcers, full thickness, lots of serous yellow drainage some aroma  HEENT:  Neck is supple, No subconjunctival hemorrhages, no oral exudates  Heart: S1 S2  Lungs: clear bilaterally  Abdomen: soft, ND, NTTP,  Back :no CVA tenderness         DATA:    .  Lab Results   Component Value Date    WBC 11.1 03/22/2024    HGB 8.0 (L) 03/22/2024    HCT 25.2 (L) 03/22/2024    MCV 61 (L) 03/22/2024     03/22/2024     Results from last 7 days   Lab Units 03/22/24  0431   SODIUM mmol/L 128*   POTASSIUM mmol/L 4.0   CHLORIDE mmol/L 84*   CO2 mmol/L 34*   BUN mg/dL 84*   CREATININE mg/dL 2.32*   CALCIUM mg/dL 8.4*   PROTEIN TOTAL g/dL 7.0   BILIRUBIN TOTAL mg/dL 1.3*   ALK PHOS U/L 150*   ALT U/L 20   AST U/L 23   GLUCOSE mg/dL 388*   Susceptibility data from last 90 days.  Collected Specimen Info Organism   03/15/24 Blood culture from Peripheral Venipuncture Coagulase negative staphylococcus   Susceptibility data from last 90 days.  Collected Specimen Info Organism   03/15/24 Blood culture from Peripheral Venipuncture Coagulase negative staphylococcus           IMPRESSION:        Problem List Items Addressed This Visit          Cardiac and Vasculature    " PAD (peripheral artery disease) (CMS/[facility])    Relevant Orders    Vascular US ankle brachial index (MARILIA) without exercise (Completed)    Vascular US lower extremity arterial duplex left (Completed)    Chronic congestive heart failure (CMS/[facility])    Relevant Medications    metoprolol succinate XL (Toprol-XL) 25 mg 24 hr tablet    clopidogrel (Plavix) 75 mg tablet    metoprolol succinate XL (Toprol-XL) 24 hr tablet 25 mg    Other Relevant Orders    Referral to Healthy at Home Program    Critical lower limb ischemia (CMS/[facility])    Relevant Orders    Lower extremity venous duplex right (Completed)    Vascular US lower extremity arterial duplex left (Completed)    * (Principal) Acute on chronic combined systolic (congestive) and diastolic (congestive) heart failure (CMS/[facility]) - Primary    Relevant Medications    metoprolol succinate XL (Toprol-XL) 25 mg 24 hr tablet    clopidogrel (Plavix) 75 mg tablet    metoprolol succinate XL (Toprol-XL) 24 hr tablet 25 mg       Genitourinary and Reproductive    PIPPA (acute kidney injury) (CMS/[facility])       Pulmonary and Pneumonias    COPD exacerbation (CMS/[facility])    Relevant Orders    Referral to Healthy at Home Program    COPD (chronic obstructive pulmonary disease) (CMS/[facility])    Relevant Orders    Referral to Healthy at Home Program     Other Visit Diagnoses       Fall, initial encounter        Relevant Medications    gabapentin (Neurontin) 100 mg capsule    Hypokalemia        Hypoglycemia        Acute on chronic congestive heart failure, unspecified heart failure type (CMS/[facility])        Relevant Medications    metoprolol succinate XL (Toprol-XL) 25 mg 24 hr tablet    clopidogrel (Plavix) 75 mg tablet    metoprolol succinate XL (Toprol-XL) 24 hr tablet 25 mg    Other Relevant Orders    Referral to Healthy at Home Program    Pleural effusion on right        Other atherosclerosis of unspecified type of bypass graft(s) of the extremities, left leg (CMS/[facility])        Atherosclerosis of native arteries  of right leg with ulceration of other part of foot (CMS/HCC)                   Wounds,   Multiple severe comorbidities and noncompliance history  Bacteremia 1/2 CoNS      Wound left bka site some concern of infection with aroma, skin breakdown with lots of drainage and may be from volume overload in general creating moist environment. Likely moisture associated dermatitis. Bacteremia probable contaminant, obviously with cirrhosis, spontaneous bacteremias can be more prevalent  PLAN:    Await cultures leg, other fluid studies paracentesis unclear if collected. Other cultures negative    Binh Valderrama MD

## 2024-03-22 NOTE — PROGRESS NOTES
Nephrology Progress Note    Assessment:  53 y.o. male with history s/f HFrEF, PAD s/p LT BKA, T2DM c/b chronic non-healing wounds on Les who presented for a fall while attempting to transfer.      PIPPA on CKD stage II: likely in setting of CRS, baseline Scr ~1.2 w/ eGFR high 60s, tends to get recurrent AKIs, worsening   Hypokalemia: corrected   Large RT pleural effusion: s/p RT sided thoracentesis 1.5L taken off   Anemia   Hypoalbuminemia   Cirrhosis c/b ascites: s/p paracentesis w/ 6300 ml off (3/19)     Plan:  - function improving, no changes to diuretics today, continue lasix gtt at 15 mg/hr, will consider adjust tomorrow however if worsening labs (CO2 trending up)     Subjective:  Admit Date: 3/15/2024    Interval History: doing ok, function slowly improving, diuresing well, CO2 trending up however    Medications:  Scheduled Meds:cefepime, 2 g, intravenous, q24h  docusate sodium, 100 mg, oral, BID  enoxaparin, 40 mg, subcutaneous, q24h  insulin glargine, 10 Units, subcutaneous, Nightly  insulin lispro, 0-15 Units, subcutaneous, TID with meals  lidocaine, 5 mL, infiltration, Once  lidocaine, 1 patch, transdermal, Daily  melatonin, 3 mg, oral, Daily  methIMAzole, 5 mg, oral, Daily  metOLazone, 5 mg, oral, Daily  metoprolol succinate XL, 25 mg, oral, Daily  pantoprazole, 40 mg, oral, Daily before breakfast   Or  pantoprazole, 40 mg, intravenous, Daily before breakfast  perflutren lipid microspheres, 0.5-10 mL of dilution, intravenous, Once in imaging  perflutren protein A microsphere, 0.5 mL, intravenous, Once in imaging  [Held by provider] spironolactone, 25 mg, oral, Daily  sulfur hexafluoride microsphr, 2 mL, intravenous, Once in imaging  vancomycin, 500 mg, intravenous, q24h      Continuous Infusions:furosemide, 15 mg/hr, Last Rate: 15 mg/hr (03/22/24 1219)        CBC:   Lab Results   Component Value Date    WBC 11.1 03/22/2024    RBC 4.13 (L) 03/22/2024    HGB 8.0 (L) 03/22/2024    HCT 25.2 (L) 03/22/2024     "MCV 61 (L) 03/22/2024    MCH 19.4 (L) 03/22/2024    MCHC 31.7 (L) 03/22/2024    RDW 18.2 (H) 03/22/2024     03/22/2024     BMP:    Lab Results   Component Value Date     (L) 03/22/2024    K 4.0 03/22/2024    CL 84 (L) 03/22/2024    CO2 34 (H) 03/22/2024    BUN 84 (H) 03/22/2024    CREATININE 2.32 (H) 03/22/2024    CALCIUM 8.4 (L) 03/22/2024    GLUCOSE 388 (H) 03/22/2024       Objective:  Vitals: /68 (BP Location: Left arm, Patient Position: Sitting)   Pulse 91   Temp 36.4 °C (97.5 °F) (Temporal)   Resp 18   Ht 1.753 m (5' 9\")   Wt 68 kg (150 lb)   SpO2 96%   BMI 22.15 kg/m²    Wt Readings from Last 3 Encounters:   03/15/24 68 kg (150 lb)   10/30/23 55.9 kg (123 lb 3.8 oz)   07/21/23 59 kg (130 lb)      24HR INTAKE/OUTPUT:    Intake/Output Summary (Last 24 hours) at 3/22/2024 1305  Last data filed at 3/22/2024 1219  Gross per 24 hour   Intake 1033.25 ml   Output 3600 ml   Net -2566.75 ml         General: alert, in no apparent distress  HEENT: normocephalic, atraumatic, anicteric  Lungs: non-labored respirations, clear to auscultation bilaterally  Heart: regular rate and rhythm, no murmurs or rubs  Abdomen: soft, non-tender, severely distended   Ext: no cyanosis, +++ RLE peripheral edema, LT BKA  Neuro: alert and oriented, no gross abnormalities      Electronically signed by Malgorzata Williamson MD, MD              "

## 2024-03-22 NOTE — PROGRESS NOTES
Occupational Therapy    OT Treatment    Patient Name: Hitesh Jurado  MRN: [id number]  Today's Date: 3/22/2024  Time Calculation  Start Time: 1030  Stop Time: 1124  Time Calculation (min): 54 min         Assessment:  End of Session Communication: Bedside nurse  End of Session Patient Position: Bed, 2 rail up, Alarm on (pt refusing to sit in recliner stating it makes him uncomfortable. pt requesting to sit EOB and RN aware)       Subjective   Previous Visit Info:  OT Last Visit  OT Received On: 03/22/24  General:  General  Prior to Session Communication: Bedside nurse  Patient Position Received: Bed, 3 rail up, Alarm on  General Comment: pt agreeable to OT tx following encouragement  Precautions:  Medical Precautions: Fall precautions  Precautions Comment: L BKA , bilat LE wounds  Vital Signs:     Pain:  Pain Assessment  Pain Assessment: 0-10  Pain Score: 0 - No pain  Pain 2  Pain Score 2: 0 - No pain    Objective        Toileting  Toileting Comments: pt required SBA while seated on BSC performing maycol hygiene. pt performing seated weightshifting  Functional Standing Tolerance:     Bed Mobility/Transfers: Transfers  Transfer:  (pt performing EOB < > recliner transfer without use of AD , pt stating FWW wasnt safe despite max encouragement. pt performed transfers with CGA)         Therapy/Activity:  pt engaging in EOB activity ~ 25 min with F + unsupported standing balance  Sensory:           Outcome Measures:[facility] Daily Activity  Putting on and taking off regular lower body clothing: A lot  Bathing (including washing, rinsing, drying): A little  Putting on and taking off regular upper body clothing: A little  Toileting, which includes using toilet, bedpan or urinal: A little  Taking care of personal grooming such as brushing teeth: A little  Eating Meals: None  Daily Activity - Total Score: 18        Education Documentation  ADL Training, taught by CHRISTA Arevalo at 3/22/2024  2:07 PM.  Learner:  Patient  Readiness: Acceptance  Method: Explanation  Response: Needs Reinforcement  Comment: pt educated on OT POC and EC techs    Education Comments  No comments found.        OP EDUCATION:       Goals:  Encounter Problems       Encounter Problems (Active)       OT Goals       Min assist bed mobility.  (Progressing)       Start:  03/19/24    Expected End:  04/02/24            Mod assist squat pivot transfers.  (Progressing)       Start:  03/19/24    Expected End:  04/02/24            Mod assist LB dressing.  (Progressing)       Start:  03/19/24    Expected End:  04/02/24            Independent with UE strengthening HEP to maintain UE strength for functional mobility and ADL, considering BKA and LE wounds.  (Progressing)       Start:  03/19/24    Expected End:  04/02/24            Good dynamic sitting balance for ADL.  (Progressing)       Start:  03/19/24    Expected End:  04/02/24

## 2024-03-23 ENCOUNTER — APPOINTMENT (OUTPATIENT)
Dept: CARDIOLOGY | Facility: HOSPITAL | Age: 54
End: 2024-03-23
Payer: MEDICAID

## 2024-03-23 LAB
ALBUMIN SERPL BCP-MCNC: 2.7 G/DL (ref 3.4–5)
ALP SERPL-CCNC: 130 U/L (ref 33–120)
ALT SERPL W P-5'-P-CCNC: 17 U/L (ref 10–52)
ANION GAP SERPL CALC-SCNC: 13 MMOL/L (ref 10–20)
AST SERPL W P-5'-P-CCNC: 18 U/L (ref 9–39)
BACTERIA SPEC CULT: NORMAL
BASOPHILS # BLD AUTO: 0.03 X10*3/UL (ref 0–0.1)
BASOPHILS NFR BLD AUTO: 0.3 %
BILIRUB SERPL-MCNC: 1 MG/DL (ref 0–1.2)
BUN SERPL-MCNC: 87 MG/DL (ref 6–23)
CALCIUM SERPL-MCNC: 8.1 MG/DL (ref 8.6–10.3)
CHLORIDE SERPL-SCNC: 83 MMOL/L (ref 98–107)
CO2 SERPL-SCNC: 39 MMOL/L (ref 21–32)
CREAT SERPL-MCNC: 2.17 MG/DL (ref 0.5–1.3)
EGFRCR SERPLBLD CKD-EPI 2021: 36 ML/MIN/1.73M*2
EOSINOPHIL # BLD AUTO: 0.18 X10*3/UL (ref 0–0.7)
EOSINOPHIL NFR BLD AUTO: 1.9 %
ERYTHROCYTE [DISTWIDTH] IN BLOOD BY AUTOMATED COUNT: 18.6 % (ref 11.5–14.5)
GLUCOSE BLD MANUAL STRIP-MCNC: 196 MG/DL (ref 74–99)
GLUCOSE BLD MANUAL STRIP-MCNC: 220 MG/DL (ref 74–99)
GLUCOSE BLD MANUAL STRIP-MCNC: 231 MG/DL (ref 74–99)
GLUCOSE BLD MANUAL STRIP-MCNC: 242 MG/DL (ref 74–99)
GLUCOSE SERPL-MCNC: 208 MG/DL (ref 74–99)
GRAM STN SPEC: NORMAL
GRAM STN SPEC: NORMAL
HCT VFR BLD AUTO: 23.3 % (ref 41–52)
HGB BLD-MCNC: 7.3 G/DL (ref 13.5–17.5)
HOLD SPECIMEN: NORMAL
IMM GRANULOCYTES # BLD AUTO: 0.05 X10*3/UL (ref 0–0.7)
IMM GRANULOCYTES NFR BLD AUTO: 0.5 % (ref 0–0.9)
LYMPHOCYTES # BLD AUTO: 1.26 X10*3/UL (ref 1.2–4.8)
LYMPHOCYTES NFR BLD AUTO: 13.5 %
MCH RBC QN AUTO: 19.1 PG (ref 26–34)
MCHC RBC AUTO-ENTMCNC: 31.3 G/DL (ref 32–36)
MCV RBC AUTO: 61 FL (ref 80–100)
MONOCYTES # BLD AUTO: 0.87 X10*3/UL (ref 0.1–1)
MONOCYTES NFR BLD AUTO: 9.3 %
NEUTROPHILS # BLD AUTO: 6.97 X10*3/UL (ref 1.2–7.7)
NEUTROPHILS NFR BLD AUTO: 74.5 %
NRBC BLD-RTO: 0 /100 WBCS (ref 0–0)
PLATELET # BLD AUTO: 370 X10*3/UL (ref 150–450)
POTASSIUM SERPL-SCNC: 3.6 MMOL/L (ref 3.5–5.3)
PROT SERPL-MCNC: 6 G/DL (ref 6.4–8.2)
RBC # BLD AUTO: 3.83 X10*6/UL (ref 4.5–5.9)
SODIUM SERPL-SCNC: 131 MMOL/L (ref 136–145)
VANCOMYCIN SERPL-MCNC: 16.9 UG/ML (ref 5–20)
WBC # BLD AUTO: 9.4 X10*3/UL (ref 4.4–11.3)

## 2024-03-23 PROCEDURE — 2500000004 HC RX 250 GENERAL PHARMACY W/ HCPCS (ALT 636 FOR OP/ED)

## 2024-03-23 PROCEDURE — 82947 ASSAY GLUCOSE BLOOD QUANT: CPT

## 2024-03-23 PROCEDURE — 2500000004 HC RX 250 GENERAL PHARMACY W/ HCPCS (ALT 636 FOR OP/ED): Performed by: STUDENT IN AN ORGANIZED HEALTH CARE EDUCATION/TRAINING PROGRAM

## 2024-03-23 PROCEDURE — 2500000001 HC RX 250 WO HCPCS SELF ADMINISTERED DRUGS (ALT 637 FOR MEDICARE OP): Performed by: STUDENT IN AN ORGANIZED HEALTH CARE EDUCATION/TRAINING PROGRAM

## 2024-03-23 PROCEDURE — 80053 COMPREHEN METABOLIC PANEL: CPT | Performed by: STUDENT IN AN ORGANIZED HEALTH CARE EDUCATION/TRAINING PROGRAM

## 2024-03-23 PROCEDURE — 36415 COLL VENOUS BLD VENIPUNCTURE: CPT | Performed by: STUDENT IN AN ORGANIZED HEALTH CARE EDUCATION/TRAINING PROGRAM

## 2024-03-23 PROCEDURE — 2500000004 HC RX 250 GENERAL PHARMACY W/ HCPCS (ALT 636 FOR OP/ED): Performed by: INTERNAL MEDICINE

## 2024-03-23 PROCEDURE — 85025 COMPLETE CBC W/AUTO DIFF WBC: CPT | Performed by: STUDENT IN AN ORGANIZED HEALTH CARE EDUCATION/TRAINING PROGRAM

## 2024-03-23 PROCEDURE — 1210000001 HC SEMI-PRIVATE ROOM DAILY

## 2024-03-23 PROCEDURE — 99232 SBSQ HOSP IP/OBS MODERATE 35: CPT | Performed by: STUDENT IN AN ORGANIZED HEALTH CARE EDUCATION/TRAINING PROGRAM

## 2024-03-23 PROCEDURE — 93010 ELECTROCARDIOGRAM REPORT: CPT | Performed by: INTERNAL MEDICINE

## 2024-03-23 PROCEDURE — 93005 ELECTROCARDIOGRAM TRACING: CPT

## 2024-03-23 PROCEDURE — 80202 ASSAY OF VANCOMYCIN: CPT

## 2024-03-23 PROCEDURE — 2500000002 HC RX 250 W HCPCS SELF ADMINISTERED DRUGS (ALT 637 FOR MEDICARE OP, ALT 636 FOR OP/ED): Performed by: STUDENT IN AN ORGANIZED HEALTH CARE EDUCATION/TRAINING PROGRAM

## 2024-03-23 RX ORDER — DIPHENHYDRAMINE HYDROCHLORIDE 50 MG/ML
25 INJECTION INTRAMUSCULAR; INTRAVENOUS ONCE
Status: COMPLETED | OUTPATIENT
Start: 2024-03-23 | End: 2024-03-23

## 2024-03-23 RX ORDER — TRAMADOL HYDROCHLORIDE 50 MG/1
50 TABLET ORAL EVERY 6 HOURS PRN
Status: DISCONTINUED | OUTPATIENT
Start: 2024-03-23 | End: 2024-03-26 | Stop reason: HOSPADM

## 2024-03-23 RX ORDER — NITROGLYCERIN 0.4 MG/1
0.4 TABLET SUBLINGUAL EVERY 5 MIN PRN
Status: DISCONTINUED | OUTPATIENT
Start: 2024-03-23 | End: 2024-03-26 | Stop reason: HOSPADM

## 2024-03-23 RX ADMIN — ATORVASTATIN CALCIUM 80 MG: 80 TABLET, FILM COATED ORAL at 20:33

## 2024-03-23 RX ADMIN — DOCUSATE SODIUM 100 MG: 100 CAPSULE, LIQUID FILLED ORAL at 08:58

## 2024-03-23 RX ADMIN — METOPROLOL SUCCINATE 25 MG: 25 TABLET, EXTENDED RELEASE ORAL at 08:58

## 2024-03-23 RX ADMIN — TRAMADOL HYDROCHLORIDE 50 MG: 50 TABLET, COATED ORAL at 14:24

## 2024-03-23 RX ADMIN — VANCOMYCIN HYDROCHLORIDE 500 MG: 500 INJECTION, POWDER, LYOPHILIZED, FOR SOLUTION INTRAVENOUS at 09:05

## 2024-03-23 RX ADMIN — PANTOPRAZOLE SODIUM 40 MG: 40 TABLET, DELAYED RELEASE ORAL at 06:19

## 2024-03-23 RX ADMIN — ENOXAPARIN SODIUM 40 MG: 40 INJECTION SUBCUTANEOUS at 14:31

## 2024-03-23 RX ADMIN — INSULIN LISPRO 3 UNITS: 100 INJECTION, SOLUTION INTRAVENOUS; SUBCUTANEOUS at 08:59

## 2024-03-23 RX ADMIN — Medication 5 MG: at 20:33

## 2024-03-23 RX ADMIN — DOCUSATE SODIUM 100 MG: 100 CAPSULE, LIQUID FILLED ORAL at 20:33

## 2024-03-23 RX ADMIN — INSULIN LISPRO 6 UNITS: 100 INJECTION, SOLUTION INTRAVENOUS; SUBCUTANEOUS at 11:28

## 2024-03-23 RX ADMIN — MORPHINE SULFATE 2 MG: 2 INJECTION, SOLUTION INTRAMUSCULAR; INTRAVENOUS at 06:19

## 2024-03-23 RX ADMIN — INSULIN GLARGINE 20 UNITS: 100 INJECTION, SOLUTION SUBCUTANEOUS at 20:33

## 2024-03-23 RX ADMIN — CEFEPIME 2 G: 2 INJECTION, POWDER, FOR SOLUTION INTRAVENOUS at 11:29

## 2024-03-23 RX ADMIN — ASPIRIN 81 MG: 81 TABLET, COATED ORAL at 08:58

## 2024-03-23 RX ADMIN — CLOPIDOGREL BISULFATE 75 MG: 75 TABLET, FILM COATED ORAL at 08:58

## 2024-03-23 RX ADMIN — METHIMAZOLE 5 MG: 5 TABLET ORAL at 08:57

## 2024-03-23 RX ADMIN — DIPHENHYDRAMINE HYDROCHLORIDE 25 MG: 50 INJECTION, SOLUTION INTRAMUSCULAR; INTRAVENOUS at 03:37

## 2024-03-23 RX ADMIN — INSULIN LISPRO 6 UNITS: 100 INJECTION, SOLUTION INTRAVENOUS; SUBCUTANEOUS at 16:38

## 2024-03-23 ASSESSMENT — PAIN SCALES - GENERAL
PAINLEVEL_OUTOF10: 0 - NO PAIN

## 2024-03-23 ASSESSMENT — COGNITIVE AND FUNCTIONAL STATUS - GENERAL
MOVING FROM LYING ON BACK TO SITTING ON SIDE OF FLAT BED WITH BEDRAILS: A LITTLE
HELP NEEDED FOR BATHING: A LOT
CLIMB 3 TO 5 STEPS WITH RAILING: TOTAL
MOBILITY SCORE: 12
DRESSING REGULAR LOWER BODY CLOTHING: A LOT
DRESSING REGULAR UPPER BODY CLOTHING: A LITTLE
DAILY ACTIVITIY SCORE: 18
TURNING FROM BACK TO SIDE WHILE IN FLAT BAD: A LOT
WALKING IN HOSPITAL ROOM: A LOT
MOVING TO AND FROM BED TO CHAIR: A LOT
STANDING UP FROM CHAIR USING ARMS: A LOT
TOILETING: A LITTLE

## 2024-03-23 ASSESSMENT — PAIN - FUNCTIONAL ASSESSMENT
PAIN_FUNCTIONAL_ASSESSMENT: 0-10
PAIN_FUNCTIONAL_ASSESSMENT: 0-10

## 2024-03-23 NOTE — PROGRESS NOTES
Nephrology Progress Note    Assessment:  53 y.o. male with history s/f HFrEF, PAD s/p LT BKA, T2DM c/b chronic non-healing wounds on Les who presented for a fall while attempting to transfer.      PIPPA on CKD stage II: likely in setting of CRS, baseline Scr ~1.2 w/ eGFR high 60s, tends to get recurrent AKIs, worsening   Hypokalemia: corrected   Large RT pleural effusion: s/p RT sided thoracentesis 1.5L taken off   Anemia   Hypoalbuminemia   Cirrhosis c/b ascites: s/p paracentesis w/ 6300 ml off (3/19)     Plan:  - stopping lasix gtt at this time, has low BP and worsening alkalosis  - will start him torsemide 60 mg daily from tomorrow  - monitor function, still improving     Subjective:  Admit Date: 3/15/2024    Interval History: doing ok, function slowly improving, diuresing well, CO2 trending up however    Medications:  Scheduled Meds:aspirin, 81 mg, oral, Daily  atorvastatin, 80 mg, oral, Daily  cefepime, 2 g, intravenous, q24h  clopidogrel, 75 mg, oral, Daily  docusate sodium, 100 mg, oral, BID  enoxaparin, 40 mg, subcutaneous, q24h  insulin glargine, 20 Units, subcutaneous, Nightly  insulin lispro, 0-15 Units, subcutaneous, TID with meals  lidocaine, 5 mL, infiltration, Once  lidocaine, 1 patch, transdermal, Daily  lisinopril, 5 mg, oral, Nightly  melatonin, 5 mg, oral, Nightly  methIMAzole, 5 mg, oral, Daily  metOLazone, 5 mg, oral, Daily  metoprolol succinate XL, 25 mg, oral, Daily  pantoprazole, 40 mg, oral, Daily before breakfast   Or  pantoprazole, 40 mg, intravenous, Daily before breakfast  perflutren lipid microspheres, 0.5-10 mL of dilution, intravenous, Once in imaging  perflutren protein A microsphere, 0.5 mL, intravenous, Once in imaging  [Held by provider] spironolactone, 25 mg, oral, Daily  sulfur hexafluoride microsphr, 2 mL, intravenous, Once in imaging  vancomycin, 500 mg, intravenous, q24h      Continuous Infusions:furosemide, 15 mg/hr, Last Rate: 15 mg/hr (03/22/24 1318)        CBC:   Lab  "Results   Component Value Date    WBC 9.4 03/23/2024    RBC 3.83 (L) 03/23/2024    HGB 7.3 (L) 03/23/2024    HCT 23.3 (L) 03/23/2024    MCV 61 (L) 03/23/2024    MCH 19.1 (L) 03/23/2024    MCHC 31.3 (L) 03/23/2024    RDW 18.6 (H) 03/23/2024     03/23/2024     BMP:    Lab Results   Component Value Date     (L) 03/23/2024    K 3.6 03/23/2024    CL 83 (L) 03/23/2024    CO2 39 (H) 03/23/2024    BUN 87 (H) 03/23/2024    CREATININE 2.17 (H) 03/23/2024    CALCIUM 8.1 (L) 03/23/2024    GLUCOSE 208 (H) 03/23/2024       Objective:  Vitals: BP 80/50 (BP Location: Left arm, Patient Position: Lying)   Pulse 76   Temp 36.4 °C (97.5 °F) (Temporal)   Resp 18   Ht 1.753 m (5' 9\")   Wt 68 kg (150 lb)   SpO2 92%   BMI 22.15 kg/m²    Wt Readings from Last 3 Encounters:   03/15/24 68 kg (150 lb)   10/30/23 55.9 kg (123 lb 3.8 oz)   07/21/23 59 kg (130 lb)      24HR INTAKE/OUTPUT:    Intake/Output Summary (Last 24 hours) at 3/23/2024 1221  Last data filed at 3/23/2024 1100  Gross per 24 hour   Intake 8.46 ml   Output 3200 ml   Net -3191.54 ml         General: alert, in no apparent distress  HEENT: normocephalic, atraumatic, anicteric  Lungs: non-labored respirations, clear to auscultation bilaterally  Heart: regular rate and rhythm, no murmurs or rubs  Abdomen: soft, non-tender, severely distended   Ext: no cyanosis, ++ RLE peripheral edema, LT BKA  Neuro: alert and oriented, no gross abnormalities      Electronically signed by Malgorzata Williamson MD, MD              "

## 2024-03-23 NOTE — PROGRESS NOTES
Hitesh Jurado is a 53 y.o. male on day 8 of admission presenting with Acute on chronic combined systolic (congestive) and diastolic (congestive) heart failure (CMS/HCC).      Subjective   Patient looks lethargic, is hypotensive, Lasix drip was stopped  Will be switched to oral torsemide in a.m. per nephrology  Pending ID recommendation for IV antibiotic management  Anticipate discharge within next 24 hours if hemodynamically stable    Objective     Last Recorded Vitals  BP 80/50 (BP Location: Left arm, Patient Position: Lying)   Pulse 76   Temp 36.4 °C (97.5 °F) (Temporal)   Resp 18   Wt 68 kg (150 lb)   SpO2 92%   Intake/Output last 3 Shifts:    Intake/Output Summary (Last 24 hours) at 3/23/2024 1331  Last data filed at 3/23/2024 1100  Gross per 24 hour   Intake 8.46 ml   Output 3200 ml   Net -3191.54 ml       Admission Weight  Weight: 68 kg (150 lb) (03/15/24 0843)    Daily Weight  03/15/24 : 68 kg (150 lb)    Image Results  ECG 12 Lead  Sinus rhythm with occasional Premature ventricular complexes  Right bundle branch block  Abnormal ECG  When compared with ECG of 22-MAR-2024 06:26, (unconfirmed)  No significant change was found      Physical Exam  General: Well-developed ill-appearing male, in no acute distress  HEENT: AT, NC, no JVD, no lymphadenopathy, neck supple, bilateral temporal cachexia  Lungs: Clear, no wheezing, no crackles  Cardiac: Normal S1-S2, no murmur, no gallop  Abdomen: Soft, nontender, distended, positive bowel sound  Extremities: Left BKA with ulcers on the knee, ulcer on the right knee noted, right lower extremity wrapped  Neurological: Patient is lethargic today however follows simple commands      Assessment/Plan   Principal Problem:    Acute on chronic combined systolic (congestive) and diastolic (congestive) heart failure (CMS/HCC)    Acute on chronic sytolic CHF exacerbation   Large right-sided pleural effusion  - Echocardiogram 10/23: Showed EF of 15 to 20%  - S/P  thoracentesis on 3/18, 1.5L drained  - Lasix drip stopped due to hypotension, switch to torsemide in a.m.  - Hold Aldactone, continue to monitor intake and output     Decompensated liver cirrhosis with large ascites  Possible hepatorenal syndrome  - RUQ ultrasound showed cirrhosis  - No history of liver cirrhosis from his [facility] admissions (risk factors are history of alcohol abuse)  - Hepatitis panel normal  - Underwent paracentesis 3/20, 6.3 L removed - albumin replaced  - GI signed off, outpatient follow-up     Acute kidney injury on chronic kidney disease  ATN vs hepatorenal sydnrome  Hyponatremia  - Multifactorial cause of PIPPA - improving  - Dc Aldactone  - Monitor renal function, avoid nephrotoxic agent     Microcytic anemia  - monitor CBC, Transfuse to keep hemoglobin >7     History of peripheral vascular disease  Status post left BKA  Infected lower extremity ulcers  - He has a history of peripheral vascular disease, underwent peripheral angiogram on 11/23 s/p RLE DCB of iliac and recanalization of R SFA on 12/4. Heparin gtt d/vinod and he was started on DAPT.  - Vascular surgery following - outpatient follow up with [facility] vascular surgeon  - pending ID recs for IV Abx mgmt   - Plastic surgery following  - Continue Vanco and cefepime  - Continue local wound care     Diabetes mellitus  - Patient is noted to be hyperglycemic, Lantus increased to 20 units  - Continue sliding scale     History of DVT  - Not on any AC at home     VTE Prophylaxis: Enoxaparin subcutaneous     Disposition: SNF once hemodynamically stable hopefully within the next 24 hours    Robson Garcia MD

## 2024-03-23 NOTE — PROGRESS NOTES
Subjective   Patient is a stable, no acute distress, no complaint  Renal function improving, hemoglobin improving  Nephrology following, on Lasix drip  Anticipate discharge in next 24 hours if hemodynamically stable           Objective   Last Recorded Vitals  /65 (BP Location: Left arm, Patient Position: Sitting)   Pulse 105   Temp 36.6 °C (97.9 °F) (Temporal)   Resp 18   Wt 68 kg (150 lb)   SpO2 94%   Intake/Output last 3 Shifts:     Intake/Output Summary (Last 24 hours) at 3/22/2024 1536  Last data filed at 3/22/2024 1500      Gross per 24 hour   Intake 1037.28 ml   Output 2600 ml   Net -1562.72 ml         Admission Weight  Weight: 68 kg (150 lb) (03/15/24 0843)     Daily Weight  03/15/24 : 68 kg (150 lb)     Image Results  ECG 12 Lead  Sinus rhythm with occasional Premature ventricular complexes  Right bundle branch block  Abnormal ECG  When compared with ECG of 21-MAR-2024 07:17, (unconfirmed)  Premature ventricular complexes are now Present        Physical Exam     General: Well-developed ill-appearing male, in no acute distress  HEENT: AT, NC, no JVD, no lymphadenopathy, neck supple, bilateral temporal cachexia  Lungs: Clear, no wheezing, no crackles  Cardiac: Normal S1-S2, no murmur, no gallop  Abdomen: Soft, nontender, distended, positive bowel sound  Extremities: Left BKA with ulcers on the knee, ulcer on the right knee noted, right lower extremity wrapped  Neurological: Alert awake oriented x3, sensation intact, clear speech           Assessment/Plan   Principal Problem:    Acute on chronic combined systolic (congestive) and diastolic (congestive) heart failure (CMS/HCC)     Acute on chronic sytolic CHF exacerbation   Large right-sided pleural effusion  - Echocardiogram 10/23: Showed EF of 15 to 20%  - S/P thoracentesis on 3/18, 1.5L drained  - Cw Lasix drip per nephrology  - Hold Aldactone, continue to monitor intake and output     Decompensated liver cirrhosis with large ascites  Possible  hepatorenal syndrome  - RUQ ultrasound showed cirrhosis  - No history of liver cirrhosis from his [facility] admissions (risk factors are history of alcohol abuse)  - Hepatitis panel   - Underwent paracentesis 3/20, 6.3 L removed - albumin replaced  - GI signed off, outpatient follow-up     Acute kidney injury on chronic kidney disease  ATN vs hepatorenal sydnrome  Hyponatremia  - Multifactorial cause of PIPPA - improving  - Dc Aldactone  - Cw Lasix drip  - Nephrology following     Microcytic anemia, improving     History of peripheral vascular disease  Status post left BKA  Infected lower extremity ulcers  - He has a history of peripheral vascular disease, underwent peripheral angiogram on 11/23 s/p RLE DCB of iliac and recanalization of R SFA on 12/4. Heparin gtt d/vinod and he was started on DAPT.  - Vascular surgery following - outpatient follow up with [facility] vascular surgeon  - ID following  - Plastic surgery following  - Continue Vanco and cefepime  - Continue local wound care     Diabetes mellitus  - Patient is noted to be hyperglycemic, Lantus increased to 20 units  - Continue sliding scale     History of DVT  -Not on any AC at home     VTE Prophylaxis: Enoxaparin subcutaneous     Disposition/Daily update: Renal function is stabilizing, continue Lasix drip per nephrology.  Respiratory standpoint is also improving, weaning oxygen as tolerated.  Will need SNF on discharge.  Appreciate recommendations from vascular surgery, nephrology, ID, plastic surgery at this time.    Anticipate discharge in next 24 hours if hemodynamically stable

## 2024-03-23 NOTE — PROGRESS NOTES
Hitesh Jurado is a 53 y.o. male on day 8 of admission presenting with Acute on chronic combined systolic (congestive) and diastolic (congestive) heart failure (CMS/HCC).       Subjective   Patient looks better now and watching you Tube Videos! He is hypotensive, Lasix drip was stopped  Will be switched to oral torsemide in a.m. per nephrology  Pending ID recommendation for IV antibiotic management  Anticipate discharge within next 24 hours if hemodynamically stable        Objective   Last Recorded Vitals  BP 80/50 (BP Location: Left arm, Patient Position: Lying)   Pulse 76   Temp 36.4 °C (97.5 °F) (Temporal)   Resp 18   Wt 68 kg (150 lb)   SpO2 92%   Intake/Output last 3 Shifts:     Intake/Output Summary (Last 24 hours) at 3/23/2024 1331  Last data filed at 3/23/2024 1100      Gross per 24 hour   Intake 8.46 ml   Output 3200 ml   Net -3191.54 ml         Admission Weight  Weight: 68 kg (150 lb) (03/15/24 0843)     Daily Weight  03/15/24 : 68 kg (150 lb)     Image Results  ECG 12 Lead  Sinus rhythm with occasional Premature ventricular complexes  Right bundle branch block  Abnormal ECG  When compared with ECG of 22-MAR-2024 06:26, (unconfirmed)  No significant change was found        Physical Exam  General: Well-developed ill-appearing male, in no acute distress  HEENT: AT, NC, no JVD, no lymphadenopathy, neck supple, bilateral temporal cachexia  Lungs: Clear, no wheezing, no crackles  Cardiac: Normal S1-S2, no murmur, no gallop  Abdomen: Soft, nontender, distended, positive bowel sound  Extremities: Left BKA with ulcers on the knee, ulcer on the right knee noted, right lower extremity wrapped  Neurological: Patient is lethargic today however follows simple commands              Assessment/Plan   Principal Problem:    Acute on chronic combined systolic (congestive) and diastolic (congestive) heart failure (CMS/HCC)     Acute on chronic sytolic CHF exacerbation   Large right-sided pleural effusion  -  Echocardiogram 10/23: Showed EF of 15 to 20%  - S/P thoracentesis on 3/18, 1.5L drained  - Lasix drip stopped due to hypotension, switch to torsemide in a.m.  - Hold Aldactone, continue to monitor intake and output     Decompensated liver cirrhosis with large ascites  Possible hepatorenal syndrome  - RUQ ultrasound showed cirrhosis  - No history of liver cirrhosis from his [facility] admissions (risk factors are history of alcohol abuse)  - Hepatitis panel normal  - Underwent paracentesis 3/20, 6.3 L removed - albumin replaced  - GI signed off, outpatient follow-up     Acute kidney injury on chronic kidney disease  ATN vs hepatorenal sydnrome  Hyponatremia  - Multifactorial cause of PIPPA - improving  - Dc Aldactone  - Monitor renal function, avoid nephrotoxic agent     Microcytic anemia  - monitor CBC, Transfuse to keep hemoglobin >7     History of peripheral vascular disease  Status post left BKA  Infected lower extremity ulcers  - He has a history of peripheral vascular disease, underwent peripheral angiogram on 11/23 s/p RLE DCB of iliac and recanalization of R SFA on 12/4. Heparin gtt d/vinod and he was started on DAPT.  - Vascular surgery following - outpatient follow up with [facility] vascular surgeon  - pending ID recs for IV Abx mgmt   - Plastic surgery following  - Continue Vanco and cefepime  - Continue local wound care     Diabetes mellitus  - Patient is noted to be hyperglycemic, Lantus increased to 20 units  - Continue sliding scale     History of DVT  - Not on any AC at home     VTE Prophylaxis: Enoxaparin subcutaneous     Disposition: SNF once hemodynamically stable hopefully within the next 24 hours    Huy Ricci MD

## 2024-03-23 NOTE — PROGRESS NOTES
"Vancomycin Dosing by Pharmacy- FOLLOW UP    Hitesh Jurado is a 53 y.o. year old male who Pharmacy has been consulted for vancomycin dosing for cellulitis, skin and soft tissue. Based on the patient's indication and renal status this patient is being dosed based on a goal AUC of 400-600.     Renal function is currently stable.    Current vancomycin dose: 500 mg given every 24 hours    Estimated vancomycin AUC on current dose: 456 mg/L.hr     Visit Vitals  BP 94/54 (BP Location: Left arm, Patient Position: Lying)   Pulse 65   Temp 36.4 °C (97.5 °F) (Temporal)   Resp 18        Lab Results   Component Value Date    CREATININE 2.17 (H) 03/23/2024    CREATININE 2.32 (H) 03/22/2024    CREATININE 2.44 (H) 03/21/2024    CREATININE 2.49 (H) 03/20/2024        Patient weight is No results found for: \"PTWEIGHT\"    No results found for: \"CULTURE\"     I/O last 3 completed shifts:  In: 1041.7 (15.3 mL/kg) [P.O.:475; I.V.:66.7 (1 mL/kg); IV Piggyback:500]  Out: 3150 (46.3 mL/kg) [Urine:3150 (1.3 mL/kg/hr)]  Weight: 68 kg   [unfilled]    Lab Results   Component Value Date    PATIENTTEMP  11/01/2023      Comment:      NOTE: Patient Results are Not Corrected for Temperature    PATIENTTEMP 37.0 11/14/2022    PATIENTTEMP 37.0 11/13/2022        Assessment/Plan    Within goal AUC range. Continue current vancomycin regimen.    This dosing regimen is predicted by [employer] to result in the following pharmacokinetic parameters:  Regimen: 500 mg IV every 24 hours.  Start time: 10:19 on 03/23/2024  Exposure target: AUC24 (range)400-600 mg/L.hr   AUC24,ss: 456 mg/L.hr  Probability of AUC24 > 400: 91 %  Ctrough,ss: 15.9 mg/L  Probability of Ctrough,ss > 20: 3 %  Probability of nephrotoxicity (Lodise DAYANARA 2009): 11 %      The next level will be obtained on 3/26 at AM labs. May be obtained sooner if clinically indicated.   Will continue to monitor renal function daily while on vancomycin and order serum creatinine at least every 48 hours " if not already ordered.  Follow for continued vancomycin needs, clinical response, and signs/symptoms of toxicity.       Nena Gavin, PharmD

## 2024-03-24 LAB
ANION GAP SERPL CALC-SCNC: 14 MMOL/L (ref 10–20)
ATRIAL RATE: 80 BPM
ATRIAL RATE: 89 BPM
ATRIAL RATE: 98 BPM
BASOPHILS # BLD AUTO: 0.05 X10*3/UL (ref 0–0.1)
BASOPHILS NFR BLD AUTO: 0.5 %
BUN SERPL-MCNC: 91 MG/DL (ref 6–23)
CALCIUM SERPL-MCNC: 8 MG/DL (ref 8.6–10.3)
CHLORIDE SERPL-SCNC: 82 MMOL/L (ref 98–107)
CO2 SERPL-SCNC: 39 MMOL/L (ref 21–32)
CREAT SERPL-MCNC: 2.23 MG/DL (ref 0.5–1.3)
EGFRCR SERPLBLD CKD-EPI 2021: 34 ML/MIN/1.73M*2
EOSINOPHIL # BLD AUTO: 0.25 X10*3/UL (ref 0–0.7)
EOSINOPHIL NFR BLD AUTO: 2.3 %
ERYTHROCYTE [DISTWIDTH] IN BLOOD BY AUTOMATED COUNT: 18.6 % (ref 11.5–14.5)
GLUCOSE BLD MANUAL STRIP-MCNC: 161 MG/DL (ref 74–99)
GLUCOSE BLD MANUAL STRIP-MCNC: 207 MG/DL (ref 74–99)
GLUCOSE BLD MANUAL STRIP-MCNC: 237 MG/DL (ref 74–99)
GLUCOSE BLD MANUAL STRIP-MCNC: 318 MG/DL (ref 74–99)
GLUCOSE SERPL-MCNC: 226 MG/DL (ref 74–99)
HCT VFR BLD AUTO: 26.9 % (ref 41–52)
HGB BLD-MCNC: 8.2 G/DL (ref 13.5–17.5)
HOLD SPECIMEN: NORMAL
IMM GRANULOCYTES # BLD AUTO: 0.1 X10*3/UL (ref 0–0.7)
IMM GRANULOCYTES NFR BLD AUTO: 0.9 % (ref 0–0.9)
LYMPHOCYTES # BLD AUTO: 1.59 X10*3/UL (ref 1.2–4.8)
LYMPHOCYTES NFR BLD AUTO: 14.4 %
MCH RBC QN AUTO: 19 PG (ref 26–34)
MCHC RBC AUTO-ENTMCNC: 30.5 G/DL (ref 32–36)
MCV RBC AUTO: 62 FL (ref 80–100)
MONOCYTES # BLD AUTO: 1.07 X10*3/UL (ref 0.1–1)
MONOCYTES NFR BLD AUTO: 9.7 %
NEUTROPHILS # BLD AUTO: 7.99 X10*3/UL (ref 1.2–7.7)
NEUTROPHILS NFR BLD AUTO: 72.2 %
NRBC BLD-RTO: 0 /100 WBCS (ref 0–0)
P AXIS: 66 DEGREES
P AXIS: 71 DEGREES
P AXIS: 72 DEGREES
P OFFSET: 164 MS
P OFFSET: 180 MS
P OFFSET: 185 MS
P ONSET: 102 MS
P ONSET: 125 MS
P ONSET: 133 MS
PLATELET # BLD AUTO: 459 X10*3/UL (ref 150–450)
POTASSIUM SERPL-SCNC: 3.8 MMOL/L (ref 3.5–5.3)
PR INTERVAL: 164 MS
PR INTERVAL: 166 MS
PR INTERVAL: 184 MS
Q ONSET: 194 MS
Q ONSET: 207 MS
Q ONSET: 216 MS
QRS COUNT: 13 BEATS
QRS COUNT: 15 BEATS
QRS COUNT: 16 BEATS
QRS DURATION: 130 MS
QRS DURATION: 140 MS
QRS DURATION: 148 MS
QT INTERVAL: 360 MS
QT INTERVAL: 400 MS
QT INTERVAL: 452 MS
QTC CALCULATION(BAZETT): 459 MS
QTC CALCULATION(BAZETT): 486 MS
QTC CALCULATION(BAZETT): 521 MS
QTC FREDERICIA: 424 MS
QTC FREDERICIA: 456 MS
QTC FREDERICIA: 497 MS
R AXIS: -4 DEGREES
R AXIS: -5 DEGREES
R AXIS: 4 DEGREES
RBC # BLD AUTO: 4.32 X10*6/UL (ref 4.5–5.9)
SODIUM SERPL-SCNC: 131 MMOL/L (ref 136–145)
T AXIS: -18 DEGREES
T AXIS: -20 DEGREES
T AXIS: -25 DEGREES
T OFFSET: 394 MS
T OFFSET: 396 MS
T OFFSET: 433 MS
VENTRICULAR RATE: 80 BPM
VENTRICULAR RATE: 89 BPM
VENTRICULAR RATE: 98 BPM
WBC # BLD AUTO: 11.1 X10*3/UL (ref 4.4–11.3)

## 2024-03-24 PROCEDURE — 1210000001 HC SEMI-PRIVATE ROOM DAILY

## 2024-03-24 PROCEDURE — 80048 BASIC METABOLIC PNL TOTAL CA: CPT | Performed by: STUDENT IN AN ORGANIZED HEALTH CARE EDUCATION/TRAINING PROGRAM

## 2024-03-24 PROCEDURE — 2500000001 HC RX 250 WO HCPCS SELF ADMINISTERED DRUGS (ALT 637 FOR MEDICARE OP): Performed by: STUDENT IN AN ORGANIZED HEALTH CARE EDUCATION/TRAINING PROGRAM

## 2024-03-24 PROCEDURE — 2500000004 HC RX 250 GENERAL PHARMACY W/ HCPCS (ALT 636 FOR OP/ED): Performed by: STUDENT IN AN ORGANIZED HEALTH CARE EDUCATION/TRAINING PROGRAM

## 2024-03-24 PROCEDURE — 36415 COLL VENOUS BLD VENIPUNCTURE: CPT | Performed by: STUDENT IN AN ORGANIZED HEALTH CARE EDUCATION/TRAINING PROGRAM

## 2024-03-24 PROCEDURE — 99232 SBSQ HOSP IP/OBS MODERATE 35: CPT | Performed by: STUDENT IN AN ORGANIZED HEALTH CARE EDUCATION/TRAINING PROGRAM

## 2024-03-24 PROCEDURE — 2500000002 HC RX 250 W HCPCS SELF ADMINISTERED DRUGS (ALT 637 FOR MEDICARE OP, ALT 636 FOR OP/ED): Performed by: STUDENT IN AN ORGANIZED HEALTH CARE EDUCATION/TRAINING PROGRAM

## 2024-03-24 PROCEDURE — 93922 UPR/L XTREMITY ART 2 LEVELS: CPT | Performed by: SURGERY

## 2024-03-24 PROCEDURE — 93926 LOWER EXTREMITY STUDY: CPT | Performed by: SURGERY

## 2024-03-24 PROCEDURE — 82947 ASSAY GLUCOSE BLOOD QUANT: CPT

## 2024-03-24 PROCEDURE — 85025 COMPLETE CBC W/AUTO DIFF WBC: CPT | Performed by: STUDENT IN AN ORGANIZED HEALTH CARE EDUCATION/TRAINING PROGRAM

## 2024-03-24 PROCEDURE — 2500000004 HC RX 250 GENERAL PHARMACY W/ HCPCS (ALT 636 FOR OP/ED)

## 2024-03-24 RX ORDER — TORSEMIDE 20 MG/1
60 TABLET ORAL DAILY
Status: DISCONTINUED | OUTPATIENT
Start: 2024-03-24 | End: 2024-03-26 | Stop reason: HOSPADM

## 2024-03-24 RX ORDER — INSULIN GLARGINE 100 [IU]/ML
30 INJECTION, SOLUTION SUBCUTANEOUS NIGHTLY
Status: DISCONTINUED | OUTPATIENT
Start: 2024-03-24 | End: 2024-03-25

## 2024-03-24 RX ADMIN — DOCUSATE SODIUM 100 MG: 100 CAPSULE, LIQUID FILLED ORAL at 20:23

## 2024-03-24 RX ADMIN — ASPIRIN 81 MG: 81 TABLET, COATED ORAL at 08:41

## 2024-03-24 RX ADMIN — Medication 5 MG: at 20:23

## 2024-03-24 RX ADMIN — VANCOMYCIN HYDROCHLORIDE 500 MG: 500 INJECTION, POWDER, LYOPHILIZED, FOR SOLUTION INTRAVENOUS at 10:00

## 2024-03-24 RX ADMIN — METHIMAZOLE 5 MG: 5 TABLET ORAL at 08:41

## 2024-03-24 RX ADMIN — INSULIN LISPRO 3 UNITS: 100 INJECTION, SOLUTION INTRAVENOUS; SUBCUTANEOUS at 08:43

## 2024-03-24 RX ADMIN — INSULIN LISPRO 12 UNITS: 100 INJECTION, SOLUTION INTRAVENOUS; SUBCUTANEOUS at 16:56

## 2024-03-24 RX ADMIN — INSULIN GLARGINE 30 UNITS: 100 INJECTION, SOLUTION SUBCUTANEOUS at 20:23

## 2024-03-24 RX ADMIN — PANTOPRAZOLE SODIUM 40 MG: 40 TABLET, DELAYED RELEASE ORAL at 06:50

## 2024-03-24 RX ADMIN — ENOXAPARIN SODIUM 40 MG: 40 INJECTION SUBCUTANEOUS at 15:25

## 2024-03-24 RX ADMIN — METOLAZONE 5 MG: 5 TABLET ORAL at 08:41

## 2024-03-24 RX ADMIN — INSULIN LISPRO 6 UNITS: 100 INJECTION, SOLUTION INTRAVENOUS; SUBCUTANEOUS at 11:17

## 2024-03-24 RX ADMIN — DOCUSATE SODIUM 100 MG: 100 CAPSULE, LIQUID FILLED ORAL at 08:41

## 2024-03-24 RX ADMIN — ATORVASTATIN CALCIUM 80 MG: 80 TABLET, FILM COATED ORAL at 20:23

## 2024-03-24 RX ADMIN — CEFEPIME 2 G: 2 INJECTION, POWDER, FOR SOLUTION INTRAVENOUS at 12:02

## 2024-03-24 RX ADMIN — CLOPIDOGREL BISULFATE 75 MG: 75 TABLET, FILM COATED ORAL at 08:41

## 2024-03-24 RX ADMIN — TRAMADOL HYDROCHLORIDE 50 MG: 50 TABLET, COATED ORAL at 20:23

## 2024-03-24 ASSESSMENT — COGNITIVE AND FUNCTIONAL STATUS - GENERAL
WALKING IN HOSPITAL ROOM: A LOT
DRESSING REGULAR UPPER BODY CLOTHING: A LITTLE
HELP NEEDED FOR BATHING: A LOT
MOBILITY SCORE: 12
MOVING FROM LYING ON BACK TO SITTING ON SIDE OF FLAT BED WITH BEDRAILS: A LITTLE
TURNING FROM BACK TO SIDE WHILE IN FLAT BAD: A LOT
DAILY ACTIVITIY SCORE: 18
DRESSING REGULAR LOWER BODY CLOTHING: A LOT
TOILETING: A LITTLE
STANDING UP FROM CHAIR USING ARMS: A LOT
MOVING TO AND FROM BED TO CHAIR: A LOT
CLIMB 3 TO 5 STEPS WITH RAILING: TOTAL

## 2024-03-24 ASSESSMENT — PAIN SCALES - WONG BAKER: WONGBAKER_NUMERICALRESPONSE: NO HURT

## 2024-03-24 ASSESSMENT — PAIN - FUNCTIONAL ASSESSMENT: PAIN_FUNCTIONAL_ASSESSMENT: 0-10

## 2024-03-24 ASSESSMENT — PAIN SCALES - GENERAL
PAINLEVEL_OUTOF10: 0 - NO PAIN
PAINLEVEL_OUTOF10: 0 - NO PAIN
PAINLEVEL_OUTOF10: 8

## 2024-03-24 NOTE — PROGRESS NOTES
?  HISTORY OF PRESENT ILLNESS:    Feels ok, some pain wounds. No fever  Current Medications:    Current Facility-Administered Medications   Medication Dose Route Frequency Provider Last Rate Last Admin    acetaminophen (Tylenol) tablet 650 mg  650 mg oral q4h PRN Izzy Early MD   650 mg at 03/21/24 2003    Or    acetaminophen (Tylenol) oral liquid 650 mg  650 mg nasogastric tube q4h PRN Izzy Early MD        Or    acetaminophen (Tylenol) suppository 650 mg  650 mg rectal q4h PRN Izzy Early MD        acetaminophen (Tylenol) tablet 650 mg  650 mg oral q4h PRN Izzy Early MD        Or    acetaminophen (Tylenol) oral liquid 650 mg  650 mg oral q4h PRN Izzy Early MD        Or    acetaminophen (Tylenol) suppository 650 mg  650 mg rectal q4h PRN Izzy Early MD        aspirin EC tablet 81 mg  81 mg oral Daily Robson Garcia MD   81 mg at 03/23/24 0858    atorvastatin (Lipitor) tablet 80 mg  80 mg oral Daily Robson Garcia MD   80 mg at 03/23/24 2033    benzocaine-menthol (Cepastat Sore Throat) 15-3.6 mg lozenge 1 lozenge  1 lozenge Mouth/Throat q2h PRN Izzy Early MD        cefepime (Maxipime) in dextrose 5 % water (D5W) 100 mL IV 2 g  2 g intravenous q24h Izzy Early MD   Stopped at 03/23/24 1159    clopidogrel (Plavix) tablet 75 mg  75 mg oral Daily Robson Garcia MD   75 mg at 03/23/24 0858    dextromethorphan-guaifenesin (Robitussin DM)  mg/5 mL oral liquid 5 mL  5 mL oral q4h PRN Izzy Early MD        dextrose 50 % injection 12.5 g  12.5 g intravenous q15 min PRN Izzy Early MD        dextrose 50 % injection 25 g  25 g intravenous q15 min PRN Izzy Early MD        docusate sodium (Colace) capsule 100 mg  100 mg oral BID Izzy Early MD   100 mg at 03/23/24 2033    enoxaparin (Lovenox) syringe 40 mg  40 mg subcutaneous q24h Izzy Early MD   40 mg at 03/23/24 1431    glucagon (Glucagen) injection 1 mg  1 mg intramuscular q15 min PRN Izzy ROD  MD Sharath        guaiFENesin (Mucinex) 12 hr tablet 600 mg  600 mg oral q12h PRN Izzy Early MD        insulin glargine (Lantus) injection 20 Units  20 Units subcutaneous Nightly Robson Garcia MD   20 Units at 03/23/24 2033    insulin lispro (HumaLOG) injection 0-15 Units  0-15 Units subcutaneous TID with meals Rachelle Sosa MD   6 Units at 03/23/24 1638    lidocaine (Xylocaine) 10 mg/mL (1 %) injection 50 mg  5 mL infiltration Once Rachelle Sosa MD        lidocaine 4 % patch 1 patch  1 patch transdermal Daily SHANDA Diaz-CNP   1 patch at 03/19/24 0818    lisinopril tablet 5 mg  5 mg oral Nightly Robson Garcia MD   5 mg at 03/22/24 2214    melatonin tablet 5 mg  5 mg oral Nightly Robson Garcia MD   5 mg at 03/23/24 2033    methIMAzole (Tapazole) tablet 5 mg  5 mg oral Daily Perez Lopez MD   5 mg at 03/23/24 0857    metOLazone (Zaroxolyn) tablet 5 mg  5 mg oral Daily Malgorzata Williamson MD   5 mg at 03/22/24 0900    metoprolol succinate XL (Toprol-XL) 24 hr tablet 25 mg  25 mg oral Daily Perez Lopez MD   25 mg at 03/23/24 0858    morphine injection 2 mg  2 mg intravenous q3h PRN Ilda Bello MD   2 mg at 03/23/24 0619    nitroglycerin (Nitrostat) SL tablet 0.4 mg  0.4 mg sublingual q5 min PRN Robson Garcia MD        ondansetron ODT (Zofran-ODT) disintegrating tablet 4 mg  4 mg oral q8h PRN Izzy Early MD        Or    ondansetron (Zofran) injection 4 mg  4 mg intravenous q8h PRN Izzy Early MD        oxygen (O2) therapy   inhalation PRN Izzy Early MD        pantoprazole (ProtoNix) EC tablet 40 mg  40 mg oral Daily before breakfast Izzy Early MD   40 mg at 03/23/24 0619    Or    pantoprazole (ProtoNix) injection 40 mg  40 mg intravenous Daily before breakfast Izzy Early MD        perflutren lipid microspheres (Definity) injection 0.5-10 mL of dilution  0.5-10 mL of dilution intravenous Once in imaging Izzy  "VIOLA Early MD        perflutren protein A microsphere (Optison) injection 0.5 mL  0.5 mL intravenous Once in imaging Izzy Early MD        [Held by provider] spironolactone (Aldactone) tablet 25 mg  25 mg oral Daily Perez Lopez MD   25 mg at 03/19/24 0819    sulfur hexafluoride microsphr (Lumason) injection 24.28 mg  2 mL intravenous Once in imaging Izzy Early MD        traMADol (Ultram) tablet 50 mg  50 mg oral q6h PRN Robson Garcia MD   50 mg at 03/23/24 1424    vancomycin (Vancocin) in dextrose 5 % water (D5W) 100 mL  mg  500 mg intravenous q24h Santo Whittington, PharmD   Stopped at 03/23/24 0935    vancomycin (Vancocin) placeholder   miscellaneous Daily PRN Izzy Early MD            Allergies:    Allergies   Allergen Reactions    Amoxicillin Unknown          Review of Systems  14 system review is negative other than HPI     BP 91/56   Pulse 76   Temp 36.3 °C (97.3 °F)   Resp 17   Ht 1.753 m (5' 9\")   Wt 68 kg (150 lb)   SpO2 94%   BMI 22.15 kg/m²    Physical Exam:  General: Patient appears ok at the present time. NAD  Skin: no new rashes, wound right foot dorsal surface large, full thickness, clean based, left leg bka site macerated tissue, drainage , multiple ulcers, full thickness, lots of serous yellow drainage some aroma  HEENT:  Neck is supple, No subconjunctival hemorrhages, no oral exudates  Heart: S1 S2  Lungs: clear bilaterally  Abdomen: soft, ND, NTTP,  Back :no CVA tenderness         DATA:    .  Lab Results   Component Value Date    WBC 9.4 03/23/2024    HGB 7.3 (L) 03/23/2024    HCT 23.3 (L) 03/23/2024    MCV 61 (L) 03/23/2024     03/23/2024     Results from last 7 days   Lab Units 03/23/24  0705   SODIUM mmol/L 131*   POTASSIUM mmol/L 3.6   CHLORIDE mmol/L 83*   CO2 mmol/L 39*   BUN mg/dL 87*   CREATININE mg/dL 2.17*   CALCIUM mg/dL 8.1*   PROTEIN TOTAL g/dL 6.0*   BILIRUBIN TOTAL mg/dL 1.0   ALK PHOS U/L 130*   ALT U/L 17   AST U/L 18   GLUCOSE mg/dL " 208*     Susceptibility data from last 90 days.  Collected Specimen Info Organism   03/15/24 Blood culture from Peripheral Venipuncture Coagulase negative staphylococcus     Susceptibility data from last 90 days.  Collected Specimen Info Organism   03/15/24 Blood culture from Peripheral Venipuncture Coagulase negative staphylococcus             IMPRESSION:        Problem List Items Addressed This Visit          Cardiac and Vasculature    PAD (peripheral artery disease) (CMS/[facility])    Relevant Orders    Vascular US ankle brachial index (MARILIA) without exercise (Completed)    Vascular US lower extremity arterial duplex left (Completed)    Chronic congestive heart failure (CMS/[facility])    Relevant Medications    metoprolol succinate XL (Toprol-XL) 25 mg 24 hr tablet    clopidogrel (Plavix) 75 mg tablet    metoprolol succinate XL (Toprol-XL) 24 hr tablet 25 mg    clopidogrel (Plavix) tablet 75 mg    nitroglycerin (Nitrostat) SL tablet 0.4 mg    Other Relevant Orders    Referral to Healthy at Home Program    Critical lower limb ischemia (CMS/[facility])    Relevant Orders    Lower extremity venous duplex right (Completed)    Vascular US lower extremity arterial duplex left (Completed)    * (Principal) Acute on chronic combined systolic (congestive) and diastolic (congestive) heart failure (CMS/[facility]) - Primary    Relevant Medications    metoprolol succinate XL (Toprol-XL) 25 mg 24 hr tablet    clopidogrel (Plavix) 75 mg tablet    metoprolol succinate XL (Toprol-XL) 24 hr tablet 25 mg    insulin glargine (Lantus) 100 unit/mL injection    clopidogrel (Plavix) tablet 75 mg    nitroglycerin (Nitrostat) SL tablet 0.4 mg       Genitourinary and Reproductive    PIPPA (acute kidney injury) (CMS/[facility])       Pulmonary and Pneumonias    COPD exacerbation (CMS/[facility])    Relevant Orders    Referral to Healthy at Home Program    COPD (chronic obstructive pulmonary disease) (CMS/[facility])    Relevant Orders    Referral to Healthy at Home Program     Other Visit  Diagnoses       Fall, initial encounter        Relevant Medications    gabapentin (Neurontin) 100 mg capsule    Hypokalemia        Hypoglycemia        Acute on chronic congestive heart failure, unspecified heart failure type (CMS/[facility])        Relevant Medications    metoprolol succinate XL (Toprol-XL) 25 mg 24 hr tablet    clopidogrel (Plavix) 75 mg tablet    metoprolol succinate XL (Toprol-XL) 24 hr tablet 25 mg    clopidogrel (Plavix) tablet 75 mg    nitroglycerin (Nitrostat) SL tablet 0.4 mg    Other Relevant Orders    Referral to Healthy at Home Program    Pleural effusion on right        Other atherosclerosis of unspecified type of bypass graft(s) of the extremities, left leg (CMS/HCC)        Atherosclerosis of native arteries of right leg with ulceration of other part of foot (CMS/[facility])                   Wounds,   Multiple severe comorbidities and noncompliance history  Bacteremia 1/2 CoNS      Wound left bka site some concern of infection with aroma, skin breakdown with lots of drainage and may be from volume overload in general creating moist environment. Likely moisture associated dermatitis. Bacteremia probable contaminant, obviously with cirrhosis, spontaneous bacteremias can be more prevalent  PLAN:  IV antibiotics until discharge  Would try doxycycline 100 mg po BID for 7 days at discharge    Binh Valderrama MD

## 2024-03-24 NOTE — PROGRESS NOTES
Hitesh Jurado is a 53 y.o. male on day 9 of admission presenting with Acute on chronic combined systolic (congestive) and diastolic (congestive) heart failure (CMS/HCC).      Subjective   Patient is currently stable, in no acute distress, no complaint, hypotensive early in the morning, diuretics held    Objective     Last Recorded Vitals  /62 (BP Location: Left arm, Patient Position: Sitting)   Pulse 84   Temp 36.6 °C (97.9 °F) (Temporal)   Resp 17   Wt 65 kg (143 lb 4.8 oz)   SpO2 93%   Intake/Output last 3 Shifts:    Intake/Output Summary (Last 24 hours) at 3/24/2024 1451  Last data filed at 3/23/2024 1800  Gross per 24 hour   Intake --   Output 900 ml   Net -900 ml       Admission Weight  Weight: 68 kg (150 lb) (03/15/24 0843)    Daily Weight  03/24/24 : 65 kg (143 lb 4.8 oz)    Image Results  ECG 12 Lead  Sinus rhythm with occasional Premature ventricular complexes  Right bundle branch block  Abnormal ECG  When compared with ECG of 22-MAR-2024 06:26, (unconfirmed)  No significant change was found  Confirmed by Jamey Alejandra ([id number]) on 3/24/2024 12:34:44 PM  ECG 12 Lead  Sinus rhythm with occasional Premature ventricular complexes  Right bundle branch block  Abnormal ECG  When compared with ECG of 21-MAR-2024 07:17, (unconfirmed)  Premature ventricular complexes are now Present  Confirmed by Jamey Alejandra ([id number]) on 3/24/2024 12:27:55 PM  ECG 12 Lead  Normal sinus rhythm  Right bundle branch block  Abnormal ECG  When compared with ECG of 21-MAR-2024 07:16, (unconfirmed)  Premature ventricular complexes are no longer Present  Confirmed by Jamey Alejandra ([id number]) on 3/24/2024 12:22:48 PM      Physical Exam    General: Well-developed ill-appearing male, in no acute distress  HEENT: AT, NC, no JVD, no lymphadenopathy, neck supple, bilateral temporal cachexia  Lungs: Clear, no wheezing, no crackles  Cardiac: Normal S1-S2, no murmur, no gallop  Abdomen: Soft, nontender, distended, positive bowel  sound  Extremities: Left BKA with ulcers on the knee, ulcer on the right knee noted, right lower extremity wrapped  Neurological: Alert awake oriented x 3, sensation intact, clear speech    Assessment/Plan   Principal Problem:    Acute on chronic combined systolic (congestive) and diastolic (congestive) heart failure (CMS/HCC)    Acute on chronic sytolic CHF exacerbation   Large right-sided pleural effusion  - Echocardiogram 10/23: Showed EF of 15 to 20%  - S/P thoracentesis on 3/18, 1.5L drained  - Lasix drip stopped due to hypotension, torsemide held  - Hold Aldactone, continue to monitor intake and output     Decompensated liver cirrhosis with large ascites  Possible hepatorenal syndrome  - RUQ ultrasound showed cirrhosis  - No history of liver cirrhosis from his [facility] admissions (risk factors are history of alcohol abuse)  - Hepatitis panel normal  - Underwent paracentesis 3/20, 6.3 L removed - albumin replaced  - GI signed off, outpatient follow-up     PIPPA/CKD, improving  ATN vs hepatorenal sydnrome  Hyponatremia, hypotension  - Aldactone discontinued  - Monitor renal function, avoid nephrotoxic agent  - nephrology following  - if remains hypotensive, possible midodrine     Microcytic anemia  - monitor CBC, Transfuse to keep hemoglobin >7     History of peripheral vascular disease  Status post left BKA  Infected lower extremity ulcers  - He has a history of peripheral vascular disease, underwent peripheral angiogram on 11/23 s/p RLE DCB of iliac and recanalization of R SFA on 12/4. Heparin gtt d/vinod and he was started on DAPT.  - Vascular surgery recommended outpatient follow up with [facility] vascular surgeon  - ID recs appreciated, oral doxycycline on discharge  - Plastic surgery following  - Continue Vanco and cefepime till discharge  - Continue local wound care     Diabetes mellitus  - Lantus increased to 30 units, ISS, hypoglycemia protocol, POCT glucose, DM diet     History of DVT  - Not on any AC at  home     VTE Prophylaxis: Lovenox subcutaneous  Disposition: SNF once hemodynamically stable hopefully within the next 24 hours    Robson Garcia MD

## 2024-03-24 NOTE — CARE PLAN
Problem: Fall/Injury  Goal: Not fall by end of shift  Outcome: Progressing     Problem: Fall/Injury  Goal: Be free from injury by end of the shift  Outcome: Progressing     Problem: Fall/Injury  Goal: Verbalize understanding of risk factor reduction measures to prevent injury from fall in the home  Outcome: Progressing     Problem: Skin  Goal: Decreased wound size/increased tissue granulation at next dressing change  Outcome: Progressing  Flowsheets (Taken 3/23/2024 2253)  Decreased wound size/increased tissue granulation at next dressing change: Promote sleep for wound healing     Problem: Skin  Goal: Participates in plan/prevention/treatment measures  Outcome: Progressing   The patient's goals for the shift include      The clinical goals for the shift include Patient will remain free from falls.

## 2024-03-24 NOTE — PROGRESS NOTES
Nephrology Progress Note    Assessment:  53 y.o. male with history s/f HFrEF, PAD s/p LT BKA, T2DM c/b chronic non-healing wounds on Les who presented for a fall while attempting to transfer.      PIPPA on CKD stage II: likely in setting of CRS, baseline Scr ~1.2 w/ eGFR high 60s, tends to get recurrent AKIs, stable  Hypokalemia: corrected   Large RT pleural effusion: s/p RT sided thoracentesis 1.5L taken off   Anemia   Hypoalbuminemia   Cirrhosis c/b ascites: s/p paracentesis w/ 6300 ml off (3/19)     Plan:  - keep off diuretics for now due to his hypotension (to be on torsemide 60 mg daily and metolazone 5 mg daily)  - if remains hypotensive will start pt on midodrine, goal to avoid giving him IVFs if possible   - encourage PO intake       Subjective:  Admit Date: 3/15/2024    Interval History: Bp low this AM, stopped lasix gtt yesterday     Medications:  Scheduled Meds:aspirin, 81 mg, oral, Daily  atorvastatin, 80 mg, oral, Daily  cefepime, 2 g, intravenous, q24h  clopidogrel, 75 mg, oral, Daily  docusate sodium, 100 mg, oral, BID  enoxaparin, 40 mg, subcutaneous, q24h  insulin glargine, 20 Units, subcutaneous, Nightly  insulin lispro, 0-15 Units, subcutaneous, TID with meals  lidocaine, 5 mL, infiltration, Once  lidocaine, 1 patch, transdermal, Daily  lisinopril, 5 mg, oral, Nightly  melatonin, 5 mg, oral, Nightly  methIMAzole, 5 mg, oral, Daily  metOLazone, 5 mg, oral, Daily  metoprolol succinate XL, 25 mg, oral, Daily  pantoprazole, 40 mg, oral, Daily before breakfast   Or  pantoprazole, 40 mg, intravenous, Daily before breakfast  perflutren lipid microspheres, 0.5-10 mL of dilution, intravenous, Once in imaging  perflutren protein A microsphere, 0.5 mL, intravenous, Once in imaging  [Held by provider] spironolactone, 25 mg, oral, Daily  sulfur hexafluoride microsphr, 2 mL, intravenous, Once in imaging  torsemide, 60 mg, oral, Daily  vancomycin, 500 mg, intravenous, q24h      Continuous Infusions:       CBC:  "  Lab Results   Component Value Date    WBC 11.1 03/24/2024    RBC 4.32 (L) 03/24/2024    HGB 8.2 (L) 03/24/2024    HCT 26.9 (L) 03/24/2024    MCV 62 (L) 03/24/2024    MCH 19.0 (L) 03/24/2024    MCHC 30.5 (L) 03/24/2024    RDW 18.6 (H) 03/24/2024     (H) 03/24/2024     BMP:    Lab Results   Component Value Date     (L) 03/24/2024    K 3.8 03/24/2024    CL 82 (L) 03/24/2024    CO2 39 (H) 03/24/2024    BUN 91 (HH) 03/24/2024    CREATININE 2.23 (H) 03/24/2024    CALCIUM 8.0 (L) 03/24/2024    GLUCOSE 226 (H) 03/24/2024       Objective:  Vitals: /62 (BP Location: Left arm, Patient Position: Sitting)   Pulse 84   Temp 36.6 °C (97.9 °F) (Temporal)   Resp 17   Ht 1.753 m (5' 9\")   Wt 65 kg (143 lb 4.8 oz)   SpO2 93%   BMI 21.16 kg/m²    Wt Readings from Last 3 Encounters:   03/24/24 65 kg (143 lb 4.8 oz)   10/30/23 55.9 kg (123 lb 3.8 oz)   07/21/23 59 kg (130 lb)      24HR INTAKE/OUTPUT:    Intake/Output Summary (Last 24 hours) at 3/24/2024 1200  Last data filed at 3/23/2024 1800  Gross per 24 hour   Intake --   Output 900 ml   Net -900 ml       General: alert, in no apparent distress  HEENT: normocephalic, atraumatic, anicteric  Lungs: non-labored respirations, clear to auscultation bilaterally  Heart: regular rate and rhythm, no murmurs or rubs  Abdomen: soft, non-tender, severely distended   Ext: no cyanosis, ++ RLE peripheral edema, LT BKA  Neuro: alert and oriented, no gross abnormalities      Electronically signed by Malgorzata Williamson MD, MD              "

## 2024-03-25 LAB
ANION GAP SERPL CALC-SCNC: 14 MMOL/L (ref 10–20)
BASOPHILS # BLD AUTO: 0.06 X10*3/UL (ref 0–0.1)
BASOPHILS NFR BLD AUTO: 0.5 %
BUN SERPL-MCNC: 88 MG/DL (ref 6–23)
CALCIUM SERPL-MCNC: 7.9 MG/DL (ref 8.6–10.3)
CHLORIDE SERPL-SCNC: 85 MMOL/L (ref 98–107)
CO2 SERPL-SCNC: 36 MMOL/L (ref 21–32)
CREAT SERPL-MCNC: 2.11 MG/DL (ref 0.5–1.3)
EGFRCR SERPLBLD CKD-EPI 2021: 37 ML/MIN/1.73M*2
EOSINOPHIL # BLD AUTO: 0.22 X10*3/UL (ref 0–0.7)
EOSINOPHIL NFR BLD AUTO: 2 %
ERYTHROCYTE [DISTWIDTH] IN BLOOD BY AUTOMATED COUNT: 18.9 % (ref 11.5–14.5)
GLUCOSE BLD MANUAL STRIP-MCNC: 217 MG/DL (ref 74–99)
GLUCOSE BLD MANUAL STRIP-MCNC: 267 MG/DL (ref 74–99)
GLUCOSE BLD MANUAL STRIP-MCNC: 328 MG/DL (ref 74–99)
GLUCOSE BLD MANUAL STRIP-MCNC: 90 MG/DL (ref 74–99)
GLUCOSE SERPL-MCNC: 61 MG/DL (ref 74–99)
HCT VFR BLD AUTO: 23.6 % (ref 41–52)
HGB BLD-MCNC: 7.4 G/DL (ref 13.5–17.5)
IMM GRANULOCYTES # BLD AUTO: 0.06 X10*3/UL (ref 0–0.7)
IMM GRANULOCYTES NFR BLD AUTO: 0.5 % (ref 0–0.9)
LYMPHOCYTES # BLD AUTO: 1.91 X10*3/UL (ref 1.2–4.8)
LYMPHOCYTES NFR BLD AUTO: 17.1 %
MCH RBC QN AUTO: 19.4 PG (ref 26–34)
MCHC RBC AUTO-ENTMCNC: 31.4 G/DL (ref 32–36)
MCV RBC AUTO: 62 FL (ref 80–100)
MONOCYTES # BLD AUTO: 0.97 X10*3/UL (ref 0.1–1)
MONOCYTES NFR BLD AUTO: 8.7 %
NEUTROPHILS # BLD AUTO: 7.97 X10*3/UL (ref 1.2–7.7)
NEUTROPHILS NFR BLD AUTO: 71.2 %
NRBC BLD-RTO: 0 /100 WBCS (ref 0–0)
PLATELET # BLD AUTO: 437 X10*3/UL (ref 150–450)
POTASSIUM SERPL-SCNC: 4.4 MMOL/L (ref 3.5–5.3)
RBC # BLD AUTO: 3.82 X10*6/UL (ref 4.5–5.9)
SODIUM SERPL-SCNC: 131 MMOL/L (ref 136–145)
WBC # BLD AUTO: 11.2 X10*3/UL (ref 4.4–11.3)

## 2024-03-25 PROCEDURE — 2500000004 HC RX 250 GENERAL PHARMACY W/ HCPCS (ALT 636 FOR OP/ED)

## 2024-03-25 PROCEDURE — 1210000001 HC SEMI-PRIVATE ROOM DAILY

## 2024-03-25 PROCEDURE — 2500000001 HC RX 250 WO HCPCS SELF ADMINISTERED DRUGS (ALT 637 FOR MEDICARE OP): Performed by: STUDENT IN AN ORGANIZED HEALTH CARE EDUCATION/TRAINING PROGRAM

## 2024-03-25 PROCEDURE — 80048 BASIC METABOLIC PNL TOTAL CA: CPT | Performed by: STUDENT IN AN ORGANIZED HEALTH CARE EDUCATION/TRAINING PROGRAM

## 2024-03-25 PROCEDURE — 99232 SBSQ HOSP IP/OBS MODERATE 35: CPT | Performed by: STUDENT IN AN ORGANIZED HEALTH CARE EDUCATION/TRAINING PROGRAM

## 2024-03-25 PROCEDURE — 97535 SELF CARE MNGMENT TRAINING: CPT | Mod: GO,CO

## 2024-03-25 PROCEDURE — 2500000001 HC RX 250 WO HCPCS SELF ADMINISTERED DRUGS (ALT 637 FOR MEDICARE OP): Performed by: PLASTIC SURGERY

## 2024-03-25 PROCEDURE — 97530 THERAPEUTIC ACTIVITIES: CPT | Mod: GP,CQ

## 2024-03-25 PROCEDURE — 2500000002 HC RX 250 W HCPCS SELF ADMINISTERED DRUGS (ALT 637 FOR MEDICARE OP, ALT 636 FOR OP/ED): Performed by: STUDENT IN AN ORGANIZED HEALTH CARE EDUCATION/TRAINING PROGRAM

## 2024-03-25 PROCEDURE — 85025 COMPLETE CBC W/AUTO DIFF WBC: CPT | Performed by: STUDENT IN AN ORGANIZED HEALTH CARE EDUCATION/TRAINING PROGRAM

## 2024-03-25 PROCEDURE — 82947 ASSAY GLUCOSE BLOOD QUANT: CPT

## 2024-03-25 PROCEDURE — 2500000005 HC RX 250 GENERAL PHARMACY W/O HCPCS: Performed by: NURSE PRACTITIONER

## 2024-03-25 PROCEDURE — 36415 COLL VENOUS BLD VENIPUNCTURE: CPT | Performed by: STUDENT IN AN ORGANIZED HEALTH CARE EDUCATION/TRAINING PROGRAM

## 2024-03-25 PROCEDURE — 2500000004 HC RX 250 GENERAL PHARMACY W/ HCPCS (ALT 636 FOR OP/ED): Performed by: STUDENT IN AN ORGANIZED HEALTH CARE EDUCATION/TRAINING PROGRAM

## 2024-03-25 PROCEDURE — 99231 SBSQ HOSP IP/OBS SF/LOW 25: CPT | Performed by: PLASTIC SURGERY

## 2024-03-25 RX ORDER — INSULIN LISPRO 100 [IU]/ML
5 INJECTION, SOLUTION INTRAVENOUS; SUBCUTANEOUS
Status: DISCONTINUED | OUTPATIENT
Start: 2024-03-25 | End: 2024-03-26 | Stop reason: HOSPADM

## 2024-03-25 RX ORDER — INSULIN GLARGINE 100 [IU]/ML
25 INJECTION, SOLUTION SUBCUTANEOUS NIGHTLY
Status: DISCONTINUED | OUTPATIENT
Start: 2024-03-25 | End: 2024-03-26 | Stop reason: HOSPADM

## 2024-03-25 RX ORDER — MIDODRINE HYDROCHLORIDE 5 MG/1
10 TABLET ORAL
Status: DISCONTINUED | OUTPATIENT
Start: 2024-03-25 | End: 2024-03-26 | Stop reason: HOSPADM

## 2024-03-25 RX ORDER — SILVER SULFADIAZINE 10 G/1000G
CREAM TOPICAL DAILY
Status: DISCONTINUED | OUTPATIENT
Start: 2024-03-25 | End: 2024-03-26 | Stop reason: HOSPADM

## 2024-03-25 RX ADMIN — METHIMAZOLE 5 MG: 5 TABLET ORAL at 09:00

## 2024-03-25 RX ADMIN — MIDODRINE HYDROCHLORIDE 10 MG: 5 TABLET ORAL at 18:08

## 2024-03-25 RX ADMIN — INSULIN LISPRO 6 UNITS: 100 INJECTION, SOLUTION INTRAVENOUS; SUBCUTANEOUS at 17:38

## 2024-03-25 RX ADMIN — LIDOCAINE 4% 1 PATCH: 40 PATCH TOPICAL at 08:10

## 2024-03-25 RX ADMIN — INSULIN GLARGINE 25 UNITS: 100 INJECTION, SOLUTION SUBCUTANEOUS at 20:51

## 2024-03-25 RX ADMIN — TORSEMIDE 60 MG: 20 TABLET ORAL at 08:09

## 2024-03-25 RX ADMIN — DOCUSATE SODIUM 100 MG: 100 CAPSULE, LIQUID FILLED ORAL at 20:51

## 2024-03-25 RX ADMIN — TRAMADOL HYDROCHLORIDE 50 MG: 50 TABLET, COATED ORAL at 14:44

## 2024-03-25 RX ADMIN — CEFEPIME 2 G: 2 INJECTION, POWDER, FOR SOLUTION INTRAVENOUS at 12:00

## 2024-03-25 RX ADMIN — PANTOPRAZOLE SODIUM 40 MG: 40 TABLET, DELAYED RELEASE ORAL at 06:25

## 2024-03-25 RX ADMIN — DOCUSATE SODIUM 100 MG: 100 CAPSULE, LIQUID FILLED ORAL at 08:10

## 2024-03-25 RX ADMIN — INSULIN LISPRO 9 UNITS: 100 INJECTION, SOLUTION INTRAVENOUS; SUBCUTANEOUS at 11:29

## 2024-03-25 RX ADMIN — ASPIRIN 81 MG: 81 TABLET, COATED ORAL at 08:10

## 2024-03-25 RX ADMIN — METOLAZONE 5 MG: 5 TABLET ORAL at 08:09

## 2024-03-25 RX ADMIN — TRAMADOL HYDROCHLORIDE 50 MG: 50 TABLET, COATED ORAL at 20:51

## 2024-03-25 RX ADMIN — SILVER SULFADIAZINE: 10 CREAM TOPICAL at 09:00

## 2024-03-25 RX ADMIN — ATORVASTATIN CALCIUM 80 MG: 80 TABLET, FILM COATED ORAL at 20:51

## 2024-03-25 RX ADMIN — ENOXAPARIN SODIUM 40 MG: 40 INJECTION SUBCUTANEOUS at 16:31

## 2024-03-25 RX ADMIN — CLOPIDOGREL BISULFATE 75 MG: 75 TABLET, FILM COATED ORAL at 08:09

## 2024-03-25 RX ADMIN — METOPROLOL SUCCINATE 25 MG: 25 TABLET, EXTENDED RELEASE ORAL at 08:10

## 2024-03-25 RX ADMIN — TRAMADOL HYDROCHLORIDE 50 MG: 50 TABLET, COATED ORAL at 08:09

## 2024-03-25 RX ADMIN — VANCOMYCIN HYDROCHLORIDE 500 MG: 500 INJECTION, POWDER, LYOPHILIZED, FOR SOLUTION INTRAVENOUS at 10:52

## 2024-03-25 RX ADMIN — Medication 5 MG: at 20:51

## 2024-03-25 ASSESSMENT — COGNITIVE AND FUNCTIONAL STATUS - GENERAL
MOBILITY SCORE: 12
WALKING IN HOSPITAL ROOM: TOTAL
DRESSING REGULAR UPPER BODY CLOTHING: A LITTLE
TURNING FROM BACK TO SIDE WHILE IN FLAT BAD: A LITTLE
PERSONAL GROOMING: A LITTLE
STANDING UP FROM CHAIR USING ARMS: A LOT
MOVING FROM LYING ON BACK TO SITTING ON SIDE OF FLAT BED WITH BEDRAILS: A LITTLE
HELP NEEDED FOR BATHING: A LOT
DAILY ACTIVITIY SCORE: 17
CLIMB 3 TO 5 STEPS WITH RAILING: TOTAL
TOILETING: A LITTLE
DRESSING REGULAR LOWER BODY CLOTHING: A LOT
MOVING TO AND FROM BED TO CHAIR: A LOT

## 2024-03-25 ASSESSMENT — ACTIVITIES OF DAILY LIVING (ADL): HOME_MANAGEMENT_TIME_ENTRY: 12

## 2024-03-25 ASSESSMENT — PAIN - FUNCTIONAL ASSESSMENT
PAIN_FUNCTIONAL_ASSESSMENT: 0-10
PAIN_FUNCTIONAL_ASSESSMENT: 0-10

## 2024-03-25 ASSESSMENT — PAIN SCALES - GENERAL
PAINLEVEL_OUTOF10: 7
PAINLEVEL_OUTOF10: 0 - NO PAIN
PAINLEVEL_OUTOF10: 0 - NO PAIN

## 2024-03-25 NOTE — PROGRESS NOTES
Hitesh Jurado is a 53 y.o. male on day 9 of admission presenting with Acute on chronic combined systolic (congestive) and diastolic (congestive) heart failure (CMS/HCC).       Subjective   Patient looks better now and watching you Tube Videos! He is hypotensive, Lasix drip was stopped  Will be switched to oral torsemide in a.m. per nephrology  Pending ID recommendation for IV antibiotic management  Anticipate discharge within next 24 hours if hemodynamically stable        Objective   Last Recorded Vitals  BP 80/50 (BP Location: Left arm, Patient Position: Lying)   Pulse 76   Temp 36.4 °C (97.5 °F) (Temporal)   Resp 18   Wt 68 kg (150 lb)   SpO2 92%   Intake/Output last 3 Shifts:     Intake/Output Summary (Last 24 hours) at 3/23/2024 1331  Last data filed at 3/23/2024 1100      Gross per 24 hour   Intake 8.46 ml   Output 3200 ml   Net -3191.54 ml         Admission Weight  Weight: 68 kg (150 lb) (03/15/24 0843)     Daily Weight  03/15/24 : 68 kg (150 lb)     Image Results  ECG 12 Lead  Sinus rhythm with occasional Premature ventricular complexes  Right bundle branch block  Abnormal ECG  When compared with ECG of 22-MAR-2024 06:26, (unconfirmed)  No significant change was found        Physical Exam  General: Well-developed ill-appearing male, in no acute distress  HEENT: AT, NC, no JVD, no lymphadenopathy, neck supple, bilateral temporal cachexia  Lungs: Clear, no wheezing, no crackles  Cardiac: Normal S1-S2, no murmur, no gallop  Abdomen: Soft, nontender, distended, positive bowel sound  Extremities: Left BKA with ulcers on the knee, ulcer on the right knee noted, right lower extremity wrapped  Neurological: Patient is lethargic today however follows simple commands              Assessment/Plan   Principal Problem:    Acute on chronic combined systolic (congestive) and diastolic (congestive) heart failure (CMS/HCC)     Acute on chronic sytolic CHF exacerbation   Large right-sided pleural effusion  -  Echocardiogram 10/23: Showed EF of 15 to 20%  - S/P thoracentesis on 3/18, 1.5L drained  - Lasix drip stopped due to hypotension, switch to torsemide in a.m.  - Hold Aldactone, continue to monitor intake and output     Decompensated liver cirrhosis with large ascites  Possible hepatorenal syndrome  - RUQ ultrasound showed cirrhosis  - No history of liver cirrhosis from his [facility] admissions (risk factors are history of alcohol abuse)  - Hepatitis panel normal  - Underwent paracentesis 3/20, 6.3 L removed - albumin replaced  - GI signed off, outpatient follow-up     Acute kidney injury on chronic kidney disease  ATN vs hepatorenal sydnrome  Hyponatremia  - Multifactorial cause of PIPPA - improving  - Dc Aldactone  - Monitor renal function, avoid nephrotoxic agent     Microcytic anemia  - monitor CBC, Transfuse to keep hemoglobin >7     History of peripheral vascular disease  Status post left BKA  Infected lower extremity ulcers  - He has a history of peripheral vascular disease, underwent peripheral angiogram on 11/23 s/p RLE DCB of iliac and recanalization of R SFA on 12/4. Heparin gtt d/vinod and he was started on DAPT.  - Vascular surgery following - outpatient follow up with [facility] vascular surgeon  - pending ID recs for IV Abx mgmt   - Plastic surgery following  - Continue Vanco and cefepime  - Continue local wound care     Diabetes mellitus  - Patient is noted to be hyperglycemic, Lantus increased to 20 units  - Continue sliding scale     History of DVT  - Not on any AC at home     VTE Prophylaxis: Enoxaparin subcutaneous     Disposition: SNF once hemodynamically stable hopefully within the next 24 hours    Huy Ricci MD

## 2024-03-25 NOTE — PROGRESS NOTES
Nephrology Progress Note    Assessment:  53 y.o. male with history s/f HFrEF, PAD s/p LT BKA, T2DM c/b chronic non-healing wounds on Les who presented for a fall while attempting to transfer.      PIPPA on CKD stage II: likely in setting of CRS, baseline Scr ~1.2 w/ eGFR high 60s, tends to get recurrent AKIs, stable  Hypokalemia: corrected   Large RT pleural effusion: s/p RT sided thoracentesis 1.5L taken off   Anemia   Hypoalbuminemia   Cirrhosis c/b ascites: s/p paracentesis w/ 6300 ml off (3/19)     Plan:  - continue his PO diuretics torsemide and metolazone  - if remains hypotensive will start pt on midodrine, goal to avoid giving him IVFs if possible   - encourage PO intake       Subjective:  Admit Date: 3/15/2024    Interval History: BP better than yesterday, feeling better as well     Medications:  Scheduled Meds:aspirin, 81 mg, oral, Daily  atorvastatin, 80 mg, oral, Daily  cefepime, 2 g, intravenous, q24h  clopidogrel, 75 mg, oral, Daily  docusate sodium, 100 mg, oral, BID  enoxaparin, 40 mg, subcutaneous, q24h  insulin glargine, 30 Units, subcutaneous, Nightly  insulin lispro, 0-15 Units, subcutaneous, TID with meals  lidocaine, 5 mL, infiltration, Once  lidocaine, 1 patch, transdermal, Daily  lisinopril, 5 mg, oral, Nightly  melatonin, 5 mg, oral, Nightly  methIMAzole, 5 mg, oral, Daily  metOLazone, 5 mg, oral, Daily  metoprolol succinate XL, 25 mg, oral, Daily  pantoprazole, 40 mg, oral, Daily before breakfast   Or  pantoprazole, 40 mg, intravenous, Daily before breakfast  perflutren lipid microspheres, 0.5-10 mL of dilution, intravenous, Once in imaging  perflutren protein A microsphere, 0.5 mL, intravenous, Once in imaging  silver sulfADIAZINE, , Topical, Daily  [Held by provider] spironolactone, 25 mg, oral, Daily  sulfur hexafluoride microsphr, 2 mL, intravenous, Once in imaging  torsemide, 60 mg, oral, Daily  vancomycin, 500 mg, intravenous, q24h      Continuous Infusions:       CBC:   Lab Results  "  Component Value Date    WBC 11.2 03/25/2024    RBC 3.82 (L) 03/25/2024    HGB 7.4 (L) 03/25/2024    HCT 23.6 (L) 03/25/2024    MCV 62 (L) 03/25/2024    MCH 19.4 (L) 03/25/2024    MCHC 31.4 (L) 03/25/2024    RDW 18.9 (H) 03/25/2024     03/25/2024     BMP:    Lab Results   Component Value Date     (L) 03/25/2024    K 4.4 03/25/2024    CL 85 (L) 03/25/2024    CO2 36 (H) 03/25/2024    BUN 88 (H) 03/25/2024    CREATININE 2.11 (H) 03/25/2024    CALCIUM 7.9 (L) 03/25/2024    GLUCOSE 61 (L) 03/25/2024       Objective:  Vitals: /67 (BP Location: Left arm, Patient Position: Sitting)   Pulse 92   Temp 36.6 °C (97.9 °F) (Temporal)   Resp 18   Ht 1.753 m (5' 9\")   Wt 65 kg (143 lb 4.8 oz)   SpO2 100%   BMI 21.16 kg/m²    Wt Readings from Last 3 Encounters:   03/24/24 65 kg (143 lb 4.8 oz)   10/30/23 55.9 kg (123 lb 3.8 oz)   07/21/23 59 kg (130 lb)      24HR INTAKE/OUTPUT:    Intake/Output Summary (Last 24 hours) at 3/25/2024 1232  Last data filed at 3/25/2024 0902  Gross per 24 hour   Intake 400 ml   Output 1100 ml   Net -700 ml       General: alert, in no apparent distress  HEENT: normocephalic, atraumatic, anicteric  Lungs: non-labored respirations, clear to auscultation bilaterally  Heart: regular rate and rhythm, no murmurs or rubs  Abdomen: soft, non-tender, severely distended   Ext: no cyanosis, +++ RLE peripheral edema, LT BKA  Neuro: alert and oriented, no gross abnormalities      Electronically signed by Malgorzata Williamson MD, MD              "

## 2024-03-25 NOTE — NURSING NOTE
"Patient given juice and gingerale after c/o feeling very sleepy. This nurse checks morning labs and sees a recent glucose of 61 mg/dL.  Patient refuses IV dextrose at this time and states pop and juice \"will be enough\"   Repeat accuchecks on file. Patient awake and alert, states \"Its almost immediate\" when asked if sleepiness symptom resolved. Patient denies any c/o at this time.   "

## 2024-03-25 NOTE — PROGRESS NOTES
Hitesh Jurado is a 53 y.o. male on day 10 of admission presenting with Acute on chronic combined systolic (congestive) and diastolic (congestive) heart failure (CMS/HCC).      Subjective   Patient was started on torsemide and metolazone, still hypotensive, midodrine was started for the patient  Awaiting pre-CERT for placement at SNF         Objective   Last Recorded Vitals  BP 99/54 (BP Location: Right arm, Patient Position: Lying)   Pulse 86   Temp 36.7 °C (98.1 °F) (Temporal)   Resp 18   Wt 65 kg (143 lb 4.8 oz)   SpO2 96%   Intake/Output last 3 Shifts:     Intake/Output Summary (Last 24 hours) at 3/25/2024 1504  Last data filed at 3/25/2024 0902      Gross per 24 hour   Intake 400 ml   Output 1100 ml   Net -700 ml         Admission Weight  Weight: 68 kg (150 lb) (03/15/24 0843)     Daily Weight  03/24/24 : 65 kg (143 lb 4.8 oz)     Physical Exam     General: Well-developed ill-appearing male, in no acute distress  HEENT: AT, NC, no JVD, no lymphadenopathy, neck supple, bilateral temporal cachexia  Lungs: Clear, no wheezing, no crackles  Cardiac: Normal S1-S2, no murmur, no gallop  Abdomen: Soft, nontender, distended, positive bowel sound  Extremities: Left BKA with ulcers on the knee, ulcer on the right knee noted, right lower extremity wrapped  Neurological: Alert awake oriented x 3, sensation intact, clear speech           Assessment/Plan   Principal Problem:    Acute on chronic combined systolic (congestive) and diastolic (congestive) heart failure (CMS/HCC)     Acute on chronic sytolic CHF exacerbation, resolved    Large right-sided pleural effusion, status post thoracentesis  - Echocardiogram 10/23: Showed EF of 15 to 20%  - S/P thoracentesis on 3/18, 1.5L drained  - cw Torsemide and metolazone, midodrine was started  - Hold Aldactone, continue to monitor intake and output     Decompensated liver cirrhosis with large ascites  Possible hepatorenal syndrome  - RUQ ultrasound showed cirrhosis  - No  history of liver cirrhosis from his [facility] admissions (risk factors are history of alcohol abuse)  - Hepatitis panel normal  - Underwent paracentesis 3/20, 6.3 L removed - albumin replaced  - GI signed off, outpatient follow-up     PIPPA/CKD, improving  ATN vs hepatorenal sydnrome  Hyponatremia, hypotension  - Aldactone discontinued  - Monitor renal function, avoid nephrotoxic agent  - nephrology following  - on torsemide, metolazone, midodrine     Microcytic anemia  - monitor CBC, Transfuse to keep hemoglobin >7     History of peripheral vascular disease  Status post left BKA  Infected lower extremity ulcers  - He has a history of peripheral vascular disease, underwent peripheral angiogram on 11/23 s/p RLE DCB of iliac and recanalization of R SFA on 12/4. Heparin gtt d/vinod and he was started on DAPT.  - Vascular surgery recommended outpatient follow up with [facility] vascular surgeon  - LUIS FERNANDO bundy appreciated, oral doxycycline on discharge  - Plastic surgery following  - Continue Vanco and cefepime till discharge  - Continue local wound care     Diabetes mellitus  - Lantus, ISS, hypoglycemia protocol, POCT glucose, DM diet     History of DVT  - Not on any AC at home     VTE Prophylaxis: Lovenox subcutaneous  Disposition: SNF once hemodynamically stable hopefully within the next 24 hours

## 2024-03-25 NOTE — PROGRESS NOTES
Occupational Therapy    OT Treatment    Patient Name: Hitesh Jurado  MRN: [id number]  Today's Date: 3/25/2024  Time Calculation  Start Time: 0914  Stop Time: 0938  Time Calculation (min): 24 min         Assessment:  End of Session Communication: Bedside nurse  End of Session Patient Position: Bed, 2 rail up, Alarm off, not on at start of session (RN aware of no bed alarm)       Subjective   Previous Visit Info:  OT Last Visit  OT Received On: 03/25/24  General:  General  Prior to Session Communication: Bedside nurse  Patient Position Received: Bed, 2 rail up, Alarm off, not on at start of session  General Comment: pt agreeable to OT tx,  Precautions:  Medical Precautions: Fall precautions  Precautions Comment: L BKA       Pain:  Pain Assessment  Pain Assessment: 0-10  Pain Score: 0 - No pain    Objective    Cognition:  Cognition  Orientation Level: Oriented X4       Activities of Daily Living: Toileting  Toileting Comments: pt seated on BSC performing seated weight shifting to complete maycol hygiene. pt required SBA to perform  Functional Standing Tolerance:     Bed Mobility/Transfers: Transfers  Transfer:  (pt performing low pivot transfer from EOB < > BSC. pt required mod A x 1 for EOB > BSC with use of hand rail. pt required min A x 2 for BSC > EOB. pt educated on poistioning BSC tp his strong side when completing for safety)    Sitting Balance:  Static Sitting Balance  Static Sitting-Comment/Number of Minutes: pt patsy G unsupported sitting balance while engaging in grooming tasks eOB      Outcome Measures:[facility] Daily Activity  Putting on and taking off regular lower body clothing: A lot  Bathing (including washing, rinsing, drying): A lot  Putting on and taking off regular upper body clothing: A little  Toileting, which includes using toilet, bedpan or urinal: A little  Taking care of personal grooming such as brushing teeth: A little  Eating Meals: None  Daily Activity - Total Score: 17        Education  Documentation  ADL Training, taught by CHRISTA Arevalo at 3/25/2024 12:38 PM.  Learner: Patient  Readiness: Acceptance  Method: Explanation  Response: Needs Reinforcement  Comment: pt educated on OT POC and safety awareness    Education Comments  No comments found.        OP EDUCATION:       Goals:  Encounter Problems       Encounter Problems (Active)       OT Goals       Min assist bed mobility.  (Progressing)       Start:  03/19/24    Expected End:  04/02/24            Mod assist squat pivot transfers.  (Progressing)       Start:  03/19/24    Expected End:  04/02/24            Mod assist LB dressing.  (Progressing)       Start:  03/19/24    Expected End:  04/02/24            Independent with UE strengthening HEP to maintain UE strength for functional mobility and ADL, considering BKA and LE wounds.  (Progressing)       Start:  03/19/24    Expected End:  04/02/24            Good dynamic sitting balance for ADL.  (Progressing)       Start:  03/19/24    Expected End:  04/02/24

## 2024-03-25 NOTE — PROGRESS NOTES
Ins. Precert has .  Updated referral sent to [facility], current pt/ot notes needed, will send once in.  Facility to obtain new ins. Precert.    Update:  pt/ot notes of today sent to [facility], will await ins. Precert.

## 2024-03-25 NOTE — PROGRESS NOTES
Physical Therapy    Physical Therapy Treatment    Patient Name: Hitesh Jurado  MRN: [id number]  Today's Date: 3/25/2024  Time Calculation  Start Time: 0915  Stop Time: 0938  Time Calculation (min): 23 min       Assessment/Plan   PT Assessment  PT Assessment Results: Decreased strength, Decreased range of motion, Decreased endurance, Impaired balance, Decreased mobility, Decreased safety awareness, Impaired judgement  Rehab Prognosis: Fair  Medical Staff Made Aware: Yes  End of Session Communication: Bedside nurse, PCT/NA/ARNIE  Assessment Comment: Patient continues to require (A) for safety with transfers/amb. Patient will benefit from additional PT to address deficits and improve mobility.  End of Session Patient Position: Bed, 3 rail up, Alarm off, not on at start of session (Call light, phone, and tray table within reach.)  PT Plan  Inpatient/Swing Bed or Outpatient: Inpatient  Treatment/Interventions: Bed mobility, Transfer training, Balance training, Therapeutic exercise, Therapeutic activity, Home exercise program, Postural re-education, Positioning     PT Frequency: 2 times per week  PT Discharge Recommendations: Moderate intensity level of continued care    PT Recommended Transfer Status: Assist x1-2.    General Visit Information:   PT  Visit  PT Received On: 03/25/24  General  Family/Caregiver Present: No  Co-Treatment: OT  Co-Treatment Reason: Co-treat with OT to maximize patient and staff safety with transfers/amb.  Prior to Session Communication: Bedside nurse, PCT/SEBLE/ARNIE  Patient Position Received: Bed, 3 rail up, Alarm on  General Comment: Pleasant and cooperative.    General Observations:   General Observation: Patient prefers to sit EOB instead of up in a chair/recliner.    Subjective     Precautions:  Precautions  Medical Precautions: Fall precautions  Precautions Comment: (L) BKA    Objective     Pain:  Pain Assessment  Pain Assessment: 0-10  Pain Score: 0 - No pain (Just c/o sore spot on  buttocks. Nursing aware.)    Cognition:  Cognition  Orientation Level: Oriented X4    Balance:   Static Sitting: F+  Dynamic Sitting: F        Treatments:        Balance/Neuromuscular Re-Education  Balance/Neuromuscular Re-Education Activity Performed: Yes  Balance/Neuromuscular Re-Education Activity 1: Multi-level dynamic reaching in seated position (L/R/C/Below waist/Across midline) with x1UE support. Patient states he does not stand with ww.        Transfers  Transfer: Yes  Transfer 1  Technique 1: Squat pivot  Transfer Device 1:  (Arm in Arm)  Transfer Level of Assistance 1: Minimum assistance, Moderate assistance, +2  Trials/Comments 1: (2x). Min/Mod(A)x1 to the (L)side and Min(A)x2 to (R)side. v/c for safe hand placement and technique. Patient not receptive to dropping the arm rest on the BSC to allow for smoother transfers. Patient mentioning that he has been looking at slide-board transfers; may be interesting in education/training. Patient declined sitting up in recliner chair, but states he wants to get his w/c(not present in the hospital).          Outcome Measures:  [facility] Basic Mobility  Turning from your back to your side while in a flat bed without using bedrails: A little  Moving from lying on your back to sitting on the side of a flat bed without using bedrails: A little  Moving to and from bed to chair (including a wheelchair): A lot  Standing up from a chair using your arms (e.g. wheelchair or bedside chair): A lot  To walk in hospital room: Total  Climbing 3-5 steps with railing: Total  Basic Mobility - Total Score: 12    Education Documentation  Mobility Training, taught by Magdalena Saldaña PTA at 3/25/2024  2:37 PM.  Learner: Patient  Readiness: Acceptance  Method: Explanation, Demonstration  Response: Verbalizes Understanding, Needs Reinforcement  Comment: See therapy note.    EDUCATION:  Individual(s) Educated: Patient  Education Provided: Body Mechanics, Fall Risk, Home Safety, POC, Posture  (Safety with transfers; Discussed slide-board transfers.)  Patient Response to Education: Patient/Caregiver Verbalized Understanding of Information    Encounter Problems       Encounter Problems (Active)       PT Problem       Pt. will transfer supine/sit with CGA (Progressing)       Start:  03/19/24    Expected End:  04/02/24            Patient will transfer from bed to chair with LRAD and modA (Progressing)       Start:  03/19/24    Expected End:  04/02/24            Patient will tolerate 10 reps of therex (Not Progressing)       Start:  03/19/24    Expected End:  04/02/24

## 2024-03-25 NOTE — PROGRESS NOTES
Hitesh Jurado is a 53 y.o. male on day 10 of admission presenting with Acute on chronic combined systolic (congestive) and diastolic (congestive) heart failure (CMS/HCC).      Subjective   Patient was started on torsemide and metolazone, still hypotensive, midodrine was started for the patient  Awaiting pre-CERT for placement at SNF    Objective     Last Recorded Vitals  BP 99/54 (BP Location: Right arm, Patient Position: Lying)   Pulse 86   Temp 36.7 °C (98.1 °F) (Temporal)   Resp 18   Wt 65 kg (143 lb 4.8 oz)   SpO2 96%   Intake/Output last 3 Shifts:    Intake/Output Summary (Last 24 hours) at 3/25/2024 1504  Last data filed at 3/25/2024 0902  Gross per 24 hour   Intake 400 ml   Output 1100 ml   Net -700 ml       Admission Weight  Weight: 68 kg (150 lb) (03/15/24 0843)    Daily Weight  03/24/24 : 65 kg (143 lb 4.8 oz)    Physical Exam    General: Well-developed ill-appearing male, in no acute distress  HEENT: AT, NC, no JVD, no lymphadenopathy, neck supple, bilateral temporal cachexia  Lungs: Clear, no wheezing, no crackles  Cardiac: Normal S1-S2, no murmur, no gallop  Abdomen: Soft, nontender, distended, positive bowel sound  Extremities: Left BKA with ulcers on the knee, ulcer on the right knee noted, right lower extremity wrapped  Neurological: Alert awake oriented x 3, sensation intact, clear speech    Assessment/Plan   Principal Problem:    Acute on chronic combined systolic (congestive) and diastolic (congestive) heart failure (CMS/HCC)    Acute on chronic sytolic CHF exacerbation, resolved    Large right-sided pleural effusion, status post thoracentesis  - Echocardiogram 10/23: Showed EF of 15 to 20%  - S/P thoracentesis on 3/18, 1.5L drained  - cw Torsemide and metolazone, midodrine was started  - Hold Aldactone, continue to monitor intake and output     Decompensated liver cirrhosis with large ascites  Possible hepatorenal syndrome  - RUQ ultrasound showed cirrhosis  - No history of liver  cirrhosis from his [facility] admissions (risk factors are history of alcohol abuse)  - Hepatitis panel normal  - Underwent paracentesis 3/20, 6.3 L removed - albumin replaced  - GI signed off, outpatient follow-up     PIPPA/CKD, improving  ATN vs hepatorenal sydnrome  Hyponatremia, hypotension  - Aldactone discontinued  - Monitor renal function, avoid nephrotoxic agent  - nephrology following  - on torsemide, metolazone, midodrine     Microcytic anemia  - monitor CBC, Transfuse to keep hemoglobin >7     History of peripheral vascular disease  Status post left BKA  Infected lower extremity ulcers  - He has a history of peripheral vascular disease, underwent peripheral angiogram on 11/23 s/p RLE DCB of iliac and recanalization of R SFA on 12/4. Heparin gtt d/vinod and he was started on DAPT.  - Vascular surgery recommended outpatient follow up with [facility] vascular surgeon  - LUIS FERNANDO bundy appreciated, oral doxycycline on discharge  - Plastic surgery following  - Continue Vanco and cefepime till discharge  - Continue local wound care     Diabetes mellitus  - Lantus, ISS, hypoglycemia protocol, POCT glucose, DM diet     History of DVT  - Not on any AC at home     VTE Prophylaxis: Lovenox subcutaneous  Disposition: SNF once hemodynamically stable hopefully within the next 24 hours    Robson Garcia MD

## 2024-03-25 NOTE — PROGRESS NOTES
Plastic Surgery Note    Afebrile, vital signs stable  Left below-knee amputation and dorsal foot wound clean  Impression: Open wound right dorsal foot and left below-knee amputation  Continue Silvadene cream dressing changes

## 2024-03-25 NOTE — CARE PLAN
Problem: Fall/Injury  Goal: Not fall by end of shift  Outcome: Progressing     Problem: Fall/Injury  Goal: Be free from injury by end of the shift  Outcome: Progressing     Problem: Fall/Injury  Goal: Pace activities to prevent fatigue by end of the shift  Outcome: Progressing     Problem: Skin  Goal: Decreased wound size/increased tissue granulation at next dressing change  Outcome: Progressing  Flowsheets (Taken 3/24/2024 2204)  Decreased wound size/increased tissue granulation at next dressing change: Promote sleep for wound healing     Problem: Heart Failure  Goal: Reduction in peripheral edema within 24 hours  Outcome: Progressing     Problem: Heart Failure  Goal: Report improvement of dyspnea/breathlessness this shift  Outcome: Progressing     Problem: Heart Failure  Goal: Increase self care and/or family involvement in 24 hours  Outcome: Progressing   The patient's goals for the shift include      The clinical goals for the shift include Patient will remain safe this shift.

## 2024-03-26 VITALS
WEIGHT: 143.3 LBS | TEMPERATURE: 97.7 F | DIASTOLIC BLOOD PRESSURE: 70 MMHG | RESPIRATION RATE: 13 BRPM | BODY MASS INDEX: 21.22 KG/M2 | HEART RATE: 73 BPM | SYSTOLIC BLOOD PRESSURE: 114 MMHG | OXYGEN SATURATION: 97 % | HEIGHT: 69 IN

## 2024-03-26 LAB
ANION GAP SERPL CALC-SCNC: 14 MMOL/L (ref 10–20)
BUN SERPL-MCNC: 90 MG/DL (ref 6–23)
CALCIUM SERPL-MCNC: 8.2 MG/DL (ref 8.6–10.3)
CHLORIDE SERPL-SCNC: 83 MMOL/L (ref 98–107)
CO2 SERPL-SCNC: 38 MMOL/L (ref 21–32)
CREAT SERPL-MCNC: 2.38 MG/DL (ref 0.5–1.3)
EGFRCR SERPLBLD CKD-EPI 2021: 32 ML/MIN/1.73M*2
ERYTHROCYTE [DISTWIDTH] IN BLOOD BY AUTOMATED COUNT: 19 % (ref 11.5–14.5)
GLUCOSE BLD MANUAL STRIP-MCNC: 129 MG/DL (ref 74–99)
GLUCOSE BLD MANUAL STRIP-MCNC: 232 MG/DL (ref 74–99)
GLUCOSE SERPL-MCNC: 137 MG/DL (ref 74–99)
HCT VFR BLD AUTO: 22.8 % (ref 41–52)
HGB BLD-MCNC: 7.1 G/DL (ref 13.5–17.5)
HOLD SPECIMEN: NORMAL
MCH RBC QN AUTO: 19.2 PG (ref 26–34)
MCHC RBC AUTO-ENTMCNC: 31.1 G/DL (ref 32–36)
MCV RBC AUTO: 62 FL (ref 80–100)
NRBC BLD-RTO: 0 /100 WBCS (ref 0–0)
PLATELET # BLD AUTO: 420 X10*3/UL (ref 150–450)
POTASSIUM SERPL-SCNC: 4.5 MMOL/L (ref 3.5–5.3)
RBC # BLD AUTO: 3.69 X10*6/UL (ref 4.5–5.9)
SODIUM SERPL-SCNC: 130 MMOL/L (ref 136–145)
VANCOMYCIN SERPL-MCNC: 18.7 UG/ML (ref 5–20)
WBC # BLD AUTO: 11.5 X10*3/UL (ref 4.4–11.3)

## 2024-03-26 PROCEDURE — 80202 ASSAY OF VANCOMYCIN: CPT

## 2024-03-26 PROCEDURE — 85027 COMPLETE CBC AUTOMATED: CPT | Performed by: STUDENT IN AN ORGANIZED HEALTH CARE EDUCATION/TRAINING PROGRAM

## 2024-03-26 PROCEDURE — 2500000001 HC RX 250 WO HCPCS SELF ADMINISTERED DRUGS (ALT 637 FOR MEDICARE OP): Performed by: STUDENT IN AN ORGANIZED HEALTH CARE EDUCATION/TRAINING PROGRAM

## 2024-03-26 PROCEDURE — 99239 HOSP IP/OBS DSCHRG MGMT >30: CPT | Performed by: STUDENT IN AN ORGANIZED HEALTH CARE EDUCATION/TRAINING PROGRAM

## 2024-03-26 PROCEDURE — 36415 COLL VENOUS BLD VENIPUNCTURE: CPT | Performed by: STUDENT IN AN ORGANIZED HEALTH CARE EDUCATION/TRAINING PROGRAM

## 2024-03-26 PROCEDURE — 80048 BASIC METABOLIC PNL TOTAL CA: CPT | Performed by: STUDENT IN AN ORGANIZED HEALTH CARE EDUCATION/TRAINING PROGRAM

## 2024-03-26 PROCEDURE — 2500000004 HC RX 250 GENERAL PHARMACY W/ HCPCS (ALT 636 FOR OP/ED): Performed by: STUDENT IN AN ORGANIZED HEALTH CARE EDUCATION/TRAINING PROGRAM

## 2024-03-26 PROCEDURE — 2500000004 HC RX 250 GENERAL PHARMACY W/ HCPCS (ALT 636 FOR OP/ED)

## 2024-03-26 PROCEDURE — 82947 ASSAY GLUCOSE BLOOD QUANT: CPT

## 2024-03-26 RX ORDER — DOXYCYCLINE 100 MG/1
100 CAPSULE ORAL 2 TIMES DAILY
Qty: 14 CAPSULE | Refills: 0
Start: 2024-03-26 | End: 2024-04-02

## 2024-03-26 RX ORDER — SILVER SULFADIAZINE 10 G/1000G
CREAM TOPICAL DAILY
Start: 2024-03-27 | End: 2024-05-01 | Stop reason: HOSPADM

## 2024-03-26 RX ORDER — METOLAZONE 5 MG/1
5 TABLET ORAL DAILY
Qty: 30 TABLET | Refills: 1
Start: 2024-03-27 | End: 2024-05-01 | Stop reason: HOSPADM

## 2024-03-26 RX ORDER — MIDODRINE HYDROCHLORIDE 10 MG/1
10 TABLET ORAL
Qty: 90 TABLET | Refills: 0
Start: 2024-03-26 | End: 2024-04-25

## 2024-03-26 RX ADMIN — TORSEMIDE 60 MG: 20 TABLET ORAL at 08:02

## 2024-03-26 RX ADMIN — MIDODRINE HYDROCHLORIDE 10 MG: 5 TABLET ORAL at 11:40

## 2024-03-26 RX ADMIN — METOLAZONE 5 MG: 5 TABLET ORAL at 08:03

## 2024-03-26 RX ADMIN — PANTOPRAZOLE SODIUM 40 MG: 40 TABLET, DELAYED RELEASE ORAL at 06:21

## 2024-03-26 RX ADMIN — METHIMAZOLE 5 MG: 5 TABLET ORAL at 08:05

## 2024-03-26 RX ADMIN — MIDODRINE HYDROCHLORIDE 10 MG: 5 TABLET ORAL at 06:47

## 2024-03-26 RX ADMIN — ASPIRIN 81 MG: 81 TABLET, COATED ORAL at 08:02

## 2024-03-26 RX ADMIN — SILVER SULFADIAZINE: 10 CREAM TOPICAL at 08:07

## 2024-03-26 RX ADMIN — CLOPIDOGREL BISULFATE 75 MG: 75 TABLET, FILM COATED ORAL at 08:03

## 2024-03-26 RX ADMIN — CEFEPIME 2 G: 2 INJECTION, POWDER, FOR SOLUTION INTRAVENOUS at 11:27

## 2024-03-26 RX ADMIN — TRAMADOL HYDROCHLORIDE 50 MG: 50 TABLET, COATED ORAL at 06:47

## 2024-03-26 RX ADMIN — DOCUSATE SODIUM 100 MG: 100 CAPSULE, LIQUID FILLED ORAL at 08:03

## 2024-03-26 RX ADMIN — METOPROLOL SUCCINATE 25 MG: 25 TABLET, EXTENDED RELEASE ORAL at 08:02

## 2024-03-26 RX ADMIN — VANCOMYCIN HYDROCHLORIDE 500 MG: 500 INJECTION, POWDER, LYOPHILIZED, FOR SOLUTION INTRAVENOUS at 10:31

## 2024-03-26 RX ADMIN — INSULIN LISPRO 6 UNITS: 100 INJECTION, SOLUTION INTRAVENOUS; SUBCUTANEOUS at 11:28

## 2024-03-26 ASSESSMENT — COGNITIVE AND FUNCTIONAL STATUS - GENERAL
TOILETING: A LITTLE
MOVING TO AND FROM BED TO CHAIR: A LOT
DRESSING REGULAR LOWER BODY CLOTHING: A LOT
HELP NEEDED FOR BATHING: A LOT
MOBILITY SCORE: 12
TURNING FROM BACK TO SIDE WHILE IN FLAT BAD: A LITTLE
WALKING IN HOSPITAL ROOM: TOTAL
DRESSING REGULAR UPPER BODY CLOTHING: A LITTLE
STANDING UP FROM CHAIR USING ARMS: A LOT
DAILY ACTIVITIY SCORE: 17
CLIMB 3 TO 5 STEPS WITH RAILING: TOTAL
MOVING FROM LYING ON BACK TO SITTING ON SIDE OF FLAT BED WITH BEDRAILS: A LITTLE
PERSONAL GROOMING: A LITTLE

## 2024-03-26 ASSESSMENT — PAIN - FUNCTIONAL ASSESSMENT: PAIN_FUNCTIONAL_ASSESSMENT: 0-10

## 2024-03-26 ASSESSMENT — PAIN SCALES - GENERAL
PAINLEVEL_OUTOF10: 8
PAINLEVEL_OUTOF10: 0 - NO PAIN

## 2024-03-26 NOTE — PROGRESS NOTES
"Vancomycin Dosing by Pharmacy- FOLLOW UP    Hitesh Jurado is a 53 y.o. year old male who Pharmacy has been consulted for vancomycin dosing for cellulitis, skin and soft tissue. Based on the patient's indication and renal status this patient is being dosed based on a goal AUC of 400-600.     Renal function is currently stable.    Current vancomycin dose: 500 mg given every 24 hours    Estimated vancomycin AUC on current dose: 499 mg/L.hr     Visit Vitals  BP 86/51 (Patient Position: Lying)   Pulse 78   Temp 36.6 °C (97.9 °F) (Temporal)   Resp 18        Lab Results   Component Value Date    CREATININE 2.38 (H) 03/26/2024    CREATININE 2.11 (H) 03/25/2024    CREATININE 2.23 (H) 03/24/2024    CREATININE 2.17 (H) 03/23/2024        Patient weight is No results found for: \"PTWEIGHT\"    No results found for: \"CULTURE\"     I/O last 3 completed shifts:  In: 400 (6.2 mL/kg) [P.O.:400]  Out: 2050 (31.5 mL/kg) [Urine:2050 (0.9 mL/kg/hr)]  Weight: 65 kg   [unfilled]    Lab Results   Component Value Date    PATIENTTEMP  11/01/2023      Comment:      NOTE: Patient Results are Not Corrected for Temperature    PATIENTTEMP 37.0 11/14/2022    PATIENTTEMP 37.0 11/13/2022        Assessment/Plan    Within goal AUC range. Continue current vancomycin regimen.    This dosing regimen is predicted by [employer] to result in the following pharmacokinetic parameters:  Regimen: 500 mg IV every 24 hours.  Start time: 10:52 on 03/26/2024  Exposure target: AUC24 (range)400-600 mg/L.hr   AUC24,ss: 499 mg/L.hr  Probability of AUC24 > 400: 99 %  Ctrough,ss: 17.7 mg/L  Probability of Ctrough,ss > 20: 15 %  Probability of nephrotoxicity (Lodise DAYANARA 2009): 14 %      The next level will be obtained on 3/29 at 0500. May be obtained sooner if clinically indicated.   Will continue to monitor renal function daily while on vancomycin and order serum creatinine at least every 48 hours if not already ordered.  Follow for continued vancomycin needs, " clinical response, and signs/symptoms of toxicity.       Consuelo Loaiza, RPh

## 2024-03-26 NOTE — PROGRESS NOTES
Nephrology Progress Note    Assessment:  53 y.o. male with history s/f HFrEF, PAD s/p LT BKA, T2DM c/b chronic non-healing wounds on Les who presented for a fall while attempting to transfer.      PIPPA on CKD stage II: likely in setting of CRS, baseline Scr ~1.2 w/ eGFR high 60s, tends to get recurrent AKIs, stable  Hypokalemia: corrected   Large RT pleural effusion: s/p RT sided thoracentesis 1.5L taken off   Anemia   Hypoalbuminemia   Cirrhosis c/b ascites: s/p paracentesis w/ 6300 ml off (3/19)     Plan:  - continue his PO diuretics torsemide and metolazone on discharge  - continue him on midodrine 10 mg TID as outpatient as well, can wean outside the hospital  - hold aldactone on discharge   - follow up in 1-2 weeks w/ BMP later this week (3/28) and fax to [phone]       Subjective:  Admit Date: 3/15/2024    Interval History: BP better than yesterday, being discharged today per report, function slightly worse but pt still  diuresing w/ Po medications     Medications:  Scheduled Meds:aspirin, 81 mg, oral, Daily  atorvastatin, 80 mg, oral, Daily  cefepime, 2 g, intravenous, q24h  clopidogrel, 75 mg, oral, Daily  docusate sodium, 100 mg, oral, BID  enoxaparin, 40 mg, subcutaneous, q24h  insulin glargine, 25 Units, subcutaneous, Nightly  insulin lispro, 0-15 Units, subcutaneous, TID with meals  insulin lispro, 5 Units, subcutaneous, TID with meals  lidocaine, 5 mL, infiltration, Once  lidocaine, 1 patch, transdermal, Daily  lisinopril, 5 mg, oral, Nightly  melatonin, 5 mg, oral, Nightly  methIMAzole, 5 mg, oral, Daily  metOLazone, 5 mg, oral, Daily  metoprolol succinate XL, 25 mg, oral, Daily  midodrine, 10 mg, oral, TID with meals  pantoprazole, 40 mg, oral, Daily before breakfast   Or  pantoprazole, 40 mg, intravenous, Daily before breakfast  perflutren lipid microspheres, 0.5-10 mL of dilution, intravenous, Once in imaging  perflutren protein A microsphere, 0.5 mL, intravenous, Once in imaging  silver  "sulfADIAZINE, , Topical, Daily  [Held by provider] spironolactone, 25 mg, oral, Daily  sulfur hexafluoride microsphr, 2 mL, intravenous, Once in imaging  torsemide, 60 mg, oral, Daily  vancomycin, 500 mg, intravenous, q24h      Continuous Infusions:       CBC:   Lab Results   Component Value Date    WBC 11.5 (H) 03/26/2024    RBC 3.69 (L) 03/26/2024    HGB 7.1 (L) 03/26/2024    HCT 22.8 (L) 03/26/2024    MCV 62 (L) 03/26/2024    MCH 19.2 (L) 03/26/2024    MCHC 31.1 (L) 03/26/2024    RDW 19.0 (H) 03/26/2024     03/26/2024     BMP:    Lab Results   Component Value Date     (L) 03/26/2024    K 4.5 03/26/2024    CL 83 (L) 03/26/2024    CO2 38 (H) 03/26/2024    BUN 90 (H) 03/26/2024    CREATININE 2.38 (H) 03/26/2024    CALCIUM 8.2 (L) 03/26/2024    GLUCOSE 137 (H) 03/26/2024       Objective:  Vitals: /70 (BP Location: Left arm, Patient Position: Sitting)   Pulse 73   Temp 36.5 °C (97.7 °F) (Temporal)   Resp 13   Ht 1.753 m (5' 9\")   Wt 65 kg (143 lb 4.8 oz)   SpO2 97%   BMI 21.16 kg/m²    Wt Readings from Last 3 Encounters:   03/24/24 65 kg (143 lb 4.8 oz)   10/30/23 55.9 kg (123 lb 3.8 oz)   07/21/23 59 kg (130 lb)      24HR INTAKE/OUTPUT:    Intake/Output Summary (Last 24 hours) at 3/26/2024 1248  Last data filed at 3/26/2024 1157  Gross per 24 hour   Intake 1275 ml   Output 2250 ml   Net -975 ml       General: alert, in no apparent distress  HEENT: normocephalic, atraumatic, anicteric  Lungs: non-labored respirations, clear to auscultation bilaterally  Heart: regular rate and rhythm, no murmurs or rubs  Abdomen: soft, non-tender, severely distended   Ext: no cyanosis, +++ RLE peripheral edema, LT BKA  Neuro: alert and oriented, no gross abnormalities      Electronically signed by Malgorzata Williamson MD              "

## 2024-03-26 NOTE — PROGRESS NOTES
03/26/24 1139   Current Planned Discharge Disposition   Current Planned Discharge Disposition SNF  ([facility])     Ins. Precert has been obtained.  Pt. Will discharge this date to [facility] at 1:00 pm via ambulance.  Spoke with pt. Who is aware and in agreement to transfer.

## 2024-03-26 NOTE — PROGRESS NOTES
03/26/24 1142   Current Planned Discharge Disposition   Current Planned Discharge Disposition SNF     Patient is cleared medically to transfer to SNF. [facility] to [facility] to [facility], patient aware and in agreement.

## 2024-03-26 NOTE — CARE PLAN
Problem: Fall/Injury  Goal: Not fall by end of shift  Outcome: Progressing  Goal: Be free from injury by end of the shift  Outcome: Progressing  Goal: Verbalize understanding of personal risk factors for fall in the hospital  Outcome: Progressing  Goal: Verbalize understanding of risk factor reduction measures to prevent injury from fall in the home  Outcome: Progressing  Goal: Use assistive devices by end of the shift  Outcome: Progressing  Goal: Pace activities to prevent fatigue by end of the shift  Outcome: Progressing     Problem: Skin  Goal: Decreased wound size/increased tissue granulation at next dressing change  Outcome: Progressing  Goal: Participates in plan/prevention/treatment measures  Outcome: Progressing  Goal: Prevent/manage excess moisture  Outcome: Progressing  Flowsheets (Taken 3/25/2024 2216)  Prevent/manage excess moisture: Moisturize dry skin  Goal: Prevent/minimize sheer/friction injuries  Outcome: Progressing  Goal: Promote/optimize nutrition  Outcome: Progressing  Goal: Promote skin healing  Outcome: Progressing     Problem: Heart Failure  Goal: Improved gas exchange this shift  Outcome: Progressing  Goal: Improved urinary output this shift  Outcome: Progressing  Goal: Reduction in peripheral edema within 24 hours  Outcome: Progressing  Goal: Report improvement of dyspnea/breathlessness this shift  Outcome: Progressing  Goal: Weight from fluid excess reduced over 2-3 days, then stabilize  Outcome: Progressing  Goal: Increase self care and/or family involvement in 24 hours  Outcome: Progressing   The patient's goals for the shift include      The clinical goals for the shift include Patient will remain safe this shift.

## 2024-03-26 NOTE — CARE PLAN
Problem: Skin  Goal: Promote skin healing  Flowsheets (Taken 3/26/2024 0906)  Promote skin healing: Assess skin/pad under line(s)/device(s)

## 2024-03-26 NOTE — CONSULTS
Nutrition Note:   Reason for Assessment: Length of stay    Patient is a 53 y.o. male presenting with acute on chronic combined systolic and diastolic heart failure. Noted pt has since discharged since being seen for nutrition assessment. Pt reported good appetite and intake, states he is eating all of his meals, although reports having difficulty eating due to early satiety before he had paracentesis with 6.3 L fluid removal on 3/20/24. Had two documented meal intakes of 100% during this admission. Denies N/V and issues chewing/swallowing. Reports weighing 114# in October 2023 and more recently gained wt up to 180# due to fluid retention. Current wt is 65 kg (143#) this admission. Available wt records indicate pt gained ~16 kg over 4 months. Left BKA and several wounds noted on nursing assessment - would benefit from Yosvany if remaining in hospital. On a cardiac diet - pt with no questions/concerns about diet, feels he is familiar with his diet restrictions and what he needs to eat. Denies any nutrition needs at this time.

## 2024-03-29 ENCOUNTER — TELEPHONE (OUTPATIENT)
Dept: VASCULAR SURGERY | Facility: HOSPITAL | Age: 54
End: 2024-03-29
Payer: MEDICAID

## 2024-03-29 NOTE — TELEPHONE ENCOUNTER
I have had the pleasure of speaking with Zohra for the [facility] nursing facility  ( [phone])  following  review of the patient 's medical record, I have informed  Zohra that  Dr. Dyre has recommended that Mr. Jurado  follow up with Dr. Ramos - vascular surgery  [facility]  .   The patient is an established patient of .  Zohra  has thanked me for  the information.  LUCRETIA uHi

## 2024-04-01 ENCOUNTER — OFFICE VISIT (OUTPATIENT)
Dept: WOUND CARE | Facility: CLINIC | Age: 54
End: 2024-04-01
Payer: MEDICAID

## 2024-04-01 ENCOUNTER — LAB REQUISITION (OUTPATIENT)
Dept: LAB | Facility: HOSPITAL | Age: 54
End: 2024-04-01
Payer: MEDICAID

## 2024-04-01 DIAGNOSIS — I73.89 OTHER SPECIFIED PERIPHERAL VASCULAR DISEASES (CMS-HCC): ICD-10-CM

## 2024-04-01 DIAGNOSIS — L97.812 NON-PRESSURE CHRONIC ULCER OF OTHER PART OF RIGHT LOWER LEG WITH FAT LAYER EXPOSED (MULTI): ICD-10-CM

## 2024-04-01 DIAGNOSIS — F17.210 NICOTINE DEPENDENCE, CIGARETTES, UNCOMPLICATED: ICD-10-CM

## 2024-04-01 DIAGNOSIS — L97.512 NON-PRESSURE CHRONIC ULCER OF OTHER PART OF RIGHT FOOT WITH FAT LAYER EXPOSED (MULTI): ICD-10-CM

## 2024-04-01 DIAGNOSIS — L97.822 NON-PRESSURE CHRONIC ULCER OF OTHER PART OF LEFT LOWER LEG WITH FAT LAYER EXPOSED (MULTI): ICD-10-CM

## 2024-04-01 PROCEDURE — 11042 DBRDMT SUBQ TIS 1ST 20SQCM/<: CPT

## 2024-04-01 PROCEDURE — 99213 OFFICE O/P EST LOW 20 MIN: CPT

## 2024-04-01 PROCEDURE — 87070 CULTURE OTHR SPECIMN AEROBIC: CPT | Mod: OUT,ELYLAB | Performed by: PLASTIC SURGERY

## 2024-04-01 PROCEDURE — 11045 DBRDMT SUBQ TISS EACH ADDL: CPT

## 2024-04-02 ENCOUNTER — APPOINTMENT (OUTPATIENT)
Dept: GENERAL RADIOLOGY | Age: 54
DRG: 469 | End: 2024-04-02
Payer: MEDICAID

## 2024-04-02 ENCOUNTER — APPOINTMENT (OUTPATIENT)
Dept: ULTRASOUND IMAGING | Age: 54
DRG: 469 | End: 2024-04-02
Payer: MEDICAID

## 2024-04-02 ENCOUNTER — HOSPITAL ENCOUNTER (INPATIENT)
Age: 54
LOS: 5 days | Discharge: NURSING, REHAB OR LONG-TERM CARE | DRG: 469 | End: 2024-04-07
Attending: EMERGENCY MEDICINE | Admitting: INTERNAL MEDICINE
Payer: MEDICAID

## 2024-04-02 DIAGNOSIS — E16.2 HYPOGLYCEMIA: ICD-10-CM

## 2024-04-02 DIAGNOSIS — E87.5 HYPERKALEMIA: ICD-10-CM

## 2024-04-02 DIAGNOSIS — R40.4 ALTERED LEVEL OF CONSCIOUSNESS: ICD-10-CM

## 2024-04-02 DIAGNOSIS — N17.9 AKI (ACUTE KIDNEY INJURY) (HCC): Primary | ICD-10-CM

## 2024-04-02 DIAGNOSIS — I95.0 IDIOPATHIC HYPOTENSION: ICD-10-CM

## 2024-04-02 DIAGNOSIS — I50.22 CHRONIC SYSTOLIC HEART FAILURE (HCC): ICD-10-CM

## 2024-04-02 PROBLEM — G93.40 ENCEPHALOPATHY: Status: ACTIVE | Noted: 2024-04-02

## 2024-04-02 LAB
ABO + RH BLD: NORMAL
ACANTHOCYTES BLD QL SMEAR: ABNORMAL
ALBUMIN SERPL-MCNC: 3 G/DL (ref 3.5–4.6)
ALP SERPL-CCNC: 611 U/L (ref 35–104)
ALT SERPL-CCNC: 97 U/L (ref 0–41)
AMMONIA PLAS-SCNC: 38 UMOL/L (ref 16–60)
AMORPH SED URNS QL MICRO: ABNORMAL
ANION GAP SERPL CALCULATED.3IONS-SCNC: 13 MEQ/L (ref 9–15)
ANION GAP SERPL CALCULATED.3IONS-SCNC: 14 MEQ/L (ref 9–15)
ANION GAP SERPL CALCULATED.3IONS-SCNC: 14 MEQ/L (ref 9–15)
ANION GAP SERPL CALCULATED.3IONS-SCNC: 9 MEQ/L (ref 9–15)
ANISOCYTOSIS BLD QL SMEAR: ABNORMAL
AST SERPL-CCNC: 208 U/L (ref 0–40)
BACTERIA URNS QL MICRO: ABNORMAL /HPF
BASOPHILS # BLD: 0.1 K/UL (ref 0–0.2)
BASOPHILS NFR BLD: 0.5 %
BILIRUB SERPL-MCNC: 0.7 MG/DL (ref 0.2–0.7)
BILIRUB UR QL STRIP: ABNORMAL
BLD GP AB SCN SERPL QL: NORMAL
BUN SERPL-MCNC: 101 MG/DL (ref 6–20)
BUN SERPL-MCNC: 102 MG/DL (ref 6–20)
CALCIUM SERPL-MCNC: 8.3 MG/DL (ref 8.5–9.9)
CALCIUM SERPL-MCNC: 8.4 MG/DL (ref 8.5–9.9)
CALCIUM SERPL-MCNC: 8.5 MG/DL (ref 8.5–9.9)
CALCIUM SERPL-MCNC: 8.7 MG/DL (ref 8.5–9.9)
CHLORIDE SERPL-SCNC: 96 MEQ/L (ref 95–107)
CHLORIDE SERPL-SCNC: 97 MEQ/L (ref 95–107)
CHLORIDE SERPL-SCNC: 97 MEQ/L (ref 95–107)
CHLORIDE SERPL-SCNC: 99 MEQ/L (ref 95–107)
CLARITY UR: ABNORMAL
CO2 SERPL-SCNC: 21 MEQ/L (ref 20–31)
CO2 SERPL-SCNC: 22 MEQ/L (ref 20–31)
CO2 SERPL-SCNC: 22 MEQ/L (ref 20–31)
CO2 SERPL-SCNC: 25 MEQ/L (ref 20–31)
COLOR UR: ABNORMAL
CREAT SERPL-MCNC: 2.26 MG/DL (ref 0.7–1.2)
CREAT SERPL-MCNC: 2.31 MG/DL (ref 0.7–1.2)
CREAT SERPL-MCNC: 2.36 MG/DL (ref 0.7–1.2)
CREAT SERPL-MCNC: 2.38 MG/DL (ref 0.7–1.2)
EKG ATRIAL RATE: 50 BPM
EKG P AXIS: 65 DEGREES
EKG P-R INTERVAL: 170 MS
EKG Q-T INTERVAL: 538 MS
EKG QRS DURATION: 154 MS
EKG QTC CALCULATION (BAZETT): 490 MS
EKG R AXIS: -28 DEGREES
EKG T AXIS: 43 DEGREES
EKG VENTRICULAR RATE: 50 BPM
EOSINOPHIL # BLD: 0 K/UL (ref 0–0.7)
EOSINOPHIL NFR BLD: 0.2 %
EPI CELLS #/AREA URNS AUTO: ABNORMAL /HPF (ref 0–5)
ERYTHROCYTE [DISTWIDTH] IN BLOOD BY AUTOMATED COUNT: 21.4 % (ref 11.5–14.5)
GLOBULIN SER CALC-MCNC: 3.8 G/DL (ref 2.3–3.5)
GLUCOSE BLD-MCNC: 127 MG/DL (ref 70–99)
GLUCOSE BLD-MCNC: 181 MG/DL (ref 70–99)
GLUCOSE BLD-MCNC: 45 MG/DL (ref 70–99)
GLUCOSE SERPL-MCNC: 110 MG/DL (ref 70–99)
GLUCOSE SERPL-MCNC: 33 MG/DL (ref 70–99)
GLUCOSE SERPL-MCNC: 91 MG/DL (ref 70–99)
GLUCOSE SERPL-MCNC: 92 MG/DL (ref 70–99)
GLUCOSE UR STRIP-MCNC: NEGATIVE MG/DL
HCT VFR BLD AUTO: 25.8 % (ref 42–52)
HGB BLD-MCNC: 7.5 G/DL (ref 14–18)
HGB UR QL STRIP: ABNORMAL
HYALINE CASTS #/AREA URNS AUTO: ABNORMAL /HPF (ref 0–5)
INR PPP: 1.5
KETONES UR STRIP-MCNC: ABNORMAL MG/DL
LACTATE BLDV-SCNC: 0.9 MMOL/L (ref 0.5–2.2)
LEUKOCYTE ESTERASE UR QL STRIP: ABNORMAL
LYMPHOCYTES # BLD: 1.1 K/UL (ref 1–4.8)
LYMPHOCYTES NFR BLD: 9.1 %
MACROCYTES BLD QL SMEAR: ABNORMAL
MCH RBC QN AUTO: 19.2 PG (ref 27–31.3)
MCHC RBC AUTO-ENTMCNC: 29.1 % (ref 33–37)
MCV RBC AUTO: 66 FL (ref 79–92.2)
MICROCYTES BLD QL SMEAR: ABNORMAL
MONOCYTES # BLD: 0.9 K/UL (ref 0.2–0.8)
MONOCYTES NFR BLD: 7.5 %
NEUTROPHILS # BLD: 10.1 K/UL (ref 1.4–6.5)
NEUTS SEG NFR BLD: 82.1 %
NITRITE UR QL STRIP: NEGATIVE
PATH INTERP BLD-IMP: YES
PERFORMED ON: ABNORMAL
PH UR STRIP: 5 [PH] (ref 5–9)
PLATELET # BLD AUTO: 416 K/UL (ref 130–400)
PLATELET BLD QL SMEAR: ADEQUATE
POC CREATININE: 2.5 MG/DL (ref 0.8–1.3)
POC SAMPLE TYPE: ABNORMAL
POIKILOCYTOSIS BLD QL SMEAR: ABNORMAL
POLYCHROMASIA BLD QL SMEAR: ABNORMAL
POTASSIUM SERPL-SCNC: 6.1 MEQ/L (ref 3.4–4.9)
POTASSIUM SERPL-SCNC: 6.2 MEQ/L (ref 3.4–4.9)
POTASSIUM SERPL-SCNC: 6.3 MEQ/L (ref 3.4–4.9)
POTASSIUM SERPL-SCNC: 6.7 MEQ/L (ref 3.4–4.9)
PROT SERPL-MCNC: 6.8 G/DL (ref 6.3–8)
PROT UR STRIP-MCNC: 100 MG/DL
PROTHROMBIN TIME: 17.8 SEC (ref 12.3–14.9)
RBC # BLD AUTO: 3.91 M/UL (ref 4.7–6.1)
RBC #/AREA URNS AUTO: >100 /HPF (ref 0–5)
SLIDE REVIEW: ABNORMAL
SODIUM SERPL-SCNC: 131 MEQ/L (ref 135–144)
SODIUM SERPL-SCNC: 131 MEQ/L (ref 135–144)
SODIUM SERPL-SCNC: 132 MEQ/L (ref 135–144)
SODIUM SERPL-SCNC: 135 MEQ/L (ref 135–144)
SP GR UR STRIP: 1.02 (ref 1–1.03)
T4 FREE SERPL-MCNC: 0.8 NG/DL (ref 0.84–1.68)
TSH REFLEX: 0.67 UIU/ML (ref 0.44–3.86)
UROBILINOGEN UR STRIP-ACNC: 1 E.U./DL
WBC # BLD AUTO: 12.3 K/UL (ref 4.8–10.8)
WBC #/AREA URNS AUTO: ABNORMAL /HPF (ref 0–5)

## 2024-04-02 PROCEDURE — 6370000000 HC RX 637 (ALT 250 FOR IP): Performed by: EMERGENCY MEDICINE

## 2024-04-02 PROCEDURE — 2580000003 HC RX 258: Performed by: INTERNAL MEDICINE

## 2024-04-02 PROCEDURE — 99213 OFFICE O/P EST LOW 20 MIN: CPT

## 2024-04-02 PROCEDURE — 2580000003 HC RX 258

## 2024-04-02 PROCEDURE — 86901 BLOOD TYPING SEROLOGIC RH(D): CPT

## 2024-04-02 PROCEDURE — 2500000003 HC RX 250 WO HCPCS: Performed by: INTERNAL MEDICINE

## 2024-04-02 PROCEDURE — 2500000003 HC RX 250 WO HCPCS: Performed by: EMERGENCY MEDICINE

## 2024-04-02 PROCEDURE — 81001 URINALYSIS AUTO W/SCOPE: CPT

## 2024-04-02 PROCEDURE — 76536 US EXAM OF HEAD AND NECK: CPT

## 2024-04-02 PROCEDURE — 84443 ASSAY THYROID STIM HORMONE: CPT

## 2024-04-02 PROCEDURE — 6360000002 HC RX W HCPCS: Performed by: EMERGENCY MEDICINE

## 2024-04-02 PROCEDURE — 83605 ASSAY OF LACTIC ACID: CPT

## 2024-04-02 PROCEDURE — 93010 ELECTROCARDIOGRAM REPORT: CPT | Performed by: INTERNAL MEDICINE

## 2024-04-02 PROCEDURE — 96374 THER/PROPH/DIAG INJ IV PUSH: CPT

## 2024-04-02 PROCEDURE — 86850 RBC ANTIBODY SCREEN: CPT

## 2024-04-02 PROCEDURE — 85025 COMPLETE CBC W/AUTO DIFF WBC: CPT

## 2024-04-02 PROCEDURE — 99222 1ST HOSP IP/OBS MODERATE 55: CPT | Performed by: INTERNAL MEDICINE

## 2024-04-02 PROCEDURE — 86900 BLOOD TYPING SEROLOGIC ABO: CPT

## 2024-04-02 PROCEDURE — 36415 COLL VENOUS BLD VENIPUNCTURE: CPT

## 2024-04-02 PROCEDURE — 6360000002 HC RX W HCPCS: Performed by: INTERNAL MEDICINE

## 2024-04-02 PROCEDURE — 6370000000 HC RX 637 (ALT 250 FOR IP): Performed by: INTERNAL MEDICINE

## 2024-04-02 PROCEDURE — 2000000000 HC ICU R&B

## 2024-04-02 PROCEDURE — 71045 X-RAY EXAM CHEST 1 VIEW: CPT

## 2024-04-02 PROCEDURE — 93005 ELECTROCARDIOGRAM TRACING: CPT | Performed by: EMERGENCY MEDICINE

## 2024-04-02 PROCEDURE — 80053 COMPREHEN METABOLIC PANEL: CPT

## 2024-04-02 PROCEDURE — 80307 DRUG TEST PRSMV CHEM ANLYZR: CPT

## 2024-04-02 PROCEDURE — 85610 PROTHROMBIN TIME: CPT

## 2024-04-02 PROCEDURE — 92610 EVALUATE SWALLOWING FUNCTION: CPT

## 2024-04-02 PROCEDURE — 84439 ASSAY OF FREE THYROXINE: CPT

## 2024-04-02 PROCEDURE — 2580000003 HC RX 258: Performed by: EMERGENCY MEDICINE

## 2024-04-02 PROCEDURE — 99291 CRITICAL CARE FIRST HOUR: CPT | Performed by: INTERNAL MEDICINE

## 2024-04-02 PROCEDURE — 99285 EMERGENCY DEPT VISIT HI MDM: CPT

## 2024-04-02 PROCEDURE — 82533 TOTAL CORTISOL: CPT

## 2024-04-02 PROCEDURE — 87040 BLOOD CULTURE FOR BACTERIA: CPT

## 2024-04-02 PROCEDURE — 83036 HEMOGLOBIN GLYCOSYLATED A1C: CPT

## 2024-04-02 PROCEDURE — 82140 ASSAY OF AMMONIA: CPT

## 2024-04-02 RX ORDER — SODIUM CHLORIDE 0.9 % (FLUSH) 0.9 %
5-40 SYRINGE (ML) INJECTION EVERY 12 HOURS SCHEDULED
Status: DISCONTINUED | OUTPATIENT
Start: 2024-04-02 | End: 2024-04-07 | Stop reason: HOSPADM

## 2024-04-02 RX ORDER — DEXTROSE MONOHYDRATE 25 G/50ML
25 INJECTION, SOLUTION INTRAVENOUS ONCE
Status: COMPLETED | OUTPATIENT
Start: 2024-04-02 | End: 2024-04-02

## 2024-04-02 RX ORDER — METOLAZONE 5 MG/1
5 TABLET ORAL DAILY
Status: ON HOLD | COMMUNITY
End: 2024-04-05 | Stop reason: HOSPADM

## 2024-04-02 RX ORDER — 0.9 % SODIUM CHLORIDE 0.9 %
250 INTRAVENOUS SOLUTION INTRAVENOUS ONCE
Status: COMPLETED | OUTPATIENT
Start: 2024-04-02 | End: 2024-04-02

## 2024-04-02 RX ORDER — SODIUM CHLORIDE 9 MG/ML
INJECTION, SOLUTION INTRAVENOUS CONTINUOUS
Status: DISCONTINUED | OUTPATIENT
Start: 2024-04-02 | End: 2024-04-02

## 2024-04-02 RX ORDER — SODIUM CHLORIDE 0.9 % (FLUSH) 0.9 %
5-40 SYRINGE (ML) INJECTION PRN
Status: DISCONTINUED | OUTPATIENT
Start: 2024-04-02 | End: 2024-04-07 | Stop reason: HOSPADM

## 2024-04-02 RX ORDER — DEXTROSE MONOHYDRATE 50 MG/ML
INJECTION, SOLUTION INTRAVENOUS CONTINUOUS
Status: DISCONTINUED | OUTPATIENT
Start: 2024-04-02 | End: 2024-04-02

## 2024-04-02 RX ORDER — GABAPENTIN 100 MG/1
100 CAPSULE ORAL 3 TIMES DAILY
Status: ON HOLD | COMMUNITY

## 2024-04-02 RX ORDER — POLYETHYLENE GLYCOL 3350 17 G/17G
17 POWDER, FOR SOLUTION ORAL DAILY PRN
Status: DISCONTINUED | OUTPATIENT
Start: 2024-04-02 | End: 2024-04-07 | Stop reason: HOSPADM

## 2024-04-02 RX ORDER — GLUCAGON 1 MG/ML
1 KIT INJECTION PRN
Status: DISCONTINUED | OUTPATIENT
Start: 2024-04-02 | End: 2024-04-07 | Stop reason: HOSPADM

## 2024-04-02 RX ORDER — CARVEDILOL 12.5 MG/1
12.5 TABLET ORAL 2 TIMES DAILY WITH MEALS
Status: ON HOLD | COMMUNITY
End: 2024-04-06 | Stop reason: HOSPADM

## 2024-04-02 RX ORDER — MAGNESIUM SULFATE IN WATER 40 MG/ML
2000 INJECTION, SOLUTION INTRAVENOUS PRN
Status: DISCONTINUED | OUTPATIENT
Start: 2024-04-02 | End: 2024-04-07 | Stop reason: HOSPADM

## 2024-04-02 RX ORDER — DEXTROSE MONOHYDRATE 100 MG/ML
INJECTION, SOLUTION INTRAVENOUS CONTINUOUS PRN
Status: DISCONTINUED | OUTPATIENT
Start: 2024-04-02 | End: 2024-04-07 | Stop reason: HOSPADM

## 2024-04-02 RX ORDER — POTASSIUM CHLORIDE 750 MG/1
20 CAPSULE, EXTENDED RELEASE ORAL 3 TIMES DAILY
Status: ON HOLD | COMMUNITY
End: 2024-04-05 | Stop reason: HOSPADM

## 2024-04-02 RX ORDER — ACETAMINOPHEN 325 MG/1
650 TABLET ORAL EVERY 6 HOURS PRN
Status: DISCONTINUED | OUTPATIENT
Start: 2024-04-02 | End: 2024-04-07 | Stop reason: HOSPADM

## 2024-04-02 RX ORDER — MIDODRINE HYDROCHLORIDE 5 MG/1
10 TABLET ORAL 3 TIMES DAILY
Status: ON HOLD | COMMUNITY

## 2024-04-02 RX ORDER — MULTIVIT WITH MINERALS/LUTEIN
250 TABLET ORAL DAILY
Status: ON HOLD | COMMUNITY

## 2024-04-02 RX ORDER — ALPRAZOLAM 0.5 MG/1
0.25 TABLET ORAL
Status: DISCONTINUED | OUTPATIENT
Start: 2024-04-02 | End: 2024-04-03

## 2024-04-02 RX ORDER — FERROUS SULFATE 325(65) MG
325 TABLET ORAL
Status: ON HOLD | COMMUNITY

## 2024-04-02 RX ORDER — ONDANSETRON 2 MG/ML
4 INJECTION INTRAMUSCULAR; INTRAVENOUS EVERY 6 HOURS PRN
Status: DISCONTINUED | OUTPATIENT
Start: 2024-04-02 | End: 2024-04-07 | Stop reason: HOSPADM

## 2024-04-02 RX ORDER — ACETAMINOPHEN 650 MG/1
650 SUPPOSITORY RECTAL EVERY 6 HOURS PRN
Status: DISCONTINUED | OUTPATIENT
Start: 2024-04-02 | End: 2024-04-07 | Stop reason: HOSPADM

## 2024-04-02 RX ORDER — DEXTROSE MONOHYDRATE 100 MG/ML
INJECTION, SOLUTION INTRAVENOUS
Status: COMPLETED
Start: 2024-04-02 | End: 2024-04-02

## 2024-04-02 RX ORDER — ONDANSETRON 4 MG/1
4 TABLET, ORALLY DISINTEGRATING ORAL EVERY 8 HOURS PRN
Status: DISCONTINUED | OUTPATIENT
Start: 2024-04-02 | End: 2024-04-07 | Stop reason: HOSPADM

## 2024-04-02 RX ORDER — POTASSIUM CHLORIDE 20 MEQ/1
40 TABLET, EXTENDED RELEASE ORAL PRN
Status: DISCONTINUED | OUTPATIENT
Start: 2024-04-02 | End: 2024-04-07 | Stop reason: HOSPADM

## 2024-04-02 RX ORDER — INSULIN LISPRO 100 [IU]/ML
5 INJECTION, SOLUTION INTRAVENOUS; SUBCUTANEOUS ONCE
Status: COMPLETED | OUTPATIENT
Start: 2024-04-02 | End: 2024-04-02

## 2024-04-02 RX ORDER — ENOXAPARIN SODIUM 100 MG/ML
40 INJECTION SUBCUTANEOUS DAILY
Status: DISCONTINUED | OUTPATIENT
Start: 2024-04-02 | End: 2024-04-07 | Stop reason: HOSPADM

## 2024-04-02 RX ORDER — POTASSIUM CHLORIDE 7.45 MG/ML
10 INJECTION INTRAVENOUS PRN
Status: DISCONTINUED | OUTPATIENT
Start: 2024-04-02 | End: 2024-04-07 | Stop reason: HOSPADM

## 2024-04-02 RX ORDER — MIDODRINE HYDROCHLORIDE 5 MG/1
10 TABLET ORAL
Status: DISCONTINUED | OUTPATIENT
Start: 2024-04-02 | End: 2024-04-06

## 2024-04-02 RX ORDER — SODIUM CHLORIDE 9 MG/ML
INJECTION, SOLUTION INTRAVENOUS PRN
Status: DISCONTINUED | OUTPATIENT
Start: 2024-04-02 | End: 2024-04-07 | Stop reason: HOSPADM

## 2024-04-02 RX ORDER — DEXTROSE MONOHYDRATE 50 MG/ML
INJECTION, SOLUTION INTRAVENOUS
Status: COMPLETED
Start: 2024-04-02 | End: 2024-04-02

## 2024-04-02 RX ORDER — NALOXONE HYDROCHLORIDE 1 MG/ML
1 INJECTION INTRAMUSCULAR; INTRAVENOUS; SUBCUTANEOUS ONCE
Status: COMPLETED | OUTPATIENT
Start: 2024-04-02 | End: 2024-04-02

## 2024-04-02 RX ADMIN — DEXTROSE MONOHYDRATE 25 G: 25 INJECTION, SOLUTION INTRAVENOUS at 18:11

## 2024-04-02 RX ADMIN — DEXTROSE MONOHYDRATE: 50 INJECTION, SOLUTION INTRAVENOUS at 12:46

## 2024-04-02 RX ADMIN — SODIUM CHLORIDE 250 ML: 9 INJECTION, SOLUTION INTRAVENOUS at 09:40

## 2024-04-02 RX ADMIN — ENOXAPARIN SODIUM 40 MG: 100 INJECTION SUBCUTANEOUS at 12:32

## 2024-04-02 RX ADMIN — SODIUM BICARBONATE 50 MEQ: 84 INJECTION, SOLUTION INTRAVENOUS at 11:04

## 2024-04-02 RX ADMIN — MIDODRINE HYDROCHLORIDE 10 MG: 10 TABLET ORAL at 16:43

## 2024-04-02 RX ADMIN — DEXTROSE MONOHYDRATE 25 G: 25 INJECTION, SOLUTION INTRAVENOUS at 09:37

## 2024-04-02 RX ADMIN — NALOXONE HYDROCHLORIDE 1 MG: 1 INJECTION PARENTERAL at 09:37

## 2024-04-02 RX ADMIN — INSULIN HUMAN 7 UNITS: 100 INJECTION, SOLUTION PARENTERAL at 11:07

## 2024-04-02 RX ADMIN — DEXTROSE MONOHYDRATE 125 ML: 100 INJECTION, SOLUTION INTRAVENOUS at 12:38

## 2024-04-02 RX ADMIN — CALCIUM GLUCONATE 1000 MG: 98 INJECTION, SOLUTION INTRAVENOUS at 10:49

## 2024-04-02 RX ADMIN — SODIUM ZIRCONIUM CYCLOSILICATE 10 G: 10 POWDER, FOR SUSPENSION ORAL at 18:10

## 2024-04-02 RX ADMIN — ASCORBIC ACID, VITAMIN A PALMITATE, CHOLECALCIFEROL, THIAMINE HYDROCHLORIDE, RIBOFLAVIN-5 PHOSPHATE SODIUM, PYRIDOXINE HYDROCHLORIDE, NIACINAMIDE, DEXPANTHENOL, ALPHA-TOCOPHEROL ACETATE, VITAMIN K1, FOLIC ACID, BIOTIN, CYANOCOBALAMIN: 200; 3300; 200; 6; 3.6; 6; 40; 15; 10; 150; 600; 60; 5 INJECTION, SOLUTION INTRAVENOUS at 18:08

## 2024-04-02 RX ADMIN — Medication 10 ML: at 21:10

## 2024-04-02 RX ADMIN — SODIUM CHLORIDE: 9 INJECTION, SOLUTION INTRAVENOUS at 12:31

## 2024-04-02 RX ADMIN — ACETAMINOPHEN 650 MG: 325 TABLET ORAL at 16:43

## 2024-04-02 RX ADMIN — INSULIN LISPRO 5 UNITS: 100 INJECTION, SOLUTION INTRAVENOUS; SUBCUTANEOUS at 18:11

## 2024-04-02 RX ADMIN — CALCIUM GLUCONATE 1000 MG: 98 INJECTION, SOLUTION INTRAVENOUS at 18:10

## 2024-04-02 ASSESSMENT — LIFESTYLE VARIABLES
HOW OFTEN DO YOU HAVE A DRINK CONTAINING ALCOHOL: PATIENT UNABLE TO ANSWER
HOW MANY STANDARD DRINKS CONTAINING ALCOHOL DO YOU HAVE ON A TYPICAL DAY: PATIENT UNABLE TO ANSWER

## 2024-04-02 ASSESSMENT — PAIN SCALES - GENERAL: PAINLEVEL_OUTOF10: 7

## 2024-04-02 NOTE — CARE COORDINATION
I was able to locate phone number for brother Vladimir Cosby [phone] who lives in [place]. He provided information on his baseline and to complete CMI. He was given ICU's phone number to call for updates.     Electronically signed by Arleen Kelley, RN, BSN on 4/2/2024 at 11:52 AM

## 2024-04-02 NOTE — CARE COORDINATION
Rcvd call from Lindsay nelson, pt will need a new precert to return.     Electronically signed by Arleen Kelley RN, BSN on 4/2/2024 at 1:56 PM

## 2024-04-02 NOTE — ED PROVIDER NOTES
noted below, none     Procedures    FINAL IMPRESSION      1. HOWARD (acute kidney injury) (HCC)    2. Hyperkalemia    3. Altered level of consciousness    4. Hypoglycemia    5. Idiopathic hypotension          DISPOSITION/PLAN   DISPOSITION Admitted 04/02/2024 10:39:52 AM      PATIENT REFERRED TO:  [facility]  [address] [place] [address]  [phone]  Schedule an appointment as soon as possible for a visit  For wound re-check: Bilateral LE wounds      DISCHARGE MEDICATIONS:  Current Discharge Medication List             (Please note that portions of this note were completed with a voice recognition program.  Efforts were made to edit the dictations but occasionally words are mis-transcribed.)    Norbert Ramirez MD (electronically signed)  Attending Emergency Physician        Norbert Ramirez MD  04/02/24 [id number]

## 2024-04-02 NOTE — ACP (ADVANCE CARE PLANNING)
Advance Care Planning   Healthcare Decision Maker:    Primary Decision Maker: EV TERAN - Brother/Sister - [phone]    Click here to complete Healthcare Decision Makers including selection of the Healthcare Decision Maker Relationship (ie \"Primary\").    Per brother above pt has siblings who do not live local and has 2 children he has no contact with.       Electronically signed by Arleen Kelley RN, BSN on 4/2/2024 at 11:53 AM

## 2024-04-02 NOTE — CARE COORDINATION
Case Management Assessment  Initial Evaluation    Date/Time of Evaluation: 4/2/2024 12:03 PM  Assessment Completed by: Arleen Kelley, RN, BSN    If patient is discharged prior to next notation, then this note serves as note for discharge by case management.    Patient Name: Tomas Teran                   YOB: 1970  Diagnosis: Hyperkalemia [E87.5]  Hypoglycemia [E16.2]  Altered level of consciousness [R40.4]  Encephalopathy [G93.40]  Idiopathic hypotension [I95.0]  HOWARD (acute kidney injury) (HCC) [N17.9]                   Date / Time: 4/2/2024  9:09 AM    Patient Admission Status: Inpatient   Readmission Risk (Low < 19, Mod (19-27), High > 27): No data recorded  Current PCP: Kylah Brandt, DO  PCP verified by ? Yes (SNF STATUS)    Chart Reviewed: Yes      History Provided by: Patient, Child/Family, Medical Record (HIS BROTHER EV)  Patient Orientation: Alert and Oriented, Person, Place, Situation, Self    Patient Cognition: Other (see comment) (arrouseable answering questions appropriately)    Hospitalization in the last 30 days (Readmission):  No    If yes, Readmission Assessment in CM Navigator will be completed.    Advance Directives:      Code Status: Full Code   Patient's Primary Decision Maker is: Legal Next of Kin (PT WANTS BROTHER LISTED AS EMERGENCY CONTACT)    Primary Decision Maker: EV TERAN - Brother/Sister - [phone]    Discharge Planning:    Patient lives with:   Type of Home: Skilled Nursing Facility  Primary Care Giver: Other (Comment) (FROM [facility])  Patient Support Systems include: Other (Comment)   Current Financial resources: Medicaid  Current community resources: Other (Comment), ECF/Home Care  Current services prior to admission: Skilled Nursing Facility, Other (Comment) (WOUND VAC)            Current DME:  SNF STATUS            Type of Home Care services:   NONE    ADLS  Prior functional level: Assistance with the following:, Bathing, Dressing,

## 2024-04-02 NOTE — ED NOTES
Dr. Coker aware of ISTAT creat - states wants the CT of the neck done with contrast still. CT called and states provider has to call the radiologist and get approval before they will do it. Number given to provider and states will call. CT requests to be notified after phone call with decision and radiologists name.   Normal for race

## 2024-04-02 NOTE — ED NOTES
Patient arrived to ER via EMS for hypoglycemia. Glucose was 30 for nursing home. They gave oral glucose without improvable. EMS stated line and gave IV D 10, glucose now 119. Patient is no responsive to pain or voice. VSS but hypotensive. Nursing home states that he has only been there for a few days.

## 2024-04-02 NOTE — CARE COORDINATION
Met with pt to complete assessment. Pt A&O x 3-4. He has a wound vac on and I asked him who I could contact for him (no emergency contacts listed) he states his brother Vladimir Cosby and that number is in his phone, he states he had it with him I searched linens, no phone.    I called [facility] and spoke with [facility] who reports they don't have an emergency contact either, he came from  about a week ago. She confirmed that his phone is on his \"table in his room and it requires his fingerprint to unlock phone.\" I relayed to Madelia  to let [facility] to inform pt, he is being transferred to ICU.    I also researched in EMR for his brother's contact info & unsuccessful.    Electronically signed by Arleen Kelley, DEYA, BSN on 4/2/2024 at 11:31 AM

## 2024-04-03 ENCOUNTER — HOSPITAL ENCOUNTER (INPATIENT)
Dept: INTERVENTIONAL RADIOLOGY/VASCULAR | Age: 54
Discharge: HOME | DRG: 469 | End: 2024-04-05
Payer: MEDICAID

## 2024-04-03 VITALS
OXYGEN SATURATION: 96 % | SYSTOLIC BLOOD PRESSURE: 113 MMHG | RESPIRATION RATE: 20 BRPM | HEART RATE: 84 BPM | DIASTOLIC BLOOD PRESSURE: 63 MMHG

## 2024-04-03 LAB
AMPHET UR QL SCN: NORMAL
ANION GAP SERPL CALCULATED.3IONS-SCNC: 14 MEQ/L (ref 9–15)
ANION GAP SERPL CALCULATED.3IONS-SCNC: 15 MEQ/L (ref 9–15)
ANION GAP SERPL CALCULATED.3IONS-SCNC: 16 MEQ/L (ref 9–15)
APPEARANCE FLUID: NORMAL
BACTERIA SPEC CULT: NORMAL
BARBITURATES UR QL SCN: NORMAL
BENZODIAZ UR QL SCN: NORMAL
BUN SERPL-MCNC: 105 MG/DL (ref 6–20)
BUN SERPL-MCNC: 108 MG/DL (ref 6–20)
BUN SERPL-MCNC: 110 MG/DL (ref 6–20)
CALCIUM SERPL-MCNC: 8.4 MG/DL (ref 8.5–9.9)
CALCIUM SERPL-MCNC: 8.4 MG/DL (ref 8.5–9.9)
CALCIUM SERPL-MCNC: 8.6 MG/DL (ref 8.5–9.9)
CANNABINOIDS UR QL SCN: NORMAL
CELL COUNT FLUID TYPE: NORMAL
CHLORIDE SERPL-SCNC: 92 MEQ/L (ref 95–107)
CHLORIDE SERPL-SCNC: 92 MEQ/L (ref 95–107)
CHLORIDE SERPL-SCNC: 93 MEQ/L (ref 95–107)
CLOT EVALUATION: NORMAL
CO2 SERPL-SCNC: 21 MEQ/L (ref 20–31)
COCAINE UR QL SCN: NORMAL
COLOR FLUID: YELLOW
CORTISOL COLLECTION INFO: ABNORMAL
CORTISOL: 22.5 UG/DL (ref 2.5–19.5)
CREAT SERPL-MCNC: 2.23 MG/DL (ref 0.7–1.2)
CREAT SERPL-MCNC: 2.24 MG/DL (ref 0.7–1.2)
CREAT SERPL-MCNC: 2.29 MG/DL (ref 0.7–1.2)
DRUG SCREEN COMMENT UR-IMP: NORMAL
ESTIMATED AVERAGE GLUCOSE: 214 MG/DL
ESTIMATED AVERAGE GLUCOSE: 220 MG/DL
FENTANYL SCREEN, URINE: NORMAL
FLUID PATH CONSULT: YES
GLUCOSE BLD-MCNC: 119 MG/DL (ref 70–99)
GLUCOSE BLD-MCNC: 127 MG/DL (ref 70–99)
GLUCOSE BLD-MCNC: 285 MG/DL (ref 70–99)
GLUCOSE BLD-MCNC: 304 MG/DL (ref 70–99)
GLUCOSE BLD-MCNC: 378 MG/DL (ref 70–99)
GLUCOSE BLD-MCNC: 435 MG/DL (ref 70–99)
GLUCOSE SERPL-MCNC: 252 MG/DL (ref 70–99)
GLUCOSE SERPL-MCNC: 317 MG/DL (ref 70–99)
GLUCOSE SERPL-MCNC: 459 MG/DL (ref 70–99)
GRAM STN SPEC: NORMAL
GRAM STN SPEC: NORMAL
HBA1C MFR BLD: 9.1 % (ref 4–6)
HBA1C MFR BLD: 9.3 % (ref 4–6)
LYMPHOCYTES, BODY FLUID: 62 %
METHADONE UR QL SCN: NORMAL
NEUTROPHIL, FLUID: 38 %
NUCLEATED CELLS FLUID: 1743 /CUMM
NUMBER OF CELLS COUNTED FLUID: 100
OPIATES UR QL SCN: NORMAL
OXYCODONE UR QL SCN: NORMAL
PCP UR QL SCN: NORMAL
PERFORMED ON: ABNORMAL
POTASSIUM SERPL-SCNC: 5.4 MEQ/L (ref 3.4–4.9)
POTASSIUM SERPL-SCNC: 5.4 MEQ/L (ref 3.4–4.9)
POTASSIUM SERPL-SCNC: 6 MEQ/L (ref 3.4–4.9)
PROPOXYPH UR QL SCN: NORMAL
RBC FLUID: 9000 /CUMM
SODIUM SERPL-SCNC: 127 MEQ/L (ref 135–144)
SODIUM SERPL-SCNC: 129 MEQ/L (ref 135–144)
SODIUM SERPL-SCNC: 129 MEQ/L (ref 135–144)

## 2024-04-03 PROCEDURE — 2700000000 HC OXYGEN THERAPY PER DAY

## 2024-04-03 PROCEDURE — 6370000000 HC RX 637 (ALT 250 FOR IP): Performed by: INTERNAL MEDICINE

## 2024-04-03 PROCEDURE — 87070 CULTURE OTHR SPECIMN AEROBIC: CPT

## 2024-04-03 PROCEDURE — 6370000000 HC RX 637 (ALT 250 FOR IP): Performed by: PHYSICIAN ASSISTANT

## 2024-04-03 PROCEDURE — 36415 COLL VENOUS BLD VENIPUNCTURE: CPT

## 2024-04-03 PROCEDURE — 2500000003 HC RX 250 WO HCPCS: Performed by: RADIOLOGY

## 2024-04-03 PROCEDURE — 6360000002 HC RX W HCPCS: Performed by: INTERNAL MEDICINE

## 2024-04-03 PROCEDURE — 87205 SMEAR GRAM STAIN: CPT

## 2024-04-03 PROCEDURE — 49083 ABD PARACENTESIS W/IMAGING: CPT

## 2024-04-03 PROCEDURE — 89051 BODY FLUID CELL COUNT: CPT

## 2024-04-03 PROCEDURE — 1210000000 HC MED SURG R&B

## 2024-04-03 PROCEDURE — 2709999900 IR US GUIDED PARACENTESIS

## 2024-04-03 PROCEDURE — 0W9G3ZZ DRAINAGE OF PERITONEAL CAVITY, PERCUTANEOUS APPROACH: ICD-10-PCS | Performed by: RADIOLOGY

## 2024-04-03 PROCEDURE — 80048 BASIC METABOLIC PNL TOTAL CA: CPT

## 2024-04-03 PROCEDURE — 99232 SBSQ HOSP IP/OBS MODERATE 35: CPT | Performed by: INTERNAL MEDICINE

## 2024-04-03 RX ORDER — INSULIN LISPRO 100 [IU]/ML
0-4 INJECTION, SOLUTION INTRAVENOUS; SUBCUTANEOUS NIGHTLY
Status: DISCONTINUED | OUTPATIENT
Start: 2024-04-03 | End: 2024-04-06

## 2024-04-03 RX ORDER — LIDOCAINE HYDROCHLORIDE 20 MG/ML
INJECTION, SOLUTION INFILTRATION; PERINEURAL PRN
Status: COMPLETED | OUTPATIENT
Start: 2024-04-03 | End: 2024-04-03

## 2024-04-03 RX ORDER — INSULIN LISPRO 100 [IU]/ML
5 INJECTION, SOLUTION INTRAVENOUS; SUBCUTANEOUS
Status: DISCONTINUED | OUTPATIENT
Start: 2024-04-03 | End: 2024-04-04

## 2024-04-03 RX ORDER — ATORVASTATIN CALCIUM 80 MG/1
80 TABLET, FILM COATED ORAL NIGHTLY
Status: DISCONTINUED | OUTPATIENT
Start: 2024-04-03 | End: 2024-04-07 | Stop reason: HOSPADM

## 2024-04-03 RX ORDER — DEXTROSE MONOHYDRATE 100 MG/ML
INJECTION, SOLUTION INTRAVENOUS CONTINUOUS PRN
Status: DISCONTINUED | OUTPATIENT
Start: 2024-04-03 | End: 2024-04-03 | Stop reason: SDUPTHER

## 2024-04-03 RX ORDER — INSULIN LISPRO 100 [IU]/ML
0-4 INJECTION, SOLUTION INTRAVENOUS; SUBCUTANEOUS EVERY 4 HOURS
Status: DISCONTINUED | OUTPATIENT
Start: 2024-04-03 | End: 2024-04-04

## 2024-04-03 RX ORDER — TRAMADOL HYDROCHLORIDE 50 MG/1
50 TABLET ORAL EVERY 6 HOURS PRN
Status: DISCONTINUED | OUTPATIENT
Start: 2024-04-03 | End: 2024-04-07 | Stop reason: HOSPADM

## 2024-04-03 RX ORDER — GLUCAGON 1 MG/ML
1 KIT INJECTION PRN
Status: DISCONTINUED | OUTPATIENT
Start: 2024-04-03 | End: 2024-04-03 | Stop reason: SDUPTHER

## 2024-04-03 RX ORDER — CLOPIDOGREL BISULFATE 75 MG/1
75 TABLET ORAL DAILY
Status: DISCONTINUED | OUTPATIENT
Start: 2024-04-03 | End: 2024-04-07 | Stop reason: HOSPADM

## 2024-04-03 RX ORDER — INSULIN GLARGINE 100 [IU]/ML
10 INJECTION, SOLUTION SUBCUTANEOUS NIGHTLY
Status: DISCONTINUED | OUTPATIENT
Start: 2024-04-03 | End: 2024-04-04

## 2024-04-03 RX ORDER — FUROSEMIDE 10 MG/ML
40 INJECTION INTRAMUSCULAR; INTRAVENOUS 2 TIMES DAILY
Status: DISCONTINUED | OUTPATIENT
Start: 2024-04-03 | End: 2024-04-05

## 2024-04-03 RX ORDER — INSULIN LISPRO 100 [IU]/ML
0-8 INJECTION, SOLUTION INTRAVENOUS; SUBCUTANEOUS
Status: DISCONTINUED | OUTPATIENT
Start: 2024-04-03 | End: 2024-04-06

## 2024-04-03 RX ORDER — INSULIN LISPRO 100 [IU]/ML
5 INJECTION, SOLUTION INTRAVENOUS; SUBCUTANEOUS ONCE
Status: COMPLETED | OUTPATIENT
Start: 2024-04-03 | End: 2024-04-03

## 2024-04-03 RX ORDER — GABAPENTIN 100 MG/1
100 CAPSULE ORAL 3 TIMES DAILY
Status: DISCONTINUED | OUTPATIENT
Start: 2024-04-03 | End: 2024-04-07 | Stop reason: HOSPADM

## 2024-04-03 RX ADMIN — MIDODRINE HYDROCHLORIDE 10 MG: 10 TABLET ORAL at 17:25

## 2024-04-03 RX ADMIN — FUROSEMIDE 40 MG: 10 INJECTION, SOLUTION INTRAMUSCULAR; INTRAVENOUS at 09:58

## 2024-04-03 RX ADMIN — INSULIN LISPRO 5 UNITS: 100 INJECTION, SOLUTION INTRAVENOUS; SUBCUTANEOUS at 18:54

## 2024-04-03 RX ADMIN — LIDOCAINE HYDROCHLORIDE 10 ML: 20 INJECTION, SOLUTION INFILTRATION; PERINEURAL at 11:50

## 2024-04-03 RX ADMIN — EPOETIN ALFA-EPBX 10000 UNITS: 10000 INJECTION, SOLUTION INTRAVENOUS; SUBCUTANEOUS at 21:14

## 2024-04-03 RX ADMIN — INSULIN LISPRO 5 UNITS: 100 INJECTION, SOLUTION INTRAVENOUS; SUBCUTANEOUS at 18:55

## 2024-04-03 RX ADMIN — INSULIN LISPRO 4 UNITS: 100 INJECTION, SOLUTION INTRAVENOUS; SUBCUTANEOUS at 20:08

## 2024-04-03 RX ADMIN — ATORVASTATIN CALCIUM 80 MG: 80 TABLET, FILM COATED ORAL at 20:08

## 2024-04-03 RX ADMIN — INSULIN GLARGINE 10 UNITS: 100 INJECTION, SOLUTION SUBCUTANEOUS at 20:08

## 2024-04-03 RX ADMIN — MIDODRINE HYDROCHLORIDE 10 MG: 10 TABLET ORAL at 08:21

## 2024-04-03 RX ADMIN — MIDODRINE HYDROCHLORIDE 10 MG: 10 TABLET ORAL at 12:28

## 2024-04-03 RX ADMIN — INSULIN LISPRO 4 UNITS: 100 INJECTION, SOLUTION INTRAVENOUS; SUBCUTANEOUS at 17:25

## 2024-04-03 RX ADMIN — TRAMADOL HYDROCHLORIDE 50 MG: 50 TABLET ORAL at 12:28

## 2024-04-03 RX ADMIN — INSULIN HUMAN 10 UNITS: 100 INJECTION, SOLUTION PARENTERAL at 06:21

## 2024-04-03 ASSESSMENT — PAIN DESCRIPTION - ORIENTATION: ORIENTATION: LEFT

## 2024-04-03 ASSESSMENT — PAIN SCALES - GENERAL
PAINLEVEL_OUTOF10: 0
PAINLEVEL_OUTOF10: 0
PAINLEVEL_OUTOF10: 10
PAINLEVEL_OUTOF10: 0

## 2024-04-03 ASSESSMENT — PAIN DESCRIPTION - LOCATION: LOCATION: CHEST

## 2024-04-03 NOTE — BH NOTE
Pt not in the floor. Will try to evaluate him later today or tomorrow morning    Electronically signed by DELFINA HERNANDEZ MD

## 2024-04-03 NOTE — CARE COORDINATION
Rounds done with bedside nurse and also received call from Lindsay with [facility] and she is following patient. Pt did not wish to speak with Pall care per report.

## 2024-04-04 ENCOUNTER — APPOINTMENT (OUTPATIENT)
Dept: GENERAL RADIOLOGY | Age: 54
DRG: 469 | End: 2024-04-04
Payer: MEDICAID

## 2024-04-04 ENCOUNTER — APPOINTMENT (OUTPATIENT)
Age: 54
DRG: 469 | End: 2024-04-04
Attending: INTERNAL MEDICINE
Payer: MEDICAID

## 2024-04-04 PROBLEM — N17.9 AKI (ACUTE KIDNEY INJURY) (HCC): Status: ACTIVE | Noted: 2024-04-04

## 2024-04-04 LAB
ACANTHOCYTES BLD QL SMEAR: ABNORMAL
ANION GAP SERPL CALCULATED.3IONS-SCNC: 14 MEQ/L (ref 9–15)
ANION GAP SERPL CALCULATED.3IONS-SCNC: 14 MEQ/L (ref 9–15)
ANISOCYTOSIS BLD QL SMEAR: ABNORMAL
BASOPHILS # BLD: 0.1 K/UL (ref 0–0.2)
BASOPHILS NFR BLD: 0.4 %
BUN SERPL-MCNC: 108 MG/DL (ref 6–20)
BUN SERPL-MCNC: 118 MG/DL (ref 6–20)
CALCIUM SERPL-MCNC: 8.1 MG/DL (ref 8.5–9.9)
CALCIUM SERPL-MCNC: 8.5 MG/DL (ref 8.5–9.9)
CHLORIDE SERPL-SCNC: 96 MEQ/L (ref 95–107)
CHLORIDE SERPL-SCNC: 96 MEQ/L (ref 95–107)
CO2 SERPL-SCNC: 23 MEQ/L (ref 20–31)
CO2 SERPL-SCNC: 23 MEQ/L (ref 20–31)
CREAT SERPL-MCNC: 2.16 MG/DL (ref 0.7–1.2)
CREAT SERPL-MCNC: 2.37 MG/DL (ref 0.7–1.2)
EOSINOPHIL # BLD: 0 K/UL (ref 0–0.7)
EOSINOPHIL NFR BLD: 0.3 %
ERYTHROCYTE [DISTWIDTH] IN BLOOD BY AUTOMATED COUNT: 22.3 % (ref 11.5–14.5)
FERRITIN: 109 NG/ML (ref 30–400)
GLUCOSE BLD-MCNC: 167 MG/DL (ref 70–99)
GLUCOSE BLD-MCNC: 189 MG/DL (ref 70–99)
GLUCOSE BLD-MCNC: 224 MG/DL (ref 70–99)
GLUCOSE BLD-MCNC: 228 MG/DL (ref 70–99)
GLUCOSE BLD-MCNC: 339 MG/DL (ref 70–99)
GLUCOSE SERPL-MCNC: 149 MG/DL (ref 70–99)
GLUCOSE SERPL-MCNC: 205 MG/DL (ref 70–99)
HCT VFR BLD AUTO: 23.6 % (ref 42–52)
HGB BLD-MCNC: 7.2 G/DL (ref 14–18)
HYPOCHROMIA BLD QL SMEAR: ABNORMAL
IRON % SATURATION: 7 % (ref 20–55)
IRON: 17 UG/DL (ref 61–157)
LYMPHOCYTES # BLD: 1.4 K/UL (ref 1–4.8)
LYMPHOCYTES NFR BLD: 12.1 %
MCH RBC QN AUTO: 19.3 PG (ref 27–31.3)
MCHC RBC AUTO-ENTMCNC: 30.5 % (ref 33–37)
MCV RBC AUTO: 63.3 FL (ref 79–92.2)
MICROCYTES BLD QL SMEAR: ABNORMAL
MONOCYTES # BLD: 1.3 K/UL (ref 0.2–0.8)
MONOCYTES NFR BLD: 10.8 %
NEUTROPHILS # BLD: 9 K/UL (ref 1.4–6.5)
NEUTS SEG NFR BLD: 76 %
PATH CONSULT FLUID: NORMAL
PERFORMED ON: ABNORMAL
PLATELET # BLD AUTO: 427 K/UL (ref 130–400)
PLATELET BLD QL SMEAR: ADEQUATE
POIKILOCYTOSIS BLD QL SMEAR: ABNORMAL
POTASSIUM SERPL-SCNC: 4.7 MEQ/L (ref 3.4–4.9)
POTASSIUM SERPL-SCNC: 4.9 MEQ/L (ref 3.4–4.9)
RBC # BLD AUTO: 3.73 M/UL (ref 4.7–6.1)
SCHISTOCYTES BLD QL SMEAR: ABNORMAL
SLIDE REVIEW: ABNORMAL
SODIUM SERPL-SCNC: 133 MEQ/L (ref 135–144)
SODIUM SERPL-SCNC: 133 MEQ/L (ref 135–144)
TOTAL IRON BINDING CAPACITY: 249 UG/DL (ref 250–450)
UNSATURATED IRON BINDING CAPACITY: 232 UG/DL (ref 112–347)
WBC # BLD AUTO: 11.8 K/UL (ref 4.8–10.8)

## 2024-04-04 PROCEDURE — 1210000000 HC MED SURG R&B

## 2024-04-04 PROCEDURE — 86376 MICROSOMAL ANTIBODY EACH: CPT

## 2024-04-04 PROCEDURE — 80048 BASIC METABOLIC PNL TOTAL CA: CPT

## 2024-04-04 PROCEDURE — 90792 PSYCH DIAG EVAL W/MED SRVCS: CPT | Performed by: PSYCHIATRY & NEUROLOGY

## 2024-04-04 PROCEDURE — 36415 COLL VENOUS BLD VENIPUNCTURE: CPT

## 2024-04-04 PROCEDURE — 2580000003 HC RX 258: Performed by: INTERNAL MEDICINE

## 2024-04-04 PROCEDURE — 6370000000 HC RX 637 (ALT 250 FOR IP): Performed by: INTERNAL MEDICINE

## 2024-04-04 PROCEDURE — 83970 ASSAY OF PARATHORMONE: CPT

## 2024-04-04 PROCEDURE — 85025 COMPLETE CBC W/AUTO DIFF WBC: CPT

## 2024-04-04 PROCEDURE — 71045 X-RAY EXAM CHEST 1 VIEW: CPT

## 2024-04-04 PROCEDURE — 97166 OT EVAL MOD COMPLEX 45 MIN: CPT

## 2024-04-04 PROCEDURE — 6360000002 HC RX W HCPCS: Performed by: INTERNAL MEDICINE

## 2024-04-04 PROCEDURE — 6370000000 HC RX 637 (ALT 250 FOR IP): Performed by: PHYSICIAN ASSISTANT

## 2024-04-04 PROCEDURE — 82728 ASSAY OF FERRITIN: CPT

## 2024-04-04 PROCEDURE — 84445 ASSAY OF TSI GLOBULIN: CPT

## 2024-04-04 PROCEDURE — 83540 ASSAY OF IRON: CPT

## 2024-04-04 PROCEDURE — 99232 SBSQ HOSP IP/OBS MODERATE 35: CPT | Performed by: INTERNAL MEDICINE

## 2024-04-04 PROCEDURE — 97162 PT EVAL MOD COMPLEX 30 MIN: CPT

## 2024-04-04 PROCEDURE — 99233 SBSQ HOSP IP/OBS HIGH 50: CPT | Performed by: INTERNAL MEDICINE

## 2024-04-04 PROCEDURE — 83550 IRON BINDING TEST: CPT

## 2024-04-04 RX ORDER — INSULIN LISPRO 100 [IU]/ML
8 INJECTION, SOLUTION INTRAVENOUS; SUBCUTANEOUS
Status: DISCONTINUED | OUTPATIENT
Start: 2024-04-04 | End: 2024-04-07 | Stop reason: HOSPADM

## 2024-04-04 RX ORDER — INSULIN GLARGINE 100 [IU]/ML
25 INJECTION, SOLUTION SUBCUTANEOUS NIGHTLY
Status: DISCONTINUED | OUTPATIENT
Start: 2024-04-04 | End: 2024-04-05

## 2024-04-04 RX ADMIN — INSULIN LISPRO 6 UNITS: 100 INJECTION, SOLUTION INTRAVENOUS; SUBCUTANEOUS at 17:24

## 2024-04-04 RX ADMIN — INSULIN LISPRO 2 UNITS: 100 INJECTION, SOLUTION INTRAVENOUS; SUBCUTANEOUS at 12:35

## 2024-04-04 RX ADMIN — CLOPIDOGREL BISULFATE 75 MG: 75 TABLET ORAL at 08:46

## 2024-04-04 RX ADMIN — MIDODRINE HYDROCHLORIDE 10 MG: 10 TABLET ORAL at 12:35

## 2024-04-04 RX ADMIN — ATORVASTATIN CALCIUM 80 MG: 80 TABLET, FILM COATED ORAL at 21:52

## 2024-04-04 RX ADMIN — TRAMADOL HYDROCHLORIDE 50 MG: 50 TABLET ORAL at 13:47

## 2024-04-04 RX ADMIN — MIDODRINE HYDROCHLORIDE 10 MG: 10 TABLET ORAL at 08:46

## 2024-04-04 RX ADMIN — Medication 10 ML: at 08:48

## 2024-04-04 RX ADMIN — MIDODRINE HYDROCHLORIDE 10 MG: 10 TABLET ORAL at 17:25

## 2024-04-04 RX ADMIN — INSULIN LISPRO 8 UNITS: 100 INJECTION, SOLUTION INTRAVENOUS; SUBCUTANEOUS at 17:24

## 2024-04-04 RX ADMIN — ENOXAPARIN SODIUM 40 MG: 100 INJECTION SUBCUTANEOUS at 08:49

## 2024-04-04 RX ADMIN — TRAMADOL HYDROCHLORIDE 50 MG: 50 TABLET ORAL at 21:53

## 2024-04-04 RX ADMIN — Medication 10 ML: at 21:53

## 2024-04-04 RX ADMIN — FUROSEMIDE 40 MG: 10 INJECTION, SOLUTION INTRAMUSCULAR; INTRAVENOUS at 08:45

## 2024-04-04 RX ADMIN — FUROSEMIDE 40 MG: 10 INJECTION, SOLUTION INTRAMUSCULAR; INTRAVENOUS at 17:24

## 2024-04-04 ASSESSMENT — PAIN DESCRIPTION - DESCRIPTORS: DESCRIPTORS: ACHING;SHARP

## 2024-04-04 ASSESSMENT — PAIN DESCRIPTION - LOCATION: LOCATION: ANKLE

## 2024-04-04 ASSESSMENT — PAIN DESCRIPTION - ORIENTATION: ORIENTATION: RIGHT

## 2024-04-04 ASSESSMENT — PAIN SCALES - GENERAL: PAINLEVEL_OUTOF10: 8

## 2024-04-04 NOTE — FLOWSHEET NOTE
Critical BUN level reported will notify Nephrology     2302 Perfect serve message sent to Hospitalist in regards to patient refusing to have echo at this time d/t \"unable to breath when I'm lying on my left side.\" Oxygen offered for comfort. Patient still request for procedure to be performed outpatient. This nurse and echo tech re-iterated the need of the testing patient still not wanting it done at this  time but verbalizes understanding

## 2024-04-04 NOTE — CARE COORDINATION
Met with pt at bedside to discuss discharge planning. Pt was at [facility] prior to admit for skilled therapy. Pt would like to dc back for continued therapy. Pt states he was living in an apartment prior to SNF. PT/OT orders in for evals, pt insurance will require a precert for dc.

## 2024-04-05 ENCOUNTER — APPOINTMENT (OUTPATIENT)
Age: 54
DRG: 469 | End: 2024-04-05
Attending: INTERNAL MEDICINE
Payer: MEDICAID

## 2024-04-05 LAB
ACANTHOCYTES BLD QL SMEAR: ABNORMAL
ANION GAP SERPL CALCULATED.3IONS-SCNC: 17 MEQ/L (ref 9–15)
ANISOCYTOSIS BLD QL SMEAR: ABNORMAL
BASOPHILS # BLD: 0 K/UL (ref 0–0.2)
BASOPHILS NFR BLD: 0.4 %
BUN SERPL-MCNC: 112 MG/DL (ref 6–20)
BURR CELLS: ABNORMAL
CALCIUM SERPL-MCNC: 8 MG/DL (ref 8.5–9.9)
CHLORIDE SERPL-SCNC: 99 MEQ/L (ref 95–107)
CO2 SERPL-SCNC: 21 MEQ/L (ref 20–31)
CREAT SERPL-MCNC: 2.29 MG/DL (ref 0.7–1.2)
ECHO AO ROOT DIAM: 2.9 CM
ECHO AO ROOT INDEX: 1.5 CM/M2
ECHO AV AREA PEAK VELOCITY: 1.6 CM2
ECHO AV AREA VTI: 1.5 CM2
ECHO AV AREA/BSA PEAK VELOCITY: 0.8 CM2/M2
ECHO AV AREA/BSA VTI: 0.8 CM2/M2
ECHO AV CUSP MM: 2 CM
ECHO AV MEAN GRADIENT: 4 MMHG
ECHO AV MEAN VELOCITY: 0.9 M/S
ECHO AV PEAK GRADIENT: 8 MMHG
ECHO AV PEAK VELOCITY: 1.6 M/S
ECHO AV VELOCITY RATIO: 0.63
ECHO AV VTI: 26 CM
ECHO BSA: 1.93 M2
ECHO LA VOL A-L A2C: 86 ML (ref 18–58)
ECHO LA VOL A-L A4C: 97 ML (ref 18–58)
ECHO LA VOL MOD A2C: 78 ML (ref 18–58)
ECHO LA VOL MOD A4C: 93 ML (ref 18–58)
ECHO LA VOLUME AREA LENGTH: 92 ML
ECHO LA VOLUME INDEX A-L A2C: 45 ML/M2 (ref 16–34)
ECHO LA VOLUME INDEX A-L A4C: 50 ML/M2 (ref 16–34)
ECHO LA VOLUME INDEX AREA LENGTH: 48 ML/M2 (ref 16–34)
ECHO LA VOLUME INDEX MOD A2C: 40 ML/M2 (ref 16–34)
ECHO LA VOLUME INDEX MOD A4C: 48 ML/M2 (ref 16–34)
ECHO LV E' LATERAL VELOCITY: 8 CM/S
ECHO LV E' SEPTAL VELOCITY: 6 CM/S
ECHO LV EDV A2C: 234 ML
ECHO LV EDV A4C: 180 ML
ECHO LV EDV BP: 209 ML (ref 67–155)
ECHO LV EDV INDEX A4C: 93 ML/M2
ECHO LV EDV INDEX BP: 108 ML/M2
ECHO LV EDV NDEX A2C: 121 ML/M2
ECHO LV EJECTION FRACTION A2C: 15 %
ECHO LV EJECTION FRACTION A4C: 9 %
ECHO LV EJECTION FRACTION BIPLANE: 12 % (ref 55–100)
ECHO LV ESV A2C: 200 ML
ECHO LV ESV A4C: 165 ML
ECHO LV ESV BP: 183 ML (ref 22–58)
ECHO LV ESV INDEX A2C: 104 ML/M2
ECHO LV ESV INDEX A4C: 85 ML/M2
ECHO LV ESV INDEX BP: 95 ML/M2
ECHO LV FRACTIONAL SHORTENING: 12 % (ref 28–44)
ECHO LV INTERNAL DIMENSION DIASTOLE INDEX: 3.11 CM/M2
ECHO LV INTERNAL DIMENSION DIASTOLIC: 6 CM (ref 4.2–5.9)
ECHO LV INTERNAL DIMENSION SYSTOLIC INDEX: 2.75 CM/M2
ECHO LV INTERNAL DIMENSION SYSTOLIC: 5.3 CM
ECHO LV IVSD: 1 CM (ref 0.6–1)
ECHO LV IVSS: 1.3 CM
ECHO LV MASS 2D: 263 G (ref 88–224)
ECHO LV MASS INDEX 2D: 136.3 G/M2 (ref 49–115)
ECHO LV POSTERIOR WALL DIASTOLIC: 1.1 CM (ref 0.6–1)
ECHO LV POSTERIOR WALL SYSTOLIC: 1.2 CM
ECHO LV RELATIVE WALL THICKNESS RATIO: 0.37
ECHO LVOT AREA: 2.5 CM2
ECHO LVOT AV VTI INDEX: 0.61
ECHO LVOT DIAM: 1.8 CM
ECHO LVOT MEAN GRADIENT: 2 MMHG
ECHO LVOT PEAK GRADIENT: 4 MMHG
ECHO LVOT PEAK VELOCITY: 1 M/S
ECHO LVOT STROKE VOLUME INDEX: 21 ML/M2
ECHO LVOT SV: 40.4 ML
ECHO LVOT VTI: 15.9 CM
ECHO MV A VELOCITY: 0.52 M/S
ECHO MV E DECELERATION TIME (DT): 105 MS
ECHO MV E VELOCITY: 0.92 M/S
ECHO MV E/A RATIO: 1.77
ECHO MV E/E' LATERAL: 11.5
ECHO MV E/E' RATIO (AVERAGED): 13.42
ECHO MV EROA PISA: 0.1 CM2
ECHO MV REGURGITANT ALIASING (NYQUIST) VELOCITY: 36 CM/S
ECHO MV REGURGITANT RADIUS PISA: 0.48 CM
ECHO MV REGURGITANT VELOCITY PISA: 4.6 M/S
ECHO MV REGURGITANT VOLUME PISA: 16.61 ML
ECHO MV REGURGITANT VTIA: 146.7 CM
ECHO RV INTERNAL DIMENSION: 3.4 CM
ECHO RV TAPSE: 1.9 CM (ref 1.7–?)
ECHO TV REGURGITANT MAX VELOCITY: 3.46 M/S
ECHO TV REGURGITANT PEAK GRADIENT: 48 MMHG
EOSINOPHIL # BLD: 0.1 K/UL (ref 0–0.7)
EOSINOPHIL NFR BLD: 0.6 %
ERYTHROCYTE [DISTWIDTH] IN BLOOD BY AUTOMATED COUNT: 22.5 % (ref 11.5–14.5)
GLUCOSE BLD-MCNC: 202 MG/DL (ref 70–99)
GLUCOSE BLD-MCNC: 208 MG/DL (ref 70–99)
GLUCOSE BLD-MCNC: 253 MG/DL (ref 70–99)
GLUCOSE BLD-MCNC: 269 MG/DL (ref 70–99)
GLUCOSE BLD-MCNC: 294 MG/DL (ref 70–99)
GLUCOSE SERPL-MCNC: 188 MG/DL (ref 70–99)
HCT VFR BLD AUTO: 22.9 % (ref 42–52)
HGB BLD-MCNC: 7 G/DL (ref 14–18)
HYPOCHROMIA BLD QL SMEAR: ABNORMAL
LYMPHOCYTES # BLD: 1.5 K/UL (ref 1–4.8)
LYMPHOCYTES NFR BLD: 14.4 %
MCH RBC QN AUTO: 19.6 PG (ref 27–31.3)
MCHC RBC AUTO-ENTMCNC: 30.6 % (ref 33–37)
MCV RBC AUTO: 64.1 FL (ref 79–92.2)
MICROCYTES BLD QL SMEAR: ABNORMAL
MONOCYTES # BLD: 1.1 K/UL (ref 0.2–0.8)
MONOCYTES NFR BLD: 10.8 %
NEUTROPHILS # BLD: 7.4 K/UL (ref 1.4–6.5)
NEUTS SEG NFR BLD: 73.4 %
PERFORMED ON: ABNORMAL
PLATELET # BLD AUTO: 436 K/UL (ref 130–400)
PLATELET BLD QL SMEAR: ABNORMAL
POIKILOCYTOSIS BLD QL SMEAR: ABNORMAL
POLYCHROMASIA BLD QL SMEAR: ABNORMAL
POTASSIUM SERPL-SCNC: 4 MEQ/L (ref 3.4–4.9)
RBC # BLD AUTO: 3.57 M/UL (ref 4.7–6.1)
SLIDE REVIEW: ABNORMAL
SODIUM SERPL-SCNC: 137 MEQ/L (ref 135–144)
TARGETS BLD QL SMEAR: ABNORMAL
WBC # BLD AUTO: 10 K/UL (ref 4.8–10.8)

## 2024-04-05 PROCEDURE — 99232 SBSQ HOSP IP/OBS MODERATE 35: CPT | Performed by: INTERNAL MEDICINE

## 2024-04-05 PROCEDURE — 1210000000 HC MED SURG R&B

## 2024-04-05 PROCEDURE — 2580000003 HC RX 258: Performed by: INTERNAL MEDICINE

## 2024-04-05 PROCEDURE — APPSS45 APP SPLIT SHARED TIME 31-45 MINUTES

## 2024-04-05 PROCEDURE — 97535 SELF CARE MNGMENT TRAINING: CPT

## 2024-04-05 PROCEDURE — 85025 COMPLETE CBC W/AUTO DIFF WBC: CPT

## 2024-04-05 PROCEDURE — 6360000002 HC RX W HCPCS: Performed by: INTERNAL MEDICINE

## 2024-04-05 PROCEDURE — 99253 IP/OBS CNSLTJ NEW/EST LOW 45: CPT | Performed by: INTERNAL MEDICINE

## 2024-04-05 PROCEDURE — 6370000000 HC RX 637 (ALT 250 FOR IP): Performed by: PHYSICIAN ASSISTANT

## 2024-04-05 PROCEDURE — 80048 BASIC METABOLIC PNL TOTAL CA: CPT

## 2024-04-05 PROCEDURE — 93306 TTE W/DOPPLER COMPLETE: CPT | Performed by: INTERNAL MEDICINE

## 2024-04-05 PROCEDURE — 93306 TTE W/DOPPLER COMPLETE: CPT

## 2024-04-05 PROCEDURE — 6370000000 HC RX 637 (ALT 250 FOR IP): Performed by: INTERNAL MEDICINE

## 2024-04-05 PROCEDURE — 36415 COLL VENOUS BLD VENIPUNCTURE: CPT

## 2024-04-05 RX ORDER — INSULIN GLARGINE 100 [IU]/ML
15 INJECTION, SOLUTION SUBCUTANEOUS NIGHTLY
Status: DISCONTINUED | OUTPATIENT
Start: 2024-04-05 | End: 2024-04-07 | Stop reason: HOSPADM

## 2024-04-05 RX ORDER — METHIMAZOLE 5 MG/1
20 TABLET ORAL DAILY
Status: DISCONTINUED | OUTPATIENT
Start: 2024-04-05 | End: 2024-04-07 | Stop reason: HOSPADM

## 2024-04-05 RX ORDER — FERROUS GLUCONATE 324(38)MG
324 TABLET ORAL
Status: DISCONTINUED | OUTPATIENT
Start: 2024-04-06 | End: 2024-04-07 | Stop reason: HOSPADM

## 2024-04-05 RX ORDER — TORSEMIDE 100 MG/1
50 TABLET ORAL DAILY
Qty: 15 TABLET | Refills: 3 | Status: ON HOLD
Start: 2024-04-05

## 2024-04-05 RX ORDER — TORSEMIDE 100 MG/1
50 TABLET ORAL DAILY
Status: DISCONTINUED | OUTPATIENT
Start: 2024-04-05 | End: 2024-04-07 | Stop reason: HOSPADM

## 2024-04-05 RX ORDER — SODIUM FERRIC GLUCONATE COMPLEX IN SUCROSE 12.5 MG/ML
125 INJECTION INTRAVENOUS ONCE
Status: DISCONTINUED | OUTPATIENT
Start: 2024-04-05 | End: 2024-04-05

## 2024-04-05 RX ADMIN — SODIUM CHLORIDE 125 MG: 9 INJECTION, SOLUTION INTRAVENOUS at 11:22

## 2024-04-05 RX ADMIN — Medication 10 ML: at 20:32

## 2024-04-05 RX ADMIN — FUROSEMIDE 40 MG: 10 INJECTION, SOLUTION INTRAMUSCULAR; INTRAVENOUS at 08:35

## 2024-04-05 RX ADMIN — INSULIN LISPRO 4 UNITS: 100 INJECTION, SOLUTION INTRAVENOUS; SUBCUTANEOUS at 12:16

## 2024-04-05 RX ADMIN — ATORVASTATIN CALCIUM 80 MG: 80 TABLET, FILM COATED ORAL at 20:31

## 2024-04-05 RX ADMIN — MIDODRINE HYDROCHLORIDE 10 MG: 10 TABLET ORAL at 18:17

## 2024-04-05 RX ADMIN — MIDODRINE HYDROCHLORIDE 10 MG: 10 TABLET ORAL at 12:17

## 2024-04-05 RX ADMIN — ONDANSETRON 4 MG: 2 INJECTION INTRAMUSCULAR; INTRAVENOUS at 20:35

## 2024-04-05 RX ADMIN — METHIMAZOLE 20 MG: 5 TABLET ORAL at 12:17

## 2024-04-05 RX ADMIN — Medication 10 ML: at 08:46

## 2024-04-05 RX ADMIN — MIDODRINE HYDROCHLORIDE 10 MG: 10 TABLET ORAL at 08:40

## 2024-04-05 RX ADMIN — ENOXAPARIN SODIUM 40 MG: 100 INJECTION SUBCUTANEOUS at 08:39

## 2024-04-05 RX ADMIN — TRAMADOL HYDROCHLORIDE 50 MG: 50 TABLET ORAL at 05:54

## 2024-04-05 RX ADMIN — INSULIN LISPRO 2 UNITS: 100 INJECTION, SOLUTION INTRAVENOUS; SUBCUTANEOUS at 08:41

## 2024-04-05 RX ADMIN — INSULIN GLARGINE 15 UNITS: 100 INJECTION, SOLUTION SUBCUTANEOUS at 20:31

## 2024-04-05 RX ADMIN — CLOPIDOGREL BISULFATE 75 MG: 75 TABLET ORAL at 08:40

## 2024-04-05 ASSESSMENT — PAIN DESCRIPTION - LOCATION: LOCATION: LEG

## 2024-04-05 ASSESSMENT — PAIN SCALES - GENERAL: PAINLEVEL_OUTOF10: 7

## 2024-04-05 NOTE — DISCHARGE INSTR - COC
Continuity of Care Form    Patient Name: Tomas Teran   :  1970  MRN:  [id number]    Admit date:  2024  Discharge date:      Code Status Order: Full Code   Advance Directives:     Admitting Physician:  Frances Watt MD  PCP: Kylah Brandt DO    Discharging Nurse: gayla  Discharging Hospital Unit/Room#: W268/W268-01  Discharging Unit Phone Number: [phone]    Emergency Contact:   Extended Emergency Contact Information  Primary Emergency Contact: EV TERAN  Mobile Phone: [phone]  Relation: Brother/Sister    Past Surgical History:  Past Surgical History:   Procedure Laterality Date    PARACENTESIS Left 2024    3215 ml removed by Dr. Sims - diagnostics sent       Immunization History:     There is no immunization history on file for this patient.    Active Problems:  Patient Active Problem List   Diagnosis Code    Hyperthyroidism E05.90    Encephalopathy G93.40    Hypoglycemia E16.2    HOWARD (acute kidney injury) (HCC) N17.9       Isolation/Infection:   Isolation            No Isolation          Patient Infection Status       None to display            Nurse Assessment:  Last Vital Signs: /74   Pulse 80   Temp 99 °F (37.2 °C)   Resp 17   Ht 1.778 m (5' 10\")   Wt 75.4 kg (166 lb 3.6 oz)   SpO2 93%   BMI 23.85 kg/m²     Last documented pain score (0-10 scale): Pain Level: 7  Last Weight:   Wt Readings from Last 1 Encounters:   24 75.4 kg (166 lb 3.6 oz)     Mental Status:  alert    IV Access:  - None    Nursing Mobility/ADLs:  Walking   Assisted  Transfer  Assisted  Bathing  Assisted  Dressing  Assisted  Toileting  Assisted  Feeding  Independent  Med Admin  Independent  Med Delivery   whole    Wound Care Documentation and Therapy:  Wound 24 Pretibial Left Stump (Active)   Wound Etiology Diabetic 24 1409   Dressing Status Clean;Dry;Intact 24 0953   Dressing/Treatment Other (comment) 24 0511   Dressing Change Due 24 1409   Wound

## 2024-04-05 NOTE — CARE COORDINATION
PLAN REMAINS [facility] [place] AND THE PRECERT WAS STARTED.  LSW TO FOLLOW FOR AUTHORIZATION.      WE HAVE PRECERT APPROVAL FOR THE PT TO TRANSFER TO [facility] BUT HE HAD A LOW HGB SO HE CANNOT GO TODAY.  PRECERT IS GOOD THROUGH THE WEEKEND.

## 2024-04-05 NOTE — CONSULTS
[facility]                   [address] [place] [address]                              CONSULTATION      PATIENT NAME: DOROTEO TERAN             : 1970  MED REC NO: [id number]                        ROOM: [facility]  ACCOUNT NO: [id number]                       ADMIT DATE: 2024  PROVIDER: Chico Floyd MD    ENDOCRINE CONSULT    REFERRING PHYSICIAN:  Frances Watt MD      REASON FOR CONSULT:  Management of hypoglycemia.    CHIEF COMPLAINT AND HISTORY OF PRESENT ILLNESS:  Obtained through prior H and P.  The patient is a poor historian.  The patient is a 53-year-old male admitted to nursing home because of altered mental status, hypoglycemia.  Glucose was found to be in the 30s.  Also, was found to be hypotensive and also hypothermic, was found to have pinpoint pupils, responded with Narcan, not been eating well at the nursing home, and also appears to be malnourished.  Initial admitting diagnosis was hypoglycemia.  The patient has had history of diabetes, but is on Humalog insulin 3 times a day with meals.  Also, on metformin 500 mg.  Other medications included methimazole 5 mg 1 month ago for hyperthyroidism.  Labs reviewed showing slightly low free T4 of 0.8, TSH was normal at 0.670.  Chemistries revealed hyperkalemia with low sodium of 132 and significantly elevated BUN of 101, creatinine was 2.36, hemoglobin level was 7.5.  Patient was started on D5.  Blood sugars have improved slightly.  Had a swallow test done.  Started on 5 carb diet.  The patient has been admitted in the past for acute on chronic congestive heart failure in the middle of March and for hypokalemia in early March.    PAST MEDICAL HISTORY:  Significant for congestive heart failure, constipation, type 2 diabetes, hypertension, osteomyelitis, peripheral artery disease.    SURGICAL HISTORY:  Amputation left below-the-knee.  History of graft.    FAMILY HISTORY:  Significant for lung cancer.    PERSONAL 
Attempted to complete consult for palliative care services, discuss goals of care, symptom management. Patient reports \"I do not want you here, I do not care what you have to say, please leave the room\".     Palliative care will sign off but will be assistance for any further needs.   
Department of Otolaryngology  Attending Consult Note      Reason for Consult: Neck mass evaluation  Requesting Physician: Frances Watt    CHIEF COMPLAINT: Patient states that he has this neck mass of about 1 year duration.    History Obtained From:  patient    HISTORY OF PRESENT ILLNESS:                The patient is a 53 y.o. male with significant past medical history of encephalopathy and liver cirrhosis who presents with hypoglycemia    Past Medical History:    No past medical history on file.  Past Surgical History:        Procedure Laterality Date    PARACENTESIS Left 04/03/2024    3215 ml removed by Dr. Sims - diagnostics sent     Current Medications:   Current Facility-Administered Medications: torsemide (DEMADEX) tablet 50 mg, 50 mg, Oral, Daily  [START ON 4/6/2024] ferrous gluconate (FERGON) tablet 324 mg, 324 mg, Oral, Daily with breakfast  ferric gluconate (FERRLECIT) 125 mg in sodium chloride 0.9 % 100 mL IVPB, 125 mg, IntraVENous, Once  methIMAzole (TAPAZOLE) tablet 20 mg, 20 mg, Oral, Daily  insulin glargine (LANTUS) injection vial 25 Units, 25 Units, SubCUTAneous, Nightly  insulin lispro (HUMALOG) injection vial 8 Units, 8 Units, SubCUTAneous, TID WC  epoetin bryan-epbx (RETACRIT) injection 10,000 Units, 10,000 Units, SubCUTAneous, Weekly  traMADol (ULTRAM) tablet 50 mg, 50 mg, Oral, Q6H PRN  clopidogrel (PLAVIX) tablet 75 mg, 75 mg, Oral, Daily  gabapentin (NEURONTIN) capsule 100 mg, 100 mg, Oral, TID  atorvastatin (LIPITOR) tablet 80 mg, 80 mg, Oral, Nightly  insulin lispro (HUMALOG) injection vial 0-8 Units, 0-8 Units, SubCUTAneous, TID WC  insulin lispro (HUMALOG) injection vial 0-4 Units, 0-4 Units, SubCUTAneous, Nightly  sodium chloride flush 0.9 % injection 5-40 mL, 5-40 mL, IntraVENous, 2 times per day  sodium chloride flush 0.9 % injection 5-40 mL, 5-40 mL, IntraVENous, PRN  0.9 % sodium chloride infusion, , IntraVENous, PRN  potassium chloride (KLOR-CON M) extended release tablet 40 mEq, 40 
Inpatient consult to Critical Care  Consult performed by: Roly Liz MD  Consult ordered by: Frances Watt MD            Admit Date: 4/2/2024    PCP:  Kylah Brandt DO         CHIEF COMPLAINT / HPI:                The patient is a 53 y.o. male with significant past medical history of diabetes and peripheral vascular disease status post left BKA who resides at a nursing facility who was found altered and brought to the ER.  And route, he was giving dextrose 10 due to hypoglycemia.  His sugars were in the 30s.  Was found to have pinpoint pupils in the ER and was given Narcan with some improvement.  However, does not seem the patient takes any narcotics at the nursing facility.  Labs showed hyperkalemia with a potassium of 6.7.  Creatinine is 2.  Baseline is normal in 2022.  Patient admitted to the ICU for further care.         Past Medical History:  No past medical history on file.     Past Surgical History:    No past surgical history on file.    Current Medications:     sodium chloride flush  5-40 mL IntraVENous 2 times per day    enoxaparin  40 mg SubCUTAneous Daily     Home Meds:  Prior to Admission medications    Medication Sig Start Date End Date Taking? Authorizing Provider   methIMAzole (TAPAZOLE) 10 MG tablet Take 2 tablets by mouth daily 10/29/23   Laila Ortiz MD   metFORMIN (GLUCOPHAGE) 500 MG tablet Take 1 tablet by mouth 2 times daily (with meals) 12/20/22   Laila Ortiz MD   metoprolol succinate (TOPROL XL) 25 MG extended release tablet Take 2 tablets by mouth daily 11/14/23   Laila Ortiz MD   melatonin 5 MG TABS tablet Take 1 tablet by mouth nightly 2/17/21   Laila Ortiz MD   lisinopril (PRINIVIL;ZESTRIL) 5 MG tablet Take 1 tablet by mouth daily    Laila Ortiz MD   linagliptin (TRADJENTA) 5 MG tablet Take 1 tablet by mouth daily 12/20/22   Laila Ortiz MD   TRUEplus Lancets 33G MISC  12/5/23   Laila Ortiz MD   BD PEN NEEDLE FÉLIX 2ND 
[facility] Department of Psychiatry  Behavioral Health Consult    REASON FOR CONSULT: Overdose??    CONSULTING PHYSICIAN: Dr Watt    History obtained from: Patient    HISTORY OF PRESENT ILLNESS:      The patient is a 53 y.o. male who came from nursing home with altered mental status and pinpoint pupil needing Narcan.  Patient denied using any narcotics at the nursing home.  Patient reported that the initial presentation was secondary to his blood sugar falling to 30 on admission.  Patient has never abused any drugs or alcohol.  Patient denies any depressive symptoms or suicidal thoughts.  He has been cooperative with all the treatment and care.  Patient wants to return back to the nursing home on discharge from the hospital.  Patient denies any hopeless or worthless feeling.  He denies any audiovisual hallucinations or paranoid thoughts.  Patient denies any OCD or PTSD symptoms.    The patient is not currently receiving care for the above psychiatric illness.      No past psychiatric history      Past Medical History:    No past medical history on file.    Past Surgical History:        Procedure Laterality Date    PARACENTESIS Left 04/03/2024    3215 ml removed by Dr. Sims - diagnostics sent       Medications Prior to Admission:   Medications Prior to Admission: carvedilol (COREG) 12.5 MG tablet, Take 1 tablet by mouth 2 times daily (with meals)  LACTOBACILLUS RHAMNOSUS, GG, PO, Take 1 capsule by mouth at bedtime  ferrous sulfate (IRON 325) 325 (65 Fe) MG tablet, Take 1 tablet by mouth daily (with breakfast)  gabapentin (NEURONTIN) 100 MG capsule, Take 1 capsule by mouth 3 times daily.  metOLazone (ZAROXOLYN) 5 MG tablet, Take 1 tablet by mouth daily  midodrine (PROAMATINE) 5 MG tablet, Take 2 tablets by mouth 3 times daily  potassium chloride (MICRO-K) 10 MEQ extended release capsule, Take 2 capsules by mouth 3 times daily  Ascorbic Acid (VITAMIN C) 250 MG tablet, Take 1 tablet by mouth 
Renal consult    HOWARD CKD 3b  Encephalopathy  DM type-2  Hypotension and hypoglycemia admission  Left BKA  OHDX HF    Plan  maintain hydration presvent further hypotension  follow labs  avoid nephrotoxic exposure continue present fuids  Hypoglycemia admission      
sheath needle was advanced until straw-colored fluid was obtained. The sheath was advanced off the needle cannula into the peritoneal space. At this point aspiration was performed completion. A total of 6300 cc of fluid was aspirated. The sheath was then removed. The patient tolerated the procedure well. There was no immediate complication.    FINDINGS: See discussion above       Successful ultrasound-guided therapeutic paracentesis       MACRO: None    Signed by: Joseph Schoenberger 3/19/2024 2:53 PM Dictation workstation:   [id number]    Lower extremity venous duplex right    Result Date: 3/18/2024  Interpreted By:  Schoenberger, Joseph, STUDY: [facility] US LOWER EXTREMITY VENOUS DUPLEX RIGHT  3/18/2024 2:08 pm    INDICATION: 54 y/o   M with  Signs/Symptoms:edema and decreased pulses in RLE. LMP:  Unknown.    COMPARISON: None.    ACCESSION NUMBER(S): [id number]    ORDERING CLINICIAN: NATALIO RODRÍGUEZ    TECHNIQUE: Routine ultrasound of the  right lower extremity was performed with duplex Doppler (color and spectral) evaluation.   Static images were obtained for remote interpretation.     FINDINGS: THIGH VEINS: There is nonocclusive partially calcified thrombus in the right popliteal vein consistent with chronic venous thrombus. The right posterior tibial and peroneal as well as superficial femoral and common femoral veins compress normally with normal color Doppler flow..    CALF VEINS:  The paired peroneal and posterior tibial calf veins are patent.       .  There is some nonacute since thrombus chronic thrombus in the right popliteal vein. Otherwise negative exam.    MACRO: None    Signed by: Joseph Schoenberger 3/18/2024 2:37 PM Dictation workstation:   [id number]    US thoracentesis    Result Date: 3/18/2024  Interpreted By:  Schoenberger, Joseph, STUDY: US THORACENTESIS; ;  3/18/2024 10:48 am    INDICATION: Signs/Symptoms:Dyspnea/to evaluate etiology of right pleural effusion.    COMPARISON: None.    ACCESSION 
knee joint effusion suspected. Tiny metallic foreign bodies lateral to the knee joint are still present. Osteopenia.    Atherosclerosis.       Intact left tibia and fibula, post below-knee amputation.    MACRO: None    Signed by: Gadiel Andres 3/15/2024 11:33 AM Dictation workstation:   [id number]    XR CHEST 1 VIEW    Result Date: 3/15/2024  Interpreted By:  Gadiel Andres, STUDY: XR CHEST 1 VIEW;  3/15/2024 10:36 am    INDICATION: Signs/Symptoms:fall.    COMPARISON: 11/01/2023    ACCESSION NUMBER(S): [id number]    ORDERING CLINICIAN: AUNG WEAVER    FINDINGS: Large right basilar pleural effusion occupying greater than 50% of the hemithoracic volume. Overlying atelectasis. Left lung grossly clear without pleural effusion. Cardiomegaly suspected. Aortic atherosclerosis. Mild pulmonary vascular congestion.       Large right pleural effusion.    MACRO: None    Signed by: Gadiel Andres 3/15/2024 11:33 AM Dictation workstation:   [id number]    OCT MACULA CIRRUS OU (BOTH EYES)    Result Date: 3/6/2024  Date of Procedure 3/6/2024. Technician Information Imaging Technician: trevor. Interpretation Right Eye Findings include Cystoid macular edema, Epiretinal membrane. Left Eye Findings include Cystoid macular edema. Interval Change Right Eye Better. Left Eye Better.       EKG: sinus bradycardia, RBBB      ASSESSMENT:    Chronic decompensated systolic CHF  Ischemic cardiomyopathy with EF of 25 to 30% per echo on 11/2023  CKD stage IIIb  Diabetes mellitus  Liver cirrhosis  Anemia-hemoglobin 7.0  Noncompliance        PLAN:   As always, aggressive risk factor modification is strongly recommended. We should adhere to the JNC VIII guidelines for HTN management and the NCEPATP III guidelines for LDL-C management.  Continue to monitor on telemetry  Check 2D echo  Continue with IV diuresis as tres as per nephrology recommendations, monitor I/O's, renal function, electrolytes. Keep K+>4 and Mg>2  Maximize cardiac medications--continue

## 2024-04-06 PROBLEM — E11.65 TYPE 2 DIABETES MELLITUS WITH HYPERGLYCEMIA, WITH LONG-TERM CURRENT USE OF INSULIN (HCC): Status: ACTIVE | Noted: 2024-04-06

## 2024-04-06 PROBLEM — Z79.4 TYPE 2 DIABETES MELLITUS WITH HYPERGLYCEMIA, WITH LONG-TERM CURRENT USE OF INSULIN (HCC): Status: ACTIVE | Noted: 2024-04-06

## 2024-04-06 PROBLEM — E04.1 THYROID NODULE: Status: ACTIVE | Noted: 2024-04-06

## 2024-04-06 LAB
ACANTHOCYTES BLD QL SMEAR: ABNORMAL
ANION GAP SERPL CALCULATED.3IONS-SCNC: 13 MEQ/L (ref 9–15)
ANISOCYTOSIS BLD QL SMEAR: ABNORMAL
BASOPHILS # BLD: 0 K/UL (ref 0–0.2)
BASOPHILS NFR BLD: 0.4 %
BUN SERPL-MCNC: 100 MG/DL (ref 6–20)
CALCIUM SERPL-MCNC: 8.2 MG/DL (ref 8.5–9.9)
CHLORIDE SERPL-SCNC: 97 MEQ/L (ref 95–107)
CO2 SERPL-SCNC: 25 MEQ/L (ref 20–31)
CREAT SERPL-MCNC: 2.02 MG/DL (ref 0.7–1.2)
EOSINOPHIL # BLD: 0.1 K/UL (ref 0–0.7)
EOSINOPHIL NFR BLD: 0.6 %
ERYTHROCYTE [DISTWIDTH] IN BLOOD BY AUTOMATED COUNT: 23.4 % (ref 11.5–14.5)
GLUCOSE BLD-MCNC: 116 MG/DL (ref 70–99)
GLUCOSE BLD-MCNC: 138 MG/DL (ref 70–99)
GLUCOSE BLD-MCNC: 241 MG/DL (ref 70–99)
GLUCOSE BLD-MCNC: 261 MG/DL (ref 70–99)
GLUCOSE BLD-MCNC: 44 MG/DL (ref 70–99)
GLUCOSE BLD-MCNC: 58 MG/DL (ref 70–99)
GLUCOSE BLD-MCNC: 91 MG/DL (ref 70–99)
GLUCOSE SERPL-MCNC: 284 MG/DL (ref 70–99)
HCT VFR BLD AUTO: 25.2 % (ref 42–52)
HGB BLD-MCNC: 7.5 G/DL (ref 14–18)
HYPOCHROMIA BLD QL SMEAR: ABNORMAL
LYMPHOCYTES # BLD: 1.3 K/UL (ref 1–4.8)
LYMPHOCYTES NFR BLD: 14.7 %
MCH RBC QN AUTO: 19.6 PG (ref 27–31.3)
MCHC RBC AUTO-ENTMCNC: 29.8 % (ref 33–37)
MCV RBC AUTO: 65.8 FL (ref 79–92.2)
MICROCYTES BLD QL SMEAR: ABNORMAL
MONOCYTES # BLD: 1.1 K/UL (ref 0.2–0.8)
MONOCYTES NFR BLD: 12.2 %
NEUTROPHILS # BLD: 6.4 K/UL (ref 1.4–6.5)
NEUTS SEG NFR BLD: 71.5 %
PERFORMED ON: ABNORMAL
PERFORMED ON: NORMAL
PLATELET # BLD AUTO: 473 K/UL (ref 130–400)
PLATELET BLD QL SMEAR: ABNORMAL
POIKILOCYTOSIS BLD QL SMEAR: ABNORMAL
POLYCHROMASIA BLD QL SMEAR: ABNORMAL
POTASSIUM SERPL-SCNC: 4.5 MEQ/L (ref 3.4–4.9)
RBC # BLD AUTO: 3.83 M/UL (ref 4.7–6.1)
SCHISTOCYTES BLD QL SMEAR: ABNORMAL
SLIDE REVIEW: ABNORMAL
SODIUM SERPL-SCNC: 135 MEQ/L (ref 135–144)
WBC # BLD AUTO: 8.9 K/UL (ref 4.8–10.8)

## 2024-04-06 PROCEDURE — 6370000000 HC RX 637 (ALT 250 FOR IP): Performed by: INTERNAL MEDICINE

## 2024-04-06 PROCEDURE — 6370000000 HC RX 637 (ALT 250 FOR IP): Performed by: PHYSICIAN ASSISTANT

## 2024-04-06 PROCEDURE — 99232 SBSQ HOSP IP/OBS MODERATE 35: CPT | Performed by: PHYSICIAN ASSISTANT

## 2024-04-06 PROCEDURE — 6360000002 HC RX W HCPCS: Performed by: INTERNAL MEDICINE

## 2024-04-06 PROCEDURE — 1210000000 HC MED SURG R&B

## 2024-04-06 PROCEDURE — 99232 SBSQ HOSP IP/OBS MODERATE 35: CPT | Performed by: INTERNAL MEDICINE

## 2024-04-06 PROCEDURE — 36415 COLL VENOUS BLD VENIPUNCTURE: CPT

## 2024-04-06 PROCEDURE — 85025 COMPLETE CBC W/AUTO DIFF WBC: CPT

## 2024-04-06 PROCEDURE — 97535 SELF CARE MNGMENT TRAINING: CPT

## 2024-04-06 PROCEDURE — 2580000003 HC RX 258: Performed by: INTERNAL MEDICINE

## 2024-04-06 PROCEDURE — 80048 BASIC METABOLIC PNL TOTAL CA: CPT

## 2024-04-06 RX ORDER — INSULIN GLARGINE 100 [IU]/ML
15 INJECTION, SOLUTION SUBCUTANEOUS NIGHTLY
Qty: 10 ML | Refills: 3 | Status: SHIPPED
Start: 2024-04-06 | End: 2024-04-06 | Stop reason: HOSPADM

## 2024-04-06 RX ORDER — INSULIN LISPRO 100 [IU]/ML
INJECTION, SOLUTION INTRAVENOUS; SUBCUTANEOUS
Qty: 5 ADJUSTABLE DOSE PRE-FILLED PEN SYRINGE | Refills: 3 | Status: ON HOLD | OUTPATIENT
Start: 2024-04-06

## 2024-04-06 RX ORDER — INSULIN LISPRO 100 [IU]/ML
0-8 INJECTION, SOLUTION INTRAVENOUS; SUBCUTANEOUS
Qty: 90 EACH | Refills: 0 | Status: SHIPPED | OUTPATIENT
Start: 2024-04-06 | End: 2024-04-06 | Stop reason: HOSPADM

## 2024-04-06 RX ORDER — INSULIN LISPRO 100 [IU]/ML
0-4 INJECTION, SOLUTION INTRAVENOUS; SUBCUTANEOUS
Status: DISCONTINUED | OUTPATIENT
Start: 2024-04-06 | End: 2024-04-07 | Stop reason: HOSPADM

## 2024-04-06 RX ORDER — ACETAMINOPHEN 80 MG
TABLET,CHEWABLE ORAL ONCE
Status: DISCONTINUED | OUTPATIENT
Start: 2024-04-06 | End: 2024-04-07 | Stop reason: HOSPADM

## 2024-04-06 RX ORDER — INSULIN LISPRO 100 [IU]/ML
0-4 INJECTION, SOLUTION INTRAVENOUS; SUBCUTANEOUS NIGHTLY
Status: DISCONTINUED | OUTPATIENT
Start: 2024-04-06 | End: 2024-04-07 | Stop reason: HOSPADM

## 2024-04-06 RX ORDER — INSULIN GLARGINE 100 [IU]/ML
15 INJECTION, SOLUTION SUBCUTANEOUS NIGHTLY
Qty: 5 ADJUSTABLE DOSE PRE-FILLED PEN SYRINGE | Refills: 3 | Status: ON HOLD | OUTPATIENT
Start: 2024-04-06

## 2024-04-06 RX ORDER — INSULIN LISPRO 100 [IU]/ML
8 INJECTION, SOLUTION INTRAVENOUS; SUBCUTANEOUS
Qty: 90 EACH | Refills: 0 | Status: SHIPPED
Start: 2024-04-06 | End: 2024-04-06 | Stop reason: HOSPADM

## 2024-04-06 RX ADMIN — FERROUS GLUCONATE 324 MG: 324 TABLET ORAL at 09:24

## 2024-04-06 RX ADMIN — INSULIN LISPRO 4 UNITS: 100 INJECTION, SOLUTION INTRAVENOUS; SUBCUTANEOUS at 09:24

## 2024-04-06 RX ADMIN — GABAPENTIN 100 MG: 100 CAPSULE ORAL at 09:13

## 2024-04-06 RX ADMIN — CLOPIDOGREL BISULFATE 75 MG: 75 TABLET ORAL at 09:13

## 2024-04-06 RX ADMIN — MIDODRINE HYDROCHLORIDE 10 MG: 10 TABLET ORAL at 09:13

## 2024-04-06 RX ADMIN — Medication 16 G: at 17:14

## 2024-04-06 RX ADMIN — ATORVASTATIN CALCIUM 80 MG: 80 TABLET, FILM COATED ORAL at 21:48

## 2024-04-06 RX ADMIN — ENOXAPARIN SODIUM 40 MG: 100 INJECTION SUBCUTANEOUS at 09:12

## 2024-04-06 RX ADMIN — MIDODRINE HYDROCHLORIDE 10 MG: 10 TABLET ORAL at 12:25

## 2024-04-06 RX ADMIN — METHIMAZOLE 20 MG: 5 TABLET ORAL at 09:13

## 2024-04-06 RX ADMIN — Medication 10 ML: at 09:28

## 2024-04-06 RX ADMIN — INSULIN GLARGINE 15 UNITS: 100 INJECTION, SOLUTION SUBCUTANEOUS at 21:48

## 2024-04-06 RX ADMIN — MIDODRINE HYDROCHLORIDE 10 MG: 10 TABLET ORAL at 17:17

## 2024-04-06 RX ADMIN — TORSEMIDE 50 MG: 100 TABLET ORAL at 09:24

## 2024-04-06 RX ADMIN — POLYETHYLENE GLYCOL 3350 17 G: 17 POWDER, FOR SOLUTION ORAL at 13:58

## 2024-04-06 RX ADMIN — INSULIN LISPRO 8 UNITS: 100 INJECTION, SOLUTION INTRAVENOUS; SUBCUTANEOUS at 12:25

## 2024-04-06 RX ADMIN — INSULIN LISPRO 8 UNITS: 100 INJECTION, SOLUTION INTRAVENOUS; SUBCUTANEOUS at 09:27

## 2024-04-06 NOTE — DISCHARGE INSTRUCTIONS
Endocrinology-    Check your blood sugars 4 times a day, before meals and at night  Document these numbers in a blood glucose log and bring them with you to your follow-up appointment.  If you are prescribed insulin, Do not take your mealtime insulin if your blood sugars less than 120   Call our office if you have blood sugars less than 80 or greater then 300 on two or more occasions  Call our office if you have any questions regarding your blood sugars or insulin dosing regiment  Signs of low blood sugar may include tremors, feeling shaky, sweating, dizziness, confusion and weakness. Check your blood sugar immediatly if you have any of these symptoms.

## 2024-04-06 NOTE — DISCHARGE INSTR - OTHER ORDERS
Blood Glucose Logs            Check your glucose before each meal an bedtime. Write down any insulin dosing changes you make on this form.    Week of ___/___/___ Breakfast  units  Lunch units  Dinner units  Bedtime  units    Sunday Monday Tuesday Wednesday Thursday Friday Saturday                      Week of ___/___/___           Sunday Monday Tuesday Wednesday Thursday Friday Saturday                      Week of ___/___/___           Sunday Monday Tuesday Wednesday Thursday Friday Saturday

## 2024-04-06 NOTE — DISCHARGE SUMMARY
Discharge Summary    Date: 4/6/2024  Patient Name: Tomas Salazar    YOB: 1970     Age: 53 y.o.    Admit Date: 4/2/2024  Discharge Date: 4/6/2024  Discharge Condition: Fair    Admission Diagnosis  Hyperkalemia [E87.5];Hypoglycemia [E16.2];Altered level of consciousness [R40.4];Encephalopathy [G93.40];Idiopathic hypotension [I95.0];HOWARD (acute kidney injury) ([facility]) [N17.9]      Discharge Diagnosis  Principal Problem:    Encephalopathy  Active Problems:    Hypoglycemia    HOWARD (acute kidney injury) ([facility])  Resolved Problems:    * No resolved hospital problems. *      Hospital Stay  Narrative of Hospital Course:  Patient comes with nursing with pinpoint pupils and altered mental status.  Received Narcan and improvement of his symptoms.  Urine  toxicology was negative for illicit drugs..  Patient was admitted to ICU eval by pulmonary for persistent right pleural effusion and eval by IR for ascites status post abdominal tap.  Patient was eval by renal for hyperkalemia and fluid overload received Lasix, and hyperkalemia was treated.  Patient refused echo.  Does have history of cardiomyopathy.  Patient received IV iron.  Has chronic anemia.  Patient was over ENT for thyroid nodule and possible neck mass and recommend to follow-up as outpatient for biopsy    Consultants:  IP CONSULT TO PSYCHIATRY  IP CONSULT TO CRITICAL CARE  IP CONSULT TO INTERVENTIONAL RADIOLOGY  IP CONSULT TO ENDOCRINOLOGY  IP CONSULT TO PALLIATIVE CARE  IP CONSULT TO NEPHROLOGY  IP CONSULT TO OTOLARYNGOLOGY  IP CONSULT TO PULMONOLOGY  IP CONSULT TO CARDIOLOGY    Surgeries/procedures Performed:      Treatments:            Discharge Plan/Disposition:  Home    Hospital/Incidental Findings Requiring Follow Up:    Patient Instructions:    Diet:    Activity:  For number of days (if applicable):      Other Instructions:    Provider Follow-Up:   No follow-ups on file.     Significant Diagnostic Studies:    Recent Labs:  Admission on 04/02/2024  No

## 2024-04-07 ENCOUNTER — APPOINTMENT (OUTPATIENT)
Dept: GENERAL RADIOLOGY | Age: 54
DRG: 469 | End: 2024-04-07
Attending: INTERNAL MEDICINE
Payer: MEDICAID

## 2024-04-07 VITALS
HEART RATE: 88 BPM | TEMPERATURE: 98.2 F | HEIGHT: 70 IN | OXYGEN SATURATION: 97 % | RESPIRATION RATE: 18 BRPM | WEIGHT: 166.23 LBS | DIASTOLIC BLOOD PRESSURE: 83 MMHG | BODY MASS INDEX: 23.8 KG/M2 | SYSTOLIC BLOOD PRESSURE: 127 MMHG

## 2024-04-07 LAB
ANION GAP SERPL CALCULATED.3IONS-SCNC: 13 MEQ/L (ref 9–15)
BACTERIA BLD CULT ORG #2: NORMAL
BACTERIA BLD CULT: NORMAL
BUN SERPL-MCNC: 98 MG/DL (ref 6–20)
CALCIUM SERPL-MCNC: 8.4 MG/DL (ref 8.5–9.9)
CHLORIDE SERPL-SCNC: 98 MEQ/L (ref 95–107)
CO2 SERPL-SCNC: 25 MEQ/L (ref 20–31)
CREAT SERPL-MCNC: 2.14 MG/DL (ref 0.7–1.2)
GLUCOSE BLD-MCNC: 186 MG/DL (ref 70–99)
GLUCOSE BLD-MCNC: 193 MG/DL (ref 70–99)
GLUCOSE BLD-MCNC: 230 MG/DL (ref 70–99)
GLUCOSE SERPL-MCNC: 192 MG/DL (ref 70–99)
MISCELLANEOUS LAB TEST ORDER: ABNORMAL
PERFORMED ON: ABNORMAL
POTASSIUM SERPL-SCNC: 4.4 MEQ/L (ref 3.4–4.9)
SODIUM SERPL-SCNC: 136 MEQ/L (ref 135–144)
THYROPEROXIDASE IGG SERPL-ACNC: 6.4 IU/ML (ref 0–25)
TSI SER-ACNC: <0.1 IU/L
WHOPPER PROMPT: ABNORMAL

## 2024-04-07 PROCEDURE — 6370000000 HC RX 637 (ALT 250 FOR IP): Performed by: INTERNAL MEDICINE

## 2024-04-07 PROCEDURE — 6370000000 HC RX 637 (ALT 250 FOR IP): Performed by: PHYSICIAN ASSISTANT

## 2024-04-07 PROCEDURE — 99232 SBSQ HOSP IP/OBS MODERATE 35: CPT | Performed by: INTERNAL MEDICINE

## 2024-04-07 PROCEDURE — 36415 COLL VENOUS BLD VENIPUNCTURE: CPT

## 2024-04-07 PROCEDURE — 80048 BASIC METABOLIC PNL TOTAL CA: CPT

## 2024-04-07 PROCEDURE — 71045 X-RAY EXAM CHEST 1 VIEW: CPT

## 2024-04-07 RX ADMIN — TORSEMIDE 50 MG: 100 TABLET ORAL at 09:13

## 2024-04-07 RX ADMIN — FERROUS GLUCONATE 324 MG: 324 TABLET ORAL at 09:14

## 2024-04-07 RX ADMIN — CLOPIDOGREL BISULFATE 75 MG: 75 TABLET ORAL at 09:16

## 2024-04-07 RX ADMIN — MIDODRINE HYDROCHLORIDE 6.25 MG: 5 TABLET ORAL at 09:14

## 2024-04-07 RX ADMIN — METHIMAZOLE 20 MG: 5 TABLET ORAL at 09:14

## 2024-04-07 NOTE — PLAN OF CARE
Problem: Discharge Planning  Goal: Discharge to home or other facility with appropriate resources  4/7/2024 1044 by Rufnia Ashley RN  Outcome: Adequate for Discharge  4/7/2024 0225 by Cristina Hollis RN  Outcome: Progressing     Problem: Skin/Tissue Integrity  Goal: Absence of new skin breakdown  Description: 1.  Monitor for areas of redness and/or skin breakdown  2.  Assess vascular access sites hourly  3.  Every 4-6 hours minimum:  Change oxygen saturation probe site  4.  Every 4-6 hours:  If on nasal continuous positive airway pressure, respiratory therapy assess nares and determine need for appliance change or resting period.  4/7/2024 1044 by Rufina Ashley RN  Outcome: Adequate for Discharge  4/7/2024 0225 by Cristina Hollis RN  Outcome: Progressing     Problem: Safety - Adult  Goal: Free from fall injury  4/7/2024 1044 by Rufina Ashley RN  Outcome: Adequate for Discharge  4/7/2024 0225 by Cristina Hollis RN  Outcome: Progressing     Problem: Neurosensory - Adult  Goal: Achieves stable or improved neurological status  4/7/2024 1044 by Rufina Ashley RN  Outcome: Adequate for Discharge  4/7/2024 0225 by Cristina Hollis RN  Outcome: Progressing     Problem: Respiratory - Adult  Goal: Achieves optimal ventilation and oxygenation  4/7/2024 1044 by Rufina Ashley RN  Outcome: Adequate for Discharge  4/7/2024 0225 by Cristina Hollis RN  Outcome: Progressing     Problem: Cardiovascular - Adult  Goal: Maintains optimal cardiac output and hemodynamic stability  4/7/2024 1044 by Rufina Ashley RN  Outcome: Adequate for Discharge  4/7/2024 0225 by Cristina Hollis RN  Outcome: Progressing  Goal: Absence of cardiac dysrhythmias or at baseline  4/7/2024 1044 by Rufina Ashley RN  Outcome: Adequate for Discharge  4/7/2024 0225 by Cristina Hollis RN  Outcome: Progressing     Problem: Skin/Tissue Integrity - Adult  Goal: Skin integrity remains intact  4/7/2024 1044 by Rufina Ashley RN  Outcome: 
  Problem: Discharge Planning  Goal: Discharge to home or other facility with appropriate resources  Outcome: Progressing  Flowsheets (Taken 4/2/2024 2000)  Discharge to home or other facility with appropriate resources: Identify barriers to discharge with patient and caregiver     Problem: Skin/Tissue Integrity  Goal: Absence of new skin breakdown  Description: 1.  Monitor for areas of redness and/or skin breakdown  2.  Assess vascular access sites hourly  3.  Every 4-6 hours minimum:  Change oxygen saturation probe site  4.  Every 4-6 hours:  If on nasal continuous positive airway pressure, respiratory therapy assess nares and determine need for appliance change or resting period.  Outcome: Progressing     Problem: Safety - Adult  Goal: Free from fall injury  Outcome: Progressing     
  Problem: Skin/Tissue Integrity  Goal: Absence of new skin breakdown  Description: 1.  Monitor for areas of redness and/or skin breakdown  2.  Assess vascular access sites hourly  3.  Every 4-6 hours minimum:  Change oxygen saturation probe site  4.  Every 4-6 hours:  If on nasal continuous positive airway pressure, respiratory therapy assess nares and determine need for appliance change or resting period.  4/4/2024 1011 by Rani Irizarry RN  Outcome: Progressing  4/3/2024 2344 by Erica Enriquez, RN  Outcome: Progressing     Problem: Safety - Adult  Goal: Free from fall injury  4/4/2024 1011 by Rani Irizarry, RN  Outcome: Progressing  4/3/2024 2344 by Erica Enriquez, RN  Outcome: Progressing     
BSE completed  
Continue POC  
See OT evaluation for all goals and OT POC. Electronically signed by Maral Goodrich OTR/L on 4/4/2024 at 12:11 PM    
Therapy evaluation completed.  Please see daily notes and/or progress notes for details related to planned treatment interventions, goals and functional performance.    
Progressing     Problem: Musculoskeletal - Adult  Goal: Return mobility to safest level of function  4/6/2024 1023 by Rufina Chavez RN  Outcome: Progressing  4/6/2024 1021 by Rufina Chavez RN  Outcome: Adequate for Discharge     Problem: Gastrointestinal - Adult  Goal: Minimal or absence of nausea and vomiting  4/6/2024 1023 by Rufina Chavez, RN  Outcome: Progressing  4/6/2024 1021 by Rufina Chavez, RN  Outcome: Progressing  Goal: Maintains or returns to baseline bowel function  4/6/2024 1023 by Rufina Chavez, RN  Outcome: Progressing  4/6/2024 1021 by Rufina Chavez, RN  Outcome: Progressing  Goal: Maintains adequate nutritional intake  4/6/2024 1023 by Rufina Chavez, RN  Outcome: Progressing  4/6/2024 1021 by Rufina Chavez, RN  Outcome: Progressing     Problem: Genitourinary - Adult  Goal: Absence of urinary retention  4/6/2024 1023 by Rufina Chavez RN  Outcome: Progressing  4/6/2024 1021 by Rufina Chavez, RN  Outcome: Progressing     Problem: Infection - Adult  Goal: Absence of infection at discharge  4/6/2024 1023 by Rufina Chavez, RN  Outcome: Progressing  4/6/2024 1021 by Rufina Chavez, RN  Outcome: Progressing     Problem: Metabolic/Fluid and Electrolytes - Adult  Goal: Electrolytes maintained within normal limits  4/6/2024 1023 by Rufina Chavez, RN  Outcome: Progressing  4/6/2024 1021 by Rufina Chavez, RN  Outcome: Progressing  Goal: Hemodynamic stability and optimal renal function maintained  4/6/2024 1023 by Rufina Chavez, RN  Outcome: Progressing  4/6/2024 1021 by Rufina Chavez, RN  Outcome: Progressing  Goal: Glucose maintained within prescribed range  4/6/2024 1023 by Rufina Chavez, RN  Outcome: Progressing  4/6/2024 1021 by Rufina Chavez, RN  Outcome: Progressing     Problem: Hematologic - Adult  Goal: Maintains hematologic stability  4/6/2024 1023 by Trockley, Rufina M, RN  Outcome: Progressing  4/6/2024 1021 by Rufina Chavez, RN  Outcome:

## 2024-04-07 NOTE — DISCHARGE INSTR - DIET

## 2024-04-08 NOTE — PROGRESS NOTES
Department of Otolaryngology  Sal Gibbs MD  Progress Note      SUBJECTIVE: Patient offers no specific complaints.  Patient is awaiting placement.    OBJECTIVE patient is in his room and appears comfortable    Physical    /74   Pulse 80   Temp 99 °F (37.2 °C)   Resp 17   Ht 1.778 m (5' 10\")   Wt 75.4 kg (166 lb 3.6 oz)   SpO2 93%   BMI 23.85 kg/m²     Intake/Output Summary (Last 24 hours) at 4/5/2024 1204  Last data filed at 4/5/2024 1031  Gross per 24 hour   Intake 840 ml   Output 1570 ml   Net -730 ml         Physical Exam   Patient is awake and alert not in any distress vital signs stable    Data      Current Inpatient Medications  Current Facility-Administered Medications: torsemide (DEMADEX) tablet 50 mg, 50 mg, Oral, Daily  [START ON 4/6/2024] ferrous gluconate (FERGON) tablet 324 mg, 324 mg, Oral, Daily with breakfast  ferric gluconate (FERRLECIT) 125 mg in sodium chloride 0.9 % 100 mL IVPB, 125 mg, IntraVENous, Once  methIMAzole (TAPAZOLE) tablet 20 mg, 20 mg, Oral, Daily  insulin glargine (LANTUS) injection vial 25 Units, 25 Units, SubCUTAneous, Nightly  insulin lispro (HUMALOG) injection vial 8 Units, 8 Units, SubCUTAneous, TID WC  epoetin bryan-epbx (RETACRIT) injection 10,000 Units, 10,000 Units, SubCUTAneous, Weekly  traMADol (ULTRAM) tablet 50 mg, 50 mg, Oral, Q6H PRN  clopidogrel (PLAVIX) tablet 75 mg, 75 mg, Oral, Daily  gabapentin (NEURONTIN) capsule 100 mg, 100 mg, Oral, TID  atorvastatin (LIPITOR) tablet 80 mg, 80 mg, Oral, Nightly  insulin lispro (HUMALOG) injection vial 0-8 Units, 0-8 Units, SubCUTAneous, TID WC  insulin lispro (HUMALOG) injection vial 0-4 Units, 0-4 Units, SubCUTAneous, Nightly  sodium chloride flush 0.9 % injection 5-40 mL, 5-40 mL, IntraVENous, 2 times per day  sodium chloride flush 0.9 % injection 5-40 mL, 5-40 mL, IntraVENous, PRN  0.9 % sodium chloride infusion, , IntraVENous, PRN  potassium chloride (KLOR-CON M) extended release tablet 40 mEq, 40 
  Physician Progress Note      PATIENT:               DOROTEO TERAN  CSN #:                  [id number]  :                       1970  ADMIT DATE:       2024 9:09 AM  DISCH DATE:  RESPONDING  PROVIDER #:        Frances Watt MD          QUERY TEXT:    Patient admitted with hypoglycemia, hyponatremia, hyperkalemia,  HOWARD,   decompensated liver cirrhosis with large ascites and has encephalopathy   documented. If possible, please document in progress notes and discharge   summary further specificity regarding the type of encephalopathy:    The medical record reflects the following:  Risk Factors: male age 53  chronic systolic HF  T2DM  CKD3b  left BKA  Clinical Indicators:  -3 Dr Floyd: \"Severe hypoglycemia, etiology unclear, probably poor   nutrition, worsened due to acute kidney injury, dehydration; hyponatremia,   possible overdose; malnutrition.\"   Dr Liz: \"Encephalopathy, likely due to hypoglycemia.  Rule out seizure.    Rule out overdose.  Treatment: pertinent labs  strict I&O  CXR  US head neck  critical care for   hemodynamic and neuro monitoring    Patrick Solorzano RN [facility]  [phone]  Options provided:  -- Metabolic encephalopathy  -- Encephalopathy due to other condition, Please specify other condition.  -- Other - I will add my own diagnosis  -- Disagree - Not applicable / Not valid  -- Disagree - Clinically unable to determine / Unknown  -- Refer to Clinical Documentation Reviewer    PROVIDER RESPONSE TEXT:    This patient has metabolic encephalopathy.    Query created by: Patrick Solorzano on 4/3/2024 12:10 PM      Electronically signed by:  Frances Watt MD 4/3/2024 12:35 PM          
  Wound Ostomy Continence Nurse  Consult Note       NAME:  Tomas Salazar  MEDICAL RECORD NUMBER:  [id number]  AGE: 53 y.o.   GENDER: male  : 1970  TODAY'S DATE:  2024    Subjective   Reason for WOC Nurse Evaluation and Assessment: Lower extremity wounds      Tomas Salazar is a 53 y.o. male referred by:   [x] Physician  [] Nursing  [] Other:     Wound Identification:  Wound Type: arterial and diabetic  Contributing Factors: diabetes and PAD    Wound History: Patient admitted to [facility] with multiple wounds present to bilateral LEs. Patient is drowsy and unable to provide wound history at this time  Current Wound Care Treatment: Recommending 1) Continue pressure injury prevention interventions 2) Daily Plurogel dressing to wounds to bilateral LEs 3) Follow-up in outpatient wound clinic after discharge.    Patient Goal of Care:  [x] Wound Healing  [] Odor Control  [] Palliative Care  [] Pain Control   [] Other:         PAST MEDICAL HISTORY    No past medical history on file.    PAST SURGICAL HISTORY    No past surgical history on file.    FAMILY HISTORY    No family history on file.    SOCIAL HISTORY    Social History     Tobacco Use    Smoking status: Never     Passive exposure: Never    Smokeless tobacco: Never   Substance Use Topics    Alcohol use: Never    Drug use: Never       ALLERGIES    Allergies   Allergen Reactions    Amoxicillin Anaphylaxis     swollen face       MEDICATIONS    No current facility-administered medications on file prior to encounter.     Current Outpatient Medications on File Prior to Encounter   Medication Sig Dispense Refill    methIMAzole (TAPAZOLE) 10 MG tablet Take 2 tablets by mouth daily      metFORMIN (GLUCOPHAGE) 500 MG tablet Take 1 tablet by mouth 2 times daily (with meals)      metoprolol succinate (TOPROL XL) 25 MG extended release tablet Take 2 tablets by mouth daily      melatonin 5 MG TABS tablet Take 1 tablet by mouth nightly      lisinopril 
2200: Bedtime bld glucose 189. Pt refused his scheduled Lantus dose- stating it would drop his sugar too low throughout the night. Will continue to recheck and monitor glucose over night.  
Dressings to bilateral lower extremities changed.  Pt tolerated well.  Small amount of serosang drainage noted to left leg wound and right foot wound.  Pt transferred to bedside commode without difficulty.  Pt is alert and oriented. Accu check obtained per pts request.  Pt's IV was also removed at this time due to leaking and at this time pt refused new placement of IV.  Denies further needs at this time.    
INPATIENT PROGRESS NOTES    PATIENT NAME: Tomas Salazar  MRN: [id number]  SERVICE DATE:  April 5, 2024   SERVICE TIME:  3:52 PM      PRIMARY SERVICE: Pulmonary Disease    CHIEF COMPLAIN: Encephalopathy    INTERVAL HPI: Patient seen and examined at bedside, Interval Notes, orders reviewed. Nursing notes noted  Patient is having 2D echo.  He said he is feeling little better today.  He is still having short of breath with any activity.   No fever or chills.  He had paracentesis in about 3.2 L fluid removed.  He still has some abdominal distention.  He is on room air and his oxygen saturation is 93%.  He had a chest x-ray shows moderate-sized right-sided pleural effusion.  He denies having any chest pain or pleuritic pain.  No cough or sputum production.         OBJECTIVE   I/O:24HR INTAKE/OUTPUT:    Intake/Output Summary (Last 24 hours) at 4/5/2024 1552  Last data filed at 4/5/2024 1031  Gross per 24 hour   Intake 480 ml   Output 1050 ml   Net -570 ml     04/04 0701 - 04/05 0700  In: 1080 [P.O.:1080]  Out: 1120 [Urine:1120]  Body mass index is 23.85 kg/m².    PHYSICAL EXAM:  Vitals:  /74   Pulse 80   Temp 99 °F (37.2 °C)   Resp 17   Ht 1.778 m (5' 10\")   Wt 75.4 kg (166 lb 3.6 oz)   SpO2 93%   BMI 23.85 kg/m²     General: Alert, awake .comfortable in bed, No distress.  Head: Atraumatic , Normocephalic   Eyes: PERRL. No sclera icterus. No conjunctival injection. No discharge   ENT: No nasal  discharge. Pharynx clear.  Neck:  Trachea midline. No thyromegaly, no JVD, No cervical adenopathy.  Chest : Bilaterally symmetrical ,Normal effort,  No accessory muscle use  Lung : Diminished breath sound bilaterally more on the right base.  No Rales. No wheezing. No rhonchi.   Heart:: Normal rate. Regular rhythm. No mumur ,  Rub or gallop  ABD: Non-tender. Distended+. No masses. No organmegaly. Normal bowel sounds. No hernia.  Ext : Dressing in the right leg, status post left BKA .no Cyanosis No clubbing  Neuro: no 
[facility]   Facility/Department: [facility]  Speech Language Pathology  Clinical Bedside Swallow Evaluation    NAME:Tomas Salazar  : 1970 (53 y.o.)   [x]   confirmed    MRN: [id number]  ROOM: [place] [id number]  ADMISSION DATE: 2024  PATIENT DIAGNOSIS(ES): Hyperkalemia [E87.5]  Hypoglycemia [E16.2]  Altered level of consciousness [R40.4]  Encephalopathy [G93.40]  Idiopathic hypotension [I95.0]  HOWARD (acute kidney injury) (HCC) [N17.9]  Chief Complaint   Patient presents with    Hypoglycemia     Patient Active Problem List    Diagnosis Date Noted    Encephalopathy 2024    Hyperthyroidism 2023     No past medical history on file.  No past surgical history on file.  Allergies   Allergen Reactions    Amoxicillin Anaphylaxis     swollen face       DATE ONSET: 2024    Date of Evaluation: 2024   Evaluating Therapist: WASHINGTON Carrillo    Dysphagia Diagnosis  Dysphagia Diagnosis: Swallow function appears WFL  Dysphagia Impression : Prolonged mastication was noted with regular solids secondary to patient being edentulous. Patient presented with adequate oral clearance with all po trials. No oral residue was noted. No overt s/s of aspiration was noted with all po trials including multiple, consecutive sips of thin liquids. Patient presented with timely swallow, clear vocal quality and hyolaryngeal elevation.    Recommended Diet     Diet Solids Recommendation: Regular  Liquid Consistency Recommendation: Thin  Recommended Form of Meds: PO  Compensatory Swallowing Strategies : Small bites/sips;Upright as possible for all oral intake      Reason for Referral  Tomas Salazar was referred for a bedside swallow evaluation to assess the efficiency of his swallow function, identify signs and symptoms of aspiration, identify risk factors, and make recommendations regarding safe dietary consistencies, effective compensatory strategies, and safe eating environment.    General  Chart Reviewed: 
Patient arrived from ER alert and orineted x4. Patient stated he is wheelchair bound at nursing home. The only family member he wants to know his medical information is pedro newman(brother) in [place]. His number is [phone]. Patient has no belongings with him coming to the ICU. Suddenly patient became hard to wake up.  checked BS and it was 45, temperature was 91.7 degrees. Keasbey warmer applied and d10 was given. Stat BMP ordered to check potassium after treating the elevated potassium from admission. Notified Dr. Hernandez. Wound was consulted for ulcers on RLE and ulcer on amputation. Other skin has no issues was verified with bernadette smith rn     1345- notified dr hernandez of potassium being 6.07 and . He said to recheck in two hours   1743- potassium 6.2 I notified dr hernandez.  1750- orders received via telephone for lokelma, insulin, d50 and calcium  
Physical Therapy  Facility/Department: [facility] MED SURG W268/W268-01  Physical Therapy Discharge      NAME: Tomas Salazar    : 1970 (53 y.o.)  MRN: [id number]    Account: [id number]  Gender: male      Patient has been discharged from acute care hospital. DC patient from current PT program.      Electronically signed by Christine Bernard PT on 24 at 4:21 PM EDT      
Physical Therapy Med Surg Daily Treatment Note  [place] [facility] [place] [place]  Room: [place] [id number]       NAME: Tomas Salazar  : 1970 (53 y.o.)  MRN: [id number]  CODE STATUS: Full Code    Date of Service: 2024    Patient Diagnosis(es): Hyperkalemia [E87.5]  Hypoglycemia [E16.2]  Altered level of consciousness [R40.4]  Encephalopathy [G93.40]  Idiopathic hypotension [I95.0]  HOWARD (acute kidney injury) ([facility]) [N17.9]   Chief Complaint   Patient presents with    Hypoglycemia     Patient Active Problem List    Diagnosis Date Noted    HOWARD (acute kidney injury) (HCC) 2024    Encephalopathy 2024    Hypoglycemia 2024    Hyperthyroidism 2023        No past medical history on file.  Past Surgical History:   Procedure Laterality Date    PARACENTESIS Left 2024    3215 ml removed by Dr. Sims - diagnostics sent            Restrictions: fall risk       SUBJECTIVE:   Subjective: pt agreeable to tx. states he desires to get his mobility better so he can do more    Pain  Pain: denies pain at this moment    OBJECTIVE:   Orientation  Overall Orientation Status: Within Functional Limits  Cognition  Overall Cognitive Status: WFL    Bed Mobility Training  Bed Mobility Training: Yes  Overall Level of Assistance: Independent  Rolling: Independent  Supine to Sit: Independent  Scooting: Independent    Transfer Training  Transfer Training: Yes  Overall Level of Assistance: Minimum assistance  Interventions: Safety awareness training;Manual cues  Sit to Stand: Minimum assistance  Stand to Sit: Minimum assistance    Overall Level of Assistance: Minimum assistance  Distance (ft): 7 Feet in room with numerous turns.   Assistive Device: Walker, rolling  Interventions: Safety awareness training;Manual cues;Verbal cues  Speed/Jazlyn: Fluctuations  Step Length: Right shortened  Stance: Right decreased  Gait Abnormalities: Step to gait  Right Side Weight Bearing: As tolerated  Left Side Weight Bearing:  
Physical Therapy Med Surg Daily Treatment Note  [place] [facility] [place] [facility] [place] [place]  Room: [place] [id number]       NAME: Tomas Salazar  : 1970 (53 y.o.)  MRN: [id number]  CODE STATUS: Full Code    Date of Service: 2024    Patient Diagnosis(es): Hyperkalemia [E87.5]  Hypoglycemia [E16.2]  Altered level of consciousness [R40.4]  Encephalopathy [G93.40]  Idiopathic hypotension [I95.0]  HOWARD (acute kidney injury) (HCC) [N17.9]   Chief Complaint   Patient presents with    Hypoglycemia     Patient Active Problem List    Diagnosis Date Noted    Type 2 diabetes mellitus with hyperglycemia, with long-term current use of insulin (HCC) 2024    Thyroid nodule 2024    HOWARD (acute kidney injury) (HCC) 2024    Encephalopathy 2024    Hypoglycemia 2024    Hyperthyroidism 2023        No past medical history on file.  Past Surgical History:   Procedure Laterality Date    PARACENTESIS Left 2024    3215 ml removed by Dr. Sims - diagnostics sent       Chart Reviewed: Yes  Family / Caregiver Present: No    Restrictions:  Restrictions/Precautions: Fall Risk;Up as Tolerated    SUBJECTIVE:   Subjective: \"I'm doing OK.\"    Pain  Pain: denies pain pre/post tx.    OBJECTIVE:        Bed mobility  Bed Mobility Comments: DNT pt sitting EOB pre/post tx. declines to practice reports no difficulty completing at this time.    Transfers  Sit to Stand: Stand by assistance  Stand to Sit: Stand by assistance  Bed to Chair: Stand by assistance  Comment: using WW, good stability and use of AD.            Activity Tolerance  Activity Tolerance: Patient tolerated treatment well          ASSESSMENT   Assessment: pt demos safe transfer ability able to get to and from bedside chair safely.     Discharge Recommendations:  Continue to assess pending progress, Therapy recommended at discharge         Goals  Long Term Goals  Long Term Goal 1: Pt to complete bed mobility with indep  Long Term Goal 2: Pt to complete 
Physical Therapy Med Surg Initial Assessment  [place] [facility] [place] [facility]  Room: [place] [id number]       NAME: Tomas Salazar  : 1970 (53 y.o.)  MRN: [id number]  CODE STATUS: Full Code    Date of Service: 2024    Patient Diagnosis(es): Hyperkalemia [E87.5]  Hypoglycemia [E16.2]  Altered level of consciousness [R40.4]  Encephalopathy [G93.40]  Idiopathic hypotension [I95.0]  HOWARD (acute kidney injury) (HCC) [N17.9]   Chief Complaint   Patient presents with    Hypoglycemia     Patient Active Problem List    Diagnosis Date Noted    Encephalopathy 2024    Hypoglycemia 2024    Hyperthyroidism 2023        No past medical history on file.  Past Surgical History:   Procedure Laterality Date    PARACENTESIS Left 2024    3215 ml removed by Dr. iSms - diagnostics sent       Chart Reviewed: Yes  Patient assessed for rehabilitation services?: Yes  Family / Caregiver Present: No  Diagnosis: Encephalopathy  General Comment  Comments: Pt resting in bed - agreeable to PT evaluation    Restrictions:  Restrictions/Precautions: Fall Risk, Up as Tolerated     SUBJECTIVE:   Subjective: \"I'm doing OK.\"    Pain  Pain: 2/10 R ankle pre session pain. /10 post session. RN notified. Repositioned for comfort. C/o buttocks pain sitting up in chair. Provided pt with seat cushion for comfort.    Prior Level of Function:  Social/Functional History  Lives With: Alone  Type of Home: Apartment  Home Layout: One level  Home Access: Level entry  Bathroom Shower/Tub: Walk-in shower  Bathroom Equipment: Shower chair, Grab bars in shower, Hand-held shower  Home Equipment: Wheelchair-manual, Walker, rolling, Electric scooter (has L LE prosthesis, but it doesn't fit well.)  ADL Assistance: Independent  Ambulation Assistance: Non-ambulatory (indep with manual WC)  Transfer Assistance: Independent  Active : No  Patient's  Info: uses scooter in community and drives that to get groceries  Additional Comments: 
Progress Note  Date:2024       Room:[facility]/W268-01  Patient Name:Tomas Salazar     YOB: 1970     Age:53 y.o.        Subjective    Subjective:  Symptoms:  He reports malaise and weakness.  No shortness of breath, cough, chest pain, headache, chest pressure, anorexia, diarrhea or anxiety.    Diet:  No nausea or vomiting.       Review of Systems   Respiratory:  Negative for cough and shortness of breath.    Cardiovascular:  Negative for chest pain.   Gastrointestinal:  Negative for anorexia, diarrhea, nausea and vomiting.   Neurological:  Positive for weakness.     Objective         Vitals Last 24 Hours:  TEMPERATURE:  Temp  Av.8 °F (37.1 °C)  Min: 97.9 °F (36.6 °C)  Max: 99.5 °F (37.5 °C)  RESPIRATIONS RANGE: Resp  Av.3  Min: 16  Max: 17  PULSE OXIMETRY RANGE: SpO2  Av.5 %  Min: 93 %  Max: 99 %  PULSE RANGE: Pulse  Av.5  Min: 80  Max: 93  BLOOD PRESSURE RANGE: Systolic (24hrs), Av , Min:107 , Max:118   ; Diastolic (24hrs), Av, Min:60, Max:79    I/O (24Hr):    Intake/Output Summary (Last 24 hours) at 2024 1126  Last data filed at 2024 1031  Gross per 24 hour   Intake 840 ml   Output 1570 ml   Net -730 ml       Objective:  General Appearance:  Ill-appearing.    Vital signs: (most recent): Blood pressure 107/74, pulse 80, temperature 99 °F (37.2 °C), resp. rate 17, height 1.778 m (5' 10\"), weight 75.4 kg (166 lb 3.6 oz), SpO2 93 %.    HEENT: Normal HEENT exam.    Lungs:  There are decreased breath sounds.    Heart: S1 normal and S2 normal.    Abdomen: Abdomen is distended.  Bowel sounds are normal.   There is no epigastric area or suprapubic area tenderness.     Pulses: Distal pulses are intact.    Neurological: Patient is alert.    Pupils:  Pupils are equal, round, and reactive to light.    Skin:  Warm.      Labs/Imaging/Diagnostics    Labs:  CBC:  Recent Labs     24  0324 24  0951   WBC 11.8* 10.0   RBC 3.73* 3.57*   HGB 7.2* 7.0*   HCT 23.6* 22.9* 
Progress Note  Date:2024       [facility]  Patient Name:Tomas Salazar     YOB: 1970     Age:53 y.o.    Chief complaint uncontrolled type 2 diabetes/hypoglycemia    Subjective    Subjective:  Symptoms:  Stable.    Diet:  Adequate intake.    Activity level: Impaired due to weakness.    Pain:  He reports no pain.       Review of Systems   Musculoskeletal:  Positive for gait problem.   Skin:  Positive for wound.   All other systems reviewed and are negative.    Objective         Vitals Last 24 Hours:  TEMPERATURE:  Temp  Av.8 °F (36.6 °C)  Min: 97.6 °F (36.4 °C)  Max: 97.9 °F (36.6 °C)  RESPIRATIONS RANGE: Resp  Av  Min: 14  Max: 18  PULSE OXIMETRY RANGE: SpO2  Av.8 %  Min: 94 %  Max: 99 %  PULSE RANGE: Pulse  Av.8  Min: 80  Max: 90  BLOOD PRESSURE RANGE: Systolic (24hrs), Av , Min:100 , Max:115   ; Diastolic (24hrs), Av, Min:58, Max:68    I/O (24Hr):    Intake/Output Summary (Last 24 hours) at 2024 1537  Last data filed at 2024 1335  Gross per 24 hour   Intake 360 ml   Output 870 ml   Net -510 ml     Objective:  General Appearance:  Comfortable.    Vital signs: (most recent): Blood pressure 111/62, pulse 90, temperature 97.9 °F (36.6 °C), resp. rate 16, height 1.778 m (5' 10\"), weight 75.4 kg (166 lb 3.6 oz), SpO2 99 %.  Vital signs are normal.    HEENT: Normal HEENT exam.    Lungs:  Normal effort and normal respiratory rate.    Heart: Normal rate.    Abdomen: Abdomen is soft.    Extremities: (Bandage bilateral legs left below the knee amputation)  Neurological: Patient is alert.      Labs/Imaging/Diagnostics    Labs:  CBC:  Recent Labs     24  0930 24  0324   WBC 12.3* 11.8*   RBC 3.91* 3.73*   HGB 7.5* 7.2*   HCT 25.8* 23.6*   MCV 66.0* 63.3*   RDW 21.4* 22.3*   * 427*     CHEMISTRIES:  Recent Labs     24  1810 24  0324 24  1046   * 133* 133*   K 5.4* 4.9 4.7   CL 93* 96 96   CO2 21 23 23   * 118* 108* 
Progress Note  Date:2024       Room:W268/W268-01  Patient Name:Tomas Salazar     YOB: 1970     Age:53 y.o.        Subjective    Subjective:  Symptoms:  He reports malaise and weakness.  No shortness of breath, cough, chest pain, headache, chest pressure, anorexia, diarrhea or anxiety.    Diet:  No nausea or vomiting.       Review of Systems   Respiratory:  Negative for cough and shortness of breath.    Cardiovascular:  Negative for chest pain.   Gastrointestinal:  Negative for anorexia, diarrhea, nausea and vomiting.   Neurological:  Positive for weakness.     Objective         Vitals Last 24 Hours:  TEMPERATURE:  Temp  Av.9 °F (36.6 °C)  Min: 97.6 °F (36.4 °C)  Max: 98.2 °F (36.8 °C)  RESPIRATIONS RANGE: Resp  Av.5  Min: 14  Max: 18  PULSE OXIMETRY RANGE: SpO2  Av.8 %  Min: 94 %  Max: 98 %  PULSE RANGE: Pulse  Av.6  Min: 78  Max: 89  BLOOD PRESSURE RANGE: Systolic (24hrs), Av , Min:100 , Max:115   ; Diastolic (24hrs), Av, Min:58, Max:68    I/O (24Hr):    Intake/Output Summary (Last 24 hours) at 2024 1243  Last data filed at 2024 1235  Gross per 24 hour   Intake 360 ml   Output 570 ml   Net -210 ml       Objective:  General Appearance:  Ill-appearing.    Vital signs: (most recent): Blood pressure 115/66, pulse 87, temperature 97.6 °F (36.4 °C), temperature source Oral, resp. rate 16, height 1.778 m (5' 10\"), weight 75.4 kg (166 lb 3.6 oz), SpO2 98 %.    HEENT: Normal HEENT exam.    Lungs:  There are decreased breath sounds.    Heart: S1 normal and S2 normal.    Abdomen: Abdomen is distended.  Bowel sounds are normal.   There is no epigastric area or suprapubic area tenderness.     Pulses: Distal pulses are intact.    Neurological: Patient is alert.    Pupils:  Pupils are equal, round, and reactive to light.    Skin:  Warm.      Labs/Imaging/Diagnostics    Labs:  CBC:  Recent Labs     24  0930 24  0324   WBC 12.3* 11.8*   RBC 3.91* 3.73*   HGB 7.5* 
Pts BG 44 at 1707. 16 g of glucose tablets given PRN per MAR. Pt lightheaded but otherwise asymptomatic. A/Ox4. /83, MAP 97, HR 97. Recheck at 1728 BG 58. Pt refused second dose of glucose tabs or other medication intervention and instead ate 100% of dinner. Pt education performed. Recheck at 1755 BG 91. Dr. Watt and JOANA Luz made aware. New orders received. Recheck at 1836 . Will continue to monitor.   
Pulmonary Medicine   Progress Note  Chief complaint : Pleural effusion    Subjunctive     No complaint, no chest pain, no coughing, no shortness of breath.  He is on room air saturation 97%, no fever    New information updated in the note today, rest of the examination did not change compared to yesterday.  Social History     Tobacco Use    Smoking status: Never     Passive exposure: Never    Smokeless tobacco: Never   Substance Use Topics    Alcohol use: Never     No family history on file.    No results for input(s): \"PHART\", \"NBV0THW\", \"PO2ART\" in the last 72 hours.    MV Settings:     / / /            IV:   sodium chloride      dextrose         Vitals:  /83   Pulse 88   Temp 98.2 °F (36.8 °C) (Oral)   Resp 18   Ht 1.778 m (5' 10\")   Wt 75.4 kg (166 lb 3.6 oz)   SpO2 97%   BMI 23.85 kg/m²    Tmax:        Intake/Output Summary (Last 24 hours) at 4/7/2024 1056  Last data filed at 4/6/2024 1844  Gross per 24 hour   Intake 480 ml   Output 500 ml   Net -20 ml         EXAM:  General: alert, cooperative, no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

## 2024-04-09 ENCOUNTER — HOSPITAL ENCOUNTER (OUTPATIENT)
Dept: WOUND CARE | Age: 54
Discharge: HOME | End: 2024-04-09
Attending: STUDENT IN AN ORGANIZED HEALTH CARE EDUCATION/TRAINING PROGRAM
Payer: MEDICAID

## 2024-04-09 VITALS
SYSTOLIC BLOOD PRESSURE: 132 MMHG | HEART RATE: 96 BPM | RESPIRATION RATE: 18 BRPM | DIASTOLIC BLOOD PRESSURE: 78 MMHG | TEMPERATURE: 97.5 F

## 2024-04-09 DIAGNOSIS — L97.911 ULCER OF RIGHT LEG, LIMITED TO BREAKDOWN OF SKIN (HCC): Primary | ICD-10-CM

## 2024-04-09 LAB
BACTERIA FLD AEROBE CULT: NORMAL
ESTIMATED AVERAGE GLUCOSE: 209 MG/DL
HBA1C MFR BLD: 8.9 % (ref 4–6)

## 2024-04-09 PROCEDURE — 11042 DBRDMT SUBQ TIS 1ST 20SQCM/<: CPT

## 2024-04-09 PROCEDURE — 11045 DBRDMT SUBQ TISS EACH ADDL: CPT

## 2024-04-09 PROCEDURE — 99214 OFFICE O/P EST MOD 30 MIN: CPT

## 2024-04-09 ASSESSMENT — PAIN SCALES - GENERAL: PAINLEVEL_OUTOF10: 6

## 2024-04-09 ASSESSMENT — PAIN DESCRIPTION - LOCATION: LOCATION: LEG

## 2024-04-09 NOTE — WOUND CARE
[facility] Physician Billing Sheet.     Tomas Salazar  AGE: 53 y.o.   GENDER: male  : 1970  TODAY'S DATE:  2024    ICD-10 CODES  Z89.512  L97.812  L97.822  I70.203      PHYSICIAN PROCEDURES    CPT CODE  27336  27821  06697 2 units       Electronically signed by Emigdio Varela DPM on 2024 at 4:32 PM

## 2024-04-13 ENCOUNTER — APPOINTMENT (OUTPATIENT)
Dept: GENERAL RADIOLOGY | Age: 54
DRG: 194 | End: 2024-04-13
Payer: MEDICAID

## 2024-04-13 ENCOUNTER — HOSPITAL ENCOUNTER (INPATIENT)
Age: 54
LOS: 4 days | Discharge: NURSING, REHAB OR LONG-TERM CARE | DRG: 194 | End: 2024-04-17
Attending: EMERGENCY MEDICINE | Admitting: INTERNAL MEDICINE
Payer: MEDICAID

## 2024-04-13 DIAGNOSIS — J90 RECURRENT RIGHT PLEURAL EFFUSION: Primary | ICD-10-CM

## 2024-04-13 DIAGNOSIS — I50.20 SYSTOLIC CONGESTIVE HEART FAILURE, UNSPECIFIED HF CHRONICITY (HCC): ICD-10-CM

## 2024-04-13 DIAGNOSIS — R06.00 DYSPNEA, UNSPECIFIED TYPE: ICD-10-CM

## 2024-04-13 PROBLEM — I50.23 ACUTE ON CHRONIC SYSTOLIC CHF (CONGESTIVE HEART FAILURE) (HCC): Status: ACTIVE | Noted: 2024-04-13

## 2024-04-13 LAB
ACANTHOCYTES BLD QL SMEAR: ABNORMAL
ALBUMIN SERPL-MCNC: 3.5 G/DL (ref 3.5–4.6)
ALP SERPL-CCNC: 398 U/L (ref 35–104)
ALT SERPL-CCNC: 30 U/L (ref 0–41)
ANION GAP SERPL CALCULATED.3IONS-SCNC: 14 MEQ/L (ref 9–15)
ANISOCYTOSIS BLD QL SMEAR: ABNORMAL
AST SERPL-CCNC: 42 U/L (ref 0–40)
BASOPHILS # BLD: 0 K/UL (ref 0–0.2)
BASOPHILS NFR BLD: 0.3 %
BILIRUB SERPL-MCNC: 1 MG/DL (ref 0.2–0.7)
BNP BLD-MCNC: NORMAL PG/ML
BUN SERPL-MCNC: 80 MG/DL (ref 6–20)
BURR CELLS: ABNORMAL
CALCIUM SERPL-MCNC: 9.4 MG/DL (ref 8.5–9.9)
CHLORIDE SERPL-SCNC: 87 MEQ/L (ref 95–107)
CO2 SERPL-SCNC: 31 MEQ/L (ref 20–31)
CREAT SERPL-MCNC: 1.5 MG/DL (ref 0.7–1.2)
EOSINOPHIL # BLD: 0 K/UL (ref 0–0.7)
EOSINOPHIL NFR BLD: 0.1 %
ERYTHROCYTE [DISTWIDTH] IN BLOOD BY AUTOMATED COUNT: 27.1 % (ref 11.5–14.5)
GLOBULIN SER CALC-MCNC: 5.5 G/DL (ref 2.3–3.5)
GLUCOSE BLD-MCNC: 318 MG/DL (ref 70–99)
GLUCOSE BLD-MCNC: 336 MG/DL (ref 70–99)
GLUCOSE BLD-MCNC: 385 MG/DL (ref 70–99)
GLUCOSE BLD-MCNC: 419 MG/DL (ref 70–99)
GLUCOSE SERPL-MCNC: 312 MG/DL (ref 70–99)
HCT VFR BLD AUTO: 35.6 % (ref 42–52)
HGB BLD-MCNC: 10.6 G/DL (ref 14–18)
HYPOCHROMIA BLD QL SMEAR: ABNORMAL
LACTATE BLDV-SCNC: 2.1 MMOL/L (ref 0.5–2.2)
LYMPHOCYTES # BLD: 0.9 K/UL (ref 1–4.8)
LYMPHOCYTES NFR BLD: 9.2 %
MACROCYTES BLD QL SMEAR: ABNORMAL
MCH RBC QN AUTO: 19.7 PG (ref 27–31.3)
MCHC RBC AUTO-ENTMCNC: 29.8 % (ref 33–37)
MCV RBC AUTO: 66.2 FL (ref 79–92.2)
MICROCYTES BLD QL SMEAR: ABNORMAL
MONOCYTES # BLD: 0.5 K/UL (ref 0.2–0.8)
MONOCYTES NFR BLD: 5.1 %
NEUTROPHILS # BLD: 8.4 K/UL (ref 1.4–6.5)
NEUTS SEG NFR BLD: 84.8 %
OVALOCYTES BLD QL SMEAR: ABNORMAL
PERFORMED ON: ABNORMAL
PLATELET # BLD AUTO: 524 K/UL (ref 130–400)
PLATELET BLD QL SMEAR: ABNORMAL
POIKILOCYTOSIS BLD QL SMEAR: ABNORMAL
POTASSIUM SERPL-SCNC: 4.5 MEQ/L (ref 3.4–4.9)
PROT SERPL-MCNC: 9 G/DL (ref 6.3–8)
RBC # BLD AUTO: 5.38 M/UL (ref 4.7–6.1)
SCHISTOCYTES BLD QL SMEAR: ABNORMAL
SLIDE REVIEW: ABNORMAL
SODIUM SERPL-SCNC: 132 MEQ/L (ref 135–144)
WBC # BLD AUTO: 9.9 K/UL (ref 4.8–10.8)

## 2024-04-13 PROCEDURE — 93005 ELECTROCARDIOGRAM TRACING: CPT | Performed by: EMERGENCY MEDICINE

## 2024-04-13 PROCEDURE — 94640 AIRWAY INHALATION TREATMENT: CPT

## 2024-04-13 PROCEDURE — 94761 N-INVAS EAR/PLS OXIMETRY MLT: CPT

## 2024-04-13 PROCEDURE — 83605 ASSAY OF LACTIC ACID: CPT

## 2024-04-13 PROCEDURE — 36415 COLL VENOUS BLD VENIPUNCTURE: CPT

## 2024-04-13 PROCEDURE — 6360000002 HC RX W HCPCS: Performed by: INTERNAL MEDICINE

## 2024-04-13 PROCEDURE — 83880 ASSAY OF NATRIURETIC PEPTIDE: CPT

## 2024-04-13 PROCEDURE — 6370000000 HC RX 637 (ALT 250 FOR IP): Performed by: EMERGENCY MEDICINE

## 2024-04-13 PROCEDURE — 71045 X-RAY EXAM CHEST 1 VIEW: CPT

## 2024-04-13 PROCEDURE — 6370000000 HC RX 637 (ALT 250 FOR IP): Performed by: NURSE PRACTITIONER

## 2024-04-13 PROCEDURE — 2580000003 HC RX 258: Performed by: INTERNAL MEDICINE

## 2024-04-13 PROCEDURE — 80053 COMPREHEN METABOLIC PANEL: CPT

## 2024-04-13 PROCEDURE — 6370000000 HC RX 637 (ALT 250 FOR IP): Performed by: INTERNAL MEDICINE

## 2024-04-13 PROCEDURE — 85025 COMPLETE CBC W/AUTO DIFF WBC: CPT

## 2024-04-13 PROCEDURE — 99285 EMERGENCY DEPT VISIT HI MDM: CPT

## 2024-04-13 PROCEDURE — 2060000000 HC ICU INTERMEDIATE R&B

## 2024-04-13 RX ORDER — ONDANSETRON 2 MG/ML
4 INJECTION INTRAMUSCULAR; INTRAVENOUS EVERY 6 HOURS PRN
Status: DISCONTINUED | OUTPATIENT
Start: 2024-04-13 | End: 2024-04-17 | Stop reason: HOSPADM

## 2024-04-13 RX ORDER — ENOXAPARIN SODIUM 100 MG/ML
40 INJECTION SUBCUTANEOUS DAILY
Status: DISCONTINUED | OUTPATIENT
Start: 2024-04-13 | End: 2024-04-17 | Stop reason: HOSPADM

## 2024-04-13 RX ORDER — INSULIN LISPRO 100 [IU]/ML
0-8 INJECTION, SOLUTION INTRAVENOUS; SUBCUTANEOUS
Status: DISCONTINUED | OUTPATIENT
Start: 2024-04-13 | End: 2024-04-17 | Stop reason: HOSPADM

## 2024-04-13 RX ORDER — DEXTROSE MONOHYDRATE 100 MG/ML
INJECTION, SOLUTION INTRAVENOUS CONTINUOUS PRN
Status: DISCONTINUED | OUTPATIENT
Start: 2024-04-13 | End: 2024-04-17 | Stop reason: HOSPADM

## 2024-04-13 RX ORDER — POTASSIUM CHLORIDE 20 MEQ/1
40 TABLET, EXTENDED RELEASE ORAL PRN
Status: DISCONTINUED | OUTPATIENT
Start: 2024-04-13 | End: 2024-04-17 | Stop reason: HOSPADM

## 2024-04-13 RX ORDER — INSULIN LISPRO 100 [IU]/ML
0-4 INJECTION, SOLUTION INTRAVENOUS; SUBCUTANEOUS NIGHTLY
Status: DISCONTINUED | OUTPATIENT
Start: 2024-04-13 | End: 2024-04-17 | Stop reason: HOSPADM

## 2024-04-13 RX ORDER — IPRATROPIUM BROMIDE AND ALBUTEROL SULFATE 2.5; .5 MG/3ML; MG/3ML
1 SOLUTION RESPIRATORY (INHALATION) EVERY 4 HOURS PRN
Status: DISCONTINUED | OUTPATIENT
Start: 2024-04-13 | End: 2024-04-17 | Stop reason: HOSPADM

## 2024-04-13 RX ORDER — ACETAMINOPHEN 650 MG/1
650 SUPPOSITORY RECTAL EVERY 6 HOURS PRN
Status: DISCONTINUED | OUTPATIENT
Start: 2024-04-13 | End: 2024-04-17 | Stop reason: HOSPADM

## 2024-04-13 RX ORDER — SODIUM CHLORIDE 0.9 % (FLUSH) 0.9 %
5-40 SYRINGE (ML) INJECTION EVERY 12 HOURS SCHEDULED
Status: DISCONTINUED | OUTPATIENT
Start: 2024-04-13 | End: 2024-04-17 | Stop reason: HOSPADM

## 2024-04-13 RX ORDER — POTASSIUM CHLORIDE 7.45 MG/ML
10 INJECTION INTRAVENOUS PRN
Status: DISCONTINUED | OUTPATIENT
Start: 2024-04-13 | End: 2024-04-17 | Stop reason: HOSPADM

## 2024-04-13 RX ORDER — GLUCAGON 1 MG/ML
1 KIT INJECTION PRN
Status: DISCONTINUED | OUTPATIENT
Start: 2024-04-13 | End: 2024-04-17 | Stop reason: HOSPADM

## 2024-04-13 RX ORDER — IPRATROPIUM BROMIDE AND ALBUTEROL SULFATE 2.5; .5 MG/3ML; MG/3ML
1 SOLUTION RESPIRATORY (INHALATION) CONTINUOUS PRN
Status: DISCONTINUED | OUTPATIENT
Start: 2024-04-13 | End: 2024-04-13

## 2024-04-13 RX ORDER — FUROSEMIDE 10 MG/ML
20 INJECTION INTRAMUSCULAR; INTRAVENOUS 2 TIMES DAILY
Status: DISCONTINUED | OUTPATIENT
Start: 2024-04-13 | End: 2024-04-15

## 2024-04-13 RX ORDER — POLYETHYLENE GLYCOL 3350 17 G/17G
17 POWDER, FOR SOLUTION ORAL DAILY PRN
Status: DISCONTINUED | OUTPATIENT
Start: 2024-04-13 | End: 2024-04-17 | Stop reason: HOSPADM

## 2024-04-13 RX ORDER — SODIUM CHLORIDE 9 MG/ML
INJECTION, SOLUTION INTRAVENOUS PRN
Status: DISCONTINUED | OUTPATIENT
Start: 2024-04-13 | End: 2024-04-17 | Stop reason: HOSPADM

## 2024-04-13 RX ORDER — ONDANSETRON 4 MG/1
4 TABLET, ORALLY DISINTEGRATING ORAL EVERY 8 HOURS PRN
Status: DISCONTINUED | OUTPATIENT
Start: 2024-04-13 | End: 2024-04-17 | Stop reason: HOSPADM

## 2024-04-13 RX ORDER — INSULIN GLARGINE 100 [IU]/ML
15 INJECTION, SOLUTION SUBCUTANEOUS NIGHTLY
Status: DISCONTINUED | OUTPATIENT
Start: 2024-04-13 | End: 2024-04-14

## 2024-04-13 RX ORDER — SODIUM CHLORIDE 0.9 % (FLUSH) 0.9 %
5-40 SYRINGE (ML) INJECTION PRN
Status: DISCONTINUED | OUTPATIENT
Start: 2024-04-13 | End: 2024-04-17 | Stop reason: HOSPADM

## 2024-04-13 RX ORDER — INSULIN GLARGINE 100 [IU]/ML
20 INJECTION, SOLUTION SUBCUTANEOUS NIGHTLY
Status: DISCONTINUED | OUTPATIENT
Start: 2024-04-13 | End: 2024-04-13

## 2024-04-13 RX ORDER — ACETAMINOPHEN 325 MG/1
650 TABLET ORAL EVERY 6 HOURS PRN
Status: DISCONTINUED | OUTPATIENT
Start: 2024-04-13 | End: 2024-04-17 | Stop reason: HOSPADM

## 2024-04-13 RX ORDER — MAGNESIUM SULFATE IN WATER 40 MG/ML
2000 INJECTION, SOLUTION INTRAVENOUS PRN
Status: DISCONTINUED | OUTPATIENT
Start: 2024-04-13 | End: 2024-04-17 | Stop reason: HOSPADM

## 2024-04-13 RX ADMIN — SODIUM CHLORIDE, PRESERVATIVE FREE 10 ML: 5 INJECTION INTRAVENOUS at 20:35

## 2024-04-13 RX ADMIN — IPRATROPIUM BROMIDE AND ALBUTEROL SULFATE 1 DOSE: 2.5; .5 SOLUTION RESPIRATORY (INHALATION) at 10:47

## 2024-04-13 RX ADMIN — INSULIN GLARGINE 15 UNITS: 100 INJECTION, SOLUTION SUBCUTANEOUS at 21:13

## 2024-04-13 RX ADMIN — ACETAMINOPHEN 650 MG: 325 TABLET ORAL at 20:31

## 2024-04-13 RX ADMIN — ENOXAPARIN SODIUM 40 MG: 100 INJECTION SUBCUTANEOUS at 17:25

## 2024-04-13 RX ADMIN — IPRATROPIUM BROMIDE AND ALBUTEROL SULFATE 1 DOSE: 2.5; .5 SOLUTION RESPIRATORY (INHALATION) at 10:44

## 2024-04-13 RX ADMIN — INSULIN LISPRO 8 UNITS: 100 INJECTION, SOLUTION INTRAVENOUS; SUBCUTANEOUS at 16:33

## 2024-04-13 RX ADMIN — IPRATROPIUM BROMIDE AND ALBUTEROL SULFATE 1 DOSE: 2.5; .5 SOLUTION RESPIRATORY (INHALATION) at 21:42

## 2024-04-13 RX ADMIN — FUROSEMIDE 20 MG: 10 INJECTION, SOLUTION INTRAMUSCULAR; INTRAVENOUS at 17:24

## 2024-04-13 ASSESSMENT — PAIN - FUNCTIONAL ASSESSMENT: PAIN_FUNCTIONAL_ASSESSMENT: NONE - DENIES PAIN

## 2024-04-13 ASSESSMENT — PAIN DESCRIPTION - LOCATION: LOCATION: LEG

## 2024-04-13 ASSESSMENT — LIFESTYLE VARIABLES
HOW MANY STANDARD DRINKS CONTAINING ALCOHOL DO YOU HAVE ON A TYPICAL DAY: PATIENT DOES NOT DRINK
HOW OFTEN DO YOU HAVE A DRINK CONTAINING ALCOHOL: NEVER

## 2024-04-13 ASSESSMENT — PAIN SCALES - GENERAL: PAINLEVEL_OUTOF10: 10

## 2024-04-13 ASSESSMENT — PAIN DESCRIPTION - ORIENTATION: ORIENTATION: RIGHT;LEFT

## 2024-04-13 ASSESSMENT — PAIN DESCRIPTION - DESCRIPTORS: DESCRIPTORS: ACHING;DISCOMFORT

## 2024-04-13 NOTE — H&P
lungs concerning for developing pulmonary edema or fluid overload.           Assessment and Plan   Acute on chronic systolic heart failure: IV diuresis.  Monitor renal function.  Underlying CKD noted.  DM: monitor glucose accordion, titrate meds as needed  Liver cirrhosis  Longstanding history of noncompliance noted, no plan for LifeVest as detailed in previous cardiology note  DVT ppx  Disposition: Dependent on hospital course. Will discharge once medically stable. SW on board for discharge planning.     Patient Active Problem List   Diagnosis Code    Hyperthyroidism E05.90    Encephalopathy G93.40    Hypoglycemia E16.2    HOWARD (acute kidney injury) (HCC) N17.9    Type 2 diabetes mellitus with hyperglycemia, with long-term current use of insulin (HCC) E11.65, Z79.4    Thyroid nodule E04.1    Acute on chronic systolic CHF (congestive heart failure) ([facility]) I50.23       LIT FRANCO MD, MD

## 2024-04-13 NOTE — ED NOTES
The following labs were labeled with appropriate pt sticker and tubed to lab:     [x] Blue (left at bedside)     [x] Lavender   [x] 1 on ice and 1 not on ice  [x] Green/yellow  [x] Black (left at bedside)   [x] Carlson  [x] on ice  [] Yellow  [x] Red (left at bedside)  [x] Type/ Screen (left at bedside)  [] ABG  [] VBG    [] COVID-19 swab    [] Rapid  [] PCR  [] Flu swab  [] Peds Viral Panel     [] Urine Sample  [] Fecal Sample  [] Pelvic Cultures  [] Blood Cultures  [] X 2  [] STREP Cultures  [] Wound Cultures

## 2024-04-13 NOTE — ED NOTES
Transport here. Tele pack intact. No acute distress noted at this time. Belongings with him on bed.

## 2024-04-13 NOTE — ED PROVIDER NOTES
abdominal ascites has not recurred since the paracentesis last week.  Persistent elevated BUN with improving creatinine.  Based on the patient's clinical dyspnea and tachypnea and worsening respiratory condition will require admission and evaluation for possible pleurocentesis    CRITICAL CARE TIME   Total Critical Care time was 36 minutes, excluding separately reportableprocedures.  There was a high probability of clinicallysignificant/life threatening deterioration in the patient's condition which required my urgent intervention.      CONSULTS:  None    PROCEDURES:  Unless otherwise noted below, none     Procedures    FINAL IMPRESSION      1. Recurrent right pleural effusion    2. Systolic congestive heart failure, unspecified HF chronicity (HCC)    3. Dyspnea, unspecified type          DISPOSITION/PLAN   DISPOSITION Decision To Admit 04/13/2024 11:57:02 AM      PATIENT REFERRED TO:  No follow-up provider specified.    DISCHARGE MEDICATIONS:  New Prescriptions    No medications on file          (Please note that portions of this note were completed with a voice recognition program.  Efforts were made to edit the dictations but occasionally words are mis-transcribed.)    Dakota Taylor MD (electronically signed)  Attending Emergency Physician         Dakota Taylor MD  04/13/24 [id number]

## 2024-04-13 NOTE — CARE COORDINATION
Case Management Assessment  Initial Evaluation    Date/Time of Evaluation: 4/13/2024 1:26 PM  Assessment Completed by: Arleen Kelley, RN, BSN    If patient is discharged prior to next notation, then this note serves as note for discharge by case management.    Patient Name: Tomas Salazar                   YOB: 1970  Diagnosis: Recurrent right pleural effusion [J90]  Acute on chronic systolic CHF (congestive heart failure) (HCC) [I50.23]  Dyspnea, unspecified type [R06.00]  Systolic congestive heart failure, unspecified HF chronicity (HCC) [I50.20]                   Date / Time: 4/13/2024 10:30 AM    Patient Admission Status: Inpatient   Readmission Risk (Low < 19, Mod (19-27), High > 27): Readmission Risk Score: 19.6    Current PCP: Kylah Brandt, DO  PCP verified by CM?      Chart Reviewed: Yes      History Provided by: Medical Record, Other (see comment) (SPOKE WITH SNF'S)  Patient Orientation: Alert and Oriented, Person, Place, Situation, Self    Patient Cognition: Alert    Hospitalization in the last 30 days (Readmission):  Yes    If yes, Readmission Assessment in CM Navigator will be completed.    Readmission Assessment  Number of Days since last admission?: 1-7 days  Previous Disposition: SNF  Who is being Interviewed: Unable to Complete (obtained info from callin SNF's and EMR)  What was the patient's/caregiver's perception as to why they think they needed to return back to the hospital?: Other (Comment) (PT NON COMPLIANT AT SNF & THREATENING TO LEAVE AMA PT REQUIRING 4L--02 & HAS NO W/C SNF FELT OUT OF GOOD CONSCIOUS COULDN'T LET HIM LEAVE & SENT HERE)  Did you visit your Primary Care Physician after you left the hospital, before you returned this time?: No  Why weren't you able to visit your PCP?: Other (Comment) (SEE ABOVE)  Did you see a specialist, such as Cardiac, Pulmonary, Orthopedic Physician, etc. after you left the hospital?: No  Who advised the patient to return to the

## 2024-04-13 NOTE — ED NOTES
Verified patient name, date of birth, with the patient ID band with the patient and the monitor room tech on the phone. Verified rythm and rate with monitor tech.

## 2024-04-13 NOTE — CARE COORDINATION
Met with pt at the bedside he was quite agitated and claimed he's not going back to any nursing home refusing to answer any questions. Pt A&O x 4 and states he doesn't want me to call his emergency contact (his brother).    I called [facility] spoke with Rufina who states pt transferred to [facility] yesterday per \"his request.\"    I called [facility] spoke with Ashley who reports he had been threatening to leave AMA , non-compliant w treatments all nite.He is requiring 4L-- 02 and doesn't have a w/c so \"out of good conscious sent him\" to ER.    Electronically signed by Arleen Kelley RN, BSN on 4/13/2024 at 1:15 PM

## 2024-04-13 NOTE — ACP (ADVANCE CARE PLANNING)
Advance Care Planning   Healthcare Decision Maker:    Primary Decision Maker: EV TERAN - Brother/Sister - [phone]    Click here to complete Healthcare Decision Makers including selection of the Healthcare Decision Maker Relationship (ie \"Primary\").  Today we referred to ACP Clinical Specialist for assistance.       Electronically signed by Arleen Kelley RN, BSN on 4/13/2024 at 1:31 PM

## 2024-04-14 LAB
ALBUMIN SERPL-MCNC: 3.3 G/DL (ref 3.5–4.6)
ALP SERPL-CCNC: 347 U/L (ref 35–104)
ALT SERPL-CCNC: 24 U/L (ref 0–41)
ANION GAP SERPL CALCULATED.3IONS-SCNC: 15 MEQ/L (ref 9–15)
AST SERPL-CCNC: 26 U/L (ref 0–40)
BILIRUB SERPL-MCNC: 0.8 MG/DL (ref 0.2–0.7)
BUN SERPL-MCNC: 86 MG/DL (ref 6–20)
CALCIUM SERPL-MCNC: 8.8 MG/DL (ref 8.5–9.9)
CHLORIDE SERPL-SCNC: 89 MEQ/L (ref 95–107)
CO2 SERPL-SCNC: 30 MEQ/L (ref 20–31)
CREAT SERPL-MCNC: 1.68 MG/DL (ref 0.7–1.2)
GLOBULIN SER CALC-MCNC: 4.8 G/DL (ref 2.3–3.5)
GLUCOSE BLD-MCNC: 341 MG/DL (ref 70–99)
GLUCOSE BLD-MCNC: 343 MG/DL (ref 70–99)
GLUCOSE BLD-MCNC: 67 MG/DL (ref 70–99)
GLUCOSE BLD-MCNC: 86 MG/DL (ref 70–99)
GLUCOSE BLD-MCNC: 87 MG/DL (ref 70–99)
GLUCOSE SERPL-MCNC: 270 MG/DL (ref 70–99)
MAGNESIUM SERPL-MCNC: 1.9 MG/DL (ref 1.7–2.4)
PERFORMED ON: ABNORMAL
PERFORMED ON: NORMAL
PERFORMED ON: NORMAL
POTASSIUM SERPL-SCNC: 3.5 MEQ/L (ref 3.4–4.9)
PROT SERPL-MCNC: 8.1 G/DL (ref 6.3–8)
SODIUM SERPL-SCNC: 134 MEQ/L (ref 135–144)

## 2024-04-14 PROCEDURE — 2580000003 HC RX 258: Performed by: INTERNAL MEDICINE

## 2024-04-14 PROCEDURE — 80053 COMPREHEN METABOLIC PANEL: CPT

## 2024-04-14 PROCEDURE — 2060000000 HC ICU INTERMEDIATE R&B

## 2024-04-14 PROCEDURE — 36415 COLL VENOUS BLD VENIPUNCTURE: CPT

## 2024-04-14 PROCEDURE — 6370000000 HC RX 637 (ALT 250 FOR IP): Performed by: NURSE PRACTITIONER

## 2024-04-14 PROCEDURE — 6370000000 HC RX 637 (ALT 250 FOR IP): Performed by: INTERNAL MEDICINE

## 2024-04-14 PROCEDURE — 94640 AIRWAY INHALATION TREATMENT: CPT

## 2024-04-14 PROCEDURE — 6360000002 HC RX W HCPCS: Performed by: INTERNAL MEDICINE

## 2024-04-14 PROCEDURE — 94760 N-INVAS EAR/PLS OXIMETRY 1: CPT

## 2024-04-14 PROCEDURE — 2700000000 HC OXYGEN THERAPY PER DAY

## 2024-04-14 PROCEDURE — 94761 N-INVAS EAR/PLS OXIMETRY MLT: CPT

## 2024-04-14 PROCEDURE — 83735 ASSAY OF MAGNESIUM: CPT

## 2024-04-14 RX ORDER — INSULIN GLARGINE 100 [IU]/ML
30 INJECTION, SOLUTION SUBCUTANEOUS NIGHTLY
Status: DISCONTINUED | OUTPATIENT
Start: 2024-04-14 | End: 2024-04-17 | Stop reason: HOSPADM

## 2024-04-14 RX ORDER — HYDROXYZINE HYDROCHLORIDE 25 MG/1
25 TABLET, FILM COATED ORAL 3 TIMES DAILY PRN
Status: DISCONTINUED | OUTPATIENT
Start: 2024-04-14 | End: 2024-04-17 | Stop reason: HOSPADM

## 2024-04-14 RX ORDER — INSULIN LISPRO 100 [IU]/ML
8 INJECTION, SOLUTION INTRAVENOUS; SUBCUTANEOUS
Status: DISCONTINUED | OUTPATIENT
Start: 2024-04-14 | End: 2024-04-17 | Stop reason: HOSPADM

## 2024-04-14 RX ADMIN — INSULIN LISPRO 6 UNITS: 100 INJECTION, SOLUTION INTRAVENOUS; SUBCUTANEOUS at 10:54

## 2024-04-14 RX ADMIN — FUROSEMIDE 20 MG: 10 INJECTION, SOLUTION INTRAMUSCULAR; INTRAVENOUS at 09:32

## 2024-04-14 RX ADMIN — IPRATROPIUM BROMIDE AND ALBUTEROL SULFATE 1 DOSE: 2.5; .5 SOLUTION RESPIRATORY (INHALATION) at 03:13

## 2024-04-14 RX ADMIN — INSULIN GLARGINE 30 UNITS: 100 INJECTION, SOLUTION SUBCUTANEOUS at 10:55

## 2024-04-14 RX ADMIN — INSULIN LISPRO 6 UNITS: 100 INJECTION, SOLUTION INTRAVENOUS; SUBCUTANEOUS at 09:33

## 2024-04-14 RX ADMIN — FUROSEMIDE 20 MG: 10 INJECTION, SOLUTION INTRAMUSCULAR; INTRAVENOUS at 18:02

## 2024-04-14 RX ADMIN — HYDROXYZINE HYDROCHLORIDE 25 MG: 25 TABLET, FILM COATED ORAL at 20:36

## 2024-04-14 RX ADMIN — ACETAMINOPHEN 650 MG: 325 TABLET ORAL at 20:25

## 2024-04-14 RX ADMIN — IPRATROPIUM BROMIDE AND ALBUTEROL SULFATE 1 DOSE: 2.5; .5 SOLUTION RESPIRATORY (INHALATION) at 23:58

## 2024-04-14 RX ADMIN — SODIUM CHLORIDE, PRESERVATIVE FREE 10 ML: 5 INJECTION INTRAVENOUS at 09:34

## 2024-04-14 RX ADMIN — SODIUM CHLORIDE, PRESERVATIVE FREE 5 ML: 5 INJECTION INTRAVENOUS at 20:26

## 2024-04-14 RX ADMIN — INSULIN LISPRO 8 UNITS: 100 INJECTION, SOLUTION INTRAVENOUS; SUBCUTANEOUS at 10:55

## 2024-04-14 RX ADMIN — ENOXAPARIN SODIUM 40 MG: 100 INJECTION SUBCUTANEOUS at 09:33

## 2024-04-14 ASSESSMENT — PAIN SCALES - GENERAL
PAINLEVEL_OUTOF10: 0
PAINLEVEL_OUTOF10: 3

## 2024-04-14 ASSESSMENT — PAIN DESCRIPTION - ORIENTATION: ORIENTATION: RIGHT;LEFT

## 2024-04-14 ASSESSMENT — PAIN DESCRIPTION - LOCATION: LOCATION: KNEE;LEG

## 2024-04-14 ASSESSMENT — PAIN DESCRIPTION - DESCRIPTORS: DESCRIPTORS: ACHING

## 2024-04-14 NOTE — CONSULTS
Spiritual Care Services     Summary of Visit:   visited patient for an advanced directive consult.  gave the patient and and an advanced directive packet.  explained the purpose of advanced directives. Patient expressed understanding and did not have any questions.  then explained how to complete the forms.    Encounter Summary  Encounter Overview/Reason : Advance Care Planning  Service Provided For:: Patient  Referral/Consult From:: Rounding  Support System: Family members  Complexity of Encounter: Moderate  Begin Time: 0945  End Time : 1015  Total Time Calculated: 30 min                          Advance Care Planning  Type: ACP conversation    Spiritual Assessment/Intervention/Outcomes:                     Care Plan:         Assist with advanced directives as requested      Spiritual Care Services   Electronically signed by Chaplain Page on 4/14/2024 at 11:23 AM.    To reach a  for emotional and spiritual support, place an EPIC consult request.   If a  is needed immediately, dial “0” and ask to page the on-call .

## 2024-04-14 NOTE — CONSULTS
[facility] Nephrology  Consult Note           Reason for Consult:  ckd stage 3, fluid overload  Requesting Physician:  Dr. Gaspar    Chief Complaint: Shortness of breath  History Obtained From:  patient, electronic medical record    History of Present Ilness:    53 y.o. year old male admitted with shortness of breath.  Was recently admitted here for encephalopathy.  Patient was followed by [facility] nephrology and has been seen by us as well.  On recent admission was admitted for hyperkalemia but at that time he was on 60 mill equivalents of potassium daily torsemide as well as as needed ibuprofen.  Was sent out without any potassium or NSAIDs and sent out on torsemide 50 mg daily.  He actually says his swelling is significantly better.  His weight is also down.  His weight recently on admission was 75.4 kg and is now about 68 kg.  He feels the shortness of breath is mostly COPD.  His GFR is stable/slightly better than his baseline    Past Medical History:    No past medical history on file.    Past Surgical History:        Procedure Laterality Date    PARACENTESIS Left 04/03/2024    3215 ml removed by Dr. Sims - diagnostics sent       Home Medications:    No current facility-administered medications on file prior to encounter.     Current Outpatient Medications on File Prior to Encounter   Medication Sig Dispense Refill    insulin glargine (LANTUS SOLOSTAR) 100 UNIT/ML injection pen Inject 15 Units into the skin nightly 5 Adjustable Dose Pre-filled Pen Syringe 3    insulin lispro, 1 Unit Dial, (HUMALOG KWIKPEN) 100 UNIT/ML SOPN Humalog, inject 3 times daily before meals- 120-140 2 units, 141-160 3 units, 161-180 4 units, 181-200 5 units, 201-220 6 units, 221-240 7 units, 241-260 8 units, 261-280 9 units, 281-300 10 units, 301 or higher 11 units. 5 Adjustable Dose Pre-filled Pen Syringe 3    Insulin Pen Needle 32G X 6 MM MISC 1 Device by Does not apply route 4 times daily (before meals and nightly) 150 each 3

## 2024-04-14 NOTE — RT PROTOCOL NOTE
RT Inhaler-Nebulizer Bronchodilator Protocol Note    There is a bronchodilator order in the chart from a provider indicating to follow the RT Bronchodilator Protocol and there is an “Initiate RT Inhaler-Nebulizer Bronchodilator Protocol” order as well (see protocol at bottom of note).    CXR Findings:  XR CHEST PORTABLE    Result Date: 4/13/2024  1. Large right pleural effusion, unchanged. 2. Increased interstitial prominence within lungs concerning for developing pulmonary edema or fluid overload.       The findings from the last RT Protocol Assessment were as follows:   History Pulmonary Disease: Smoker 15 pack years or more  Respiratory Pattern: Regular pattern and RR 12-20 bpm  Breath Sounds: Slightly diminished and/or crackles  Cough: Strong, spontaneous, non-productive  Indication for Bronchodilator Therapy: Decreased or absent breath sounds  Bronchodilator Assessment Score: 3    Aerosolized bronchodilator medication orders have been revised according to the RT Inhaler-Nebulizer Bronchodilator Protocol below.    Respiratory Therapist to perform RT Therapy Protocol Assessment initially then follow the protocol.  Repeat RT Therapy Protocol Assessment PRN for score 0-3 or on second treatment, BID, and PRN for scores above 3.    No Indications - adjust the frequency to every 6 hours PRN wheezing or bronchospasm, if no treatments needed after 48 hours then discontinue using Per Protocol order mode.     If indication present, adjust the RT bronchodilator orders based on the Bronchodilator Assessment Score as indicated below.  Use Inhaler orders unless patient has one or more of the following: on home nebulizer, not able to hold breath for 10 seconds, is not alert and oriented, cannot activate and use MDI correctly, or respiratory rate 25 breaths per minute or more, then use the equivalent nebulizer order(s) with same Frequency and PRN reasons based on the score.  If a patient is on this medication at home then do

## 2024-04-15 LAB
ALBUMIN SERPL-MCNC: 3.2 G/DL (ref 3.5–4.6)
ALP SERPL-CCNC: 306 U/L (ref 35–104)
ALT SERPL-CCNC: 21 U/L (ref 0–41)
ANION GAP SERPL CALCULATED.3IONS-SCNC: 14 MEQ/L (ref 9–15)
AST SERPL-CCNC: 26 U/L (ref 0–40)
BILIRUB SERPL-MCNC: 0.7 MG/DL (ref 0.2–0.7)
BNP BLD-MCNC: NORMAL PG/ML
BUN SERPL-MCNC: 86 MG/DL (ref 6–20)
CALCIUM SERPL-MCNC: 8.8 MG/DL (ref 8.5–9.9)
CHLORIDE SERPL-SCNC: 92 MEQ/L (ref 95–107)
CO2 SERPL-SCNC: 29 MEQ/L (ref 20–31)
CREAT SERPL-MCNC: 1.61 MG/DL (ref 0.7–1.2)
EKG ATRIAL RATE: 108 BPM
EKG P AXIS: 67 DEGREES
EKG P-R INTERVAL: 152 MS
EKG Q-T INTERVAL: 398 MS
EKG QRS DURATION: 134 MS
EKG QTC CALCULATION (BAZETT): 533 MS
EKG R AXIS: -21 DEGREES
EKG T AXIS: 32 DEGREES
EKG VENTRICULAR RATE: 108 BPM
GLOBULIN SER CALC-MCNC: 4.8 G/DL (ref 2.3–3.5)
GLUCOSE BLD-MCNC: 121 MG/DL (ref 70–99)
GLUCOSE BLD-MCNC: 136 MG/DL (ref 70–99)
GLUCOSE BLD-MCNC: 183 MG/DL (ref 70–99)
GLUCOSE BLD-MCNC: 194 MG/DL (ref 70–99)
GLUCOSE BLD-MCNC: 67 MG/DL (ref 70–99)
GLUCOSE BLD-MCNC: 86 MG/DL (ref 70–99)
GLUCOSE SERPL-MCNC: 117 MG/DL (ref 70–99)
PERFORMED ON: ABNORMAL
PERFORMED ON: NORMAL
POTASSIUM SERPL-SCNC: 3.8 MEQ/L (ref 3.4–4.9)
PROT SERPL-MCNC: 8 G/DL (ref 6.3–8)
SODIUM SERPL-SCNC: 135 MEQ/L (ref 135–144)
T3 FREE: 2.9 PG/ML (ref 2–4.4)
T4 FREE SERPL-MCNC: 0.71 NG/DL (ref 0.84–1.68)

## 2024-04-15 PROCEDURE — 36415 COLL VENOUS BLD VENIPUNCTURE: CPT

## 2024-04-15 PROCEDURE — 93010 ELECTROCARDIOGRAM REPORT: CPT | Performed by: INTERNAL MEDICINE

## 2024-04-15 PROCEDURE — 2060000000 HC ICU INTERMEDIATE R&B

## 2024-04-15 PROCEDURE — 6370000000 HC RX 637 (ALT 250 FOR IP): Performed by: INTERNAL MEDICINE

## 2024-04-15 PROCEDURE — 2580000003 HC RX 258: Performed by: INTERNAL MEDICINE

## 2024-04-15 PROCEDURE — 84439 ASSAY OF FREE THYROXINE: CPT

## 2024-04-15 PROCEDURE — 94760 N-INVAS EAR/PLS OXIMETRY 1: CPT

## 2024-04-15 PROCEDURE — 6360000002 HC RX W HCPCS: Performed by: INTERNAL MEDICINE

## 2024-04-15 PROCEDURE — 2700000000 HC OXYGEN THERAPY PER DAY

## 2024-04-15 PROCEDURE — 51798 US URINE CAPACITY MEASURE: CPT

## 2024-04-15 PROCEDURE — 80053 COMPREHEN METABOLIC PANEL: CPT

## 2024-04-15 PROCEDURE — 94640 AIRWAY INHALATION TREATMENT: CPT

## 2024-04-15 PROCEDURE — 94761 N-INVAS EAR/PLS OXIMETRY MLT: CPT

## 2024-04-15 PROCEDURE — 84481 FREE ASSAY (FT-3): CPT

## 2024-04-15 PROCEDURE — 83880 ASSAY OF NATRIURETIC PEPTIDE: CPT

## 2024-04-15 PROCEDURE — 97161 PT EVAL LOW COMPLEX 20 MIN: CPT

## 2024-04-15 RX ORDER — FUROSEMIDE 10 MG/ML
40 INJECTION INTRAMUSCULAR; INTRAVENOUS 2 TIMES DAILY
Status: DISCONTINUED | OUTPATIENT
Start: 2024-04-15 | End: 2024-04-16

## 2024-04-15 RX ORDER — FUROSEMIDE 10 MG/ML
40 INJECTION INTRAMUSCULAR; INTRAVENOUS 2 TIMES DAILY
Status: DISCONTINUED | OUTPATIENT
Start: 2024-04-15 | End: 2024-04-15

## 2024-04-15 RX ORDER — IPRATROPIUM BROMIDE AND ALBUTEROL SULFATE 2.5; .5 MG/3ML; MG/3ML
1 SOLUTION RESPIRATORY (INHALATION) 3 TIMES DAILY
Status: DISCONTINUED | OUTPATIENT
Start: 2024-04-15 | End: 2024-04-17 | Stop reason: HOSPADM

## 2024-04-15 RX ADMIN — Medication 16 G: at 01:41

## 2024-04-15 RX ADMIN — IPRATROPIUM BROMIDE AND ALBUTEROL SULFATE 1 DOSE: 2.5; .5 SOLUTION RESPIRATORY (INHALATION) at 14:45

## 2024-04-15 RX ADMIN — FUROSEMIDE 40 MG: 10 INJECTION, SOLUTION INTRAMUSCULAR; INTRAVENOUS at 16:46

## 2024-04-15 RX ADMIN — ACETAMINOPHEN 650 MG: 325 TABLET ORAL at 04:14

## 2024-04-15 RX ADMIN — ENOXAPARIN SODIUM 40 MG: 100 INJECTION SUBCUTANEOUS at 10:11

## 2024-04-15 RX ADMIN — IPRATROPIUM BROMIDE AND ALBUTEROL SULFATE 1 DOSE: 2.5; .5 SOLUTION RESPIRATORY (INHALATION) at 19:26

## 2024-04-15 RX ADMIN — IPRATROPIUM BROMIDE AND ALBUTEROL SULFATE 1 DOSE: 2.5; .5 SOLUTION RESPIRATORY (INHALATION) at 09:41

## 2024-04-15 RX ADMIN — SODIUM CHLORIDE, PRESERVATIVE FREE 10 ML: 5 INJECTION INTRAVENOUS at 10:11

## 2024-04-15 ASSESSMENT — PAIN DESCRIPTION - LOCATION: LOCATION: BACK;CHEST

## 2024-04-15 ASSESSMENT — PAIN DESCRIPTION - DESCRIPTORS: DESCRIPTORS: ACHING

## 2024-04-15 ASSESSMENT — PAIN SCALES - GENERAL: PAINLEVEL_OUTOF10: 3

## 2024-04-15 NOTE — PLAN OF CARE
Problem: Discharge Planning  Goal: Discharge to home or other facility with appropriate resources  4/15/2024 1142 by Amna Hewitt RN  Outcome: Progressing  4/15/2024 0039 by Li Cardozo RN  Outcome: Progressing     Problem: Safety - Adult  Goal: Free from fall injury  4/15/2024 1142 by Amna Hewitt RN  Outcome: Progressing  Flowsheets (Taken 4/15/2024 1141)  Free From Fall Injury: Instruct family/caregiver on patient safety  4/15/2024 0039 by Li Cardozo RN  Outcome: Progressing     Problem: ABCDS Injury Assessment  Goal: Absence of physical injury  4/15/2024 1142 by Amna Hewitt RN  Outcome: Progressing  Flowsheets (Taken 4/15/2024 1141)  Absence of Physical Injury: Implement safety measures based on patient assessment  4/15/2024 0039 by Li Cardozo RN  Outcome: Progressing     Problem: Skin/Tissue Integrity  Goal: Absence of new skin breakdown  Description: 1.  Monitor for areas of redness and/or skin breakdown  2.  Assess vascular access sites hourly  3.  Every 4-6 hours minimum:  Change oxygen saturation probe site  4.  Every 4-6 hours:  If on nasal continuous positive airway pressure, respiratory therapy assess nares and determine need for appliance change or resting period.  4/15/2024 1142 by Amna Hewitt RN  Outcome: Progressing  4/15/2024 0039 by Li Cardozo RN  Outcome: Progressing     Problem: Pain  Goal: Verbalizes/displays adequate comfort level or baseline comfort level  4/15/2024 1142 by Amna Hewitt RN  Outcome: Progressing  4/15/2024 0039 by Li Cardozo RN  Outcome: Progressing     Problem: Chronic Conditions and Co-morbidities  Goal: Patient's chronic conditions and co-morbidity symptoms are monitored and maintained or improved  Outcome: Progressing

## 2024-04-15 NOTE — PLAN OF CARE
Nutrition Problem #1: Increased nutrient needs  Intervention: Food and/or Nutrient Delivery: Continue Current Diet  Nutritional

## 2024-04-15 NOTE — WOUND CARE
No    Referrals:  []   [] Home Health Care  [] Supplies  [] Other    Patient/Caregiver Teaching:  Level of patient/caregiver understanding able to:   [] Indicates understanding       [] Needs reinforcement  [] Unsuccessful      [x] Verbal Understanding  [] Demonstrated understanding       [] No evidence of learning  [] Refused teaching         [] N/A       Electronically signed by  Brenda Loyd Rn, covering for wound and ostomy.

## 2024-04-15 NOTE — FLOWSHEET NOTE
2023- patients glucose 86- given sandwich and 3 orange juice.     0129- patients glucose 67- given ramon crackers and 2 juices. Also given 4 glucose chew tabs per MAR.     0230- glucose recheck is 183.

## 2024-04-15 NOTE — CARE COORDINATION
LSW met with patient to discuss his discharge plan. Patient was admitted from [facility] but stated he does not want to return. He stated he would like to return to his apartment when discharged. No needs reported. Patient was seen by therapy today and was not put on program due to being at his baseline.

## 2024-04-16 ENCOUNTER — APPOINTMENT (OUTPATIENT)
Dept: GENERAL RADIOLOGY | Age: 54
DRG: 194 | End: 2024-04-16
Payer: MEDICAID

## 2024-04-16 ENCOUNTER — HOSPITAL ENCOUNTER (INPATIENT)
Dept: INTERVENTIONAL RADIOLOGY/VASCULAR | Age: 54
Discharge: HOME | DRG: 194 | End: 2024-04-18
Payer: MEDICAID

## 2024-04-16 VITALS
HEART RATE: 106 BPM | DIASTOLIC BLOOD PRESSURE: 57 MMHG | RESPIRATION RATE: 16 BRPM | SYSTOLIC BLOOD PRESSURE: 103 MMHG | OXYGEN SATURATION: 100 %

## 2024-04-16 LAB
ANION GAP SERPL CALCULATED.3IONS-SCNC: 13 MEQ/L (ref 9–15)
APPEARANCE FLUID: NORMAL
BUN SERPL-MCNC: 85 MG/DL (ref 6–20)
CALCIUM SERPL-MCNC: 8.7 MG/DL (ref 8.5–9.9)
CELL COUNT FLUID TYPE: NORMAL
CHLORIDE SERPL-SCNC: 91 MEQ/L (ref 95–107)
CLOT EVALUATION: NORMAL
CO2 SERPL-SCNC: 30 MEQ/L (ref 20–31)
COLOR FLUID: YELLOW
CREAT SERPL-MCNC: 1.47 MG/DL (ref 0.7–1.2)
ERYTHROCYTE [DISTWIDTH] IN BLOOD BY AUTOMATED COUNT: 26.3 % (ref 11.5–14.5)
FLUID PATH CONSULT: YES
FLUID TYPE: NORMAL
FLUID TYPE: NORMAL
GLUCOSE BLD-MCNC: 137 MG/DL (ref 70–99)
GLUCOSE BLD-MCNC: 146 MG/DL (ref 70–99)
GLUCOSE BLD-MCNC: 267 MG/DL (ref 70–99)
GLUCOSE BLD-MCNC: 333 MG/DL (ref 70–99)
GLUCOSE BLD-MCNC: 369 MG/DL (ref 70–99)
GLUCOSE BLD-MCNC: 406 MG/DL (ref 70–99)
GLUCOSE FLD-MCNC: 219 MG/DL
GLUCOSE SERPL-MCNC: 333 MG/DL (ref 70–99)
HCT VFR BLD AUTO: 28.9 % (ref 42–52)
HGB BLD-MCNC: 8.6 G/DL (ref 14–18)
INR PPP: 1.3
LYMPHOCYTES, BODY FLUID: 4 %
MCH RBC QN AUTO: 19.6 PG (ref 27–31.3)
MCHC RBC AUTO-ENTMCNC: 29.8 % (ref 33–37)
MCV RBC AUTO: 66 FL (ref 79–92.2)
MONOCYTE, FLUID: 66 %
NEUTROPHIL, FLUID: 29 %
NUCLEATED CELLS FLUID: 426 /CUMM
NUMBER OF CELLS COUNTED FLUID: 100
PERFORMED ON: ABNORMAL
PH, BODY FLUID: 7.5
PLATELET # BLD AUTO: 478 K/UL (ref 130–400)
POTASSIUM SERPL-SCNC: 4.1 MEQ/L (ref 3.4–4.9)
PROT FLD-MCNC: 4.4 G/DL
PROTHROMBIN TIME: 16.5 SEC (ref 12.3–14.9)
RBC # BLD AUTO: 4.38 M/UL (ref 4.7–6.1)
RBC FLUID: NORMAL /CUMM
SODIUM SERPL-SCNC: 134 MEQ/L (ref 135–144)
WBC # BLD AUTO: 11.3 K/UL (ref 4.8–10.8)

## 2024-04-16 PROCEDURE — 83986 ASSAY PH BODY FLUID NOS: CPT

## 2024-04-16 PROCEDURE — 87116 MYCOBACTERIA CULTURE: CPT

## 2024-04-16 PROCEDURE — 94760 N-INVAS EAR/PLS OXIMETRY 1: CPT

## 2024-04-16 PROCEDURE — 83615 LACTATE (LD) (LDH) ENZYME: CPT

## 2024-04-16 PROCEDURE — 32555 ASPIRATE PLEURA W/ IMAGING: CPT

## 2024-04-16 PROCEDURE — 32555 ASPIRATE PLEURA W/ IMAGING: CPT | Performed by: RADIOLOGY

## 2024-04-16 PROCEDURE — 87205 SMEAR GRAM STAIN: CPT

## 2024-04-16 PROCEDURE — 82945 GLUCOSE OTHER FLUID: CPT

## 2024-04-16 PROCEDURE — 84157 ASSAY OF PROTEIN OTHER: CPT

## 2024-04-16 PROCEDURE — 88305 TISSUE EXAM BY PATHOLOGIST: CPT

## 2024-04-16 PROCEDURE — 87102 FUNGUS ISOLATION CULTURE: CPT

## 2024-04-16 PROCEDURE — 2580000003 HC RX 258: Performed by: INTERNAL MEDICINE

## 2024-04-16 PROCEDURE — 71046 X-RAY EXAM CHEST 2 VIEWS: CPT

## 2024-04-16 PROCEDURE — 2500000003 HC RX 250 WO HCPCS: Performed by: RADIOLOGY

## 2024-04-16 PROCEDURE — 6370000000 HC RX 637 (ALT 250 FOR IP): Performed by: INTERNAL MEDICINE

## 2024-04-16 PROCEDURE — 2700000000 HC OXYGEN THERAPY PER DAY

## 2024-04-16 PROCEDURE — 2709999900 IR GUIDED THORACENTESIS PLEURAL

## 2024-04-16 PROCEDURE — 85027 COMPLETE CBC AUTOMATED: CPT

## 2024-04-16 PROCEDURE — 36415 COLL VENOUS BLD VENIPUNCTURE: CPT

## 2024-04-16 PROCEDURE — 2060000000 HC ICU INTERMEDIATE R&B

## 2024-04-16 PROCEDURE — 85610 PROTHROMBIN TIME: CPT

## 2024-04-16 PROCEDURE — 94761 N-INVAS EAR/PLS OXIMETRY MLT: CPT

## 2024-04-16 PROCEDURE — 89051 BODY FLUID CELL COUNT: CPT

## 2024-04-16 PROCEDURE — 87070 CULTURE OTHR SPECIMN AEROBIC: CPT

## 2024-04-16 PROCEDURE — 88112 CYTOPATH CELL ENHANCE TECH: CPT

## 2024-04-16 PROCEDURE — 94640 AIRWAY INHALATION TREATMENT: CPT

## 2024-04-16 PROCEDURE — 80048 BASIC METABOLIC PNL TOTAL CA: CPT

## 2024-04-16 PROCEDURE — 0W993ZZ DRAINAGE OF RIGHT PLEURAL CAVITY, PERCUTANEOUS APPROACH: ICD-10-PCS | Performed by: INTERNAL MEDICINE

## 2024-04-16 PROCEDURE — 99222 1ST HOSP IP/OBS MODERATE 55: CPT | Performed by: NURSE PRACTITIONER

## 2024-04-16 RX ORDER — TORSEMIDE 100 MG/1
100 TABLET ORAL DAILY
Status: DISCONTINUED | OUTPATIENT
Start: 2024-04-16 | End: 2024-04-17 | Stop reason: HOSPADM

## 2024-04-16 RX ORDER — LIDOCAINE HYDROCHLORIDE 20 MG/ML
INJECTION, SOLUTION INFILTRATION; PERINEURAL PRN
Status: COMPLETED | OUTPATIENT
Start: 2024-04-16 | End: 2024-04-16

## 2024-04-16 RX ADMIN — INSULIN LISPRO 4 UNITS: 100 INJECTION, SOLUTION INTRAVENOUS; SUBCUTANEOUS at 06:12

## 2024-04-16 RX ADMIN — LIDOCAINE HYDROCHLORIDE 7 ML: 20 INJECTION, SOLUTION INFILTRATION; PERINEURAL at 13:17

## 2024-04-16 RX ADMIN — IPRATROPIUM BROMIDE AND ALBUTEROL SULFATE 1 DOSE: 2.5; .5 SOLUTION RESPIRATORY (INHALATION) at 07:00

## 2024-04-16 RX ADMIN — SODIUM CHLORIDE, PRESERVATIVE FREE 10 ML: 5 INJECTION INTRAVENOUS at 09:03

## 2024-04-16 RX ADMIN — INSULIN LISPRO 8 UNITS: 100 INJECTION, SOLUTION INTRAVENOUS; SUBCUTANEOUS at 16:19

## 2024-04-16 RX ADMIN — IPRATROPIUM BROMIDE AND ALBUTEROL SULFATE 1 DOSE: 2.5; .5 SOLUTION RESPIRATORY (INHALATION) at 19:27

## 2024-04-16 RX ADMIN — TORSEMIDE 100 MG: 100 TABLET ORAL at 09:03

## 2024-04-16 RX ADMIN — SODIUM CHLORIDE, PRESERVATIVE FREE 10 ML: 5 INJECTION INTRAVENOUS at 21:22

## 2024-04-16 RX ADMIN — ACETAMINOPHEN 650 MG: 325 TABLET ORAL at 23:16

## 2024-04-16 ASSESSMENT — PAIN DESCRIPTION - ORIENTATION: ORIENTATION: LEFT

## 2024-04-16 ASSESSMENT — PAIN - FUNCTIONAL ASSESSMENT: PAIN_FUNCTIONAL_ASSESSMENT: ACTIVITIES ARE NOT PREVENTED

## 2024-04-16 ASSESSMENT — PAIN DESCRIPTION - LOCATION: LOCATION: KNEE;SHOULDER

## 2024-04-16 ASSESSMENT — PAIN DESCRIPTION - DESCRIPTORS: DESCRIPTORS: ACHING;SHOOTING

## 2024-04-16 ASSESSMENT — PAIN SCALES - GENERAL: PAINLEVEL_OUTOF10: 7

## 2024-04-16 NOTE — FLOWSHEET NOTE
1390- patient asked to get his glucose checked- result 406- refused insulin all yesterday. Pt wants to recheck around 6am. Rechecked was 369. Notified Dr. Leo victor to give 8am sliding scale humalog early. Sliding scale ordered for 8units but patient only agrees to take 4units SS. So given 4units SS humalog and will recheck at 7am.

## 2024-04-16 NOTE — BRIEF OP NOTE
Preliminary  Procedure Note, Full Note To Follow in PACS  Vascular and Interventional Radiology      Tomas Salazar  1970  [id number]    Date of Procedure: 04/16/24    Physician: Bjorn Nunez MD, DABR    Pre-Op Diagnosis: Large right pleural effusion    Post-Op Diagnosis: Same       Procedure: Right thoracentesis    Estimated Blood Loss (mL): Minimal    Complications: None    Specimens: 70 cc of yellowish brown fluid was sent for lab analysis    Implants: none    Drains: none    Findings: There was a large right pleural effusion. Under ultrasound, 3030 cc of yellowish brown fluid was drained. Patient still has residual fluid that was unable to be drained due to large size and patient discomfort. If he continues to have symptoms, will need drained in a few days.     Electronically signed by BJORN NUNEZ MD on 4/16/2024 at 2:00 PM

## 2024-04-16 NOTE — CONSULTS
Vascular Medicine and Interventional Radiology    Date of Consultation:2024    Tomas Salazar  : 1970  MR #: [id number]    Consultant:BRANDI Nieves - TAE  Consulting Physician: Dr. Bjorn Claudio, Vascular Medicine and Interventional Radiology     Consult Ordered By: Dr. CELINA Villeda            PCP:  Kylah Brandt DO     Attending Physician: Poppy Villeda MD     Date of Admission: 2024 10:30 AM     Chief Complaint:   Chief Complaint   Patient presents with    Shortness of Breath     Pt arrived via squad from [facility]. C/o abd pain and diff breathing. Hx of ascites.       Reason for Consultation: thoracentesis    History of Present Illness: 53 year old male admitted from SNF with shortness of breath, fluid overload. Hx heart failure, decompensated cirrhosis previously requiring paracentesis and thoracentesis.  Last paracentesis 4/3/2024 with 3215 mL drained.  Last thoracentesis at [facility] with 1500 mL drained.  X-ray on admit with large right pleural effusion and increased interstitial prominence with lungs concerning for developing pulmonary edema/fluid overload.  Improving w/diuresis.  IR consulted for right thoracentesis.   Today WBC 11.3, Hgb 8.6, , INR 1.3.  He continues to report shortness of breath with exertion.  Denies chest pain, nausea, vomiting (tolerating his diet well), or abdominal pain.  On lovenox for DVT prophylaxis, held this morning in anticipation of thoracentesis.  States he was on Plavix as outpatient up until last month.    Past Medical History:   has no past medical history on file.    Past SurgicalHistory:   has a past surgical history that includes Paracentesis (Left, 2024).     Allergies:Amoxicillin    Home Medications:   Prior to Admission medications    Medication Sig Start Date End Date Taking? Authorizing Provider   insulin glargine (LANTUS SOLOSTAR) 100 UNIT/ML injection pen Inject 15 Units into the skin nightly 24   Radames

## 2024-04-16 NOTE — CARE COORDINATION
Patient planning to return home when discharged. He was at [facility] [place] but was independent with therapy yesterday and not put on program. Patient to have possible thoracentesis today. He is currently on 4L O2 and did not have O2 at home. O2 eval will be needed prior to discharge.

## 2024-04-16 NOTE — PLAN OF CARE
Problem: Discharge Planning  Goal: Discharge to home or other facility with appropriate resources  Outcome: Progressing     Problem: Safety - Adult  Goal: Free from fall injury  Outcome: Progressing     Problem: ABCDS Injury Assessment  Goal: Absence of physical injury  Outcome: Progressing     Problem: Skin/Tissue Integrity  Goal: Absence of new skin breakdown  Description: 1.  Monitor for areas of redness and/or skin breakdown  2.  Assess vascular access sites hourly  3.  Every 4-6 hours minimum:  Change oxygen saturation probe site  4.  Every 4-6 hours:  If on nasal continuous positive airway pressure, respiratory therapy assess nares and determine need for appliance change or resting period.  Outcome: Progressing     Problem: Pain  Goal: Verbalizes/displays adequate comfort level or baseline comfort level  Outcome: Progressing  Flowsheets (Taken 4/15/2024 2100)  Verbalizes/displays adequate comfort level or baseline comfort level: Encourage patient to monitor pain and request assistance     Problem: Chronic Conditions and Co-morbidities  Goal: Patient's chronic conditions and co-morbidity symptoms are monitored and maintained or improved  Outcome: Progressing     Problem: Nutrition Deficit:  Goal: Optimize nutritional status  4/16/2024 0204 by Li Cardozo RN  Outcome: Progressing  4/15/2024 1339 by Rufina Dempsey, BRISSA, LD  Flowsheets (Taken 4/15/2024 1339)  Nutrient intake appropriate for improving, restoring, or maintaining nutritional needs:   Assess nutritional status and recommend course of action   Monitor oral intake, labs, and treatment plans   Recommend appropriate diets, oral nutritional supplements, and vitamin/mineral supplements

## 2024-04-16 NOTE — CARE COORDINATION
Definition of CHF discussed with patient.   Symptoms of heart failure and decompensation reviewed: weight gain of >3 #, edema, difficulty breathing, cough, issues with appetite, fatigue, or difficulty with sleep.   Common causes of CHF reviewed: CAD, arrhythmias, MI, HTN, valve dz., infection,  ETOH or drug abuse, or genetic abnormalities.  Importance of daily weight and B/P monitoring discussed. Pt to use a calender or notebook to record daily weight and call physician immediately with 3 # weight gain.  Low sodium diet and fluid intake discussed. Pt taught about a fluid restriction and advised to discuss this with the cardiologist prior to limiting oral intake.  Shown how to read labels for sodium levels, recommended food list provided.  I emphasized the importance of following their physician's orders for medication administration.   Importance of flu and pneumonia vaccinations reinforced.   Common CHF medications reviewed as well as avoiding certain other meds (decongestants, NSAIDS)  Instructed to discuss activity recommendations with physician.  Sample CHF weight documentation form provided.   CHF Zones discussed. Importance of staying in \"green\" area stressed. Pt verbalized understanding to call MD ASAP when he reaches the yellow zone, and to call 911 when reaching the red zone.   Booklet and zone pamphlet provided to the pt.   Patient denies any further questions at this time.   Electronically signed by Jane Saleh RN on 4/16/2024 at 11:02 AM

## 2024-04-16 NOTE — OR NURSING
THORACENTESIS - NO SEDATION    1304 - Patient arrived to [facility] via cart. A&Ox4, calm and cooperative. Procedure explained and consent signed. Patient positioned sitting on cart side leaning over tray table. VSS, on 4L NC.  Posterior lung fields scanned with ultrasound for initial images.     1311 - Dr. Claudio arrived and procedural site marked using U/S guidance. Area cleansed with Chloraprep. After 3 minute dry time, draped with sterile drape and towels.    1315 - Time out completed for right sided thoracentesis.      1317 - Right  Posterior chest site then numbed with lidocaine 2% by Dr. Claudio, [id number] Centesis 5F catheter placed into pleural space using U/S guidance.  Fluid draining appears yellowish / light gasper.  Sample of fluid obtained and placed into specimen containers to be sent for testing as ordered. Catheter then attached to [employer] machine to remove fluid.      1349 - Total 3030 ml removed.  Catheter removed.  Bandaid applied.  VSS. Pt tolerated well.  Verbal and tactile reassurance given throughout.     1356 - Patient taken for CXR and specimen fluid taken to lab. Report called to DEYA Lamas on 1 west. Transport requested to take patient back to room and Dr. Claudio notified CXR completed. Electronically signed by Rufina Orlando RN on 4/16/2024 at 2:06 PM

## 2024-04-17 VITALS
DIASTOLIC BLOOD PRESSURE: 76 MMHG | HEART RATE: 112 BPM | SYSTOLIC BLOOD PRESSURE: 121 MMHG | WEIGHT: 150.9 LBS | BODY MASS INDEX: 22.35 KG/M2 | OXYGEN SATURATION: 95 % | TEMPERATURE: 98.1 F | HEIGHT: 69 IN | RESPIRATION RATE: 18 BRPM

## 2024-04-17 PROBLEM — I50.23 ACUTE ON CHRONIC SYSTOLIC CHF (CONGESTIVE HEART FAILURE) (HCC): Status: RESOLVED | Noted: 2024-04-13 | Resolved: 2024-04-17

## 2024-04-17 PROBLEM — I50.20 SYSTOLIC CONGESTIVE HEART FAILURE (HCC): Status: ACTIVE | Noted: 2024-04-17

## 2024-04-17 LAB
ALBUMIN SERPL-MCNC: 2.6 G/DL (ref 3.5–4.6)
ALP SERPL-CCNC: 236 U/L (ref 35–104)
ALT SERPL-CCNC: 16 U/L (ref 0–41)
ANION GAP SERPL CALCULATED.3IONS-SCNC: 12 MEQ/L (ref 9–15)
AST SERPL-CCNC: 22 U/L (ref 0–40)
BASOPHILS # BLD: 0 K/UL (ref 0–0.2)
BASOPHILS NFR BLD: 0.4 %
BILIRUB SERPL-MCNC: 0.9 MG/DL (ref 0.2–0.7)
BUN SERPL-MCNC: 80 MG/DL (ref 6–20)
CALCIUM SERPL-MCNC: 8.1 MG/DL (ref 8.5–9.9)
CHLORIDE SERPL-SCNC: 94 MEQ/L (ref 95–107)
CO2 SERPL-SCNC: 30 MEQ/L (ref 20–31)
CREAT SERPL-MCNC: 1.34 MG/DL (ref 0.7–1.2)
EOSINOPHIL # BLD: 0 K/UL (ref 0–0.7)
EOSINOPHIL NFR BLD: 0.2 %
ERYTHROCYTE [DISTWIDTH] IN BLOOD BY AUTOMATED COUNT: 26.2 % (ref 11.5–14.5)
GLOBULIN SER CALC-MCNC: 3.8 G/DL (ref 2.3–3.5)
GLUCOSE BLD-MCNC: 213 MG/DL (ref 70–99)
GLUCOSE BLD-MCNC: 361 MG/DL (ref 70–99)
GLUCOSE SERPL-MCNC: 354 MG/DL (ref 70–99)
HCT VFR BLD AUTO: 26.6 % (ref 42–52)
HGB BLD-MCNC: 8 G/DL (ref 14–18)
LACTATE DEHYDROGENASE, FLUID: 122 U/L
LYMPHOCYTES # BLD: 1.1 K/UL (ref 1–4.8)
LYMPHOCYTES NFR BLD: 10.3 %
MCH RBC QN AUTO: 19.6 PG (ref 27–31.3)
MCHC RBC AUTO-ENTMCNC: 30.1 % (ref 33–37)
MCV RBC AUTO: 65.2 FL (ref 79–92.2)
MONOCYTES # BLD: 0.6 K/UL (ref 0.2–0.8)
MONOCYTES NFR BLD: 5.7 %
NEUTROPHILS # BLD: 9.2 K/UL (ref 1.4–6.5)
NEUTS SEG NFR BLD: 83 %
PATH CONSULT FLUID: NORMAL
PERFORMED ON: ABNORMAL
PERFORMED ON: ABNORMAL
PLATELET # BLD AUTO: 411 K/UL (ref 130–400)
POTASSIUM SERPL-SCNC: 3.7 MEQ/L (ref 3.4–4.9)
PROT SERPL-MCNC: 6.4 G/DL (ref 6.3–8)
RBC # BLD AUTO: 4.08 M/UL (ref 4.7–6.1)
SODIUM SERPL-SCNC: 136 MEQ/L (ref 135–144)
SPECIMEN TYPE: NORMAL
WBC # BLD AUTO: 11 K/UL (ref 4.8–10.8)

## 2024-04-17 PROCEDURE — 2700000000 HC OXYGEN THERAPY PER DAY

## 2024-04-17 PROCEDURE — 94640 AIRWAY INHALATION TREATMENT: CPT

## 2024-04-17 PROCEDURE — 85025 COMPLETE CBC W/AUTO DIFF WBC: CPT

## 2024-04-17 PROCEDURE — 2580000003 HC RX 258: Performed by: INTERNAL MEDICINE

## 2024-04-17 PROCEDURE — 36415 COLL VENOUS BLD VENIPUNCTURE: CPT

## 2024-04-17 PROCEDURE — 6370000000 HC RX 637 (ALT 250 FOR IP): Performed by: INTERNAL MEDICINE

## 2024-04-17 PROCEDURE — 94761 N-INVAS EAR/PLS OXIMETRY MLT: CPT

## 2024-04-17 PROCEDURE — 80053 COMPREHEN METABOLIC PANEL: CPT

## 2024-04-17 PROCEDURE — 97165 OT EVAL LOW COMPLEX 30 MIN: CPT

## 2024-04-17 RX ORDER — TORSEMIDE 100 MG/1
100 TABLET ORAL DAILY
Qty: 30 TABLET | Refills: 3 | Status: SHIPPED | OUTPATIENT
Start: 2024-04-18

## 2024-04-17 RX ADMIN — IPRATROPIUM BROMIDE AND ALBUTEROL SULFATE 1 DOSE: 2.5; .5 SOLUTION RESPIRATORY (INHALATION) at 07:08

## 2024-04-17 RX ADMIN — INSULIN LISPRO 8 UNITS: 100 INJECTION, SOLUTION INTRAVENOUS; SUBCUTANEOUS at 07:19

## 2024-04-17 RX ADMIN — ACETAMINOPHEN 650 MG: 325 TABLET ORAL at 08:30

## 2024-04-17 RX ADMIN — SODIUM CHLORIDE, PRESERVATIVE FREE 10 ML: 5 INJECTION INTRAVENOUS at 08:06

## 2024-04-17 RX ADMIN — INSULIN LISPRO 2 UNITS: 100 INJECTION, SOLUTION INTRAVENOUS; SUBCUTANEOUS at 11:15

## 2024-04-17 RX ADMIN — TORSEMIDE 100 MG: 100 TABLET ORAL at 08:06

## 2024-04-17 ASSESSMENT — PAIN DESCRIPTION - PAIN TYPE
TYPE: CHRONIC PAIN
TYPE: CHRONIC PAIN

## 2024-04-17 ASSESSMENT — PAIN DESCRIPTION - ONSET
ONSET: ON-GOING
ONSET: ON-GOING

## 2024-04-17 ASSESSMENT — PAIN DESCRIPTION - DESCRIPTORS
DESCRIPTORS: ACHING
DESCRIPTORS: SORE

## 2024-04-17 ASSESSMENT — PAIN DESCRIPTION - FREQUENCY
FREQUENCY: CONTINUOUS
FREQUENCY: CONTINUOUS

## 2024-04-17 ASSESSMENT — PAIN DESCRIPTION - ORIENTATION
ORIENTATION: RIGHT;LEFT
ORIENTATION: RIGHT;LEFT

## 2024-04-17 ASSESSMENT — PAIN SCALES - GENERAL
PAINLEVEL_OUTOF10: 3
PAINLEVEL_OUTOF10: 7

## 2024-04-17 ASSESSMENT — PAIN DESCRIPTION - LOCATION
LOCATION: LEG
LOCATION: LEG

## 2024-04-17 ASSESSMENT — PAIN - FUNCTIONAL ASSESSMENT
PAIN_FUNCTIONAL_ASSESSMENT: PREVENTS OR INTERFERES SOME ACTIVE ACTIVITIES AND ADLS
PAIN_FUNCTIONAL_ASSESSMENT: PREVENTS OR INTERFERES SOME ACTIVE ACTIVITIES AND ADLS

## 2024-04-17 NOTE — DISCHARGE SUMMARY
Hospital Medicine Discharge Summary    Tomas Salazar  :  1970  MRN:  [id number]    Admit date:  2024  Discharge date:  2024    Admitting Physician:  No admitting provider for patient encounter.  Primary Care Physician:  Kylah Brandt DO      Discharge Diagnoses:    As below    Chief Complaint   Patient presents with    Shortness of Breath     Pt arrived via squad from [facility]. C/o abd pain and diff breathing. Hx of ascites.      Hospital Course:     Fluid overload likely 2/2 cirrhosis and acute on chronic HFrEF exacerbation: Pt has history of requiring paracenteses and thoracenteses in the setting of his decompensated cirrhosis. BNP 47,092 on arrival and is improving with diuresis. Pt diuresed well and was discharged on 100mg PO torsemide. S/p R-sided thoracentesis with 3L removed. Pt did not qualify for home O2 at time of discharge.     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 Pt non-compliant during admission.     History of noncompliance with EF 20-25%, no plan for LifeVest as detailed in previous cardiology notes.    Exam on discharge:   /72   Pulse (!) 112   Temp 98.2 °F (36.8 °C) (Oral)   Resp 18   Ht 1.753 m (5' 9\")   Wt 68.4 kg (150 lb 14.4 oz)   SpO2 90%   BMI 22.28 kg/m²   Physical Exam  Cardiovascular:      Rate and Rhythm: Regular rhythm. Tachycardia present.   Pulmonary:      Comments: Decreased breath sounds on the right but improved aeration.  Abdominal:      General: There is distension.      Palpations: Abdomen is soft.      Tenderness: There is no abdominal tenderness.   Musculoskeletal:      Right lower leg: No edema.      Left lower leg: No edema.      Comments: L BKA.   Neurological:

## 2024-04-17 NOTE — CARE COORDINATION
Patient requested an ambulette home. Physicians ambulette scheduled for today at 12:30. Nurse notified.

## 2024-04-17 NOTE — PROGRESS NOTES
04/13/24 2147   RT Protocol   History Pulmonary Disease 1   Respiratory pattern 0   Breath sounds 2   Cough 0   Indications for Bronchodilator Therapy Decreased or absent breath sounds   Bronchodilator Assessment Score 3       
   04/15/24 2000   RT Protocol   History Pulmonary Disease 1   Respiratory pattern 0   Breath sounds 2   Cough 0   Indications for Bronchodilator Therapy Decreased or absent breath sounds   Bronchodilator Assessment Score 3       
   04/17/24 0804   RT Protocol   History Pulmonary Disease 2   Respiratory pattern 0   Breath sounds 2   Cough 0   Indications for Bronchodilator Therapy Decreased or absent breath sounds   Bronchodilator Assessment Score 4       
   04/17/24 0916   Resting (Room Air)   SpO2 95      During Walk (Room Air)   SpO2 90      Comments pt is amputee, would not use our wheelchair for testing, PT worked with him also to try and qualify for o2, pt very reluctant to coorperate at this time   After Walk   Does the Patient Qualify for Home O2 No   Does the Patient Need Portable Oxygen Tanks No       
  Physician Progress Note      PATIENT:               DOROTEO TERAN  CSN #:                  [id number]  :                       1970  ADMIT DATE:       2024 10:30 AM  DISCH DATE:  RESPONDING  PROVIDER #:        Poppy Villeda MD          QUERY TEXT:    Pt admitted with acute on chronic systolic heart failure. Pt noted to also   have CAD and HTN. If possible, please document in progress notes and discharge   summary the etiology of CHF, if able to be determined.    The medical record reflects the following:  Risk Factors: CAD, acute on chronic systolic heart failure, cirrhosis, CKD3a  Clinical Indicators: echo  Left Ventricle: Severely reduced left ventricular   systolic function with a visually estimated EF of 20 - 25%. Left ventricle   size is normal. Normal wall thickness. Normal wall motion. Grade II diastolic   dysfunction with increased LAP.  -  Mitral Valve: Moderately thickened leaflet. Moderate regurgitation.  -  Tricuspid Valve: Moderate to severe regurgitation with a centrally directed   jet. Moderately elevated RVSP 56 mmHg, consistent with moderate pulmonary   hypertension.  Treatment: Lovenox, Lasix    Ai WELLS, RN, [facility]  [phone]  Options provided:  -- CHF due to Hypertensive Heart Disease  -- CHF due to Hypertensive Heart Disease and CAD  -- CHF not due to Hypertension but due to CAD  -- Other - I will add my own diagnosis  -- Disagree - Not applicable / Not valid  -- Disagree - Clinically unable to determine / Unknown  -- Refer to Clinical Documentation Reviewer    PROVIDER RESPONSE TEXT:    This patient has CHF due to hypertensive heart disease and CAD.    Query created by: Ai Lou on 4/15/2024 10:15 AM      Electronically signed by:  Poppy Villeda MD 4/15/2024 7:13 PM          
0722: Vitals obtained. Patient given 8 units w/meal of insulin, but refusing sliding scale as of yesterday. Patient also refusing tele stating it fell off last night and he is tired of the stickers. Will inform Dr. Villeda. Patient is tolerating 2L NC which he is tolerating well at this time. Per patient he took the NC off last night and was tolerating room air, but then he started to feel somewhat short of breath. Will continue to monitor.     1100: BLE dressing change performed. IV removed due to being discharged today. Patient stated he has no way to get home. Case management made aware and ambulate set up for 1230pm. Patient agrees with plan.     1155: Reviewed discharge instructions with patient. Patient verbalized understanding of discharge instructions. IVs removed. Dressing applied. No complications noted. Tele removed. Patient assisted in getting dressed. Awaiting physicians lashaun for transport. Patient requesting [facility] doctors. Added to followup and given [facility] list.     1244: patient independent in getting into w/c. Report given to physicians lashaun tech.   
0735: Dr. Villeda sent secure message about patient not having IV access with IV lasix ordered. Also made aware of patient refusing insulin and only allowing staff to give certain amounts. Awaiting response.     1035: Dressing change performed. Slough noted with small amount of yellow drainage. Patient tolerated well.     1500: Patient returned from thoracentesis. Dressing clean dry and intact. Patient stated he does feel like he can breathe a little easier. Patient updated on possibility of having to go back tomorrow to have more fluid removed.   
0900- Patient scheduled to have 8 units humalog with each meal plus sliding scale. Patient's blood glucose this morning is 121. Patient declines scheduled 8 units at this time stating that is too much. This RN placed insulin syringe in sharps container and marked the MAR appropriately.     1009- Patient declines IV lasix stating \"I'll take 20 but I ain't gonna take 40\". Patient educated that this RN is unable to make changes to doctor's orders, but he is within his rights to refuse the medication. Patient asked to speak to nephrology again regarding lasix. Patient states he is not urinating enough and the lasix is not working. Lasix marked off as not given appropriately on the MAR and physicians made aware.     1145- Patient voided 225 yellow urine via commode. Post void residual bladder scan showed 36ml. Nephrology made aware of patient's void amount, bladder scan and lasix refusal. No new orders received at this time.     1621- Blood sugar prior to dinner is 136. Patient continues to refuse any humalog coverage at this time. Insulin marked off on the MAR appropriately. Patient agree's to scheduled IV lasix. Patient is sitting at the side of the bed eating dinner. This RN to come back when patient is done with dinner, per his request.     1730- Second RN unable to gain IV access. Patient declines a third attempt.     Electronically signed by Amna Hewitt RN on 4/15/2024 at 6:40 PM             
1915- Assumed care of patient for 12 hour shift. Chart and medications reviewed. Patient resting in bed. Bed in lowest position and wheels locked. No complaints of pain at this time. Call light and belongings within reach.     2123- PM assessment completed. A?O x4. Patient appears to be agitated. Lung sounds diminished / clear. 100% on 4L NC. Heart sounds regular. Sinus Rhythm on tele. Bowel sounds active x4. No edema noted. Right pedal pulse palpable. Left femoral pulse palpable. Wound to Left BKA clean, dry and intact. Skin pink/jaundice in color and warm. #22 Right posterior hand flushed, saline locked and dressing intact. Patient siting at edge of bed. No complaints of pain at this time. Call light and belongings within reach.    2135- Patient refused Lantus as his blood glucose was 137 and did not want it to drop. Educated on importance of medication. Patient still refused.     2315- Tylenol 325 mg 2 tablets given for 7/10 left shoulder and knee pain.     0010- Patient resting in bed at this time. No complaints of pain. Call light and belongings remain within reach.     Electronically signed by Sheila Haro RN on 4/17/2024 at 5:44 AM        
At 2020 patients BS was 318mg/DL. SS order is to to give 4 units of Humalog. He is also ordered 20 units of lantus. Patient refused both medications. He stated that they we too much and wanted to approach the situation slowly. HE stated that he would take 15 units of lantus. Nurse contacted CNP and informed her of situation. She then ordered 15 units of lantus. Nurse administered 15units of lantus.   
Comprehensive Nutrition Assessment    Type and Reason for Visit:  Initial, Positive Nutrition Screen (+ malnutrition screen)    Nutrition Recommendations/Plan:   Continue current plan of care     Malnutrition Assessment:  Malnutrition Status:  No malnutrition (04/15/24 1338)        Nutrition Assessment:    Nutritional status appears adequate at this time, based on available information. Pt denies any recent change in appetite or intake, states any weight loss was from fluid losses after paracentesis, no interested in an oral nutrition supplement, current diet appropriate and tolerated    Nutrition Related Findings:    \"Admitted for shortness of breath.  Hx includes : ascites and pleural effusion. CHF,, COPD, DM, BKA  He has had recent problems with renal injury and electrolyte abnormalities.  Currently stays at a nursing home and was recently hospitalized discharged 1 week ago.\" last paracentesis 4/3/24 ( 3.2 liters), labs noted ( + hypoglycemia, elevated BUN/creat noted), meds reviewed ( lasix), nursing notes abdominal distention, last BM PTA, pt denies and GI & or nutrition related complaints, observed 100% lunch consumed Wound Type: Diabetic Ulcer, Multiple       Current Nutrition Intake & Therapies:    Average Meal Intake: 51-75%, %  Average Supplements Intake: None Ordered  ADULT DIET; Regular; 4 carb choices (60 gm/meal); Low Fat/Low Chol/High Fiber/2 gm Na    Anthropometric Measures:  Height: 175.3 cm (5' 9\")  Ideal Body Weight (IBW): 160 lbs (73 kg)    Admission Body Weight: 68 kg (150 lb) ((4/13))  Current Body Weight: 68 kg (150 lb) (( 4/13)), 93.8 % IBW. Weight Source: Bed Scale  Current BMI (kg/m2): 22.1  Usual Body Weight: 72.6 kg (160 lb) ((12/2023) 152# ( 1/2024))  % Weight Change (Calculated): -6.3  Weight Adjustment For: Amputation  Total Adjusted Percentage (Calculated): 5.9  Adjusted Ideal Body Weight (lbs) (Calculated): 150.6 lbs  Adjusted Ideal Body Weight (kg) (Calculated): 68.45 
Hospitalist Progress Note      Date of Admission: 4/13/2024  Chief Complaint:    Chief Complaint   Patient presents with    Shortness of Breath     Pt arrived via squad from [facility]. C/o abd pain and diff breathing. Hx of ascites.      Subjective:  No acute events overnight. Pt states his breathing still feels restricted.     Medications:    Infusion Medications    sodium chloride      dextrose       Scheduled Medications    torsemide  100 mg Oral Daily    ipratropium 0.5 mg-albuterol 2.5 mg  1 Dose Inhalation TID    insulin glargine  30 Units SubCUTAneous Nightly    insulin lispro  8 Units SubCUTAneous TID WC    sodium chloride flush  5-40 mL IntraVENous 2 times per day    enoxaparin  40 mg SubCUTAneous Daily    insulin lispro  0-8 Units SubCUTAneous TID WC    insulin lispro  0-4 Units SubCUTAneous Nightly     PRN Meds: hydrOXYzine HCl, sodium chloride flush, sodium chloride, potassium chloride **OR** potassium alternative oral replacement **OR** potassium chloride, magnesium sulfate, ondansetron **OR** ondansetron, polyethylene glycol, acetaminophen **OR** acetaminophen, glucose, dextrose bolus **OR** dextrose bolus, glucagon (rDNA), dextrose, ipratropium 0.5 mg-albuterol 2.5 mg    Intake/Output Summary (Last 24 hours) at 4/16/2024 1649  Last data filed at 4/16/2024 1405  Gross per 24 hour   Intake 1270 ml   Output 900 ml   Net 370 ml       Exam:  /75   Pulse (!) 118   Temp 97.7 °F (36.5 °C) (Oral)   Resp 18   Ht 1.753 m (5' 9\")   Wt 68.4 kg (150 lb 14.4 oz)   SpO2 100%   BMI 22.28 kg/m²   Physical Exam  Cardiovascular:      Rate and Rhythm: Regular rhythm. Tachycardia present.   Pulmonary:      Comments: NC in place. Decreased breath sounds on the right.  Abdominal:      General: There is distension.      Palpations: Abdomen is soft.      Tenderness: There is no abdominal tenderness.   Musculoskeletal:      Right lower leg: No edema.      Left lower leg: No edema.      Comments: L BKA. 
Hospitalist Progress Note      Date of Admission: 4/13/2024  Chief Complaint:    Chief Complaint   Patient presents with    Shortness of Breath     Pt arrived via squad from [facility]. C/o abd pain and diff breathing. Hx of ascites.      Subjective:  No new complaints.  No nausea, vomiting, chest pain, or headache      Medications:    Infusion Medications    sodium chloride      dextrose       Scheduled Medications    insulin glargine  30 Units SubCUTAneous Nightly    insulin lispro  8 Units SubCUTAneous TID WC    sodium chloride flush  5-40 mL IntraVENous 2 times per day    enoxaparin  40 mg SubCUTAneous Daily    furosemide  20 mg IntraVENous BID    insulin lispro  0-8 Units SubCUTAneous TID WC    insulin lispro  0-4 Units SubCUTAneous Nightly     PRN Meds: sodium chloride flush, sodium chloride, potassium chloride **OR** potassium alternative oral replacement **OR** potassium chloride, magnesium sulfate, ondansetron **OR** ondansetron, polyethylene glycol, acetaminophen **OR** acetaminophen, glucose, dextrose bolus **OR** dextrose bolus, glucagon (rDNA), dextrose, ipratropium 0.5 mg-albuterol 2.5 mg    Intake/Output Summary (Last 24 hours) at 4/14/2024 1219  Last data filed at 4/14/2024 1008  Gross per 24 hour   Intake 480 ml   Output 2 ml   Net 478 ml     Exam:  /87   Pulse (!) 113   Temp 98.1 °F (36.7 °C) (Oral)   Resp 18   Ht 1.753 m (5' 9\")   Wt 68.4 kg (150 lb 14.4 oz)   SpO2 94%   BMI 22.28 kg/m²   Head: Normocephalic, atraumatic  Sclera clear  Neck JVD flat  Lungs: normal effort of breathing    Labs:   Recent Labs     04/13/24  1045   WBC 9.9   HGB 10.6*   HCT 35.6*   *     Recent Labs     04/13/24  1045 04/14/24  1120   * 134*   K 4.5 3.5   CL 87* 89*   CO2 31 30   BUN 80* 86*   CREATININE 1.50* 1.68*   CALCIUM 9.4 8.8   AST 42* 26   ALT 30 24   BILITOT 1.0* 0.8*   ALKPHOS 398* 347*     No results for input(s): \"INR\" in the last 72 hours.  No results for input(s): \"CKTOTAL\", 
Hospitalist Progress Note      Date of Admission: 4/13/2024  Chief Complaint:    Chief Complaint   Patient presents with    Shortness of Breath     Pt arrived via squad from [facility]. C/o abd pain and diff breathing. Hx of ascites.      Subjective:  No acute events overnight. Pt states his breathing still feels restricted.     Medications:    Infusion Medications    sodium chloride      dextrose       Scheduled Medications    ipratropium 0.5 mg-albuterol 2.5 mg  1 Dose Inhalation TID    furosemide  40 mg IntraVENous BID    insulin glargine  30 Units SubCUTAneous Nightly    insulin lispro  8 Units SubCUTAneous TID WC    sodium chloride flush  5-40 mL IntraVENous 2 times per day    enoxaparin  40 mg SubCUTAneous Daily    insulin lispro  0-8 Units SubCUTAneous TID WC    insulin lispro  0-4 Units SubCUTAneous Nightly     PRN Meds: hydrOXYzine HCl, sodium chloride flush, sodium chloride, potassium chloride **OR** potassium alternative oral replacement **OR** potassium chloride, magnesium sulfate, ondansetron **OR** ondansetron, polyethylene glycol, acetaminophen **OR** acetaminophen, glucose, dextrose bolus **OR** dextrose bolus, glucagon (rDNA), dextrose, ipratropium 0.5 mg-albuterol 2.5 mg    Intake/Output Summary (Last 24 hours) at 4/15/2024 1345  Last data filed at 4/15/2024 1138  Gross per 24 hour   Intake 300 ml   Output 300 ml   Net 0 ml       Exam:  /66   Pulse (!) 106   Temp 97.7 °F (36.5 °C) (Oral)   Resp 18   Ht 1.753 m (5' 9\")   Wt 68.4 kg (150 lb 14.4 oz)   SpO2 100%   BMI 22.28 kg/m²   Physical Exam  Cardiovascular:      Rate and Rhythm: Regular rhythm. Tachycardia present.   Pulmonary:      Comments: NC in place. Decreased breath sounds on the right.  Abdominal:      General: There is distension.      Palpations: Abdomen is soft.      Tenderness: There is no abdominal tenderness.   Musculoskeletal:      Right lower leg: No edema.      Left lower leg: No edema.      Comments: L BKA. 
Nephrology Progress Note    Assessment:  CKD 3a  DM type-2  Hyperthyroid  Anemia  OHD HFrEF 20% EF  COPD  Left BKA PAD        Plan: urine out-puts poorly recorded  needs breathing treatments  consider thorocentesis today    Patient Active Problem List:     Hyperthyroidism     Encephalopathy     Hypoglycemia     HOWARD (acute kidney injury) ([facility])     Type 2 diabetes mellitus with hyperglycemia, with long-term current use of insulin (HCC)     Thyroid nodule     Acute on chronic systolic CHF (congestive heart failure) ([facility])      Subjective:  Admit Date: 4/13/2024    Interval History: stable    Medications:  Scheduled Meds:   insulin glargine  30 Units SubCUTAneous Nightly    insulin lispro  8 Units SubCUTAneous TID WC    sodium chloride flush  5-40 mL IntraVENous 2 times per day    enoxaparin  40 mg SubCUTAneous Daily    furosemide  20 mg IntraVENous BID    insulin lispro  0-8 Units SubCUTAneous TID WC    insulin lispro  0-4 Units SubCUTAneous Nightly     Continuous Infusions:   sodium chloride      dextrose         CBC:   Recent Labs     04/13/24  1045   WBC 9.9   HGB 10.6*   *     CMP:    Recent Labs     04/13/24  1045 04/14/24  1120 04/15/24  0558   * 134* 135   K 4.5 3.5 3.8   CL 87* 89* 92*   CO2 31 30 29   BUN 80* 86* 86*   CREATININE 1.50* 1.68* 1.61*   GLUCOSE 312* 270* 117*   CALCIUM 9.4 8.8 8.8   LABGLOM 55.0* 48.0* 50.5*     Troponin: No results for input(s): \"TROPONINI\" in the last 72 hours.  BNP: No results for input(s): \"BNP\" in the last 72 hours.  INR: No results for input(s): \"INR\" in the last 72 hours.  Lipids: No results for input(s): \"CHOL\", \"LDLDIRECT\", \"TRIG\", \"HDL\", \"AMYLASE\", \"LIPASE\" in the last 72 hours.  Liver:   Recent Labs     04/15/24  0558   AST 26   ALT 21   ALKPHOS 306*   PROT 8.0   LABALBU 3.2*   BILITOT 0.7     Iron:  No results for input(s): \"IRONS\", \"FERRITIN\" in the last 72 hours.    Invalid input(s): \"LABIRONS\"  Urinalysis: No results for input(s): \"UA\" in the last 72 
Patient is requesting a nebulizer treatment. Nurse contacted CNP concerning this matter. CNP input orders and informed RT concerning this matter.   
Patient's peripheral IV infiltrated during administration of IV lasix. Patient was given 2ml of 4ml syringe before he complained of discomfort. Administration was stopped and IV removed. Patient hesistant to allow new IV access. Patient states \"when is enough enough?\" Patient educated on the purpose of medication and treatment during this hospital admission and also provided assurance that he is able to decide when he no longer wants to pursue aggressive treatment options, that is always his choice and we can provide him with any resources he needs. Patient aggress to allow this RN to gain new IV access. This RN attempted in right forearm but was unsuccessful. Patient only willing to allow IV access where he says is appropriate and \"out of the way.\" Second RN to assess and initiate IV access.     Electronically signed by Amna Hewitt RN on 4/15/2024 at 5:15 PM    
Per Monitor room pt had a 4 beat run of vtach. Luther ACOSTA notified.    0500 staff went in to re-apply tele monitor pt is refusing to allow staff to re-apply tele monitor. Nilsa FALK in monitor room notified that pt is refusing.  
Physical Therapy Med Surg Initial Assessment  Facility/Department: [facility] TELEMETRY  Room: [place] [address]       NAME: Tomas Salazar  : 1970 (53 y.o.)  MRN: [id number]  CODE STATUS: Full Code    Date of Service: 4/15/2024    Patient Diagnosis(es): Recurrent right pleural effusion [J90]  Acute on chronic systolic CHF (congestive heart failure) ([facility]) [I50.23]  Dyspnea, unspecified type [R06.00]  Systolic congestive heart failure, unspecified HF chronicity ([facility]) [I50.20]   Chief Complaint   Patient presents with    Shortness of Breath     Pt arrived via squad from [facility]. C/o abd pain and diff breathing. Hx of ascites.      Patient Active Problem List    Diagnosis Date Noted    Acute on chronic systolic CHF (congestive heart failure) ([facility]) 2024    Type 2 diabetes mellitus with hyperglycemia, with long-term current use of insulin ([facility]) 2024    Thyroid nodule 2024    HOWARD (acute kidney injury) ([facility]) 2024    Encephalopathy 2024    Hypoglycemia 2024    Hyperthyroidism 2023        History reviewed. No pertinent past medical history.  Past Surgical History:   Procedure Laterality Date    PARACENTESIS Left 2024    3215 ml removed by Dr. Sims - diagnostics sent       Chart Reviewed: Yes  Family / Caregiver Present: No    Restrictions:  Restrictions/Precautions: Fall Risk     SUBJECTIVE:        Pain  Pain: denies pain pre/post tx.    Prior Level of Function:  Social/Functional History  Lives With: Alone  Type of Home: Apartment  Home Layout: One level  Home Equipment: Wheelchair-manual, Walker, rolling, Electric scooter (has prosthesis that does not fit)  Additional Comments: was at [facility] recently but plans to return to his apartment    OBJECTIVE:   Vision  Vision: Within Functional Limits  Hearing: Within functional limits    Cognition:  Overall Orientation Status: Within Functional Limits  Follows Commands: Within Functional Limits         ROM:  AROM: Generally decreased, 
Spoke to Adams RN to follow up after yesterday's right thoracentesis. Dressing C/D/I. Denies drainage or redness at site. Pt on room air and being discharged today.   
[facility]  Occupational Therapy        NAME:  Tomas Salazar  ROOM: W169/W169-01  :  1970  DATE: 4/15/2024    Attempted to see Tomas Salazar at 1332 on this date for:   [x]  Initial Evaluation   []  Treatment       Patient was unable to be seen due to:   [] Off unit for testing/procedure    [x] Patient refused, stating \"I would but I'm so tired. I just can't get any sleep.\" Pt napping on therapist arrival and with difficulty keeping eyes open to answer questions. Pt amenable to therapist returning another time.     [] Therapy on hold due to     [] Nursing deferred due to    [] Other:      Electronically signed by TEJAS Dumont/L on 4/15/24 at 1:37 PM EDT  
[facility]  Occupational Therapy        NAME:  Tomas Salazar  ROOM: W169/W169-01  :  1970  DATE: 2024    Attempted to see Tomas Salazar at 1525 on this date for:   [x]  Initial Evaluation   []  Treatment       Patient was unable to be seen due to:  Patient refused, stating \" I had that procedure and I just keep coughing up fluid. Can we try it tomorrow?\" Agreeable to evaluation in AM.      Electronically signed by VALE Dumont on 24 at 3:32 PM EDT  
  Recent Labs     04/14/24  1120 04/15/24  0558 04/16/24  0543   * 135 134*   K 3.5 3.8 4.1   CL 89* 92* 91*   CO2 30 29 30   BUN 86* 86* 85*   CREATININE 1.68* 1.61* 1.47*   GLUCOSE 270* 117* 333*   CALCIUM 8.8 8.8 8.7   LABGLOM 48.0* 50.5* 56.3*     Troponin: No results for input(s): \"TROPONINI\" in the last 72 hours.  BNP: No results for input(s): \"BNP\" in the last 72 hours.  INR:   Recent Labs     04/16/24  0543   INR 1.3     Lipids: No results for input(s): \"CHOL\", \"LDLDIRECT\", \"TRIG\", \"HDL\", \"AMYLASE\", \"LIPASE\" in the last 72 hours.  Liver:   Recent Labs     04/15/24  0558   AST 26   ALT 21   ALKPHOS 306*   PROT 8.0   LABALBU 3.2*   BILITOT 0.7     Iron:  No results for input(s): \"IRONS\", \"FERRITIN\" in the last 72 hours.    Invalid input(s): \"LABIRONS\"  Urinalysis: No results for input(s): \"UA\" in the last 72 hours.    Objective:   Vitals: /88   Pulse (!) 121   Temp 97.5 °F (36.4 °C) (Axillary)   Resp 18   Ht 1.753 m (5' 9\")   Wt 68.4 kg (150 lb 14.4 oz)   SpO2 100%   BMI 22.28 kg/m²    Wt Readings from Last 3 Encounters:   04/13/24 68.4 kg (150 lb 14.4 oz)   04/05/24 75.4 kg (166 lb 3.6 oz)   12/07/23 72.6 kg (160 lb)      24HR INTAKE/OUTPUT:    Intake/Output Summary (Last 24 hours) at 4/16/2024 0851  Last data filed at 4/16/2024 0600  Gross per 24 hour   Intake 1030 ml   Output 625 ml   Net 405 ml     Admission weight: 68.4 kg (150 lb 14.4 oz)     Constitutional:  Alert, awake, no apparent distress   Skin:normal, no rash  HEENT:sclera anicteric.  Head atraumatic normocephalic  Neck:supple with no thyromegally  Cardiovascular:  S1, S2 without m/r/g   Respiratory:  CTA B without w/r/r   Abdomen: +bs, soft, nt  Ext: 1+ LE edema  Musculoskeletal:Intact  Neuro:Alert and oriented with no deficit      Electronically signed by Vikram Chowdhury MD on 4/16/2024 at 8:51 AM          
fit)  Has the patient had two or more falls in the past year or any fall with injury in the past year?: Yes  Receives Help From: Other (comment) (SNF STATUS)  ADL Assistance: Independent  Homemaking Assistance: Independent  Ambulation Assistance: Non-ambulatory  Transfer Assistance: Independent  Active : No  Patient's  Info: Access to care  Occupation: On disability  Additional Comments: was at snf recently but plans to return to his apartment    OBJECTIVE:     Orientation Status:  Orientation  Overall Orientation Status: Within Functional Limits    Observation:  Observation/Palpation  Posture: Fair  Observation: pt sitting EOB upon arrival with respiratory at bedside. Pt agreeable to evaluation, irritable at times. on RA with vitals WNL despite feeling short of breath at times.    Cognition Status:  Cognition  Overall Cognitive Status: WFL    Perception Status:  Perception  Overall Perceptual Status: WFL    Vision and Hearing Status:  Hearing  Hearing: Within functional limits        GROSS ASSESSMENT AROM/PROM:  AROM: Within functional limits       ROM:   LUE AROM (degrees)  LUE AROM : WFL  Left Hand AROM (degrees)  Left Hand AROM: WFL  RUE AROM (degrees)  RUE AROM : WFL  Right Hand AROM (degrees)  Right Hand AROM: WFL    UE STRENGTH:  Strength: Within functional limits    UE COORDINATION:  Coordination: Within functional limits    UE TONE:  Tone: Normal    UE SENSATION:  Sensation: Intact    Hand Dominance:  Hand Dominance  Hand Dominance: Right    ADL Status:  ADL  Feeding: Unable to assess (Comment)  Grooming: Modified independent   UE Bathing: Independent  LE Bathing: Modified independent   UE Dressing: Independent  LE Dressing: Modified independent   Toileting: Modified independent   Additional Comments: ADL tasks simulated/completed/self reported as above stated. Pt is IND at w/c level at baseline, refuses use of hospital w/c to demo normal routine/performance from w/c level at this time. demos

## 2024-04-17 NOTE — DISCHARGE INSTRUCTIONS
Follow up with primary care physician in the next 7 days or sooner if needed. If you do not have a Primary care physician, please schedule an appointment with one. Please ask prior to discharge about a list of local providers.     Please return to ER or call 911 if you develop any significant signs or symptoms.    I may not have addressed all of your medical illnesses or the abnormal blood work or imaging therefore please ask your PCP to obtain [facility] record to follow up on all of the abnormal labs, imaging and findings that I have and have not addressed during your hospitalization.     Discharging you from the hospital does not mean that your medical care ends here and now. You may still need additional work up, investigation, monitoring, and treatment to be handled from this point on by outside providers including your PCP, Specialists and other healthcare providers.     For medication questions, contact your retail pharmacy and your PCP.    Your medical team at [facility] appreciates the opportunity to work with you to get well!    Poppy Villeda MD  10:40 AM

## 2024-04-17 NOTE — CARE COORDINATION
SPOKE WITH PATIENT. STATES HE IS WORKING WITH INSURANCE CM FOR HHA SERVICES. PT DENIED THIS CM TO ARRANGE HHC SERVICES. STATES HE WANTS TO CHANGE TO [facility]. PT WANTED HOME 02 EVAL.     02 EVAL COMPLETED AND PT DID NOT QUALIFY FOR HOME 02. MESSAGE TO MD TO ASSESS IF N.POX NEEDED.

## 2024-04-18 ENCOUNTER — APPOINTMENT (OUTPATIENT)
Dept: CARDIOLOGY | Facility: HOSPITAL | Age: 54
End: 2024-04-18
Payer: MEDICAID

## 2024-04-18 ENCOUNTER — HOSPITAL ENCOUNTER (INPATIENT)
Facility: HOSPITAL | Age: 54
LOS: 13 days | Discharge: HOSPICE | End: 2024-05-01
Attending: EMERGENCY MEDICINE | Admitting: INTERNAL MEDICINE
Payer: MEDICAID

## 2024-04-18 ENCOUNTER — APPOINTMENT (OUTPATIENT)
Dept: RADIOLOGY | Facility: HOSPITAL | Age: 54
End: 2024-04-18
Payer: MEDICAID

## 2024-04-18 DIAGNOSIS — J96.01 ACUTE RESPIRATORY FAILURE WITH HYPOXIA (MULTI): ICD-10-CM

## 2024-04-18 DIAGNOSIS — R18.8 OTHER ASCITES: ICD-10-CM

## 2024-04-18 DIAGNOSIS — J90 PLEURAL EFFUSION: ICD-10-CM

## 2024-04-18 DIAGNOSIS — N17.9 AKI (ACUTE KIDNEY INJURY) (CMS-HCC): ICD-10-CM

## 2024-04-18 DIAGNOSIS — I50.43 ACUTE ON CHRONIC COMBINED SYSTOLIC (CONGESTIVE) AND DIASTOLIC (CONGESTIVE) HEART FAILURE (MULTI): ICD-10-CM

## 2024-04-18 DIAGNOSIS — I73.9 PAD (PERIPHERAL ARTERY DISEASE) (CMS-HCC): ICD-10-CM

## 2024-04-18 DIAGNOSIS — R06.00 DYSPNEA, UNSPECIFIED TYPE: Primary | ICD-10-CM

## 2024-04-18 DIAGNOSIS — I21.4 NSTEMI (NON-ST ELEVATED MYOCARDIAL INFARCTION) (MULTI): ICD-10-CM

## 2024-04-18 DIAGNOSIS — R79.89 ELEVATED TROPONIN: ICD-10-CM

## 2024-04-18 LAB
ACANTHOCYTES BLD QL SMEAR: NORMAL
AFB STAIN: NORMAL
ALBUMIN SERPL BCP-MCNC: 2.9 G/DL (ref 3.4–5)
ALP SERPL-CCNC: 309 U/L (ref 33–120)
ALT SERPL W P-5'-P-CCNC: 26 U/L (ref 10–52)
ANION GAP SERPL CALC-SCNC: 14 MMOL/L (ref 10–20)
AST SERPL W P-5'-P-CCNC: 38 U/L (ref 9–39)
ATRIAL RATE: 115 BPM
BASOPHILS # BLD AUTO: 0.05 X10*3/UL (ref 0–0.1)
BASOPHILS NFR BLD AUTO: 0.5 %
BILIRUB DIRECT SERPL-MCNC: 0.4 MG/DL (ref 0–0.3)
BILIRUB SERPL-MCNC: 0.8 MG/DL (ref 0–1.2)
BUN SERPL-MCNC: 89 MG/DL (ref 6–23)
CALCIUM SERPL-MCNC: 8.5 MG/DL (ref 8.6–10.3)
CARDIAC TROPONIN I PNL SERPL HS: 1060 NG/L (ref 0–20)
CARDIAC TROPONIN I PNL SERPL HS: 823 NG/L (ref 0–20)
CARDIAC TROPONIN I PNL SERPL HS: 962 NG/L (ref 0–20)
CHLORIDE SERPL-SCNC: 93 MMOL/L (ref 98–107)
CO2 SERPL-SCNC: 29 MMOL/L (ref 21–32)
CREAT SERPL-MCNC: 1.43 MG/DL (ref 0.5–1.3)
EGFRCR SERPLBLD CKD-EPI 2021: 59 ML/MIN/1.73M*2
EOSINOPHIL # BLD AUTO: 0.02 X10*3/UL (ref 0–0.7)
EOSINOPHIL NFR BLD AUTO: 0.2 %
ERYTHROCYTE [DISTWIDTH] IN BLOOD BY AUTOMATED COUNT: 26.5 % (ref 11.5–14.5)
GLUCOSE SERPL-MCNC: 236 MG/DL (ref 74–99)
HCT VFR BLD AUTO: 27.2 % (ref 41–52)
HGB BLD-MCNC: 7.9 G/DL (ref 13.5–17.5)
IMM GRANULOCYTES # BLD AUTO: 0.02 X10*3/UL (ref 0–0.7)
IMM GRANULOCYTES NFR BLD AUTO: 0.2 % (ref 0–0.9)
INR PPP: 1.4 (ref 0.9–1.1)
LACTATE SERPL-SCNC: 0.8 MMOL/L (ref 0.4–2)
LYMPHOCYTES # BLD AUTO: 1.4 X10*3/UL (ref 1.2–4.8)
LYMPHOCYTES NFR BLD AUTO: 15.2 %
MAGNESIUM SERPL-MCNC: 1.77 MG/DL (ref 1.6–2.4)
MCH RBC QN AUTO: 19.7 PG (ref 26–34)
MCHC RBC AUTO-ENTMCNC: 29 G/DL (ref 32–36)
MCV RBC AUTO: 68 FL (ref 80–100)
MONOCYTES # BLD AUTO: 0.74 X10*3/UL (ref 0.1–1)
MONOCYTES NFR BLD AUTO: 8 %
NEUTROPHILS # BLD AUTO: 7.01 X10*3/UL (ref 1.2–7.7)
NEUTROPHILS NFR BLD AUTO: 75.9 %
NRBC BLD-RTO: 0 /100 WBCS (ref 0–0)
OVALOCYTES BLD QL SMEAR: NORMAL
P AXIS: 69 DEGREES
P OFFSET: 187 MS
P ONSET: 145 MS
PLATELET # BLD AUTO: 335 X10*3/UL (ref 150–450)
POLYCHROMASIA BLD QL SMEAR: NORMAL
POTASSIUM SERPL-SCNC: 3.5 MMOL/L (ref 3.5–5.3)
PR INTERVAL: 142 MS
PRELIMINARY: NORMAL
PRELIMINARY: NORMAL
PROT SERPL-MCNC: 7.1 G/DL (ref 6.4–8.2)
PROTHROMBIN TIME: 16 SECONDS (ref 9.8–12.8)
Q ONSET: 216 MS
QRS COUNT: 19 BEATS
QRS DURATION: 142 MS
QT INTERVAL: 370 MS
QTC CALCULATION(BAZETT): 511 MS
QTC FREDERICIA: 459 MS
R AXIS: -25 DEGREES
RBC # BLD AUTO: 4.02 X10*6/UL (ref 4.5–5.9)
RBC MORPH BLD: NORMAL
SODIUM SERPL-SCNC: 132 MMOL/L (ref 136–145)
T AXIS: 20 DEGREES
T OFFSET: 401 MS
VENTRICULAR RATE: 115 BPM
WBC # BLD AUTO: 9.2 X10*3/UL (ref 4.4–11.3)

## 2024-04-18 PROCEDURE — 84443 ASSAY THYROID STIM HORMONE: CPT | Performed by: INTERNAL MEDICINE

## 2024-04-18 PROCEDURE — 83735 ASSAY OF MAGNESIUM: CPT | Performed by: EMERGENCY MEDICINE

## 2024-04-18 PROCEDURE — 2500000004 HC RX 250 GENERAL PHARMACY W/ HCPCS (ALT 636 FOR OP/ED): Performed by: EMERGENCY MEDICINE

## 2024-04-18 PROCEDURE — 96374 THER/PROPH/DIAG INJ IV PUSH: CPT

## 2024-04-18 PROCEDURE — 84484 ASSAY OF TROPONIN QUANT: CPT | Performed by: EMERGENCY MEDICINE

## 2024-04-18 PROCEDURE — 96375 TX/PRO/DX INJ NEW DRUG ADDON: CPT

## 2024-04-18 PROCEDURE — 99223 1ST HOSP IP/OBS HIGH 75: CPT | Performed by: INTERNAL MEDICINE

## 2024-04-18 PROCEDURE — 99285 EMERGENCY DEPT VISIT HI MDM: CPT | Mod: 25

## 2024-04-18 PROCEDURE — 2500000001 HC RX 250 WO HCPCS SELF ADMINISTERED DRUGS (ALT 637 FOR MEDICARE OP): Performed by: EMERGENCY MEDICINE

## 2024-04-18 PROCEDURE — 71045 X-RAY EXAM CHEST 1 VIEW: CPT | Performed by: RADIOLOGY

## 2024-04-18 PROCEDURE — 85025 COMPLETE CBC W/AUTO DIFF WBC: CPT | Performed by: EMERGENCY MEDICINE

## 2024-04-18 PROCEDURE — 82248 BILIRUBIN DIRECT: CPT | Performed by: EMERGENCY MEDICINE

## 2024-04-18 PROCEDURE — 93005 ELECTROCARDIOGRAM TRACING: CPT

## 2024-04-18 PROCEDURE — 83605 ASSAY OF LACTIC ACID: CPT | Performed by: EMERGENCY MEDICINE

## 2024-04-18 PROCEDURE — 80048 BASIC METABOLIC PNL TOTAL CA: CPT | Performed by: EMERGENCY MEDICINE

## 2024-04-18 PROCEDURE — 36415 COLL VENOUS BLD VENIPUNCTURE: CPT | Performed by: EMERGENCY MEDICINE

## 2024-04-18 PROCEDURE — 87081 CULTURE SCREEN ONLY: CPT | Mod: ELYLAB | Performed by: INTERNAL MEDICINE

## 2024-04-18 PROCEDURE — 1200000002 HC GENERAL ROOM WITH TELEMETRY DAILY

## 2024-04-18 PROCEDURE — 71045 X-RAY EXAM CHEST 1 VIEW: CPT

## 2024-04-18 PROCEDURE — 85610 PROTHROMBIN TIME: CPT | Performed by: EMERGENCY MEDICINE

## 2024-04-18 PROCEDURE — 84439 ASSAY OF FREE THYROXINE: CPT | Performed by: INTERNAL MEDICINE

## 2024-04-18 PROCEDURE — 87449 NOS EACH ORGANISM AG IA: CPT | Mod: ELYLAB | Performed by: INTERNAL MEDICINE

## 2024-04-18 RX ORDER — FUROSEMIDE 10 MG/ML
20 INJECTION INTRAMUSCULAR; INTRAVENOUS ONCE
Status: COMPLETED | OUTPATIENT
Start: 2024-04-18 | End: 2024-04-18

## 2024-04-18 RX ORDER — ONDANSETRON 4 MG/1
4 TABLET, FILM COATED ORAL EVERY 8 HOURS PRN
Status: DISCONTINUED | OUTPATIENT
Start: 2024-04-18 | End: 2024-04-25

## 2024-04-18 RX ORDER — HEPARIN SODIUM 10000 [USP'U]/100ML
0-4000 INJECTION, SOLUTION INTRAVENOUS CONTINUOUS
Status: DISCONTINUED | OUTPATIENT
Start: 2024-04-18 | End: 2024-04-21

## 2024-04-18 RX ORDER — TALC
3 POWDER (GRAM) TOPICAL NIGHTLY PRN
Status: DISCONTINUED | OUTPATIENT
Start: 2024-04-18 | End: 2024-04-28

## 2024-04-18 RX ORDER — NITROGLYCERIN 0.4 MG/1
0.4 TABLET SUBLINGUAL ONCE
Status: COMPLETED | OUTPATIENT
Start: 2024-04-18 | End: 2024-04-18

## 2024-04-18 RX ORDER — LEVOFLOXACIN 5 MG/ML
500 INJECTION, SOLUTION INTRAVENOUS EVERY 24 HOURS
Status: DISCONTINUED | OUTPATIENT
Start: 2024-04-18 | End: 2024-04-18

## 2024-04-18 RX ORDER — ACETAMINOPHEN 160 MG/5ML
650 SOLUTION ORAL EVERY 4 HOURS PRN
Status: DISCONTINUED | OUTPATIENT
Start: 2024-04-18 | End: 2024-05-01 | Stop reason: HOSPADM

## 2024-04-18 RX ORDER — POLYETHYLENE GLYCOL 3350 17 G/17G
17 POWDER, FOR SOLUTION ORAL DAILY PRN
Status: DISCONTINUED | OUTPATIENT
Start: 2024-04-18 | End: 2024-05-01 | Stop reason: HOSPADM

## 2024-04-18 RX ORDER — ACETAMINOPHEN 325 MG/1
650 TABLET ORAL EVERY 4 HOURS PRN
Status: DISCONTINUED | OUTPATIENT
Start: 2024-04-18 | End: 2024-05-01 | Stop reason: HOSPADM

## 2024-04-18 RX ORDER — ONDANSETRON HYDROCHLORIDE 2 MG/ML
4 INJECTION, SOLUTION INTRAVENOUS EVERY 8 HOURS PRN
Status: DISCONTINUED | OUTPATIENT
Start: 2024-04-18 | End: 2024-04-25

## 2024-04-18 RX ORDER — ACETAMINOPHEN 650 MG/1
650 SUPPOSITORY RECTAL EVERY 4 HOURS PRN
Status: DISCONTINUED | OUTPATIENT
Start: 2024-04-18 | End: 2024-05-01 | Stop reason: HOSPADM

## 2024-04-18 RX ORDER — PANTOPRAZOLE SODIUM 40 MG/10ML
40 INJECTION, POWDER, LYOPHILIZED, FOR SOLUTION INTRAVENOUS
Status: DISCONTINUED | OUTPATIENT
Start: 2024-04-19 | End: 2024-04-29

## 2024-04-18 RX ORDER — MORPHINE SULFATE 4 MG/ML
4 INJECTION, SOLUTION INTRAMUSCULAR; INTRAVENOUS ONCE
Status: DISCONTINUED | OUTPATIENT
Start: 2024-04-18 | End: 2024-04-25

## 2024-04-18 RX ORDER — NAPROXEN SODIUM 220 MG/1
324 TABLET, FILM COATED ORAL ONCE
Status: COMPLETED | OUTPATIENT
Start: 2024-04-18 | End: 2024-04-18

## 2024-04-18 RX ORDER — PANTOPRAZOLE SODIUM 40 MG/1
40 TABLET, DELAYED RELEASE ORAL
Status: DISCONTINUED | OUTPATIENT
Start: 2024-04-19 | End: 2024-04-29

## 2024-04-18 RX ORDER — LEVOFLOXACIN 5 MG/ML
750 INJECTION, SOLUTION INTRAVENOUS EVERY 24 HOURS
Status: DISCONTINUED | OUTPATIENT
Start: 2024-04-19 | End: 2024-04-19

## 2024-04-18 RX ADMIN — FUROSEMIDE 20 MG: 10 INJECTION, SOLUTION INTRAMUSCULAR; INTRAVENOUS at 19:48

## 2024-04-18 RX ADMIN — NITROGLYCERIN 1 INCH: 20 OINTMENT TOPICAL at 20:10

## 2024-04-18 RX ADMIN — NITROGLYCERIN 0.4 MG: 0.4 TABLET SUBLINGUAL at 19:59

## 2024-04-18 RX ADMIN — ASPIRIN 324 MG: 81 TABLET, CHEWABLE ORAL at 19:48

## 2024-04-18 RX ADMIN — HEPARIN SODIUM 800 UNITS/HR: 10000 INJECTION, SOLUTION INTRAVENOUS at 21:00

## 2024-04-18 SDOH — SOCIAL STABILITY: SOCIAL INSECURITY: HAVE YOU HAD THOUGHTS OF HARMING ANYONE ELSE?: NO

## 2024-04-18 SDOH — SOCIAL STABILITY: SOCIAL INSECURITY: ABUSE: ADULT

## 2024-04-18 SDOH — SOCIAL STABILITY: SOCIAL INSECURITY: HAS ANYONE EVER THREATENED TO HURT YOUR FAMILY OR YOUR PETS?: NO

## 2024-04-18 SDOH — SOCIAL STABILITY: SOCIAL INSECURITY: DO YOU FEEL ANYONE HAS EXPLOITED OR TAKEN ADVANTAGE OF YOU FINANCIALLY OR OF YOUR PERSONAL PROPERTY?: NO

## 2024-04-18 SDOH — SOCIAL STABILITY: SOCIAL INSECURITY: ARE YOU OR HAVE YOU BEEN THREATENED OR ABUSED PHYSICALLY, EMOTIONALLY, OR SEXUALLY BY ANYONE?: NO

## 2024-04-18 SDOH — SOCIAL STABILITY: SOCIAL INSECURITY: ARE THERE ANY APPARENT SIGNS OF INJURIES/BEHAVIORS THAT COULD BE RELATED TO ABUSE/NEGLECT?: NO

## 2024-04-18 SDOH — SOCIAL STABILITY: SOCIAL INSECURITY: DO YOU FEEL UNSAFE GOING BACK TO THE PLACE WHERE YOU ARE LIVING?: NO

## 2024-04-18 SDOH — SOCIAL STABILITY: SOCIAL INSECURITY: WERE YOU ABLE TO COMPLETE ALL THE BEHAVIORAL HEALTH SCREENINGS?: YES

## 2024-04-18 SDOH — SOCIAL STABILITY: SOCIAL INSECURITY: HAVE YOU HAD ANY THOUGHTS OF HARMING ANYONE ELSE?: NO

## 2024-04-18 SDOH — SOCIAL STABILITY: SOCIAL INSECURITY: DOES ANYONE TRY TO KEEP YOU FROM HAVING/CONTACTING OTHER FRIENDS OR DOING THINGS OUTSIDE YOUR HOME?: NO

## 2024-04-18 ASSESSMENT — ACTIVITIES OF DAILY LIVING (ADL)
JUDGMENT_ADEQUATE_SAFELY_COMPLETE_DAILY_ACTIVITIES: YES
HEARING - RIGHT EAR: FUNCTIONAL
TOILETING: NEEDS ASSISTANCE
ADEQUATE_TO_COMPLETE_ADL: YES
HEARING - LEFT EAR: FUNCTIONAL
GROOMING: INDEPENDENT
FEEDING YOURSELF: INDEPENDENT
BATHING: INDEPENDENT
DRESSING YOURSELF: INDEPENDENT
PATIENT'S MEMORY ADEQUATE TO SAFELY COMPLETE DAILY ACTIVITIES?: YES
WALKS IN HOME: NEEDS ASSISTANCE

## 2024-04-18 ASSESSMENT — COGNITIVE AND FUNCTIONAL STATUS - GENERAL
PERSONAL GROOMING: A LITTLE
TURNING FROM BACK TO SIDE WHILE IN FLAT BAD: A LITTLE
CLIMB 3 TO 5 STEPS WITH RAILING: TOTAL
WALKING IN HOSPITAL ROOM: A LOT
HELP NEEDED FOR BATHING: A LITTLE
PATIENT BASELINE BEDBOUND: NO
MOVING FROM LYING ON BACK TO SITTING ON SIDE OF FLAT BED WITH BEDRAILS: A LITTLE
EATING MEALS: A LITTLE
TOILETING: A LITTLE
DRESSING REGULAR UPPER BODY CLOTHING: A LITTLE
STANDING UP FROM CHAIR USING ARMS: A LOT
DAILY ACTIVITIY SCORE: 18
MOVING TO AND FROM BED TO CHAIR: A LOT
DRESSING REGULAR LOWER BODY CLOTHING: A LITTLE
MOBILITY SCORE: 13

## 2024-04-18 ASSESSMENT — LIFESTYLE VARIABLES
SUBSTANCE_ABUSE_PAST_12_MONTHS: NO
HOW MANY STANDARD DRINKS CONTAINING ALCOHOL DO YOU HAVE ON A TYPICAL DAY: PATIENT DOES NOT DRINK
EVER FELT BAD OR GUILTY ABOUT YOUR DRINKING: NO
HOW OFTEN DO YOU HAVE A DRINK CONTAINING ALCOHOL: NEVER
SKIP TO QUESTIONS 9-10: 1
HAVE PEOPLE ANNOYED YOU BY CRITICIZING YOUR DRINKING: NO
EVER HAD A DRINK FIRST THING IN THE MORNING TO STEADY YOUR NERVES TO GET RID OF A HANGOVER: NO
PRESCIPTION_ABUSE_PAST_12_MONTHS: NO
AUDIT-C TOTAL SCORE: 0
HOW OFTEN DO YOU HAVE 6 OR MORE DRINKS ON ONE OCCASION: NEVER
HAVE YOU EVER FELT YOU SHOULD CUT DOWN ON YOUR DRINKING: NO
AUDIT-C TOTAL SCORE: 0
TOTAL SCORE: 0

## 2024-04-18 ASSESSMENT — PAIN - FUNCTIONAL ASSESSMENT
PAIN_FUNCTIONAL_ASSESSMENT: 0-10
PAIN_FUNCTIONAL_ASSESSMENT: 0-10

## 2024-04-18 ASSESSMENT — PAIN DESCRIPTION - ORIENTATION
ORIENTATION_2: LEFT;RIGHT
ORIENTATION: RIGHT;UPPER

## 2024-04-18 ASSESSMENT — PAIN SCALES - GENERAL
PAINLEVEL_OUTOF10: 8
PAINLEVEL_OUTOF10: 0 - NO PAIN
PAINLEVEL_OUTOF10: 6

## 2024-04-18 ASSESSMENT — PATIENT HEALTH QUESTIONNAIRE - PHQ9
SUM OF ALL RESPONSES TO PHQ9 QUESTIONS 1 & 2: 0
2. FEELING DOWN, DEPRESSED OR HOPELESS: NOT AT ALL
1. LITTLE INTEREST OR PLEASURE IN DOING THINGS: NOT AT ALL

## 2024-04-18 ASSESSMENT — PAIN DESCRIPTION - LOCATION
LOCATION_2: LEG
LOCATION: CHEST

## 2024-04-18 NOTE — Clinical Note
Pt arrived to ultrasound procedure room for right thoracentesis.  Procedure discussed with pt.  Pt verbalizes understanding.   Pt positioned upright leaning over bedside table.  Bernarda Ultrasound tech scanned patient's right back for pleural fluid.

## 2024-04-18 NOTE — Clinical Note
Paracentesis catheter in.  Kathrine colored ascites draining via renova pump.  Sample collected and message sent to Dr. Garcia to see if she wants fluid sent.  Awaiting answer.  Patient tolerated well.

## 2024-04-18 NOTE — ED PROVIDER NOTES
"HPI   Chief Complaint   Patient presents with    Shortness of Breath     \"I can't catch my breath since last night\"       Patient is a 53-year-old male with history of diabetes mellitus, COPD, liver cirrhosis, history of thoracentesis and paracentesis, signed out from clinic clinic earlier today because she did not want to be admitted that comes in by EMS complaining of shortness of breath complaining of swelling of his abdomen, no chest pain                        [place] Coma Scale Score: 15                     Patient History   Past Medical History:   Diagnosis Date    CHF (congestive heart failure) (Multi)     Diabetes mellitus (Multi)      No past surgical history on file.  No family history on file.  Social History     Tobacco Use    Smoking status: Every Day     Current packs/day: 0.25     Types: Cigarettes    Smokeless tobacco: Never   Substance Use Topics    Alcohol use: Never    Drug use: Never       Physical Exam   ED Triage Vitals [04/18/24 1736]   Temp Heart Rate Respirations BP   -- (!) 115 18 125/78      Pulse Ox Temp src Heart Rate Source Patient Position   (!) 92 % -- -- --      BP Location FiO2 (%)     -- --       Physical Exam  Vitals reviewed.   Constitutional:       Appearance: He is ill-appearing.   Cardiovascular:      Rate and Rhythm: Regular rhythm. Tachycardia present.   Pulmonary:      Effort: Pulmonary effort is normal.      Breath sounds: Decreased breath sounds present.      Comments: Diminished breath sounds at the bases left greater than the right  Abdominal:      Palpations: Abdomen is soft.      Comments: Presence of ascites   Musculoskeletal:      Cervical back: Normal range of motion.      Comments: Left leg below-knee amputation and had dressing, right leg presence of a wrap on the lower extremities all the digits are intact no open wounds on the foot   Skin:     General: Skin is warm.   Neurological:      General: No focal deficit present.      Mental Status: He is alert. "       ED Course & MDM   Diagnoses as of 04/18/24 1933   Dyspnea, unspecified type   Pleural effusion   Other ascites   Elevated troponin       Medical Decision Making  Patient's EKG interpreted by me shows sinus tachycardia rate of 115, right bundle branch block, normal QRS, nonspecific ST changes.  At this time I ordered a CBC chemistry chest x-ray EKG and cardiac enzymes, patient is complaining of shortness of breath which has been progressive getting worse for the last few weeks and was signed out from [facility] as he did not want to stay there.  The workup and management be done based upon results of test ordered.  Patient scheduled large right-sided pleural effusion patient's troponin is elevated which is more than his baseline but not complaining of any chest pain, at this time patient was discussed with the hospitalist team and will be admitted to the service.    Was complaining of chest pain, started while he was waiting to be admitted upstairs, at this time repeat EKG was done which showed and interpreted by me was sinus tachycardia rate 112 right bundle branch block ST changes in the anterior lateral leads similar to prior,  Patient is admitted to Nitropaste and admitted to the hospital    Since repeat only troponins were elevated repeat EKG still shows ST changes in the anterior lateral leads, at this time patient was also started on IV heparin and the hospitalist team was informed about elevated troponins.  Review the patient's old records from cardiology and there was note by Dr. Argueta about heart catheterization which showed some blockage but not any intervention at that time.        Procedure  Procedures     Sigifredo Sagastume MD  04/18/24 1937       Sigifredo Sagastume MD  04/18/24 2028       Sigifredo Sagastume MD  04/18/24 2051

## 2024-04-18 NOTE — Clinical Note
Dr. Schoenberger inserted right thoracentesis catheter.  Clear yellow pleural fluid aspirated.  Pt tolerating fairly. Fluid aspirated for ordered testing.  Dr. Schoenberger manually aspirating fluid with syringe.

## 2024-04-18 NOTE — Clinical Note
CXR. Done.  Dr. Schoenberger evaluated CXR.  No pneumothorax.  Ok to send pt back to floor.  Pt informed.

## 2024-04-18 NOTE — Clinical Note
The site was marked. Prepped with: ChloraPrep. The patient was draped. Dr. Schoenberger prepped and draped pt's right back.

## 2024-04-18 NOTE — Clinical Note
Report given to Mary Cooper RN.  Dr. Garcia did respond back that paracentesis was therapeutic and she did not want labs sent.

## 2024-04-19 ENCOUNTER — APPOINTMENT (OUTPATIENT)
Dept: RADIOLOGY | Facility: HOSPITAL | Age: 54
End: 2024-04-19
Payer: MEDICAID

## 2024-04-19 ENCOUNTER — APPOINTMENT (OUTPATIENT)
Dept: CARDIOLOGY | Facility: HOSPITAL | Age: 54
End: 2024-04-19
Payer: MEDICAID

## 2024-04-19 PROBLEM — J90 RECURRENT RIGHT PLEURAL EFFUSION: Status: ACTIVE | Noted: 2024-04-19

## 2024-04-19 PROBLEM — E11.42 TYPE 2 DIABETES MELLITUS WITH PERIPHERAL NEUROPATHY (MULTI): Status: ACTIVE | Noted: 2019-05-29

## 2024-04-19 PROBLEM — J18.9 RIGHT LOWER LOBE PNEUMONIA: Status: ACTIVE | Noted: 2024-04-19

## 2024-04-19 PROBLEM — J96.01 ACUTE RESPIRATORY FAILURE WITH HYPOXIA (MULTI): Status: ACTIVE | Noted: 2024-04-19

## 2024-04-19 LAB
ALBUMIN SERPL BCP-MCNC: 2.8 G/DL (ref 3.4–5)
ALP SERPL-CCNC: 302 U/L (ref 33–120)
ALT SERPL W P-5'-P-CCNC: 23 U/L (ref 10–52)
ANION GAP SERPL CALC-SCNC: 16 MMOL/L (ref 10–20)
AST SERPL W P-5'-P-CCNC: 28 U/L (ref 9–39)
ATRIAL RATE: 112 BPM
BILIRUB SERPL-MCNC: 1.1 MG/DL (ref 0–1.2)
BUN SERPL-MCNC: 87 MG/DL (ref 6–23)
CALCIUM SERPL-MCNC: 8.2 MG/DL (ref 8.6–10.3)
CARDIAC TROPONIN I PNL SERPL HS: 743 NG/L (ref 0–20)
CHLORIDE SERPL-SCNC: 90 MMOL/L (ref 98–107)
CO2 SERPL-SCNC: 29 MMOL/L (ref 21–32)
CREAT SERPL-MCNC: 1.49 MG/DL (ref 0.5–1.3)
EGFRCR SERPLBLD CKD-EPI 2021: 56 ML/MIN/1.73M*2
ERYTHROCYTE [DISTWIDTH] IN BLOOD BY AUTOMATED COUNT: 25.8 % (ref 11.5–14.5)
ERYTHROCYTE [DISTWIDTH] IN BLOOD BY AUTOMATED COUNT: 25.9 % (ref 11.5–14.5)
FLUAV RNA RESP QL NAA+PROBE: NOT DETECTED
FLUBV RNA RESP QL NAA+PROBE: NOT DETECTED
GLUCOSE BLD MANUAL STRIP-MCNC: 367 MG/DL (ref 74–99)
GLUCOSE BLD MANUAL STRIP-MCNC: 90 MG/DL (ref 74–99)
GLUCOSE SERPL-MCNC: 398 MG/DL (ref 74–99)
HCT VFR BLD AUTO: 25.7 % (ref 41–52)
HCT VFR BLD AUTO: 28.4 % (ref 41–52)
HGB BLD-MCNC: 7.8 G/DL (ref 13.5–17.5)
HGB BLD-MCNC: 8.5 G/DL (ref 13.5–17.5)
HOLD SPECIMEN: NORMAL
MAGNESIUM SERPL-MCNC: 2.03 MG/DL (ref 1.6–2.4)
MCH RBC QN AUTO: 19.8 PG (ref 26–34)
MCH RBC QN AUTO: 19.8 PG (ref 26–34)
MCHC RBC AUTO-ENTMCNC: 29.9 G/DL (ref 32–36)
MCHC RBC AUTO-ENTMCNC: 30.4 G/DL (ref 32–36)
MCV RBC AUTO: 65 FL (ref 80–100)
MCV RBC AUTO: 66 FL (ref 80–100)
MRSA DNA SPEC QL NAA+PROBE: NOT DETECTED
NRBC BLD-RTO: 0 /100 WBCS (ref 0–0)
NRBC BLD-RTO: 0 /100 WBCS (ref 0–0)
P AXIS: 75 DEGREES
P OFFSET: 188 MS
P ONSET: 141 MS
PLATELET # BLD AUTO: 405 X10*3/UL (ref 150–450)
PLATELET # BLD AUTO: 464 X10*3/UL (ref 150–450)
POTASSIUM SERPL-SCNC: 3.7 MMOL/L (ref 3.5–5.3)
PR INTERVAL: 150 MS
PROT SERPL-MCNC: 6.9 G/DL (ref 6.4–8.2)
Q ONSET: 216 MS
QRS COUNT: 19 BEATS
QRS DURATION: 134 MS
QT INTERVAL: 368 MS
QTC CALCULATION(BAZETT): 502 MS
QTC FREDERICIA: 453 MS
R AXIS: -24 DEGREES
RBC # BLD AUTO: 3.94 X10*6/UL (ref 4.5–5.9)
RBC # BLD AUTO: 4.3 X10*6/UL (ref 4.5–5.9)
RSV RNA RESP QL NAA+PROBE: NOT DETECTED
SARS-COV-2 RNA RESP QL NAA+PROBE: NOT DETECTED
SODIUM SERPL-SCNC: 131 MMOL/L (ref 136–145)
T AXIS: 31 DEGREES
T OFFSET: 400 MS
T4 FREE SERPL-MCNC: 1.38 NG/DL (ref 0.61–1.12)
TSH SERPL-ACNC: 0.08 MIU/L (ref 0.44–3.98)
UFH PPP CHRO-ACNC: 0.2 IU/ML
UFH PPP CHRO-ACNC: 0.2 IU/ML
UFH PPP CHRO-ACNC: 0.3 IU/ML
VANCOMYCIN SERPL-MCNC: 23.1 UG/ML (ref 5–20)
VANCOMYCIN SERPL-MCNC: 28.4 UG/ML (ref 5–20)
VENTRICULAR RATE: 112 BPM
WBC # BLD AUTO: 13.3 X10*3/UL (ref 4.4–11.3)
WBC # BLD AUTO: 9.5 X10*3/UL (ref 4.4–11.3)

## 2024-04-19 PROCEDURE — 2500000001 HC RX 250 WO HCPCS SELF ADMINISTERED DRUGS (ALT 637 FOR MEDICARE OP): Performed by: INTERNAL MEDICINE

## 2024-04-19 PROCEDURE — 85027 COMPLETE CBC AUTOMATED: CPT | Performed by: INTERNAL MEDICINE

## 2024-04-19 PROCEDURE — 87637 SARSCOV2&INF A&B&RSV AMP PRB: CPT | Performed by: INTERNAL MEDICINE

## 2024-04-19 PROCEDURE — 85027 COMPLETE CBC AUTOMATED: CPT | Performed by: EMERGENCY MEDICINE

## 2024-04-19 PROCEDURE — 2500000004 HC RX 250 GENERAL PHARMACY W/ HCPCS (ALT 636 FOR OP/ED): Mod: JZ

## 2024-04-19 PROCEDURE — 36415 COLL VENOUS BLD VENIPUNCTURE: CPT | Performed by: INTERNAL MEDICINE

## 2024-04-19 PROCEDURE — 1200000002 HC GENERAL ROOM WITH TELEMETRY DAILY

## 2024-04-19 PROCEDURE — 2500000004 HC RX 250 GENERAL PHARMACY W/ HCPCS (ALT 636 FOR OP/ED): Performed by: INTERNAL MEDICINE

## 2024-04-19 PROCEDURE — 82947 ASSAY GLUCOSE BLOOD QUANT: CPT

## 2024-04-19 PROCEDURE — 2500000006 HC RX 250 W HCPCS SELF ADMINISTERED DRUGS (ALT 637 FOR ALL PAYERS): Performed by: INTERNAL MEDICINE

## 2024-04-19 PROCEDURE — 99233 SBSQ HOSP IP/OBS HIGH 50: CPT | Performed by: STUDENT IN AN ORGANIZED HEALTH CARE EDUCATION/TRAINING PROGRAM

## 2024-04-19 PROCEDURE — 80202 ASSAY OF VANCOMYCIN: CPT | Performed by: PHARMACIST

## 2024-04-19 PROCEDURE — 83735 ASSAY OF MAGNESIUM: CPT | Performed by: INTERNAL MEDICINE

## 2024-04-19 PROCEDURE — 93005 ELECTROCARDIOGRAM TRACING: CPT

## 2024-04-19 PROCEDURE — 93010 ELECTROCARDIOGRAM REPORT: CPT | Performed by: INTERNAL MEDICINE

## 2024-04-19 PROCEDURE — 85520 HEPARIN ASSAY: CPT | Performed by: STUDENT IN AN ORGANIZED HEALTH CARE EDUCATION/TRAINING PROGRAM

## 2024-04-19 PROCEDURE — 2500000002 HC RX 250 W HCPCS SELF ADMINISTERED DRUGS (ALT 637 FOR MEDICARE OP, ALT 636 FOR OP/ED): Performed by: INTERNAL MEDICINE

## 2024-04-19 PROCEDURE — 85520 HEPARIN ASSAY: CPT | Performed by: INTERNAL MEDICINE

## 2024-04-19 PROCEDURE — 71250 CT THORAX DX C-: CPT | Performed by: RADIOLOGY

## 2024-04-19 PROCEDURE — 2500000004 HC RX 250 GENERAL PHARMACY W/ HCPCS (ALT 636 FOR OP/ED): Performed by: EMERGENCY MEDICINE

## 2024-04-19 PROCEDURE — 2500000004 HC RX 250 GENERAL PHARMACY W/ HCPCS (ALT 636 FOR OP/ED): Mod: JZ | Performed by: PHARMACIST

## 2024-04-19 PROCEDURE — 84484 ASSAY OF TROPONIN QUANT: CPT | Performed by: INTERNAL MEDICINE

## 2024-04-19 PROCEDURE — 2500000001 HC RX 250 WO HCPCS SELF ADMINISTERED DRUGS (ALT 637 FOR MEDICARE OP): Performed by: STUDENT IN AN ORGANIZED HEALTH CARE EDUCATION/TRAINING PROGRAM

## 2024-04-19 PROCEDURE — 2500000004 HC RX 250 GENERAL PHARMACY W/ HCPCS (ALT 636 FOR OP/ED): Performed by: NURSE PRACTITIONER

## 2024-04-19 PROCEDURE — 71250 CT THORAX DX C-: CPT

## 2024-04-19 PROCEDURE — 80053 COMPREHEN METABOLIC PANEL: CPT | Performed by: INTERNAL MEDICINE

## 2024-04-19 PROCEDURE — 87641 MR-STAPH DNA AMP PROBE: CPT | Performed by: STUDENT IN AN ORGANIZED HEALTH CARE EDUCATION/TRAINING PROGRAM

## 2024-04-19 RX ORDER — MORPHINE SULFATE 2 MG/ML
INJECTION, SOLUTION INTRAMUSCULAR; INTRAVENOUS
Status: COMPLETED
Start: 2024-04-19 | End: 2024-04-19

## 2024-04-19 RX ORDER — MORPHINE SULFATE 2 MG/ML
2 INJECTION, SOLUTION INTRAMUSCULAR; INTRAVENOUS EVERY 4 HOURS PRN
Status: DISCONTINUED | OUTPATIENT
Start: 2024-04-19 | End: 2024-04-27

## 2024-04-19 RX ORDER — INSULIN LISPRO 100 [IU]/ML
0-10 INJECTION, SOLUTION INTRAVENOUS; SUBCUTANEOUS
Status: DISCONTINUED | OUTPATIENT
Start: 2024-04-19 | End: 2024-05-01 | Stop reason: HOSPADM

## 2024-04-19 RX ORDER — IPRATROPIUM BROMIDE AND ALBUTEROL SULFATE 2.5; .5 MG/3ML; MG/3ML
3 SOLUTION RESPIRATORY (INHALATION) 4 TIMES DAILY PRN
Status: DISCONTINUED | OUTPATIENT
Start: 2024-04-19 | End: 2024-05-01 | Stop reason: HOSPADM

## 2024-04-19 RX ORDER — MUPIROCIN 20 MG/G
OINTMENT TOPICAL 3 TIMES DAILY
Status: DISCONTINUED | OUTPATIENT
Start: 2024-04-19 | End: 2024-05-01 | Stop reason: HOSPADM

## 2024-04-19 RX ORDER — GABAPENTIN 100 MG/1
100 CAPSULE ORAL 3 TIMES DAILY
Status: DISCONTINUED | OUTPATIENT
Start: 2024-04-19 | End: 2024-04-19

## 2024-04-19 RX ORDER — MORPHINE SULFATE 2 MG/ML
2 INJECTION, SOLUTION INTRAMUSCULAR; INTRAVENOUS ONCE
Status: COMPLETED | OUTPATIENT
Start: 2024-04-19 | End: 2024-04-19

## 2024-04-19 RX ORDER — VANCOMYCIN HYDROCHLORIDE 1 G/20ML
INJECTION, POWDER, LYOPHILIZED, FOR SOLUTION INTRAVENOUS DAILY PRN
Status: DISCONTINUED | OUTPATIENT
Start: 2024-04-19 | End: 2024-04-21

## 2024-04-19 RX ORDER — DEXTROSE 50 % IN WATER (D50W) INTRAVENOUS SYRINGE
25
Status: DISCONTINUED | OUTPATIENT
Start: 2024-04-19 | End: 2024-05-01 | Stop reason: HOSPADM

## 2024-04-19 RX ORDER — MAGNESIUM SULFATE 1 G/100ML
1 INJECTION INTRAVENOUS ONCE
Status: COMPLETED | OUTPATIENT
Start: 2024-04-19 | End: 2024-04-19

## 2024-04-19 RX ORDER — VANCOMYCIN HYDROCHLORIDE 750 MG/150ML
750 INJECTION, SOLUTION INTRAVENOUS EVERY 24 HOURS
Status: DISCONTINUED | OUTPATIENT
Start: 2024-04-20 | End: 2024-04-21

## 2024-04-19 RX ORDER — DEXTROSE 50 % IN WATER (D50W) INTRAVENOUS SYRINGE
12.5
Status: DISCONTINUED | OUTPATIENT
Start: 2024-04-19 | End: 2024-05-01 | Stop reason: HOSPADM

## 2024-04-19 RX ORDER — METOPROLOL SUCCINATE 25 MG/1
25 TABLET, EXTENDED RELEASE ORAL DAILY
Status: DISCONTINUED | OUTPATIENT
Start: 2024-04-19 | End: 2024-05-01 | Stop reason: HOSPADM

## 2024-04-19 RX ORDER — LEVOFLOXACIN 5 MG/ML
750 INJECTION, SOLUTION INTRAVENOUS EVERY 24 HOURS
Status: DISCONTINUED | OUTPATIENT
Start: 2024-04-19 | End: 2024-04-21

## 2024-04-19 RX ORDER — METHIMAZOLE 5 MG/1
5 TABLET ORAL DAILY
Status: DISCONTINUED | OUTPATIENT
Start: 2024-04-19 | End: 2024-04-19

## 2024-04-19 RX ORDER — ASPIRIN 81 MG/1
81 TABLET ORAL DAILY
Status: DISCONTINUED | OUTPATIENT
Start: 2024-04-19 | End: 2024-05-01 | Stop reason: HOSPADM

## 2024-04-19 RX ORDER — FUROSEMIDE 10 MG/ML
20 INJECTION INTRAMUSCULAR; INTRAVENOUS DAILY
Status: DISCONTINUED | OUTPATIENT
Start: 2024-04-19 | End: 2024-04-19

## 2024-04-19 RX ORDER — SPIRONOLACTONE 25 MG/1
100 TABLET ORAL DAILY
Status: DISCONTINUED | OUTPATIENT
Start: 2024-04-20 | End: 2024-04-27

## 2024-04-19 RX ORDER — MIDODRINE HYDROCHLORIDE 5 MG/1
10 TABLET ORAL
Status: DISCONTINUED | OUTPATIENT
Start: 2024-04-19 | End: 2024-05-01 | Stop reason: HOSPADM

## 2024-04-19 RX ORDER — METHIMAZOLE 5 MG/1
12.5 TABLET ORAL DAILY
Status: DISCONTINUED | OUTPATIENT
Start: 2024-04-19 | End: 2024-05-01 | Stop reason: HOSPADM

## 2024-04-19 RX ORDER — FUROSEMIDE 40 MG/1
40 TABLET ORAL DAILY
Status: DISCONTINUED | OUTPATIENT
Start: 2024-04-20 | End: 2024-04-25

## 2024-04-19 RX ORDER — ATORVASTATIN CALCIUM 80 MG/1
80 TABLET, FILM COATED ORAL NIGHTLY
Status: DISCONTINUED | OUTPATIENT
Start: 2024-04-19 | End: 2024-05-01 | Stop reason: HOSPADM

## 2024-04-19 RX ORDER — POTASSIUM CHLORIDE 20 MEQ/1
20 TABLET, EXTENDED RELEASE ORAL 3 TIMES DAILY
Status: DISCONTINUED | OUTPATIENT
Start: 2024-04-19 | End: 2024-04-20

## 2024-04-19 RX ORDER — LIDOCAINE 560 MG/1
1 PATCH PERCUTANEOUS; TOPICAL; TRANSDERMAL DAILY
Status: DISCONTINUED | OUTPATIENT
Start: 2024-04-20 | End: 2024-05-01 | Stop reason: HOSPADM

## 2024-04-19 RX ADMIN — MUPIROCIN: 20 OINTMENT TOPICAL at 20:57

## 2024-04-19 RX ADMIN — LEVOFLOXACIN 750 MG: 5 INJECTION, SOLUTION INTRAVENOUS at 01:00

## 2024-04-19 RX ADMIN — MIDODRINE HYDROCHLORIDE 10 MG: 5 TABLET ORAL at 12:24

## 2024-04-19 RX ADMIN — MAGNESIUM SULFATE HEPTAHYDRATE 1 G: 1 INJECTION, SOLUTION INTRAVENOUS at 03:56

## 2024-04-19 RX ADMIN — Medication 3 MG: at 03:00

## 2024-04-19 RX ADMIN — METHIMAZOLE 12.5 MG: 5 TABLET ORAL at 09:09

## 2024-04-19 RX ADMIN — VANCOMYCIN HYDROCHLORIDE 1500 MG: 1.5 INJECTION, POWDER, LYOPHILIZED, FOR SOLUTION INTRAVENOUS at 02:51

## 2024-04-19 RX ADMIN — MUPIROCIN: 20 OINTMENT TOPICAL at 12:24

## 2024-04-19 RX ADMIN — HEPARIN SODIUM 1000 UNITS/HR: 10000 INJECTION, SOLUTION INTRAVENOUS at 17:30

## 2024-04-19 RX ADMIN — INSULIN LISPRO 10 UNITS: 100 INJECTION, SOLUTION INTRAVENOUS; SUBCUTANEOUS at 09:10

## 2024-04-19 RX ADMIN — MORPHINE SULFATE 2 MG: 2 INJECTION, SOLUTION INTRAMUSCULAR; INTRAVENOUS at 03:45

## 2024-04-19 RX ADMIN — FUROSEMIDE 20 MG: 10 INJECTION, SOLUTION INTRAMUSCULAR; INTRAVENOUS at 09:10

## 2024-04-19 RX ADMIN — ACETAMINOPHEN 650 MG: 325 TABLET ORAL at 12:47

## 2024-04-19 RX ADMIN — ONDANSETRON 4 MG: 2 INJECTION INTRAMUSCULAR; INTRAVENOUS at 22:28

## 2024-04-19 RX ADMIN — INSULIN LISPRO 10 UNITS: 100 INJECTION, SOLUTION INTRAVENOUS; SUBCUTANEOUS at 12:25

## 2024-04-19 RX ADMIN — PANTOPRAZOLE SODIUM 40 MG: 40 TABLET, DELAYED RELEASE ORAL at 09:14

## 2024-04-19 RX ADMIN — MIDODRINE HYDROCHLORIDE 10 MG: 5 TABLET ORAL at 16:50

## 2024-04-19 RX ADMIN — POTASSIUM CHLORIDE 20 MEQ: 1500 TABLET, EXTENDED RELEASE ORAL at 09:08

## 2024-04-19 RX ADMIN — MORPHINE SULFATE 2 MG: 2 INJECTION, SOLUTION INTRAMUSCULAR; INTRAVENOUS at 22:11

## 2024-04-19 RX ADMIN — POTASSIUM CHLORIDE 20 MEQ: 1500 TABLET, EXTENDED RELEASE ORAL at 16:50

## 2024-04-19 RX ADMIN — METOPROLOL SUCCINATE 25 MG: 25 TABLET, EXTENDED RELEASE ORAL at 09:09

## 2024-04-19 RX ADMIN — ASPIRIN 81 MG: 81 TABLET, COATED ORAL at 09:08

## 2024-04-19 RX ADMIN — ATORVASTATIN CALCIUM 80 MG: 80 TABLET, FILM COATED ORAL at 20:57

## 2024-04-19 RX ADMIN — MIDODRINE HYDROCHLORIDE 10 MG: 5 TABLET ORAL at 09:14

## 2024-04-19 RX ADMIN — POTASSIUM CHLORIDE 20 MEQ: 1500 TABLET, EXTENDED RELEASE ORAL at 20:57

## 2024-04-19 ASSESSMENT — PAIN DESCRIPTION - ORIENTATION: ORIENTATION: LEFT;UPPER

## 2024-04-19 ASSESSMENT — PAIN SCALES - GENERAL
PAINLEVEL_OUTOF10: 8
PAINLEVEL_OUTOF10: 0 - NO PAIN
PAINLEVEL_OUTOF10: 5 - MODERATE PAIN
PAINLEVEL_OUTOF10: 8
PAINLEVEL_OUTOF10: 0 - NO PAIN
PAINLEVEL_OUTOF10: 7

## 2024-04-19 ASSESSMENT — COGNITIVE AND FUNCTIONAL STATUS - GENERAL
EATING MEALS: A LITTLE
DAILY ACTIVITIY SCORE: 18
PERSONAL GROOMING: A LITTLE
DRESSING REGULAR UPPER BODY CLOTHING: A LITTLE
HELP NEEDED FOR BATHING: A LITTLE
MOVING TO AND FROM BED TO CHAIR: A LOT
TOILETING: A LITTLE
STANDING UP FROM CHAIR USING ARMS: A LOT
CLIMB 3 TO 5 STEPS WITH RAILING: A LOT
WALKING IN HOSPITAL ROOM: A LOT
DRESSING REGULAR LOWER BODY CLOTHING: A LITTLE
MOBILITY SCORE: 16

## 2024-04-19 ASSESSMENT — PAIN - FUNCTIONAL ASSESSMENT
PAIN_FUNCTIONAL_ASSESSMENT: 0-10

## 2024-04-19 ASSESSMENT — PAIN DESCRIPTION - LOCATION
LOCATION: CHEST
LOCATION: LEG

## 2024-04-19 ASSESSMENT — ACTIVITIES OF DAILY LIVING (ADL): LACK_OF_TRANSPORTATION: NO

## 2024-04-19 NOTE — PROGRESS NOTES
ASSESSMENT & PLAN:       Principal Problem:    Right lower lobe pneumonia    Right Pleural effusion  Active Problems:    PAD (peripheral artery disease) (CMS-HCC)    Tobacco dependence with current use    Acute on chronic congestive heart failure (Multi)    Type 2 diabetes mellitus with peripheral neuropathy (Multi)    NSTEMI (non-ST elevated myocardial infarction) (Multi)    Dyspnea, unspecified type    Recurrent right pleural effusion    Acute respiratory failure with hypoxia (Multi)    Cirrhosis with ascites        This patient presented with worsening shortness of breath now requiring oxygen for the first time.  Has severely reduced ejection fraction CHF, and liver cirrhosis and recurrent pleural effusion and ascites.  He did require thoracentesis and paracentesis in the past.  -Poorly controlled diabetes, and peripheral vascular disease status post left below-knee amputation.  -Start patient on vancomycin, and continue Levaquin.  He has anaphylaxis reaction to penicillin so no cephalosporin.  -Obtain MRSA screen, Legionella, COVID, RSV and flu.  -IR consult for possible thoracentesis of recurrent right pleural effusion.  -Follow cultures.  -Continue with heparin drip for now.  He has trending up troponin.  He did have chest pain on arrival to the ER.    -Cardiology consult.  -Continue with Lasix for CHF.  -DuoNeb for bronchodilation.  -Sliding scale coverage for diabetes.  -Will obtain CT chest without contrast for further evaluation of pleural effusion and lung consolidation        4/20/24  -Continued on heparin drip for NSTEMI  -Cards evaluated patient, recs appreciated. Believe patient will require a [facility] on 4/22/24.   -Will discontinue Lasix 20 mg IV and diurese with Lasix 40 mg PO + aldactone 100 mg daily given cirrhosis. Renal fucntion appears at baseline.   -CT thorax reviewed, reveals dense consolidations extensviely in all 3 right lobes as well as extensively loculated, large right pleural effusion  which appears to be increasing in size. Thoracic surgery consultation advised for chest tube consideration. 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   -Contineud on vanc/levaquin due to concerns of bacterial pneumonia       55 minutes spent on patient encounter. Time calculated includes chart review, bedside evaluation, counselling, and care coordination.           Mukesh Benoit MD    SUBJECTIVE     Patient had no acute events overnight.  States chest pain has resolved since receiving nitroglycerin in the ED.  Denies any fevers chills or chest pain.  Endorsing shortness of breath.  Further also unremarkable.    OBJECTIVE:       Last Recorded Vitals:  Vitals:    04/18/24 2225 04/19/24 0304 04/19/24 0315 04/19/24 1200   BP: 123/84 130/82 113/75 122/81   BP Location: Right arm Right arm Right arm Left arm   Patient Position: Lying Sitting  Lying   Pulse: 109 109 106 104   Resp: 20 20  20   Temp: 37.1 °C (98.8 °F) 36.8 °C (98.2 °F) 36.9 °C (98.4 °F) 36.8 °C (98.2 °F)   TempSrc:  Temporal Temporal Temporal   SpO2: 96% 96% 99% 97%   Weight: 65.2 kg (143 lb 11.8 oz)      Height:           Last I/O:  I/O last 3 completed shifts:  In: 650 (10 mL/kg) [IV Piggyback:650]  Out: 350 (5.4 mL/kg) [Urine:350 (0.1 mL/kg/hr)]  Weight: 65.2 kg     Physical Exam:  General: Ill-appearing adult male in mild distress  HEENT: Clear sclera, EOMI, trachea midline, moist mucous membranes  Respiratory: Equal chest rise, no retractions, diminished breath sounds on right  Abdomen: Tense, distended, nontender  Extremities: Left BKA noted stump covered in clean bandaging.  Superficial ulcerations involving right leg  Neurological: Spontaneously moves all extremities, no dysarthria, cranial nerves grossly intact  Psychiatric: Appropriate mood and affect  Skin: Warm, dry    Inpatient Medications:  aspirin, 81 mg, oral, Daily  atorvastatin, 80 mg, oral, Nightly  furosemide, 20 mg, intravenous, Daily  insulin  lispro, 0-10 Units, subcutaneous, TID with meals  levoFLOXacin, 750 mg, intravenous, q24h  methIMAzole, 12.5 mg, oral, Daily  metoprolol succinate XL, 25 mg, oral, Daily  midodrine, 10 mg, oral, TID with meals  morphine, 4 mg, intravenous, Once  mupirocin, , Topical, TID  pantoprazole, 40 mg, oral, Daily before breakfast   Or  pantoprazole, 40 mg, intravenous, Daily before breakfast  potassium chloride CR, 20 mEq, oral, TID  [START ON 4/20/2024] vancomycin, 750 mg, intravenous, q24h        PRN Medications  PRN medications: acetaminophen **OR** acetaminophen **OR** acetaminophen, dextrose, dextrose, glucagon, glucagon, heparin, ipratropium-albuteroL, melatonin, ondansetron **OR** ondansetron, polyethylene glycol, vancomycin    Continuous Medications:  heparin, 0-4,000 Units/hr, Last Rate: 900 Units/hr (04/19/24 0620)          LABS AND IMAGING:     Labs:  Results for orders placed or performed during the hospital encounter of 04/18/24 (from the past 24 hour(s))   CBC and Auto Differential   Result Value Ref Range    WBC 9.2 4.4 - 11.3 x10*3/uL    nRBC 0.0 0.0 - 0.0 /100 WBCs    RBC 4.02 (L) 4.50 - 5.90 x10*6/uL    Hemoglobin 7.9 (L) 13.5 - 17.5 g/dL    Hematocrit 27.2 (L) 41.0 - 52.0 %    MCV 68 (L) 80 - 100 fL    MCH 19.7 (L) 26.0 - 34.0 pg    MCHC 29.0 (L) 32.0 - 36.0 g/dL    RDW 26.5 (H) 11.5 - 14.5 %    Platelets 335 150 - 450 x10*3/uL    Neutrophils % 75.9 40.0 - 80.0 %    Immature Granulocytes %, Automated 0.2 0.0 - 0.9 %    Lymphocytes % 15.2 13.0 - 44.0 %    Monocytes % 8.0 2.0 - 10.0 %    Eosinophils % 0.2 0.0 - 6.0 %    Basophils % 0.5 0.0 - 2.0 %    Neutrophils Absolute 7.01 1.20 - 7.70 x10*3/uL    Immature Granulocytes Absolute, Automated 0.02 0.00 - 0.70 x10*3/uL    Lymphocytes Absolute 1.40 1.20 - 4.80 x10*3/uL    Monocytes Absolute 0.74 0.10 - 1.00 x10*3/uL    Eosinophils Absolute 0.02 0.00 - 0.70 x10*3/uL    Basophils Absolute 0.05 0.00 - 0.10 x10*3/uL   Basic metabolic panel   Result Value Ref Range     Glucose 236 (H) 74 - 99 mg/dL    Sodium 132 (L) 136 - 145 mmol/L    Potassium 3.5 3.5 - 5.3 mmol/L    Chloride 93 (L) 98 - 107 mmol/L    Bicarbonate 29 21 - 32 mmol/L    Anion Gap 14 10 - 20 mmol/L    Urea Nitrogen 89 (H) 6 - 23 mg/dL    Creatinine 1.43 (H) 0.50 - 1.30 mg/dL    eGFR 59 (L) >60 mL/min/1.73m*2    Calcium 8.5 (L) 8.6 - 10.3 mg/dL   Magnesium   Result Value Ref Range    Magnesium 1.77 1.60 - 2.40 mg/dL   Hepatic function panel   Result Value Ref Range    Albumin 2.9 (L) 3.4 - 5.0 g/dL    Bilirubin, Total 0.8 0.0 - 1.2 mg/dL    Bilirubin, Direct 0.4 (H) 0.0 - 0.3 mg/dL    Alkaline Phosphatase 309 (H) 33 - 120 U/L    ALT 26 10 - 52 U/L    AST 38 9 - 39 U/L    Total Protein 7.1 6.4 - 8.2 g/dL   Lactate   Result Value Ref Range    Lactate 0.8 0.4 - 2.0 mmol/L   Protime-INR   Result Value Ref Range    Protime 16.0 (H) 9.8 - 12.8 seconds    INR 1.4 (H) 0.9 - 1.1   Troponin I, High Sensitivity   Result Value Ref Range    Troponin I, High Sensitivity 823 (HH) 0 - 20 ng/L   Morphology   Result Value Ref Range    RBC Morphology See Below     Polychromasia Mild     Ovalocytes Few     Acanthocytes Many    ECG 12 lead   Result Value Ref Range    Ventricular Rate 115 BPM    Atrial Rate 115 BPM    [place] Interval 142 ms    QRS Duration 142 ms    QT Interval 370 ms    QTC Calculation(Bazett) 511 ms    P Axis 69 degrees    R Axis -25 degrees    T Axis 20 degrees    QRS Count 19 beats    Q Onset 216 ms    P Onset 145 ms    P Offset 187 ms    T Offset 401 ms    QTC Fredericia 459 ms   Troponin I, High Sensitivity, Initial   Result Value Ref Range    Troponin I, High Sensitivity 962 (HH) 0 - 20 ng/L   Troponin, High Sensitivity, 1 Hour   Result Value Ref Range    Troponin I, High Sensitivity 1,060 (HH) 0 - 20 ng/L   TSH with reflex to Free T4 if abnormal   Result Value Ref Range    Thyroid Stimulating Hormone 0.08 (L) 0.44 - 3.98 mIU/L   Thyroxine, Free   Result Value Ref Range    Thyroxine, Free 1.38 (H) 0.61 - 1.12 ng/dL    ECG 12 lead   Result Value Ref Range    Ventricular Rate 112 BPM    Atrial Rate 112 BPM    DE Interval 150 ms    QRS Duration 134 ms    QT Interval 368 ms    QTC Calculation(Bazett) 502 ms    P Axis 75 degrees    R Axis -24 degrees    T Axis 31 degrees    QRS Count 19 beats    Q Onset 216 ms    P Onset 141 ms    P Offset 188 ms    T Offset 400 ms    QTC Fredericia 453 ms   Heparin Assay   Result Value Ref Range    Heparin Unfractionated 0.3 See Comment Below for Therapeutic Ranges IU/mL   Sars-CoV-2 PCR   Result Value Ref Range    Coronavirus 2019, PCR Not Detected Not Detected   Influenza A, and B PCR   Result Value Ref Range    Flu A Result Not Detected Not Detected    Flu B Result Not Detected Not Detected   RSV PCR   Result Value Ref Range    RSV PCR Not Detected Not Detected   ECG 12 lead   Result Value Ref Range    Ventricular Rate 110 BPM    Atrial Rate 110 BPM    DE Interval 152 ms    QRS Duration 142 ms    QT Interval 400 ms    QTC Calculation(Bazett) 541 ms    P Axis 87 degrees    R Axis -47 degrees    T Axis 28 degrees    QRS Count 18 beats    Q Onset 215 ms    P Onset 139 ms    P Offset 186 ms    T Offset 415 ms    QTC Fredericia 490 ms   CBC   Result Value Ref Range    WBC 9.5 4.4 - 11.3 x10*3/uL    nRBC 0.0 0.0 - 0.0 /100 WBCs    RBC 3.94 (L) 4.50 - 5.90 x10*6/uL    Hemoglobin 7.8 (L) 13.5 - 17.5 g/dL    Hematocrit 25.7 (L) 41.0 - 52.0 %    MCV 65 (L) 80 - 100 fL    MCH 19.8 (L) 26.0 - 34.0 pg    MCHC 30.4 (L) 32.0 - 36.0 g/dL    RDW 25.8 (H) 11.5 - 14.5 %    Platelets 405 150 - 450 x10*3/uL   Comprehensive metabolic panel   Result Value Ref Range    Glucose 398 (H) 74 - 99 mg/dL    Sodium 131 (L) 136 - 145 mmol/L    Potassium 3.7 3.5 - 5.3 mmol/L    Chloride 90 (L) 98 - 107 mmol/L    Bicarbonate 29 21 - 32 mmol/L    Anion Gap 16 10 - 20 mmol/L    Urea Nitrogen 87 (H) 6 - 23 mg/dL    Creatinine 1.49 (H) 0.50 - 1.30 mg/dL    eGFR 56 (L) >60 mL/min/1.73m*2    Calcium 8.2 (L) 8.6 - 10.3 mg/dL    Albumin  2.8 (L) 3.4 - 5.0 g/dL    Alkaline Phosphatase 302 (H) 33 - 120 U/L    Total Protein 6.9 6.4 - 8.2 g/dL    AST 28 9 - 39 U/L    Bilirubin, Total 1.1 0.0 - 1.2 mg/dL    ALT 23 10 - 52 U/L   Heparin Assay   Result Value Ref Range    Heparin Unfractionated 0.2 See Comment Below for Therapeutic Ranges IU/mL   Troponin I, High Sensitivity   Result Value Ref Range    Troponin I, High Sensitivity 743 (HH) 0 - 20 ng/L   Magnesium   Result Value Ref Range    Magnesium 2.03 1.60 - 2.40 mg/dL   Vancomycin   Result Value Ref Range    Vancomycin 28.4 (H) 5.0 - 20.0 ug/mL   POCT GLUCOSE   Result Value Ref Range    POCT Glucose 367 (H) 74 - 99 mg/dL   Heparin Assay   Result Value Ref Range    Heparin Unfractionated 0.2 See Comment Below for Therapeutic Ranges IU/mL   Vancomycin   Result Value Ref Range    Vancomycin 23.1 (H) 5.0 - 20.0 ug/mL   POCT GLUCOSE   Result Value Ref Range    POCT Glucose 90 74 - 99 mg/dL        Imaging:

## 2024-04-19 NOTE — CONSULTS
HPI:      Patient is a 53 y.o. male with past medical history of systolic CHF with most recent EF of 25 to 30%, hypertension, hyperlipidemia, PAD, diabetes, liver cirrhosis, who presents initially to [facility] with a chief complaint of  SOB.  Patient with multiple medical problems history of noncompliance.  He states he has not followed up with a cardiologist as outpatient.  He has multiple recurrent admissions recently.  Apparently left AMA from [facility].  Noted to have elevated cardiac enzyme and trending down in the setting of chronic kidney disease.  He denies any chest pain chest pressure heaviness.  Per patient he states he had a coronary angiogram approximately 2 years ago at [facility], no PCI was performed.  Hemoglobin is 7.8, troponin is 743 now.  EKG with ST, RBBB.         Cardiology consulted for acute on chronic systolic heart failure     Echo on 11/2023:  - The left ventricle is severely dilated. Left ventricular systolic function is   severely decreased. EF = 27 ± 5% (2D 4-ch.) Definity contrast used for endocardial    border detection. Indeterminate left ventricular diastolic dysfunction due to >2+    MR.   - The right ventricle is dilated. Right ventricular systolic function is mildly   decreased.   - The left atrial cavity is severely dilated.   - There is moderate (2+ - 3+) mitral valve regurgitation. Regurgitant orifice area    (PISA) is 0.34 cm².   - There is moderate (2+ - 3+) tricuspid valve regurgitation.   - Estimated right ventricular systolic pressure is 35 mmHg consistent with mild   pulmonary hypertension. Estimated right atrial pressure is 8 mmHg based on IVC   assessment.     On examination today, patient resting in bed comfortably.  Denying anginal like symptoms.  Patient does report shortness of breath at rest and with exertion.  He reports shortness of breath is worse when he is laying down and at night.  Patient lives in a nursing home and will need  continuous follow-up with a cardiologist, in which she would like to establish. Patient very noncompliant.          Past Medical History   No past medical history on file.            Patient Active Problem List   Diagnosis    Hyperthyroidism    Encephalopathy    Hypoglycemia    PIPPA (acute kidney injury) (HCC)         Past Surgical History             Past Surgical History:   Procedure Laterality Date    PARACENTESIS Left 04/03/2024     3215 ml removed by Dr. Simental - diagnostics sent            Social History   Social History                Socioeconomic History    Marital status: Single   Tobacco Use    Smoking status: Never       Passive exposure: Never    Smokeless tobacco: Never   Substance and Sexual Activity    Alcohol use: Never    Drug use: Never      Social Determinants of Health              Food Insecurity: Patient Unable To Answer (4/2/2024)     Hunger Vital Sign      Worried About Running Out of Food in the Last Year: Patient unable to answer      Ran Out of Food in the Last Year: Patient unable to answer   Transportation Needs: Patient Unable To Answer (4/2/2024)     PRAPARE - Transportation      Lack of Transportation (Medical): Patient unable to answer      Lack of Transportation (Non-Medical): Patient unable to answer   Housing Stability: Patient Unable To Answer (4/2/2024)     Housing Stability Vital Sign      Unable to Pay for Housing in the Last Year: Patient unable to answer      Number of Places Lived in the Last Year: 1      Unstable Housing in the Last Year: Patient unable to answer            Family History   No family history on file.         Current Facility-Administered Medications                     Current Facility-Administered Medications   Medication Dose Route Frequency Provider Last Rate Last Admin    insulin glargine (LANTUS) injection vial 25 Units  25 Units SubCUTAneous Nightly Maicol Dela Cruz MD        insulin lispro (HUMALOG) injection vial 8 Units  8 Units SubCUTAneous TID WC  Maicol Dela Cruz MD   8 Units at 04/04/24 1724    epoetin tika-epbx (RETACRIT) injection 10,000 Units  10,000 Units SubCUTAneous Weekly Allen Mahajan MD   10,000 Units at 04/03/24 2114    furosemide (LASIX) injection 40 mg  40 mg IntraVENous BID Allen Mahajan MD   40 mg at 04/04/24 1724    traMADol (ULTRAM) tablet 50 mg  50 mg Oral Q6H PRN Daily Valentine MD   50 mg at 04/05/24 0554    clopidogrel (PLAVIX) tablet 75 mg  75 mg Oral Daily Ness Bird MD   75 mg at 04/04/24 0846    gabapentin (NEURONTIN) capsule 100 mg  100 mg Oral TID Ness Bird MD        atorvastatin (LIPITOR) tablet 80 mg  80 mg Oral Nightly Ness Bird MD   80 mg at 04/04/24 2152    insulin lispro (HUMALOG) injection vial 0-8 Units  0-8 Units SubCUTAneous TID  Dimitry Joshi PA   6 Units at 04/04/24 1724    insulin lispro (HUMALOG) injection vial 0-4 Units  0-4 Units SubCUTAneous Nightly Dimitry Joshi PA   4 Units at 04/03/24 2008    sodium chloride flush 0.9 % injection 5-40 mL  5-40 mL IntraVENous 2 times per day Daily Valentine MD   10 mL at 04/04/24 2153    sodium chloride flush 0.9 % injection 5-40 mL  5-40 mL IntraVENous PRN Daily Valentine MD        0.9 % sodium chloride infusion   IntraVENous PRN Daily Valentine MD        potassium chloride (KLOR-CON M) extended release tablet 40 mEq  40 mEq Oral PRN Daily Valentine MD         Or    potassium bicarb-citric acid (EFFER-K) effervescent tablet 40 mEq  40 mEq Oral PRN Daily Valentine MD         Or    potassium chloride 10 mEq/100 mL IVPB (Peripheral Line)  10 mEq IntraVENous PRN Daily Valentine MD        magnesium sulfate 2000 mg in 50 mL IVPB premix  2,000 mg IntraVENous PRN Daily Valentine MD        enoxaparin (LOVENOX) injection 40 mg  40 mg SubCUTAneous Daily Daily Valentine MD   40 mg at 04/04/24 0849    ondansetron (ZOFRAN-ODT) disintegrating tablet 4 mg  4 mg Oral Q8H PRN Daily Valentine MD         Or    ondansetron (ZOFRAN) injection 4 mg  4 mg IntraVENous Q6H PRN  "Daily Valentine MD        polyethylene glycol (GLYCOLAX) packet 17 g  17 g Oral Daily PRN Daily Valentine MD        acetaminophen (TYLENOL) tablet 650 mg  650 mg Oral Q6H PRN Daily Valentine MD   650 mg at 04/02/24 1643     Or    acetaminophen (TYLENOL) suppository 650 mg  650 mg Rectal Q6H PRN Daily Valentine MD        glucose chewable tablet 16 g  4 tablet Oral PRN Daily Valentine MD        dextrose bolus 10% 125 mL  125 mL IntraVENous PRN Daily Valentine MD   Stopped at 04/02/24 1248     Or    dextrose bolus 10% 250 mL  250 mL IntraVENous PRN Daily Valentine MD        glucagon injection 1 mg  1 mg SubCUTAneous PRN Daily Valentine MD        dextrose 10 % infusion   IntraVENous Continuous PRN Daily Valentine MD        midodrine (PROAMATINE) tablet 10 mg  10 mg Oral TID WC Lonnie Ortiz MD   10 mg at 04/04/24 1725            ALLERGIES: Amoxicillin     Review of Systems   Constitutional: Negative.    HENT: Negative.     Eyes: Negative.    Respiratory:  Positive for shortness of breath. Negative for chest tightness.    Cardiovascular:  Negative for chest pain, palpitations and leg swelling.   Gastrointestinal: Negative.    Endocrine: Negative.    Genitourinary: Negative.    Musculoskeletal: Negative.    Skin: Negative.    Allergic/Immunologic: Negative.    Neurological: Negative.    Hematological: Negative.    Psychiatric/Behavioral: Negative.              VITALS:  Blood pressure 107/74, pulse 80, temperature 99 °F (37.2 °C), resp. rate 17, height 1.778 m (5' 10\"), weight 75.4 kg (166 lb 3.6 oz), SpO2 93 %.  Body mass index is 23.85 kg/m².     Physical Exam  HENT:      Head: Normocephalic.   Cardiovascular:      Rate and Rhythm: Normal rate and regular rhythm.      Heart sounds: No murmur heard.  Pulmonary:      Effort: Pulmonary effort is normal.      Breath sounds: Normal breath sounds.   Abdominal:      General: Bowel sounds are normal. There is distension.   Musculoskeletal:      Right lower leg: No edema.      Left lower " "leg: Left lower leg edema: BKA.   Skin:     General: Skin is warm and dry.   Neurological:      General: No focal deficit present.      Mental Status: He is alert.   Psychiatric:         Mood and Affect: Mood normal.            LABS:     Pertinent Labs:  CBC:         Recent Labs     04/04/24  0324   WBC 11.8*   HGB 7.2*   *      BMP:        Recent Labs     04/05/24  0447      K 4.0   CL 99   CO2 21   *   CREATININE 2.29*   GLUCOSE 188*   LABGLOM 33.1*      INR:         Recent Labs     04/02/24  0930   INR 1.5      BNP: No results for input(s): \"BNP\" in the last 72 hours.   TSH: No results found for: \"TSH\"   Cardiac Injury Profile: No results for input(s): \"CKTOTAL\", \"CKMB\", \"CKMBINDEX\", \"TROPONINI\" in the last 72 hours.   Troponin: No results found for: \"TROPONINI\"  Lipid Profile: No results found for: \"TRIG\", \"HDL\", \"LDLCALC\", \"CHOL\"   Hemoglobin A1C: No components found for: \"HGBA1C\"         Radiology:  XR CHEST PORTABLE     Result Date: 4/4/2024  EXAMINATION: ONE XRAY VIEW OF THE CHEST 4/4/2024 6:06 am COMPARISON: April 2, 2024 HISTORY: ORDERING SYSTEM PROVIDED HISTORY: Pleural effusion TECHNOLOGIST PROVIDED HISTORY: Reason for exam:->Pleural effusion What reading provider will be dictating this exam?->CRC FINDINGS: Right basilar opacity with silhouetting of the right hemidiaphragm and right heart border compatible with moderate right effusion and associated right basilar atelectasis, unchanged from April 2, 2024..  The left lung is clear. No cardiomediastinal shift.  Osseous and body wall soft tissues unremarkable.      Moderate right effusion and associated right basilar atelectasis, unchanged from April 2, 2024.      IR US GUIDED PARACENTESIS     Result Date: 4/3/2024  PROCEDURE: PARACENTESIS WITHOUT IMAGE GUIDANCE US ABDOMEN LIMITED 4/3/2024 HISTORY: ORDERING SYSTEM PROVIDED HISTORY: ascites TECHNOLOGIST PROVIDED HISTORY: Reason for exam:->ascites TECHNIQUE: Informed consent was obtained " after a detailed explanation of the procedure including risks, benefits, and alternatives.  Universal protocol was followed.  A limited ultrasound of the abdomen was performed. The left abdomen was prepped and draped in sterile fashion and local anesthesia was achieved with lidocaine.  An 5 Hebrew needle sheath was advanced into ascites and paracentesis was performed.  The patient tolerated the procedure well. FINDINGS: Limited ultrasound of the abdomen demonstrates ascites. A total of 3215 mL was removed.      Successful paracentesis.      XR CHEST PORTABLE     Result Date: 4/2/2024  EXAMINATION: ONE XRAY VIEW OF THE CHEST 4/2/2024 10:50 am COMPARISON: None. HISTORY: ORDERING SYSTEM PROVIDED HISTORY: Altered LOC TECHNOLOGIST PROVIDED HISTORY: Reason for exam:->Altered LOC What reading provider will be dictating this exam?->CRC FINDINGS: See impression.      1. Pleural and parenchymal opacities right lung base with elevation of the right hemidiaphragm. 2. Cardiomegaly. 3. Hyperinflated left lung. 4. Central bronchial wall thickening and mild interstitial prominence.      US HEAD NECK SOFT TISSUE THYROID     Result Date: 4/2/2024  EXAMINATION: ULTRASOUND OF THE THYROID WITH COLOR DOPPLER FLOW EVALUATION4/2/2024 10:14 am Ultrasound Thyroid COMPARISON: None HISTORY: ORDERING SYSTEM PROVIDED HISTORY: MASS, mass of neck, Possible pulsatile mass of right carotid artery TECHNOLOGIST PROVIDED HISTORY: Reason for exam:->mass of neck, FINDINGS: Size right thyroid lobe: 6.1 x 3.2 x 4.3 cm Size left thyroid lobe: 4.4 x 1.6 x 1.8 cm Size isthmus: 0.4 cm Texture: Homogenous Estimated total number of nodules greater than or equal to 1 cm: 1 Nodule#: # 1: Maximum size: 5.2 cm . All dimensions: 5.2 x 4.1 x 3.4 cm Location: Right Mid Composition: solid or almost completely solid: 2 points Echogenicity: hypoechoic: 2 points Shape: wider than tall: 0 points Margins: smooth: 0 points Echogenic foci: none: 0 points ACR Total Points: 4;   ACR TI-RADS risk category: TR4 -  moderately suspicious nodule. No aneurysm is seen.      1. Nodule 1: ACR TI-RADS 2017 Category 4. Recommend: Ultrasound-guided fine needle aspiration ACR TI-RADS 2017 Recommendations: TR1(0 points) : No FNA or follow up TR2 (2 points) : No FNA or follow up TR3 (3 points) : FNA if >/= 2.5 cm, follow up if 1.5 - 2.4 cm in 1, 3, and 5 years TR4 (4-6 points) : FNA if >/= 1.5 cm, follow up if 1.0 - 1.4 cm in 1, 2, 3, and 5 years TR5 (>/= 7 points) : FNA if >/= 1.0 cm, follow up if 0.5 - 0.9 cm every year for 5 years *ACR TI-RADS recommends that no more than two nodules with the highest ACR TI-RADS total point should be biopsied and no more than four nodules should be followed.      Vascular US ankle brachial index (MARILIA) without exercise     Result Date: 3/24/2024            [place] [address]     Tel [phone] Fax [phone] Vascular Lab Report [facility] US ANKLE BRACHIAL INDEX (MARILIA) WITHOUT EXERCISE Patient Name:      BROOK MORELOS Reading Physician:  [id number] Siena Escobar MD, RPVI Study Date:        3/20/2024            Ordering Provider:  [id number] LIAM LEGGETT MRN/PID:           [id number]             Fellow: Accession#:        [id number]         Technologist:       America Hansen RDMS, T Date of Birth/Age: 1970 / 53 years  Technologist 2: Gender:            M                    Encounter#:         [id number] Admission Status:  Inpatient            Location Performed: [facility] Diagnosis/ICD: Atherosclerosis of native arteries of right leg with ulceration                of other part of foot-I70.235 CPT Codes:     99828.52 Peripheral artery MARILIA Only Reduced Service Pertinent History: PVD. LEFT BKA. CONCLUSIONS: Right Lower PVR: No evidence  of arterial occlusive disease in the right lower extremity at rest. Decreased digital perfusion noted. Monophasic flow is noted in the right common femoral artery, right posterior tibial artery and right dorsalis pedis artery. Left Lower PVR: Left BKA. Comparison: Compared with study from 2/28/2022, improved right MARILIA from last exam. Imaging & Doppler Findings: RIGHT Lower PVR                Pressures Ratios Right Posterior Tibial (Ankle) 116 mmHg  1.10 Right Dorsalis Pedis (Ankle)   78 mmHg   0.74 Right Digit (Great Toe)        36 mmHg   0.34                    Right     Left Brachial Pressure 105 mmHg 101 mmHg [id number] Siena Escobar MD, NIK Electronically signed by [id number] NIK Sosa MD on 3/24/2024 at 6:54:08 PM ** Final **     Vascular US lower extremity arterial duplex left     Result Date: 3/24/2024            [place] [address]     Tel [phone] Fax [phone] [facility] LOWER EXTREMITY ARTERIAL DUPLEX LEFT Patient Name:      BROOK MORELOS Reading Physician:  [id number] Siena Escobar MD, RPVI Study Date:        3/20/2024            Ordering Provider:  [id number] JAMI TAVAREZ MRN/PID:           [id number]             Fellow: Accession#:        [id number]         Technologist:       America Hansen RDMS Date of Birth/Age: 1970 / 53 years  Technologist 2: Gender:            M                    Encounter#:         [id number] Admission Status:  Inpatient            Location Performed: [facility] Diagnosis/ICD: Other atherosclerosis of unspecified type of bypass graft(s) of                the extremities, left leg-I70.392 CPT Codes:     97778 Peripheral artery Lower arterial Duplex limited Smoker:            Former. Pertinent History: LE Bypass graft. Pt poor historian. h/o rt to left fem/fem                    bypass and failed fem pop graft (pt unsure of when this was                     done) BKA left, wound on left stump. Per order assess left                    profunda.   CONCLUSIONS: Left Lower Arterial: There is an occlusion documented at the superficial femoral artery proximal, superficial femoral artery mid. and superficial femoral artery distal. Profunda mid 93.2 cm/sec, Distal 38.1 cm/sec. Imaging & Doppler Findings: Right                    Left  PSV                      PSV              EIA        67 cm/s              CFA        56 cm/s       Profunda Proximal 63 cm/s           Popliteal     34 cm/s [id number] NIK Sosa MD Electronically signed by [id number] NIK Sosa MD on 3/24/2024 at 6:44:24 PM ** Final **     US guided abdominal paracentesis     Result Date: 3/19/2024  Interpreted By:  Schoenberger, Joseph, STUDY: US GUIDED ABDOMINAL PARACENTESIS; ;  3/19/2024 2:50 pm    INDICATION: Signs/Symptoms:paracentesis.    COMPARISON: None.    ACCESSION NUMBER(S): [id number]    ORDERING CLINICIAN: ALEXA JUNE    CONSENT: The procedure, its indication, its risks, and alternatives were explained to the patient who understands and consents to the procedure. A time-out is completed prior to the procedure to confirm patient identity and procedure to be performed.    MODALITIES: Ultrasound    TECHNIQUE: Targeted sonographic evaluation of the abdomen demonstrates suitable pocket for paracentesis in the right lower quadrant. This site was then marked.    The marked site was sterilely prepped with chlorhexidine and sterile drapes were placed. Local anesthetic was administered. A 5 Comoran sheath needle was advanced until straw-colored fluid was obtained. The sheath was advanced off the needle cannula into the peritoneal space. At this point aspiration was performed completion. A total of 6300 cc of fluid was aspirated. The sheath was then removed. The patient tolerated the procedure well. There was no immediate complication.    FINDINGS: See discussion above         Successful ultrasound-guided therapeutic paracentesis       MACRO: None    Signed by: Joseph Schoenberger 3/19/2024 2:53 PM Dictation workstation:   [id number]     Lower extremity venous duplex right     Result Date: 3/18/2024  Interpreted By:  Schoenberger, Joseph, STUDY: [facility] US LOWER EXTREMITY VENOUS DUPLEX RIGHT  3/18/2024 2:08 pm    INDICATION: 54 y/o   M with  Signs/Symptoms:edema and decreased pulses in RLE. LMP:  Unknown.    COMPARISON: None.    ACCESSION NUMBER(S): [id number]    ORDERING CLINICIAN: COOKIE TAVAREZ    TECHNIQUE: Routine ultrasound of the  right lower extremity was performed with duplex Doppler (color and spectral) evaluation.   Static images were obtained for remote interpretation.     FINDINGS: THIGH VEINS: There is nonocclusive partially calcified thrombus in the right popliteal vein consistent with chronic venous thrombus. The right posterior tibial and peroneal as well as superficial femoral and common femoral veins compress normally with normal color Doppler flow..    CALF VEINS:  The paired peroneal and posterior tibial calf veins are patent.        .  There is some nonacute since thrombus chronic thrombus in the right popliteal vein. Otherwise negative exam.    MACRO: None    Signed by: Joseph Schoenberger 3/18/2024 2:37 PM Dictation workstation:   [id number]     US thoracentesis     Result Date: 3/18/2024  Interpreted By:  Schoenberger, Joseph, STUDY: US THORACENTESIS; ;  3/18/2024 10:48 am    INDICATION: Signs/Symptoms:Dyspnea/to evaluate etiology of right pleural effusion.    COMPARISON: None.    ACCESSION NUMBER(S): [id number]    ORDERING CLINICIAN: TEJINDER CHAVIRA    CONSENT: The procedure, its indication, its risks, and alternatives were explained to the patient who understands and consents to the procedure. A time-out is completed prior to the procedure to confirm patient identity and procedure to be performed.    MODALITIES: Ultrasound    TECHNIQUE: Targeted sonographic  evaluation of the lower right chest demonstrates a large right pleural effusion. Lowest intracostal space with suitable percutaneous access to this pleural effusion was localized using ultrasound. This site was marked. The marked site was then sterilely prepped with chlorhexidine and a sterile barrier drape was placed. Local anesthetic was administered. A 5 Brazilian sheath needle was advanced until light green fluid was obtained. The sheath was advanced off the needle cannula to the pleural space. 60 cc of fluid was then aspirated into a syringe and sent to the laboratory for studies as ordered by the referring physician. Subsequently aspiration was performed for therapeutic thoracentesis. After a total aspiration of 1500 cc, the patient experienced some right-sided chest pressure. Therefore the procedure was terminated. The patient tolerated the procedure well otherwise without other immediate complication PICC    FINDINGS: See discussion above        Successful ultrasound-guided diagnostic and therapeutic thoracentesis       MACRO: None    Signed by: Joseph Schoenberger 3/18/2024 10:58 AM Dictation workstation:   [id number]     XR CHEST 1 VIEW     Result Date: 3/18/2024  Interpreted By:  Schoenberger, Joseph, STUDY: XR CHEST 1 VIEW;  3/18/2024 10:50 am    INDICATION: Signs/Symptoms:Post Right Thoracentesis.    COMPARISON: 03/15/2024    ACCESSION NUMBER(S): [id number]    ORDERING CLINICIAN: JOSEPH SCHOENBERGER    FINDINGS:             CARDIOMEDIASTINAL SILHOUETTE: Cardiac silhouette remains somewhat enlarged.    LUNGS: There is no evidence for right pneumothorax post thoracentesis. Decreased right pleural fluid. Considerable opacity in the lower right hemithorax consistent with residual pleural fluid with underlying basal atelectasis.    ABDOMEN: No remarkable upper abdominal findings.    BONES: No acute osseous changes.        1.  Satisfactory appearance post right thoracentesis. See discussion above.           MACRO: None    Signed by: Joseph Schoenberger 3/18/2024 10:56 AM Dictation workstation:   [id number]     US abdomen complete     Result Date: 3/18/2024  Interpreted By:  Schoenberger, Joseph, STUDY: US ABDOMEN COMPLETE; ;  3/18/2024 9:49 am    INDICATION: Signs/Symptoms:Distended abdomen.  Significant alcohol history. Concern for cirrhosis may need paracentesis.    COMPARISON: None.    ACCESSION NUMBER(S): [id number]    ORDERING CLINICIAN: COOKIE TAVAREZ    TECHNIQUE: 4 quadrant sonographic survey for ascites as well as sonographic evaluation of the right upper quadrant.    FINDINGS: The liver is normal in size measuring 17.9 cm in cephalocaudal dimension. The capsular margins are nodular consistent with cirrhosis. No definite focal lesion is seen.    There is no dilation of the bile ducts. The extrahepatic common duct measures 4 mm.    The gallbladder is not well distended and incompletely evaluated.    There is no right hydronephrosis. The right kidney measures 8.2 cm in longitudinal dimension.    There is no left hydronephrosis. The left kidney measures 7.3 cm in longitudinal dimension.    The spleen is not enlarged measuring 6.8 cm in cephalocaudal dimension. It is sonographically unremarkable.    There is a small to moderate volume of peritoneal fluid        Small to moderate volume of peritoneal fluid.    Cirrhosis.       MACRO: None    Signed by: Joseph Schoenberger 3/18/2024 10:46 AM Dictation workstation:   [id number]     XR TIBIA FIBULA LEFT (2 VIEWS)     Result Date: 3/15/2024  Interpreted By:  Artur Santos, STUDY: XR TIBIA FIBULA LEFT 2 VIEWS;  3/15/2024 10:36 am    INDICATION: Signs/Symptoms:fall, previous below-knee amputation, pain at left stump.    COMPARISON: 06/23/2022.    ACCESSION NUMBER(S): [id number]    ORDERING CLINICIAN: LO SHAW    FINDINGS: Post below-knee amputation changes. No acute fracture or dislocation identified. Tiny patellar osteophytes. Small knee joint effusion  suspected. Tiny metallic foreign bodies lateral to the knee joint are still present. Osteopenia.    Atherosclerosis.        Intact left tibia and fibula, post below-knee amputation.    MACRO: None    Signed by: Artur Santos 3/15/2024 11:33 AM Dictation workstation:   [id number]     XR CHEST 1 VIEW     Result Date: 3/15/2024  Interpreted By:  Artur Santos, STUDY: XR CHEST 1 VIEW;  3/15/2024 10:36 am    INDICATION: Signs/Symptoms:fall.    COMPARISON: 11/01/2023    ACCESSION NUMBER(S): [id number]    ORDERING CLINICIAN: LO SHAW    FINDINGS: Large right basilar pleural effusion occupying greater than 50% of the hemithoracic volume. Overlying atelectasis. Left lung grossly clear without pleural effusion. Cardiomegaly suspected. Aortic atherosclerosis. Mild pulmonary vascular congestion.        Large right pleural effusion.    MACRO: None    Signed by: Artur Santos 3/15/2024 11:33 AM Dictation workstation:   [id number]     OCT MACULA CIRRUS OU (BOTH EYES)     Result Date: 3/6/2024  Date of Procedure 3/6/2024. Technician Information Imaging Technician: [email or web address]. Interpretation Right Eye Findings include Cystoid macular edema, Epiretinal membrane. Left Eye Findings include Cystoid macular edema. Interval Change Right Eye Better. Left Eye Better.         EKG: sinus bradycardia, RBBB              Assessment plan:     Elevated troponin.  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  Encephalopathy-resolved  History of medical noncompliance  Right bundle branch block      Plan:  Continue with heparin drip for now  IV diuresis as tolerated.  Patient with reported cardiac catheterization within the last 2 years at [facility].  Will try to obtain report.  Will most  likely need cardiac catheterization on Monday if patient is agreeable.  I have concern regarding his noncompliance.  Continue telemetry  Max cardiac meds  Avoid nephrotoxic agents  Patient not a candidate for LifeVest given history of noncompliance  Please keep potassium between 4 and 5 magnesium above 2  Please keep hemoglobin above 8 and platelets above 50      Electronically signed by Antony Batista DO on 4/19/2024 at 8:04 AM

## 2024-04-19 NOTE — NURSING NOTE
Patient recommended to not it at side of bed to void after the complaint of chest pain. Patient not following recommendation. I rechecked patient pain status and he told me his chest pain was a 0.

## 2024-04-19 NOTE — H&P
History Of Present Illness  Hitesh Jurado is a 53 y.o. male presenting with shortness of breath.  Patient reported worsening shortness of breath over several days.  He has history of CHF, liver cirrhosis with ascites, and recurrent pleural effusion.  He did require thoracentesis and paracentesis in the past.  He initially presented to [facility] but did sign AGAINST MEDICAL ADVICE when he was offered admission and came to this ER.  He denied fever, vomiting or diarrhea.  He does not use oxygen at home.  Patient has left below-knee amputation, and multiple superficial wounds on the right leg.  He has other family members to help him at home but does not have home health.    ER course: Patient was awake, alert, oriented, looks chronically ill.  He was tachycardic up to 115, and was hypoxic required 2 L/min nasal cannula oxygen.  His labs shows significantly elevated troponin 823 that was trending up.  He also has evidence of chronic kidney disease.  There is no leukocytosis.  His hemoglobin level was 7.9 closer to his baseline.  Chest x-ray shows worsening airspace disease mostly on the right lower lung with moderate size pleural effusion on the right.  Patient was given Lasix in the ER, was given aspirin and was started on heparin drip for suspected NSTEMI.  Admitted to medicine for further management    Past Medical History  Congestive heart failure EF 20 to 25%  Diabetes mellitus with peripheral neuropathy  Liver cirrhosis with ascites  Recurrent pleural effusion  COPD  CKD  Peripheral vascular disease  Anxiety/depression  GERD  Hyperlipidemia  Postural hypotension  Thyroid mass    Surgical History  Thoracentesis  Paracentesis  Left below-knee amputation  PCI     Social History  He reports that he has quit smoking. His smoking use included cigarettes. He has quit using smokeless tobacco. He reports that he does not drink alcohol and does not use drugs.    Family History  Reviewed with the patient,  "noncontributory     Allergies  Amoxicillin    Review of Systems  10/10 points review of system were conducted, negative except as above    Physical Exam  Constitutional:       Appearance: He is ill-appearing. He is not toxic-appearing or diaphoretic.   HENT:      Head: Normocephalic and atraumatic.      Nose: Nose normal.      Mouth/Throat:      Mouth: Mucous membranes are moist.   Eyes:      General: No scleral icterus.     Extraocular Movements: Extraocular movements intact.      Conjunctiva/sclera: Conjunctivae normal.      Pupils: Pupils are equal, round, and reactive to light.   Cardiovascular:      Rate and Rhythm: Regular rhythm. Tachycardia present.      Heart sounds: Murmur heard.   Pulmonary:      Comments: Diminished air entry on the right side with basilar crackles, no significant wheezing.  Abdominal:      General: There is distension.      Palpations: There is no mass.      Tenderness: There is no abdominal tenderness. There is no right CVA tenderness, left CVA tenderness, guarding or rebound.      Hernia: No hernia is present.   Musculoskeletal:         General: Normal range of motion.      Cervical back: Normal range of motion and neck supple. No tenderness.      Right lower leg: Edema present.   Skin:     General: Skin is warm and dry.      Coloration: Skin is not jaundiced or pale.      Findings: Lesion present. No bruising, erythema or rash.      Comments: Has multiple superficial ulceration of the right leg   Neurological:      General: No focal deficit present.      Mental Status: He is alert and oriented to person, place, and time. Mental status is at baseline.   Psychiatric:         Mood and Affect: Mood normal.         Behavior: Behavior normal.          Last Recorded Vitals  Blood pressure 123/84, pulse 109, temperature 37.1 °C (98.8 °F), resp. rate 20, height 1.753 m (5' 9\"), weight 65.2 kg (143 lb 11.8 oz), SpO2 96%.    Relevant Results  Scheduled medications  aspirin, 81 mg, oral, " Daily  atorvastatin, 80 mg, oral, Nightly  furosemide, 20 mg, intravenous, Daily  gabapentin, 100 mg, oral, TID  insulin lispro, 0-10 Units, subcutaneous, TID with meals  levoFLOXacin, 750 mg, intravenous, q24h  methIMAzole, 5 mg, oral, Daily  metoprolol succinate XL, 25 mg, oral, Daily  midodrine, 10 mg, oral, TID with meals  morphine, 4 mg, intravenous, Once  pantoprazole, 40 mg, oral, Daily before breakfast   Or  pantoprazole, 40 mg, intravenous, Daily before breakfast  potassium chloride CR, 20 mEq, oral, TID      Continuous medications  heparin, 0-4,000 Units/hr, Last Rate: 800 Units/hr (04/19/24 0000)      PRN medications  PRN medications: acetaminophen **OR** acetaminophen **OR** acetaminophen, dextrose, dextrose, glucagon, glucagon, heparin, ipratropium-albuteroL, melatonin, ondansetron **OR** ondansetron, polyethylene glycol, vancomycin     Results for orders placed or performed during the hospital encounter of 04/18/24 (from the past 24 hour(s))   CBC and Auto Differential   Result Value Ref Range    WBC 9.2 4.4 - 11.3 x10*3/uL    nRBC 0.0 0.0 - 0.0 /100 WBCs    RBC 4.02 (L) 4.50 - 5.90 x10*6/uL    Hemoglobin 7.9 (L) 13.5 - 17.5 g/dL    Hematocrit 27.2 (L) 41.0 - 52.0 %    MCV 68 (L) 80 - 100 fL    MCH 19.7 (L) 26.0 - 34.0 pg    MCHC 29.0 (L) 32.0 - 36.0 g/dL    RDW 26.5 (H) 11.5 - 14.5 %    Platelets 335 150 - 450 x10*3/uL    Neutrophils % 75.9 40.0 - 80.0 %    Immature Granulocytes %, Automated 0.2 0.0 - 0.9 %    Lymphocytes % 15.2 13.0 - 44.0 %    Monocytes % 8.0 2.0 - 10.0 %    Eosinophils % 0.2 0.0 - 6.0 %    Basophils % 0.5 0.0 - 2.0 %    Neutrophils Absolute 7.01 1.20 - 7.70 x10*3/uL    Immature Granulocytes Absolute, Automated 0.02 0.00 - 0.70 x10*3/uL    Lymphocytes Absolute 1.40 1.20 - 4.80 x10*3/uL    Monocytes Absolute 0.74 0.10 - 1.00 x10*3/uL    Eosinophils Absolute 0.02 0.00 - 0.70 x10*3/uL    Basophils Absolute 0.05 0.00 - 0.10 x10*3/uL   Basic metabolic panel   Result Value Ref Range     Glucose 236 (H) 74 - 99 mg/dL    Sodium 132 (L) 136 - 145 mmol/L    Potassium 3.5 3.5 - 5.3 mmol/L    Chloride 93 (L) 98 - 107 mmol/L    Bicarbonate 29 21 - 32 mmol/L    Anion Gap 14 10 - 20 mmol/L    Urea Nitrogen 89 (H) 6 - 23 mg/dL    Creatinine 1.43 (H) 0.50 - 1.30 mg/dL    eGFR 59 (L) >60 mL/min/1.73m*2    Calcium 8.5 (L) 8.6 - 10.3 mg/dL   Magnesium   Result Value Ref Range    Magnesium 1.77 1.60 - 2.40 mg/dL   Hepatic function panel   Result Value Ref Range    Albumin 2.9 (L) 3.4 - 5.0 g/dL    Bilirubin, Total 0.8 0.0 - 1.2 mg/dL    Bilirubin, Direct 0.4 (H) 0.0 - 0.3 mg/dL    Alkaline Phosphatase 309 (H) 33 - 120 U/L    ALT 26 10 - 52 U/L    AST 38 9 - 39 U/L    Total Protein 7.1 6.4 - 8.2 g/dL   Lactate   Result Value Ref Range    Lactate 0.8 0.4 - 2.0 mmol/L   Protime-INR   Result Value Ref Range    Protime 16.0 (H) 9.8 - 12.8 seconds    INR 1.4 (H) 0.9 - 1.1   Troponin I, High Sensitivity   Result Value Ref Range    Troponin I, High Sensitivity 823 (HH) 0 - 20 ng/L   Morphology   Result Value Ref Range    RBC Morphology See Below     Polychromasia Mild     Ovalocytes Few     Acanthocytes Many    ECG 12 lead   Result Value Ref Range    Ventricular Rate 115 BPM    Atrial Rate 115 BPM    WA Interval 142 ms    QRS Duration 142 ms    QT Interval 370 ms    QTC Calculation(Bazett) 511 ms    P Axis 69 degrees    R Axis -25 degrees    T Axis 20 degrees    QRS Count 19 beats    Q Onset 216 ms    P Onset 145 ms    P Offset 187 ms    T Offset 401 ms    QTC Fredericia 459 ms   Troponin I, High Sensitivity, Initial   Result Value Ref Range    Troponin I, High Sensitivity 962 (HH) 0 - 20 ng/L   Troponin, High Sensitivity, 1 Hour   Result Value Ref Range    Troponin I, High Sensitivity 1,060 (HH) 0 - 20 ng/L       ECG 12 lead    Result Date: 4/18/2024  Sinus tachycardia with occasional Premature ventricular complexes Possible Left atrial enlargement Right bundle branch block Abnormal ECG When compared with ECG of  23-MAR-2024 07:08, Inverted T waves have replaced nonspecific T wave abnormality in Anterior leads See ED provider note for full interpretation and clinical correlation Confirmed by Lisa Naidu ([id number]) on 4/18/2024 6:37:26 PM    XR chest 1 view    Result Date: 4/18/2024  Interpreted By:  Kamron Hopkins, STUDY: XR CHEST 1 VIEW;  4/18/2024 6:24 pm   INDICATION: Signs/Symptoms:sob.   COMPARISON: 03/18/2024   ACCESSION NUMBER(S): [id number]   ORDERING CLINICIAN: ASMITA CHAU   FINDINGS:         CARDIOMEDIASTINAL SILHOUETTE: Cardiomediastinal silhouette is normal in size and configuration.   LUNGS: There is a moderate-sized right pleural effusion. There is dense airspace disease in the right mid lower lung, worsening from the prior. The left lung is clear.   ABDOMEN: No remarkable upper abdominal findings.   BONES: No acute osseous changes.       1. Worsening airspace disease at the right lung base. Moderate-sized effusion present. CT can be performed further evaluation       MACRO: None   Signed by: Kamron Hopkins 4/18/2024 6:27 PM Dictation workstation:   [id number]    XR chest 1 view    Result Date: 4/18/2024  * * *Final Report* * * DATE OF EXAM: Apr 18 2024  4:49AM   VHX   [id number]  -  XR CHEST 1V FRONTAL PORT  / ACCESSION #  [id number] PROCEDURE REASON: Shortness of breath      * * * * Physician Interpretation * * * *  EXAMINATION:  CHEST RADIOGRAPH (PORTABLE SINGLE VIEW AP) Exam Date/Time:  4/18/2024 4:49 AM CLINICAL HISTORY: Shortness of breath , recent paracentesis and right-sided thoracentesis with 3 L removed on 04/16/2024. MQ:  XCPR_5 Comparison:  01/15/2024 RESULT: Lines, tubes, and devices:  None. Lungs and pleura:  Increased blunting of the RIGHT costophrenic angle and diffuse airspace opacity of the RIGHT mid to lower lung without midline shift.  LEFT lung appears clear.  No LEFT pleural effusion.  No pneumothorax.  Skin fold artifact on the LEFT. Cardiomediastinal silhouette:  Stable  cardiomediastinal silhouette. Other:  No acute congestion.    IMPRESSION: 1.  No pneumothorax. 2.  Diffuse airspace opacity at the RIGHT mid to lower lung which may represent postthoracentesis edema, with moderate volume remaining pleural effusion, but no midline shift.  Infection not excluded.   : MINH   Transcribe Date/Time: Apr 18 2024  4:58A Dictated by : NATHANAEL SMITH MD This examination was interpreted and the report reviewed and electronically signed by: NATHANAEL SMITH MD on Apr 18 2024  5:06AM  EST    XR chest 2 views    1.Interval decrease in right-sided pleural effusion with residual moderate amount of fluid. 2.Right basilar atelectasis, underlying nodule cannot be excluded, therefore follow-up after treatment is recommended with CT or x-ray. 3.   No evidence of pneumothorax. COMPARISON: April 13, 2024 DIAGNOSIS:  post right thoracentesis - Dr. Montenegro to read COMMENTS: J90 Recurrent right pleural effusion ICD10 TECHNIQUE: XR CHEST (2 VW) FINDINGS: Right basilar atelectasis, due to compressive atelectasis, underlying nodules or infiltrates cannot be evaluated for. No evidence of pneumothorax.  Decrease in right pleural effusion with residual moderate amount of fluid.  Cardiac silhouette is enlarged.  Visualized soft tissues, and osseous structures are unremarkable.    IR GUIDED THORACENTESIS PLEURAL    Technically successful ultrasound-guided thoracentesis without immediate complications.  3030 cc of clear yellowish-brown fluid were drained. There remained a small-to-moderate amount of effusion which were unable to be drained due to the large volume and patient beginning to experience discomfort and coughing. If patient continues to have symptoms and unable to remove the remaining fluid medically, then he may require another thoracentesis. HISTORY: BROOK MORELOS is a Male of 53 years age. DIAGNOSIS: Large right pleural effusion COMMENTS: None PROCEDURE: Following the discussion of procedure,  "risks versus benefits, possible complications, informed consent was obtained.   Following universal protocol, patient and site verification was performed with a \"timeout\" prior to the procedure. Brief survey of the patient's right hemithorax demonstrate moderate amount of pleural effusion.   After selection of an appropriate site for thoracentesis, the thoracic skin was prepped and draped in usual sterile fashion.  Under ultrasound guidance, using lidocaine for local anesthesia, a 19-gauge Yueh catheter was inserted in the pleural cavity until effusion was aspirated.  Using vacuum system, 3030 cc of clear yellowish turbid brownish effusion was drained.  The catheter was removed and the aspiration site dressed. The fluid was sent for laboratory analysis.   The patient tolerated procedure well.  No immediate complications were identified.  Subsequent chest radiograph demonstrated no evidence of pneumothorax .  The patient left the radiology department in stable condition.  Radiology nurse monitored patient's vital signs throughout the procedure which lasted approximately 30 minutes. Site of thoracentesis:  Right Amount of aspirated pleural effusion: 3030 cc     XR chest 1 view    Result Date: 4/13/2024  EXAMINATION: ONE XRAY VIEW OF THE CHEST 4/13/2024 10:47 am COMPARISON: 04/07/2024 HISTORY: ORDERING SYSTEM PROVIDED HISTORY: SOB TECHNOLOGIST PROVIDED HISTORY: Reason for exam:->SOB What reading provider will be dictating this exam?->CRC FINDINGS: There is a large right pleural effusion present.  This is not significantly changed when compared the previous study.  The left lung appears clear. There is however increased interstitial prominence within the lungs that may represent mild pulmonary edema or fluid overload.  There are no signs of associated pneumothorax.    1. Large right pleural effusion, unchanged. 2. Increased interstitial prominence within lungs concerning for developing pulmonary edema or fluid " overload.    XR chest 1 view    Result Date: 4/7/2024  EXAMINATION: ONE XRAY VIEW OF THE CHEST 4/7/2024 6:07 am COMPARISON: 04/04/2024 HISTORY: ORDERING SYSTEM PROVIDED HISTORY: Pleural effusion TECHNOLOGIST PROVIDED HISTORY: Reason for exam:->Pleural effusion What reading provider will be dictating this exam?->CRC FINDINGS: Portable chest reveals no change seen in the moderate to large right pleural effusion.  Heart is borderline enlarged.  The left lung is grossly clear.  No new focal parenchymal opacification present.    No change seen in the moderate to large right pleural effusion.  Some atelectatic changes again seen in the right lung base with no new focal parenchymal opacification present.    Echocardiogram    Result Date: 4/5/2024    Left Ventricle: Severely reduced left ventricular systolic function with a visually estimated EF of 20 - 25%. Left ventricle size is normal. Normal wall thickness. Normal wall motion. Grade II diastolic dysfunction with increased LAP.   Mitral Valve: Moderately thickened leaflet. Moderate regurgitation.   Tricuspid Valve: Moderate to severe regurgitation with a centrally directed jet. Moderately elevated RVSP 56 mmHg, consistent with moderate pulmonary hypertension.   Left Atrium: Left atrium is mildly dilated.   Image quality is adequate. Left Ventricle Severely reduced left ventricular systolic function with a visually estimated EF of 20 - 25%. Left ventricle size is normal. Normal wall thickness. Normal wall motion. Grade II diastolic dysfunction with increased LAP. Right Ventricle Right ventricle size is normal. Normal systolic function. Left Atrium Left atrium is mildly dilated. Right Atrium Right atrium size is normal. IVC/SVC IVC diameter is less than or equal to 21 mm and decreases less than 50% during inspiration; therefore the estimated right atrial pressure is intermediate (~8 mmHg). IVC is mildly dilated. Mitral Valve Moderately thickened leaflet. Moderate  regurgitation. No stenosis noted. Tricuspid Valve Valve structure is normal. Moderate to severe regurgitation with a centrally directed jet. No stenosis noted. Moderately elevated RVSP 56 mmHg, consistent with moderate pulmonary hypertension. Aortic Valve Valve structure is normal. No regurgitation. No stenosis. Pulmonic Valve The pulmonic valve visualization is suboptimal but appears to be functioning normally. Trace regurgitation. No stenosis noted. Ascending Aorta Normal sized aortic root and ascending aorta. Pericardium No pericardial effusion. Study Details Image quality: adequate. No contrast was given.    XR chest 1 view    Result Date: 4/4/2024  EXAMINATION: ONE XRAY VIEW OF THE CHEST 4/4/2024 6:06 am COMPARISON: April 2, 2024 HISTORY: ORDERING SYSTEM PROVIDED HISTORY: Pleural effusion TECHNOLOGIST PROVIDED HISTORY: Reason for exam:->Pleural effusion What reading provider will be dictating this exam?->CRC FINDINGS: Right basilar opacity with silhouetting of the right hemidiaphragm and right heart border compatible with moderate right effusion and associated right basilar atelectasis, unchanged from April 2, 2024..  The left lung is clear. No cardiomediastinal shift.  Osseous and body wall soft tissues unremarkable.    Moderate right effusion and associated right basilar atelectasis, unchanged from April 2, 2024.    IR US GUIDED PARACENTESIS    Result Date: 4/3/2024  PROCEDURE: PARACENTESIS WITHOUT IMAGE GUIDANCE US ABDOMEN LIMITED 4/3/2024 HISTORY: ORDERING SYSTEM PROVIDED HISTORY: ascites TECHNOLOGIST PROVIDED HISTORY: Reason for exam:->ascites TECHNIQUE: Informed consent was obtained after a detailed explanation of the procedure including risks, benefits, and alternatives.  Universal protocol was followed.  A limited ultrasound of the abdomen was performed. The left abdomen was prepped and draped in sterile fashion and local anesthesia was achieved with lidocaine.  An 5 Pashto needle sheath was advanced  into ascites and paracentesis was performed.  The patient tolerated the procedure well. FINDINGS: Limited ultrasound of the abdomen demonstrates ascites. A total of 3215 mL was removed.    Successful paracentesis.    XR chest 1 view    Result Date: 4/2/2024  EXAMINATION: ONE XRAY VIEW OF THE CHEST 4/2/2024 10:50 am COMPARISON: None. HISTORY: ORDERING SYSTEM PROVIDED HISTORY: Altered LOC TECHNOLOGIST PROVIDED HISTORY: Reason for exam:->Altered LOC What reading provider will be dictating this exam?->CRC FINDINGS: See impression.    1. Pleural and parenchymal opacities right lung base with elevation of the right hemidiaphragm. 2. Cardiomegaly. 3. Hyperinflated left lung. 4. Central bronchial wall thickening and mild interstitial prominence.    US head neck soft tissue    Result Date: 4/2/2024  EXAMINATION: ULTRASOUND OF THE THYROID WITH COLOR DOPPLER FLOW EVALUATION4/2/2024 10:14 am Ultrasound Thyroid COMPARISON: None HISTORY: ORDERING SYSTEM PROVIDED HISTORY: MASS, mass of neck, Possible pulsatile mass of right carotid artery TECHNOLOGIST PROVIDED HISTORY: Reason for exam:->mass of neck, FINDINGS: Size right thyroid lobe: 6.1 x 3.2 x 4.3 cm Size left thyroid lobe: 4.4 x 1.6 x 1.8 cm Size isthmus: 0.4 cm Texture: Homogenous Estimated total number of nodules greater than or equal to 1 cm: 1 Nodule#: # 1: Maximum size: 5.2 cm . All dimensions: 5.2 x 4.1 x 3.4 cm Location: Right Mid Composition: solid or almost completely solid: 2 points Echogenicity: hypoechoic: 2 points Shape: wider than tall: 0 points Margins: smooth: 0 points Echogenic foci: none: 0 points ACR Total Points: 4;  ACR TI-RADS risk category: TR4 -  moderately suspicious nodule. No aneurysm is seen.    1. Nodule 1: ACR TI-RADS 2017 Category 4. Recommend: Ultrasound-guided fine needle aspiration ACR TI-RADS 2017 Recommendations: TR1(0 points) : No FNA or follow up TR2 (2 points) : No FNA or follow up TR3 (3 points) : FNA if >/= 2.5 cm, follow up if 1.5 -  2.4 cm in 1, 3, and 5 years TR4 (4-6 points) : FNA if >/= 1.5 cm, follow up if 1.0 - 1.4 cm in 1, 2, 3, and 5 years TR5 (>/= 7 points) : FNA if >/= 1.0 cm, follow up if 0.5 - 0.9 cm every year for 5 years *ACR TI-RADS recommends that no more than two nodules with the highest ACR TI-RADS total point should be biopsied and no more than four nodules should be followed.    Vascular US ankle brachial index (MARILIA) without exercise    Result Date: 3/24/2024             [place] [address]     Tel [phone] Fax [phone]  Vascular Lab Report  [facility] US ANKLE BRACHIAL INDEX (MARILIA) WITHOUT EXERCISE Patient Name:      BROOK THOMAS MORELOS Reading Physician:  [id number] Siena Escobar MD, RPVI Study Date:        3/20/2024            Ordering Provider:  [id number] LIAM LEGGETT MRN/PID:           [id number]             Fellow: Accession#:        [id number]         Technologist:       America Hansen                                                             RDMS, RVT Date of Birth/Age: 1970 / 53 years  Technologist 2: Gender:            M                    Encounter#:         [id number] Admission Status:  Inpatient            Location Performed: [facility]  Diagnosis/ICD: Atherosclerosis of native arteries of right leg with ulceration                of other part of foot-I70.235 CPT Codes:     53383.52 Peripheral artery MARILIA Only Reduced Service  Pertinent History: PVD. LEFT BKA.  CONCLUSIONS: Right Lower PVR: No evidence of arterial occlusive disease in the right lower extremity at rest. Decreased digital perfusion noted. Monophasic flow is noted in the right common femoral artery, right posterior tibial artery and right dorsalis pedis artery. Left Lower PVR: Left BKA.  Comparison: Compared with study from 2/28/2022, improved right MARILIA from last exam.   Imaging & Doppler Findings:  RIGHT Lower PVR                Pressures Ratios Right Posterior Tibial (Ankle) 116 mmHg  1.10 Right Dorsalis Pedis (Ankle)   78 mmHg   0.74 Right Digit (Great Toe)        36 mmHg   0.34                      Right     Left Brachial Pressure 105 mmHg 101 mmHg   [id number] Siena Escobar MD, NIK Electronically signed by [id number] NIK Sosa MD on 3/24/2024 at 6:54:08 PM  ** Final **     Vascular US lower extremity arterial duplex left    Result Date: 3/24/2024             [place] [address]     Tel [phone] Fax [phone]  [facility]  [facility]UP[facility] LEFT Patient Name:      BROOK MORELOS Reading Physician:  [id number] NIK Sosa MD Study Date:        3/20/2024            Ordering Provider:  [id number] JAMI TAVAREZ MRN/PID:           [id number]             Fellow: Accession#:        [id number]         Technologist:       America Hansen RDMS Date of Birth/Age: 1970 / 53 years  Technologist 2: Gender:            M                    Encounter#:         [id number] Admission Status:  Inpatient            Location Performed: [facility]  Diagnosis/ICD: Other atherosclerosis of unspecified type of bypass graft(s) of                the extremities, left leg-I70.392 CPT Codes:     20502 Peripheral artery Lower arterial Duplex limited  Smoker:            Former. Pertinent History: LE Bypass graft. Pt poor historian. h/o rt to left fem/fem                    bypass and failed fem pop graft (pt unsure of when this was                    done) BKA left, wound on left stump. Per order assess left                    profunda.  CONCLUSIONS: Left Lower Arterial: There is an occlusion documented at the superficial femoral artery proximal, superficial femoral artery mid. and superficial femoral artery distal. Profunda mid 93.2 cm/sec, Distal 38.1  cm/sec.  Imaging & Doppler Findings:  Right                    Left  PSV                      PSV              EIA        67 cm/s              CFA        56 cm/s       Profunda Proximal 63 cm/s           Popliteal     34 cm/s  [id number] Siena Escobar MD, RPVI Electronically signed by [id number] Siena Escobar MD RPSHANKAR on 3/24/2024 at 6:44:24 PM  ** Final **     ECG 12 Lead    Result Date: 3/24/2024  Sinus rhythm with occasional Premature ventricular complexes Right bundle branch block Abnormal ECG When compared with ECG of 22-MAR-2024 06:26, (unconfirmed) No significant change was found Confirmed by Jamey Alejandra ([id number]) on 3/24/2024 12:34:44 PM    ECG 12 Lead    Result Date: 3/24/2024  Sinus rhythm with occasional Premature ventricular complexes Right bundle branch block Abnormal ECG When compared with ECG of 21-MAR-2024 07:17, (unconfirmed) Premature ventricular complexes are now Present Confirmed by Jamey Alejandra ([id number]) on 3/24/2024 12:27:55 PM    ECG 12 Lead    Result Date: 3/24/2024  Normal sinus rhythm Right bundle branch block Abnormal ECG When compared with ECG of 21-MAR-2024 07:16, (unconfirmed) Premature ventricular complexes are no longer Present Confirmed by Jamey Alejandra ([id number]) on 3/24/2024 12:22:48 PM    ECG 12 Lead    Result Date: 3/20/2024  Normal sinus rhythm Nonspecific intraventricular conduction delay Nonspecific T wave abnormality Abnormal ECG Confirmed by Jamey Alejandra ([id number]) on 3/20/2024 2:22:15 PM          Assessment/Plan   Principal Problem:    Right lower lobe pneumonia  Active Problems:    PAD (peripheral artery disease) (CMS-[facility])    Tobacco dependence with current use    Acute on chronic congestive heart failure (Multi)    Type 2 diabetes mellitus with peripheral neuropathy (Multi)    NSTEMI (non-ST elevated myocardial infarction) (Multi)    Dyspnea, unspecified type    Recurrent right pleural effusion    Acute respiratory failure with hypoxia (Multi)      This patient presented with  worsening shortness of breath now requiring oxygen for the first time.  Has severely reduced ejection fraction CHF, and liver cirrhosis and recurrent pleural effusion and ascites.  He did require thoracentesis and paracentesis in the past.  -Poorly controlled diabetes, and peripheral vascular disease status post left below-knee amputation.  -Start patient on vancomycin, and continue Levaquin.  He has anaphylaxis reaction to penicillin so no cephalosporin.  -Obtain MRSA screen, Legionella, COVID, RSV and flu.  -IR consult for possible thoracentesis of recurrent right pleural effusion.  -Follow cultures.  -Continue with heparin drip for now.  He has trending up troponin.  He did have chest pain on arrival to the ER.    -Cardiology consult.  -Continue with Lasix for CHF.  -DuoNeb for bronchodilation.  -Sliding scale coverage for diabetes.  -Will obtain CT chest without contrast for further evaluation of pleural effusion and lung consolidation       Ilda Bello MD

## 2024-04-19 NOTE — NURSING NOTE
Order for thoracentesis reviewed, Message sent to Dr. Benoit regarding heparin drip, per Dr. Benoit it cannot be stopped at this time, the radiologist would need it held for 4 hours prior, thoracentesis is on hold at this time because of this, Dr. Benoit aware, RN aware.

## 2024-04-19 NOTE — PROGRESS NOTES
"Vancomycin Dosing by Pharmacy- FOLLOW UP    Hitesh Jurado is a 53 y.o. year old male who Pharmacy has been consulted for vancomycin dosing for pneumonia. Based on the patient's indication and renal status this patient is being dosed based on a goal AUC of 400-600.     Renal function is currently stable.    Current vancomycin dose: 1250 mg given every 24 hours    Estimated vancomycin AUC on current dose: 742 mg/L.hr     Visit Vitals  /81 (BP Location: Left arm, Patient Position: Lying)   Pulse 104   Temp 36.8 °C (98.2 °F) (Temporal)   Resp 20        Lab Results   Component Value Date    CREATININE 1.49 (H) 04/19/2024    CREATININE 1.43 (H) 04/18/2024    CREATININE 2.38 (H) 03/26/2024    CREATININE 2.11 (H) 03/25/2024        Patient weight is No results found for: \"PTWEIGHT\"    No results found for: \"CULTURE\"     I/O last 3 completed shifts:  In: 650 (10 mL/kg) [IV Piggyback:650]  Out: 350 (5.4 mL/kg) [Urine:350 (0.1 mL/kg/hr)]  Weight: 65.2 kg   [unfilled]    Lab Results   Component Value Date    PATIENTTEMP  11/01/2023      Comment:      NOTE: Patient Results are Not Corrected for Temperature    PATIENTTEMP 37.0 11/14/2022    PATIENTTEMP 37.0 11/13/2022        Assessment/Plan    Above goal AUC. Orders placed for new vancomcyin regimen of 750 mg every 24 hours to begin at 0300 on 4/20.    This dosing regimen is predicted by [employer] to result in the following pharmacokinetic parameters:  Regimen: 750 mg IV every 24 hours.  Start time: 02:51 on 04/20/2024  Exposure target: AUC24 (range)400-600 mg/L.hr   AUC24,ss: 454 mg/L.hr  Probability of AUC24 > 400: 72 %  Ctrough,ss: 11.3 mg/L  Probability of Ctrough,ss > 20: 4 %  Probability of nephrotoxicity (Lodise DAYANARA 2009): 7 %    The next level will be obtained on 4/21 at AM labs. May be obtained sooner if clinically indicated.   Will continue to monitor renal function daily while on vancomycin and order serum creatinine at least every 48 hours if not " already ordered.  Follow for continued vancomycin needs, clinical response, and signs/symptoms of toxicity.       Nena Gavin, PharmD

## 2024-04-19 NOTE — PROGRESS NOTES
04/19/24 1327   Discharge Planning   Living Arrangements Alone   Support Systems Friends/neighbors   Assistance Needed Pt states PTA ind ADLS and IADLS, wheelchair bound with Left leg BKA with prosthesis, multiple falls, doesn't drive- transport through insurance   Type of Residence Private residence  (1 level ADA apartment, no steps)   Number of Stairs to Enter Residence 0   Number of Stairs Within Residence 0   Do you have animals or pets at home? No   Home or Post Acute Services In home services   Type of Home Care Services Home nursing visits;Home OT;Home PT   Patient expects to be discharged to: Home with HHC + Home O2   Financial Resource Strain   How hard is it for you to pay for the very basics like food, housing, medical care, and heating? Not hard   Housing Stability   In the last 12 months, was there a time when you were not able to pay the mortgage or rent on time? N   In the last 12 months, how many places have you lived? 1   In the last 12 months, was there a time when you did not have a steady place to sleep or slept in a shelter (including now)? N   Transportation Needs   In the past 12 months, has lack of transportation kept you from medical appointments or from getting medications? no   In the past 12 months, has lack of transportation kept you from meetings, work, or from getting things needed for daily living? No   Patient Choice   Provider Choice list and CMS website (https://medicare.gov/care-compare#search) for post-acute Quality and Resource Measure Data were provided and reviewed with: Patient   Patient / Family choosing to utilize agency / facility established prior to hospitalization No     Pt admitted with DX right lower lobe pneumonia. Pt was recently at [facility] and when offered admission left AMA and came to [facility]. Pt states also recently admitted at [facility], and states has been in a couple different Skilled nursing facilities recently and when DC they set up  home care who is supposed to come out but states he has no idea what agency it is through, also states goes to wound center at [facility] weekly. Pt states PTA ind ADLS and IADLS, wheelchair bound with Left leg BKA with prosthesis, multiple falls, doesn't drive- transport through insurance. Pt states that he has been unable to use his prosthetic leg d/t fluid buildup and it doesn't fit, and also has wound on that leg. Pt DC preference is home with home care and requesting home O2 evaluation. Attending Dr. Benoit notified of this information and also asked for PT/OT eval order, states ADOD Monday at earliest. CT team will monitor case for progression and DC planning, and will request home care choice from pt since unable to confirm agency.

## 2024-04-19 NOTE — NURSING NOTE
Patient complaint of chest pain. Dr. Bello made aware. At bedside. EKG, Vital complete.. New orders received.    Enbrel Counseling:  I discussed with the patient the risks of etanercept including but not limited to myelosuppression, immunosuppression, autoimmune hepatitis, demyelinating diseases, lymphoma, and infections.  The patient understands that monitoring is required including a PPD at baseline and must alert us or the primary physician if symptoms of infection or other concerning signs are noted.

## 2024-04-19 NOTE — CONSULTS
"Vancomycin Dosing by Pharmacy- INITIAL    Hitesh Jurado is a 53 y.o. year old male who Pharmacy has been consulted for vancomycin dosing for pneumonia. Based on the patient's indication and renal status this patient will be dosed based on a goal AUC of 400-600.     Renal function is currently stable.    Visit Vitals  /75 (BP Location: Right arm)   Pulse 106   Temp 36.9 °C (98.4 °F) (Temporal)   Resp 20        Lab Results   Component Value Date    CREATININE 1.43 (H) 04/18/2024    CREATININE 2.38 (H) 03/26/2024    CREATININE 2.11 (H) 03/25/2024    CREATININE 2.23 (H) 03/24/2024        Patient weight is No results found for: \"PTWEIGHT\"    No results found for: \"CULTURE\"     No intake/output data recorded.  [unfilled]    Lab Results   Component Value Date    PATIENTTEMP  11/01/2023      Comment:      NOTE: Patient Results are Not Corrected for Temperature    PATIENTTEMP 37.0 11/14/2022    PATIENTTEMP 37.0 11/13/2022          Assessment/Plan     Patient has already been given a loading dose of 1500 mg on 4/19/24 at 0251.  Will initiate vancomycin maintenance,  1250 mg every 24 hours beginning at 3am on 4/20/24.    This dosing regimen is predicted by [employer] to result in the following pharmacokinetic parameters:  Loading dose: 1500 mg IV 02:51 on 04/19/2024  Regimen: 1250 mg IV every 24 hours.  Start time: 03:001 on 04/20/2024  Exposure target: AUC24 (range)400-600 mg/L.hr   AUC24,ss: 508 mg/L.hr  Probability of AUC24 > 400: 76 %  Ctrough,ss: 14.7 mg/L  Probability of Ctrough,ss > 20: 24 %  Probability of nephrotoxicity (Lodise DAYANARA 2009): 10 %    Follow-up level will be ordered on 4/19/24 at 10am and a second level at 1400 unless clinically indicated sooner.  Will continue to monitor renal function daily while on vancomycin and order serum creatinine at least every 48 hours if not already ordered.  Follow for continued vancomycin needs, clinical response, and signs/symptoms of toxicity.       Rock JARRELL" Yayo, TRACYh

## 2024-04-19 NOTE — DISCHARGE INSTRUCTIONS
HEART FAILURE EDUCATION:  1. Weigh yourself daily and record on your weight log.  2. If you gain more than 2 or 3 pounds overnight, call your cardiologist.  3. Follow a low sodium diet. No more than 2000 mg in one day, or more than 650 mg per meal.  4. Limit total fluids to no more than 8 cups (or 2 liters) per day - this includes all fluids (water, coffee, juice, milk, tea, etc.)  5. Monitor your blood pressure daily and record on your weight log.  6. Call to schedule your follow-up appointments when you get home if they were not already scheduled for you.  7. Keep your follow-up appointments! Bring your weight log with you so the doctors can see your weight trend and blood pressure readings.  8. Be sure to  any new prescriptions and take them as directed. If unsure of the medications, be sure to call your cardiologist.  9. Stay as active as you can tolerate.   10. If you notice subtle change of symptoms (slight increase in swelling, slight shortness of breath, a new intolerance to laying flat, a new cough), be sure to call your cardiologist.  11. If you have any questions or concerns or you have not heard back from the cardiologist, feel free to call Evi Haddad heart failure navigator at [phone].

## 2024-04-19 NOTE — NURSING NOTE
"Patient known to me from [facility] admission. Patient lives at home alone. He states he does not have a cardiologist. He says his life has been in transition. I did explain that after his last admission I set him up with a cardiology appointment at [facility]. He says he most likely did not go because he was in the hospital. He does say he has a PCP named Jaydon Whipple. He says he gets to follow up appointments through [facility] which is through his insurance. He does say that is knows how to set this up, however, he currently cannot use this service because his phone does not work and he needs to get to a store to get it fixed or get a new one. He claims to be compliant with all his medications but says he was told he did not have everything he was supposed to take. He said if something was missing it is because it was not sent to the pharmacy. He says he was just recently admitted at [facility]. He also states he had been at [facility] and then they sent him to [facility] and then sent home and was supposed to have home care started but it never did. He is able to verbalized to me that his EF is 27%.         Provided patient with education reinforcement. Discussed CHF, signs and symptoms, and when to call cardiologist. Reinforced the importance of following a Low Sodium Diet and monitoring daily weight, lower leg edema, and shortness of breath.  In regards to the low sodium diet patient states, \"maybe your salt but I use mineral salt and that is a benefit to me.\" He also states that this is all part of a money making thing and that western medicine is a killer. Reviewed importance of taking prescribed medications after discharge. Reinforced importance of following up with cardiologist within a week of discharge. Provided patient with my contact information should any questions or concerns arise.   " none

## 2024-04-20 LAB
ALBUMIN SERPL BCP-MCNC: 2.7 G/DL (ref 3.4–5)
ALP SERPL-CCNC: 329 U/L (ref 33–120)
ALT SERPL W P-5'-P-CCNC: 23 U/L (ref 10–52)
ANION GAP SERPL CALC-SCNC: 15 MMOL/L (ref 10–20)
AST SERPL W P-5'-P-CCNC: 26 U/L (ref 9–39)
BASOPHILS # BLD AUTO: 0.02 X10*3/UL (ref 0–0.1)
BASOPHILS NFR BLD AUTO: 0.2 %
BILIRUB SERPL-MCNC: 1.4 MG/DL (ref 0–1.2)
BUN SERPL-MCNC: 101 MG/DL (ref 6–23)
CALCIUM SERPL-MCNC: 8.2 MG/DL (ref 8.6–10.3)
CHLORIDE SERPL-SCNC: 89 MMOL/L (ref 98–107)
CO2 SERPL-SCNC: 28 MMOL/L (ref 21–32)
CREAT SERPL-MCNC: 2.19 MG/DL (ref 0.5–1.3)
DACRYOCYTES BLD QL SMEAR: NORMAL
EGFRCR SERPLBLD CKD-EPI 2021: 35 ML/MIN/1.73M*2
EOSINOPHIL # BLD AUTO: 0 X10*3/UL (ref 0–0.7)
EOSINOPHIL NFR BLD AUTO: 0 %
ERYTHROCYTE [DISTWIDTH] IN BLOOD BY AUTOMATED COUNT: 25.9 % (ref 11.5–14.5)
GLUCOSE BLD MANUAL STRIP-MCNC: 157 MG/DL (ref 74–99)
GLUCOSE BLD MANUAL STRIP-MCNC: 190 MG/DL (ref 74–99)
GLUCOSE BLD MANUAL STRIP-MCNC: 276 MG/DL (ref 74–99)
GLUCOSE BLD MANUAL STRIP-MCNC: 360 MG/DL (ref 74–99)
GLUCOSE BLD MANUAL STRIP-MCNC: 386 MG/DL (ref 74–99)
GLUCOSE SERPL-MCNC: 387 MG/DL (ref 74–99)
HCT VFR BLD AUTO: 25.8 % (ref 41–52)
HGB BLD-MCNC: 7.7 G/DL (ref 13.5–17.5)
HOLD SPECIMEN: NORMAL
HYPOCHROMIA BLD QL SMEAR: NORMAL
IMM GRANULOCYTES # BLD AUTO: 0.05 X10*3/UL (ref 0–0.7)
IMM GRANULOCYTES NFR BLD AUTO: 0.5 % (ref 0–0.9)
LEGIONELLA AG UR QL: NEGATIVE
LYMPHOCYTES # BLD AUTO: 1.04 X10*3/UL (ref 1.2–4.8)
LYMPHOCYTES NFR BLD AUTO: 10.1 %
MAGNESIUM SERPL-MCNC: 1.94 MG/DL (ref 1.6–2.4)
MCH RBC QN AUTO: 19.6 PG (ref 26–34)
MCHC RBC AUTO-ENTMCNC: 29.8 G/DL (ref 32–36)
MCV RBC AUTO: 66 FL (ref 80–100)
MONOCYTES # BLD AUTO: 0.92 X10*3/UL (ref 0.1–1)
MONOCYTES NFR BLD AUTO: 8.9 %
NEUTROPHILS # BLD AUTO: 8.3 X10*3/UL (ref 1.2–7.7)
NEUTROPHILS NFR BLD AUTO: 80.3 %
NRBC BLD-RTO: 0 /100 WBCS (ref 0–0)
OVALOCYTES BLD QL SMEAR: NORMAL
PLATELET # BLD AUTO: 447 X10*3/UL (ref 150–450)
POTASSIUM SERPL-SCNC: 5.2 MMOL/L (ref 3.5–5.3)
PROT SERPL-MCNC: 6.8 G/DL (ref 6.4–8.2)
RBC # BLD AUTO: 3.93 X10*6/UL (ref 4.5–5.9)
RBC MORPH BLD: NORMAL
SCHISTOCYTES BLD QL SMEAR: NORMAL
SODIUM SERPL-SCNC: 127 MMOL/L (ref 136–145)
STAPHYLOCOCCUS SPEC CULT: NORMAL
UFH PPP CHRO-ACNC: 0.3 IU/ML
WBC # BLD AUTO: 10.3 X10*3/UL (ref 4.4–11.3)

## 2024-04-20 PROCEDURE — 85520 HEPARIN ASSAY: CPT | Performed by: STUDENT IN AN ORGANIZED HEALTH CARE EDUCATION/TRAINING PROGRAM

## 2024-04-20 PROCEDURE — 2500000006 HC RX 250 W HCPCS SELF ADMINISTERED DRUGS (ALT 637 FOR ALL PAYERS): Performed by: INTERNAL MEDICINE

## 2024-04-20 PROCEDURE — 2500000002 HC RX 250 W HCPCS SELF ADMINISTERED DRUGS (ALT 637 FOR MEDICARE OP, ALT 636 FOR OP/ED): Performed by: INTERNAL MEDICINE

## 2024-04-20 PROCEDURE — 2500000001 HC RX 250 WO HCPCS SELF ADMINISTERED DRUGS (ALT 637 FOR MEDICARE OP): Performed by: STUDENT IN AN ORGANIZED HEALTH CARE EDUCATION/TRAINING PROGRAM

## 2024-04-20 PROCEDURE — 83735 ASSAY OF MAGNESIUM: CPT | Performed by: STUDENT IN AN ORGANIZED HEALTH CARE EDUCATION/TRAINING PROGRAM

## 2024-04-20 PROCEDURE — 82947 ASSAY GLUCOSE BLOOD QUANT: CPT

## 2024-04-20 PROCEDURE — 2500000004 HC RX 250 GENERAL PHARMACY W/ HCPCS (ALT 636 FOR OP/ED): Performed by: INTERNAL MEDICINE

## 2024-04-20 PROCEDURE — 1200000002 HC GENERAL ROOM WITH TELEMETRY DAILY

## 2024-04-20 PROCEDURE — 80053 COMPREHEN METABOLIC PANEL: CPT | Performed by: STUDENT IN AN ORGANIZED HEALTH CARE EDUCATION/TRAINING PROGRAM

## 2024-04-20 PROCEDURE — 2500000004 HC RX 250 GENERAL PHARMACY W/ HCPCS (ALT 636 FOR OP/ED)

## 2024-04-20 PROCEDURE — 99222 1ST HOSP IP/OBS MODERATE 55: CPT | Performed by: NURSE PRACTITIONER

## 2024-04-20 PROCEDURE — 2500000005 HC RX 250 GENERAL PHARMACY W/O HCPCS: Performed by: NURSE PRACTITIONER

## 2024-04-20 PROCEDURE — 2500000004 HC RX 250 GENERAL PHARMACY W/ HCPCS (ALT 636 FOR OP/ED): Performed by: NURSE PRACTITIONER

## 2024-04-20 PROCEDURE — 36415 COLL VENOUS BLD VENIPUNCTURE: CPT | Performed by: STUDENT IN AN ORGANIZED HEALTH CARE EDUCATION/TRAINING PROGRAM

## 2024-04-20 PROCEDURE — 2500000004 HC RX 250 GENERAL PHARMACY W/ HCPCS (ALT 636 FOR OP/ED): Performed by: EMERGENCY MEDICINE

## 2024-04-20 PROCEDURE — 85025 COMPLETE CBC W/AUTO DIFF WBC: CPT | Performed by: STUDENT IN AN ORGANIZED HEALTH CARE EDUCATION/TRAINING PROGRAM

## 2024-04-20 PROCEDURE — 2500000004 HC RX 250 GENERAL PHARMACY W/ HCPCS (ALT 636 FOR OP/ED): Performed by: STUDENT IN AN ORGANIZED HEALTH CARE EDUCATION/TRAINING PROGRAM

## 2024-04-20 PROCEDURE — 99233 SBSQ HOSP IP/OBS HIGH 50: CPT | Performed by: STUDENT IN AN ORGANIZED HEALTH CARE EDUCATION/TRAINING PROGRAM

## 2024-04-20 PROCEDURE — 84145 PROCALCITONIN (PCT): CPT | Mod: ELYLAB | Performed by: STUDENT IN AN ORGANIZED HEALTH CARE EDUCATION/TRAINING PROGRAM

## 2024-04-20 PROCEDURE — 82436 ASSAY OF URINE CHLORIDE: CPT | Performed by: STUDENT IN AN ORGANIZED HEALTH CARE EDUCATION/TRAINING PROGRAM

## 2024-04-20 PROCEDURE — 2500000001 HC RX 250 WO HCPCS SELF ADMINISTERED DRUGS (ALT 637 FOR MEDICARE OP): Performed by: INTERNAL MEDICINE

## 2024-04-20 RX ORDER — INSULIN GLARGINE 100 [IU]/ML
10 INJECTION, SOLUTION SUBCUTANEOUS EVERY 24 HOURS
Status: DISCONTINUED | OUTPATIENT
Start: 2024-04-20 | End: 2024-04-24

## 2024-04-20 RX ORDER — INSULIN LISPRO 100 [IU]/ML
10 INJECTION, SOLUTION INTRAVENOUS; SUBCUTANEOUS ONCE
Status: DISCONTINUED | OUTPATIENT
Start: 2024-04-20 | End: 2024-04-24

## 2024-04-20 RX ADMIN — HEPARIN SODIUM 1000 UNITS/HR: 10000 INJECTION, SOLUTION INTRAVENOUS at 20:41

## 2024-04-20 RX ADMIN — PANTOPRAZOLE SODIUM 40 MG: 40 TABLET, DELAYED RELEASE ORAL at 05:17

## 2024-04-20 RX ADMIN — MUPIROCIN: 20 OINTMENT TOPICAL at 18:00

## 2024-04-20 RX ADMIN — MUPIROCIN: 20 OINTMENT TOPICAL at 22:06

## 2024-04-20 RX ADMIN — INSULIN GLARGINE 10 UNITS: 100 INJECTION, SOLUTION SUBCUTANEOUS at 22:06

## 2024-04-20 RX ADMIN — MORPHINE SULFATE 2 MG: 2 INJECTION, SOLUTION INTRAMUSCULAR; INTRAVENOUS at 20:25

## 2024-04-20 RX ADMIN — MIDODRINE HYDROCHLORIDE 10 MG: 5 TABLET ORAL at 17:14

## 2024-04-20 RX ADMIN — INSULIN LISPRO 10 UNITS: 100 INJECTION, SOLUTION INTRAVENOUS; SUBCUTANEOUS at 15:42

## 2024-04-20 RX ADMIN — POTASSIUM CHLORIDE 20 MEQ: 1500 TABLET, EXTENDED RELEASE ORAL at 20:38

## 2024-04-20 RX ADMIN — VANCOMYCIN HYDROCHLORIDE 750 MG: 750 INJECTION, SOLUTION INTRAVENOUS at 02:00

## 2024-04-20 RX ADMIN — LIDOCAINE 4% 1 PATCH: 40 PATCH TOPICAL at 20:32

## 2024-04-20 RX ADMIN — TRIMETHOBENZAMIDE HYDROCHLORIDE 200 MG: 100 INJECTION INTRAMUSCULAR at 23:00

## 2024-04-20 RX ADMIN — ATORVASTATIN CALCIUM 80 MG: 80 TABLET, FILM COATED ORAL at 20:35

## 2024-04-20 RX ADMIN — LEVOFLOXACIN 750 MG: 5 INJECTION, SOLUTION INTRAVENOUS at 02:00

## 2024-04-20 RX ADMIN — ONDANSETRON 4 MG: 2 INJECTION INTRAMUSCULAR; INTRAVENOUS at 08:26

## 2024-04-20 ASSESSMENT — COGNITIVE AND FUNCTIONAL STATUS - GENERAL
HELP NEEDED FOR BATHING: A LITTLE
CLIMB 3 TO 5 STEPS WITH RAILING: A LOT
PERSONAL GROOMING: A LITTLE
TOILETING: A LITTLE
MOVING TO AND FROM BED TO CHAIR: A LOT
EATING MEALS: A LITTLE
DRESSING REGULAR LOWER BODY CLOTHING: A LITTLE
DAILY ACTIVITIY SCORE: 18
WALKING IN HOSPITAL ROOM: A LOT
MOBILITY SCORE: 16
STANDING UP FROM CHAIR USING ARMS: A LOT
DRESSING REGULAR UPPER BODY CLOTHING: A LITTLE

## 2024-04-20 ASSESSMENT — PAIN - FUNCTIONAL ASSESSMENT: PAIN_FUNCTIONAL_ASSESSMENT: 0-10

## 2024-04-20 ASSESSMENT — PAIN SCALES - GENERAL
PAINLEVEL_OUTOF10: 6
PAINLEVEL_OUTOF10: 0 - NO PAIN

## 2024-04-20 NOTE — PROGRESS NOTES
Occupational Therapy                 Therapy Communication Note    Patient Name: Hitesh Jurado  MRN: [id number]  Today's Date: 4/20/2024     Discipline: Occupational Therapy    Missed Visit Reason: Missed Visit Reason: Patient refused (PT/OT attempt for eval. Pt refused to participate, states he's been nauseous all morning. Reattempt as able.)    Missed Time: Attempt

## 2024-04-20 NOTE — PROGRESS NOTES
ASSESSMENT & PLAN:       Principal Problem:    Right lower lobe pneumonia    Right Pleural effusion  Active Problems:    PAD (peripheral artery disease) (CMS-HCC)    Tobacco dependence with current use    Acute on chronic congestive heart failure (Multi)    Type 2 diabetes mellitus with peripheral neuropathy (Multi)    NSTEMI (non-ST elevated myocardial infarction) (Multi)    Dyspnea, unspecified type    Recurrent right pleural effusion    Acute respiratory failure with hypoxia (Multi)    Cirrhosis with ascites        This patient presented with worsening shortness of breath now requiring oxygen for the first time.  Has severely reduced ejection fraction CHF, and liver cirrhosis and recurrent pleural effusion and ascites.  He did require thoracentesis and paracentesis in the past.  -Poorly controlled diabetes, and peripheral vascular disease status post left below-knee amputation.  -Start patient on vancomycin, and continue Levaquin.  He has anaphylaxis reaction to penicillin so no cephalosporin.  -Obtain MRSA screen, Legionella, COVID, RSV and flu.  -IR consult for possible thoracentesis of recurrent right pleural effusion.  -Follow cultures.  -Continue with heparin drip for now.  He has trending up troponin.  He did have chest pain on arrival to the ER.    -Cardiology consult.  -Continue with Lasix for CHF.  -DuoNeb for bronchodilation.  -Sliding scale coverage for diabetes.  -Will obtain CT chest without contrast for further evaluation of pleural effusion and lung consolidation     4/21/24  -Continued on heparin drip for NSTEMI.   -Patient now with PIPPA cr 1.49 -> 2.19 with worsening uremia (87->101).   -Nephrology consulted; recs appreciated. Will discuss case with nephrology as patient appears symptomatic from a uremia perspective.   -Will hold aldactone/lasix for now  -Continued on vanc/levaquin due to concerns of bacterial pneumonia. Will add on procalcitonin  -Tigan PRN for nausea. Control. Patient with  prolonged QTC    55 minutes spent on patient encounter. Time calculated includes chart review, bedside evaluation, counselling, and care coordination.       4/20/24  -Continued on heparin drip for NSTEMI  -Cards evaluated patient, recs appreciated. Believe patient will require a C on 4/22/24.   -Will discontinue Lasix 20 mg IV and diurese with Lasix 40 mg PO + aldactone 100 mg daily given cirrhosis. Renal fucntion appears at baseline.   -CT thorax reviewed, reveals dense consolidations extensviely in all 3 right lobes as well as extensively loculated, large right pleural effusion which appears to be increasing in size. Thoracic surgery consultation advised for chest tube consideration. 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   -Contineud on vanc/levaquin due to concerns of bacterial pneumonia         Mukesh Benoit MD    SUBJECTIVE     Patient had no acute events overnight.  States chest pain has resolved since receiving nitroglycerin in the ED.  Denies any fevers chills or chest pain.  Endorsing shortness of breath.  Further also unremarkable.    OBJECTIVE:       Last Recorded Vitals:  Vitals:    04/19/24 1200 04/19/24 2000 04/19/24 2301 04/20/24 0757   BP: 122/81 118/74 102/69 100/60   BP Location: Left arm Left arm Left arm Left arm   Patient Position: Lying Sitting Lying Sitting   Pulse: 104 95 90 93   Resp: 20 18 18 20   Temp: 36.8 °C (98.2 °F) 36.9 °C (98.4 °F) 36.5 °C (97.7 °F) 36.7 °C (98.1 °F)   TempSrc: Temporal Temporal Temporal Temporal   SpO2: 97% 100% 98% 96%   Weight:       Height:           Last I/O:  I/O last 3 completed shifts:  In: 1690 (25.9 mL/kg) [P.O.:800; I.V.:240 (3.7 mL/kg); IV Piggyback:650]  Out: 1100 (16.9 mL/kg) [Urine:1100 (0.5 mL/kg/hr)]  Weight: 65.2 kg     Physical Exam:  General: Ill-appearing adult male in mild distress  HEENT: Clear sclera, EOMI, trachea midline, moist mucous membranes  Respiratory: Equal chest rise, no retractions,  diminished breath sounds on right  Abdomen: Tense, distended, nontender  Extremities: Left BKA noted stump covered in clean bandaging.  Superficial ulcerations involving right leg  Neurological: Spontaneously moves all extremities, no dysarthria, cranial nerves grossly intact  Psychiatric: Appropriate mood and affect  Skin: Warm, dry    Inpatient Medications:  aspirin, 81 mg, oral, Daily  atorvastatin, 80 mg, oral, Nightly  furosemide, 40 mg, oral, Daily  insulin lispro, 0-10 Units, subcutaneous, TID with meals  levoFLOXacin, 750 mg, intravenous, q24h  lidocaine, 1 patch, transdermal, Daily  methIMAzole, 12.5 mg, oral, Daily  metoprolol succinate XL, 25 mg, oral, Daily  midodrine, 10 mg, oral, TID with meals  morphine, 4 mg, intravenous, Once  mupirocin, , Topical, TID  pantoprazole, 40 mg, oral, Daily before breakfast   Or  pantoprazole, 40 mg, intravenous, Daily before breakfast  potassium chloride CR, 20 mEq, oral, TID  spironolactone, 100 mg, oral, Daily  vancomycin, 750 mg, intravenous, q24h        PRN Medications  PRN medications: acetaminophen **OR** acetaminophen **OR** acetaminophen, dextrose, dextrose, glucagon, glucagon, heparin, ipratropium-albuteroL, melatonin, morphine, [Held by provider] ondansetron **OR** [Held by provider] ondansetron, polyethylene glycol, trimethobenzamide, vancomycin    Continuous Medications:  heparin, 0-4,000 Units/hr, Last Rate: 1,000 Units/hr (04/20/24 0700)          LABS AND IMAGING:     Labs:  Results for orders placed or performed during the hospital encounter of 04/18/24 (from the past 24 hour(s))   POCT GLUCOSE   Result Value Ref Range    POCT Glucose 367 (H) 74 - 99 mg/dL   Heparin Assay   Result Value Ref Range    Heparin Unfractionated 0.2 See Comment Below for Therapeutic Ranges IU/mL   Vancomycin   Result Value Ref Range    Vancomycin 23.1 (H) 5.0 - 20.0 ug/mL   POCT GLUCOSE   Result Value Ref Range    POCT Glucose 90 74 - 99 mg/dL   MRSA Surveillance for Vancomycin  De-escalation, PCR    Specimen: Anterior Nares; Swab   Result Value Ref Range    MRSA PCR Not Detected Not Detected   Heparin Assay, UFH   Result Value Ref Range    Heparin Unfractionated 0.3 See Comment Below for Therapeutic Ranges IU/mL   SST TOP   Result Value Ref Range    Extra Tube Hold for add-ons.    POCT GLUCOSE   Result Value Ref Range    POCT Glucose 157 (H) 74 - 99 mg/dL   CBC   Result Value Ref Range    WBC 13.3 (H) 4.4 - 11.3 x10*3/uL    nRBC 0.0 0.0 - 0.0 /100 WBCs    RBC 4.30 (L) 4.50 - 5.90 x10*6/uL    Hemoglobin 8.5 (L) 13.5 - 17.5 g/dL    Hematocrit 28.4 (L) 41.0 - 52.0 %    MCV 66 (L) 80 - 100 fL    MCH 19.8 (L) 26.0 - 34.0 pg    MCHC 29.9 (L) 32.0 - 36.0 g/dL    RDW 25.9 (H) 11.5 - 14.5 %    Platelets 464 (H) 150 - 450 x10*3/uL   Heparin Assay, UFH   Result Value Ref Range    Heparin Unfractionated 0.3 See Comment Below for Therapeutic Ranges IU/mL   POCT GLUCOSE   Result Value Ref Range    POCT Glucose 190 (H) 74 - 99 mg/dL   Heparin Assay, UFH   Result Value Ref Range    Heparin Unfractionated 0.3 See Comment Below for Therapeutic Ranges IU/mL   POCT GLUCOSE   Result Value Ref Range    POCT Glucose 276 (H) 74 - 99 mg/dL   Magnesium   Result Value Ref Range    Magnesium 1.94 1.60 - 2.40 mg/dL   CBC and Auto Differential   Result Value Ref Range    WBC 10.3 4.4 - 11.3 x10*3/uL    nRBC 0.0 0.0 - 0.0 /100 WBCs    RBC 3.93 (L) 4.50 - 5.90 x10*6/uL    Hemoglobin 7.7 (L) 13.5 - 17.5 g/dL    Hematocrit 25.8 (L) 41.0 - 52.0 %    MCV 66 (L) 80 - 100 fL    MCH 19.6 (L) 26.0 - 34.0 pg    MCHC 29.8 (L) 32.0 - 36.0 g/dL    RDW 25.9 (H) 11.5 - 14.5 %    Platelets 447 150 - 450 x10*3/uL    Neutrophils % 80.3 40.0 - 80.0 %    Immature Granulocytes %, Automated 0.5 0.0 - 0.9 %    Lymphocytes % 10.1 13.0 - 44.0 %    Monocytes % 8.9 2.0 - 10.0 %    Eosinophils % 0.0 0.0 - 6.0 %    Basophils % 0.2 0.0 - 2.0 %    Neutrophils Absolute 8.30 (H) 1.20 - 7.70 x10*3/uL    Immature Granulocytes Absolute, Automated 0.05  0.00 - 0.70 x10*3/uL    Lymphocytes Absolute 1.04 (L) 1.20 - 4.80 x10*3/uL    Monocytes Absolute 0.92 0.10 - 1.00 x10*3/uL    Eosinophils Absolute 0.00 0.00 - 0.70 x10*3/uL    Basophils Absolute 0.02 0.00 - 0.10 x10*3/uL   Comprehensive Metabolic Panel   Result Value Ref Range    Glucose 387 (H) 74 - 99 mg/dL    Sodium 127 (L) 136 - 145 mmol/L    Potassium 5.2 3.5 - 5.3 mmol/L    Chloride 89 (L) 98 - 107 mmol/L    Bicarbonate 28 21 - 32 mmol/L    Anion Gap 15 10 - 20 mmol/L    Urea Nitrogen 101 (HH) 6 - 23 mg/dL    Creatinine 2.19 (H) 0.50 - 1.30 mg/dL    eGFR 35 (L) >60 mL/min/1.73m*2    Calcium 8.2 (L) 8.6 - 10.3 mg/dL    Albumin 2.7 (L) 3.4 - 5.0 g/dL    Alkaline Phosphatase 329 (H) 33 - 120 U/L    Total Protein 6.8 6.4 - 8.2 g/dL    AST 26 9 - 39 U/L    Bilirubin, Total 1.4 (H) 0.0 - 1.2 mg/dL    ALT 23 10 - 52 U/L   Morphology   Result Value Ref Range    RBC Morphology See Below     Hypochromia Mild     RBC Fragments Few     Ovalocytes Few     Teardrop Cells Few         Imaging:

## 2024-04-20 NOTE — CONSULTS
Inpatient consult to Thoracic Surgery  Consult performed by: Lali Johnson, SHANDA-CNP  Consult ordered by: Mukesh Benoit MD          Reason For Consult  Recurrent hydrothorax    History Of Present Illness  Hitesh Jurado is a 53 y.o. male with PMHx of HTN, HLD, CAD with hx of PCI, ICM with EF 15-20% (2023), valvular heart disease (mod->severe mitral and tricuspid valve regurgitation), IDDM2 with neuropathy, PAD s/p multiple lower extremity interventions and L BKA, chronic wounds, CKD, nicotine dependence, and medical noncompliance who presented to [facility] ED on   4/18 for complaints of shortness of breath. Patient recently admitted to [facility] in [place] where right thoracentesis was done on 4/16 with 3L of the drained at that time.  In ED Patient was awake, alert, oriented, looks chronically ill. He was tachycardic up to 115, and was hypoxic required 2 L/min nasal cannula oxygen. His labs shows significantly elevated troponin 823 that was trending up.  He also has evidence of chronic kidney disease.  There is no leukocytosis.  His hemoglobin level was 7.9 closer to his baseline. Chest x-ray shows worsening airspace disease mostly on the right lower lung with moderate size pleural effusion on the right.  Patient was given Lasix in the ER, was given aspirin and was started on heparin drip for suspected NSTEMI. Admitted to medicine for further management.  CT thorax was ordered and reveals dense consolidations extensively in all 3 right lobes as well as extensively loculated, large right pleural effusion which appears to be increasing in size. Thoracic surgery consulted for further evaluation and consideration for chest tube.  At the time of consultation, patient being at the side of bed in no acute distress.  He remains afebrile and hemodynamically stable.  Sinus tachycardia with frequent PVCs on telemetry.  Maintaining adequate oxygen saturation on 2 L nasal cannula.  Patient is on IV heparin for non-STEMI.   He has been evaluated by cardiology with plan that patient will require a left heart cath on 4/22.  Nephrology also consulted. Renal function appears to be worsening. Patient with complaints of nausea and is unable to tolerate p.o. medications at this time. Continued on Vanco/Levaquin due to concerns of bacterial pneumonia.  Dr. Miles updated, from thoracic surgery perspective patient is not a surgical candidate.  Potentially can offer patient Pleurx catheter placement but should be medically optimized by cardiology and GI before considering any invasive procedure. At this time it seems patient has parapneumonic effusion. And agree with antibiotics and thoracentesis. Thank you for the consult.     Past Medical History  He has a past medical history of CHF (congestive heart failure) (Multi) and Diabetes mellitus (Multi).    Surgical History  He has no past surgical history on file.     Social History  He reports that he has quit smoking. His smoking use included cigarettes. He has quit using smokeless tobacco. He reports that he does not drink alcohol and does not use drugs.    Family History  No family history on file.     Allergies  Amoxicillin    Review of Systems   All other systems reviewed and are negative.       Physical Exam  Vitals and nursing note reviewed.   Constitutional:       General: He is not in acute distress.     Appearance: He is cachectic. He is ill-appearing.   HENT:      Head: Normocephalic and atraumatic.   Eyes:      Pupils: Pupils are equal, round, and reactive to light. Pupils are equal.   Cardiovascular:      Rate and Rhythm: Normal rate and regular rhythm.   Pulmonary:      Breath sounds: Decreased air movement present. Examination of the right-middle field reveals decreased breath sounds and rhonchi. Examination of the left-middle field reveals decreased breath sounds and rhonchi. Examination of the right-lower field reveals decreased breath sounds and rhonchi. Examination of the  "left-lower field reveals decreased breath sounds and rhonchi. Decreased breath sounds and rhonchi present.   Chest:      Chest wall: No tenderness.   Abdominal:      General: There is distension.      Tenderness: There is abdominal tenderness.   Musculoskeletal:      Right foot: Decreased range of motion.      Left Lower Extremity: Left leg is amputated below knee.   Feet:      Right foot:      Skin integrity: Skin breakdown present.   Skin:     General: Skin is warm.      Coloration: Skin is ashen.   Neurological:      Mental Status: He is alert.   Psychiatric:         Attention and Perception: Attention and perception normal.         Mood and Affect: Mood and affect normal.         Speech: Speech normal.         Behavior: Behavior is withdrawn. Behavior is cooperative.          Last Recorded Vitals  Blood pressure 109/67, pulse 92, temperature 36.3 °C (97.3 °F), resp. rate 20, height 1.753 m (5' 9\"), weight 65.2 kg (143 lb 11.8 oz), SpO2 96%.    Relevant Results  Scheduled medications  aspirin, 81 mg, oral, Daily  atorvastatin, 80 mg, oral, Nightly  [Held by provider] furosemide, 40 mg, oral, Daily  insulin lispro, 0-10 Units, subcutaneous, TID with meals  levoFLOXacin, 750 mg, intravenous, q24h  lidocaine, 1 patch, transdermal, Daily  methIMAzole, 12.5 mg, oral, Daily  metoprolol succinate XL, 25 mg, oral, Daily  midodrine, 10 mg, oral, TID with meals  morphine, 4 mg, intravenous, Once  mupirocin, , Topical, TID  pantoprazole, 40 mg, oral, Daily before breakfast   Or  pantoprazole, 40 mg, intravenous, Daily before breakfast  potassium chloride CR, 20 mEq, oral, TID  [Held by provider] spironolactone, 100 mg, oral, Daily  vancomycin, 750 mg, intravenous, q24h      Continuous medications  heparin, 0-4,000 Units/hr, Last Rate: 1,000 Units/hr (04/20/24 0700)      PRN medications  PRN medications: acetaminophen **OR** acetaminophen **OR** acetaminophen, dextrose, dextrose, glucagon, glucagon, heparin, " ipratropium-albuteroL, melatonin, morphine, [Held by provider] ondansetron **OR** [Held by provider] ondansetron, polyethylene glycol, trimethobenzamide, vancomycin   Results for orders placed or performed during the hospital encounter of 04/18/24 (from the past 24 hour(s))   Vancomycin   Result Value Ref Range    Vancomycin 23.1 (H) 5.0 - 20.0 ug/mL   POCT GLUCOSE   Result Value Ref Range    POCT Glucose 90 74 - 99 mg/dL   MRSA Surveillance for Vancomycin De-escalation, PCR    Specimen: Anterior Nares; Swab   Result Value Ref Range    MRSA PCR Not Detected Not Detected   Heparin Assay, UFH   Result Value Ref Range    Heparin Unfractionated 0.3 See Comment Below for Therapeutic Ranges IU/mL   SST TOP   Result Value Ref Range    Extra Tube Hold for add-ons.    POCT GLUCOSE   Result Value Ref Range    POCT Glucose 157 (H) 74 - 99 mg/dL   CBC   Result Value Ref Range    WBC 13.3 (H) 4.4 - 11.3 x10*3/uL    nRBC 0.0 0.0 - 0.0 /100 WBCs    RBC 4.30 (L) 4.50 - 5.90 x10*6/uL    Hemoglobin 8.5 (L) 13.5 - 17.5 g/dL    Hematocrit 28.4 (L) 41.0 - 52.0 %    MCV 66 (L) 80 - 100 fL    MCH 19.8 (L) 26.0 - 34.0 pg    MCHC 29.9 (L) 32.0 - 36.0 g/dL    RDW 25.9 (H) 11.5 - 14.5 %    Platelets 464 (H) 150 - 450 x10*3/uL   Heparin Assay, UFH   Result Value Ref Range    Heparin Unfractionated 0.3 See Comment Below for Therapeutic Ranges IU/mL   POCT GLUCOSE   Result Value Ref Range    POCT Glucose 190 (H) 74 - 99 mg/dL   Heparin Assay, UFH   Result Value Ref Range    Heparin Unfractionated 0.3 See Comment Below for Therapeutic Ranges IU/mL   POCT GLUCOSE   Result Value Ref Range    POCT Glucose 276 (H) 74 - 99 mg/dL   Magnesium   Result Value Ref Range    Magnesium 1.94 1.60 - 2.40 mg/dL   CBC and Auto Differential   Result Value Ref Range    WBC 10.3 4.4 - 11.3 x10*3/uL    nRBC 0.0 0.0 - 0.0 /100 WBCs    RBC 3.93 (L) 4.50 - 5.90 x10*6/uL    Hemoglobin 7.7 (L) 13.5 - 17.5 g/dL    Hematocrit 25.8 (L) 41.0 - 52.0 %    MCV 66 (L) 80 - 100 fL     MCH 19.6 (L) 26.0 - 34.0 pg    MCHC 29.8 (L) 32.0 - 36.0 g/dL    RDW 25.9 (H) 11.5 - 14.5 %    Platelets 447 150 - 450 x10*3/uL    Neutrophils % 80.3 40.0 - 80.0 %    Immature Granulocytes %, Automated 0.5 0.0 - 0.9 %    Lymphocytes % 10.1 13.0 - 44.0 %    Monocytes % 8.9 2.0 - 10.0 %    Eosinophils % 0.0 0.0 - 6.0 %    Basophils % 0.2 0.0 - 2.0 %    Neutrophils Absolute 8.30 (H) 1.20 - 7.70 x10*3/uL    Immature Granulocytes Absolute, Automated 0.05 0.00 - 0.70 x10*3/uL    Lymphocytes Absolute 1.04 (L) 1.20 - 4.80 x10*3/uL    Monocytes Absolute 0.92 0.10 - 1.00 x10*3/uL    Eosinophils Absolute 0.00 0.00 - 0.70 x10*3/uL    Basophils Absolute 0.02 0.00 - 0.10 x10*3/uL   Comprehensive Metabolic Panel   Result Value Ref Range    Glucose 387 (H) 74 - 99 mg/dL    Sodium 127 (L) 136 - 145 mmol/L    Potassium 5.2 3.5 - 5.3 mmol/L    Chloride 89 (L) 98 - 107 mmol/L    Bicarbonate 28 21 - 32 mmol/L    Anion Gap 15 10 - 20 mmol/L    Urea Nitrogen 101 (HH) 6 - 23 mg/dL    Creatinine 2.19 (H) 0.50 - 1.30 mg/dL    eGFR 35 (L) >60 mL/min/1.73m*2    Calcium 8.2 (L) 8.6 - 10.3 mg/dL    Albumin 2.7 (L) 3.4 - 5.0 g/dL    Alkaline Phosphatase 329 (H) 33 - 120 U/L    Total Protein 6.8 6.4 - 8.2 g/dL    AST 26 9 - 39 U/L    Bilirubin, Total 1.4 (H) 0.0 - 1.2 mg/dL    ALT 23 10 - 52 U/L   SST TOP   Result Value Ref Range    Extra Tube Hold for add-ons.    Morphology   Result Value Ref Range    RBC Morphology See Below     Hypochromia Mild     RBC Fragments Few     Ovalocytes Few     Teardrop Cells Few    POCT GLUCOSE   Result Value Ref Range    POCT Glucose 360 (H) 74 - 99 mg/dL    CT chest wo IV contrast    Result Date: 4/19/2024  Interpreted By:  Obed Louie, STUDY: CT CHEST WO IV CONTRAST;  4/19/2024 11:46 am   INDICATION: Signs/Symptoms:Right pleural effusion, pneumonia.   COMPARISON: Portable chest 18 April 2024; CT chest without contrast 1 November 2023 and 24 October 2023; CT abdomen and pelvis without contrast 13 November  2022; portable chest 28 February 2022 and 27 February 2022   ACCESSION NUMBER(S): [id number]   ORDERING CLINICIAN: SILAS NEGRO   TECHNIQUE: Helical CT chest from thoracic inlet through the hemidiaphragms without intravenous contrast   FINDINGS: LUNGS / AIRSPACES / AIRWAYS:   LARGE AIRWAYS Filling defect: Thin web-like secretions, one in the trachea at the thoracic inlet and another in the bronchus intermedius. No polypoid or otherwise masslike filling defect Wall thickening: Right upper lobe bronchus is thick walled. Other large airways on the right are difficult to evaluate due to obscuration due to consolidations and compression Bronchiectasis: Negative Other: N/A   AIRSPACES Fibrosis: Negative Emphysema: Negative Consolidation: Dense consolidation extensively in portions of all three lobes. None in the left lung Ground glass airspace disease: Negative Edema: Negative Nodule / Mass: Negative Other: N/A   PLEURA: Effusion:  Both sides negative Pneumothorax:  Both sides negative Other:  n/a   CARDIOVASCULAR: Heart size:  Enlarged but unchanged Pericardial effusion:  Negative Thoracic aortic aneurysm:  Negative Pulmonary arteries:  No ectasia Heart failure change:  Negative.  No sign of interstitial or alveolar edema. Other:  n/a   NONVASCULAR MEDIASTINUM: Esophagus:  Grossly normal by CT Mediastinal Mass:  Negative Hiatal hernia:  None Other:  n/a   LYMPH NODES: No thoracic adenopathy   CHEST WALL: Symmetric nonspecific mild to moderate bilateral gynecomastia unchanged   SKELETON: No acute or contributory abnormality   UPPER ABDOMEN: Incompletely included extent of small volume perihepatic ascites, new at least around the liver from CT chest 1 November 2023       Dense consolidations extensively in all three right lobes. No cavitary component to the pneumonias   Extensively loculated, large right pleural effusion. 3 L removed at an outside institution on 16 April 2024. The right pleural effusion has even increased  in size from yesterday's portable chest radiograph. Consult thoracic surgery for tube placement and management   Due to the compressive affects of the effusion, there is partial collapse of the middle and right lower lobes   No left lung pneumonia or other acute process in the left hemithorax   No left pleural effusion   No pneumothorax on either side   MACRO: None   Signed by: Obed Louie 4/19/2024 3:18 PM Dictation workstation:   [id number]    ECG 12 lead    Result Date: 4/19/2024  Sinus tachycardia with occasional Premature ventricular complexes Possible Left atrial enlargement Right bundle branch block Abnormal ECG When compared with ECG of 18-APR-2024 20:06, (unconfirmed) Sinus rhythm has replaced Electronic atrial pacemaker Right bundle branch block is now Present See ED provider note for full interpretation and clinical correlation Confirmed by Leah More ([id number]) on 4/19/2024 10:07:21 AM    ECG 12 lead    Result Date: 4/19/2024  Sinus tachycardia with frequent and consecutive Premature ventricular complexes Right bundle branch block Left anterior fascicular block  Bifascicular block  Inferior infarct , age undetermined Abnormal ECG When compared with ECG of 18-APR-2024 20:08, (unconfirmed) No significant change was found    ECG 12 lead    Result Date: 4/18/2024  Sinus tachycardia with occasional Premature ventricular complexes Possible Left atrial enlargement Right bundle branch block Abnormal ECG When compared with ECG of 23-MAR-2024 07:08, Inverted T waves have replaced nonspecific T wave abnormality in Anterior leads See ED provider note for full interpretation and clinical correlation Confirmed by Lisa Naidu ([id number]) on 4/18/2024 6:37:26 PM    XR chest 1 view    Result Date: 4/18/2024  Interpreted By:  Kamron Hopkins, STUDY: XR CHEST 1 VIEW;  4/18/2024 6:24 pm   INDICATION: Signs/Symptoms:sob.   COMPARISON: 03/18/2024   ACCESSION NUMBER(S): [id number]   ORDERING CLINICIAN: ASMITA CHAU    FINDINGS:         CARDIOMEDIASTINAL SILHOUETTE: Cardiomediastinal silhouette is normal in size and configuration.   LUNGS: There is a moderate-sized right pleural effusion. There is dense airspace disease in the right mid lower lung, worsening from the prior. The left lung is clear.   ABDOMEN: No remarkable upper abdominal findings.   BONES: No acute osseous changes.       1. Worsening airspace disease at the right lung base. Moderate-sized effusion present. CT can be performed further evaluation       MACRO: None   Signed by: Kamron Hopkins 4/18/2024 6:27 PM Dictation workstation:   [id number]       Assessment/Plan     Right lower lobe pneumonia; Right Pleural effusion  -CT thorax reveals dense consolidations extensviely in all 3 right lobes as well as extensively loculated, large right pleural effusion which appears to be increasing in size   -Dr. Miles updated   -Patient is not a surgical candidate. Can potentially offer Pleurx but patient needs to be medically optimized before considering any invasive procedures. Appears that he has parapneumonic effusion agree with antibiotic and thoracentesis for now.  -Thank you for the consult    HTN; HLD; CAD; ICM; Valvular Heart Disease  EF 15-20% with global hypokinesis of LV and moderate->severe mitral and tricuspid valve regurgitation.   Underwent R thoracentesis 3/18 yielding 1.5L serous fluid   -medical management per Cardiology       PIPPA on CKD  Pt with hx of CKD II with baseline Cr ~1.2. Cr 1.5 on presentation and peaked at 2.1 this admission.   -Nephrology following  -Strict I&O  -follow BMP      IDDM2; Diabetic Neuropathy  Poorly controlled DM2. Hgb A1C 9.3% (4/2024)  -medical management per primary  -recommend strict glycemic control in setting of nonhealing wounds      #Cirrhosis with ascites   -Management per primary     I spent 60 minutes in the professional and overall care of this patient.

## 2024-04-20 NOTE — PROGRESS NOTES
Physical Therapy                 Therapy Communication Note    Patient Name: Hitesh Jurado  MRN: [id number]  Today's Date: 4/20/2024     Discipline: Physical Therapy    Missed Visit Reason: 1050-Missed Visit Reason: Patient refused (PT/OT attempt for eval. pt refused to praticipate states hes been nausated all morning .  reattempt as able.)    Missed Time: Attempt    Comment:

## 2024-04-20 NOTE — CONSULTS
[facility] Nephrology  Consult Note           Reason for Consult:  PIPPA on CKD   Requesting Physician:  Dr. Benoit     Chief Complaint:  SOB  History Obtained From:  patient, electronic medical record    History of Present Ilness:    53 y.o. male with history s/f HFrEF, PAD s/p LT BKA, T2DM c/b chronic non-healing wounds, cirrhosis who presented for SOB for several days. Pt was recently at [facility] for same symptoms in addition to abdominal pain. While there was diuresed and discharged on torsemide 100 mg PO daily. Had RT sided thoracentesis w/ 3L taken off. Discharged 4/17. Notably on presentation here on 4/18 imaging showed re-accumulation of his RT sided pleural effusion. CT also shows RT sided PNA. Nephrology consulted for PIPPA. Scr ~1.4 at baseline w/ eGFR high 50s/low 60s. Today Scr up to 2.19. has been getting diuretics, presented tachycardic which has improved though BP trending down    Past Medical History:    Past Medical History:   Diagnosis Date    CHF (congestive heart failure) (Multi)     Diabetes mellitus (Multi)        Past Surgical History:    No past surgical history on file.    Home Medications:    No current facility-administered medications on file prior to encounter.     Current Outpatient Medications on File Prior to Encounter   Medication Sig Dispense Refill    aspirin 81 mg EC tablet Take 1 tablet (81 mg) by mouth once daily.      atorvastatin (Lipitor) 80 mg tablet Take 1 tablet (80 mg) by mouth once daily.      clopidogrel (Plavix) 75 mg tablet Take 1 tablet (75 mg) by mouth once daily.      furosemide (Lasix) 20 mg tablet Take 3 tablets (60 mg) by mouth 2 times a day.      insulin glargine (Lantus) 100 unit/mL injection Inject 20 Units under the skin once daily at bedtime. Take as directed per insulin instructions.      insulin lispro (HumaLOG) 100 unit/mL injection Inject under the skin 3 times a day with meals. Take as directed per insulin instructions.      methIMAzole (Tapazole) 10 mg  tablet Take 2 tablets (20 mg) by mouth every 8 hours. (Patient taking differently: Take 0.5 tablets (5 mg) by mouth once daily. 2.5 tabs) 90 tablet 0    metoprolol succinate XL (Toprol-XL) 25 mg 24 hr tablet Take 1 tablet (25 mg) by mouth once daily. Do not crush or chew.      torsemide 60 mg tablet Take 60 mg by mouth once daily. Do not start before March 27, 2024.  0    acetaminophen (Tylenol) 500 mg tablet Take 1 tablet (500 mg) by mouth every 8 hours if needed.      azithromycin (Zithromax) 500 mg tablet Take 1 tablet (500 mg) by mouth once every 24 hours. Do not start before October 30, 2023. 5 tablet 0    gabapentin (Neurontin) 100 mg capsule Take 1 capsule (100 mg) by mouth 3 times a day.      melatonin 5 mg tablet Take 1 tablet (5 mg) by mouth once daily at bedtime.      metOLazone (Zaroxolyn) 5 mg tablet Take 1 tablet (5 mg) by mouth once daily. Do not start before March 27, 2024. 30 tablet 1    midodrine (Proamatine) 10 mg tablet Take 1 tablet (10 mg) by mouth 3 times a day with meals. 90 tablet 0    potassium chloride CR 20 mEq ER tablet Take 1 tablet (20 mEq) by mouth 3 times a day. Do not crush or chew. For 10 days      silver sulfADIAZINE (Silvadene) 1 % cream Apply topically once daily. Do not start before March 27, 2024.      [DISCONTINUED] carvedilol (Coreg) 12.5 mg tablet Take 1 tablet (12.5 mg) by mouth twice a day.      [DISCONTINUED] empagliflozin (Jardiance) 10 mg Take 1 tablet (10 mg) by mouth once daily.      [DISCONTINUED] linaGLIPtin (Tradjenta) 5 mg tablet Take 1 tablet (5 mg) by mouth once daily.      [DISCONTINUED] mupirocin (Bactroban) 2 % ointment Apply topically 3 times a day.         Allergies:  Amoxicillin    Social History:    Social History     Socioeconomic History    Marital status: Single     Spouse name: Not on file    Number of children: Not on file    Years of education: Not on file    Highest education level: Not on file   Occupational History    Not on file   Tobacco Use     Smoking status: Former     Types: Cigarettes    Smokeless tobacco: Former   Vaping Use    Vaping status: Never Used   Substance and Sexual Activity    Alcohol use: Never    Drug use: Never    Sexual activity: Defer   Other Topics Concern    Not on file   Social History Narrative    Not on file     Social Determinants of Health     Financial Resource Strain: Low Risk  (4/19/2024)    Overall Financial Resource Strain (CARDIA)     Difficulty of Paying Living Expenses: Not hard at all   Food Insecurity: Patient Unable To Answer (4/2/2024)    Received from [facility]    Hunger Vital Sign     Worried About Running Out of Food in the Last Year: Patient unable to answer     Ran Out of Food in the Last Year: Patient unable to answer   Transportation Needs: No Transportation Needs (4/19/2024)    PRAPARE - Transportation     Lack of Transportation (Medical): No     Lack of Transportation (Non-Medical): No   Recent Concern: Transportation Needs - Unmet Transportation Needs (3/18/2024)    PRAPARE - Transportation     Lack of Transportation (Medical): Yes     Lack of Transportation (Non-Medical): Yes   Physical Activity: Unknown (2/8/2024)    Received from [facility]    Exercise Vital Sign     Days of Exercise per Week: 0 days     Minutes of Exercise per Session: Patient declined   Recent Concern: Physical Activity - Inactive (12/14/2023)    Received from [facility]    Exercise Vital Sign     Days of Exercise per Week: 0 days     Minutes of Exercise per Session: 0 min   Stress: No Stress Concern Present (2/8/2024)    Received from [facility]    [employer] - Occupational Stress Questionnaire     Feeling of Stress : Not at all   Social Connections: Unknown (2/8/2024)    Received from [facility]    Social Connection and Isolation Panel [NHANES]     Frequency of Communication with Friends and Family: More than three times a week     Frequency of Social  Gatherings with Friends and Family: More than three times a week     Attends Faith Services: Patient declined     Active Member of Clubs or Organizations: No     Attends Club or Organization Meetings: Patient declined     Marital Status: Not on file   Recent Concern: Social Connections - Socially Isolated (12/14/2023)    Received from [facility]    Social Connection and Isolation Panel [NHANES]     Frequency of Communication with Friends and Family: More than three times a week     Frequency of Social Gatherings with Friends and Family: More than three times a week     Attends Faith Services: Never     Active Member of Clubs or Organizations: No     Attends Club or Organization Meetings: Patient refused     Marital Status: Never    Intimate Partner Violence: Not At Risk (12/14/2023)    Received from [facility]    Humiliation, Afraid, Rape, and Kick questionnaire     Fear of Current or Ex-Partner: No     Emotionally Abused: No     Physically Abused: No     Sexually Abused: No   Housing Stability: Low Risk  (4/19/2024)    Housing Stability Vital Sign     Unable to Pay for Housing in the Last Year: No     Number of Places Lived in the Last Year: 1     Unstable Housing in the Last Year: No       Family History:   No family history on file.    Review of Systems:   Positives in bold  Constitutional: fever, chills, fatigue, malaise   HENT:  rhinorrhea, sinus pain, sore throat, epistaxis    Eyes:  photophobia, visual disturbance, eye redness  Respiratory: shortness of breath, cough, hemoptysis    Cardiovascular: chest pain, palpitations, orthopnea, leg swelling   Gastrointestinal: abdominal pain, nausea, vomiting, diarrhea, constipation   Endocrine: polydipsia, polyphagia, cold intolerance, heat intolerance  Genitourinary: dysuria, flank pain, frequency, urgency   Hematologic: easy bruising, easy bleeding  Musculoskeletal: back pain, neck pain, myalgias, arthalgias  Neurological: syncope, lightheadedness,  "dizziness, seizures, tremors, weakness  Psychiatric/Behavioral: anxiety, depression, hallucinations  Skin: rash, itching    Physical exam:   Constitutional:  VITALS:  /67   Pulse 92   Temp 36.3 °C (97.3 °F)   Resp 20   Ht 1.753 m (5' 9\")   Wt 65.2 kg (143 lb 11.8 oz)   SpO2 96%   BMI 21.23 kg/m²     General: alert, in no apparent distress  HEENT: normocephalic, atraumatic, anicteric  Neck: supple, no mass  Lungs: non-labored respirations, clear to auscultation bilaterally  Heart: regular rate and rhythm, no murmurs or rubs  Abdomen: soft, non-tender, distended  MSK: no joint swelling or tenderness  Ext: no cyanosis, no peripheral edema, LT BKA  Neuro: alert and oriented, no gross abnormalities  Psych: normal mood and affect    Data/  CBC:   Lab Results   Component Value Date    WBC 10.3 04/20/2024    RBC 3.93 (L) 04/20/2024    HGB 7.7 (L) 04/20/2024    HCT 25.8 (L) 04/20/2024    MCV 66 (L) 04/20/2024    MCH 19.6 (L) 04/20/2024    MCHC 29.8 (L) 04/20/2024    RDW 25.9 (H) 04/20/2024     04/20/2024     BMP:    Lab Results   Component Value Date     (L) 04/20/2024    K 5.2 04/20/2024    CL 89 (L) 04/20/2024    CO2 28 04/20/2024     (HH) 04/20/2024    CREATININE 2.19 (H) 04/20/2024    CALCIUM 8.2 (L) 04/20/2024    GLUCOSE 387 (H) 04/20/2024       Assessment:  53 y.o. male with history s/f HFrEF, PAD s/p LT BKA, T2DM c/b chronic non-healing wounds, cirrhosis who presented for SOB for several days.     PIPPA on CKD stage II/III: likely in setting of diuretics, not significantly hypotensive, no contrast, not on any other nephrotoxic medications, function was at baseline on presentation w/ Scr ~1.4 w/ eGFR high 50s/low 60s  Fluid overload: improved, has combined HFrEF 20-25%   Recurrent RT sided pleural effusion: had 3L taken off at [facility] on 4/16, present here again   Cirrhosis   Hypotension   Hypoalbuminemia  Anemia   RT sided PNA     Plan:  - agree w/ holding diuretics for now   - encouraged " increased intake, wanting to avoid giving IVFs for now  - will re-evaluate tomorrow if need to proceed with this   - tentative plan for [facility] on Monday     Thank you for the consultation. Will continue to follow  Please do not hesitate to call with questions.    Malgorzata Williamson MD

## 2024-04-20 NOTE — NURSING NOTE
Patient offered am meds multiple times throughout this am.  Patient continues to refuse them related to his nausea, offered patient prn Tigan for nausea, patient declined.

## 2024-04-21 ENCOUNTER — APPOINTMENT (OUTPATIENT)
Dept: RADIOLOGY | Facility: HOSPITAL | Age: 54
End: 2024-04-21
Payer: MEDICAID

## 2024-04-21 LAB
ALBUMIN SERPL BCP-MCNC: 2.7 G/DL (ref 3.4–5)
ALP SERPL-CCNC: 311 U/L (ref 33–120)
ALT SERPL W P-5'-P-CCNC: 19 U/L (ref 10–52)
ANION GAP SERPL CALC-SCNC: 15 MMOL/L (ref 10–20)
ANION GAP SERPL CALC-SCNC: 17 MMOL/L (ref 10–20)
AST SERPL W P-5'-P-CCNC: 21 U/L (ref 9–39)
BACTERIA FLD AEROBE CULT: NORMAL
BASOPHILS # BLD AUTO: 0.04 X10*3/UL (ref 0–0.1)
BASOPHILS NFR BLD AUTO: 0.4 %
BILIRUB SERPL-MCNC: 1.6 MG/DL (ref 0–1.2)
BUN SERPL-MCNC: 107 MG/DL (ref 6–23)
BUN SERPL-MCNC: 108 MG/DL (ref 6–23)
CALCIUM SERPL-MCNC: 8 MG/DL (ref 8.6–10.3)
CALCIUM SERPL-MCNC: 8 MG/DL (ref 8.6–10.3)
CHLORIDE SERPL-SCNC: 88 MMOL/L (ref 98–107)
CHLORIDE SERPL-SCNC: 90 MMOL/L (ref 98–107)
CHLORIDE UR-SCNC: <15 MMOL/L
CHLORIDE/CREATININE (MMOL/G) IN URINE: NORMAL
CO2 SERPL-SCNC: 27 MMOL/L (ref 21–32)
CO2 SERPL-SCNC: 28 MMOL/L (ref 21–32)
CREAT SERPL-MCNC: 2.83 MG/DL (ref 0.5–1.3)
CREAT SERPL-MCNC: 2.89 MG/DL (ref 0.5–1.3)
CREAT UR-MCNC: 73.6 MG/DL (ref 20–370)
DACRYOCYTES BLD QL SMEAR: NORMAL
EGFRCR SERPLBLD CKD-EPI 2021: 25 ML/MIN/1.73M*2
EGFRCR SERPLBLD CKD-EPI 2021: 26 ML/MIN/1.73M*2
EOSINOPHIL # BLD AUTO: 0.01 X10*3/UL (ref 0–0.7)
EOSINOPHIL NFR BLD AUTO: 0.1 %
ERYTHROCYTE [DISTWIDTH] IN BLOOD BY AUTOMATED COUNT: 25.5 % (ref 11.5–14.5)
GLUCOSE BLD MANUAL STRIP-MCNC: 163 MG/DL (ref 74–99)
GLUCOSE BLD MANUAL STRIP-MCNC: 195 MG/DL (ref 74–99)
GLUCOSE BLD MANUAL STRIP-MCNC: 349 MG/DL (ref 74–99)
GLUCOSE BLD MANUAL STRIP-MCNC: 351 MG/DL (ref 74–99)
GLUCOSE SERPL-MCNC: 239 MG/DL (ref 74–99)
GLUCOSE SERPL-MCNC: 388 MG/DL (ref 74–99)
HCT VFR BLD AUTO: 27.8 % (ref 41–52)
HGB BLD-MCNC: 8.5 G/DL (ref 13.5–17.5)
HYPOCHROMIA BLD QL SMEAR: NORMAL
IMM GRANULOCYTES # BLD AUTO: 0.06 X10*3/UL (ref 0–0.7)
IMM GRANULOCYTES NFR BLD AUTO: 0.6 % (ref 0–0.9)
INR PPP: 2 (ref 0.9–1.1)
LYMPHOCYTES # BLD AUTO: 1.35 X10*3/UL (ref 1.2–4.8)
LYMPHOCYTES NFR BLD AUTO: 13.1 %
MAGNESIUM SERPL-MCNC: 2.01 MG/DL (ref 1.6–2.4)
MCH RBC QN AUTO: 20.1 PG (ref 26–34)
MCHC RBC AUTO-ENTMCNC: 30.6 G/DL (ref 32–36)
MCV RBC AUTO: 66 FL (ref 80–100)
MONOCYTES # BLD AUTO: 1.52 X10*3/UL (ref 0.1–1)
MONOCYTES NFR BLD AUTO: 14.8 %
NEUTROPHILS # BLD AUTO: 7.31 X10*3/UL (ref 1.2–7.7)
NEUTROPHILS NFR BLD AUTO: 71 %
NRBC BLD-RTO: 0 /100 WBCS (ref 0–0)
OVALOCYTES BLD QL SMEAR: NORMAL
PLATELET # BLD AUTO: 449 X10*3/UL (ref 150–450)
POTASSIUM SERPL-SCNC: 4.8 MMOL/L (ref 3.5–5.3)
POTASSIUM SERPL-SCNC: 5.9 MMOL/L (ref 3.5–5.3)
POTASSIUM UR-SCNC: 74 MMOL/L
POTASSIUM/CREAT UR-RTO: 101 MMOL/G CREAT
PROCALCITONIN SERPL-MCNC: 0.72 NG/ML
PROT SERPL-MCNC: 6.8 G/DL (ref 6.4–8.2)
PROTHROMBIN TIME: 22.6 SECONDS (ref 9.8–12.8)
RBC # BLD AUTO: 4.22 X10*6/UL (ref 4.5–5.9)
RBC MORPH BLD: NORMAL
SCHISTOCYTES BLD QL SMEAR: NORMAL
SODIUM SERPL-SCNC: 126 MMOL/L (ref 136–145)
SODIUM SERPL-SCNC: 128 MMOL/L (ref 136–145)
SODIUM UR-SCNC: 14 MMOL/L
SODIUM/CREAT UR-RTO: 19 MMOL/G CREAT
UFH PPP CHRO-ACNC: 0.4 IU/ML
UFH PPP CHRO-ACNC: 0.5 IU/ML
VANCOMYCIN SERPL-MCNC: 39.6 UG/ML (ref 5–20)
WBC # BLD AUTO: 10.3 X10*3/UL (ref 4.4–11.3)

## 2024-04-21 PROCEDURE — 72040 X-RAY EXAM NECK SPINE 2-3 VW: CPT

## 2024-04-21 PROCEDURE — 36415 COLL VENOUS BLD VENIPUNCTURE: CPT | Performed by: STUDENT IN AN ORGANIZED HEALTH CARE EDUCATION/TRAINING PROGRAM

## 2024-04-21 PROCEDURE — 2500000004 HC RX 250 GENERAL PHARMACY W/ HCPCS (ALT 636 FOR OP/ED): Performed by: STUDENT IN AN ORGANIZED HEALTH CARE EDUCATION/TRAINING PROGRAM

## 2024-04-21 PROCEDURE — 82565 ASSAY OF CREATININE: CPT | Performed by: STUDENT IN AN ORGANIZED HEALTH CARE EDUCATION/TRAINING PROGRAM

## 2024-04-21 PROCEDURE — 1200000002 HC GENERAL ROOM WITH TELEMETRY DAILY

## 2024-04-21 PROCEDURE — 85610 PROTHROMBIN TIME: CPT | Performed by: STUDENT IN AN ORGANIZED HEALTH CARE EDUCATION/TRAINING PROGRAM

## 2024-04-21 PROCEDURE — 2500000006 HC RX 250 W HCPCS SELF ADMINISTERED DRUGS (ALT 637 FOR ALL PAYERS): Performed by: STUDENT IN AN ORGANIZED HEALTH CARE EDUCATION/TRAINING PROGRAM

## 2024-04-21 PROCEDURE — 80048 BASIC METABOLIC PNL TOTAL CA: CPT | Mod: CCI | Performed by: STUDENT IN AN ORGANIZED HEALTH CARE EDUCATION/TRAINING PROGRAM

## 2024-04-21 PROCEDURE — 80202 ASSAY OF VANCOMYCIN: CPT

## 2024-04-21 PROCEDURE — 85025 COMPLETE CBC W/AUTO DIFF WBC: CPT | Performed by: STUDENT IN AN ORGANIZED HEALTH CARE EDUCATION/TRAINING PROGRAM

## 2024-04-21 PROCEDURE — 2500000004 HC RX 250 GENERAL PHARMACY W/ HCPCS (ALT 636 FOR OP/ED): Performed by: NURSE PRACTITIONER

## 2024-04-21 PROCEDURE — 2500000001 HC RX 250 WO HCPCS SELF ADMINISTERED DRUGS (ALT 637 FOR MEDICARE OP): Performed by: INTERNAL MEDICINE

## 2024-04-21 PROCEDURE — 2500000004 HC RX 250 GENERAL PHARMACY W/ HCPCS (ALT 636 FOR OP/ED)

## 2024-04-21 PROCEDURE — 82947 ASSAY GLUCOSE BLOOD QUANT: CPT

## 2024-04-21 PROCEDURE — 83735 ASSAY OF MAGNESIUM: CPT | Performed by: STUDENT IN AN ORGANIZED HEALTH CARE EDUCATION/TRAINING PROGRAM

## 2024-04-21 PROCEDURE — 2500000004 HC RX 250 GENERAL PHARMACY W/ HCPCS (ALT 636 FOR OP/ED): Performed by: INTERNAL MEDICINE

## 2024-04-21 PROCEDURE — 85520 HEPARIN ASSAY: CPT | Performed by: STUDENT IN AN ORGANIZED HEALTH CARE EDUCATION/TRAINING PROGRAM

## 2024-04-21 PROCEDURE — 99233 SBSQ HOSP IP/OBS HIGH 50: CPT | Performed by: STUDENT IN AN ORGANIZED HEALTH CARE EDUCATION/TRAINING PROGRAM

## 2024-04-21 PROCEDURE — 72040 X-RAY EXAM NECK SPINE 2-3 VW: CPT | Performed by: STUDENT IN AN ORGANIZED HEALTH CARE EDUCATION/TRAINING PROGRAM

## 2024-04-21 PROCEDURE — 2500000001 HC RX 250 WO HCPCS SELF ADMINISTERED DRUGS (ALT 637 FOR MEDICARE OP): Performed by: STUDENT IN AN ORGANIZED HEALTH CARE EDUCATION/TRAINING PROGRAM

## 2024-04-21 PROCEDURE — 2500000005 HC RX 250 GENERAL PHARMACY W/O HCPCS: Performed by: NURSE PRACTITIONER

## 2024-04-21 RX ORDER — CLOPIDOGREL BISULFATE 75 MG/1
75 TABLET ORAL DAILY
Status: DISCONTINUED | OUTPATIENT
Start: 2024-04-21 | End: 2024-05-01 | Stop reason: HOSPADM

## 2024-04-21 RX ORDER — SODIUM CHLORIDE 9 MG/ML
75 INJECTION, SOLUTION INTRAVENOUS CONTINUOUS
Status: DISCONTINUED | OUTPATIENT
Start: 2024-04-21 | End: 2024-04-23

## 2024-04-21 RX ORDER — ENOXAPARIN SODIUM 100 MG/ML
30 INJECTION SUBCUTANEOUS DAILY
Status: DISCONTINUED | OUTPATIENT
Start: 2024-04-22 | End: 2024-04-21

## 2024-04-21 RX ORDER — MEROPENEM 1 G/1
1 INJECTION, POWDER, FOR SOLUTION INTRAVENOUS EVERY 12 HOURS
Status: DISCONTINUED | OUTPATIENT
Start: 2024-04-21 | End: 2024-04-29

## 2024-04-21 RX ORDER — ENOXAPARIN SODIUM 100 MG/ML
30 INJECTION SUBCUTANEOUS DAILY
Status: DISCONTINUED | OUTPATIENT
Start: 2024-04-21 | End: 2024-04-25

## 2024-04-21 RX ORDER — MORPHINE SULFATE 2 MG/ML
2 INJECTION, SOLUTION INTRAMUSCULAR; INTRAVENOUS ONCE
Status: COMPLETED | OUTPATIENT
Start: 2024-04-21 | End: 2024-04-21

## 2024-04-21 RX ADMIN — LIDOCAINE 4% 1 PATCH: 40 PATCH TOPICAL at 20:57

## 2024-04-21 RX ADMIN — INSULIN LISPRO 8 UNITS: 100 INJECTION, SOLUTION INTRAVENOUS; SUBCUTANEOUS at 12:10

## 2024-04-21 RX ADMIN — METOPROLOL SUCCINATE 25 MG: 25 TABLET, EXTENDED RELEASE ORAL at 09:52

## 2024-04-21 RX ADMIN — SODIUM CHLORIDE 75 ML/HR: 9 INJECTION, SOLUTION INTRAVENOUS at 09:52

## 2024-04-21 RX ADMIN — VANCOMYCIN HYDROCHLORIDE 750 MG: 750 INJECTION, SOLUTION INTRAVENOUS at 03:01

## 2024-04-21 RX ADMIN — INSULIN LISPRO 2 UNITS: 100 INJECTION, SOLUTION INTRAVENOUS; SUBCUTANEOUS at 17:31

## 2024-04-21 RX ADMIN — MUPIROCIN: 20 OINTMENT TOPICAL at 14:50

## 2024-04-21 RX ADMIN — MIDODRINE HYDROCHLORIDE 10 MG: 5 TABLET ORAL at 09:52

## 2024-04-21 RX ADMIN — ENOXAPARIN SODIUM 30 MG: 30 INJECTION SUBCUTANEOUS at 12:09

## 2024-04-21 RX ADMIN — CLOPIDOGREL BISULFATE 75 MG: 75 TABLET, FILM COATED ORAL at 14:50

## 2024-04-21 RX ADMIN — MIDODRINE HYDROCHLORIDE 10 MG: 5 TABLET ORAL at 17:30

## 2024-04-21 RX ADMIN — MORPHINE SULFATE 2 MG: 2 INJECTION, SOLUTION INTRAMUSCULAR; INTRAVENOUS at 20:57

## 2024-04-21 RX ADMIN — ATORVASTATIN CALCIUM 80 MG: 80 TABLET, FILM COATED ORAL at 21:03

## 2024-04-21 RX ADMIN — MORPHINE SULFATE 2 MG: 2 INJECTION, SOLUTION INTRAMUSCULAR; INTRAVENOUS at 00:41

## 2024-04-21 RX ADMIN — MIDODRINE HYDROCHLORIDE 10 MG: 5 TABLET ORAL at 12:09

## 2024-04-21 RX ADMIN — MUPIROCIN: 20 OINTMENT TOPICAL at 09:52

## 2024-04-21 RX ADMIN — PANTOPRAZOLE SODIUM 40 MG: 40 TABLET, DELAYED RELEASE ORAL at 06:19

## 2024-04-21 RX ADMIN — METHIMAZOLE 12.5 MG: 5 TABLET ORAL at 09:52

## 2024-04-21 RX ADMIN — MEROPENEM 1 G: 1 INJECTION, POWDER, FOR SOLUTION INTRAVENOUS at 01:37

## 2024-04-21 RX ADMIN — MEROPENEM 1 G: 1 INJECTION, POWDER, FOR SOLUTION INTRAVENOUS at 12:10

## 2024-04-21 RX ADMIN — INSULIN LISPRO 10 UNITS: 100 INJECTION, SOLUTION INTRAVENOUS; SUBCUTANEOUS at 09:55

## 2024-04-21 RX ADMIN — MUPIROCIN: 20 OINTMENT TOPICAL at 21:00

## 2024-04-21 RX ADMIN — ASPIRIN 81 MG: 81 TABLET, COATED ORAL at 09:52

## 2024-04-21 RX ADMIN — SODIUM ZIRCONIUM CYCLOSILICATE 10 G: 10 POWDER, FOR SUSPENSION ORAL at 14:50

## 2024-04-21 ASSESSMENT — PAIN SCALES - PAIN ASSESSMENT IN ADVANCED DEMENTIA (PAINAD)
BREATHING: OCCASIONAL LABORED BREATHING, SHORT PERIOD OF HYPERVENTILATION
FACIALEXPRESSION: SAD, FRIGHTENED, FROWN
NEGVOCALIZATION: OCCASIONAL MOAN/GROAN, LOW SPEECH, NEGATIVE/DISAPPROVING QUALITY
TOTALSCORE: 4
BODYLANGUAGE: TENSE, DISTRESSED PACING, FIDGETING
CONSOLABILITY: NO NEED TO CONSOLE

## 2024-04-21 ASSESSMENT — PAIN SCALES - GENERAL
PAINLEVEL_OUTOF10: 7
PAINLEVEL_OUTOF10: 0 - NO PAIN
PAINLEVEL_OUTOF10: 0 - NO PAIN
PAINLEVEL_OUTOF10: 7

## 2024-04-21 ASSESSMENT — COGNITIVE AND FUNCTIONAL STATUS - GENERAL
DRESSING REGULAR LOWER BODY CLOTHING: A LITTLE
EATING MEALS: A LITTLE
WALKING IN HOSPITAL ROOM: A LOT
STANDING UP FROM CHAIR USING ARMS: A LOT
PERSONAL GROOMING: A LITTLE
MOBILITY SCORE: 16
MOVING TO AND FROM BED TO CHAIR: A LOT
TOILETING: A LITTLE
DRESSING REGULAR UPPER BODY CLOTHING: A LITTLE
DAILY ACTIVITIY SCORE: 18
CLIMB 3 TO 5 STEPS WITH RAILING: A LOT
HELP NEEDED FOR BATHING: A LITTLE

## 2024-04-21 ASSESSMENT — PAIN - FUNCTIONAL ASSESSMENT
PAIN_FUNCTIONAL_ASSESSMENT: 0-10
PAIN_FUNCTIONAL_ASSESSMENT: 0-10

## 2024-04-21 ASSESSMENT — PAIN SCALES - WONG BAKER: WONGBAKER_NUMERICALRESPONSE: HURTS EVEN MORE

## 2024-04-21 ASSESSMENT — PAIN DESCRIPTION - LOCATION: LOCATION: KNEE

## 2024-04-21 ASSESSMENT — PAIN DESCRIPTION - ORIENTATION: ORIENTATION: LEFT

## 2024-04-21 NOTE — PROGRESS NOTES
Nephrology Progress Note    Assessment:  53 y.o. male with history s/f HFrEF, PAD s/p LT BKA, T2DM c/b chronic non-healing wounds, cirrhosis who presented for SOB for several days.      PIPPA on CKD stage II/III: likely in setting of diuretics, not significantly hypotensive, no contrast, not on any other nephrotoxic medications, function was at baseline on presentation w/ Scr ~1.4 w/ eGFR high 50s/low 60s  Fluid overload: improved, has combined HFrEF 20-25%   Recurrent RT sided pleural effusion: had 3L taken off at [facility] on 4/16, present here again   Cirrhosis   Hypotension   Hypoalbuminemia  Anemia   RT sided PNA      Plan:  - keep holding diuretics for now   - starting NS at 75 ml/hr for now and checking BMP again later   - agree not ready for [facility] at this time   - giving lokelma    Subjective:  Admit Date: 4/18/2024    Interval History: function worse today, K up to 5.9, Na trending down as well, BP occasionally in the low 100s     Medications:  Scheduled Meds:aspirin, 81 mg, oral, Daily  atorvastatin, 80 mg, oral, Nightly  enoxaparin, 30 mg, subcutaneous, Daily  [Held by provider] furosemide, 40 mg, oral, Daily  insulin glargine, 10 Units, subcutaneous, q24h  insulin lispro, 0-10 Units, subcutaneous, TID with meals  insulin lispro, 10 Units, subcutaneous, Once  lidocaine, 1 patch, transdermal, Daily  meropenem, 1 g, intravenous, q12h  methIMAzole, 12.5 mg, oral, Daily  metoprolol succinate XL, 25 mg, oral, Daily  midodrine, 10 mg, oral, TID with meals  morphine, 4 mg, intravenous, Once  mupirocin, , Topical, TID  pantoprazole, 40 mg, oral, Daily before breakfast   Or  pantoprazole, 40 mg, intravenous, Daily before breakfast  [Held by provider] spironolactone, 100 mg, oral, Daily      Continuous Infusions:sodium chloride 0.9%, 75 mL/hr, Last Rate: 75 mL/hr (04/21/24 0952)        CBC:   Lab Results   Component Value Date    WBC 10.3 04/21/2024    RBC 4.22 (L) 04/21/2024    HGB 8.5 (L) 04/21/2024    HCT 27.8 (L)  "04/21/2024    MCV 66 (L) 04/21/2024    MCH 20.1 (L) 04/21/2024    MCHC 30.6 (L) 04/21/2024    RDW 25.5 (H) 04/21/2024     04/21/2024     BMP:    Lab Results   Component Value Date     (L) 04/21/2024    K 5.9 (H) 04/21/2024    CL 88 (L) 04/21/2024    CO2 27 04/21/2024     (HH) 04/21/2024    CREATININE 2.89 (H) 04/21/2024    CALCIUM 8.0 (L) 04/21/2024    GLUCOSE 388 (H) 04/21/2024       Objective:  Vitals: /60 (BP Location: Left arm, Patient Position: Lying)   Pulse 92   Temp 36.7 °C (98.1 °F) (Temporal)   Resp 16   Ht 1.753 m (5' 9\")   Wt 71.2 kg (156 lb 15.5 oz)   SpO2 98%   BMI 23.18 kg/m²    Wt Readings from Last 3 Encounters:   04/21/24 71.2 kg (156 lb 15.5 oz)   03/24/24 65 kg (143 lb 4.8 oz)   10/30/23 55.9 kg (123 lb 3.8 oz)      24HR INTAKE/OUTPUT:    Intake/Output Summary (Last 24 hours) at 4/21/2024 1251  Last data filed at 4/21/2024 0559  Gross per 24 hour   Intake 764.83 ml   Output 125 ml   Net 639.83 ml       General: alert, in no apparent distress  HEENT: normocephalic, atraumatic, anicteric  Lungs: non-labored respirations, clear to auscultation bilaterally  Heart: regular rate and rhythm, no murmurs or rubs  Abdomen: soft, non-tender, distended  Ext: no cyanosis, no peripheral edema, LT BKA  Neuro: alert and oriented, no gross abnormalities        Electronically signed by Malgorzata Williamson MD, MD              "

## 2024-04-21 NOTE — PROGRESS NOTES
"Vancomycin Dosing by Pharmacy- FOLLOW UP    Hitesh Jurado is a 53 y.o. year old male who Pharmacy has been consulted for vancomycin dosing for pneumonia. Based on the patient's indication and renal status this patient is being dosed based on a goal AUC of 400-600.     Renal function is currently declining.    Current vancomycin dose: 750 mg given every 24 hours    Estimated vancomycin AUC on current dose: 727 mg/L.hr     Visit Vitals  /68 (BP Location: Left arm, Patient Position: Lying)   Pulse 90   Temp 36.8 °C (98.2 °F) (Temporal)   Resp 16        Lab Results   Component Value Date    CREATININE 2.89 (H) 04/21/2024    CREATININE 2.19 (H) 04/20/2024    CREATININE 1.49 (H) 04/19/2024    CREATININE 1.43 (H) 04/18/2024        Patient weight is No results found for: \"PTWEIGHT\"    No results found for: \"CULTURE\"     I/O last 3 completed shifts:  In: 764.8 (10.7 mL/kg) [I.V.:364.8 (5.1 mL/kg); IV Piggyback:400]  Out: 375 (5.3 mL/kg) [Urine:375 (0.1 mL/kg/hr)]  Weight: 71.2 kg   [unfilled]    Lab Results   Component Value Date    PATIENTTEMP  11/01/2023      Comment:      NOTE: Patient Results are Not Corrected for Temperature    PATIENTTEMP 37.0 11/14/2022    PATIENTTEMP 37.0 11/13/2022        Assessment/Plan    Above AUC goal. Order discontinued and will monitor labs.    This dosing regimen is predicted by [employer] to result in the following pharmacokinetic parameters:    Loading dose: N/A  Regimen: 750 mg IV every 24 hours.  Start time: 03:01 on 04/22/2024  Exposure target: AUC24 (range)400-600 mg/L.hr   AUC24,ss: 736 mg/L.hr  Probability of AUC24 > 400: 100 %  Ctrough,ss: 22.7 mg/L  Probability of Ctrough,ss > 20: 69 %  Probability of nephrotoxicity (Lodise DAYANARA 2009): 23 %    The next level will be obtained on 4/22/24 at 1st AM. May be obtained sooner if clinically indicated.   Will continue to monitor renal function daily while on vancomycin and order serum creatinine at least every 48 hours if not " already ordered.  Follow for continued vancomycin needs, clinical response, and signs/symptoms of toxicity.       Gabby Mathews, PharmD

## 2024-04-21 NOTE — PROGRESS NOTES
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 Presented with Cr 1.43 which uptrended following diuresis. Likely intravascularly depleted   -Nephro following; recs appreciated. Agree with holding diuretics. Advise Gentle IV fluids & monitoring at this time. Patient reporting minimal urine output    NSTEMI  -Presented with chest discomfort and notably elevated troponins (823 -> 962 -> 1060 -> 743).  -Card following, recs appreciated. Was tentatively planned for [facility] on 4/22, however will be deferred at this time given worsening renal function.  -Heparin drip discontinued    Concern for bacterial pneumonia   Loculated Pleural effusion  -CT thorax reviewed, reveals dense consolidations extensviely in all 3 right lobes as well as extensively loculated, large right pleural effusion   -CT surgery following; recs appreciated. Poor surgical candidate. Could offer pleurex catheter once more stable. Agree with antibiotic coverage and thoracentesis at this time.   -Plan for thoracentesis per IR on 4/22/24  -Continued on meropenem, Vancomycin d/c'd    Cirrhosis with ascites  -Lasix/aldactone on hold, will monitor    Hyponatremia  -Na 126, likely related to free water retention from cirrhosis. Will monitor.      Goals of care  -Patient expressed frustrations regarding current health status. Expressed interest in more comfort focused care as well as hospice. Palliative care evaluation appreciated    Hx HTN, HLD,CAD, ICM, Valvular heart disease, T2DM, PAD S/p left BKA,   -Home meds resumed as applicable    55 minutes spent on patient encounter. Time calculated includes chart review, bedside evaluation, counselling, and care coordination.         Mukesh Benoit MD    SUBJECTIVE     Patient had no acute events overnight.  States chest pain has resolved since receiving nitroglycerin in the ED.  Denies any fevers chills or chest pain.  Endorsing shortness of breath.  Further also  unremarkable.    OBJECTIVE:       Last Recorded Vitals:  Vitals:    04/21/24 0539 04/21/24 0620 04/21/24 0714 04/21/24 1107   BP: 121/74  104/68 106/60   BP Location:   Left arm Left arm   Patient Position:   Lying Lying   Pulse:   90 92   Resp:   16 16   Temp: 36.7 °C (98.1 °F)  36.8 °C (98.2 °F) 36.7 °C (98.1 °F)   TempSrc:   Temporal Temporal   SpO2: 94%  96% 98%   Weight:  71.2 kg (156 lb 15.5 oz)     Height:           Last I/O:  I/O last 3 completed shifts:  In: 764.8 (10.7 mL/kg) [I.V.:364.8 (5.1 mL/kg); IV Piggyback:400]  Out: 375 (5.3 mL/kg) [Urine:375 (0.1 mL/kg/hr)]  Weight: 71.2 kg     Physical Exam:  General: Ill-appearing adult male in mild distress  HEENT: Clear sclera, EOMI, trachea midline, moist mucous membranes  Respiratory: Equal chest rise, no retractions, diminished breath sounds on right  Abdomen: Tense, distended, nontender  Extremities: Left BKA noted stump covered in clean bandaging.  Superficial ulcerations involving right leg  Neurological: Spontaneously moves all extremities, no dysarthria, cranial nerves grossly intact  Psychiatric: Appropriate mood and affect  Skin: Warm, dry    Inpatient Medications:  aspirin, 81 mg, oral, Daily  atorvastatin, 80 mg, oral, Nightly  enoxaparin, 30 mg, subcutaneous, Daily  [Held by provider] furosemide, 40 mg, oral, Daily  insulin glargine, 10 Units, subcutaneous, q24h  insulin lispro, 0-10 Units, subcutaneous, TID with meals  insulin lispro, 10 Units, subcutaneous, Once  lidocaine, 1 patch, transdermal, Daily  meropenem, 1 g, intravenous, q12h  methIMAzole, 12.5 mg, oral, Daily  metoprolol succinate XL, 25 mg, oral, Daily  midodrine, 10 mg, oral, TID with meals  morphine, 4 mg, intravenous, Once  mupirocin, , Topical, TID  pantoprazole, 40 mg, oral, Daily before breakfast   Or  pantoprazole, 40 mg, intravenous, Daily before breakfast  sodium zirconium cyclosilicate, 10 g, oral, Once  [Held by provider] spironolactone, 100 mg, oral, Daily        PRN  Medications  PRN medications: acetaminophen **OR** acetaminophen **OR** acetaminophen, dextrose, dextrose, glucagon, glucagon, ipratropium-albuteroL, melatonin, morphine, [Held by provider] ondansetron **OR** [Held by provider] ondansetron, polyethylene glycol, trimethobenzamide, vancomycin    Continuous Medications:  sodium chloride 0.9%, 75 mL/hr, Last Rate: 75 mL/hr (04/21/24 1341)          LABS AND IMAGING:     Labs:  Results for orders placed or performed during the hospital encounter of 04/18/24 (from the past 24 hour(s))   POCT GLUCOSE   Result Value Ref Range    POCT Glucose 386 (H) 74 - 99 mg/dL   Urine electrolytes   Result Value Ref Range    Sodium, Urine Random 14 mmol/L    Sodium/Creatinine Ratio 19 Not established. mmol/g Creat    Potassium, Urine Random 74 mmol/L    Potassium/Creatinine Ratio 101 Not established mmol/g Creat    Chloride, Urine Random <15 mmol/L    Chloride/Creatinine Ratio      Creatinine, Urine Random 73.6 20.0 - 370.0 mg/dL   Vancomycin   Result Value Ref Range    Vancomycin 39.6 (H) 5.0 - 20.0 ug/mL   Magnesium   Result Value Ref Range    Magnesium 2.01 1.60 - 2.40 mg/dL   CBC and Auto Differential   Result Value Ref Range    WBC 10.3 4.4 - 11.3 x10*3/uL    nRBC 0.0 0.0 - 0.0 /100 WBCs    RBC 4.22 (L) 4.50 - 5.90 x10*6/uL    Hemoglobin 8.5 (L) 13.5 - 17.5 g/dL    Hematocrit 27.8 (L) 41.0 - 52.0 %    MCV 66 (L) 80 - 100 fL    MCH 20.1 (L) 26.0 - 34.0 pg    MCHC 30.6 (L) 32.0 - 36.0 g/dL    RDW 25.5 (H) 11.5 - 14.5 %    Platelets 449 150 - 450 x10*3/uL    Neutrophils % 71.0 40.0 - 80.0 %    Immature Granulocytes %, Automated 0.6 0.0 - 0.9 %    Lymphocytes % 13.1 13.0 - 44.0 %    Monocytes % 14.8 2.0 - 10.0 %    Eosinophils % 0.1 0.0 - 6.0 %    Basophils % 0.4 0.0 - 2.0 %    Neutrophils Absolute 7.31 1.20 - 7.70 x10*3/uL    Immature Granulocytes Absolute, Automated 0.06 0.00 - 0.70 x10*3/uL    Lymphocytes Absolute 1.35 1.20 - 4.80 x10*3/uL    Monocytes Absolute 1.52 (H) 0.10 - 1.00  x10*3/uL    Eosinophils Absolute 0.01 0.00 - 0.70 x10*3/uL    Basophils Absolute 0.04 0.00 - 0.10 x10*3/uL   Comprehensive Metabolic Panel   Result Value Ref Range    Glucose 388 (H) 74 - 99 mg/dL    Sodium 126 (L) 136 - 145 mmol/L    Potassium 5.9 (H) 3.5 - 5.3 mmol/L    Chloride 88 (L) 98 - 107 mmol/L    Bicarbonate 27 21 - 32 mmol/L    Anion Gap 17 10 - 20 mmol/L    Urea Nitrogen 108 (HH) 6 - 23 mg/dL    Creatinine 2.89 (H) 0.50 - 1.30 mg/dL    eGFR 25 (L) >60 mL/min/1.73m*2    Calcium 8.0 (L) 8.6 - 10.3 mg/dL    Albumin 2.7 (L) 3.4 - 5.0 g/dL    Alkaline Phosphatase 311 (H) 33 - 120 U/L    Total Protein 6.8 6.4 - 8.2 g/dL    AST 21 9 - 39 U/L    Bilirubin, Total 1.6 (H) 0.0 - 1.2 mg/dL    ALT 19 10 - 52 U/L   Heparin Assay, UFH   Result Value Ref Range    Heparin Unfractionated 0.4 See Comment Below for Therapeutic Ranges IU/mL   Morphology   Result Value Ref Range    RBC Morphology See Below     Hypochromia Mild     RBC Fragments Few     Ovalocytes Few     Teardrop Cells Few    Protime-INR   Result Value Ref Range    Protime 22.6 (H) 9.8 - 12.8 seconds    INR 2.0 (H) 0.9 - 1.1   POCT GLUCOSE   Result Value Ref Range    POCT Glucose 351 (H) 74 - 99 mg/dL   Heparin Assay   Result Value Ref Range    Heparin Unfractionated 0.5 See Comment Below for Therapeutic Ranges IU/mL   POCT GLUCOSE   Result Value Ref Range    POCT Glucose 349 (H) 74 - 99 mg/dL        Imaging:

## 2024-04-21 NOTE — PROGRESS NOTES
Vancomycin Dosing by Pharmacy- Cessation of Therapy    Consult to pharmacy for vancomycin dosing has been discontinued by the prescriber, pharmacy will sign off at this time.    Please call pharmacy if there are further questions or re-enter a consult if vancomycin is resumed.     Nena Gavin, ShoshanaD

## 2024-04-21 NOTE — CARE PLAN
The patient's goals for the shift include none    The clinical goals for the shift include patient will remain hemodynamically stable throughout shift.    Over the shift, the patient did not make progress toward the following goals. Barriers to progression include complex history and prognosis. Recommendations to address these barriers include monitoring and collaboration with multiple doctors and NP's.

## 2024-04-21 NOTE — PROGRESS NOTES
HPI:      Patient is a 53 y.o. male with past medical history of systolic CHF with most recent EF of 25 to 30%, hypertension, hyperlipidemia, PAD, diabetes, liver cirrhosis, who presents initially to [facility] with a chief complaint of  SOB.  Patient with multiple medical problems history of noncompliance.  He states he has not followed up with a cardiologist as outpatient.  He has multiple recurrent admissions recently.  Apparently left AMA from [facility].  Noted to have elevated cardiac enzyme and trending down in the setting of chronic kidney disease.  He denies any chest pain chest pressure heaviness.  Per patient he states he had a coronary angiogram approximately 2 years ago at [facility], no PCI was performed.  Hemoglobin is 7.8, troponin is 743 now.  EKG with ST, RBBB.         Cardiology consulted for acute on chronic systolic heart failure     Echo on 11/2023:  - The left ventricle is severely dilated. Left ventricular systolic function is   severely decreased. EF = 27 ± 5% (2D 4-ch.) Definity contrast used for endocardial    border detection. Indeterminate left ventricular diastolic dysfunction due to >2+    MR.   - The right ventricle is dilated. Right ventricular systolic function is mildly   decreased.   - The left atrial cavity is severely dilated.   - There is moderate (2+ - 3+) mitral valve regurgitation. Regurgitant orifice area    (PISA) is 0.34 cm².   - There is moderate (2+ - 3+) tricuspid valve regurgitation.   - Estimated right ventricular systolic pressure is 35 mmHg consistent with mild   pulmonary hypertension. Estimated right atrial pressure is 8 mmHg based on IVC   assessment.      On examination today, patient resting in bed comfortably.  Denying anginal like symptoms.  Patient does report shortness of breath at rest and with exertion.  He reports shortness of breath is worse when he is laying down and at night.  Patient lives in a nursing home and will need  continuous follow-up with a cardiologist, in which she would like to establish. Patient very noncompliant.     4/21/2024: Patient with worsening renal function.  Nephrology following now.  Patient also being evaluated by cardiothoracic surgery for thoracentesis.  He denies any chest pain.   Previous cardiac catheterization report from [facility] in 2021 reviewed    Cardiac catheterization at [facility] in 2021   Angiographic findings     LMCA: Very short left main.     LAD: Diffuse mild irregularity up to 20% narrowing.Late mid LAD with 30%   stenosis. Very small caliber D1 and D2 branches with severe diffuse disease.     LCx: Abnormal.Chronically occluded mid circumflex with retrogradely filling   OM branch from left to left collaterals.     RCA: Diffuse mild irregularity up to 20% narrowing.Distal 40% stenosis.                  Past Medical History   No past medical history on file.            Patient Active Problem List   Diagnosis    Hyperthyroidism    Encephalopathy    Hypoglycemia    PIPPA (acute kidney injury) (HCC)         Past Surgical History             Past Surgical History:   Procedure Laterality Date    PARACENTESIS Left 04/03/2024     3215 ml removed by Dr. Simental - diagnostics sent            Social History   Social History                Socioeconomic History    Marital status: Single   Tobacco Use    Smoking status: Never       Passive exposure: Never    Smokeless tobacco: Never   Substance and Sexual Activity    Alcohol use: Never    Drug use: Never      Social Determinants of Health              Food Insecurity: Patient Unable To Answer (4/2/2024)     Hunger Vital Sign      Worried About Running Out of Food in the Last Year: Patient unable to answer      Ran Out of Food in the Last Year: Patient unable to answer   Transportation Needs: Patient Unable To Answer (4/2/2024)     PRAPARE - Transportation      Lack of Transportation (Medical): Patient unable to answer      Lack of  Transportation (Non-Medical): Patient unable to answer   Housing Stability: Patient Unable To Answer (4/2/2024)     Housing Stability Vital Sign      Unable to Pay for Housing in the Last Year: Patient unable to answer      Number of Places Lived in the Last Year: 1      Unstable Housing in the Last Year: Patient unable to answer            Family History   No family history on file.         Current Facility-Administered Medications                     Current Facility-Administered Medications   Medication Dose Route Frequency Provider Last Rate Last Admin    insulin glargine (LANTUS) injection vial 25 Units  25 Units SubCUTAneous Nightly Maicol Dela Cruz MD        insulin lispro (HUMALOG) injection vial 8 Units  8 Units SubCUTAneous TID  Maicol Dela Cruz MD   8 Units at 04/04/24 1724    epoetin tika-epbx (RETACRIT) injection 10,000 Units  10,000 Units SubCUTAneous Weekly Allen Mahajan MD   10,000 Units at 04/03/24 2114    furosemide (LASIX) injection 40 mg  40 mg IntraVENous BID Allen Mahajan MD   40 mg at 04/04/24 1724    traMADol (ULTRAM) tablet 50 mg  50 mg Oral Q6H PRN Daily Valentine MD   50 mg at 04/05/24 0554    clopidogrel (PLAVIX) tablet 75 mg  75 mg Oral Daily Ness Bird MD   75 mg at 04/04/24 0846    gabapentin (NEURONTIN) capsule 100 mg  100 mg Oral TID Ness Bird MD        atorvastatin (LIPITOR) tablet 80 mg  80 mg Oral Nightly Ness Bird MD   80 mg at 04/04/24 2152    insulin lispro (HUMALOG) injection vial 0-8 Units  0-8 Units SubCUTAneous TID  Dimitry Joshi PA   6 Units at 04/04/24 1724    insulin lispro (HUMALOG) injection vial 0-4 Units  0-4 Units SubCUTAneous Nightly Dimitry Joshi PA   4 Units at 04/03/24 2008    sodium chloride flush 0.9 % injection 5-40 mL  5-40 mL IntraVENous 2 times per day Daily Valentine MD   10 mL at 04/04/24 2153    sodium chloride flush 0.9 % injection 5-40 mL  5-40 mL IntraVENous PRN Daily Valentine MD        0.9 % sodium chloride infusion    IntraVENous PRN Daily Valentine MD        potassium chloride (KLOR-CON M) extended release tablet 40 mEq  40 mEq Oral PRN Daily Valentine MD         Or    potassium bicarb-citric acid (EFFER-K) effervescent tablet 40 mEq  40 mEq Oral PRN Daily Valentine MD         Or    potassium chloride 10 mEq/100 mL IVPB (Peripheral Line)  10 mEq IntraVENous PRN Daily Valentine MD        magnesium sulfate 2000 mg in 50 mL IVPB premix  2,000 mg IntraVENous PRN Daily Valentine MD        enoxaparin (LOVENOX) injection 40 mg  40 mg SubCUTAneous Daily Daily Valentine MD   40 mg at 04/04/24 0849    ondansetron (ZOFRAN-ODT) disintegrating tablet 4 mg  4 mg Oral Q8H PRN Daily Valentine MD         Or    ondansetron (ZOFRAN) injection 4 mg  4 mg IntraVENous Q6H PRN Daily Valentine MD        polyethylene glycol (GLYCOLAX) packet 17 g  17 g Oral Daily PRN Daily Valentine MD        acetaminophen (TYLENOL) tablet 650 mg  650 mg Oral Q6H PRN Daily Valentine MD   650 mg at 04/02/24 1643     Or    acetaminophen (TYLENOL) suppository 650 mg  650 mg Rectal Q6H PRN Daily Valentine MD        glucose chewable tablet 16 g  4 tablet Oral PRN Daily Valentine MD        dextrose bolus 10% 125 mL  125 mL IntraVENous PRN Daily Valentine MD   Stopped at 04/02/24 1248     Or    dextrose bolus 10% 250 mL  250 mL IntraVENous PRN Daily Valentine MD        glucagon injection 1 mg  1 mg SubCUTAneous PRN Daily Valentine MD        dextrose 10 % infusion   IntraVENous Continuous PRN Daily Valentine MD        midodrine (PROAMATINE) tablet 10 mg  10 mg Oral TID WC Lonnie Ortiz MD   10 mg at 04/04/24 1725            ALLERGIES: Amoxicillin     Review of Systems   Constitutional: Negative.    HENT: Negative.     Eyes: Negative.    Respiratory:  Positive for shortness of breath. Negative for chest tightness.    Cardiovascular:  Negative for chest pain, palpitations and leg swelling.   Gastrointestinal: Negative.    Endocrine: Negative.    Genitourinary: Negative.    Musculoskeletal:  "Negative.    Skin: Negative.    Allergic/Immunologic: Negative.    Neurological: Negative.    Hematological: Negative.    Psychiatric/Behavioral: Negative.              VITALS:  Blood pressure 107/74, pulse 80, temperature 99 °F (37.2 °C), resp. rate 17, height 1.778 m (5' 10\"), weight 75.4 kg (166 lb 3.6 oz), SpO2 93 %.  Body mass index is 23.85 kg/m².     Physical Exam  HENT:      Head: Normocephalic.   Cardiovascular:      Rate and Rhythm: Normal rate and regular rhythm.      Heart sounds: No murmur heard.  Pulmonary:      Effort: Pulmonary effort is normal.      Breath sounds: Normal breath sounds.   Abdominal:      General: Bowel sounds are normal. There is distension.   Musculoskeletal:      Right lower leg: No edema.      Left lower leg: Left lower leg edema: BKA.   Skin:     General: Skin is warm and dry.   Neurological:      General: No focal deficit present.      Mental Status: He is alert.   Psychiatric:         Mood and Affect: Mood normal.            LABS:     Pertinent Labs:  CBC:         Recent Labs     04/04/24  0324   WBC 11.8*   HGB 7.2*   *      BMP:        Recent Labs     04/05/24  0447      K 4.0   CL 99   CO2 21   *   CREATININE 2.29*   GLUCOSE 188*   LABGLOM 33.1*      INR:         Recent Labs     04/02/24  0930   INR 1.5      BNP: No results for input(s): \"BNP\" in the last 72 hours.   TSH: No results found for: \"TSH\"   Cardiac Injury Profile: No results for input(s): \"CKTOTAL\", \"CKMB\", \"CKMBINDEX\", \"TROPONINI\" in the last 72 hours.   Troponin: No results found for: \"TROPONINI\"  Lipid Profile: No results found for: \"TRIG\", \"HDL\", \"LDLCALC\", \"CHOL\"   Hemoglobin A1C: No components found for: \"HGBA1C\"         Radiology:  XR CHEST PORTABLE     Result Date: 4/4/2024  EXAMINATION: ONE XRAY VIEW OF THE CHEST 4/4/2024 6:06 am COMPARISON: April 2, 2024 HISTORY: ORDERING SYSTEM PROVIDED HISTORY: Pleural effusion TECHNOLOGIST PROVIDED HISTORY: Reason for exam:->Pleural effusion What " reading provider will be dictating this exam?->CRC FINDINGS: Right basilar opacity with silhouetting of the right hemidiaphragm and right heart border compatible with moderate right effusion and associated right basilar atelectasis, unchanged from April 2, 2024..  The left lung is clear. No cardiomediastinal shift.  Osseous and body wall soft tissues unremarkable.      Moderate right effusion and associated right basilar atelectasis, unchanged from April 2, 2024.      IR US GUIDED PARACENTESIS     Result Date: 4/3/2024  PROCEDURE: PARACENTESIS WITHOUT IMAGE GUIDANCE US ABDOMEN LIMITED 4/3/2024 HISTORY: ORDERING SYSTEM PROVIDED HISTORY: ascites TECHNOLOGIST PROVIDED HISTORY: Reason for exam:->ascites TECHNIQUE: Informed consent was obtained after a detailed explanation of the procedure including risks, benefits, and alternatives.  Universal protocol was followed.  A limited ultrasound of the abdomen was performed. The left abdomen was prepped and draped in sterile fashion and local anesthesia was achieved with lidocaine.  An 5 [place] needle sheath was advanced into ascites and paracentesis was performed.  The patient tolerated the procedure well. FINDINGS: Limited ultrasound of the abdomen demonstrates ascites. A total of 3215 mL was removed.      Successful paracentesis.      XR CHEST PORTABLE     Result Date: 4/2/2024  EXAMINATION: ONE XRAY VIEW OF THE CHEST 4/2/2024 10:50 am COMPARISON: None. HISTORY: ORDERING SYSTEM PROVIDED HISTORY: Altered LOC TECHNOLOGIST PROVIDED HISTORY: Reason for exam:->Altered LOC What reading provider will be dictating this exam?->CRC FINDINGS: See impression.      1. Pleural and parenchymal opacities right lung base with elevation of the right hemidiaphragm. 2. Cardiomegaly. 3. Hyperinflated left lung. 4. Central bronchial wall thickening and mild interstitial prominence.      US HEAD NECK SOFT TISSUE THYROID     Result Date: 4/2/2024  EXAMINATION: ULTRASOUND OF THE THYROID WITH COLOR  DOPPLER FLOW EVALUATION4/2/2024 10:14 am Ultrasound Thyroid COMPARISON: None HISTORY: ORDERING SYSTEM PROVIDED HISTORY: MASS, mass of neck, Possible pulsatile mass of right carotid artery TECHNOLOGIST PROVIDED HISTORY: Reason for exam:->mass of neck, FINDINGS: Size right thyroid lobe: 6.1 x 3.2 x 4.3 cm Size left thyroid lobe: 4.4 x 1.6 x 1.8 cm Size isthmus: 0.4 cm Texture: Homogenous Estimated total number of nodules greater than or equal to 1 cm: 1 Nodule#: # 1: Maximum size: 5.2 cm . All dimensions: 5.2 x 4.1 x 3.4 cm Location: Right Mid Composition: solid or almost completely solid: 2 points Echogenicity: hypoechoic: 2 points Shape: wider than tall: 0 points Margins: smooth: 0 points Echogenic foci: none: 0 points ACR Total Points: 4;  ACR TI-RADS risk category: TR4 -  moderately suspicious nodule. No aneurysm is seen.      1. Nodule 1: ACR TI-RADS 2017 Category 4. Recommend: Ultrasound-guided fine needle aspiration ACR TI-RADS 2017 Recommendations: TR1(0 points) : No FNA or follow up TR2 (2 points) : No FNA or follow up TR3 (3 points) : FNA if >/= 2.5 cm, follow up if 1.5 - 2.4 cm in 1, 3, and 5 years TR4 (4-6 points) : FNA if >/= 1.5 cm, follow up if 1.0 - 1.4 cm in 1, 2, 3, and 5 years TR5 (>/= 7 points) : FNA if >/= 1.0 cm, follow up if 0.5 - 0.9 cm every year for 5 years *ACR TI-RADS recommends that no more than two nodules with the highest ACR TI-RADS total point should be biopsied and no more than four nodules should be followed.      Vascular US ankle brachial index (MARILIA) without exercise     Result Date: 3/24/2024            [place] [id number]     Tel [phone] Fax [phone] Vascular Lab Report [facility] US ANKLE BRACHIAL INDEX (MARILIA) WITHOUT EXERCISE Patient Name:      BROOK MORELOS Patterson Physician:  [id number] Siena Escobar MD, RPVI Study Date:        3/20/2024            Ordering Provider:   [id number] LIAM LEGGETT MRN/PID:           [id number]             Fellow: Accession#:        [id number]         Technologist:       America Hansen                                                             RDMS, RVT Date of Birth/Age: 1970 / 53 years  Technologist 2: Gender:            M                    Encounter#:         [id number] Admission Status:  Inpatient            Location Performed: [facility] Diagnosis/ICD: Atherosclerosis of native arteries of right leg with ulceration                of other part of foot-I70.235 CPT Codes:     49135.52 Peripheral artery MARILIA Only Reduced Service Pertinent History: PVD. LEFT BKA. CONCLUSIONS: Right Lower PVR: No evidence of arterial occlusive disease in the right lower extremity at rest. Decreased digital perfusion noted. Monophasic flow is noted in the right common femoral artery, right posterior tibial artery and right dorsalis pedis artery. Left Lower PVR: Left BKA. Comparison: Compared with study from 2/28/2022, improved right MARILIA from last exam. Imaging & Doppler Findings: RIGHT Lower PVR                Pressures Ratios Right Posterior Tibial (Ankle) 116 mmHg  1.10 Right Dorsalis Pedis (Ankle)   78 mmHg   0.74 Right Digit (Great Toe)        36 mmHg   0.34                    Right     Left Brachial Pressure 105 mmHg 101 mmHg [id number] Siena Escobar MD, NIK Electronically signed by [id number] Siena Escobar MD, NIK on 3/24/2024 at 6:54:08 PM ** Final **     Vascular US lower extremity arterial duplex left     Result Date: 3/24/2024            [place] [address]     Tel [phone] Fax [phone] Vascular Lab Report [facility] US LOWER EXTREMITY ARTERIAL DUPLEX LEFT Patient Name:      BROOK MORELOS Reading Physician:  [id number] Siena Escobar MD, RPVI Study Date:        3/20/2024            Ordering  Provider:  [id number] JAMI TAVAREZ MRN/PID:           [id number]             Fellow: Accession#:        [id number]         Technologist:       America Hansen RDMS Date of Birth/Age: 1970 / 53 years  Technologist 2: Gender:            M                    Encounter#:         [id number] Admission Status:  Inpatient            Location Performed: [facility] Diagnosis/ICD: Other atherosclerosis of unspecified type of bypass graft(s) of                the extremities, left leg-I70.392 CPT Codes:     97773 Peripheral artery Lower arterial Duplex limited Smoker:            Former. Pertinent History: LE Bypass graft. Pt poor historian. h/o rt to left fem/fem                    bypass and failed fem pop graft (pt unsure of when this was                    done) BKA left, wound on left stump. Per order assess left                    profunda.   CONCLUSIONS: Left Lower Arterial: There is an occlusion documented at the superficial femoral artery proximal, superficial femoral artery mid. and superficial femoral artery distal. Profunda mid 93.2 cm/sec, Distal 38.1 cm/sec. Imaging & Doppler Findings: Right                    Left  PSV                      PSV              EIA        67 cm/s              CFA        56 cm/s       Profunda Proximal 63 cm/s           Popliteal     34 cm/s [id number] Siena Escobar MD, RPVI Electronically signed by [id number] Siena Escobar MD, RPVI on 3/24/2024 at 6:44:24 PM ** Final **     US guided abdominal paracentesis     Result Date: 3/19/2024  Interpreted By:  Schoenberger, Joseph, STUDY: US GUIDED ABDOMINAL PARACENTESIS; ;  3/19/2024 2:50 pm    INDICATION: Signs/Symptoms:paracentesis.    COMPARISON: None.    ACCESSION NUMBER(S): [id number]    ORDERING CLINICIAN: ALEXA JUNE    CONSENT: The procedure, its indication, its risks, and alternatives were explained to the patient who understands and consents to the procedure. A time-out is completed prior to the procedure to confirm  patient identity and procedure to be performed.    MODALITIES: Ultrasound    TECHNIQUE: Targeted sonographic evaluation of the abdomen demonstrates suitable pocket for paracentesis in the right lower quadrant. This site was then marked.    The marked site was sterilely prepped with chlorhexidine and sterile drapes were placed. Local anesthetic was administered. A 5 [place] sheath needle was advanced until straw-colored fluid was obtained. The sheath was advanced off the needle cannula into the peritoneal space. At this point aspiration was performed completion. A total of 6300 cc of fluid was aspirated. The sheath was then removed. The patient tolerated the procedure well. There was no immediate complication.    FINDINGS: See discussion above        Successful ultrasound-guided therapeutic paracentesis       MACRO: None    Signed by: Joseph Schoenberger 3/19/2024 2:53 PM Dictation workstation:   [id number]     Lower extremity venous duplex right     Result Date: 3/18/2024  Interpreted By:  Schoenberger, Joseph, STUDY: [facility] US LOWER EXTREMITY VENOUS DUPLEX RIGHT  3/18/2024 2:08 pm    INDICATION: 54 y/o   M with  Signs/Symptoms:edema and decreased pulses in RLE. LMP:  Unknown.    COMPARISON: None.    ACCESSION NUMBER(S): [id number]    ORDERING CLINICIAN: COOKIE TAVAREZ    TECHNIQUE: Routine ultrasound of the  right lower extremity was performed with duplex Doppler (color and spectral) evaluation.   Static images were obtained for remote interpretation.     FINDINGS: THIGH VEINS: There is nonocclusive partially calcified thrombus in the right popliteal vein consistent with chronic venous thrombus. The right posterior tibial and peroneal as well as superficial femoral and common femoral veins compress normally with normal color Doppler flow..    CALF VEINS:  The paired peroneal and posterior tibial calf veins are patent.        .  There is some nonacute since thrombus chronic thrombus in the right popliteal vein.  Otherwise negative exam.    MACRO: None    Signed by: Joseph Schoenberger 3/18/2024 2:37 PM Dictation workstation:   [id number]     US thoracentesis     Result Date: 3/18/2024  Interpreted By:  Schoenberger, Joseph, STUDY: US THORACENTESIS; ;  3/18/2024 10:48 am    INDICATION: Signs/Symptoms:Dyspnea/to evaluate etiology of right pleural effusion.    COMPARISON: None.    ACCESSION NUMBER(S): [id number]    ORDERING CLINICIAN: TEJINDER CHAVIRA    CONSENT: The procedure, its indication, its risks, and alternatives were explained to the patient who understands and consents to the procedure. A time-out is completed prior to the procedure to confirm patient identity and procedure to be performed.    MODALITIES: Ultrasound    TECHNIQUE: Targeted sonographic evaluation of the lower right chest demonstrates a large right pleural effusion. Lowest intracostal space with suitable percutaneous access to this pleural effusion was localized using ultrasound. This site was marked. The marked site was then sterilely prepped with chlorhexidine and a sterile barrier drape was placed. Local anesthetic was administered. A 5 Spanish sheath needle was advanced until light green fluid was obtained. The sheath was advanced off the needle cannula to the pleural space. 60 cc of fluid was then aspirated into a syringe and sent to the laboratory for studies as ordered by the referring physician. Subsequently aspiration was performed for therapeutic thoracentesis. After a total aspiration of 1500 cc, the patient experienced some right-sided chest pressure. Therefore the procedure was terminated. The patient tolerated the procedure well otherwise without other immediate complication PICC    FINDINGS: See discussion above        Successful ultrasound-guided diagnostic and therapeutic thoracentesis       MACRO: None    Signed by: Joseph Schoenberger 3/18/2024 10:58 AM Dictation workstation:   [id number]     XR CHEST 1 VIEW     Result Date:  3/18/2024  Interpreted By:  Schoenberger, Joseph, STUDY: XR CHEST 1 VIEW;  3/18/2024 10:50 am    INDICATION: Signs/Symptoms:Post Right Thoracentesis.    COMPARISON: 03/15/2024    ACCESSION NUMBER(S): [id number]    ORDERING CLINICIAN: JOSEPH SCHOENBERGER    FINDINGS:             CARDIOMEDIASTINAL SILHOUETTE: Cardiac silhouette remains somewhat enlarged.    LUNGS: There is no evidence for right pneumothorax post thoracentesis. Decreased right pleural fluid. Considerable opacity in the lower right hemithorax consistent with residual pleural fluid with underlying basal atelectasis.    ABDOMEN: No remarkable upper abdominal findings.    BONES: No acute osseous changes.        1.  Satisfactory appearance post right thoracentesis. See discussion above.          MACRO: None    Signed by: Joseph Schoenberger 3/18/2024 10:56 AM Dictation workstation:   [id number]     US abdomen complete     Result Date: 3/18/2024  Interpreted By:  Schoenberger, Joseph, STUDY: US ABDOMEN COMPLETE; ;  3/18/2024 9:49 am    INDICATION: Signs/Symptoms:Distended abdomen.  Significant alcohol history. Concern for cirrhosis may need paracentesis.    COMPARISON: None.    ACCESSION NUMBER(S): [id number]    ORDERING CLINICIAN: COOKIE TAVAREZ    TECHNIQUE: 4 quadrant sonographic survey for ascites as well as sonographic evaluation of the right upper quadrant.    FINDINGS: The liver is normal in size measuring 17.9 cm in cephalocaudal dimension. The capsular margins are nodular consistent with cirrhosis. No definite focal lesion is seen.    There is no dilation of the bile ducts. The extrahepatic common duct measures 4 mm.    The gallbladder is not well distended and incompletely evaluated.    There is no right hydronephrosis. The right kidney measures 8.2 cm in longitudinal dimension.    There is no left hydronephrosis. The left kidney measures 7.3 cm in longitudinal dimension.    The spleen is not enlarged measuring 6.8 cm in cephalocaudal  dimension. It is sonographically unremarkable.    There is a small to moderate volume of peritoneal fluid        Small to moderate volume of peritoneal fluid.    Cirrhosis.       MACRO: None    Signed by: Joseph Schoenberger 3/18/2024 10:46 AM Dictation workstation:   [id number]     XR TIBIA FIBULA LEFT (2 VIEWS)     Result Date: 3/15/2024  Interpreted By:  Artur Santos, STUDY: XR TIBIA FIBULA LEFT 2 VIEWS;  3/15/2024 10:36 am    INDICATION: Signs/Symptoms:fall, previous below-knee amputation, pain at left stump.    COMPARISON: 06/23/2022.    ACCESSION NUMBER(S): [id number]    ORDERING CLINICIAN: LO SHAW    FINDINGS: Post below-knee amputation changes. No acute fracture or dislocation identified. Tiny patellar osteophytes. Small knee joint effusion suspected. Tiny metallic foreign bodies lateral to the knee joint are still present. Osteopenia.    Atherosclerosis.        Intact left tibia and fibula, post below-knee amputation.    MACRO: None    Signed by: Artur Santos 3/15/2024 11:33 AM Dictation workstation:   [id number]     XR CHEST 1 VIEW     Result Date: 3/15/2024  Interpreted By:  Artur Santos, STUDY: XR CHEST 1 VIEW;  3/15/2024 10:36 am    INDICATION: Signs/Symptoms:fall.    COMPARISON: 11/01/2023    ACCESSION NUMBER(S): [id number]    ORDERING CLINICIAN: LO SHAW    FINDINGS: Large right basilar pleural effusion occupying greater than 50% of the hemithoracic volume. Overlying atelectasis. Left lung grossly clear without pleural effusion. Cardiomegaly suspected. Aortic atherosclerosis. Mild pulmonary vascular congestion.        Large right pleural effusion.    MACRO: None    Signed by: Artur Santos 3/15/2024 11:33 AM Dictation workstation:   [id number]     OCT MACULA CIRRUS OU (BOTH EYES)     Result Date: 3/6/2024  Date of Procedure 3/6/2024. Technician Information Imaging Technician: niecy. Interpretation Right Eye Findings include Cystoid macular edema, Epiretinal membrane. Left Eye  Findings include Cystoid macular edema. Interval Change Right Eye Better. Left Eye Better.         EKG: sinus bradycardia, RBBB              Assessment plan:      Elevated troponin.  Rule out underlying cardiac ischemia   Acute on chronic decompensated combined heart failure with reduced ejection fraction EF 20-25%  History of coronary disease with known circumflex chronic total occlusion by cath in 2021 at [facility]  History of PAD with life AKA  Decompensated liver cirrhosis  PIPPA on CKD  Diabetes  Hypertension  Hyperlipidemia  Hypokalemia  Anemia  Right-sided pleural effusion.  Encephalopathy-resolved  History of medical noncompliance  Right bundle branch block        Plan:    Okay to discontinue heparin drip.  Changed to DVT prophylactic dose. Lovenox 30mg daily.   Ideally patient would require cardiac catheterization given elevated cardiac enzyme, history of CAD, acute congestive heart failure and multiple risk factors for CAD.  Patient with worsening renal function and I have concern regarding his noncompliance.  Patient with multiple comorbidities, not ideal PCI candidate.  Will continue to evaluate.  Not ready for cardiac catheterization tomorrow  Continue telemetry  Max cardiac meds  Avoid nephrotoxic agents  Nephrology recommendations  Cardiothoracic surgery recommendations  Patient not a candidate for LifeVest given history of noncompliance  Please keep potassium between 4 and 5 magnesium above 2  Please keep hemoglobin above 8 and platelets above 50      Electronically signed by Antony Batista DO on 4/21/2024 at 10:20 AM

## 2024-04-22 ENCOUNTER — APPOINTMENT (OUTPATIENT)
Dept: RADIOLOGY | Facility: HOSPITAL | Age: 54
End: 2024-04-22
Payer: MEDICAID

## 2024-04-22 LAB
ALBUMIN SERPL BCP-MCNC: 2.7 G/DL (ref 3.4–5)
ALP SERPL-CCNC: 476 U/L (ref 33–120)
ALT SERPL W P-5'-P-CCNC: 19 U/L (ref 10–52)
AMYLASE FLD-CCNC: 38 U/L
ANION GAP SERPL CALC-SCNC: 17 MMOL/L (ref 10–20)
AST SERPL W P-5'-P-CCNC: 29 U/L (ref 9–39)
BACTERIA FLD AEROBE CULT: NORMAL
BASOPHILS # BLD AUTO: 0.04 X10*3/UL (ref 0–0.1)
BASOPHILS NFR BLD AUTO: 0.3 %
BASOPHILS NFR FLD MANUAL: 0 %
BILIRUB SERPL-MCNC: 1.5 MG/DL (ref 0–1.2)
BLASTS NFR FLD MANUAL: 0 %
BUN SERPL-MCNC: 113 MG/DL (ref 6–23)
CALCIUM SERPL-MCNC: 7.8 MG/DL (ref 8.6–10.3)
CHLORIDE SERPL-SCNC: 91 MMOL/L (ref 98–107)
CLARITY FLD: CLEAR
CO2 SERPL-SCNC: 26 MMOL/L (ref 21–32)
COLOR FLD: YELLOW
CREAT SERPL-MCNC: 3.05 MG/DL (ref 0.5–1.3)
EGFRCR SERPLBLD CKD-EPI 2021: 24 ML/MIN/1.73M*2
EOSINOPHIL # BLD AUTO: 0.02 X10*3/UL (ref 0–0.7)
EOSINOPHIL NFR BLD AUTO: 0.2 %
EOSINOPHIL NFR FLD MANUAL: 0 %
ERYTHROCYTE [DISTWIDTH] IN BLOOD BY AUTOMATED COUNT: 25.6 % (ref 11.5–14.5)
GLUCOSE BLD MANUAL STRIP-MCNC: 181 MG/DL (ref 74–99)
GLUCOSE BLD MANUAL STRIP-MCNC: 181 MG/DL (ref 74–99)
GLUCOSE BLD MANUAL STRIP-MCNC: 299 MG/DL (ref 74–99)
GLUCOSE BLD MANUAL STRIP-MCNC: 342 MG/DL (ref 74–99)
GLUCOSE SERPL-MCNC: 175 MG/DL (ref 74–99)
HCT VFR BLD AUTO: 26.3 % (ref 41–52)
HGB BLD-MCNC: 7.9 G/DL (ref 13.5–17.5)
HYPOCHROMIA BLD QL SMEAR: NORMAL
IMM GRANULOCYTES # BLD AUTO: 0.07 X10*3/UL (ref 0–0.7)
IMM GRANULOCYTES NFR BLD AUTO: 0.6 % (ref 0–0.9)
IMMATURE GRANULOCYTES IN FLUID: 0 %
LDH FLD L TO P-CCNC: 86 U/L
LDH SERPL L TO P-CCNC: 217 U/L (ref 84–246)
LYMPHOCYTES # BLD AUTO: 1.39 X10*3/UL (ref 1.2–4.8)
LYMPHOCYTES NFR BLD AUTO: 11 %
LYMPHOCYTES NFR FLD MANUAL: 10 %
MAGNESIUM SERPL-MCNC: 1.97 MG/DL (ref 1.6–2.4)
MCH RBC QN AUTO: 19.7 PG (ref 26–34)
MCHC RBC AUTO-ENTMCNC: 30 G/DL (ref 32–36)
MCV RBC AUTO: 66 FL (ref 80–100)
MONOCYTES # BLD AUTO: 1.56 X10*3/UL (ref 0.1–1)
MONOCYTES NFR BLD AUTO: 12.4 %
MONOS+MACROS NFR FLD MANUAL: 75 %
NEUTROPHILS # BLD AUTO: 9.52 X10*3/UL (ref 1.2–7.7)
NEUTROPHILS NFR BLD AUTO: 75.5 %
NEUTROPHILS NFR FLD MANUAL: 15 %
NRBC BLD-RTO: 0.2 /100 WBCS (ref 0–0)
OTHER CELLS NFR FLD MANUAL: 0 %
OVALOCYTES BLD QL SMEAR: NORMAL
PH FLD: 7.4 [PH]
PLASMA CELLS NFR FLD MANUAL: 0 %
PLATELET # BLD AUTO: 465 X10*3/UL (ref 150–450)
POLYCHROMASIA BLD QL SMEAR: NORMAL
POTASSIUM SERPL-SCNC: 4.9 MMOL/L (ref 3.5–5.3)
PROT FLD-MCNC: 3.1 G/DL
PROT SERPL-MCNC: 6.9 G/DL (ref 6.4–8.2)
RBC # BLD AUTO: 4.01 X10*6/UL (ref 4.5–5.9)
RBC # FLD AUTO: 3000 /UL
RBC MORPH BLD: NORMAL
SCHISTOCYTES BLD QL SMEAR: NORMAL
SODIUM SERPL-SCNC: 129 MMOL/L (ref 136–145)
TARGETS BLD QL SMEAR: NORMAL
TOTAL CELLS COUNTED FLD: 100
WBC # BLD AUTO: 12.6 X10*3/UL (ref 4.4–11.3)
WBC # FLD AUTO: 293 /UL

## 2024-04-22 PROCEDURE — 89051 BODY FLUID CELL COUNT: CPT | Performed by: STUDENT IN AN ORGANIZED HEALTH CARE EDUCATION/TRAINING PROGRAM

## 2024-04-22 PROCEDURE — 2500000002 HC RX 250 W HCPCS SELF ADMINISTERED DRUGS (ALT 637 FOR MEDICARE OP, ALT 636 FOR OP/ED): Performed by: INTERNAL MEDICINE

## 2024-04-22 PROCEDURE — 2500000001 HC RX 250 WO HCPCS SELF ADMINISTERED DRUGS (ALT 637 FOR MEDICARE OP): Performed by: STUDENT IN AN ORGANIZED HEALTH CARE EDUCATION/TRAINING PROGRAM

## 2024-04-22 PROCEDURE — 99233 SBSQ HOSP IP/OBS HIGH 50: CPT | Performed by: STUDENT IN AN ORGANIZED HEALTH CARE EDUCATION/TRAINING PROGRAM

## 2024-04-22 PROCEDURE — 84075 ASSAY ALKALINE PHOSPHATASE: CPT | Performed by: STUDENT IN AN ORGANIZED HEALTH CARE EDUCATION/TRAINING PROGRAM

## 2024-04-22 PROCEDURE — 2720000007 HC OR 272 NO HCPCS

## 2024-04-22 PROCEDURE — 0W993ZZ DRAINAGE OF RIGHT PLEURAL CAVITY, PERCUTANEOUS APPROACH: ICD-10-PCS | Performed by: RADIOLOGY

## 2024-04-22 PROCEDURE — 2500000005 HC RX 250 GENERAL PHARMACY W/O HCPCS: Performed by: RADIOLOGY

## 2024-04-22 PROCEDURE — 87070 CULTURE OTHR SPECIMN AEROBIC: CPT | Mod: ELYLAB | Performed by: STUDENT IN AN ORGANIZED HEALTH CARE EDUCATION/TRAINING PROGRAM

## 2024-04-22 PROCEDURE — 32555 ASPIRATE PLEURA W/ IMAGING: CPT | Performed by: RADIOLOGY

## 2024-04-22 PROCEDURE — 2500000004 HC RX 250 GENERAL PHARMACY W/ HCPCS (ALT 636 FOR OP/ED): Performed by: NURSE PRACTITIONER

## 2024-04-22 PROCEDURE — 2500000005 HC RX 250 GENERAL PHARMACY W/O HCPCS: Performed by: NURSE PRACTITIONER

## 2024-04-22 PROCEDURE — 82947 ASSAY GLUCOSE BLOOD QUANT: CPT

## 2024-04-22 PROCEDURE — C9113 INJ PANTOPRAZOLE SODIUM, VIA: HCPCS | Performed by: INTERNAL MEDICINE

## 2024-04-22 PROCEDURE — 83735 ASSAY OF MAGNESIUM: CPT | Performed by: STUDENT IN AN ORGANIZED HEALTH CARE EDUCATION/TRAINING PROGRAM

## 2024-04-22 PROCEDURE — 36415 COLL VENOUS BLD VENIPUNCTURE: CPT | Performed by: STUDENT IN AN ORGANIZED HEALTH CARE EDUCATION/TRAINING PROGRAM

## 2024-04-22 PROCEDURE — 84157 ASSAY OF PROTEIN OTHER: CPT | Mod: ELYLAB | Performed by: STUDENT IN AN ORGANIZED HEALTH CARE EDUCATION/TRAINING PROGRAM

## 2024-04-22 PROCEDURE — 82150 ASSAY OF AMYLASE: CPT | Mod: ELYLAB | Performed by: STUDENT IN AN ORGANIZED HEALTH CARE EDUCATION/TRAINING PROGRAM

## 2024-04-22 PROCEDURE — 2500000004 HC RX 250 GENERAL PHARMACY W/ HCPCS (ALT 636 FOR OP/ED): Performed by: STUDENT IN AN ORGANIZED HEALTH CARE EDUCATION/TRAINING PROGRAM

## 2024-04-22 PROCEDURE — 71045 X-RAY EXAM CHEST 1 VIEW: CPT

## 2024-04-22 PROCEDURE — 32555 ASPIRATE PLEURA W/ IMAGING: CPT

## 2024-04-22 PROCEDURE — 2500000004 HC RX 250 GENERAL PHARMACY W/ HCPCS (ALT 636 FOR OP/ED): Performed by: INTERNAL MEDICINE

## 2024-04-22 PROCEDURE — 83986 ASSAY PH BODY FLUID NOS: CPT | Mod: ELYLAB | Performed by: STUDENT IN AN ORGANIZED HEALTH CARE EDUCATION/TRAINING PROGRAM

## 2024-04-22 PROCEDURE — 1200000002 HC GENERAL ROOM WITH TELEMETRY DAILY

## 2024-04-22 PROCEDURE — 83615 LACTATE (LD) (LDH) ENZYME: CPT | Mod: ELYLAB | Performed by: STUDENT IN AN ORGANIZED HEALTH CARE EDUCATION/TRAINING PROGRAM

## 2024-04-22 PROCEDURE — 83615 LACTATE (LD) (LDH) ENZYME: CPT | Performed by: STUDENT IN AN ORGANIZED HEALTH CARE EDUCATION/TRAINING PROGRAM

## 2024-04-22 PROCEDURE — C1729 CATH, DRAINAGE: HCPCS

## 2024-04-22 PROCEDURE — 85025 COMPLETE CBC W/AUTO DIFF WBC: CPT | Performed by: STUDENT IN AN ORGANIZED HEALTH CARE EDUCATION/TRAINING PROGRAM

## 2024-04-22 PROCEDURE — 2500000001 HC RX 250 WO HCPCS SELF ADMINISTERED DRUGS (ALT 637 FOR MEDICARE OP): Performed by: INTERNAL MEDICINE

## 2024-04-22 RX ORDER — LIDOCAINE HYDROCHLORIDE 10 MG/ML
INJECTION, SOLUTION EPIDURAL; INFILTRATION; INTRACAUDAL; PERINEURAL
Status: COMPLETED | OUTPATIENT
Start: 2024-04-22 | End: 2024-04-22

## 2024-04-22 RX ADMIN — METOPROLOL SUCCINATE 25 MG: 25 TABLET, EXTENDED RELEASE ORAL at 12:18

## 2024-04-22 RX ADMIN — MIDODRINE HYDROCHLORIDE 10 MG: 5 TABLET ORAL at 17:25

## 2024-04-22 RX ADMIN — CLOPIDOGREL BISULFATE 75 MG: 75 TABLET, FILM COATED ORAL at 12:18

## 2024-04-22 RX ADMIN — MORPHINE SULFATE 2 MG: 2 INJECTION, SOLUTION INTRAMUSCULAR; INTRAVENOUS at 21:16

## 2024-04-22 RX ADMIN — PANTOPRAZOLE SODIUM 40 MG: 40 INJECTION, POWDER, FOR SOLUTION INTRAVENOUS at 06:21

## 2024-04-22 RX ADMIN — LIDOCAINE 4% 1 PATCH: 40 PATCH TOPICAL at 21:16

## 2024-04-22 RX ADMIN — INSULIN GLARGINE 10 UNITS: 100 INJECTION, SOLUTION SUBCUTANEOUS at 21:55

## 2024-04-22 RX ADMIN — Medication 3 MG: at 21:15

## 2024-04-22 RX ADMIN — ASPIRIN 81 MG: 81 TABLET, COATED ORAL at 12:18

## 2024-04-22 RX ADMIN — MUPIROCIN: 20 OINTMENT TOPICAL at 21:21

## 2024-04-22 RX ADMIN — INSULIN LISPRO 6 UNITS: 100 INJECTION, SOLUTION INTRAVENOUS; SUBCUTANEOUS at 17:16

## 2024-04-22 RX ADMIN — LIDOCAINE HYDROCHLORIDE 4 ML: 10 INJECTION, SOLUTION EPIDURAL; INFILTRATION; INTRACAUDAL; PERINEURAL at 10:03

## 2024-04-22 RX ADMIN — MUPIROCIN: 20 OINTMENT TOPICAL at 15:00

## 2024-04-22 RX ADMIN — MORPHINE SULFATE 2 MG: 2 INJECTION, SOLUTION INTRAMUSCULAR; INTRAVENOUS at 03:15

## 2024-04-22 RX ADMIN — TRIMETHOBENZAMIDE HYDROCHLORIDE 200 MG: 100 INJECTION INTRAMUSCULAR at 03:24

## 2024-04-22 RX ADMIN — MORPHINE SULFATE 2 MG: 2 INJECTION, SOLUTION INTRAMUSCULAR; INTRAVENOUS at 12:17

## 2024-04-22 RX ADMIN — METHIMAZOLE 12.5 MG: 5 TABLET ORAL at 12:18

## 2024-04-22 RX ADMIN — ATORVASTATIN CALCIUM 80 MG: 80 TABLET, FILM COATED ORAL at 21:15

## 2024-04-22 RX ADMIN — MEROPENEM 1 G: 1 INJECTION, POWDER, FOR SOLUTION INTRAVENOUS at 14:10

## 2024-04-22 RX ADMIN — MIDODRINE HYDROCHLORIDE 10 MG: 5 TABLET ORAL at 12:18

## 2024-04-22 RX ADMIN — MEROPENEM 1 G: 1 INJECTION, POWDER, FOR SOLUTION INTRAVENOUS at 02:00

## 2024-04-22 ASSESSMENT — PAIN SCALES - PAIN ASSESSMENT IN ADVANCED DEMENTIA (PAINAD)
NEGVOCALIZATION: OCCASIONAL MOAN/GROAN, LOW SPEECH, NEGATIVE/DISAPPROVING QUALITY
BODYLANGUAGE: TENSE, DISTRESSED PACING, FIDGETING
TOTALSCORE: 4
FACIALEXPRESSION: SAD, FRIGHTENED, FROWN
BREATHING: OCCASIONAL LABORED BREATHING, SHORT PERIOD OF HYPERVENTILATION
BREATHING: OCCASIONAL LABORED BREATHING, SHORT PERIOD OF HYPERVENTILATION
NEGVOCALIZATION: OCCASIONAL MOAN/GROAN, LOW SPEECH, NEGATIVE/DISAPPROVING QUALITY
FACIALEXPRESSION: SAD, FRIGHTENED, FROWN
TOTALSCORE: 5
BODYLANGUAGE: TENSE, DISTRESSED PACING, FIDGETING
CONSOLABILITY: NO NEED TO CONSOLE
CONSOLABILITY: DISTRACTED OR REASSURED BY VOICE/TOUCH

## 2024-04-22 ASSESSMENT — PAIN SCALES - GENERAL
PAINLEVEL_OUTOF10: 10 - WORST POSSIBLE PAIN
PAINLEVEL_OUTOF10: 0 - NO PAIN
PAINLEVEL_OUTOF10: 2
PAINLEVEL_OUTOF10: 8
PAINLEVEL_OUTOF10: 0 - NO PAIN
PAINLEVEL_OUTOF10: 8
PAINLEVEL_OUTOF10: 0 - NO PAIN
PAINLEVEL_OUTOF10: 0 - NO PAIN

## 2024-04-22 ASSESSMENT — PAIN SCALES - WONG BAKER
WONGBAKER_NUMERICALRESPONSE: HURTS WORST
WONGBAKER_NUMERICALRESPONSE: HURTS WHOLE LOT

## 2024-04-22 ASSESSMENT — PAIN - FUNCTIONAL ASSESSMENT
PAIN_FUNCTIONAL_ASSESSMENT: 0-10

## 2024-04-22 ASSESSMENT — PAIN DESCRIPTION - LOCATION: LOCATION: LEG

## 2024-04-22 NOTE — CONSULTS
Consults    Reason For Consult  Goals of care     History Of Present Illness  Hitesh Jurado is a 53 y.o. male with PMHx of HTN, HLD, CAD with hx of PCI, ICM with EF 15-20% (2023), valvular heart disease (mod->severe mitral and tricuspid valve regurgitation), IDDM2 with neuropathy, PAD s/p multiple lower extremity interventions and L BKA, chronic wounds, CKD, nicotine dependence, and medical noncompliance who presented to [facility] ED on 4/18 for complaints of shortness of breath. Patient recently admitted to [facility] in [place] where right thoracentesis was done on 4/16 with 3L of the drained at that time.  In ED was tachycardic up to 115, and was hypoxic required 2 L/min nasal cannula oxygen. His labs shows significantly elevated troponin 823 that was trending up.  He also has evidence of chronic kidney disease.  There is no leukocytosis.  His hemoglobin level was 7.9 closer to his baseline. Chest x-ray showed worsening airspace disease mostly on the right lower lung with moderate size pleural effusion on the right.  S/P thoracentesis today  Patient was given Lasix in the ER, was given aspirin and was started on heparin drip for suspected NSTEMI. Admitted to medicine for further management.  Patient is on IV heparin for non-STEMI.  He has been evaluated by cardiology with plan that patient will require a left heart cath on 4/22 however this is on hold.  Nephrology also consulted. Renal function appears to be worsening. Continued on Vanco/Levaquin due to concerns of bacterial pneumonia.  Dr. Miles updated, from thoracic surgery perspective patient is not a surgical candidate for chest tube at this time.  Potentially can offer patient Pleurx catheter placement but should be medically optimized by cardiology and GI before considering any invasive procedure. Pt expressed wishes for conservative management to primary service.  Palliative care consulted for hospice consideration v. Introduction of palliative care  "services.       Personal/Social History  He reports that he has quit smoking. His smoking use included cigarettes. He has quit using smokeless tobacco. He reports that he does not drink alcohol and does not use drugs.    Past Medical History  He has a past medical history of CHF (congestive heart failure) (Multi) and Diabetes mellitus (Multi).    Surgical History  He has no past surgical history on file.     Family History  No family history on file.  Allergies  Amoxicillin      Physical Exam  GEN: chronically ill male sitting up in bed, pleasant and cooperative  HEENT: MMM  RESP: even and regular  ABD: distended  EXT: deferred for meeting  PSYCH: call, appropriate to situation and pleasant  SKIN: dry      Last Recorded Vitals  Blood pressure 104/66, pulse 70, temperature 36.1 °C (97 °F), resp. rate 16, height 1.753 m (5' 9\"), weight 71.2 kg (156 lb 15.5 oz), SpO2 100%.    Relevant Results  Scheduled medications  aspirin, 81 mg, oral, Daily  atorvastatin, 80 mg, oral, Nightly  clopidogrel, 75 mg, oral, Daily  [Held by provider] enoxaparin, 30 mg, subcutaneous, Daily  [Held by provider] furosemide, 40 mg, oral, Daily  insulin glargine, 10 Units, subcutaneous, q24h  insulin lispro, 0-10 Units, subcutaneous, TID with meals  insulin lispro, 10 Units, subcutaneous, Once  lidocaine, 1 patch, transdermal, Daily  meropenem, 1 g, intravenous, q12h  methIMAzole, 12.5 mg, oral, Daily  metoprolol succinate XL, 25 mg, oral, Daily  midodrine, 10 mg, oral, TID with meals  morphine, 4 mg, intravenous, Once  mupirocin, , Topical, TID  pantoprazole, 40 mg, oral, Daily before breakfast   Or  pantoprazole, 40 mg, intravenous, Daily before breakfast  [Held by provider] spironolactone, 100 mg, oral, Daily      Continuous medications  sodium chloride 0.9%, 75 mL/hr, Last Rate: 75 mL/hr (04/21/24 1341)      PRN medications  PRN medications: acetaminophen **OR** acetaminophen **OR** acetaminophen, dextrose, dextrose, glucagon, glucagon, " ipratropium-albuteroL, melatonin, morphine, [Held by provider] ondansetron **OR** [Held by provider] ondansetron, polyethylene glycol, trimethobenzamide     Results for orders placed or performed during the hospital encounter of 04/18/24 (from the past 24 hour(s))   Basic Metabolic Panel   Result Value Ref Range    Glucose 239 (H) 74 - 99 mg/dL    Sodium 128 (L) 136 - 145 mmol/L    Potassium 4.8 3.5 - 5.3 mmol/L    Chloride 90 (L) 98 - 107 mmol/L    Bicarbonate 28 21 - 32 mmol/L    Anion Gap 15 10 - 20 mmol/L    Urea Nitrogen 107 (HH) 6 - 23 mg/dL    Creatinine 2.83 (H) 0.50 - 1.30 mg/dL    eGFR 26 (L) >60 mL/min/1.73m*2    Calcium 8.0 (L) 8.6 - 10.3 mg/dL   POCT GLUCOSE   Result Value Ref Range    POCT Glucose 195 (H) 74 - 99 mg/dL   POCT GLUCOSE   Result Value Ref Range    POCT Glucose 163 (H) 74 - 99 mg/dL   Magnesium   Result Value Ref Range    Magnesium 1.97 1.60 - 2.40 mg/dL   Comprehensive Metabolic Panel   Result Value Ref Range    Glucose 175 (H) 74 - 99 mg/dL    Sodium 129 (L) 136 - 145 mmol/L    Potassium 4.9 3.5 - 5.3 mmol/L    Chloride 91 (L) 98 - 107 mmol/L    Bicarbonate 26 21 - 32 mmol/L    Anion Gap 17 10 - 20 mmol/L    Urea Nitrogen 113 (HH) 6 - 23 mg/dL    Creatinine 3.05 (H) 0.50 - 1.30 mg/dL    eGFR 24 (L) >60 mL/min/1.73m*2    Calcium 7.8 (L) 8.6 - 10.3 mg/dL    Albumin 2.7 (L) 3.4 - 5.0 g/dL    Alkaline Phosphatase 476 (H) 33 - 120 U/L    Total Protein 6.9 6.4 - 8.2 g/dL    AST 29 9 - 39 U/L    Bilirubin, Total 1.5 (H) 0.0 - 1.2 mg/dL    ALT 19 10 - 52 U/L   Lactate Dehydrogenase   Result Value Ref Range     84 - 246 U/L   CBC and Auto Differential   Result Value Ref Range    WBC 12.6 (H) 4.4 - 11.3 x10*3/uL    nRBC 0.2 (H) 0.0 - 0.0 /100 WBCs    RBC 4.01 (L) 4.50 - 5.90 x10*6/uL    Hemoglobin 7.9 (L) 13.5 - 17.5 g/dL    Hematocrit 26.3 (L) 41.0 - 52.0 %    MCV 66 (L) 80 - 100 fL    MCH 19.7 (L) 26.0 - 34.0 pg    MCHC 30.0 (L) 32.0 - 36.0 g/dL    RDW 25.6 (H) 11.5 - 14.5 %    Platelets  465 (H) 150 - 450 x10*3/uL    Neutrophils % 75.5 40.0 - 80.0 %    Immature Granulocytes %, Automated 0.6 0.0 - 0.9 %    Lymphocytes % 11.0 13.0 - 44.0 %    Monocytes % 12.4 2.0 - 10.0 %    Eosinophils % 0.2 0.0 - 6.0 %    Basophils % 0.3 0.0 - 2.0 %    Neutrophils Absolute 9.52 (H) 1.20 - 7.70 x10*3/uL    Immature Granulocytes Absolute, Automated 0.07 0.00 - 0.70 x10*3/uL    Lymphocytes Absolute 1.39 1.20 - 4.80 x10*3/uL    Monocytes Absolute 1.56 (H) 0.10 - 1.00 x10*3/uL    Eosinophils Absolute 0.02 0.00 - 0.70 x10*3/uL    Basophils Absolute 0.04 0.00 - 0.10 x10*3/uL   Morphology   Result Value Ref Range    RBC Morphology See Below     Polychromasia Mild     Hypochromia Mild     RBC Fragments Few     Target Cells Few     Ovalocytes Few    POCT GLUCOSE   Result Value Ref Range    POCT Glucose 181 (H) 74 - 99 mg/dL   Body Fluid Cell Count   Result Value Ref Range    Color, Fluid Yellow Colorless, Straw, Yellow    Clarity, Fluid Clear Clear    WBC, Fluid 293 See Comment /uL    RBC, Fluid 3,000 0  /uL /uL   Body Fluid Differential   Result Value Ref Range    Neutrophils %, Manual, Fluid 15 <25 % %    Lymphocytes %, Manual, Fluid 10 <75 % %    Mono/Macrophages %, Manual, Fluid 75 <70 % %    Eosinophils %, Manual, Fluid 0 0 % %    Basophils %, Manual, Fluid 0 0 % %    Immature Granulocytes %, Manual, Fluid 0 0 % %    Blasts %, Manual, Fluid 0 0 % %    Unclassified Cells %, Manual, Fluid 0 0 % %    Plasma Cells %, Manual, Fluid 0 0 % %    Total Cells Counted, Fluid 100    POCT GLUCOSE   Result Value Ref Range    POCT Glucose 181 (H) 74 - 99 mg/dL          Assessment/Plan   -Met with patient for an extensive period of time.  Consult social work for advance directives.  He identified his brother, Remigio, as primary surrogate in the event he becomes incapacitated.  Discussed differences in [place] state code status options.  He is willing to accept low/medium burden treatment options as long as he is cognizant.  However, in the  "event of a full arrest his wishes are for no resuscitation (DNRCCA).  He has experienced a slow decline and seeing what he described as \"suffering\" in his parents.  He would not want alternate means of nutrition (ie-feeding tubes) but willing to pursue ICU level of care and would carefully weigh each decision that is presented and how it might impact his quality of life.  He appears willing to accept low/medium burden treatments if he is cognizant.  Palliative care was introduced as a service for patients with serious illness to help with symptoms, assist with goals of care conversations, navigate complex decision making, improve quality of life for patients, and provide support both patients and families. Patient's current clinical condition, including diagnosis, prognosis, and management plan.  He is unsure if he is interested in palliative at this time.  He understood from nephrology that he wasn't appropriate for palliative but what was discussed was hospice.  He did agree with a follow up visit.    I spent 55 minutes in the professional and overall care of this patient.      Romero Todd, APRN-CNP  "

## 2024-04-22 NOTE — PROGRESS NOTES
Occupational Therapy                 Therapy Communication Note    Patient Name: Hitesh Jurado  MRN: [id number]  Today's Date: 4/22/2024     Discipline: Occupational Therapy    Missed Visit Reason: Missed Visit Reason:  (Patient off the floor.)    Comment: Attempted OT evaluation. Patient off the floor for a thoracentesis per nursing. Will re attempt as patient available/appropriate.

## 2024-04-22 NOTE — NURSING NOTE
Pt off floor to thoracentesis, was planned for left heart cath today but that was deferred d/t worsening renal function. Pt is receiving IV antibiotics, CT showed dense consolidation right 3 lobes plus large right pleural effusion.  Still awaiting PT/OT evals to aide in DC planning. CT team will continue to monitor case progression for DC planning needs.

## 2024-04-22 NOTE — PROGRESS NOTES
Pt. Requesting assist with DPOA_HC.  Met with pt. Who is a/o x 3, states he would like to name his brother carlene hernandez as DPOA_HC.  Assisted in completion of document, original given to pt. And copy placed on chart.

## 2024-04-22 NOTE — PROGRESS NOTES
Physical Therapy                 Therapy Communication Note    Patient Name: Hitesh Jurado  MRN: [id number]  Today's Date: 4/22/2024     Discipline: Physical Therapy    Missed Visit Reason: 0934-Missed Visit Reason: Other (Comment) (PT/OT attempt pt off floor for thoracentesis . re-attempt as able.)    Missed Time: Attempt    Comment:

## 2024-04-22 NOTE — CARE PLAN
The patient's goals for the shift include none    The clinical goals for the shift include Patient will remain hemodynamically stable      Problem: Pain - Adult  Goal: Verbalizes/displays adequate comfort level or baseline comfort level  Outcome: Progressing     Problem: Safety - Adult  Goal: Free from fall injury  Outcome: Progressing     Problem: Discharge Planning  Goal: Discharge to home or other facility with appropriate resources  Outcome: Progressing     Problem: Chronic Conditions and Co-morbidities  Goal: Patient's chronic conditions and co-morbidity symptoms are monitored and maintained or improved  Outcome: Progressing     Problem: Diabetes  Goal: Achieve decreasing blood glucose levels by end of shift  Outcome: Progressing  Goal: Increase stability of blood glucose readings by end of shift  Outcome: Progressing  Goal: Decrease in ketones present in urine by end of shift  Outcome: Progressing  Goal: Maintain electrolyte levels within acceptable range throughout shift  Outcome: Progressing  Goal: Maintain glucose levels >70mg/dl to <250mg/dl throughout shift  Outcome: Progressing  Goal: Learn about and adhere to nutrition recommendations by end of shift  Outcome: Progressing  Goal: Increase self care and/or family involovement by end of shift  Outcome: Progressing  Goal: Receive DSME education by end of shift  Outcome: Progressing     Problem: Pain  Goal: Takes deep breaths with improved pain control throughout the shift  Outcome: Progressing  Goal: Turns in bed with improved pain control throughout the shift  Outcome: Progressing  Goal: Walks with improved pain control throughout the shift  Outcome: Progressing     Problem: Fall/Injury  Goal: Not fall by end of shift  Outcome: Progressing     Problem: Skin  Goal: Decreased wound size/increased tissue granulation at next dressing change  Outcome: Progressing  Flowsheets (Taken 4/19/2024 0023 by Jess Tsai RN)  Decreased wound size/increased tissue  granulation at next dressing change:   Promote sleep for wound healing   Protective dressings over bony prominences  Goal: Promote/optimize nutrition  Outcome: Progressing  Goal: Promote skin healing  Outcome: Progressing     Problem: Heart Failure  Goal: Improved gas exchange this shift  Outcome: Progressing  Goal: Improved urinary output this shift  Outcome: Progressing  Goal: Reduction in peripheral edema within 24 hours  Outcome: Progressing  Goal: Report improvement of dyspnea/breathlessness this shift  Outcome: Progressing  Goal: Weight from fluid excess reduced over 2-3 days, then stabilize  Outcome: Progressing  Goal: Increase self care and/or family involvement in 24 hours  Outcome: Progressing

## 2024-04-22 NOTE — PROGRESS NOTES
Nephrology Progress Note    Assessment:  53 y.o. male with history s/f HFrEF, PAD s/p LT BKA, T2DM c/b chronic non-healing wounds, cirrhosis who presented for SOB for several days.      PIPPA on CKD stage II/III: likely in setting of diuretics, not significantly hypotensive, no contrast, not on any other nephrotoxic medications, function was at baseline on presentation w/ Scr ~1.4 w/ eGFR high 50s/low 60s  Fluid overload: improved, has combined HFrEF 20-25%   Recurrent RT sided pleural effusion: had 3L taken off at [facility] on 4/16, present here again, now s/p RT sided thoracentesis w/ 2.5L taken off   Cirrhosis   Hypotension   Hypoalbuminemia  Anemia   RT sided PNA      Plan:  - keep holding diuretics for now   - starting NS at 75 ml/hr for now and checking BMP again later   - agree not ready for C at this time   - palliative eval     Subjective:  Admit Date: 4/18/2024    Interval History: function worse today however trend improving, had thoracentesis this AM w/ 2.5L taken off     Medications:  Scheduled Meds:aspirin, 81 mg, oral, Daily  atorvastatin, 80 mg, oral, Nightly  clopidogrel, 75 mg, oral, Daily  [Held by provider] enoxaparin, 30 mg, subcutaneous, Daily  [Held by provider] furosemide, 40 mg, oral, Daily  insulin glargine, 10 Units, subcutaneous, q24h  insulin lispro, 0-10 Units, subcutaneous, TID with meals  insulin lispro, 10 Units, subcutaneous, Once  lidocaine, 1 patch, transdermal, Daily  meropenem, 1 g, intravenous, q12h  methIMAzole, 12.5 mg, oral, Daily  metoprolol succinate XL, 25 mg, oral, Daily  midodrine, 10 mg, oral, TID with meals  morphine, 4 mg, intravenous, Once  mupirocin, , Topical, TID  pantoprazole, 40 mg, oral, Daily before breakfast   Or  pantoprazole, 40 mg, intravenous, Daily before breakfast  [Held by provider] spironolactone, 100 mg, oral, Daily      Continuous Infusions:sodium chloride 0.9%, 75 mL/hr, Last Rate: 75 mL/hr (04/21/24 1341)        CBC:   Lab Results   Component Value  "Date    WBC 12.6 (H) 04/22/2024    RBC 4.01 (L) 04/22/2024    HGB 7.9 (L) 04/22/2024    HCT 26.3 (L) 04/22/2024    MCV 66 (L) 04/22/2024    MCH 19.7 (L) 04/22/2024    MCHC 30.0 (L) 04/22/2024    RDW 25.6 (H) 04/22/2024     (H) 04/22/2024     BMP:    Lab Results   Component Value Date     (L) 04/22/2024    K 4.9 04/22/2024    CL 91 (L) 04/22/2024    CO2 26 04/22/2024     (HH) 04/22/2024    CREATININE 3.05 (H) 04/22/2024    CALCIUM 7.8 (L) 04/22/2024    GLUCOSE 175 (H) 04/22/2024       Objective:  Vitals: /66   Pulse 70   Temp 36.1 °C (97 °F)   Resp 16   Ht 1.753 m (5' 9\")   Wt 71.2 kg (156 lb 15.5 oz)   SpO2 100%   BMI 23.18 kg/m²    Wt Readings from Last 3 Encounters:   04/21/24 71.2 kg (156 lb 15.5 oz)   03/24/24 65 kg (143 lb 4.8 oz)   10/30/23 55.9 kg (123 lb 3.8 oz)      24HR INTAKE/OUTPUT:    Intake/Output Summary (Last 24 hours) at 4/22/2024 1228  Last data filed at 4/22/2024 1027  Gross per 24 hour   Intake 286.25 ml   Output 2750 ml   Net -2463.75 ml       General: alert, in no apparent distress  HEENT: normocephalic, atraumatic, anicteric  Lungs: non-labored respirations, clear to auscultation bilaterally  Heart: regular rate and rhythm, no murmurs or rubs  Abdomen: soft, non-tender, distended  Ext: no cyanosis, no peripheral edema, LT BKA  Neuro: alert and oriented, no gross abnormalities        Electronically signed by Malgorzata Williamson MD, MD              "

## 2024-04-22 NOTE — POST-PROCEDURE NOTE
Interventional Radiology Brief Postprocedure Note    Attending: Schoenberger    Assistant: None    Diagnosis: Right Pleural Effusion    Description of procedure: US Right Thoracentesis     Anesthesia:  Local    Complications: None    Estimated Blood Loss: none    Medications  As of 04/22/24 1034      aspirin chewable tablet 324 mg (mg) Total dose:  324 mg Dosing weight:  68      Date/Time Rate/Dose/Volume Action       04/18/24 1948 324 mg Given               furosemide (Lasix) injection 20 mg (mg) Total dose:  20 mg Dosing weight:  68      Date/Time Rate/Dose/Volume Action       04/18/24  1948 20 mg Given               furosemide (Lasix) injection 20 mg (mg) Total dose:  20 mg Dosing weight:  65.2      Date/Time Rate/Dose/Volume Action       04/19/24  0910 20 mg Given               nitroglycerin (Nitrostat) SL tablet 0.4 mg (mg) Total dose:  0.4 mg Dosing weight:  68      Date/Time Rate/Dose/Volume Action       04/18/24 1959 0.4 mg Given               nitroglycerin (Tunde-Bid) 2 % ointment 1 inch (inch) Total dose:  1 inch Dosing weight:  68      Date/Time Rate/Dose/Volume Action       04/18/24 2010 1 inch Given               heparin bolus from bag 4,000 Units (Units) Total dose:  Cannot be calculated* Dosing weight:  68   *Total user-documented volume 604.83 mL may contain volume from other administrations     Date/Time Rate/Dose/Volume Action       04/18/24  2045 4,000 Units Bolus from Bag               heparin bolus from bag 1,500-3,000 Units (Units) Total dose:  Cannot be calculated* Dosing weight:  68   *Total user-documented volume 604.83 mL may contain volume from other administrations     Date/Time Rate/Dose/Volume Action       04/19/24  0620 1,500 Units Bolus from Bag      1400 1,500 Units Bolus from Bag               heparin 25,000 Units in dextrose 5% 250 mL (100 Units/mL) infusion (premix) (Units/hr) Total dose:  Cannot be calculated* Dosing weight:  68   *Total user-documented volume 604.83 mL may  contain volume from other administrations     Date/Time Rate/Dose/Volume Action       04/18/24  2100 800 Units/hr - 8 mL/hr New Bag      2300 800 Units/hr - 8 mL/hr Rate Verify     04/19/24  0000 800 Units/hr - 8 mL/hr Rate Verify      0100 800 Units/hr - 8 mL/hr Rate Verify      0200 800 Units/hr - 8 mL/hr Rate Verify      0300 800 Units/hr - 8 mL/hr Rate Verify      0400 800 Units/hr - 8 mL/hr Rate Verify      0500 800 Units/hr - 8 mL/hr Rate Verify      0620 900 Units/hr - 9 mL/hr Rate Change      1400 1,000 Units/hr - 10 mL/hr Rate Change      1730 1,000 Units/hr - 10 mL/hr New Bag     04/20/24  0700 1,000 Units/hr - 10 mL/hr Rate Verify      2040  Stopped      2041 1,000 Units/hr - 10 mL/hr New Bag     04/21/24  0559 1,000 Units/hr - 10 mL/hr Rate Verify      0617 1,000 Units/hr - 10 mL/hr Rate Verify      1020  Stopped               morphine injection 4 mg (mg) Total dose:  0 mg* Dosing weight:  68   *Administration not included in total     Date/Time Rate/Dose/Volume Action       04/18/24  2045 *4 mg Missed               pantoprazole (ProtoNix) EC tablet 40 mg (mg) Total dose:  120 mg Dosing weight:  68      Date/Time Rate/Dose/Volume Action       04/19/24  0914 40 mg Given     04/20/24  0517 40 mg Given     04/21/24  0619 40 mg Given     04/22/24  0621 *Not included in total See Alternative               pantoprazole (ProtoNix) injection 40 mg (mg) Total dose:  40 mg Dosing weight:  68      Date/Time Rate/Dose/Volume Action       04/19/24  0914 *Not included in total See Alternative     04/20/24  0517 *Not included in total See Alternative     04/21/24  0619 *Not included in total See Alternative     04/22/24  0621 40 mg (over 2 min) Given               acetaminophen (Tylenol) tablet 650 mg (mg) Total dose:  650 mg Dosing weight:  68      Date/Time Rate/Dose/Volume Action       04/19/24  1247 650 mg Given               acetaminophen (Tylenol) oral liquid 650 mg (mg) Total dose:  Cannot be calculated* Dosing  weight:  68   *Administration dose not documented     Date/Time Rate/Dose/Volume Action       04/19/24  1247 *Not included in total See Alternative               acetaminophen (Tylenol) suppository 650 mg (mg) Total dose:  Cannot be calculated* Dosing weight:  68   *Administration dose not documented     Date/Time Rate/Dose/Volume Action       04/19/24  1247 *Not included in total See Alternative               ondansetron (Zofran) tablet 4 mg (mg) Total dose:  Cannot be calculated* Dosing weight:  68   *Administration dose not documented     Date/Time Rate/Dose/Volume Action       04/19/24  2228 *Not included in total See Alternative     04/20/24  0826 *Not included in total See Alternative      1101 *Not included in total Held by provider               ondansetron (Zofran) injection 4 mg (mg) Total dose:  8 mg Dosing weight:  68      Date/Time Rate/Dose/Volume Action       04/19/24  2228 4 mg Given     04/20/24  0826 4 mg Given      1101 *Not included in total Held by provider               melatonin tablet 3 mg (mg) Total dose:  3 mg Dosing weight:  68      Date/Time Rate/Dose/Volume Action       04/19/24  0300 3 mg Given               cefepime (Maxipime) in dextrose 5% 50 mL IV 1 g (g) Total dose:  0 g* Dosing weight:  68   *Administration not included in total     Date/Time Rate/Dose/Volume Action       04/18/24  2100 *1 g - 100 mL/hr (over 30 min) Missed               azithromycin (Zithromax) in dextrose 5 % in water (D5W) 250 mL  mg (mg) Total dose:  0 mg* Dosing weight:  68   *Administration not included in total     Date/Time Rate/Dose/Volume Action       04/18/24  2100 *500 mg - 250 mL/hr (over 60 min) Missed               levoFLOXacin (Levaquin)  mg (mg) Total dose:  0 mg* Dosing weight:  65.2   *Administration not included in total     Date/Time Rate/Dose/Volume Action       04/19/24  0100 *500 mg - 100 mL/hr (over 60 min) Missed               levoFLOXacin (Levaquin)  mg (mL/hr) Total  dose:  750 mg* Dosing weight:  65.2   *From user-documented volume     Date/Time Rate/Dose/Volume Action       04/19/24  0100 750 mg - 100 mL/hr (over 90 min) New Bag      0230  (over 90 min) Stopped      0500 150 mL      04/20/24  0200 750 mg - 100 mL/hr (over 90 min) New Bag      0330  (over 90 min) Stopped               aspirin EC tablet 81 mg (mg) Total dose:  162 mg*   *Administration not included in total     Date/Time Rate/Dose/Volume Action       04/19/24  0908 81 mg Given     04/20/24 0900 *81 mg Missed     04/21/24 0952 81 mg Given               atorvastatin (Lipitor) tablet 80 mg (mg) Total dose:  240 mg*   *Administration not included in total     Date/Time Rate/Dose/Volume Action       04/19/24  0045 *80 mg Missed      2057 80 mg Given     04/20/24 2035 80 mg Given     04/21/24 2103 80 mg Given               metoprolol succinate XL (Toprol-XL) 24 hr tablet 25 mg (mg) Total dose:  50 mg* Dosing weight:  65.2   *Administration not included in total     Date/Time Rate/Dose/Volume Action       04/19/24  0909 25 mg Given     04/20/24 0900 *25 mg Missed     04/21/24 0952 25 mg Given               potassium chloride CR (Klor-Con M20) ER tablet 20 mEq (mEq) Total dose:  80 mEq* Dosing weight:  65.2   *Administration not included in total     Date/Time Rate/Dose/Volume Action       04/19/24 0908 20 mEq Given      1650 20 mEq Given      2057 20 mEq Given     04/20/24 0900 *20 mEq Missed      1500 *20 mEq Missed      2038 20 mEq Given               gabapentin (Neurontin) capsule 100 mg (mg) Total dose:  0 mg*   *Administration not included in total     Date/Time Rate/Dose/Volume Action       04/19/24 0900 *100 mg Missed               midodrine (Proamatine) tablet 10 mg (mg) Total dose:  70 mg* Dosing weight:  65.2   *Administration not included in total     Date/Time Rate/Dose/Volume Action       04/19/24  0914 10 mg Given      1224 10 mg Given      1650 10 mg Given     04/20/24  0800 *10 mg Missed       1200 *10 mg Missed      1714 10 mg Given     04/21/24  0952 10 mg Given      1209 10 mg Given      1730 10 mg Given               insulin lispro (HumaLOG) injection 0-10 Units (Units) Total dose:  50 Units Dosing weight:  65.2      Date/Time Rate/Dose/Volume Action       04/19/24  0910 10 Units Given      1225 10 Units Given      1700 *Not included in total Missed     04/20/24  0800 *Not included in total Missed      1200 *Not included in total Missed      1542 10 Units Given     04/21/24  0955 10 Units Given      1210 8 Units Given      1731 2 Units Given     04/22/24  0800 *Not included in total Missed               insulin lispro (HumaLOG) injection 10 Units (Units) Total dose:  0 Units* Dosing weight:  65.2   *Administration not included in total     Date/Time Rate/Dose/Volume Action       04/20/24  2130 *10 Units Missed               vancomycin (Vancocin) in dextrose 5 % water (D5W) 500 mL IV 1,500 mg (mg) Total dose:  0 mg* Dosing weight:  65.2   *Administration not included in total     Date/Time Rate/Dose/Volume Action       04/19/24  0130 *1,500 mg - 333.3 mL/hr (over 90 min) Missed               methIMAzole (Tapazole) tablet 12.5 mg (mg) Total dose:  25 mg* Dosing weight:  65.2   *Administration not included in total     Date/Time Rate/Dose/Volume Action       04/19/24  0909 12.5 mg Given     04/20/24  0900 *12.5 mg Missed     04/21/24  0952 12.5 mg Given               vancomycin (Vancocin) 1,500 mg in dextrose 5%  mL (mL/hr) Total dose:  1,500 mg* Dosing weight:  65.2   *From user-documented volume     Date/Time Rate/Dose/Volume Action       04/19/24  0251 1,500 mg - 400 mL/hr (over 75 min) New Bag      0406  (over 75 min) Stopped      0500 500 mL                morphine injection  - Omnicell Override Pull (mg) Total dose:  2 mg      Date/Time Rate/Dose/Volume Action       04/19/24  0345 2 mg Given               morphine injection 2 mg (mg) Total dose:  2 mg Dosing weight:  65.2      Date/Time  Rate/Dose/Volume Action       04/19/24  0345 2 mg Given               morphine injection 2 mg (mg) Total dose:  8 mg Dosing weight:  65.2      Date/Time Rate/Dose/Volume Action       04/19/24  2211 2 mg Given     04/20/24 2025 2 mg Given     04/21/24 2057 2 mg Given     04/22/24 0315 2 mg Given               morphine injection 2 mg (mg) Total dose:  2 mg Dosing weight:  65.2      Date/Time Rate/Dose/Volume Action       04/21/24  0041 2 mg Given               magnesium sulfate in D5W IV 1 g (mL/hr) Total volume:  Not documented* Dosing weight:  65.2   *Total volume has not been documented. View each administration to see the amount administered.     Date/Time Rate/Dose/Volume Action       04/19/24  0356 1 g - 50 mL/hr (over 120 min) New Bag      0556  (over 120 min) Stopped               mupirocin (Bactroban) 2 % ointment Total dose:  Cannot be calculated* Dosing weight:  65.2   *Administration does not have dose documented     Date/Time Rate/Dose/Volume Action       04/19/24  1224  Given      1500 *Not included in total Missed      2057  Given     04/20/24  0900 *Not included in total Missed      1800  Given      2206  Given     04/21/24  0952  Given      1450  Given      2100  Given               vancomycin in dextrose 5 % (Vancocin) IVPB 750 mg (mL/hr) Total dose:  1,500 mg* Dosing weight:  65.2   *From user-documented volume     Date/Time Rate/Dose/Volume Action       04/20/24  0200 750 mg - 200 mL/hr (over 45 min) New Bag      0245  (over 45 min) Stopped     04/21/24  0301 750 mg - 200 mL/hr (over 45 min) New Bag      0346 300 mL Stopped               furosemide (Lasix) tablet 40 mg (mg) Total dose:  Cannot be calculated* Dosing weight:  65.2   *Administration dose not documented     Date/Time Rate/Dose/Volume Action       04/20/24 0900 *40 mg Missed      1131 *Not included in total Held by provider     04/21/24 0900 *40 mg Missed     04/22/24 0900 *Not included in total Automatically Held                spironolactone (Aldactone) tablet 100 mg (mg) Total dose:  Cannot be calculated* Dosing weight:  65.2   *Administration dose not documented     Date/Time Rate/Dose/Volume Action       04/20/24 0900 *100 mg Missed      1131 *Not included in total Held by provider     04/21/24 0900 *100 mg Missed     04/22/24 0900 *Not included in total Automatically Held               lidocaine 4 % patch 1 patch (patch) Total dose:  2 patch* Dosing weight:  65.2   *Administration not included in total     Date/Time Rate/Dose/Volume Action       04/20/24 0900 *1 patch (over 720 min) Missed      2032 1 patch (over 720 min) Medication Applied     04/21/24  0832  (over 720 min) Medication Removed      2057 1 patch (over 720 min) Medication Applied     04/22/24  0857  (over 720 min) Medication Removed               trimethobenzamide (Tigan) injection 200 mg (mg) Total dose:  400 mg Dosing weight:  65.2      Date/Time Rate/Dose/Volume Action       04/20/24  2300 200 mg Given     04/22/24  0324 200 mg Given               insulin glargine (Lantus) injection 10 Units (Units) Total dose:  10 Units* Dosing weight:  65.2   *Administration not included in total     Date/Time Rate/Dose/Volume Action       04/20/24 2206 10 Units Given     04/21/24  2130 *10 Units Missed               meropenem (Merrem) in sodium chloride 0.9 % 100 mL IV 1 g (mL/hr) Total dose:  1 g* Dosing weight:  65.2   *From user-documented volume     Date/Time Rate/Dose/Volume Action       04/21/24  0137 1 g - 200 mL/hr (over 30 min) New Bag      0207 100 mL Stopped      1209 200 mL/hr (over 30 min) Restarted      1210 1 g - 200 mL/hr (over 30 min) New Bag      1240  (over 30 min) Stopped     04/22/24  0200 1 g - 200 mL/hr (over 30 min) New Bag      0230  (over 30 min) Stopped               sodium chloride 0.9% infusion (mL/hr) Total volume:  286.25 mL* Dosing weight:  71.2   *From user-documented volume     Date/Time Rate/Dose/Volume Action       04/21/24  0952 75 mL/hr  New Bag      1341 75 mL/hr - 286.25 mL Rate Verify               enoxaparin (Lovenox) syringe 30 mg (mg) Total dose:  30 mg* Dosing weight:  71.2   *Administration not included in total     Date/Time Rate/Dose/Volume Action       04/21/24  1209 30 mg Given     04/22/24  0825 *Not included in total Held by provider      0900 *30 mg Missed               sodium zirconium cyclosilicate (Lokelma) packet 10 g (g) Total dose:  10 g Dosing weight:  71.2      Date/Time Rate/Dose/Volume Action       04/21/24  1450 10 g Given               clopidogrel (Plavix) tablet 75 mg (mg) Total dose:  75 mg Dosing weight:  71.2      Date/Time Rate/Dose/Volume Action       04/21/24  1450 75 mg Given               lidocaine PF (Xylocaine) 10 mg/mL (1 %) injection (mL) Total volume:  4 mL      Date/Time Rate/Dose/Volume Action       04/22/24  1003 4 mL Given                   2500 ml Yellow fluid from right pleural space with US Guidance. Sample sent to lab      See detailed result report with images in PACS.    The patient tolerated the procedure well without incident or complication and is in stable condition.

## 2024-04-22 NOTE — PROGRESS NOTES
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 Presented with Cr 1.43 which uptrended following diuresis. Likely intravascularly depleted   -Nephro following; recs appreciated. Agree with holding diuretics. Advise continued IV fluids & monitoring at this time. Cr 2.83 -> 3.05    NSTEMI  -Presented with chest discomfort and notably elevated troponins (823 -> 962 -> 1060 -> 743).  -Card following, recs appreciated. Was tentatively planned for [facility] on 4/22, however will be deferred at this time given worsening renal function.  -Heparin drip discontinued. Patient remains not yet ready for cath.     Concern for bacterial pneumonia   Loculated Pleural effusion  -CT thorax reviewed, reveals dense consolidations extensviely in all 3 right lobes as well as extensively loculated, large right pleural effusion   -CT surgery following; recs appreciated. Poor surgical candidate. Could offer pleurex catheter once more stable. Agree with antibiotic coverage and thoracentesis at this time.   -Continued on meropenem, Vancomycin d/c'd  -s/p thoracentesis on 4/22. 2.5 L of fluid removed    Cirrhosis with ascites  -Lasix/aldactone on hold, will monitor    Hyponatremia  -Na 129, remains relatively stable, will monitor    Goals of care  -Patient expressed frustrations regarding current health status. Expressed interest in more comfort focused care as well as hospice. Palliative care evaluation appreciated  -Discussed with Romero Todd CNP, goals of care was discussed, code status changed to DNR CCA. Patient not yet ready for hospice but interested in potential palliative care initiation on discharge.     Hx HTN, HLD,CAD, ICM, Valvular heart disease, T2DM, PAD S/p left BKA,   -Home meds resumed as applicable    55 minutes spent on patient encounter. Time calculated includes chart review, bedside evaluation, counselling, and care coordination.         Mukesh Benoit MD    SUBJECTIVE     Patient had no  acute events overnight.  States chest pain has resolved since receiving nitroglycerin in the ED.  Denies any fevers chills or chest pain.  Endorsing shortness of breath.  Further ROS was unremarkable.    OBJECTIVE:       Last Recorded Vitals:  Vitals:    04/22/24 1017 04/22/24 1027 04/22/24 1031 04/22/24 1125   BP: 107/65 92/56 104/61 104/66   Pulse: 83 81 84 70   Resp: 18 18  16   Temp:    36.1 °C (97 °F)   TempSrc:       SpO2: 100% 100%  100%   Weight:       Height:           Last I/O:  I/O last 3 completed shifts:  In: 1051.1 (14.8 mL/kg) [I.V.:651.1 (9.1 mL/kg); IV Piggyback:400]  Out: 875 (12.3 mL/kg) [Urine:875 (0.3 mL/kg/hr)]  Weight: 71.2 kg     Physical Exam:  General: Ill-appearing adult male in mild distress  HEENT: Clear sclera, EOMI, trachea midline, moist mucous membranes  Respiratory: Equal chest rise, no retractions, diminished breath sounds on right  Abdomen: Tense, distended, nontender  Extremities: Left BKA noted stump covered in clean bandaging.  Superficial ulcerations involving right leg  Neurological: Spontaneously moves all extremities, no dysarthria, cranial nerves grossly intact  Psychiatric: Appropriate mood and affect  Skin: Warm, dry    Inpatient Medications:  aspirin, 81 mg, oral, Daily  atorvastatin, 80 mg, oral, Nightly  clopidogrel, 75 mg, oral, Daily  [Held by provider] enoxaparin, 30 mg, subcutaneous, Daily  [Held by provider] furosemide, 40 mg, oral, Daily  insulin glargine, 10 Units, subcutaneous, q24h  insulin lispro, 0-10 Units, subcutaneous, TID with meals  insulin lispro, 10 Units, subcutaneous, Once  lidocaine, 1 patch, transdermal, Daily  meropenem, 1 g, intravenous, q12h  methIMAzole, 12.5 mg, oral, Daily  metoprolol succinate XL, 25 mg, oral, Daily  midodrine, 10 mg, oral, TID with meals  morphine, 4 mg, intravenous, Once  mupirocin, , Topical, TID  pantoprazole, 40 mg, oral, Daily before breakfast   Or  pantoprazole, 40 mg, intravenous, Daily before breakfast  [Held by  provider] spironolactone, 100 mg, oral, Daily        PRN Medications  PRN medications: acetaminophen **OR** acetaminophen **OR** acetaminophen, dextrose, dextrose, glucagon, glucagon, ipratropium-albuteroL, melatonin, morphine, [Held by provider] ondansetron **OR** [Held by provider] ondansetron, polyethylene glycol, trimethobenzamide    Continuous Medications:  sodium chloride 0.9%, 75 mL/hr, Last Rate: 75 mL/hr (04/21/24 1341)          LABS AND IMAGING:     Labs:  Results for orders placed or performed during the hospital encounter of 04/18/24 (from the past 24 hour(s))   POCT GLUCOSE   Result Value Ref Range    POCT Glucose 195 (H) 74 - 99 mg/dL   POCT GLUCOSE   Result Value Ref Range    POCT Glucose 163 (H) 74 - 99 mg/dL   Magnesium   Result Value Ref Range    Magnesium 1.97 1.60 - 2.40 mg/dL   Comprehensive Metabolic Panel   Result Value Ref Range    Glucose 175 (H) 74 - 99 mg/dL    Sodium 129 (L) 136 - 145 mmol/L    Potassium 4.9 3.5 - 5.3 mmol/L    Chloride 91 (L) 98 - 107 mmol/L    Bicarbonate 26 21 - 32 mmol/L    Anion Gap 17 10 - 20 mmol/L    Urea Nitrogen 113 (HH) 6 - 23 mg/dL    Creatinine 3.05 (H) 0.50 - 1.30 mg/dL    eGFR 24 (L) >60 mL/min/1.73m*2    Calcium 7.8 (L) 8.6 - 10.3 mg/dL    Albumin 2.7 (L) 3.4 - 5.0 g/dL    Alkaline Phosphatase 476 (H) 33 - 120 U/L    Total Protein 6.9 6.4 - 8.2 g/dL    AST 29 9 - 39 U/L    Bilirubin, Total 1.5 (H) 0.0 - 1.2 mg/dL    ALT 19 10 - 52 U/L   Lactate Dehydrogenase   Result Value Ref Range     84 - 246 U/L   CBC and Auto Differential   Result Value Ref Range    WBC 12.6 (H) 4.4 - 11.3 x10*3/uL    nRBC 0.2 (H) 0.0 - 0.0 /100 WBCs    RBC 4.01 (L) 4.50 - 5.90 x10*6/uL    Hemoglobin 7.9 (L) 13.5 - 17.5 g/dL    Hematocrit 26.3 (L) 41.0 - 52.0 %    MCV 66 (L) 80 - 100 fL    MCH 19.7 (L) 26.0 - 34.0 pg    MCHC 30.0 (L) 32.0 - 36.0 g/dL    RDW 25.6 (H) 11.5 - 14.5 %    Platelets 465 (H) 150 - 450 x10*3/uL    Neutrophils % 75.5 40.0 - 80.0 %    Immature  Granulocytes %, Automated 0.6 0.0 - 0.9 %    Lymphocytes % 11.0 13.0 - 44.0 %    Monocytes % 12.4 2.0 - 10.0 %    Eosinophils % 0.2 0.0 - 6.0 %    Basophils % 0.3 0.0 - 2.0 %    Neutrophils Absolute 9.52 (H) 1.20 - 7.70 x10*3/uL    Immature Granulocytes Absolute, Automated 0.07 0.00 - 0.70 x10*3/uL    Lymphocytes Absolute 1.39 1.20 - 4.80 x10*3/uL    Monocytes Absolute 1.56 (H) 0.10 - 1.00 x10*3/uL    Eosinophils Absolute 0.02 0.00 - 0.70 x10*3/uL    Basophils Absolute 0.04 0.00 - 0.10 x10*3/uL   Morphology   Result Value Ref Range    RBC Morphology See Below     Polychromasia Mild     Hypochromia Mild     RBC Fragments Few     Target Cells Few     Ovalocytes Few    POCT GLUCOSE   Result Value Ref Range    POCT Glucose 181 (H) 74 - 99 mg/dL   Body Fluid Cell Count   Result Value Ref Range    Color, Fluid Yellow Colorless, Straw, Yellow    Clarity, Fluid Clear Clear    WBC, Fluid 293 See Comment /uL    RBC, Fluid 3,000 0  /uL /uL   Body Fluid Differential   Result Value Ref Range    Neutrophils %, Manual, Fluid 15 <25 % %    Lymphocytes %, Manual, Fluid 10 <75 % %    Mono/Macrophages %, Manual, Fluid 75 <70 % %    Eosinophils %, Manual, Fluid 0 0 % %    Basophils %, Manual, Fluid 0 0 % %    Immature Granulocytes %, Manual, Fluid 0 0 % %    Blasts %, Manual, Fluid 0 0 % %    Unclassified Cells %, Manual, Fluid 0 0 % %    Plasma Cells %, Manual, Fluid 0 0 % %    Total Cells Counted, Fluid 100    POCT GLUCOSE   Result Value Ref Range    POCT Glucose 181 (H) 74 - 99 mg/dL        Imaging:

## 2024-04-23 LAB
ACANTHOCYTES BLD QL SMEAR: NORMAL
ALBUMIN SERPL BCP-MCNC: 2.7 G/DL (ref 3.4–5)
ALP SERPL-CCNC: 425 U/L (ref 33–120)
ALT SERPL W P-5'-P-CCNC: 16 U/L (ref 10–52)
ANION GAP SERPL CALC-SCNC: 18 MMOL/L (ref 10–20)
AST SERPL W P-5'-P-CCNC: 26 U/L (ref 9–39)
BASOPHILS # BLD AUTO: 0.02 X10*3/UL (ref 0–0.1)
BASOPHILS NFR BLD AUTO: 0.1 %
BILIRUB SERPL-MCNC: 1.5 MG/DL (ref 0–1.2)
BUN SERPL-MCNC: 109 MG/DL (ref 6–23)
BURR CELLS BLD QL SMEAR: NORMAL
CALCIUM SERPL-MCNC: 7.1 MG/DL (ref 8.6–10.3)
CHLORIDE SERPL-SCNC: 91 MMOL/L (ref 98–107)
CO2 SERPL-SCNC: 24 MMOL/L (ref 21–32)
CREAT SERPL-MCNC: 3.22 MG/DL (ref 0.5–1.3)
EGFRCR SERPLBLD CKD-EPI 2021: 22 ML/MIN/1.73M*2
EOSINOPHIL # BLD AUTO: 0 X10*3/UL (ref 0–0.7)
EOSINOPHIL NFR BLD AUTO: 0 %
ERYTHROCYTE [DISTWIDTH] IN BLOOD BY AUTOMATED COUNT: 25.9 % (ref 11.5–14.5)
GLUCOSE BLD MANUAL STRIP-MCNC: 294 MG/DL (ref 74–99)
GLUCOSE BLD MANUAL STRIP-MCNC: 322 MG/DL (ref 74–99)
GLUCOSE BLD MANUAL STRIP-MCNC: 334 MG/DL (ref 74–99)
GLUCOSE BLD MANUAL STRIP-MCNC: 339 MG/DL (ref 74–99)
GLUCOSE BLD MANUAL STRIP-MCNC: 342 MG/DL (ref 74–99)
GLUCOSE SERPL-MCNC: 349 MG/DL (ref 74–99)
HCT VFR BLD AUTO: 27.2 % (ref 41–52)
HGB BLD-MCNC: 8.2 G/DL (ref 13.5–17.5)
HOLD SPECIMEN: NORMAL
HYPOCHROMIA BLD QL SMEAR: NORMAL
IMM GRANULOCYTES # BLD AUTO: 0.09 X10*3/UL (ref 0–0.7)
IMM GRANULOCYTES NFR BLD AUTO: 0.6 % (ref 0–0.9)
LYMPHOCYTES # BLD AUTO: 0.98 X10*3/UL (ref 1.2–4.8)
LYMPHOCYTES NFR BLD AUTO: 6.8 %
MAGNESIUM SERPL-MCNC: 1.98 MG/DL (ref 1.6–2.4)
MCH RBC QN AUTO: 19.7 PG (ref 26–34)
MCHC RBC AUTO-ENTMCNC: 30.1 G/DL (ref 32–36)
MCV RBC AUTO: 65 FL (ref 80–100)
MONOCYTES # BLD AUTO: 1.58 X10*3/UL (ref 0.1–1)
MONOCYTES NFR BLD AUTO: 11 %
NEUTROPHILS # BLD AUTO: 11.73 X10*3/UL (ref 1.2–7.7)
NEUTROPHILS NFR BLD AUTO: 81.5 %
NRBC BLD-RTO: 0.2 /100 WBCS (ref 0–0)
OVALOCYTES BLD QL SMEAR: NORMAL
PLATELET # BLD AUTO: 449 X10*3/UL (ref 150–450)
PLATELET CLUMP BLD QL SMEAR: PRESENT
POTASSIUM SERPL-SCNC: 5.6 MMOL/L (ref 3.5–5.3)
PROT SERPL-MCNC: 6.4 G/DL (ref 6.4–8.2)
RBC # BLD AUTO: 4.16 X10*6/UL (ref 4.5–5.9)
RBC MORPH BLD: NORMAL
SCHISTOCYTES BLD QL SMEAR: NORMAL
SODIUM SERPL-SCNC: 127 MMOL/L (ref 136–145)
TARGETS BLD QL SMEAR: NORMAL
WBC # BLD AUTO: 14.4 X10*3/UL (ref 4.4–11.3)

## 2024-04-23 PROCEDURE — 2500000005 HC RX 250 GENERAL PHARMACY W/O HCPCS: Performed by: NURSE PRACTITIONER

## 2024-04-23 PROCEDURE — 2500000002 HC RX 250 W HCPCS SELF ADMINISTERED DRUGS (ALT 637 FOR MEDICARE OP, ALT 636 FOR OP/ED): Performed by: INTERNAL MEDICINE

## 2024-04-23 PROCEDURE — 83735 ASSAY OF MAGNESIUM: CPT | Performed by: STUDENT IN AN ORGANIZED HEALTH CARE EDUCATION/TRAINING PROGRAM

## 2024-04-23 PROCEDURE — 82947 ASSAY GLUCOSE BLOOD QUANT: CPT

## 2024-04-23 PROCEDURE — 2500000006 HC RX 250 W HCPCS SELF ADMINISTERED DRUGS (ALT 637 FOR ALL PAYERS): Performed by: STUDENT IN AN ORGANIZED HEALTH CARE EDUCATION/TRAINING PROGRAM

## 2024-04-23 PROCEDURE — 82565 ASSAY OF CREATININE: CPT | Performed by: STUDENT IN AN ORGANIZED HEALTH CARE EDUCATION/TRAINING PROGRAM

## 2024-04-23 PROCEDURE — 2500000004 HC RX 250 GENERAL PHARMACY W/ HCPCS (ALT 636 FOR OP/ED): Performed by: STUDENT IN AN ORGANIZED HEALTH CARE EDUCATION/TRAINING PROGRAM

## 2024-04-23 PROCEDURE — 2500000001 HC RX 250 WO HCPCS SELF ADMINISTERED DRUGS (ALT 637 FOR MEDICARE OP): Performed by: INTERNAL MEDICINE

## 2024-04-23 PROCEDURE — 1200000002 HC GENERAL ROOM WITH TELEMETRY DAILY

## 2024-04-23 PROCEDURE — 2500000004 HC RX 250 GENERAL PHARMACY W/ HCPCS (ALT 636 FOR OP/ED): Performed by: NURSE PRACTITIONER

## 2024-04-23 PROCEDURE — 85025 COMPLETE CBC W/AUTO DIFF WBC: CPT | Performed by: STUDENT IN AN ORGANIZED HEALTH CARE EDUCATION/TRAINING PROGRAM

## 2024-04-23 PROCEDURE — 2500000004 HC RX 250 GENERAL PHARMACY W/ HCPCS (ALT 636 FOR OP/ED): Performed by: INTERNAL MEDICINE

## 2024-04-23 PROCEDURE — 99232 SBSQ HOSP IP/OBS MODERATE 35: CPT | Performed by: STUDENT IN AN ORGANIZED HEALTH CARE EDUCATION/TRAINING PROGRAM

## 2024-04-23 PROCEDURE — 36415 COLL VENOUS BLD VENIPUNCTURE: CPT | Performed by: STUDENT IN AN ORGANIZED HEALTH CARE EDUCATION/TRAINING PROGRAM

## 2024-04-23 PROCEDURE — 2500000001 HC RX 250 WO HCPCS SELF ADMINISTERED DRUGS (ALT 637 FOR MEDICARE OP): Performed by: STUDENT IN AN ORGANIZED HEALTH CARE EDUCATION/TRAINING PROGRAM

## 2024-04-23 PROCEDURE — C9113 INJ PANTOPRAZOLE SODIUM, VIA: HCPCS | Performed by: INTERNAL MEDICINE

## 2024-04-23 RX ORDER — FUROSEMIDE 10 MG/ML
40 INJECTION INTRAMUSCULAR; INTRAVENOUS DAILY
Status: DISCONTINUED | OUTPATIENT
Start: 2024-04-23 | End: 2024-04-24

## 2024-04-23 RX ADMIN — MEROPENEM 1 G: 1 INJECTION, POWDER, FOR SOLUTION INTRAVENOUS at 02:00

## 2024-04-23 RX ADMIN — LIDOCAINE 4% 1 PATCH: 40 PATCH TOPICAL at 20:38

## 2024-04-23 RX ADMIN — INSULIN LISPRO 2 UNITS: 100 INJECTION, SOLUTION INTRAVENOUS; SUBCUTANEOUS at 18:26

## 2024-04-23 RX ADMIN — MUPIROCIN: 20 OINTMENT TOPICAL at 11:00

## 2024-04-23 RX ADMIN — MUPIROCIN: 20 OINTMENT TOPICAL at 15:55

## 2024-04-23 RX ADMIN — ASPIRIN 81 MG: 81 TABLET, COATED ORAL at 08:44

## 2024-04-23 RX ADMIN — MORPHINE SULFATE 2 MG: 2 INJECTION, SOLUTION INTRAMUSCULAR; INTRAVENOUS at 01:19

## 2024-04-23 RX ADMIN — TRIMETHOBENZAMIDE HYDROCHLORIDE 200 MG: 100 INJECTION INTRAMUSCULAR at 12:18

## 2024-04-23 RX ADMIN — MORPHINE SULFATE 2 MG: 2 INJECTION, SOLUTION INTRAMUSCULAR; INTRAVENOUS at 13:26

## 2024-04-23 RX ADMIN — PANTOPRAZOLE SODIUM 40 MG: 40 INJECTION, POWDER, FOR SOLUTION INTRAVENOUS at 06:35

## 2024-04-23 RX ADMIN — INSULIN GLARGINE 10 UNITS: 100 INJECTION, SOLUTION SUBCUTANEOUS at 20:30

## 2024-04-23 RX ADMIN — SODIUM ZIRCONIUM CYCLOSILICATE 5 G: 10 POWDER, FOR SUSPENSION ORAL at 13:30

## 2024-04-23 RX ADMIN — MEROPENEM 1 G: 1 INJECTION, POWDER, FOR SOLUTION INTRAVENOUS at 13:26

## 2024-04-23 RX ADMIN — CLOPIDOGREL BISULFATE 75 MG: 75 TABLET, FILM COATED ORAL at 08:44

## 2024-04-23 RX ADMIN — ATORVASTATIN CALCIUM 80 MG: 80 TABLET, FILM COATED ORAL at 22:11

## 2024-04-23 RX ADMIN — MIDODRINE HYDROCHLORIDE 10 MG: 5 TABLET ORAL at 08:43

## 2024-04-23 RX ADMIN — MIDODRINE HYDROCHLORIDE 10 MG: 5 TABLET ORAL at 12:11

## 2024-04-23 RX ADMIN — FUROSEMIDE 40 MG: 10 INJECTION, SOLUTION INTRAMUSCULAR; INTRAVENOUS at 13:26

## 2024-04-23 RX ADMIN — METHIMAZOLE 12.5 MG: 5 TABLET ORAL at 08:43

## 2024-04-23 RX ADMIN — INSULIN LISPRO 4 UNITS: 100 INJECTION, SOLUTION INTRAVENOUS; SUBCUTANEOUS at 08:41

## 2024-04-23 RX ADMIN — MIDODRINE HYDROCHLORIDE 10 MG: 5 TABLET ORAL at 17:00

## 2024-04-23 ASSESSMENT — COGNITIVE AND FUNCTIONAL STATUS - GENERAL
MOVING TO AND FROM BED TO CHAIR: A LITTLE
DAILY ACTIVITIY SCORE: 24
STANDING UP FROM CHAIR USING ARMS: A LITTLE
CLIMB 3 TO 5 STEPS WITH RAILING: A LOT
WALKING IN HOSPITAL ROOM: A LITTLE
MOBILITY SCORE: 19

## 2024-04-23 ASSESSMENT — PAIN - FUNCTIONAL ASSESSMENT
PAIN_FUNCTIONAL_ASSESSMENT: 0-10

## 2024-04-23 ASSESSMENT — PAIN SCALES - GENERAL
PAINLEVEL_OUTOF10: 10 - WORST POSSIBLE PAIN
PAINLEVEL_OUTOF10: 3
PAINLEVEL_OUTOF10: 2

## 2024-04-23 ASSESSMENT — PAIN SCALES - PAIN ASSESSMENT IN ADVANCED DEMENTIA (PAINAD)
BREATHING: NORMAL
NEGVOCALIZATION: OCCASIONAL MOAN/GROAN, LOW SPEECH, NEGATIVE/DISAPPROVING QUALITY
CONSOLABILITY: DISTRACTED OR REASSURED BY VOICE/TOUCH
TOTALSCORE: 4
FACIALEXPRESSION: SAD, FRIGHTENED, FROWN
BODYLANGUAGE: TENSE, DISTRESSED PACING, FIDGETING

## 2024-04-23 ASSESSMENT — PAIN SCALES - WONG BAKER: WONGBAKER_NUMERICALRESPONSE: HURTS WORST

## 2024-04-23 ASSESSMENT — PAIN DESCRIPTION - LOCATION: LOCATION: NECK

## 2024-04-23 NOTE — PROGRESS NOTES
Occupational Therapy                 Therapy Communication Note    Patient Name: Hitesh Jurado  MRN: [id number]  Today's Date: 4/23/2024     Discipline: Occupational Therapy    Missed Visit Reason: Missed Visit Reason: Parent refused    Comment: Attempted OT evaluation. Patient seated EOB, refusing participation in therapy evaluation. Patient c/o feeling nauseous. Educated patient on role of therapy, patient continued to decline. Will re attempt as patient agreeable. Discussed with nursing.

## 2024-04-23 NOTE — PROGRESS NOTES
Physical Therapy                 Therapy Communication Note    Patient Name: Hitesh Jurado  MRN: [id number]  Today's Date: 4/23/2024     Discipline: Physical Therapy    Missed Visit Reason: 1200-Missed Visit Reason: Patient refused (PT/OT attempt for eval  pt refused therapy eval reports nausea and vomiting and that hes not getting out of bed.   reattempt as able.)    Missed Time: Attempt    Comment:

## 2024-04-23 NOTE — PROGRESS NOTES
Music Therapy Note    Hitesh Jurado was referred by Pain rounds          Pre-assessment  Pain Score: 3  Mood/Affect: Appropriate, Calm, Cooperative  Verbalized Emotional State: Acceptance         Treatment/Interventions  Music Therapy Interventions: Assessment    Post-assessment  Unable to Assess Reason: Did not provide expressive therapy intervention    Narrative  Assessment Detail: PT sititng up at bedside. PT pleasant and calm with MT. PT shared they were doing well and without significant pain at this time due to medications given. PT shared they were coping well and presented as so based on MT assessment. PT declined services at this time.  Intervention: MT educated and assessed PT on MT services to help reduce pain perception and increase relaxation. PT was given MT contact information as requested to initiate a follow up as needed.  Follow-up: PT will follow up with staff as needed.    Education Documentation  Pain Management, taught by Ami Grover at 4/23/2024  5:08 PM.  Learner: Patient  Readiness: Acceptance  Method: Explanation  Response: Verbalizes Understanding

## 2024-04-23 NOTE — PROGRESS NOTES
Hitesh Jurado is a 53 y.o. male on day 5 of admission presenting with Right lower lobe pneumonia.      Subjective   Patient is currently stable, in no acute distress, on oxygen 3 L via nasal cannula, refused PT/OT evaluation      Objective     Last Recorded Vitals  BP 92/57   Pulse 79   Temp 36.7 °C (98.1 °F) (Temporal)   Resp 16   Wt 71.2 kg (156 lb 15.5 oz)   SpO2 100%   Intake/Output last 3 Shifts:    Intake/Output Summary (Last 24 hours) at 4/23/2024 1253  Last data filed at 4/23/2024 1022  Gross per 24 hour   Intake 1542.5 ml   Output 500 ml   Net 1042.5 ml       Admission Weight  Weight: 68 kg (150 lb) (04/18/24 1736)    Daily Weight  04/21/24 : 71.2 kg (156 lb 15.5 oz)    Image Results  XR chest 1 view  Narrative: Interpreted By:  Schoenberger, Joseph,   STUDY:  XR CHEST 1 VIEW;  4/22/2024 10:54 am      INDICATION:  Signs/Symptoms:Post Right Thoracentesis.      COMPARISON:  04/18/2024      ACCESSION NUMBER(S):  [id number]      ORDERING CLINICIAN:  JOSEPH SCHOENBERGER      FINDINGS:  In the interval since the prior exam the patient has undergone a  right thoracentesis. There is minimal residual blunting in the  lateral costophrenic angle on the right. There is vague indistinct  opacity in the periphery of the right lower lung. While some of this  could represent infiltrate is also possible the some represent some  area of loculated fluid. There is no evidence of post thoracentesis  pneumothorax.              CARDIOMEDIASTINAL SILHOUETTE:  Cardiomediastinal silhouette is normal in size and configuration.      LUNGS:  No new focal lung opacities are seen.      ABDOMEN:  No remarkable upper abdominal findings.      BONES:  No acute osseous changes.      Impression: 1.  Satisfactory appearance post right thoracentesis. See discussion  above.              MACRO:  None      Signed by: Joseph Schoenberger 4/22/2024 10:59 AM  Dictation workstation:   [id number]  US thoracentesis  Narrative: Interpreted  By:  Schoenberger, Joseph,   STUDY:  US THORACENTESIS; ;  4/22/2024 10:45 am      INDICATION:  Signs/Symptoms:Thoracentesis, recurrent right pleural effusion.      COMPARISON:  None.      ACCESSION NUMBER(S):  [id number]      ORDERING CLINICIAN:  SILAS NEGRO      CONSENT:  The procedure, its indication, its risks, and alternatives were  explained to the patient who understands and consents to the  procedure. A time-out is completed prior to the procedure to confirm  patient identity and procedure to be performed.      MODALITIES:  Ultrasound      TECHNIQUE:  The patient is seated leaning forward in ultrasound. The lowest  right-sided intracostal space with safe percutaneous access to the  patient's right pleural effusion was localized. This site was then  marked.      All personnel in the procedure suite used appropriate mask and cap.  Additionally, the performing physician and assistant used maximum  barrier technique to include a sterile gown and gloves as per  standard hospital protocol. The marked site was sterilely prepped  with chlorhexidine in the usual manner. A large sterile barrier was  used to to completely draped the patient is outside of the sterilely  prepped area in the usual manner per standard hospital protocol.  Local anesthetic was administered. A 5 Urdu sheath needle was  advanced until gold fluid was obtained. The sheath was advanced off  the needle cannula into the pleural space. At this 0.60 cc of fluid  was aspirated and sent to the laboratory for studies as ordered by  the referring physician. Subsequently, aspiration was performed  completion. A total of 2500 cc of fluid was aspirated. With the  patient in suspended expiration the sheath was removed. An  appropriate dressing was placed. The patient tolerated the procedure  well without immediate complication.      FINDINGS:  See discussion above      Impression: Successful ultrasound-guided diagnostic and therapeutic thoracentesis           MACRO:  None      Signed by: Joseph Schoenberger 4/22/2024 10:50 AM  Dictation workstation:   [id number]      Physical Exam    General: Ill-appearing adult male, no acute distress, on oxygen via NC   HEENT: Clear sclera, EOMI, trachea midline, moist mucous membranes  Respiratory: Equal chest rise, no retractions, diminished breath sounds on right  Abdomen: Tense, distended, nontender  Extremities: Left BKA noted stump covered in clean bandaging.  Superficial ulcerations involving right leg  Neurological: Spontaneously moves all extremities, no dysarthria, cranial nerves grossly intact  Psychiatric: Appropriate mood and affect  Skin: Warm, dry    Assessment/Plan   This patient currently has cardiac telemetry ordered; if you would like to modify or discontinue the telemetry order, click here to go to the orders activity to modify/discontinue the order.    Principal Problem:    Right lower lobe pneumonia  Active Problems:    PAD (peripheral artery disease) (CMS-[facility])    Tobacco dependence with current use    Acute on chronic congestive heart failure (Multi)    Type 2 diabetes mellitus with peripheral neuropathy (Multi)    NSTEMI (non-ST elevated myocardial infarction) (Multi)    Dyspnea, unspecified type    Recurrent right pleural effusion    Acute respiratory failure with hypoxia (Multi)    Hitesh Jurado is a 53 y.o. male with PMH of CKD, cirrhosis with ascites, hyponatremia, HTN, HLD, CAD, ischemic cardiomyopathy, VHD, DM2, PAD status post left BKA who was admitted for management of the following issues:     PIPPA on CKD 2-3a, worsening, CR 3.22 today  Hyperkalemia, hyponatremia  -Baseline appears to be approx 1.3. Presented with Cr 1.43 which uptrended following diuresis. Likely intravascularly depleted   -Nephro following, diuretics held  -Monitor BMP, avoid nephrotoxic agent  -Lokelma once given     NSTEMI  -Presented with chest discomfort and notably elevated troponins (823 -> 962 -> 1060 -> 743).  -Card  following, recs appreciated. Was tentatively planned for [facility] on 4/22, however will be deferred at this time given worsening renal function.  -Heparin drip discontinued. Patient remains not yet ready for cath.      Concern for bacterial pneumonia, leukocytosis worsening  Loculated Pleural effusion, s/p thoracentesis on 4/22, 2.5 L fluid removed  -CT thorax reviewed, reveals dense consolidations extensviely in all 3 right lobes as well as extensively loculated, large right pleural effusion   -CT surgery following; recs appreciated. Poor surgical candidate. Could offer pleurex catheter once more stable. Agree with antibiotic coverage and thoracentesis at this time.   -cw meropenem, pain management     Cirrhosis with ascites  -Lasix/aldactone on hold, will monitor     Goals of care  -Palliative recs appreciated, patient is now DNR     Hx HTN, HLD,CAD, ICM, Valvular heart disease, T2DM, PAD S/p left BKA, HFrEF 15-20%, microscopic anemia  Patient is currently hypotensive  -cw home meds  -ISS, hypoglycemia protocol, POCT glucose, DM diet  -CBC daily, transfuse to keep hemoglobin>7     VTE prophylaxis: Held for now  Disposition: Pending PT/OT recommendation, TCC following    Robson Garcia MD

## 2024-04-23 NOTE — PROGRESS NOTES
Nephrology Progress Note    Assessment:  53 y.o. male with history s/f HFrEF, PAD s/p LT BKA, T2DM c/b chronic non-healing wounds, cirrhosis who presented for SOB for several days.      PIPPA on CKD stage II/III: likely in setting of diuretics, not significantly hypotensive, no contrast, not on any other nephrotoxic medications, function was at baseline on presentation w/ Scr ~1.4 w/ eGFR high 50s/low 60s  Fluid overload: improved, has combined HFrEF 20-25%   Recurrent RT sided pleural effusion: had 3L taken off at [facility] on 4/16, present here again, now s/p RT sided thoracentesis w/ 2.5L taken off   Cirrhosis   Hypotension   Hypoalbuminemia  Anemia   RT sided PNA      Plan:  - stopping IVFs   - will give lokelma  - resuming lasix 40 mg IV daily for now, will change to PO soon and resume spironolactone depending on how his labs look     Subjective:  Admit Date: 4/18/2024    Interval History: function still slowly worsening, has been intermittently nauseated    Medications:  Scheduled Meds:aspirin, 81 mg, oral, Daily  atorvastatin, 80 mg, oral, Nightly  clopidogrel, 75 mg, oral, Daily  [Held by provider] enoxaparin, 30 mg, subcutaneous, Daily  [Held by provider] furosemide, 40 mg, oral, Daily  insulin glargine, 10 Units, subcutaneous, q24h  insulin lispro, 0-10 Units, subcutaneous, TID with meals  insulin lispro, 10 Units, subcutaneous, Once  lidocaine, 1 patch, transdermal, Daily  meropenem, 1 g, intravenous, q12h  methIMAzole, 12.5 mg, oral, Daily  metoprolol succinate XL, 25 mg, oral, Daily  midodrine, 10 mg, oral, TID with meals  morphine, 4 mg, intravenous, Once  mupirocin, , Topical, TID  pantoprazole, 40 mg, oral, Daily before breakfast   Or  pantoprazole, 40 mg, intravenous, Daily before breakfast  [Held by provider] spironolactone, 100 mg, oral, Daily      Continuous Infusions:       CBC:   Lab Results   Component Value Date    WBC 14.4 (H) 04/23/2024    RBC 4.16 (L) 04/23/2024    HGB 8.2 (L) 04/23/2024    HCT  "27.2 (L) 04/23/2024    MCV 65 (L) 04/23/2024    MCH 19.7 (L) 04/23/2024    MCHC 30.1 (L) 04/23/2024    RDW 25.9 (H) 04/23/2024     04/23/2024     BMP:    Lab Results   Component Value Date     (L) 04/23/2024    K 5.6 (H) 04/23/2024    CL 91 (L) 04/23/2024    CO2 24 04/23/2024     (HH) 04/23/2024    CREATININE 3.22 (H) 04/23/2024    CALCIUM 7.1 (L) 04/23/2024    GLUCOSE 349 (H) 04/23/2024       Objective:  Vitals: BP 92/57   Pulse 79   Temp 36.7 °C (98.1 °F) (Temporal)   Resp 16   Ht 1.753 m (5' 9\")   Wt 71.2 kg (156 lb 15.5 oz)   SpO2 100%   BMI 23.18 kg/m²    Wt Readings from Last 3 Encounters:   04/21/24 71.2 kg (156 lb 15.5 oz)   03/24/24 65 kg (143 lb 4.8 oz)   11/01/23 51.7 kg (114 lb)      24HR INTAKE/OUTPUT:    Intake/Output Summary (Last 24 hours) at 4/23/2024 1301  Last data filed at 4/23/2024 1022  Gross per 24 hour   Intake 1542.5 ml   Output 500 ml   Net 1042.5 ml       General: alert, in no apparent distress  HEENT: normocephalic, atraumatic, anicteric  Lungs: non-labored respirations, clear to auscultation bilaterally  Heart: regular rate and rhythm, no murmurs or rubs  Abdomen: soft, non-tender, distended  Ext: no cyanosis, ++ peripheral edema, LT BKA  Neuro: alert and oriented, no gross abnormalities        Electronically signed by Malgorzata Williamson MD, MD              "

## 2024-04-23 NOTE — CARE PLAN
The patient's goals for the shift include none    The clinical goals for the shift include Patient will remain hemodynamically stable      Problem: Pain - Adult  Goal: Verbalizes/displays adequate comfort level or baseline comfort level  4/23/2024 1020 by Alessandro Mckeon RN  Outcome: Progressing  4/23/2024 1019 by Alessandro Mckeon RN  Outcome: Progressing     Problem: Safety - Adult  Goal: Free from fall injury  4/23/2024 1020 by Alessandro Mckeon RN  Outcome: Progressing  4/23/2024 1019 by Alessandro Mckeon RN  Outcome: Progressing     Problem: Discharge Planning  Goal: Discharge to home or other facility with appropriate resources  4/23/2024 1020 by Alessandro Mckeon RN  Outcome: Progressing  4/23/2024 1019 by Alessandro Mckeon RN  Outcome: Progressing     Problem: Chronic Conditions and Co-morbidities  Goal: Patient's chronic conditions and co-morbidity symptoms are monitored and maintained or improved  4/23/2024 1020 by Alessandro Mckeon RN  Outcome: Progressing  4/23/2024 1019 by Alessandro Mckeon RN  Outcome: Progressing     Problem: Diabetes  Goal: Achieve decreasing blood glucose levels by end of shift  4/23/2024 1020 by Alessandro Mckeon RN  Outcome: Progressing  4/23/2024 1019 by Alessandro Mckeon RN  Outcome: Progressing  Goal: Increase stability of blood glucose readings by end of shift  4/23/2024 1020 by Alessandro Mckeon RN  Outcome: Progressing  4/23/2024 1019 by Alessandro Mckeon RN  Outcome: Progressing  Goal: Decrease in ketones present in urine by end of shift  4/23/2024 1020 by Alessandro Mckeon RN  Outcome: Progressing  4/23/2024 1019 by Alessandro Mckeon RN  Outcome: Progressing  Goal: Maintain electrolyte levels within acceptable range throughout shift  4/23/2024 1020 by Alessandro Mckeon RN  Outcome: Progressing  4/23/2024 1019 by Alessandro Mckeon RN  Outcome: Progressing  Goal: Maintain glucose levels >70mg/dl to <250mg/dl throughout shift  4/23/2024 1020 by Alessandro Mckeon  RN  Outcome: Progressing  4/23/2024 1019 by Alessandro Mckeon RN  Outcome: Progressing  Goal: Learn about and adhere to nutrition recommendations by end of shift  4/23/2024 1020 by Alessandro Mckeon RN  Outcome: Progressing  4/23/2024 1019 by Alessandro Mckeon RN  Outcome: Progressing  Goal: Increase self care and/or family involovement by end of shift  4/23/2024 1020 by Alessandro Mckeon RN  Outcome: Progressing  4/23/2024 1019 by Alessandro Mckeon RN  Outcome: Progressing  Goal: Receive DSME education by end of shift  4/23/2024 1020 by Alessandro Mckeon RN  Outcome: Progressing  4/23/2024 1019 by Alessandro Mckeon RN  Outcome: Progressing     Problem: Pain  Goal: Takes deep breaths with improved pain control throughout the shift  4/23/2024 1020 by Alessandro Mckeon RN  Outcome: Progressing  4/23/2024 1019 by Alessandro Mckeon RN  Outcome: Progressing  Goal: Turns in bed with improved pain control throughout the shift  4/23/2024 1020 by Alessandro Mckeon RN  Outcome: Progressing  4/23/2024 1019 by Alessandro Mckeon RN  Outcome: Progressing  Goal: Walks with improved pain control throughout the shift  4/23/2024 1020 by Alessandro Mckeon RN  Outcome: Progressing  4/23/2024 1019 by Alessandro Mckeon RN  Outcome: Progressing     Problem: Fall/Injury  Goal: Not fall by end of shift  4/23/2024 1020 by Alessandro Mckeon RN  Outcome: Progressing  4/23/2024 1019 by Alessandro Mckeon RN  Outcome: Progressing  Goal: Be free from injury by end of the shift  4/23/2024 1020 by Alessandro Mckeon RN  Outcome: Progressing  4/23/2024 1019 by Alessandro Mckeon RN  Outcome: Progressing  Goal: Verbalize understanding of personal risk factors for fall in the hospital  4/23/2024 1020 by Alessandro Mckeon RN  Outcome: Progressing  4/23/2024 1019 by Alessandro Mckeon RN  Outcome: Progressing  Goal: Verbalize understanding of risk factor reduction measures to prevent injury from fall in the home  4/23/2024 1020 by Alessandro Mckeon,  RN  Outcome: Progressing  4/23/2024 1019 by Alessandro Mckeon RN  Outcome: Progressing  Goal: Use assistive devices by end of the shift  4/23/2024 1020 by Alessandro Mckeon RN  Outcome: Progressing  4/23/2024 1019 by Alessandro Mckeon RN  Outcome: Progressing  Goal: Pace activities to prevent fatigue by end of the shift  4/23/2024 1020 by Alessandro Mckeon RN  Outcome: Progressing  4/23/2024 1019 by Alessandro Mckeon RN  Outcome: Progressing     Problem: Skin  Goal: Decreased wound size/increased tissue granulation at next dressing change  4/23/2024 1020 by Alessandro Mckeon RN  Outcome: Progressing  Flowsheets (Taken 4/19/2024 0023 by Jess Tsai RN)  Decreased wound size/increased tissue granulation at next dressing change:   Promote sleep for wound healing   Protective dressings over bony prominences  4/23/2024 1019 by Alessandro Mckeon RN  Outcome: Progressing  Goal: Promote/optimize nutrition  4/23/2024 1020 by Alessandro Mckeon RN  Outcome: Progressing  Flowsheets (Taken 4/23/2024 1020)  Promote/optimize nutrition: Consume > 50% meals/supplements  4/23/2024 1019 by Alessandro Mckeon RN  Outcome: Progressing  Goal: Promote skin healing  4/23/2024 1020 by Alessandro Mckeon RN  Outcome: Progressing  4/23/2024 1019 by Alessandro Mckeon RN  Outcome: Progressing     Problem: Heart Failure  Goal: Improved gas exchange this shift  4/23/2024 1020 by Alessandro Mckeon RN  Outcome: Progressing  4/23/2024 1019 by Alessandro Mckeon RN  Outcome: Progressing  Goal: Improved urinary output this shift  4/23/2024 1020 by Alessandro Mckeon RN  Outcome: Progressing  4/23/2024 1019 by Alessandro Mckeon RN  Outcome: Progressing  Goal: Reduction in peripheral edema within 24 hours  4/23/2024 1020 by Alessandro Mckeon RN  Outcome: Progressing  4/23/2024 1019 by Alessandro Mckeon RN  Outcome: Progressing  Goal: Report improvement of dyspnea/breathlessness this shift  4/23/2024 1020 by Alessandro Mcekon, RN  Outcome:  Progressing  4/23/2024 1019 by Alessandro Mckeon RN  Outcome: Progressing  Goal: Weight from fluid excess reduced over 2-3 days, then stabilize  4/23/2024 1020 by Alessandro Mckeon RN  Outcome: Progressing  4/23/2024 1019 by Alessandro Mckeon RN  Outcome: Progressing  Goal: Increase self care and/or family involvement in 24 hours  4/23/2024 1020 by Alessandro Mckeon RN  Outcome: Progressing  4/23/2024 1019 by Alessandro Mckeon RN  Outcome: Progressing

## 2024-04-23 NOTE — NURSING NOTE
Doses of insulin have not been given as ordered per request of the patient.  Patient has not been eating a full meal, so he has been refusing full coverage as ordered. Tried to educate him on why he should take it as ordered, (sugars remain high) but was unsuccessful.     Patient also wants wound dressings done like he wants them done, regardless of recommendations from wound care. Patient also chooses when to take which medications and do dressing changes.

## 2024-04-23 NOTE — NURSING NOTE
Pt refusing therapy this AM, still awaiting PT/OT eval [facility] scores to aide in DC planning. Pt has been given HHC list, still awaiting agency of choice. Pt had previously stated had HHC but unable to tell what agency. [facility] and [facility] both state not active. RASHMI Mitcehll contacted the SNF where pt was to try to identify HHC agency. [facility]argas [facility] and Ashely said he signed out AMA and called 911 and went to [facility] so they did not set up hc for him.  CT team will continue to monitor case for progression and DC planning, and will follow up for new home care choice.

## 2024-04-23 NOTE — PROGRESS NOTES
Removed dressing from left bka. PI stage 2 noted. Wound measures 0.3cm x 0.3cm x 0.1cm. Wound bed has yellow slough tissue seen, surrounding tissue blanches. There is PI stump which measures at 0.3cm x 0.3cm x 0.1cm. Yellow slough tissue is present in wound. Will recommend gent cream dressings daily. Applied Mepilex foam dressing. Removed dressing from left lower leg Open area seen top of left foot which measures 1.3cm x 2.0cm x 0.3cm. Wound bed has moderate amount of yellow slough tissue seen. There is some thin straw color drainage noted. Will recommend gent. Cream dressings daily. Applied ABD and ace wrap.  Wound Care Progress Note     Visit Date: 4/23/2024      Patient Name: Hitesh Jurado         MRN: [id number]                Reason for Visit: Wound Care        Wound History: Chronic     Pertinent Labs:   Albumin   Date Value Ref Range Status   04/23/2024 2.7 (L) 3.4 - 5.0 g/dL Final     Albumin, Urine Random   Date Value Ref Range Status   10/27/2023 12.1 Not established mg/L Final           Wound Assessment:           NEW                        Wound 03/15/24 Diabetic Ulcer Dorsal foot;Right (Active)   Date First Assessed/Time First Assessed: 03/15/24 2042   Present on Original Admission: Yes  Primary Wound Type: Diabetic Ulcer  Wound Location Orientation: Dorsal foot;Right   Number of days: 38       Wound 03/15/24 Pretibial Left;Proximal (Active)   Date First Assessed/Time First Assessed: 03/15/24 2044   Location: Pretibial  Wound Location Orientation: Left;Proximal   Number of days: 38       Wound 03/15/24 Pretibial Right (Active)   Date First Assessed/Time First Assessed: 03/15/24 2045   Location: Pretibial  Wound Location Orientation: Right   Number of days: 38       Wound 03/18/24 Pressure Injury Other (Comment) Left (Active)   Date First Assessed/Time First Assessed: 03/18/24 1130   Hand Hygiene Completed: Yes  Primary Wound Type: Pressure Injury  Location: (c) Other (Comment)  Wound Location  Orientation: Left   Number of days: 36       Wound 04/22/24 Other (comment) Back Right (Active)   Date First Assessed/Time First Assessed: 04/22/24 1027   Present on Original Admission: No  Hand Hygiene Completed: Yes  Primary Wound Type: (c) Other (comment)  Location: Back  Wound Location Orientation: Right   Number of days: 1     Wound 03/15/24 Diabetic Ulcer Dorsal foot;Right (Active)   Site Assessment Pink 04/21/24 0900   Keily-Wound Assessment Pink 04/22/24 1306   Drainage Description Yellow 04/22/24 1306   Drainage Amount Scant 04/22/24 1306   Dressing Kerlix/rolled gauze;Non adherent 04/22/24 1306   Dressing Changed Changed 04/21/24 2100   Dressing Status Clean;Dry 04/22/24 1306       Wound 03/15/24 Pretibial Left;Proximal (Active)       Wound 03/15/24 Pretibial Right (Active)       Wound 03/18/24 Pressure Injury Other (Comment) Left (Active)   Site Assessment Granulation 04/23/24 0800   Keily-Wound Assessment Dry 04/23/24 0800   State of Healing Slough;Early/partial granulation 04/22/24 0800   Drainage Description Yellow 04/21/24 2100   Drainage Amount Moderate 04/21/24 2100   Dressing Kerlix/rolled gauze;Non adherent 04/22/24 0800   Dressing Changed Changed 04/20/24 2100   Dressing Status Clean;Dry;Occlusive 04/21/24 2100       Wound 04/22/24 Other (comment) Back Right (Active)   Site Assessment Intact 04/22/24 2100   Drainage Amount Small 04/22/24 2100   Dressing Status Old drainage 04/22/24 2100                   Wound Team Plan: Continue with current dressing changes.     Paul Hein LPN  4/23/2024  12:29 PM

## 2024-04-24 ENCOUNTER — APPOINTMENT (OUTPATIENT)
Dept: RADIOLOGY | Facility: HOSPITAL | Age: 54
End: 2024-04-24
Payer: MEDICAID

## 2024-04-24 LAB
ANION GAP SERPL CALC-SCNC: 17 MMOL/L (ref 10–20)
BUN SERPL-MCNC: 122 MG/DL (ref 6–23)
CALCIUM SERPL-MCNC: 6.8 MG/DL (ref 8.6–10.3)
CHLORIDE SERPL-SCNC: 89 MMOL/L (ref 98–107)
CHLORIDE UR-SCNC: <15 MMOL/L
CHLORIDE/CREATININE (MMOL/G) IN URINE: NORMAL
CO2 SERPL-SCNC: 25 MMOL/L (ref 21–32)
CREAT SERPL-MCNC: 3.77 MG/DL (ref 0.5–1.3)
CREAT UR-MCNC: 101.7 MG/DL (ref 20–370)
CREAT UR-MCNC: 101.7 MG/DL (ref 20–370)
EGFRCR SERPLBLD CKD-EPI 2021: 18 ML/MIN/1.73M*2
ERYTHROCYTE [DISTWIDTH] IN BLOOD BY AUTOMATED COUNT: 26.1 % (ref 11.5–14.5)
GLUCOSE BLD MANUAL STRIP-MCNC: 180 MG/DL (ref 74–99)
GLUCOSE BLD MANUAL STRIP-MCNC: 188 MG/DL (ref 74–99)
GLUCOSE BLD MANUAL STRIP-MCNC: 208 MG/DL (ref 74–99)
GLUCOSE BLD MANUAL STRIP-MCNC: 313 MG/DL (ref 74–99)
GLUCOSE BLD MANUAL STRIP-MCNC: 332 MG/DL (ref 74–99)
GLUCOSE BLD MANUAL STRIP-MCNC: 373 MG/DL (ref 74–99)
GLUCOSE SERPL-MCNC: 329 MG/DL (ref 74–99)
HCT VFR BLD AUTO: 27.5 % (ref 41–52)
HGB BLD-MCNC: 8.4 G/DL (ref 13.5–17.5)
MCH RBC QN AUTO: 19.8 PG (ref 26–34)
MCHC RBC AUTO-ENTMCNC: 30.5 G/DL (ref 32–36)
MCV RBC AUTO: 65 FL (ref 80–100)
NRBC BLD-RTO: 0.4 /100 WBCS (ref 0–0)
PLATELET # BLD AUTO: 422 X10*3/UL (ref 150–450)
POTASSIUM SERPL-SCNC: 5.9 MMOL/L (ref 3.5–5.3)
PROCALCITONIN SERPL-MCNC: 1.62 NG/ML
RBC # BLD AUTO: 4.25 X10*6/UL (ref 4.5–5.9)
SODIUM SERPL-SCNC: 125 MMOL/L (ref 136–145)
SODIUM UR-SCNC: <10 MMOL/L
SODIUM/CREAT UR-RTO: NORMAL
WBC # BLD AUTO: 17.9 X10*3/UL (ref 4.4–11.3)

## 2024-04-24 PROCEDURE — 1200000002 HC GENERAL ROOM WITH TELEMETRY DAILY

## 2024-04-24 PROCEDURE — 85027 COMPLETE CBC AUTOMATED: CPT | Performed by: STUDENT IN AN ORGANIZED HEALTH CARE EDUCATION/TRAINING PROGRAM

## 2024-04-24 PROCEDURE — 97162 PT EVAL MOD COMPLEX 30 MIN: CPT | Mod: GP | Performed by: PHYSICAL THERAPIST

## 2024-04-24 PROCEDURE — 80048 BASIC METABOLIC PNL TOTAL CA: CPT | Performed by: STUDENT IN AN ORGANIZED HEALTH CARE EDUCATION/TRAINING PROGRAM

## 2024-04-24 PROCEDURE — 2500000001 HC RX 250 WO HCPCS SELF ADMINISTERED DRUGS (ALT 637 FOR MEDICARE OP): Performed by: STUDENT IN AN ORGANIZED HEALTH CARE EDUCATION/TRAINING PROGRAM

## 2024-04-24 PROCEDURE — C9113 INJ PANTOPRAZOLE SODIUM, VIA: HCPCS | Performed by: INTERNAL MEDICINE

## 2024-04-24 PROCEDURE — 2500000004 HC RX 250 GENERAL PHARMACY W/ HCPCS (ALT 636 FOR OP/ED): Performed by: NURSE PRACTITIONER

## 2024-04-24 PROCEDURE — 71045 X-RAY EXAM CHEST 1 VIEW: CPT

## 2024-04-24 PROCEDURE — 2500000005 HC RX 250 GENERAL PHARMACY W/O HCPCS: Performed by: NURSE PRACTITIONER

## 2024-04-24 PROCEDURE — 2500000004 HC RX 250 GENERAL PHARMACY W/ HCPCS (ALT 636 FOR OP/ED): Performed by: STUDENT IN AN ORGANIZED HEALTH CARE EDUCATION/TRAINING PROGRAM

## 2024-04-24 PROCEDURE — 99232 SBSQ HOSP IP/OBS MODERATE 35: CPT | Performed by: STUDENT IN AN ORGANIZED HEALTH CARE EDUCATION/TRAINING PROGRAM

## 2024-04-24 PROCEDURE — 87040 BLOOD CULTURE FOR BACTERIA: CPT | Mod: ELYLAB | Performed by: STUDENT IN AN ORGANIZED HEALTH CARE EDUCATION/TRAINING PROGRAM

## 2024-04-24 PROCEDURE — 2500000001 HC RX 250 WO HCPCS SELF ADMINISTERED DRUGS (ALT 637 FOR MEDICARE OP): Performed by: INTERNAL MEDICINE

## 2024-04-24 PROCEDURE — 2500000002 HC RX 250 W HCPCS SELF ADMINISTERED DRUGS (ALT 637 FOR MEDICARE OP, ALT 636 FOR OP/ED): Performed by: STUDENT IN AN ORGANIZED HEALTH CARE EDUCATION/TRAINING PROGRAM

## 2024-04-24 PROCEDURE — 82436 ASSAY OF URINE CHLORIDE: CPT | Performed by: STUDENT IN AN ORGANIZED HEALTH CARE EDUCATION/TRAINING PROGRAM

## 2024-04-24 PROCEDURE — 36415 COLL VENOUS BLD VENIPUNCTURE: CPT | Performed by: STUDENT IN AN ORGANIZED HEALTH CARE EDUCATION/TRAINING PROGRAM

## 2024-04-24 PROCEDURE — 84300 ASSAY OF URINE SODIUM: CPT | Performed by: STUDENT IN AN ORGANIZED HEALTH CARE EDUCATION/TRAINING PROGRAM

## 2024-04-24 PROCEDURE — 71045 X-RAY EXAM CHEST 1 VIEW: CPT | Performed by: RADIOLOGY

## 2024-04-24 PROCEDURE — 84145 PROCALCITONIN (PCT): CPT | Mod: ELYLAB | Performed by: STUDENT IN AN ORGANIZED HEALTH CARE EDUCATION/TRAINING PROGRAM

## 2024-04-24 PROCEDURE — 2500000004 HC RX 250 GENERAL PHARMACY W/ HCPCS (ALT 636 FOR OP/ED): Performed by: INTERNAL MEDICINE

## 2024-04-24 PROCEDURE — 82947 ASSAY GLUCOSE BLOOD QUANT: CPT

## 2024-04-24 PROCEDURE — 97165 OT EVAL LOW COMPLEX 30 MIN: CPT | Mod: GO

## 2024-04-24 RX ORDER — DOCUSATE SODIUM 100 MG/1
100 CAPSULE, LIQUID FILLED ORAL 2 TIMES DAILY
Status: DISCONTINUED | OUTPATIENT
Start: 2024-04-24 | End: 2024-05-01 | Stop reason: HOSPADM

## 2024-04-24 RX ORDER — INSULIN GLARGINE 100 [IU]/ML
25 INJECTION, SOLUTION SUBCUTANEOUS EVERY 24 HOURS
Status: DISCONTINUED | OUTPATIENT
Start: 2024-04-24 | End: 2024-04-29

## 2024-04-24 RX ORDER — DIPHENHYDRAMINE HCL 25 MG
50 TABLET ORAL ONCE
Status: COMPLETED | OUTPATIENT
Start: 2024-04-24 | End: 2024-04-24

## 2024-04-24 RX ORDER — FUROSEMIDE 10 MG/ML
80 INJECTION INTRAMUSCULAR; INTRAVENOUS DAILY
Status: DISCONTINUED | OUTPATIENT
Start: 2024-04-24 | End: 2024-04-27

## 2024-04-24 RX ORDER — INSULIN LISPRO 100 [IU]/ML
10 INJECTION, SOLUTION INTRAVENOUS; SUBCUTANEOUS
Status: DISCONTINUED | OUTPATIENT
Start: 2024-04-24 | End: 2024-05-01 | Stop reason: HOSPADM

## 2024-04-24 RX ADMIN — TRIMETHOBENZAMIDE HYDROCHLORIDE 200 MG: 100 INJECTION INTRAMUSCULAR at 20:47

## 2024-04-24 RX ADMIN — INSULIN LISPRO 10 UNITS: 100 INJECTION, SOLUTION INTRAVENOUS; SUBCUTANEOUS at 11:42

## 2024-04-24 RX ADMIN — ATORVASTATIN CALCIUM 80 MG: 80 TABLET, FILM COATED ORAL at 20:42

## 2024-04-24 RX ADMIN — INSULIN GLARGINE 25 UNITS: 100 INJECTION, SOLUTION SUBCUTANEOUS at 20:39

## 2024-04-24 RX ADMIN — MIDODRINE HYDROCHLORIDE 10 MG: 5 TABLET ORAL at 10:26

## 2024-04-24 RX ADMIN — MORPHINE SULFATE 2 MG: 2 INJECTION, SOLUTION INTRAMUSCULAR; INTRAVENOUS at 10:26

## 2024-04-24 RX ADMIN — MIDODRINE HYDROCHLORIDE 10 MG: 5 TABLET ORAL at 16:21

## 2024-04-24 RX ADMIN — INSULIN LISPRO 8 UNITS: 100 INJECTION, SOLUTION INTRAVENOUS; SUBCUTANEOUS at 11:38

## 2024-04-24 RX ADMIN — FUROSEMIDE 80 MG: 10 INJECTION, SOLUTION INTRAMUSCULAR; INTRAVENOUS at 10:18

## 2024-04-24 RX ADMIN — DOCUSATE SODIUM 100 MG: 100 CAPSULE, LIQUID FILLED ORAL at 01:10

## 2024-04-24 RX ADMIN — METOPROLOL SUCCINATE 25 MG: 25 TABLET, EXTENDED RELEASE ORAL at 10:22

## 2024-04-24 RX ADMIN — ASPIRIN 81 MG: 81 TABLET, COATED ORAL at 10:23

## 2024-04-24 RX ADMIN — MEROPENEM 1 G: 1 INJECTION, POWDER, FOR SOLUTION INTRAVENOUS at 01:16

## 2024-04-24 RX ADMIN — CLOPIDOGREL BISULFATE 75 MG: 75 TABLET, FILM COATED ORAL at 10:22

## 2024-04-24 RX ADMIN — MUPIROCIN: 20 OINTMENT TOPICAL at 06:14

## 2024-04-24 RX ADMIN — METHIMAZOLE 12.5 MG: 5 TABLET ORAL at 10:24

## 2024-04-24 RX ADMIN — MEROPENEM 1 G: 1 INJECTION, POWDER, FOR SOLUTION INTRAVENOUS at 16:21

## 2024-04-24 RX ADMIN — DOCUSATE SODIUM 100 MG: 100 CAPSULE, LIQUID FILLED ORAL at 09:00

## 2024-04-24 RX ADMIN — DIPHENHYDRAMINE HYDROCHLORIDE 50 MG: 25 TABLET ORAL at 01:10

## 2024-04-24 ASSESSMENT — COGNITIVE AND FUNCTIONAL STATUS - GENERAL
MOBILITY SCORE: 14
WALKING IN HOSPITAL ROOM: TOTAL
TURNING FROM BACK TO SIDE WHILE IN FLAT BAD: A LITTLE
STANDING UP FROM CHAIR USING ARMS: A LOT
CLIMB 3 TO 5 STEPS WITH RAILING: TOTAL
CLIMB 3 TO 5 STEPS WITH RAILING: TOTAL
MOBILITY SCORE: 14
MOVING TO AND FROM BED TO CHAIR: A LITTLE
DRESSING REGULAR UPPER BODY CLOTHING: A LITTLE
DAILY ACTIVITIY SCORE: 16
MOVING TO AND FROM BED TO CHAIR: A LITTLE
DAILY ACTIVITIY SCORE: 15
PERSONAL GROOMING: A LITTLE
TOILETING: A LOT
DRESSING REGULAR LOWER BODY CLOTHING: A LOT
TURNING FROM BACK TO SIDE WHILE IN FLAT BAD: A LITTLE
DRESSING REGULAR LOWER BODY CLOTHING: A LOT
HELP NEEDED FOR BATHING: A LOT
HELP NEEDED FOR BATHING: A LOT
STANDING UP FROM CHAIR USING ARMS: A LOT
WALKING IN HOSPITAL ROOM: TOTAL
TOILETING: A LOT
PERSONAL GROOMING: A LOT
DRESSING REGULAR UPPER BODY CLOTHING: A LITTLE

## 2024-04-24 ASSESSMENT — ACTIVITIES OF DAILY LIVING (ADL): BATHING_ASSISTANCE: MODERATE

## 2024-04-24 ASSESSMENT — PAIN SCALES - GENERAL
PAINLEVEL_OUTOF10: 8
PAINLEVEL_OUTOF10: 6

## 2024-04-24 ASSESSMENT — PAIN - FUNCTIONAL ASSESSMENT
PAIN_FUNCTIONAL_ASSESSMENT: 0-10

## 2024-04-24 ASSESSMENT — PAIN DESCRIPTION - LOCATION: LOCATION: ABDOMEN

## 2024-04-24 NOTE — PROGRESS NOTES
Hitesh Jurado is a 53 y.o. male on day 6 of admission presenting with Right lower lobe pneumonia.      Subjective   Patient is ill, complaining of abdominal distention and pain  Renal function is worse today, nephro following, Lasix was increased to 80 mg daily for now, IR was consulted for paracentesis  Leukocytosis worse, infection workup was sent, CXR positive for pneumonia, Pro-Serafin 1.62, ID was consulted for antibiotic management    Objective     Last Recorded Vitals  /73 (BP Location: Left arm, Patient Position: Sitting)   Pulse 93   Temp 36.6 °C (97.9 °F) (Temporal)   Resp 16   Wt 71.2 kg (156 lb 15.5 oz)   SpO2 100%   Intake/Output last 3 Shifts:    Intake/Output Summary (Last 24 hours) at 4/24/2024 1324  Last data filed at 4/24/2024 0116  Gross per 24 hour   Intake 200 ml   Output --   Net 200 ml       Admission Weight  Weight: 68 kg (150 lb) (04/18/24 1736)    Daily Weight  04/21/24 : 71.2 kg (156 lb 15.5 oz)    Image Results  XR chest 1 view  Narrative: Interpreted By:  Obed Louie,   STUDY:  XR CHEST 1 VIEW;  4/24/2024 9:52 am      INDICATION:  Signs/Symptoms:Leukocytosis.      COMPARISON:  Portable chest 22 April 2024 and other recent chest imaging back  through 1 November 2023      ACCESSION NUMBER(S):  [id number]      ORDERING CLINICIAN:  NERY CAMPOS      TECHNIQUE:  Single frontal view of the chest; Portable technique      FINDINGS:  The cardiomediastinal silhouette is unchanged      Left lung remains clear      Right basilar opacification is due to a combination of known  pneumonia, effusion and atelectasis      No pneumothorax      Impression: As above      MACRO:  None      Signed by: Obed Louie 4/24/2024 12:20 PM  Dictation workstation:   [id number]      Physical Exam    General: Ill-appearing adult male, distressed due to pain, on oxygen via NC   HEENT: Clear sclera, EOMI, trachea midline, moist mucous membranes  Respiratory: Equal chest rise, no retractions,  diminished breath sounds on right  Abdomen: Tense, distended, nontender  Extremities: Left BKA noted stump covered in clean bandaging.  Superficial ulcerations involving right leg  Neurological: Spontaneously moves all extremities, no dysarthria, cranial nerves grossly intact  Psychiatric: Appropriate mood and affect  Skin: Warm, dry      Assessment/Plan   This patient currently has cardiac telemetry ordered; if you would like to modify or discontinue the telemetry order, click here to go to the orders activity to modify/discontinue the order.  Principal Problem:    Right lower lobe pneumonia  Active Problems:    PAD (peripheral artery disease) (CMS-[facility])    Tobacco dependence with current use    Acute on chronic congestive heart failure (Multi)    Type 2 diabetes mellitus with peripheral neuropathy (Multi)    NSTEMI (non-ST elevated myocardial infarction) (Multi)    Dyspnea, unspecified type    Recurrent right pleural effusion    Acute respiratory failure with hypoxia (Multi)    Hitesh Jurado is a 53 y.o. male with PMH of CKD, cirrhosis with ascites, hyponatremia, HTN, HLD, CAD, ischemic cardiomyopathy, VHD, DM2, PAD status post left BKA who was admitted for management of the following issues:    PIPPA on CKD 2-3a, worsening, CR 3. 7 7 today  Hyperkalemia, hyponatremia    -Nephro following, Lasix was increased to 80 mg IV daily, Aldactone held, continue with Lokelma  -Monitor BMP, avoid nephrotoxic agent     NSTEMI  -Presented with chest discomfort and notably elevated troponins (823 -> 962 -> 1060 -> 743).  -Card following, recs appreciated. Was tentatively planned for [facility] on 4/22, however will be deferred at this time given worsening renal function.  -Heparin drip discontinued. Patient remains not yet ready for cath.      Concern for bacterial pneumonia, leukocytosis worsening  Loculated Pleural effusion, s/p thoracentesis on 4/22, 2.5 L fluid removed  -CT thorax reviewed, reveals dense consolidations  extensviely in all 3 right lobes as well as extensively loculated, large right pleural effusion   -CXR positive for pneumonia, elevated Pro-Serafin   -ID consulted for IV antibiotic management  -CT surgery following; recs appreciated. Poor surgical candidate. Could offer pleurex catheter once more stable. Agree with antibiotic coverage and thoracentesis at this time.   -cw meropenem, pain management     Cirrhosis with ascites  -Lasix/aldactone on hold, will monitor  -IR consulted for paracentesis     Goals of care  -Palliative recs appreciated, patient is now DNR     Hx HTN, HLD,CAD, ICM, Valvular heart disease, T2DM, PAD S/p left BKA, HFrEF 15-20%, microscopic anemia  Patient is currently hypotensive  -cw home meds  -ISS, hypoglycemia protocol, POCT glucose, DM diet  -CBC daily, transfuse to keep hemoglobin>7      VTE prophylaxis: Held for now  Disposition: Pending PT/OT recommendation, TCC following    Robson Garcia MD

## 2024-04-24 NOTE — NURSING NOTE
Received the order for a paracentesis on this pt, d/w Dr Louie and will plan to do this procedure tomorrow, Secure message sent to Dr Garcia.

## 2024-04-24 NOTE — PROGRESS NOTES
"Physical Therapy    Physical Therapy Evaluation    Patient Name: Hitesh Jurado  MRN: [id number]  Today's Date: 4/24/2024   Time Calculation  Start Time: 1040  Stop Time: 1109  Time Calculation (min): 29 min    Assessment/Plan   PT Assessment  PT Assessment Results: Decreased strength, Decreased range of motion, Decreased endurance, Impaired balance, Decreased mobility, Impaired sensation, Decreased skin integrity, Pain  Rehab Prognosis: Fair  Barriers to Discharge: Pt. lives alone  End of Session Communication: Bedside nurse  Assessment Comment: Pt. presents general weakness and debility and would benefit from continued PT to increase mobility and indep.  End of Session Patient Position: Bed, 3 rail up, Alarm off, not on at start of session  IP OR SWING BED PT PLAN  Inpatient or Swing Bed: Inpatient  PT Plan  Treatment/Interventions: Bed mobility, Transfer training, Balance training, Strengthening, Endurance training, Therapeutic exercise, Range of motion, Therapeutic activity, Home exercise program  PT Plan: Skilled PT  PT Frequency: 2 times per week  PT Discharge Recommendations: Moderate intensity level of continued care  PT Recommended Transfer Status: Assist x1 (scoot-pivot transfers to/from [facility] with gait belt and arm of commode lowered)  PT - OK to Discharge: Yes (to next level of care when medically stable)    Subjective       General Visit Information:  General  Reason for Referral: Impaired mobility  Referred By: Kaycee 4/19  Past Medical History Relevant to Rehab: L BKA, liver cirrhosis, multiple falls, smoker, thoracentesis, paracentesis, DM, COPD, CHF, CKD  Family/Caregiver Present: No  Prior to Session Communication: Bedside nurse (Pt. prefers the name \"Sudarshan;\" RN aware of critically low Na+ level of 125 and ok with PT/OT completing evals)  Patient Position Received: Alarm off, not on at start of session (Pt. sitting indep. EOB; 3LO2, bandages intact R lower leg and L residual limb)  General Comment: " To ED on 4/18 with SOB and distended abdomen. Pt. recently left [facility]. Pt. found to have RLL PNA, ascites, tachycardia and elevated troponin levels. Pt. admitted with NSTEMI, PNA, PIPPA on CKD, cirrhosis with ascites and hyponatremia. s/p R Thoracentesis 4/22; L heart cath deferred due to worsening kidney function. 4/24 Labs: , K+ 5.9, Na+ 125. CT chest 4/19: dense consolidation extensively in all 3 R lobes. Large R pleural effusion. Partial collapse of middle and R lower lobes.    Home Living:  Home Living  Home Living Comments: Lives alone with 1st floor set up, no NATIVIDAD. Pt. sleeps in recliner. Owns manual W/C and scooter. Pt. has a LLE prosthetic, but is unable to wear it due to ulcers on L residual limb.    Prior Level of Function:  Prior Function Per Pt/Caregiver Report  Prior Function Comments: Pt. performs low pivot transfers at home independently. Indep. with propelling W/C with BUE and RLE. Pt. admits to 2 recent falls. No home O2. Pt. reports that he feels too unbalanced standing with a walker, so he does not use one.    Precautions:  Precautions  Medical Precautions: Fall precautions  Precautions Comment: L BKA       Objective     Pain:  Pain Assessment  Pain Assessment: 0-10  Pain Score:  (8-10)  Pain Location:  (BLEs (chronic) and R side of back (due to current PNA, per pt))  Pain Interventions: Repositioned    Cognition:  Cognition  Overall Cognitive Status:  (Flat affect; depressed mood)    General Assessments:  General Observation  General Observation: Debilitated appearance; distended abdomen, RLE edema present   Activity Tolerance  Activity Tolerance Comments: Pt. became nauseated after performing mobility; no emesis. Fatigues quickly.  Sensation  Sensation Comment: R foot numbness/tingling from chronic neuropathy  Strength  Strength Comments: General weakness throughout; BLE 3/5           Static Sitting Balance  Static Sitting-Balance Support: No upper extremity  supported  Static Sitting-Level of Assistance: Independent  Static Sitting-Comment/Number of Minutes: >10 minutes       Functional Assessments:     Bed Mobility  Bed Mobility:  (sit to supine performed with SBAx1)  Transfers  Transfer:  (Bed to/from [facility] scoot-pivot transfer towards L and R side with arm of commode lowered; gait belt intact and CGA/minAx1; pt. hesitant to allow therapist to assist patient, but more agreeable after education.)             Extremity/Trunk Assessments:        RLE   RLE :  (ROM WFL)  LLE   LLE :  (L knee extension lacking ~ 10 degrees; pt. reports that he has an ulcer on his knee; otherwise L knee flexion and L hip ROM WFL)    Outcome Measures:  [facility] Basic Mobility  Turning from your back to your side while in a flat bed without using bedrails: None  Moving from lying on your back to sitting on the side of a flat bed without using bedrails: A little  Moving to and from bed to chair (including a wheelchair): A little  Standing up from a chair using your arms (e.g. wheelchair or bedside chair): A lot  To walk in hospital room: Total  Climbing 3-5 steps with railing: Total  Basic Mobility - Total Score: 14                            Goals:  Encounter Problems       Encounter Problems (Active)       Impaired mobility        Perform all bed mobility with indep.       Start:  04/24/24    Expected End:  05/08/24            Perform low pivot transfers with mod. indep.        Start:  04/24/24    Expected End:  05/08/24            Patient will perform BLE HEP with supervision        Start:  04/24/24    Expected End:  05/08/24               Pain - Adult            Education Documentation  Mobility Training, taught by Lauren Hansen, PT at 4/24/2024  2:27 PM.  Learner: Patient  Readiness: Acceptance  Method: Explanation  Response: Verbalizes Understanding, Needs Reinforcement  Comment: see note    Education Comments  No comments found.

## 2024-04-24 NOTE — CARE PLAN
The patient's goals for the shift include none    The clinical goals for the shift include Patient will be safe today

## 2024-04-24 NOTE — PROGRESS NOTES
Occupational Therapy    Evaluation    Patient Name: Hitesh Jurado  MRN: [id number]  Today's Date: 4/24/2024  Time Calculation  Start Time: 1040  Stop Time: 1112  Time Calculation (min): 32 min    Assessment  IP OT Assessment  End of Session Communication: Bedside nurse  End of Session Patient Position: Bed, 3 rail up, Alarm off, not on at start of session (all needs in reach)  Plan:  Treatment Interventions: ADL retraining, Functional transfer training, UE strengthening/ROM, Endurance training, Patient/family training, Equipment evaluation/education, Compensatory technique education  OT Frequency: 2 times per week  OT Discharge Recommendations: Moderate intensity level of continued care  OT - OK to Discharge: Yes (when medically appropriate)    Subjective   Current Problem:  1. Dyspnea, unspecified type        2. Pleural effusion        3. Other ascites        4. Elevated troponin          General:  General  Reason for Referral: ADL  Referred By: Kaycee  Past Medical History Relevant to Rehab: L BKA, liver cirrhosis, multiple falls, smoker, thoracentesis, paracentesis, DM, COPD, CHF, CKD  Family/Caregiver Present: No  Prior to Session Communication: Bedside nurse  Patient Position Received: Alarm off, not on at start of session (Pt. sitting indep. EOB; 3LO2, bandages intact R lower leg and L residual limb)  General Comment: To ED on 4/18 with SOB and distended abdomen. Pt. recently left [facility]. Pt. found to have RLL PNA, ascites, tachycardia and elevated troponin levels. Pt. admitted with NSTEMI, PNA, PIPPA on CKD, cirrhosis with ascites and hyponatremia. s/p R Thoracentesis 4/22; L heart cath deferred due to worsening kidney function. 4/24 Labs: , K+ 5.9, Na+ 125. CT chest 4/19: dense consolidation extensively in all 3 R lobes. Large R pleural effusion. Partial collapse of middle and R lower lobes.  Precautions:  Medical Precautions: Fall precautions, Oxygen therapy device and  L/min  Precautions Comment: L BKA  Vital Signs:     Pain:  Pain Assessment  Pain Assessment: 0-10  Pain Score: 8 (BLEs (chronic) and R side of back (due to current PNA, per pt))  Pain Interventions: Repositioned    Objective   Cognition:  Overall Cognitive Status:  (Flat, calm for most of session, but easily agitated)           Home Living:  Home Living Comments: Lives alone with 1st floor set up, no NATIVIDAD. Pt. sleeps in recliner. Owns manual W/C and scooter. Pt. has a LLE prosthetic, but is unable to wear it due to ulcers on L residual limb and edema.  Pt. performs low pivot transfers at home independently. Indep. with propelling W/C with BUE and RLE. Pt. admits to 2 recent falls. No home O2. Pt. reports that he feels too unbalanced standing with a walker, so he does not use one.  Mod I with self care.  Uses walk in shower with seat and safety bar.  Mod I with IADL, including grocery shopping.  Drives motorized scooter to grocery store behind his home.        ADL:  Eating Assistance: Independent  Grooming Deficit: Setup  Bathing Assistance: Moderate  UE Dressing Assistance: Stand by  LE Dressing Assistance: Moderate  Toileting Assistance with Device: Moderate  Functional Assistance: Minimal  Activity Tolerance:  Activity Tolerance Comments: Pt. became nauseated after performing mobility; no emesis. Fatigues quickly.  Bed Mobility/Transfers: Bed Mobility  Bed Mobility:  (SBA sit to sup, extra time)    Transfers  Transfer:  (CGA to min assist scoot pivot transfer EOB to BSC and BSC to EOB with BSC arm down.  Gait belt.)    Sitting Balance:  Static Sitting Balance  Static Sitting-Comment/Number of Minutes: Good  Dynamic Sitting Balance  Dynamic Sitting-Comments: Fair    Sensation:  Sensation Comment: R foot numbness/tingling from chronic neuropathy       Extremities: RUE   RUE :  (Bilat. UE AROM WFL.  Bilat. UE strength 3+/5 to 4-/5) and      Outcome Measures: [facility] Daily Activity  Putting on and taking off regular  lower body clothing: A lot  Bathing (including washing, rinsing, drying): A lot  Putting on and taking off regular upper body clothing: A little  Toileting, which includes using toilet, bedpan or urinal: A lot  Taking care of personal grooming such as brushing teeth: A lot  Eating Meals: None  Daily Activity - Total Score: 15      Education Documentation  ADL Training, taught by Sara Bates, OT at 4/24/2024  5:29 PM.  Learner: Patient  Readiness: Acceptance  Method: Explanation  Response: Verbalizes Understanding, Needs Reinforcement    Education Comments  No comments found.      Goals:   Encounter Problems       Encounter Problems (Active)       OT Goals       Mod I bilat. UE and core strengthening HEP.        Start:  04/24/24    Expected End:  05/08/24            Sup for bed/chair/commode transfers, scoot pivots vs. slide board.       Start:  04/24/24    Expected End:  05/08/24            Min assist LB dressing.        Start:  04/24/24    Expected End:  05/08/24            Min assist LB bathing.        Start:  04/24/24    Expected End:  05/08/24            Min assist toileting.        Start:  04/24/24    Expected End:  05/08/24

## 2024-04-24 NOTE — PROGRESS NOTES
Nephrology Progress Note    Assessment:  53 y.o. male with history s/f HFrEF, PAD s/p LT BKA, T2DM c/b chronic non-healing wounds, cirrhosis who presented for SOB for several days.      PIPPA on CKD stage II/III: likely in setting of diuretics, not significantly hypotensive, no contrast, not on any other nephrotoxic medications, function was at baseline on presentation w/ Scr ~1.4 w/ eGFR high 50s/low 60s  Fluid overload: improved, has combined HFrEF 20-25%   Recurrent RT sided pleural effusion: had 3L taken off at [facility] on 4/16, present here again, now s/p RT sided thoracentesis w/ 2.5L taken off   Cirrhosis   Hypotension   Hypoalbuminemia  Anemia   RT sided PNA      Plan:  - increasing lasix to 80 mg, may need BID dosing  - keep aldactone on hold for now  - may need paracentesis as well     Subjective:  Admit Date: 4/18/2024    Interval History: function stilll worsening, no improvement w/ lasix 40 mg, didn't have significant UOP, still feeling nauseated     Medications:  Scheduled Meds:aspirin, 81 mg, oral, Daily  atorvastatin, 80 mg, oral, Nightly  clopidogrel, 75 mg, oral, Daily  docusate sodium, 100 mg, oral, BID  [Held by provider] enoxaparin, 30 mg, subcutaneous, Daily  furosemide, 80 mg, intravenous, Daily  [Held by provider] furosemide, 40 mg, oral, Daily  insulin glargine, 25 Units, subcutaneous, q24h  insulin lispro, 0-10 Units, subcutaneous, TID with meals  insulin lispro, 10 Units, subcutaneous, TID with meals  lidocaine, 1 patch, transdermal, Daily  meropenem, 1 g, intravenous, q12h  methIMAzole, 12.5 mg, oral, Daily  metoprolol succinate XL, 25 mg, oral, Daily  midodrine, 10 mg, oral, TID with meals  morphine, 4 mg, intravenous, Once  mupirocin, , Topical, TID  pantoprazole, 40 mg, oral, Daily before breakfast   Or  pantoprazole, 40 mg, intravenous, Daily before breakfast  sodium zirconium cyclosilicate, 10 g, oral, Once  sodium zirconium cyclosilicate, 15 g, oral, Once  [Held by provider]  "spironolactone, 100 mg, oral, Daily      Continuous Infusions:       CBC:   Lab Results   Component Value Date    WBC 17.9 (H) 04/24/2024    RBC 4.25 (L) 04/24/2024    HGB 8.4 (L) 04/24/2024    HCT 27.5 (L) 04/24/2024    MCV 65 (L) 04/24/2024    MCH 19.8 (L) 04/24/2024    MCHC 30.5 (L) 04/24/2024    RDW 26.1 (H) 04/24/2024     04/24/2024     BMP:    Lab Results   Component Value Date     (L) 04/24/2024    K 5.9 (H) 04/24/2024    CL 89 (L) 04/24/2024    CO2 25 04/24/2024     (HH) 04/24/2024    CREATININE 3.77 (H) 04/24/2024    CALCIUM 6.8 (L) 04/24/2024    GLUCOSE 329 (H) 04/24/2024       Objective:  Vitals: /73 (BP Location: Left arm, Patient Position: Sitting)   Pulse 93   Temp 36.6 °C (97.9 °F) (Temporal)   Resp 16   Ht 1.753 m (5' 9\")   Wt 71.2 kg (156 lb 15.5 oz)   SpO2 100%   BMI 23.18 kg/m²    Wt Readings from Last 3 Encounters:   04/21/24 71.2 kg (156 lb 15.5 oz)   03/24/24 65 kg (143 lb 4.8 oz)   10/30/23 55.9 kg (123 lb 3.8 oz)      24HR INTAKE/OUTPUT:    Intake/Output Summary (Last 24 hours) at 4/24/2024 1219  Last data filed at 4/24/2024 0116  Gross per 24 hour   Intake 200 ml   Output --   Net 200 ml       General: alert, in mild distress  HEENT: normocephalic, atraumatic, anicteric  Lungs: non-labored respirations, clear to auscultation bilaterally  Heart: regular rate and rhythm, no murmurs or rubs  Abdomen: soft, non-tender, distended  Ext: no cyanosis, ++ peripheral edema, LT BKA  Neuro: alert and oriented, no gross abnormalities      Electronically signed by Malgorzata Williamson MD, MD              "

## 2024-04-25 ENCOUNTER — APPOINTMENT (OUTPATIENT)
Dept: RADIOLOGY | Facility: HOSPITAL | Age: 54
End: 2024-04-25
Payer: MEDICAID

## 2024-04-25 ENCOUNTER — OUTSIDE SERVICES (OUTPATIENT)
Dept: INFECTIOUS DISEASES | Age: 54
End: 2024-04-25

## 2024-04-25 ENCOUNTER — APPOINTMENT (OUTPATIENT)
Dept: CARDIOLOGY | Facility: HOSPITAL | Age: 54
End: 2024-04-25
Payer: MEDICAID

## 2024-04-25 DIAGNOSIS — N17.9 AKI (ACUTE KIDNEY INJURY) (HCC): ICD-10-CM

## 2024-04-25 DIAGNOSIS — M86.9 OSTEOMYELITIS OF LEFT KNEE REGION (HCC): ICD-10-CM

## 2024-04-25 DIAGNOSIS — T07.XXXA MULTIPLE WOUNDS: ICD-10-CM

## 2024-04-25 DIAGNOSIS — J18.9 PNEUMONIA OF RIGHT LOWER LOBE DUE TO INFECTIOUS ORGANISM: Primary | ICD-10-CM

## 2024-04-25 DIAGNOSIS — I73.9 PERIPHERAL ARTERIAL DISEASE (HCC): ICD-10-CM

## 2024-04-25 LAB
ANION GAP SERPL CALC-SCNC: 18 MMOL/L (ref 10–20)
ANION GAP SERPL CALC-SCNC: 18 MMOL/L (ref 10–20)
ANION GAP SERPL CALC-SCNC: 20 MMOL/L (ref 10–20)
ATRIAL RATE: 110 BPM
ATRIAL RATE: 82 BPM
BACTERIA FLD CULT: NORMAL
BUN SERPL-MCNC: 120 MG/DL (ref 6–23)
BUN SERPL-MCNC: 121 MG/DL (ref 6–23)
BUN SERPL-MCNC: 122 MG/DL (ref 6–23)
CALCIUM SERPL-MCNC: 6.1 MG/DL (ref 8.6–10.3)
CALCIUM SERPL-MCNC: 6.3 MG/DL (ref 8.6–10.3)
CALCIUM SERPL-MCNC: 6.5 MG/DL (ref 8.6–10.3)
CHLORIDE SERPL-SCNC: 88 MMOL/L (ref 98–107)
CHLORIDE SERPL-SCNC: 88 MMOL/L (ref 98–107)
CHLORIDE SERPL-SCNC: 89 MMOL/L (ref 98–107)
CO2 SERPL-SCNC: 20 MMOL/L (ref 21–32)
CO2 SERPL-SCNC: 23 MMOL/L (ref 21–32)
CO2 SERPL-SCNC: 23 MMOL/L (ref 21–32)
CREAT SERPL-MCNC: 4.45 MG/DL (ref 0.5–1.3)
CREAT SERPL-MCNC: 4.45 MG/DL (ref 0.5–1.3)
CREAT SERPL-MCNC: 4.47 MG/DL (ref 0.5–1.3)
EGFRCR SERPLBLD CKD-EPI 2021: 15 ML/MIN/1.73M*2
ERYTHROCYTE [DISTWIDTH] IN BLOOD BY AUTOMATED COUNT: 25.9 % (ref 11.5–14.5)
GLUCOSE BLD MANUAL STRIP-MCNC: 102 MG/DL (ref 74–99)
GLUCOSE BLD MANUAL STRIP-MCNC: 133 MG/DL (ref 74–99)
GLUCOSE BLD MANUAL STRIP-MCNC: 143 MG/DL (ref 74–99)
GLUCOSE BLD MANUAL STRIP-MCNC: 157 MG/DL (ref 74–99)
GLUCOSE BLD MANUAL STRIP-MCNC: 84 MG/DL (ref 74–99)
GLUCOSE SERPL-MCNC: 102 MG/DL (ref 74–99)
GLUCOSE SERPL-MCNC: 109 MG/DL (ref 74–99)
GLUCOSE SERPL-MCNC: 129 MG/DL (ref 74–99)
GRAM STN SPEC: NORMAL
GRAM STN SPEC: NORMAL
HCT VFR BLD AUTO: 27.4 % (ref 41–52)
HGB BLD-MCNC: 8.5 G/DL (ref 13.5–17.5)
HOLD SPECIMEN: NORMAL
MCH RBC QN AUTO: 19.6 PG (ref 26–34)
MCHC RBC AUTO-ENTMCNC: 31 G/DL (ref 32–36)
MCV RBC AUTO: 63 FL (ref 80–100)
NRBC BLD-RTO: 0.5 /100 WBCS (ref 0–0)
P AXIS: 76 DEGREES
P AXIS: 87 DEGREES
P OFFSET: 181 MS
P OFFSET: 186 MS
P ONSET: 131 MS
P ONSET: 139 MS
PLATELET # BLD AUTO: 375 X10*3/UL (ref 150–450)
POTASSIUM SERPL-SCNC: 5.8 MMOL/L (ref 3.5–5.3)
POTASSIUM SERPL-SCNC: 6.2 MMOL/L (ref 3.5–5.3)
POTASSIUM SERPL-SCNC: 7 MMOL/L (ref 3.5–5.3)
PR INTERVAL: 152 MS
PR INTERVAL: 162 MS
Q ONSET: 212 MS
Q ONSET: 215 MS
QRS COUNT: 13 BEATS
QRS COUNT: 18 BEATS
QRS DURATION: 142 MS
QRS DURATION: 144 MS
QT INTERVAL: 400 MS
QT INTERVAL: 428 MS
QTC CALCULATION(BAZETT): 500 MS
QTC CALCULATION(BAZETT): 541 MS
QTC FREDERICIA: 475 MS
QTC FREDERICIA: 490 MS
R AXIS: -47 DEGREES
R AXIS: 36 DEGREES
RBC # BLD AUTO: 4.34 X10*6/UL (ref 4.5–5.9)
SODIUM SERPL-SCNC: 122 MMOL/L (ref 136–145)
SODIUM SERPL-SCNC: 122 MMOL/L (ref 136–145)
SODIUM SERPL-SCNC: 124 MMOL/L (ref 136–145)
T AXIS: 2 DEGREES
T AXIS: 28 DEGREES
T OFFSET: 415 MS
T OFFSET: 426 MS
VENTRICULAR RATE: 110 BPM
VENTRICULAR RATE: 82 BPM
WBC # BLD AUTO: 20.1 X10*3/UL (ref 4.4–11.3)

## 2024-04-25 PROCEDURE — 99232 SBSQ HOSP IP/OBS MODERATE 35: CPT | Performed by: STUDENT IN AN ORGANIZED HEALTH CARE EDUCATION/TRAINING PROGRAM

## 2024-04-25 PROCEDURE — 2500000001 HC RX 250 WO HCPCS SELF ADMINISTERED DRUGS (ALT 637 FOR MEDICARE OP): Performed by: NURSE PRACTITIONER

## 2024-04-25 PROCEDURE — 94760 N-INVAS EAR/PLS OXIMETRY 1: CPT

## 2024-04-25 PROCEDURE — 2500000005 HC RX 250 GENERAL PHARMACY W/O HCPCS: Performed by: STUDENT IN AN ORGANIZED HEALTH CARE EDUCATION/TRAINING PROGRAM

## 2024-04-25 PROCEDURE — 2500000005 HC RX 250 GENERAL PHARMACY W/O HCPCS: Performed by: RADIOLOGY

## 2024-04-25 PROCEDURE — 80048 BASIC METABOLIC PNL TOTAL CA: CPT | Performed by: STUDENT IN AN ORGANIZED HEALTH CARE EDUCATION/TRAINING PROGRAM

## 2024-04-25 PROCEDURE — 2500000004 HC RX 250 GENERAL PHARMACY W/ HCPCS (ALT 636 FOR OP/ED): Performed by: NURSE PRACTITIONER

## 2024-04-25 PROCEDURE — 2500000006 HC RX 250 W HCPCS SELF ADMINISTERED DRUGS (ALT 637 FOR ALL PAYERS): Performed by: NURSE PRACTITIONER

## 2024-04-25 PROCEDURE — 36415 COLL VENOUS BLD VENIPUNCTURE: CPT | Performed by: NURSE PRACTITIONER

## 2024-04-25 PROCEDURE — 85027 COMPLETE CBC AUTOMATED: CPT | Performed by: STUDENT IN AN ORGANIZED HEALTH CARE EDUCATION/TRAINING PROGRAM

## 2024-04-25 PROCEDURE — 99291 CRITICAL CARE FIRST HOUR: CPT | Performed by: NURSE PRACTITIONER

## 2024-04-25 PROCEDURE — 2500000002 HC RX 250 W HCPCS SELF ADMINISTERED DRUGS (ALT 637 FOR MEDICARE OP, ALT 636 FOR OP/ED): Performed by: NURSE PRACTITIONER

## 2024-04-25 PROCEDURE — 2500000004 HC RX 250 GENERAL PHARMACY W/ HCPCS (ALT 636 FOR OP/ED): Performed by: INTERNAL MEDICINE

## 2024-04-25 PROCEDURE — 93010 ELECTROCARDIOGRAM REPORT: CPT | Performed by: INTERNAL MEDICINE

## 2024-04-25 PROCEDURE — 2020000001 HC ICU ROOM DAILY

## 2024-04-25 PROCEDURE — 93971 EXTREMITY STUDY: CPT | Performed by: RADIOLOGY

## 2024-04-25 PROCEDURE — 2500000004 HC RX 250 GENERAL PHARMACY W/ HCPCS (ALT 636 FOR OP/ED): Performed by: STUDENT IN AN ORGANIZED HEALTH CARE EDUCATION/TRAINING PROGRAM

## 2024-04-25 PROCEDURE — 2720000007 HC OR 272 NO HCPCS

## 2024-04-25 PROCEDURE — 2500000001 HC RX 250 WO HCPCS SELF ADMINISTERED DRUGS (ALT 637 FOR MEDICARE OP): Performed by: STUDENT IN AN ORGANIZED HEALTH CARE EDUCATION/TRAINING PROGRAM

## 2024-04-25 PROCEDURE — 2500000005 HC RX 250 GENERAL PHARMACY W/O HCPCS: Performed by: NURSE PRACTITIONER

## 2024-04-25 PROCEDURE — 76705 ECHO EXAM OF ABDOMEN: CPT | Performed by: RADIOLOGY

## 2024-04-25 PROCEDURE — 82947 ASSAY GLUCOSE BLOOD QUANT: CPT

## 2024-04-25 PROCEDURE — 93005 ELECTROCARDIOGRAM TRACING: CPT

## 2024-04-25 PROCEDURE — 36415 COLL VENOUS BLD VENIPUNCTURE: CPT | Performed by: STUDENT IN AN ORGANIZED HEALTH CARE EDUCATION/TRAINING PROGRAM

## 2024-04-25 PROCEDURE — 80051 ELECTROLYTE PANEL: CPT | Performed by: NURSE PRACTITIONER

## 2024-04-25 PROCEDURE — 80048 BASIC METABOLIC PNL TOTAL CA: CPT | Performed by: HOSPITALIST

## 2024-04-25 PROCEDURE — 2500000001 HC RX 250 WO HCPCS SELF ADMINISTERED DRUGS (ALT 637 FOR MEDICARE OP): Performed by: INTERNAL MEDICINE

## 2024-04-25 PROCEDURE — 49083 ABD PARACENTESIS W/IMAGING: CPT

## 2024-04-25 PROCEDURE — 76705 ECHO EXAM OF ABDOMEN: CPT

## 2024-04-25 PROCEDURE — 93971 EXTREMITY STUDY: CPT

## 2024-04-25 PROCEDURE — 2500000004 HC RX 250 GENERAL PHARMACY W/ HCPCS (ALT 636 FOR OP/ED): Mod: JZ | Performed by: NURSE PRACTITIONER

## 2024-04-25 PROCEDURE — P9047 ALBUMIN (HUMAN), 25%, 50ML: HCPCS | Mod: JZ | Performed by: NURSE PRACTITIONER

## 2024-04-25 RX ORDER — LIDOCAINE HYDROCHLORIDE 10 MG/ML
INJECTION INFILTRATION; PERINEURAL
Status: COMPLETED | OUTPATIENT
Start: 2024-04-25 | End: 2024-04-25

## 2024-04-25 RX ORDER — CHLOROTHIAZIDE SODIUM 500 MG/1
500 INJECTION INTRAVENOUS ONCE
Status: COMPLETED | OUTPATIENT
Start: 2024-04-25 | End: 2024-04-25

## 2024-04-25 RX ORDER — BUMETANIDE 0.25 MG/ML
4 INJECTION INTRAMUSCULAR; INTRAVENOUS ONCE
Status: COMPLETED | OUTPATIENT
Start: 2024-04-25 | End: 2024-04-25

## 2024-04-25 RX ORDER — HEPARIN SODIUM 5000 [USP'U]/ML
5000 INJECTION, SOLUTION INTRAVENOUS; SUBCUTANEOUS EVERY 8 HOURS
Status: DISCONTINUED | OUTPATIENT
Start: 2024-04-25 | End: 2024-05-01 | Stop reason: HOSPADM

## 2024-04-25 RX ORDER — FUROSEMIDE 10 MG/ML
100 INJECTION INTRAMUSCULAR; INTRAVENOUS ONCE
Status: COMPLETED | OUTPATIENT
Start: 2024-04-25 | End: 2024-04-25

## 2024-04-25 RX ORDER — ALBUMIN HUMAN 250 G/1000ML
12.5 SOLUTION INTRAVENOUS EVERY 6 HOURS
Status: DISCONTINUED | OUTPATIENT
Start: 2024-04-26 | End: 2024-04-25

## 2024-04-25 RX ORDER — DEXTROSE MONOHYDRATE 100 MG/ML
50 INJECTION, SOLUTION INTRAVENOUS CONTINUOUS
Status: ACTIVE | OUTPATIENT
Start: 2024-04-25 | End: 2024-04-25

## 2024-04-25 RX ORDER — ALBUMIN HUMAN 250 G/1000ML
1 SOLUTION INTRAVENOUS ONCE
Status: DISCONTINUED | OUTPATIENT
Start: 2024-04-25 | End: 2024-04-25

## 2024-04-25 RX ORDER — ALBUMIN HUMAN 250 G/1000ML
25 SOLUTION INTRAVENOUS ONCE
Status: DISCONTINUED | OUTPATIENT
Start: 2024-04-26 | End: 2024-04-25

## 2024-04-25 RX ORDER — DEXTROSE 50 % IN WATER (D50W) INTRAVENOUS SYRINGE
25 ONCE
Status: COMPLETED | OUTPATIENT
Start: 2024-04-25 | End: 2024-04-25

## 2024-04-25 RX ADMIN — CHLOROTHIAZIDE SODIUM 500 MG: 500 INJECTION, POWDER, LYOPHILIZED, FOR SOLUTION INTRAVENOUS at 16:38

## 2024-04-25 RX ADMIN — DEXTROSE MONOHYDRATE 25 G: 25 INJECTION, SOLUTION INTRAVENOUS at 16:39

## 2024-04-25 RX ADMIN — MORPHINE SULFATE 2 MG: 2 INJECTION, SOLUTION INTRAMUSCULAR; INTRAVENOUS at 09:01

## 2024-04-25 RX ADMIN — Medication 4 L/MIN: at 18:00

## 2024-04-25 RX ADMIN — BUMETANIDE 4 MG: 0.25 INJECTION INTRAMUSCULAR; INTRAVENOUS at 18:18

## 2024-04-25 RX ADMIN — FUROSEMIDE 80 MG: 10 INJECTION, SOLUTION INTRAMUSCULAR; INTRAVENOUS at 09:57

## 2024-04-25 RX ADMIN — TRIMETHOBENZAMIDE HYDROCHLORIDE 200 MG: 100 INJECTION INTRAMUSCULAR at 16:46

## 2024-04-25 RX ADMIN — ATORVASTATIN CALCIUM 80 MG: 80 TABLET, FILM COATED ORAL at 21:15

## 2024-04-25 RX ADMIN — MIDODRINE HYDROCHLORIDE 10 MG: 5 TABLET ORAL at 11:47

## 2024-04-25 RX ADMIN — MORPHINE SULFATE 2 MG: 2 INJECTION, SOLUTION INTRAMUSCULAR; INTRAVENOUS at 01:58

## 2024-04-25 RX ADMIN — DOCUSATE SODIUM 100 MG: 100 CAPSULE, LIQUID FILLED ORAL at 21:15

## 2024-04-25 RX ADMIN — SODIUM ZIRCONIUM CYCLOSILICATE 10 G: 10 POWDER, FOR SUSPENSION ORAL at 16:39

## 2024-04-25 RX ADMIN — MIDODRINE HYDROCHLORIDE 10 MG: 5 TABLET ORAL at 09:58

## 2024-04-25 RX ADMIN — MUPIROCIN: 20 OINTMENT TOPICAL at 16:58

## 2024-04-25 RX ADMIN — LIDOCAINE 4% 1 PATCH: 40 PATCH TOPICAL at 21:15

## 2024-04-25 RX ADMIN — HEPARIN SODIUM 5000 UNITS: 5000 INJECTION INTRAVENOUS; SUBCUTANEOUS at 16:42

## 2024-04-25 RX ADMIN — MUPIROCIN: 20 OINTMENT TOPICAL at 10:07

## 2024-04-25 RX ADMIN — MORPHINE SULFATE 2 MG: 2 INJECTION, SOLUTION INTRAMUSCULAR; INTRAVENOUS at 16:45

## 2024-04-25 RX ADMIN — CHLOROTHIAZIDE SODIUM 500 MG: 500 INJECTION, POWDER, LYOPHILIZED, FOR SOLUTION INTRAVENOUS at 18:14

## 2024-04-25 RX ADMIN — CLOPIDOGREL BISULFATE 75 MG: 75 TABLET, FILM COATED ORAL at 09:57

## 2024-04-25 RX ADMIN — METOPROLOL SUCCINATE 25 MG: 25 TABLET, EXTENDED RELEASE ORAL at 09:57

## 2024-04-25 RX ADMIN — LIDOCAINE HYDROCHLORIDE 10 ML: 10 INJECTION, SOLUTION INFILTRATION; PERINEURAL at 10:34

## 2024-04-25 RX ADMIN — MEROPENEM 1 G: 1 INJECTION, POWDER, FOR SOLUTION INTRAVENOUS at 16:43

## 2024-04-25 RX ADMIN — MIDODRINE HYDROCHLORIDE 10 MG: 5 TABLET ORAL at 16:59

## 2024-04-25 RX ADMIN — METHIMAZOLE 12.5 MG: 5 TABLET ORAL at 09:57

## 2024-04-25 RX ADMIN — ALBUMIN HUMAN 25 G: 0.25 SOLUTION INTRAVENOUS at 16:40

## 2024-04-25 RX ADMIN — MEROPENEM 1 G: 1 INJECTION, POWDER, FOR SOLUTION INTRAVENOUS at 02:09

## 2024-04-25 RX ADMIN — INSULIN HUMAN 10 UNITS: 100 INJECTION, SOLUTION PARENTERAL at 16:42

## 2024-04-25 RX ADMIN — PANTOPRAZOLE SODIUM 40 MG: 40 TABLET, DELAYED RELEASE ORAL at 05:42

## 2024-04-25 RX ADMIN — SODIUM ZIRCONIUM CYCLOSILICATE 10 G: 10 POWDER, FOR SUSPENSION ORAL at 16:41

## 2024-04-25 RX ADMIN — FUROSEMIDE 100 MG: 10 INJECTION, SOLUTION INTRAMUSCULAR; INTRAVENOUS at 16:37

## 2024-04-25 RX ADMIN — TRIMETHOBENZAMIDE HYDROCHLORIDE 200 MG: 100 INJECTION INTRAMUSCULAR at 23:15

## 2024-04-25 RX ADMIN — Medication 4 L/MIN: at 15:28

## 2024-04-25 RX ADMIN — Medication 4 L/MIN: at 20:00

## 2024-04-25 RX ADMIN — DEXTROSE MONOHYDRATE 50 ML/HR: 100 INJECTION, SOLUTION INTRAVENOUS at 16:42

## 2024-04-25 RX ADMIN — MORPHINE SULFATE 2 MG: 2 INJECTION, SOLUTION INTRAMUSCULAR; INTRAVENOUS at 21:16

## 2024-04-25 ASSESSMENT — PAIN DESCRIPTION - LOCATION
LOCATION: ABDOMEN
LOCATION: ABDOMEN
LOCATION: NECK

## 2024-04-25 ASSESSMENT — PAIN SCALES - GENERAL
PAINLEVEL_OUTOF10: 3
PAINLEVEL_OUTOF10: 8
PAINLEVEL_OUTOF10: 0 - NO PAIN
PAINLEVEL_OUTOF10: 9
PAINLEVEL_OUTOF10: 7
PAINLEVEL_OUTOF10: 3
PAINLEVEL_OUTOF10: 10 - WORST POSSIBLE PAIN
PAINLEVEL_OUTOF10: 7

## 2024-04-25 ASSESSMENT — PAIN - FUNCTIONAL ASSESSMENT
PAIN_FUNCTIONAL_ASSESSMENT: 0-10

## 2024-04-25 ASSESSMENT — COGNITIVE AND FUNCTIONAL STATUS - GENERAL
TURNING FROM BACK TO SIDE WHILE IN FLAT BAD: A LITTLE
DAILY ACTIVITIY SCORE: 16
DRESSING REGULAR UPPER BODY CLOTHING: A LITTLE
CLIMB 3 TO 5 STEPS WITH RAILING: TOTAL
MOBILITY SCORE: 14
WALKING IN HOSPITAL ROOM: TOTAL
DRESSING REGULAR LOWER BODY CLOTHING: A LOT
STANDING UP FROM CHAIR USING ARMS: A LOT
TOILETING: A LOT
MOVING TO AND FROM BED TO CHAIR: A LITTLE
HELP NEEDED FOR BATHING: A LOT
PERSONAL GROOMING: A LITTLE

## 2024-04-25 ASSESSMENT — PAIN DESCRIPTION - DESCRIPTORS: DESCRIPTORS: SORE

## 2024-04-25 ASSESSMENT — PAIN DESCRIPTION - ORIENTATION: ORIENTATION: MID

## 2024-04-25 NOTE — SIGNIFICANT EVENT
Patient arrived from [address] [place] [address] with 2 Rns, on 4L. NC., and on monitor. Patient connected to monitor and VSS at this time. Dressings changed per order and MD at bedside.

## 2024-04-25 NOTE — PROGRESS NOTES
Hitesh Jurado is a 53 y.o. male on day 7 of admission presenting with Right lower lobe pneumonia.      Subjective   Patient is ill, renal function worse, hyperkalemia worse likely hemolyzed, leukocytosis worse, nephrology following, consent provided for possible hemodialysis in a.m.  Consulted ICU for second opinion and possible need for transfer to ICU for a higher level of care  Status post paracentesis with 800 mL removal  Left hand swelling, ultrasound Doppler was ordered to rule out DVT    Objective     Last Recorded Vitals  BP 97/68   Pulse 85   Temp 36.6 °C (97.9 °F) (Temporal)   Resp 20   Wt 71.2 kg (156 lb 15.5 oz)   SpO2 98%   Intake/Output last 3 Shifts:    Intake/Output Summary (Last 24 hours) at 4/25/2024 1216  Last data filed at 4/25/2024 1055  Gross per 24 hour   Intake 100 ml   Output 2900 ml   Net -2800 ml       Admission Weight  Weight: 68 kg (150 lb) (04/18/24 1736)    Daily Weight  04/21/24 : 71.2 kg (156 lb 15.5 oz)    Image Results  US guided abdominal paracentesis  Addendum: Interpreted By:  Obed Louie,    ADDENDUM:   The original report incorrectly states that specimens were sent for   lab analysis. This was not the case. Only therapeutic, not diagnostic   paracentesis performed        Signed by: Obed Louie 4/25/2024 11:56 AM        -------- ORIGINAL REPORT --------   Dictation workstation:   [id number]  Narrative: Interpreted By:  Obed Louie,   STUDY:  US GUIDED ABDOMINAL PARACENTESIS;  4/25/2024 11:09 am      INDICATION:  Signs/Symptoms:Paracentesis.      COMPARISON:  CT chest without contrast 19 April 2024      ACCESSION NUMBER(S):  [id number]      ORDERING CLINICIAN:  NERY CAMPOS      FINDINGS:  PREPARATION: The history and physical exam pertinent to the procedure  were reviewed and no updates were made      CONSENT: The risks, benefits, treatment options, personnel involved,  potential complications including but not limited to internal  hemorrhage,  infection and damaging abdominal structures were  discussed with the  patient.   All questions were answered.  The  staff physician personally verified written informed consent was  obtained.      TIMEOUT: Audible timeout was performed immediately prior to the  procedure.  The patient, myself, as well as the support staff all  together confirmed the patient's name, date of birth, patient  medications, allergies, platelets and coagulation profile, procedure  to be performed, reason for the procedure, laterality of the  procedure (if applicable) patient position, handling of any obtained  samples / specimens, patient disposition, and necessary equipment for  the procedure were confirmed.      PROCEDURE:  Using ultrasound, a dominant pocket of free fluid in the  right lower  quadrant was localized and images were obtained and archived.  Skin  entry site was marked and then prepped and draped in the usual  sterile fashion.  The skin was numbed with 2% lidocaine.      Then, under ultrasound guidance, the subcutaneous soft tissues down  to and including the peritoneum was numbed with 2% lidocaine for a  total of 10 mL 2% lidocaine used for the procedure.      Using continued ultrasound guidance, a Yueh-type needle/catheter was  advanced into the ascites. The catheter was then attached to a vacuum  system.      TOTAL VOLUME DRAINED:  2,900 mL      ESTIMATED BLOOD LOSS: < 5 mL      SPECIMEN/S:  The first 60 mL of the aspirated fluid was sent for the  requested lab tests.      COMPLICATIONS: No immediate complication      SIGN-OUT DISCUSSION: completed      DR. LOUIE performed the entire procedure.      Impression: SUCCESSFUL, UNEVENTFUL ULTRASOUND-GUIDED  DIAGNOSTIC AND THERAPEUTIC  PARACENTESIS PERFORMED IN THE  RIGHT LOWER QUADRANT      TOTAL VOLUME DRAINED: 2,900 mL      NO IMMEDIATE COMPLICATIONS      MACRO:  None      Signed by: Obed Louie 4/25/2024 11:28 AM  Dictation workstation:   [id number]      Physical  Exam    General: ill-appearing adult male, distressed due to pain, on oxygen via NC   HEENT: Clear sclera, EOMI, trachea midline, moist mucous membranes  Respiratory: Equal chest rise, no retractions, diminished breath sounds bilaterally  Abdomen: Tense, mildly distended, nontender  Extremities: Left BKA noted stump covered in clean bandaging.  Superficial ulcerations involving right leg, right hand swollen  Neurological: Spontaneously moves all extremities, no dysarthria, cranial nerves grossly intact  Psychiatric: Appropriate mood and affect, ill-appearing  Skin: Warm, dry, scattered bruises noted    Assessment/Plan   This patient currently has cardiac telemetry ordered; if you would like to modify or discontinue the telemetry order, click here to go to the orders activity to modify/discontinue the order.    Principal Problem:    Right lower lobe pneumonia  Active Problems:    PAD (peripheral artery disease) (CMS-HCC)    Tobacco dependence with current use    Acute on chronic congestive heart failure (Multi)    Type 2 diabetes mellitus with peripheral neuropathy (Multi)    NSTEMI (non-ST elevated myocardial infarction) (Multi)    Dyspnea, unspecified type    Recurrent right pleural effusion    Acute respiratory failure with hypoxia (Multi)    Hitesh Jurado is a 53 y.o. male with PMH of CKD, cirrhosis with ascites, hyponatremia, HTN, HLD, CAD, ischemic cardiomyopathy, VHD, DM2, PAD status post left BKA who was admitted for management of the following issues:     PIPPA on CKD 2-3a, worsening, CR again worse today  Hyperkalemia worse, hyponatremia worse     -Nephro following, Lasix was increased to 80 mg IV daily, Aldactone held, continue with Lokelma  -Monitor BMP, avoid nephrotoxic agent  -Consent provided for possible hemodialysis in a.m.  [facility] given  -ICU consulted for possible transfer to ICU for higher level of care     NSTEMI  -Presented with chest discomfort and notably elevated troponins (823 -> 962  -> 1060 -> 743).  -Card following, recs appreciated.  Not a candidate for left heart cath due to his renal function and also other comorbidities     Concern for bacterial pneumonia, leukocytosis worsening  Loculated Pleural effusion, s/p thoracentesis on 4/22, 2.5 L fluid removed  -CT thorax reviewed, reveals dense consolidations extensviely in all 3 right lobes as well as extensively loculated, large right pleural effusion   -CXR positive for pneumonia, elevated Pro-Serafin   -ID consulted for IV antibiotic management  -Leukocytosis worsening  -left hand swelling, ultrasound Doppler was ordered  -CT surgery following; recs appreciated. Poor surgical candidate. Could offer pleurex catheter once more stable. Agree with antibiotic coverage and thoracentesis at this time.   -cw meropenem, pain management     Cirrhosis with ascites  -Lasix/aldactone on hold, will monitor  -Status post paracentesis by IR, 800 mL removed     Goals of care  -Palliative recs appreciated, patient is now DNR     Hx HTN, HLD,CAD, ICM, Valvular heart disease, T2DM, PAD S/p left BKA, HFrEF 15-20%, microscopic anemia  Patient is currently hypotensive  -cw home meds  -ISS, hypoglycemia protocol, POCT glucose, DM diet  -CBC daily, transfuse to keep hemoglobin>7      VTE prophylaxis: Held for now  Disposition: Possible transfer to ICU for a higher level of care    Robson Garcia MD

## 2024-04-25 NOTE — PROGRESS NOTES
Occupational Therapy                 Therapy Communication Note    Patient Name: Hitesh Jurado  MRN: [id number]  Today's Date: 4/25/2024     Discipline: Occupational Therapy    Missed Visit Reason: Missed Visit Reason:  (hold OT tx, pt receiving stat EKG, ICU NP at bedside assessing pt, pt may be transferring to ICU. will reattempt as schedule allows and pt appropriate)    Missed Time: Attempt    Comment:

## 2024-04-25 NOTE — PROGRESS NOTES
Nephrology Progress Note    Assessment:  53 y.o. male with history s/f HFrEF, PAD s/p LT BKA, T2DM c/b chronic non-healing wounds, cirrhosis who presented for SOB for several days.      PIPPA on CKD stage II/III: initially felt in setting of diuretics, not significantly hypotensive, no contrast, not on any other nephrotoxic medications, function was at baseline on presentation w/ Scr ~1.4 w/ eGFR high 50s/low 60s and worsened during admission, now fluid overloaded, ? Decompensated CHF vs. HRS, urine Na/Cl <10  Fluid overload: improved, has combined HFrEF 20-25%   Recurrent RT sided pleural effusion: had 3L taken off at [facility] on 4/16, present here again, now s/p RT sided thoracentesis w/ 2.5L taken off   Cirrhosis c/b ascites: now s/p paracentesis (4/24)  Hypotension   Hypoalbuminemia  Anemia   RT sided PNA   Hyponatremia     Plan:  - checking UA  - if HRS already on midodrine, will give pt albumin w/ higher dose of lasix/diuril   - giving lokelma (pt has agreed to take it, his K 7 is hemolyzed)   - low threshold for RRT, pt agreeable and consented     Subjective:  Admit Date: 4/18/2024    Interval History: function stilll worsening, still not diuresing much, not feeling well    Medications:  Scheduled Meds:[Held by provider] aspirin, 81 mg, oral, Daily  atorvastatin, 80 mg, oral, Nightly  clopidogrel, 75 mg, oral, Daily  docusate sodium, 100 mg, oral, BID  [Held by provider] enoxaparin, 30 mg, subcutaneous, Daily  furosemide, 80 mg, intravenous, Daily  [Held by provider] furosemide, 40 mg, oral, Daily  insulin glargine, 25 Units, subcutaneous, q24h  insulin lispro, 0-10 Units, subcutaneous, TID with meals  insulin lispro, 10 Units, subcutaneous, TID with meals  lidocaine, 1 patch, transdermal, Daily  meropenem, 1 g, intravenous, q12h  methIMAzole, 12.5 mg, oral, Daily  metoprolol succinate XL, 25 mg, oral, Daily  midodrine, 10 mg, oral, TID with meals  morphine, 4 mg, intravenous, Once  mupirocin, , Topical,  "TID  pantoprazole, 40 mg, oral, Daily before breakfast   Or  pantoprazole, 40 mg, intravenous, Daily before breakfast  sodium zirconium cyclosilicate, 10 g, oral, Once  sodium zirconium cyclosilicate, 15 g, oral, Once  [Held by provider] spironolactone, 100 mg, oral, Daily      Continuous Infusions:       CBC:   Lab Results   Component Value Date    WBC 20.1 (H) 04/25/2024    RBC 4.34 (L) 04/25/2024    HGB 8.5 (L) 04/25/2024    HCT 27.4 (L) 04/25/2024    MCV 63 (L) 04/25/2024    MCH 19.6 (L) 04/25/2024    MCHC 31.0 (L) 04/25/2024    RDW 25.9 (H) 04/25/2024     04/25/2024     BMP:    Lab Results   Component Value Date     (L) 04/25/2024    K 7.0 (HH) 04/25/2024    CL 88 (L) 04/25/2024    CO2 23 04/25/2024     (HH) 04/25/2024    CREATININE 4.45 (H) 04/25/2024    CALCIUM 6.3 (L) 04/25/2024    GLUCOSE 109 (H) 04/25/2024       Objective:  Vitals: /67   Pulse 79   Temp 36.6 °C (97.9 °F) (Temporal)   Resp 20   Ht 1.753 m (5' 9\")   Wt 71.2 kg (156 lb 15.5 oz)   SpO2 99%   BMI 23.18 kg/m²    Wt Readings from Last 3 Encounters:   04/21/24 71.2 kg (156 lb 15.5 oz)   03/24/24 65 kg (143 lb 4.8 oz)   10/30/23 55.9 kg (123 lb 3.8 oz)      24HR INTAKE/OUTPUT:    Intake/Output Summary (Last 24 hours) at 4/25/2024 1137  Last data filed at 4/25/2024 1055  Gross per 24 hour   Intake 100 ml   Output 2900 ml   Net -2800 ml       General: alert, in mild distress  HEENT: normocephalic, atraumatic, anicteric  Lungs: non-labored respirations, clear to auscultation bilaterally  Heart: regular rate and rhythm, no murmurs or rubs  Abdomen: soft, non-tender, distended  Ext: no cyanosis, ++ peripheral edema, LT BKA  Neuro: lethargic, no gross abnormalities      Electronically signed by Malgorzata Williamson MD, MD              "

## 2024-04-25 NOTE — PROGRESS NOTES
HPI:      Patient is a 53 y.o. male with past medical history of systolic CHF with most recent EF of 25 to 30%, hypertension, hyperlipidemia, PAD, diabetes, liver cirrhosis, who presents initially to [facility] with a chief complaint of  SOB.  Patient with multiple medical problems history of noncompliance.  He states he has not followed up with a cardiologist as outpatient.  He has multiple recurrent admissions recently.  Apparently left AMA from [facility].  Noted to have elevated cardiac enzyme and trending down in the setting of chronic kidney disease.  He denies any chest pain chest pressure heaviness.  Per patient he states he had a coronary angiogram approximately 2 years ago at [facility], no PCI was performed.  Hemoglobin is 7.8, troponin is 743 now.  EKG with ST, RBBB.         Cardiology consulted for acute on chronic systolic heart failure     Echo on 11/2023:  - The left ventricle is severely dilated. Left ventricular systolic function is   severely decreased. EF = 27 ± 5% (2D 4-ch.) Definity contrast used for endocardial    border detection. Indeterminate left ventricular diastolic dysfunction due to >2+    MR.   - The right ventricle is dilated. Right ventricular systolic function is mildly   decreased.   - The left atrial cavity is severely dilated.   - There is moderate (2+ - 3+) mitral valve regurgitation. Regurgitant orifice area    (PISA) is 0.34 cm².   - There is moderate (2+ - 3+) tricuspid valve regurgitation.   - Estimated right ventricular systolic pressure is 35 mmHg consistent with mild   pulmonary hypertension. Estimated right atrial pressure is 8 mmHg based on IVC   assessment.      On examination today, patient resting in bed comfortably.  Denying anginal like symptoms.  Patient does report shortness of breath at rest and with exertion.  He reports shortness of breath is worse when he is laying down and at night.  Patient lives in a nursing home and will need  continuous follow-up with a cardiologist, in which she would like to establish. Patient very noncompliant.     4/21/2024: Patient with worsening renal function.  Nephrology following now.  Patient also being evaluated by cardiothoracic surgery for thoracentesis.  He denies any chest pain.   Previous cardiac catheterization report from [facility] in 2021 reviewed    4/25/2024: Patient in intensive care unit.  Awaiting dialysis.  Status post paracentesis today.  He denies any chest pain.  He is hemodynamically stable.        Cardiac catheterization at [facility] in 2021   Angiographic findings     LMCA: Very short left main.     LAD: Diffuse mild irregularity up to 20% narrowing.Late mid LAD with 30%   stenosis. Very small caliber D1 and D2 branches with severe diffuse disease.     LCx: Abnormal.Chronically occluded mid circumflex with retrogradely filling   OM branch from left to left collaterals.     RCA: Diffuse mild irregularity up to 20% narrowing.Distal 40% stenosis.                  Past Medical History   No past medical history on file.            Patient Active Problem List   Diagnosis    Hyperthyroidism    Encephalopathy    Hypoglycemia    PIPPA (acute kidney injury) (HCC)         Past Surgical History             Past Surgical History:   Procedure Laterality Date    PARACENTESIS Left 04/03/2024     3215 ml removed by Dr. Simental - diagnostics sent            Social History   Social History                Socioeconomic History    Marital status: Single   Tobacco Use    Smoking status: Never       Passive exposure: Never    Smokeless tobacco: Never   Substance and Sexual Activity    Alcohol use: Never    Drug use: Never      Social Determinants of Health              Food Insecurity: Patient Unable To Answer (4/2/2024)     Hunger Vital Sign      Worried About Running Out of Food in the Last Year: Patient unable to answer      Ran Out of Food in the Last Year: Patient unable to answer    Transportation Needs: Patient Unable To Answer (4/2/2024)     PRAPARE - Transportation      Lack of Transportation (Medical): Patient unable to answer      Lack of Transportation (Non-Medical): Patient unable to answer   Housing Stability: Patient Unable To Answer (4/2/2024)     Housing Stability Vital Sign      Unable to Pay for Housing in the Last Year: Patient unable to answer      Number of Places Lived in the Last Year: 1      Unstable Housing in the Last Year: Patient unable to answer            Family History   No family history on file.         Current Facility-Administered Medications                     Current Facility-Administered Medications   Medication Dose Route Frequency Provider Last Rate Last Admin    insulin glargine (LANTUS) injection vial 25 Units  25 Units SubCUTAneous Nightly Maicol Dela Cruz MD        insulin lispro (HUMALOG) injection vial 8 Units  8 Units SubCUTAneous TID  Maicol Dela Cruz MD   8 Units at 04/04/24 1724    epoetin tika-epbx (RETACRIT) injection 10,000 Units  10,000 Units SubCUTAneous Weekly Allen Mahajan MD   10,000 Units at 04/03/24 2114    furosemide (LASIX) injection 40 mg  40 mg IntraVENous BID Allen Mahajan MD   40 mg at 04/04/24 1724    traMADol (ULTRAM) tablet 50 mg  50 mg Oral Q6H PRN Daily Valentine MD   50 mg at 04/05/24 0554    clopidogrel (PLAVIX) tablet 75 mg  75 mg Oral Daily Ness Bird MD   75 mg at 04/04/24 0846    gabapentin (NEURONTIN) capsule 100 mg  100 mg Oral TID Ness Bird MD        atorvastatin (LIPITOR) tablet 80 mg  80 mg Oral Nightly Ness Bird MD   80 mg at 04/04/24 2152    insulin lispro (HUMALOG) injection vial 0-8 Units  0-8 Units SubCUTAneous TID  Dimitry Joshi PA   6 Units at 04/04/24 1724    insulin lispro (HUMALOG) injection vial 0-4 Units  0-4 Units SubCUTAneous Nightly Dimitry Joshi PA   4 Units at 04/03/24 2008    sodium chloride flush 0.9 % injection 5-40 mL  5-40 mL IntraVENous 2 times per day Serge  MD Daily   10 mL at 04/04/24 2153    sodium chloride flush 0.9 % injection 5-40 mL  5-40 mL IntraVENous PRN Daily Valentine MD        0.9 % sodium chloride infusion   IntraVENous PRN Daily Valentine MD        potassium chloride (KLOR-CON M) extended release tablet 40 mEq  40 mEq Oral PRN Daily Valentine MD         Or    potassium bicarb-citric acid (EFFER-K) effervescent tablet 40 mEq  40 mEq Oral PRN Daily Valentine MD         Or    potassium chloride 10 mEq/100 mL IVPB (Peripheral Line)  10 mEq IntraVENous PRN Daily Valentine MD        magnesium sulfate 2000 mg in 50 mL IVPB premix  2,000 mg IntraVENous PRN Daily Valentine MD        enoxaparin (LOVENOX) injection 40 mg  40 mg SubCUTAneous Daily Daily Valentine MD   40 mg at 04/04/24 0849    ondansetron (ZOFRAN-ODT) disintegrating tablet 4 mg  4 mg Oral Q8H PRN Daily Valentine MD         Or    ondansetron (ZOFRAN) injection 4 mg  4 mg IntraVENous Q6H PRN Daily Valentine MD        polyethylene glycol (GLYCOLAX) packet 17 g  17 g Oral Daily PRN Daily Valentine MD        acetaminophen (TYLENOL) tablet 650 mg  650 mg Oral Q6H PRN Daily Valentine MD   650 mg at 04/02/24 1643     Or    acetaminophen (TYLENOL) suppository 650 mg  650 mg Rectal Q6H PRN Daily Valentine MD        glucose chewable tablet 16 g  4 tablet Oral PRN Daily Valentine MD        dextrose bolus 10% 125 mL  125 mL IntraVENous PRN Daily Valentine MD   Stopped at 04/02/24 1248     Or    dextrose bolus 10% 250 mL  250 mL IntraVENous PRN Daily Valentine MD        glucagon injection 1 mg  1 mg SubCUTAneous PRN Daily Valentine MD        dextrose 10 % infusion   IntraVENous Continuous PRN Daily Valentine MD        midodrine (PROAMATINE) tablet 10 mg  10 mg Oral TID WC Lonnie Ortiz MD   10 mg at 04/04/24 1725            ALLERGIES: Amoxicillin     Review of Systems   Constitutional: Negative.    HENT: Negative.     Eyes: Negative.    Respiratory:  Positive for shortness of breath. Negative for chest tightness.   "  Cardiovascular:  Negative for chest pain, palpitations and leg swelling.   Gastrointestinal: Negative.    Endocrine: Negative.    Genitourinary: Negative.    Musculoskeletal: Negative.    Skin: Negative.    Allergic/Immunologic: Negative.    Neurological: Negative.    Hematological: Negative.    Psychiatric/Behavioral: Negative.              VITALS:  Blood pressure 107/74, pulse 80, temperature 99 °F (37.2 °C), resp. rate 17, height 1.778 m (5' 10\"), weight 75.4 kg (166 lb 3.6 oz), SpO2 93 %.  Body mass index is 23.85 kg/m².     Physical Exam  HENT:      Head: Normocephalic.   Cardiovascular:      Rate and Rhythm: Normal rate and regular rhythm.      Heart sounds: No murmur heard.  Pulmonary:      Effort: Pulmonary effort is normal.      Breath sounds: Normal breath sounds.   Abdominal:      General: Bowel sounds are normal. There is distension.   Musculoskeletal:      Right lower leg: No edema.      Left lower leg: Left lower leg edema: BKA.   Skin:     General: Skin is warm and dry.   Neurological:      General: No focal deficit present.      Mental Status: He is alert.   Psychiatric:         Mood and Affect: Mood normal.            LABS:     Pertinent Labs:  CBC:         Recent Labs     04/04/24  0324   WBC 11.8*   HGB 7.2*   *      BMP:        Recent Labs     04/05/24  0447      K 4.0   CL 99   CO2 21   *   CREATININE 2.29*   GLUCOSE 188*   LABGLOM 33.1*      INR:         Recent Labs     04/02/24  0930   INR 1.5      BNP: No results for input(s): \"BNP\" in the last 72 hours.   TSH: No results found for: \"TSH\"   Cardiac Injury Profile: No results for input(s): \"CKTOTAL\", \"CKMB\", \"CKMBINDEX\", \"TROPONINI\" in the last 72 hours.   Troponin: No results found for: \"TROPONINI\"  Lipid Profile: No results found for: \"TRIG\", \"HDL\", \"LDLCALC\", \"CHOL\"   Hemoglobin A1C: No components found for: \"HGBA1C\"         Radiology:  XR CHEST PORTABLE     Result Date: 4/4/2024  EXAMINATION: ONE XRAY VIEW OF THE " CHEST 4/4/2024 6:06 am COMPARISON: April 2, 2024 HISTORY: ORDERING SYSTEM PROVIDED HISTORY: Pleural effusion TECHNOLOGIST PROVIDED HISTORY: Reason for exam:->Pleural effusion What reading provider will be dictating this exam?->CRC FINDINGS: Right basilar opacity with silhouetting of the right hemidiaphragm and right heart border compatible with moderate right effusion and associated right basilar atelectasis, unchanged from April 2, 2024..  The left lung is clear. No cardiomediastinal shift.  Osseous and body wall soft tissues unremarkable.      Moderate right effusion and associated right basilar atelectasis, unchanged from April 2, 2024.      IR US GUIDED PARACENTESIS     Result Date: 4/3/2024  PROCEDURE: PARACENTESIS WITHOUT IMAGE GUIDANCE US ABDOMEN LIMITED 4/3/2024 HISTORY: ORDERING SYSTEM PROVIDED HISTORY: ascites TECHNOLOGIST PROVIDED HISTORY: Reason for exam:->ascites TECHNIQUE: Informed consent was obtained after a detailed explanation of the procedure including risks, benefits, and alternatives.  Universal protocol was followed.  A limited ultrasound of the abdomen was performed. The left abdomen was prepped and draped in sterile fashion and local anesthesia was achieved with lidocaine.  An 5 Yemeni needle sheath was advanced into ascites and paracentesis was performed.  The patient tolerated the procedure well. FINDINGS: Limited ultrasound of the abdomen demonstrates ascites. A total of 3215 mL was removed.      Successful paracentesis.      XR CHEST PORTABLE     Result Date: 4/2/2024  EXAMINATION: ONE XRAY VIEW OF THE CHEST 4/2/2024 10:50 am COMPARISON: None. HISTORY: ORDERING SYSTEM PROVIDED HISTORY: Altered LOC TECHNOLOGIST PROVIDED HISTORY: Reason for exam:->Altered LOC What reading provider will be dictating this exam?->CRC FINDINGS: See impression.      1. Pleural and parenchymal opacities right lung base with elevation of the right hemidiaphragm. 2. Cardiomegaly. 3. Hyperinflated left lung. 4.  Central bronchial wall thickening and mild interstitial prominence.      US HEAD NECK SOFT TISSUE THYROID     Result Date: 4/2/2024  EXAMINATION: ULTRASOUND OF THE THYROID WITH COLOR DOPPLER FLOW EVALUATION4/2/2024 10:14 am Ultrasound Thyroid COMPARISON: None HISTORY: ORDERING SYSTEM PROVIDED HISTORY: MASS, mass of neck, Possible pulsatile mass of right carotid artery TECHNOLOGIST PROVIDED HISTORY: Reason for exam:->mass of neck, FINDINGS: Size right thyroid lobe: 6.1 x 3.2 x 4.3 cm Size left thyroid lobe: 4.4 x 1.6 x 1.8 cm Size isthmus: 0.4 cm Texture: Homogenous Estimated total number of nodules greater than or equal to 1 cm: 1 Nodule#: # 1: Maximum size: 5.2 cm . All dimensions: 5.2 x 4.1 x 3.4 cm Location: Right Mid Composition: solid or almost completely solid: 2 points Echogenicity: hypoechoic: 2 points Shape: wider than tall: 0 points Margins: smooth: 0 points Echogenic foci: none: 0 points ACR Total Points: 4;  ACR TI-RADS risk category: TR4 -  moderately suspicious nodule. No aneurysm is seen.      1. Nodule 1: ACR TI-RADS 2017 Category 4. Recommend: Ultrasound-guided fine needle aspiration ACR TI-RADS 2017 Recommendations: TR1(0 points) : No FNA or follow up TR2 (2 points) : No FNA or follow up TR3 (3 points) : FNA if >/= 2.5 cm, follow up if 1.5 - 2.4 cm in 1, 3, and 5 years TR4 (4-6 points) : FNA if >/= 1.5 cm, follow up if 1.0 - 1.4 cm in 1, 2, 3, and 5 years TR5 (>/= 7 points) : FNA if >/= 1.0 cm, follow up if 0.5 - 0.9 cm every year for 5 years *ACR TI-RADS recommends that no more than two nodules with the highest ACR TI-RADS total point should be biopsied and no more than four nodules should be followed.      Vascular US ankle brachial index (MARILIA) without exercise     Result Date: 3/24/2024            [place] [address]     Tel [phone] Fax [phone] Vascular Lab Report VASC US ANKLE BRACHIAL INDEX (MARILIA) WITHOUT EXERCISE Patient Name:      BROOK  THMOAS Diane Physician:  [id number] Siena Escobar MD, RPVI Study Date:        3/20/2024            Ordering Provider:  [id number] LIAM LEGGETT MRN/PID:           [id number]             Fellow: Accession#:        [id number]         Technologist:       America Hansen RDMS, RVT Date of Birth/Age: 1970 / 53 years  Technologist 2: Gender:            M                    Encounter#:         [id number] Admission Status:  Inpatient            Location Performed: [facility] Diagnosis/ICD: Atherosclerosis of native arteries of right leg with ulceration                of other part of foot-I70.235 CPT Codes:     94004.52 Peripheral artery MARILIA Only Reduced Service Pertinent History: PVD. LEFT BKA. CONCLUSIONS: Right Lower PVR: No evidence of arterial occlusive disease in the right lower extremity at rest. Decreased digital perfusion noted. Monophasic flow is noted in the right common femoral artery, right posterior tibial artery and right dorsalis pedis artery. Left Lower PVR: Left BKA. Comparison: Compared with study from 2/28/2022, improved right MARILIA from last exam. Imaging & Doppler Findings: RIGHT Lower PVR                Pressures Ratios Right Posterior Tibial (Ankle) 116 mmHg  1.10 Right Dorsalis Pedis (Ankle)   78 mmHg   0.74 Right Digit (Great Toe)        36 mmHg   0.34                    Right     Left Brachial Pressure 105 mmHg 101 mmHg [id number] Siena Escobar MD, NIK Electronically signed by [id number] Siena Escobar MD, NIK on 3/24/2024 at 6:54:08 PM ** Final **     Vascular US lower extremity arterial duplex left     Result Date: 3/24/2024            [place] [address]     Tel [phone] Fax [phone] [facility]TREM[facility]UP[facility] LEFT Patient Name:       BROOK Diane Physician:  [id number] Siena Escobar MD, RPVI Study Date:        3/20/2024            Ordering Provider:  [id number] JAMI TAVAREZ MRN/PID:           [id number]             Fellow: Accession#:        [id number]         Technologist:       America Hansne RDMS Date of Birth/Age: 1970 / 53 years  Technologist 2: Gender:            M                    Encounter#:         [id number] Admission Status:  Inpatient            Location Performed: [facility] Diagnosis/ICD: Other atherosclerosis of unspecified type of bypass graft(s) of                the extremities, left leg-I70.392 CPT Codes:     89092 Peripheral artery Lower arterial Duplex limited Smoker:            Former. Pertinent History: LE Bypass graft. Pt poor historian. h/o rt to left fem/fem                    bypass and failed fem pop graft (pt unsure of when this was                    done) BKA left, wound on left stump. Per order assess left                    profunda.   CONCLUSIONS: Left Lower Arterial: There is an occlusion documented at the superficial femoral artery proximal, superficial femoral artery mid. and superficial femoral artery distal. Profunda mid 93.2 cm/sec, Distal 38.1 cm/sec. Imaging & Doppler Findings: Right                    Left  PSV                      PSV              EIA        67 cm/s              CFA        56 cm/s       Profunda Proximal 63 cm/s           Popliteal     34 cm/s [id number] Siena Escobar MD, RPVI Electronically signed by [id number] Siena Escobar MD, NIK on 3/24/2024 at 6:44:24 PM ** Final **     US guided abdominal paracentesis     Result Date: 3/19/2024  Interpreted By:  Schoenberger, Joseph, STUDY: US GUIDED ABDOMINAL PARACENTESIS; ;  3/19/2024 2:50 pm    INDICATION: Signs/Symptoms:paracentesis.    COMPARISON: None.    ACCESSION NUMBER(S): [id number]    ORDERING CLINICIAN: ALEXA JUNE    CONSENT: The procedure, its  indication, its risks, and alternatives were explained to the patient who understands and consents to the procedure. A time-out is completed prior to the procedure to confirm patient identity and procedure to be performed.    MODALITIES: Ultrasound    TECHNIQUE: Targeted sonographic evaluation of the abdomen demonstrates suitable pocket for paracentesis in the right lower quadrant. This site was then marked.    The marked site was sterilely prepped with chlorhexidine and sterile drapes were placed. Local anesthetic was administered. A 5 American sheath needle was advanced until straw-colored fluid was obtained. The sheath was advanced off the needle cannula into the peritoneal space. At this point aspiration was performed completion. A total of 6300 cc of fluid was aspirated. The sheath was then removed. The patient tolerated the procedure well. There was no immediate complication.    FINDINGS: See discussion above        Successful ultrasound-guided therapeutic paracentesis       MACRO: None    Signed by: Joseph Schoenberger 3/19/2024 2:53 PM Dictation workstation:   [id number]     Lower extremity venous duplex right     Result Date: 3/18/2024  Interpreted By:  Schoenberger, Joseph, STUDY: [facility] US LOWER EXTREMITY VENOUS DUPLEX RIGHT  3/18/2024 2:08 pm    INDICATION: 52 y/o   M with  Signs/Symptoms:edema and decreased pulses in RLE. LMP:  Unknown.    COMPARISON: None.    ACCESSION NUMBER(S): [id number]    ORDERING CLINICIAN: COOKIE TAVAREZ    TECHNIQUE: Routine ultrasound of the  right lower extremity was performed with duplex Doppler (color and spectral) evaluation.   Static images were obtained for remote interpretation.     FINDINGS: THIGH VEINS: There is nonocclusive partially calcified thrombus in the right popliteal vein consistent with chronic venous thrombus. The right posterior tibial and peroneal as well as superficial femoral and common femoral veins compress normally with normal color Doppler flow..     CALF VEINS:  The paired peroneal and posterior tibial calf veins are patent.        .  There is some nonacute since thrombus chronic thrombus in the right popliteal vein. Otherwise negative exam.    MACRO: None    Signed by: Joseph Schoenberger 3/18/2024 2:37 PM Dictation workstation:   [id number]     US thoracentesis     Result Date: 3/18/2024  Interpreted By:  Schoenberger, Joseph, STUDY: US THORACENTESIS; ;  3/18/2024 10:48 am    INDICATION: Signs/Symptoms:Dyspnea/to evaluate etiology of right pleural effusion.    COMPARISON: None.    ACCESSION NUMBER(S): [id number]    ORDERING CLINICIAN: TEJINDER CHAVIRA    CONSENT: The procedure, its indication, its risks, and alternatives were explained to the patient who understands and consents to the procedure. A time-out is completed prior to the procedure to confirm patient identity and procedure to be performed.    MODALITIES: Ultrasound    TECHNIQUE: Targeted sonographic evaluation of the lower right chest demonstrates a large right pleural effusion. Lowest intracostal space with suitable percutaneous access to this pleural effusion was localized using ultrasound. This site was marked. The marked site was then sterilely prepped with chlorhexidine and a sterile barrier drape was placed. Local anesthetic was administered. A 5 Ukrainian sheath needle was advanced until light green fluid was obtained. The sheath was advanced off the needle cannula to the pleural space. 60 cc of fluid was then aspirated into a syringe and sent to the laboratory for studies as ordered by the referring physician. Subsequently aspiration was performed for therapeutic thoracentesis. After a total aspiration of 1500 cc, the patient experienced some right-sided chest pressure. Therefore the procedure was terminated. The patient tolerated the procedure well otherwise without other immediate complication PICC    FINDINGS: See discussion above        Successful ultrasound-guided diagnostic and  therapeutic thoracentesis       MACRO: None    Signed by: Joseph Schoenberger 3/18/2024 10:58 AM Dictation workstation:   [id number]     XR CHEST 1 VIEW     Result Date: 3/18/2024  Interpreted By:  Schoenberger, Joseph, STUDY: XR CHEST 1 VIEW;  3/18/2024 10:50 am    INDICATION: Signs/Symptoms:Post Right Thoracentesis.    COMPARISON: 03/15/2024    ACCESSION NUMBER(S): [id number]    ORDERING CLINICIAN: JOSEPH SCHOENBERGER    FINDINGS:             CARDIOMEDIASTINAL SILHOUETTE: Cardiac silhouette remains somewhat enlarged.    LUNGS: There is no evidence for right pneumothorax post thoracentesis. Decreased right pleural fluid. Considerable opacity in the lower right hemithorax consistent with residual pleural fluid with underlying basal atelectasis.    ABDOMEN: No remarkable upper abdominal findings.    BONES: No acute osseous changes.        1.  Satisfactory appearance post right thoracentesis. See discussion above.          MACRO: None    Signed by: Joseph Schoenberger 3/18/2024 10:56 AM Dictation workstation:   [id number]     US abdomen complete     Result Date: 3/18/2024  Interpreted By:  Schoenberger, Joseph, STUDY: US ABDOMEN COMPLETE; ;  3/18/2024 9:49 am    INDICATION: Signs/Symptoms:Distended abdomen.  Significant alcohol history. Concern for cirrhosis may need paracentesis.    COMPARISON: None.    ACCESSION NUMBER(S): [id number]    ORDERING CLINICIAN: COOKIE TAVAREZ    TECHNIQUE: 4 quadrant sonographic survey for ascites as well as sonographic evaluation of the right upper quadrant.    FINDINGS: The liver is normal in size measuring 17.9 cm in cephalocaudal dimension. The capsular margins are nodular consistent with cirrhosis. No definite focal lesion is seen.    There is no dilation of the bile ducts. The extrahepatic common duct measures 4 mm.    The gallbladder is not well distended and incompletely evaluated.    There is no right hydronephrosis. The right kidney measures 8.2 cm in longitudinal  dimension.    There is no left hydronephrosis. The left kidney measures 7.3 cm in longitudinal dimension.    The spleen is not enlarged measuring 6.8 cm in cephalocaudal dimension. It is sonographically unremarkable.    There is a small to moderate volume of peritoneal fluid        Small to moderate volume of peritoneal fluid.    Cirrhosis.       MACRO: None    Signed by: Joseph Schoenberger 3/18/2024 10:46 AM Dictation workstation:   [id number]     XR TIBIA FIBULA LEFT (2 VIEWS)     Result Date: 3/15/2024  Interpreted By:  Artur Santos, STUDY: XR TIBIA FIBULA LEFT 2 VIEWS;  3/15/2024 10:36 am    INDICATION: Signs/Symptoms:fall, previous below-knee amputation, pain at left stump.    COMPARISON: 06/23/2022.    ACCESSION NUMBER(S): [id number]    ORDERING CLINICIAN: LO SHAW    FINDINGS: Post below-knee amputation changes. No acute fracture or dislocation identified. Tiny patellar osteophytes. Small knee joint effusion suspected. Tiny metallic foreign bodies lateral to the knee joint are still present. Osteopenia.    Atherosclerosis.        Intact left tibia and fibula, post below-knee amputation.    MACRO: None    Signed by: Artur Santos 3/15/2024 11:33 AM Dictation workstation:   [id number]     XR CHEST 1 VIEW     Result Date: 3/15/2024  Interpreted By:  Artur Santos, STUDY: XR CHEST 1 VIEW;  3/15/2024 10:36 am    INDICATION: Signs/Symptoms:fall.    COMPARISON: 11/01/2023    ACCESSION NUMBER(S): [id number]    ORDERING CLINICIAN: LO SHAW    FINDINGS: Large right basilar pleural effusion occupying greater than 50% of the hemithoracic volume. Overlying atelectasis. Left lung grossly clear without pleural effusion. Cardiomegaly suspected. Aortic atherosclerosis. Mild pulmonary vascular congestion.        Large right pleural effusion.    MACRO: None    Signed by: Artur Santos 3/15/2024 11:33 AM Dictation workstation:   [id number]     OCT MACULA CIRRUS OU (BOTH EYES)     Result Date: 3/6/2024  Date  of Procedure 3/6/2024. Technician Information Imaging Technician: niecy. Interpretation Right Eye Findings include Cystoid macular edema, Epiretinal membrane. Left Eye Findings include Cystoid macular edema. Interval Change Right Eye Better. Left Eye Better.         EKG: sinus bradycardia, RBBB              Assessment plan:      Elevated troponin.  Rule out underlying cardiac ischemia   Acute on chronic decompensated combined heart failure with reduced ejection fraction EF 20-25%  History of coronary disease with known circumflex chronic total occlusion by cath in 2021 at [facility]  History of PAD with life AKA  Decompensated liver cirrhosis  PIPPA on CKD  Diabetes  Hypertension  Hyperlipidemia  Hypokalemia  Anemia  Right-sided pleural effusion.  Encephalopathy-resolved  History of medical noncompliance  Right bundle branch block        Plan:  ICU supportive care and management.  Ideally patient would require cardiac catheterization given elevated cardiac enzyme, history of CAD, acute congestive heart failure and multiple risk factors for CAD.  Patient with worsening renal function and I have concern regarding his noncompliance.  Patient with multiple comorbidities, not ideal PCI candidate.  Will continue to evaluate.  Not ready for cardiac catheterization  Continue telemetry  Max cardiac meds  Avoid nephrotoxic agents  Nephrology recommendations  Cardiothoracic surgery recommendations  Patient not a candidate for LifeVest given history of noncompliance  Please keep potassium between 4 and 5 magnesium above 2  Please keep hemoglobin above 8 and platelets above 50  Prognosis is poor.  Palliative care following      Electronically signed by Antony Batista DO on 4/25/2024 at 6:44 PM

## 2024-04-25 NOTE — PROGRESS NOTES
Hitesh Jurado is a 53 y.o. male on day 7 of admission presenting with Right lower lobe pneumonia.    Call made to bedside. Patient alert & oriented x3. This TCC introduced myself and my role. Patient reports transportation was at the door ready to take him to his paracentesis procedure. Requested we talk again later. Care Transitions will continue to follow during course of hospital stay for any changes to discharge needs.  Ewelina Butterfield RN

## 2024-04-25 NOTE — NURSING NOTE
Verified with Dr. Hsieh that we are giving 500mg ivp diuril now even though patient received at 1600.

## 2024-04-25 NOTE — CARE PLAN
The patient's goals for the shift include none    The clinical goals for the shift include patient will be compliant with plan of care by the end of the shift    Over the shift, the patient did not make progress toward the following goals. Barriers to progression include need for some control over plan of care. Recommendations to address these barriers include offer choices and give pt as much autonomy as possible.

## 2024-04-25 NOTE — PROGRESS NOTES
"Hitesh Jurado is a 53 y.o. male on day 7 of admission presenting with Right lower lobe pneumonia.    Subjective   +pain, +SOB but somewhat improved since thora.  Abdomen less distended after paracentesis.      Objective     Physical Exam  GEN: chronically ill appearing male; alert and oriented.  RESP: even and regular    Last Recorded Vitals  Blood pressure 95/62, pulse (!) 0, temperature 36.1 °C (97 °F), temperature source Temporal, resp. rate 21, height 1.753 m (5' 9\"), weight 71.2 kg (156 lb 15.5 oz), SpO2 95%.  Intake/Output last 3 Shifts:  I/O last 3 completed shifts:  In: 200 (2.8 mL/kg) [IV Piggyback:200]  Out: - (0 mL/kg)   Weight: 71.2 kg     Relevant Results  No current facility-administered medications on file prior to encounter.     Current Outpatient Medications on File Prior to Encounter   Medication Sig Dispense Refill    aspirin 81 mg EC tablet Take 1 tablet (81 mg) by mouth once daily.      atorvastatin (Lipitor) 80 mg tablet Take 1 tablet (80 mg) by mouth once daily.      clopidogrel (Plavix) 75 mg tablet Take 1 tablet (75 mg) by mouth once daily.      furosemide (Lasix) 20 mg tablet Take 3 tablets (60 mg) by mouth 2 times a day.      insulin glargine (Lantus) 100 unit/mL injection Inject 20 Units under the skin once daily at bedtime. Take as directed per insulin instructions.      insulin lispro (HumaLOG) 100 unit/mL injection Inject under the skin 3 times a day with meals. Take as directed per insulin instructions.      methIMAzole (Tapazole) 10 mg tablet Take 2 tablets (20 mg) by mouth every 8 hours. (Patient taking differently: Take 0.5 tablets (5 mg) by mouth once daily. 2.5 tabs) 90 tablet 0    metoprolol succinate XL (Toprol-XL) 25 mg 24 hr tablet Take 1 tablet (25 mg) by mouth once daily. Do not crush or chew.      torsemide 60 mg tablet Take 60 mg by mouth once daily. Do not start before March 27, 2024.  0    acetaminophen (Tylenol) 500 mg tablet Take 1 tablet (500 mg) by mouth " every 8 hours if needed.      azithromycin (Zithromax) 500 mg tablet Take 1 tablet (500 mg) by mouth once every 24 hours. Do not start before October 30, 2023. 5 tablet 0    gabapentin (Neurontin) 100 mg capsule Take 1 capsule (100 mg) by mouth 3 times a day.      melatonin 5 mg tablet Take 1 tablet (5 mg) by mouth once daily at bedtime.      metOLazone (Zaroxolyn) 5 mg tablet Take 1 tablet (5 mg) by mouth once daily. Do not start before March 27, 2024. 30 tablet 1    midodrine (Proamatine) 10 mg tablet Take 1 tablet (10 mg) by mouth 3 times a day with meals. 90 tablet 0    potassium chloride CR 20 mEq ER tablet Take 1 tablet (20 mEq) by mouth 3 times a day. Do not crush or chew. For 10 days      silver sulfADIAZINE (Silvadene) 1 % cream Apply topically once daily. Do not start before March 27, 2024.        Results for orders placed or performed during the hospital encounter of 04/18/24 (from the past 24 hour(s))   POCT GLUCOSE   Result Value Ref Range    POCT Glucose 208 (H) 74 - 99 mg/dL   POCT GLUCOSE   Result Value Ref Range    POCT Glucose 180 (H) 74 - 99 mg/dL   POCT GLUCOSE   Result Value Ref Range    POCT Glucose 157 (H) 74 - 99 mg/dL   POCT GLUCOSE   Result Value Ref Range    POCT Glucose 133 (H) 74 - 99 mg/dL   CBC   Result Value Ref Range    WBC 20.1 (H) 4.4 - 11.3 x10*3/uL    nRBC 0.5 (H) 0.0 - 0.0 /100 WBCs    RBC 4.34 (L) 4.50 - 5.90 x10*6/uL    Hemoglobin 8.5 (L) 13.5 - 17.5 g/dL    Hematocrit 27.4 (L) 41.0 - 52.0 %    MCV 63 (L) 80 - 100 fL    MCH 19.6 (L) 26.0 - 34.0 pg    MCHC 31.0 (L) 32.0 - 36.0 g/dL    RDW 25.9 (H) 11.5 - 14.5 %    Platelets 375 150 - 450 x10*3/uL   Basic Metabolic Panel   Result Value Ref Range    Glucose 109 (H) 74 - 99 mg/dL    Sodium 122 (L) 136 - 145 mmol/L    Potassium 7.0 (HH) 3.5 - 5.3 mmol/L    Chloride 88 (L) 98 - 107 mmol/L    Bicarbonate 23 21 - 32 mmol/L    Anion Gap 18 10 - 20 mmol/L    Urea Nitrogen 121 (HH) 6 - 23 mg/dL    Creatinine 4.45 (H) 0.50 - 1.30 mg/dL     "eGFR 15 (L) >60 mL/min/1.73m*2    Calcium 6.3 (L) 8.6 - 10.3 mg/dL   SST TOP   Result Value Ref Range    Extra Tube Hold for add-ons.    POCT GLUCOSE   Result Value Ref Range    POCT Glucose 102 (H) 74 - 99 mg/dL   ECG 12 lead   Result Value Ref Range    Ventricular Rate 82 BPM    Atrial Rate 82 BPM    CO Interval 162 ms    QRS Duration 144 ms    QT Interval 428 ms    QTC Calculation(Bazett) 500 ms    P Axis 76 degrees    R Axis 36 degrees    T Axis 2 degrees    QRS Count 13 beats    Q Onset 212 ms    P Onset 131 ms    P Offset 181 ms    T Offset 426 ms    QTC Fredericia 475 ms   Basic Metabolic Panel   Result Value Ref Range    Glucose 102 (H) 74 - 99 mg/dL    Sodium 124 (L) 136 - 145 mmol/L    Potassium 6.2 (HH) 3.5 - 5.3 mmol/L    Chloride 89 (L) 98 - 107 mmol/L    Bicarbonate 23 21 - 32 mmol/L    Anion Gap 18 10 - 20 mmol/L    Urea Nitrogen 122 (HH) 6 - 23 mg/dL    Creatinine 4.47 (H) 0.50 - 1.30 mg/dL    eGFR 15 (L) >60 mL/min/1.73m*2    Calcium 6.5 (L) 8.6 - 10.3 mg/dL   POCT GLUCOSE   Result Value Ref Range    POCT Glucose 84 74 - 99 mg/dL     Assessment/Plan   -met with patient again once transferred to ICU team.  Discussed with critical care NP prior to meeting with patient.  Discussed multi system organ dysfunction.  Pt is unsure what his goals are but he likes to have options and requests \"to straight from the hip\".  Discussions of hospice were had.  His brother is flying in overnight from [place].  He seems willing to accept changes to quality of life as long as he is cognizant about what is going on and he can make informed decisions.  He will think about hospice and will discuss with his brother tomorrow.      Goals of Care  He is not sure what his goal is at this time.  Would like some time to think about it.    Is the patient hospice-eligible?   Yes  Was a discussion held re hospice services?   yes  Was a decision made re hospice services?  Unknown      I spent 25minutes in the professional and overall " care of this patient.      Romero Todd, APRN-CNP

## 2024-04-25 NOTE — PROGRESS NOTES
"[facility] Critical Care Medicine       Date:  4/25/2024  Patient:  Hitesh Jurado  YOB: 1970  MRN:  [id number]   Admit Date:  4/18/2024  ========================================================================================================    Chief Complaint   Patient presents with    Shortness of Breath     \"I can't catch my breath since last night\"         History of Present Illness:  Hitesh Jurado is a 53 y.o. year old male patient with Past Medical History of  Type 2 diabetes, hyperthyroidism, ischemic cardiomyopathy, HFrEF (15-20%), HTN, cirrhosis, recurrent pleural effusions, COPD, CKD II, PAD with left BKA and chronic wounds, GERD, HLD, PTSD, anxiety, depression, medical non-compliance, and tobacco use who presented to  EM 1 week ago (4/18/2024) for complaints of shortness of breath. Was admitted for suspected pneumonia and initiated on Vancomycin and Levaquin. Had troponin elevation without EKG changes and initiated on heparin drip. Was evaluated by cardiology for NSTEMI with plans for ischemic evaluation. Previous cardiac cath per patient at [facility] 2 years ago without intervention. Cardiac catheterization has been unable to be completed to this point due to worsening renal failure resistant to diuretic therapy. CXR with recurrent pericardial effusion with thoracic surgery consult who felt patient would benefit from optimization prior to possible offering of PleurX catheter. Thoracentesis completed 4/22 with 2.5L fluid removal (previously completed at [facility] on 4/16 with 3L fluid removal) and was initiated on antibiotic therapy with Vancomycin and Levaquin for possible pneumonia. Paracentesis completed today with 2.9 L fluid removal (previous completed 4/3 for 3.2L). Was contacted by hospital medicine to evaluate patient due to renal failure resistant to diuresis, acute fluid overload, and worsening leukocytosis.     Echo 10/2023: EF 15-20%, entire lateral wall, mid, and apical " inferior wall, apical septal segment, and apex are abnormal, mod to sev MR, mod to sev TR, RVSP 64.9  Echo 4/5/2024: EF 20-25%, Grade II diastolic dysfunction, mod MR, mod to sev TR, RVSP 56, LA mod dilated  Cardiac catheterization 2021: LMCA short left main, LAD diffuse mild irregularity up to 20% narrowing, late mid LAD with 30% stenosis, very small caliber D1 and D2 branches with severe diffuse disease, Lcx abnormal, chronically occluded mid circumflex with retrograde filling OM branch from left to left collaterals, RCA diffuse mild irregularity up to 20% narrowing, distal 40% stenosis    Interval ICU Events:  4/25: Admitted to the ICU for acute renal failure with hyperkalemia in setting of cardiorenal versus hepatorenal syndrome.     Medical History:  Past Medical History:   Diagnosis Date    CHF (congestive heart failure) (Multi)     Diabetes mellitus (Multi)      No past surgical history on file.  Medications Prior to Admission   Medication Sig Dispense Refill Last Dose    aspirin 81 mg EC tablet Take 1 tablet (81 mg) by mouth once daily.   4/18/2024 at 0800    atorvastatin (Lipitor) 80 mg tablet Take 1 tablet (80 mg) by mouth once daily.   4/17/2024 at 2000    clopidogrel (Plavix) 75 mg tablet Take 1 tablet (75 mg) by mouth once daily.   4/18/2024 at 0800    furosemide (Lasix) 20 mg tablet Take 3 tablets (60 mg) by mouth 2 times a day.   4/17/2024    insulin glargine (Lantus) 100 unit/mL injection Inject 20 Units under the skin once daily at bedtime. Take as directed per insulin instructions.   4/17/2024    insulin lispro (HumaLOG) 100 unit/mL injection Inject under the skin 3 times a day with meals. Take as directed per insulin instructions.   4/18/2024    methIMAzole (Tapazole) 10 mg tablet Take 2 tablets (20 mg) by mouth every 8 hours. (Patient taking differently: Take 0.5 tablets (5 mg) by mouth once daily. 2.5 tabs) 90 tablet 0 4/18/2024 at 0800    metoprolol succinate XL (Toprol-XL) 25 mg 24 hr tablet  Take 1 tablet (25 mg) by mouth once daily. Do not crush or chew.   4/18/2024 at 0800    torsemide 60 mg tablet Take 60 mg by mouth once daily. Do not start before March 27, 2024.  0 4/17/2024 at 0800    acetaminophen (Tylenol) 500 mg tablet Take 1 tablet (500 mg) by mouth every 8 hours if needed.       azithromycin (Zithromax) 500 mg tablet Take 1 tablet (500 mg) by mouth once every 24 hours. Do not start before October 30, 2023. 5 tablet 0     gabapentin (Neurontin) 100 mg capsule Take 1 capsule (100 mg) by mouth 3 times a day.       melatonin 5 mg tablet Take 1 tablet (5 mg) by mouth once daily at bedtime.   Unknown    metOLazone (Zaroxolyn) 5 mg tablet Take 1 tablet (5 mg) by mouth once daily. Do not start before March 27, 2024. 30 tablet 1     midodrine (Proamatine) 10 mg tablet Take 1 tablet (10 mg) by mouth 3 times a day with meals. 90 tablet 0     potassium chloride CR 20 mEq ER tablet Take 1 tablet (20 mEq) by mouth 3 times a day. Do not crush or chew. For 10 days   Unknown    silver sulfADIAZINE (Silvadene) 1 % cream Apply topically once daily. Do not start before March 27, 2024.   Unknown     Amoxicillin  Social History     Tobacco Use    Smoking status: Former     Types: Cigarettes    Smokeless tobacco: Former   Vaping Use    Vaping status: Never Used   Substance Use Topics    Alcohol use: Never    Drug use: Never     No family history on file.    Hospital Medications:           Current Facility-Administered Medications:     acetaminophen (Tylenol) tablet 650 mg, 650 mg, oral, q4h PRN, 650 mg at 04/19/24 1247 **OR** acetaminophen (Tylenol) oral liquid 650 mg, 650 mg, nasogastric tube, q4h PRN **OR** acetaminophen (Tylenol) suppository 650 mg, 650 mg, rectal, q4h PRN, HEENA Van    [START ON 4/26/2024] albumin human 25 % solution 25 g, 25 g, intravenous, Once, HEENA Van    albumin human 25 % solution 75 g, 1 g/kg, intravenous, Once, HEENA Van     [Held by provider] aspirin EC tablet 81 mg, 81 mg, oral, Daily, HEENA Van, 81 mg at 04/24/24 1023    atorvastatin (Lipitor) tablet 80 mg, 80 mg, oral, Nightly, HEENA Van, 80 mg at 04/24/24 2042    chlorothiazide (Diuril) injection 500 mg, 500 mg, intravenous, Once, HEENA Van    clopidogrel (Plavix) tablet 75 mg, 75 mg, oral, Daily, HEENA Van, 75 mg at 04/25/24 0957    dextrose 50 % injection 12.5 g, 12.5 g, intravenous, q15 min PRN, HEENA Van    dextrose 50 % injection 25 g, 25 g, intravenous, q15 min PRN, HEENA Van    docusate sodium (Colace) capsule 100 mg, 100 mg, oral, BID, HEENA Van, 100 mg at 04/24/24 0900    furosemide (Lasix) injection 100 mg, 100 mg, intravenous, Once, HEENA Van    furosemide (Lasix) injection 80 mg, 80 mg, intravenous, Daily, HEENA Van, 80 mg at 04/25/24 0957    glucagon (Glucagen) injection 1 mg, 1 mg, intramuscular, q15 min PRN, HEENA Van    glucagon (Glucagen) injection 1 mg, 1 mg, intramuscular, q15 min PRN, HEENA Van    heparin (porcine) injection 5,000 Units, 5,000 Units, subcutaneous, q8h, HEENA Van    insulin glargine (Lantus) injection 25 Units, 25 Units, subcutaneous, q24h, HEENA Van, 25 Units at 04/24/24 2039    insulin lispro (HumaLOG) injection 0-10 Units, 0-10 Units, subcutaneous, TID with meals, HEENA Van, 8 Units at 04/24/24 1138    insulin lispro (HumaLOG) injection 10 Units, 10 Units, subcutaneous, TID with meals, Radha Shane, SHANDA-CNP, 10 Units at 04/24/24 1142    ipratropium-albuteroL (Duo-Neb) 0.5-2.5 mg/3 mL nebulizer solution 3 mL, 3 mL, nebulization, 4x daily PRN, Radah Shane, APRN-CNP    lidocaine 4 % patch 1 patch, 1 patch, transdermal, Daily, Radha Shane, APRN-CNP,  1 patch at 04/23/24 2038    melatonin tablet 3 mg, 3 mg, oral, Nightly PRN, HEENA Van, 3 mg at 04/22/24 2115    meropenem (Merrem) in sodium chloride 0.9 % 100 mL IV 1 g, 1 g, intravenous, q12h, HEENA Van, Stopped at 04/25/24 0239    methIMAzole (Tapazole) tablet 12.5 mg, 12.5 mg, oral, Daily, HEENA Van, 12.5 mg at 04/25/24 0957    metoprolol succinate XL (Toprol-XL) 24 hr tablet 25 mg, 25 mg, oral, Daily, HEENA Van, 25 mg at 04/25/24 0957    midodrine (Proamatine) tablet 10 mg, 10 mg, oral, TID with meals, HEENA Van, 10 mg at 04/25/24 1147    morphine injection 2 mg, 2 mg, intravenous, q4h PRN, HEENA Van, 2 mg at 04/25/24 0901    mupirocin (Bactroban) 2 % ointment, , Topical, TID, HEENA Van, Given at 04/25/24 1007    pantoprazole (ProtoNix) EC tablet 40 mg, 40 mg, oral, Daily before breakfast, 40 mg at 04/25/24 0542 **OR** pantoprazole (ProtoNix) injection 40 mg, 40 mg, intravenous, Daily before breakfast, HEENA Van, 40 mg at 04/23/24 0635    polyethylene glycol (Glycolax, Miralax) packet 17 g, 17 g, oral, Daily PRN, HEENA Van    sodium zirconium cyclosilicate (Lokelma) packet 10 g, 10 g, oral, Once, HEENA Van    [Held by provider] spironolactone (Aldactone) tablet 100 mg, 100 mg, oral, Daily, HEENA Van    trimethobenzamide (Tigan) injection 200 mg, 200 mg, intramuscular, q6h PRN, Radha Shane, APRN-CNP, 200 mg at 04/24/24 2047    Review of Systems:  14 point review of systems was completed and negative except for those specially mention in my HPI    Physical Exam:    Heart Rate:  [78-88]   Temp:  [36.1 °C (97 °F)-36.6 °C (97.9 °F)]   Resp:  [14-20]   BP: ()/(50-74)   SpO2:  [97 %-100 %]     Physical Exam  Constitutional:       Appearance: He is ill-appearing.   HENT:      Head:  Normocephalic.   Cardiovascular:      Rate and Rhythm: Normal rate and regular rhythm.      Pulses: Normal pulses.      Heart sounds: Normal heart sounds.      Comments: Edema of abdomen/ flank  Pulmonary:      Effort: Pulmonary effort is normal.      Breath sounds: Examination of the right-lower field reveals decreased breath sounds. Examination of the left-lower field reveals decreased breath sounds. Decreased breath sounds present.   Abdominal:      General: There is distension.      Palpations: Abdomen is soft.   Musculoskeletal:         General: Normal range of motion.      Cervical back: Normal range of motion.      Right lower leg: Edema present.      Comments: Left BKA, RLE with ACE wrap dressing, see images for wound documentation   Skin:     General: Skin is cool and dry.      Capillary Refill: Capillary refill takes less than 2 seconds.      Coloration: Skin is sallow.   Neurological:      General: No focal deficit present.      Mental Status: He is alert.   Psychiatric:         Mood and Affect: Mood normal.         Behavior: Behavior normal.         Objective:    I have reviewed all medications, laboratory results, and imaging pertinent for today's encounter.           Intake/Output Summary (Last 24 hours) at 4/25/2024 1541  Last data filed at 4/25/2024 1055  Gross per 24 hour   Intake 100 ml   Output 2900 ml   Net -2800 ml       Recent Imaging  Vascular US upper extremity venous duplex left   Final Result   Acute, occlusive thrombosis of the left basilic vein        Exam negative for acute deep venous thrombosis in the left upper   extremity        MACRO:   None        Signed by: Obed Louie 4/25/2024 2:13 PM   Dictation workstation:   [id number]      US guided abdominal paracentesis   Final Result   Addendum (preliminary) 1 of 1   Interpreted By:  Obed Louie,    ADDENDUM:   The original report incorrectly states that specimens were sent for   lab analysis. This was not the case. Only therapeutic,  not diagnostic   paracentesis performed        Signed by: Obed Louie 4/25/2024 11:56 AM        -------- ORIGINAL REPORT --------   Dictation workstation:   [id number]      Final   SUCCESSFUL, UNEVENTFUL ULTRASOUND-GUIDED  DIAGNOSTIC AND THERAPEUTIC   PARACENTESIS PERFORMED IN THE  RIGHT LOWER QUADRANT        TOTAL VOLUME DRAINED: 2,900 mL        NO IMMEDIATE COMPLICATIONS        MACRO:   None        Signed by: Obed Louie 4/25/2024 11:28 AM   Dictation workstation:   [id number]      XR chest 1 view   Final Result   As above        MACRO:   None        Signed by: Obed Louie 4/24/2024 12:20 PM   Dictation workstation:   [id number]      XR chest 1 view   Final Result   1.  Satisfactory appearance post right thoracentesis. See discussion   above.                  MACRO:   None        Signed by: Joseph Schoenberger 4/22/2024 10:59 AM   Dictation workstation:   [id number]      US thoracentesis   Final Result   Successful ultrasound-guided diagnostic and therapeutic thoracentesis             MACRO:   None        Signed by: Joseph Schoenberger 4/22/2024 10:50 AM   Dictation workstation:   [id number]      XR cervical spine 2-3 views   Final Result   Suboptimal lateral radiographs without evidence of fracture in the   upper or midcervical spine. Degenerative disc space narrowing   partially visualized in the lower cervical spine.             MACRO:   None        Signed by: Heber Mcnair 4/21/2024 1:18 AM   Dictation workstation:   [id number]      CT chest wo IV contrast   Final Result   Dense consolidations extensively in all three right lobes. No   cavitary component to the pneumonias        Extensively loculated, large right pleural effusion. 3 L removed at   an outside institution on 16 April 2024. The right pleural effusion   has even increased in size from yesterday's portable chest   radiograph. Consult thoracic surgery for tube placement and management        Due to the compressive affects of the  effusion, there is partial   collapse of the middle and right lower lobes        No left lung pneumonia or other acute process in the left hemithorax        No left pleural effusion        No pneumothorax on either side        MACRO:   None        Signed by: Obed Louie 4/19/2024 3:18 PM   Dictation workstation:   [id number]      XR chest 1 view   Final Result   1. Worsening airspace disease at the right lung base. Moderate-sized   effusion present. CT can be performed further evaluation                  MACRO:   None        Signed by: Kamron Hopkins 4/18/2024 6:27 PM   Dictation workstation:   [id number]          No echocardiogram results found for the past 14 days    Assessment/Plan:    I am currently managing this critically ill patient for the following problems:    Neuro/Psych/Pain Ctrl/Sedation:  PTSD  JONA  Depression  Hx of alcohol abuse, reports sober x1 year  CAM-ICU  Delirium precautions  Pain management as needed    Respiratory/ENT:  Loculated recurrent pleural effusion, right  Pneumonia   CT chest: dense consolidations extensively in all 3 right lobes, no cavitary component to the pneumonias, extensively loculated large right pleural effusion with partial collapse of middle and lower lobes on right  Oxygen therapy as needed to maintain SPO2 greater than 92%  Duonebs  See ID    Cardiovascular:  Acute on chronic HFrEF  Ischemic cardiomyopathy  NSTEMI  PAD  Echo  Diuresis  Beta blocker    GI:  Cirrhosis  Trend CMP, INR  RUQ US    Renal/Volume Status (Intra & Extravascular):  PIPPA on CKD II, baseline Cr ~1.2  Hyperkalemia  hyponatremia  ?CRS versus HRS  Trend BMP  Nephrology following, appreciate recommendations  Diuresis, will likely need HD    Endocrine  hyperthyroidism  Tapazole    Infectious Disease:  Leukocytosis-worsening  Meropenem  ID following, appreciate recommendations  Blood culture, NTD  Urine with reflex    Heme/Onc:  Trend CBC    MSK:  Left BKA  Wound care    Ethics/Code  Status:  DNRCCA    :  DVT Prophylaxis: heparin  GI Prophylaxis: PPI  Bowel Regimen: as needed  Diet: cardiac/ diabetic  CVC: none  Mary Lou: none  Roque: none  Restraints: none  Dispo: ICU    Critical Care Time:  60 minutes spent in preparing to see patient (I.e. review of medical records), evaluation of diagnostics (I.e. labs, imaging, etc.), documentation, discussing plan of care with patient/ family/ caregiver, and/ or coordination of care with multidisciplinary team. Time does not include completion of procedure time.     Plan discussed with Dr. Мария Shane, APRN-CNP

## 2024-04-25 NOTE — SIGNIFICANT EVENT
Supportive visit in nature.  Pt generally not feeling well.  Understands plan for ICU transfer and dialysis.  He's in agreement.

## 2024-04-25 NOTE — PROGRESS NOTES
Physical Therapy                 Therapy Communication Note    Patient Name: Hitesh Jurado  MRN: [id number]  Today's Date: 4/25/2024     Discipline: Physical Therapy    Missed Visit Reason: Missed Visit Reason: Patient refused, Patient in a medical procedure (10:32 pt off the floor for procedure , 11:19 pt declined stating he would like to rest , nursing notified)    Missed Time: Attempt    Comment:

## 2024-04-26 ENCOUNTER — APPOINTMENT (OUTPATIENT)
Dept: RADIOLOGY | Facility: HOSPITAL | Age: 54
End: 2024-04-26
Payer: MEDICAID

## 2024-04-26 ENCOUNTER — OUTSIDE SERVICES (OUTPATIENT)
Dept: INFECTIOUS DISEASES | Age: 54
End: 2024-04-26
Payer: MEDICAID

## 2024-04-26 DIAGNOSIS — J18.9 PNEUMONIA OF RIGHT LOWER LOBE DUE TO INFECTIOUS ORGANISM: Primary | ICD-10-CM

## 2024-04-26 LAB
ACANTHOCYTES BLD QL SMEAR: NORMAL
ALBUMIN SERPL BCP-MCNC: 2.6 G/DL (ref 3.4–5)
ALP SERPL-CCNC: 288 U/L (ref 33–120)
ALT SERPL W P-5'-P-CCNC: 11 U/L (ref 10–52)
AMORPH CRY #/AREA UR COMP ASSIST: ABNORMAL /HPF
ANION GAP SERPL CALC-SCNC: 19 MMOL/L (ref 10–20)
APPEARANCE UR: ABNORMAL
AST SERPL W P-5'-P-CCNC: 48 U/L (ref 9–39)
BASOPHILS # BLD AUTO: 0.02 X10*3/UL (ref 0–0.1)
BASOPHILS NFR BLD AUTO: 0.1 %
BILIRUB SERPL-MCNC: 1.2 MG/DL (ref 0–1.2)
BILIRUB UR STRIP.AUTO-MCNC: NEGATIVE MG/DL
BUN SERPL-MCNC: 125 MG/DL (ref 6–23)
BURR CELLS BLD QL SMEAR: NORMAL
CALCIUM SERPL-MCNC: 6.4 MG/DL (ref 8.6–10.3)
CHLORIDE SERPL-SCNC: 87 MMOL/L (ref 98–107)
CO2 SERPL-SCNC: 24 MMOL/L (ref 21–32)
COLOR UR: YELLOW
CREAT SERPL-MCNC: 4.75 MG/DL (ref 0.5–1.3)
EGFRCR SERPLBLD CKD-EPI 2021: 14 ML/MIN/1.73M*2
EOSINOPHIL # BLD AUTO: 0.01 X10*3/UL (ref 0–0.7)
EOSINOPHIL NFR BLD AUTO: 0.1 %
ERYTHROCYTE [DISTWIDTH] IN BLOOD BY AUTOMATED COUNT: 25.7 % (ref 11.5–14.5)
GLUCOSE BLD MANUAL STRIP-MCNC: 135 MG/DL (ref 74–99)
GLUCOSE BLD MANUAL STRIP-MCNC: 139 MG/DL (ref 74–99)
GLUCOSE BLD MANUAL STRIP-MCNC: 183 MG/DL (ref 74–99)
GLUCOSE BLD MANUAL STRIP-MCNC: 84 MG/DL (ref 74–99)
GLUCOSE BLD MANUAL STRIP-MCNC: 88 MG/DL (ref 74–99)
GLUCOSE SERPL-MCNC: 81 MG/DL (ref 74–99)
GLUCOSE UR STRIP.AUTO-MCNC: NEGATIVE MG/DL
HCT VFR BLD AUTO: 27 % (ref 41–52)
HGB BLD-MCNC: 8.4 G/DL (ref 13.5–17.5)
HOLD SPECIMEN: NORMAL
HYPOCHROMIA BLD QL SMEAR: NORMAL
IMM GRANULOCYTES # BLD AUTO: 0.19 X10*3/UL (ref 0–0.7)
IMM GRANULOCYTES NFR BLD AUTO: 1 % (ref 0–0.9)
INR PPP: 1.3 (ref 0.9–1.1)
KETONES UR STRIP.AUTO-MCNC: NEGATIVE MG/DL
LEUKOCYTE ESTERASE UR QL STRIP.AUTO: NEGATIVE
LYMPHOCYTES # BLD AUTO: 1.06 X10*3/UL (ref 1.2–4.8)
LYMPHOCYTES NFR BLD AUTO: 5.8 %
MAGNESIUM SERPL-MCNC: 1.98 MG/DL (ref 1.6–2.4)
MCH RBC QN AUTO: 19.8 PG (ref 26–34)
MCHC RBC AUTO-ENTMCNC: 31.1 G/DL (ref 32–36)
MCV RBC AUTO: 64 FL (ref 80–100)
MONOCYTES # BLD AUTO: 1.16 X10*3/UL (ref 0.1–1)
MONOCYTES NFR BLD AUTO: 6.3 %
MUCOUS THREADS #/AREA URNS AUTO: ABNORMAL /LPF
NEUTROPHILS # BLD AUTO: 15.87 X10*3/UL (ref 1.2–7.7)
NEUTROPHILS NFR BLD AUTO: 86.7 %
NITRITE UR QL STRIP.AUTO: NEGATIVE
NRBC BLD-RTO: 0.3 /100 WBCS (ref 0–0)
OVALOCYTES BLD QL SMEAR: NORMAL
PH UR STRIP.AUTO: 5 [PH]
PHOSPHATE SERPL-MCNC: 7.5 MG/DL (ref 2.5–4.9)
PLATELET # BLD AUTO: 338 X10*3/UL (ref 150–450)
POTASSIUM SERPL-SCNC: 5.5 MMOL/L (ref 3.5–5.3)
PROT SERPL-MCNC: 6.3 G/DL (ref 6.4–8.2)
PROT UR STRIP.AUTO-MCNC: NEGATIVE MG/DL
PROTHROMBIN TIME: 14.5 SECONDS (ref 9.8–12.8)
RBC # BLD AUTO: 4.25 X10*6/UL (ref 4.5–5.9)
RBC # UR STRIP.AUTO: ABNORMAL /UL
RBC #/AREA URNS AUTO: ABNORMAL /HPF
RBC MORPH BLD: NORMAL
SCHISTOCYTES BLD QL SMEAR: NORMAL
SODIUM SERPL-SCNC: 124 MMOL/L (ref 136–145)
SP GR UR STRIP.AUTO: 1.01
TARGETS BLD QL SMEAR: NORMAL
UROBILINOGEN UR STRIP.AUTO-MCNC: <2 MG/DL
WBC # BLD AUTO: 18.3 X10*3/UL (ref 4.4–11.3)
WBC #/AREA URNS AUTO: ABNORMAL /HPF

## 2024-04-26 PROCEDURE — 80053 COMPREHEN METABOLIC PANEL: CPT | Performed by: NURSE PRACTITIONER

## 2024-04-26 PROCEDURE — 36415 COLL VENOUS BLD VENIPUNCTURE: CPT | Performed by: NURSE PRACTITIONER

## 2024-04-26 PROCEDURE — 81001 URINALYSIS AUTO W/SCOPE: CPT | Performed by: STUDENT IN AN ORGANIZED HEALTH CARE EDUCATION/TRAINING PROGRAM

## 2024-04-26 PROCEDURE — 99291 CRITICAL CARE FIRST HOUR: CPT | Performed by: NURSE PRACTITIONER

## 2024-04-26 PROCEDURE — 84100 ASSAY OF PHOSPHORUS: CPT | Performed by: HOSPITALIST

## 2024-04-26 PROCEDURE — 73560 X-RAY EXAM OF KNEE 1 OR 2: CPT | Mod: LT

## 2024-04-26 PROCEDURE — 99223 1ST HOSP IP/OBS HIGH 75: CPT | Performed by: STUDENT IN AN ORGANIZED HEALTH CARE EDUCATION/TRAINING PROGRAM

## 2024-04-26 PROCEDURE — C9113 INJ PANTOPRAZOLE SODIUM, VIA: HCPCS | Performed by: NURSE PRACTITIONER

## 2024-04-26 PROCEDURE — 73120 X-RAY EXAM OF HAND: CPT | Mod: LT

## 2024-04-26 PROCEDURE — 2500000004 HC RX 250 GENERAL PHARMACY W/ HCPCS (ALT 636 FOR OP/ED): Performed by: NURSE PRACTITIONER

## 2024-04-26 PROCEDURE — 97530 THERAPEUTIC ACTIVITIES: CPT | Mod: GO,CO

## 2024-04-26 PROCEDURE — 73560 X-RAY EXAM OF KNEE 1 OR 2: CPT | Mod: LEFT SIDE | Performed by: RADIOLOGY

## 2024-04-26 PROCEDURE — 2500000001 HC RX 250 WO HCPCS SELF ADMINISTERED DRUGS (ALT 637 FOR MEDICARE OP): Performed by: NURSE PRACTITIONER

## 2024-04-26 PROCEDURE — 73120 X-RAY EXAM OF HAND: CPT | Mod: LEFT SIDE | Performed by: RADIOLOGY

## 2024-04-26 PROCEDURE — 2500000005 HC RX 250 GENERAL PHARMACY W/O HCPCS: Performed by: NURSE PRACTITIONER

## 2024-04-26 PROCEDURE — 82947 ASSAY GLUCOSE BLOOD QUANT: CPT

## 2024-04-26 PROCEDURE — 2500000004 HC RX 250 GENERAL PHARMACY W/ HCPCS (ALT 636 FOR OP/ED): Mod: JZ | Performed by: STUDENT IN AN ORGANIZED HEALTH CARE EDUCATION/TRAINING PROGRAM

## 2024-04-26 PROCEDURE — 2500000005 HC RX 250 GENERAL PHARMACY W/O HCPCS: Performed by: STUDENT IN AN ORGANIZED HEALTH CARE EDUCATION/TRAINING PROGRAM

## 2024-04-26 PROCEDURE — P9047 ALBUMIN (HUMAN), 25%, 50ML: HCPCS | Mod: JZ | Performed by: STUDENT IN AN ORGANIZED HEALTH CARE EDUCATION/TRAINING PROGRAM

## 2024-04-26 PROCEDURE — 2500000006 HC RX 250 W HCPCS SELF ADMINISTERED DRUGS (ALT 637 FOR ALL PAYERS): Performed by: NURSE PRACTITIONER

## 2024-04-26 PROCEDURE — 85025 COMPLETE CBC W/AUTO DIFF WBC: CPT | Performed by: HOSPITALIST

## 2024-04-26 PROCEDURE — 85610 PROTHROMBIN TIME: CPT | Performed by: NURSE PRACTITIONER

## 2024-04-26 PROCEDURE — 2020000001 HC ICU ROOM DAILY

## 2024-04-26 PROCEDURE — 99232 SBSQ HOSP IP/OBS MODERATE 35: CPT | Performed by: INTERNAL MEDICINE

## 2024-04-26 PROCEDURE — 97530 THERAPEUTIC ACTIVITIES: CPT | Mod: GP,CQ

## 2024-04-26 PROCEDURE — 83735 ASSAY OF MAGNESIUM: CPT | Performed by: HOSPITALIST

## 2024-04-26 RX ORDER — BUMETANIDE 0.25 MG/ML
4 INJECTION INTRAMUSCULAR; INTRAVENOUS ONCE
Status: COMPLETED | OUTPATIENT
Start: 2024-04-26 | End: 2024-04-26

## 2024-04-26 RX ORDER — CYCLOBENZAPRINE HCL 10 MG
5 TABLET ORAL ONCE
Status: COMPLETED | OUTPATIENT
Start: 2024-04-26 | End: 2024-04-26

## 2024-04-26 RX ORDER — ALBUMIN HUMAN 250 G/1000ML
25 SOLUTION INTRAVENOUS ONCE
Status: COMPLETED | OUTPATIENT
Start: 2024-04-26 | End: 2024-04-26

## 2024-04-26 RX ORDER — CHLOROTHIAZIDE SODIUM 500 MG/1
500 INJECTION INTRAVENOUS ONCE
Status: COMPLETED | OUTPATIENT
Start: 2024-04-26 | End: 2024-04-26

## 2024-04-26 RX ADMIN — CLOPIDOGREL BISULFATE 75 MG: 75 TABLET, FILM COATED ORAL at 08:45

## 2024-04-26 RX ADMIN — ACETAMINOPHEN 650 MG: 325 TABLET ORAL at 08:45

## 2024-04-26 RX ADMIN — HEPARIN SODIUM 5000 UNITS: 5000 INJECTION INTRAVENOUS; SUBCUTANEOUS at 08:45

## 2024-04-26 RX ADMIN — MORPHINE SULFATE 2 MG: 2 INJECTION, SOLUTION INTRAMUSCULAR; INTRAVENOUS at 21:08

## 2024-04-26 RX ADMIN — MORPHINE SULFATE 2 MG: 2 INJECTION, SOLUTION INTRAMUSCULAR; INTRAVENOUS at 05:38

## 2024-04-26 RX ADMIN — MUPIROCIN: 20 OINTMENT TOPICAL at 10:00

## 2024-04-26 RX ADMIN — MUPIROCIN: 20 OINTMENT TOPICAL at 20:57

## 2024-04-26 RX ADMIN — DOCUSATE SODIUM 100 MG: 100 CAPSULE, LIQUID FILLED ORAL at 08:45

## 2024-04-26 RX ADMIN — LIDOCAINE 4% 1 PATCH: 40 PATCH TOPICAL at 20:57

## 2024-04-26 RX ADMIN — MEROPENEM 1 G: 1 INJECTION, POWDER, FOR SOLUTION INTRAVENOUS at 13:15

## 2024-04-26 RX ADMIN — MIDODRINE HYDROCHLORIDE 10 MG: 5 TABLET ORAL at 13:12

## 2024-04-26 RX ADMIN — BUMETANIDE 4 MG: 0.25 INJECTION INTRAMUSCULAR; INTRAVENOUS at 13:11

## 2024-04-26 RX ADMIN — Medication 3 MG: at 20:59

## 2024-04-26 RX ADMIN — MORPHINE SULFATE 2 MG: 2 INJECTION, SOLUTION INTRAMUSCULAR; INTRAVENOUS at 10:18

## 2024-04-26 RX ADMIN — ATORVASTATIN CALCIUM 80 MG: 80 TABLET, FILM COATED ORAL at 20:57

## 2024-04-26 RX ADMIN — MIDODRINE HYDROCHLORIDE 10 MG: 5 TABLET ORAL at 16:46

## 2024-04-26 RX ADMIN — PANTOPRAZOLE SODIUM 40 MG: 40 INJECTION, POWDER, FOR SOLUTION INTRAVENOUS at 05:37

## 2024-04-26 RX ADMIN — MEROPENEM 1 G: 1 INJECTION, POWDER, FOR SOLUTION INTRAVENOUS at 01:56

## 2024-04-26 RX ADMIN — DOCUSATE SODIUM 100 MG: 100 CAPSULE, LIQUID FILLED ORAL at 20:57

## 2024-04-26 RX ADMIN — MORPHINE SULFATE 2 MG: 2 INJECTION, SOLUTION INTRAMUSCULAR; INTRAVENOUS at 01:22

## 2024-04-26 RX ADMIN — HEPARIN SODIUM 5000 UNITS: 5000 INJECTION INTRAVENOUS; SUBCUTANEOUS at 00:57

## 2024-04-26 RX ADMIN — MUPIROCIN: 20 OINTMENT TOPICAL at 15:00

## 2024-04-26 RX ADMIN — SODIUM ZIRCONIUM CYCLOSILICATE 10 G: 10 POWDER, FOR SUSPENSION ORAL at 08:45

## 2024-04-26 RX ADMIN — CYCLOBENZAPRINE HYDROCHLORIDE 5 MG: 10 TABLET, FILM COATED ORAL at 09:25

## 2024-04-26 RX ADMIN — CHLOROTHIAZIDE SODIUM 500 MG: 500 INJECTION, POWDER, LYOPHILIZED, FOR SOLUTION INTRAVENOUS at 13:12

## 2024-04-26 RX ADMIN — METHIMAZOLE 12.5 MG: 5 TABLET ORAL at 08:46

## 2024-04-26 RX ADMIN — SODIUM ZIRCONIUM CYCLOSILICATE 10 G: 10 POWDER, FOR SUSPENSION ORAL at 16:46

## 2024-04-26 RX ADMIN — HEPARIN SODIUM 5000 UNITS: 5000 INJECTION INTRAVENOUS; SUBCUTANEOUS at 16:46

## 2024-04-26 RX ADMIN — MIDODRINE HYDROCHLORIDE 10 MG: 5 TABLET ORAL at 08:45

## 2024-04-26 RX ADMIN — ALBUMIN HUMAN 25 G: 0.25 SOLUTION INTRAVENOUS at 13:14

## 2024-04-26 ASSESSMENT — PAIN DESCRIPTION - LOCATION
LOCATION: NECK
LOCATION: KNEE

## 2024-04-26 ASSESSMENT — ENCOUNTER SYMPTOMS
NEUROLOGICAL NEGATIVE: 1
SHORTNESS OF BREATH: 0
CARDIOVASCULAR NEGATIVE: 1
ARTHRALGIAS: 1
JOINT SWELLING: 1
ALLERGIC/IMMUNOLOGIC NEGATIVE: 1
HEMATOLOGIC/LYMPHATIC NEGATIVE: 1
GASTROINTESTINAL NEGATIVE: 1
ENDOCRINE NEGATIVE: 1
PSYCHIATRIC NEGATIVE: 1
FEVER: 0
EYES NEGATIVE: 1
RESPIRATORY NEGATIVE: 1
COLOR CHANGE: 1
CONSTITUTIONAL NEGATIVE: 1

## 2024-04-26 ASSESSMENT — COGNITIVE AND FUNCTIONAL STATUS - GENERAL
CLIMB 3 TO 5 STEPS WITH RAILING: TOTAL
MOVING TO AND FROM BED TO CHAIR: A LOT
HELP NEEDED FOR BATHING: A LOT
DRESSING REGULAR UPPER BODY CLOTHING: A LITTLE
WALKING IN HOSPITAL ROOM: TOTAL
STANDING UP FROM CHAIR USING ARMS: TOTAL
DRESSING REGULAR LOWER BODY CLOTHING: A LOT
MOVING FROM LYING ON BACK TO SITTING ON SIDE OF FLAT BED WITH BEDRAILS: A LOT
MOBILITY SCORE: 9
TURNING FROM BACK TO SIDE WHILE IN FLAT BAD: A LOT
EATING MEALS: A LITTLE
TOILETING: A LOT
PERSONAL GROOMING: A LITTLE
DAILY ACTIVITIY SCORE: 15

## 2024-04-26 ASSESSMENT — PAIN SCALES - GENERAL
PAINLEVEL_OUTOF10: 4
PAINLEVEL_OUTOF10: 4
PAINLEVEL_OUTOF10: 7
PAINLEVEL_OUTOF10: 9
PAINLEVEL_OUTOF10: 5 - MODERATE PAIN
PAINLEVEL_OUTOF10: 8
PAINLEVEL_OUTOF10: 7

## 2024-04-26 ASSESSMENT — PAIN - FUNCTIONAL ASSESSMENT
PAIN_FUNCTIONAL_ASSESSMENT: 0-10

## 2024-04-26 ASSESSMENT — PAIN DESCRIPTION - ORIENTATION: ORIENTATION: LEFT

## 2024-04-26 NOTE — CARE PLAN
The patient's goals for the shift include none    The clinical goals for the shift include Patient will have pain maintained at manageable levels      Problem: Pain - Adult  Goal: Verbalizes/displays adequate comfort level or baseline comfort level  Outcome: Progressing     Problem: Safety - Adult  Goal: Free from fall injury  Outcome: Progressing     Problem: Discharge Planning  Goal: Discharge to home or other facility with appropriate resources  Outcome: Progressing     Problem: Chronic Conditions and Co-morbidities  Goal: Patient's chronic conditions and co-morbidity symptoms are monitored and maintained or improved  Outcome: Progressing     Problem: Diabetes  Goal: Achieve decreasing blood glucose levels by end of shift  Outcome: Progressing  Goal: Increase stability of blood glucose readings by end of shift  Outcome: Progressing  Goal: Decrease in ketones present in urine by end of shift  Outcome: Progressing  Goal: Maintain electrolyte levels within acceptable range throughout shift  Outcome: Progressing  Goal: Maintain glucose levels >70mg/dl to <250mg/dl throughout shift  Outcome: Progressing  Goal: Learn about and adhere to nutrition recommendations by end of shift  Outcome: Progressing  Goal: Increase self care and/or family involovement by end of shift  Outcome: Progressing  Goal: Receive DSME education by end of shift  Outcome: Progressing     Problem: Pain  Goal: Takes deep breaths with improved pain control throughout the shift  Outcome: Progressing  Goal: Turns in bed with improved pain control throughout the shift  Outcome: Progressing  Goal: Walks with improved pain control throughout the shift  Outcome: Progressing     Problem: Fall/Injury  Goal: Not fall by end of shift  Outcome: Progressing  Goal: Be free from injury by end of the shift  Outcome: Progressing  Goal: Verbalize understanding of personal risk factors for fall in the hospital  Outcome: Progressing  Goal: Verbalize understanding of  risk factor reduction measures to prevent injury from fall in the home  Outcome: Progressing  Goal: Use assistive devices by end of the shift  Outcome: Progressing  Goal: Pace activities to prevent fatigue by end of the shift  Outcome: Progressing     Problem: Skin  Goal: Decreased wound size/increased tissue granulation at next dressing change  Outcome: Progressing  Flowsheets (Taken 4/24/2024 1519 by Omayra Smith RN)  Decreased wound size/increased tissue granulation at next dressing change: Protective dressings over bony prominences  Goal: Promote/optimize nutrition  Outcome: Progressing  Flowsheets (Taken 4/26/2024 1116)  Promote/optimize nutrition: Consume > 50% meals/supplements  Goal: Promote skin healing  Outcome: Progressing     Problem: Heart Failure  Goal: Improved gas exchange this shift  Outcome: Progressing  Goal: Improved urinary output this shift  Outcome: Progressing  Goal: Reduction in peripheral edema within 24 hours  Outcome: Progressing  Goal: Report improvement of dyspnea/breathlessness this shift  Outcome: Progressing  Goal: Weight from fluid excess reduced over 2-3 days, then stabilize  Outcome: Progressing  Goal: Increase self care and/or family involvement in 24 hours  Outcome: Progressing

## 2024-04-26 NOTE — PROGRESS NOTES
Physical Therapy    Physical Therapy Treatment    Patient Name: Hitesh Jurado  MRN: [id number]  Today's Date: 4/26/2024  Time Calculation  Start Time: 0959  Stop Time: 1022  Time Calculation (min): 23 min       Assessment/Plan   End of Session Communication: Bedside nurse  Assessment Comment: Patient presented with fair effort throughout todays session. Due to fatigue, weakness, and significant pain in LLE limiting progress this date. Call light and all needs in reach.  End of Session Patient Position: Alarm off, not on at start of session (Pt seated EOB.)      General Visit Information:   PT  Visit  PT Received On: 04/26/24  General  Co-Treatment: OT  Co-Treatment Reason: Due to pt's significant debility for optimal safety and therapy session  Prior to Session Communication: Bedside nurse  Patient Position Received:  (Pt seated EOB)  General Comment: Pt agreeable to therapy this date.  Subjective     Precautions:  Precautions  Medical Precautions: Fall precautions  Precautions Comment: L BKA       Objective     Pain:  Pain Assessment  Pain Assessment: 0-10  Pain Score: 9  Pain Type: Phantom pain  Pain Location: Knee  Pain Orientation: Left      Treatments:        Balance/Neuromuscular Re-Education  Balance/Neuromuscular Re-Education Activity Performed: Yes  Balance/Neuromuscular Re-Education Activity 1: Pt performed static seated EOB balance UE support on mattress SUP, demonstrates adequate cores strength for self-support.  Bed Mobility  Bed Mobility: Yes  Bed Mobility 1  Bed Mobility 1: Scooting  Level of Assistance 1: Dependent  Bed Mobility Comments 1: Pt attempted to perform lateral scooting EOB, did not want therapist assist with attempt. Unable to clear buttock or demonstrate adequate UE strength for scooting. Pt believes it is too difficult due to type of mattress (air mattress). Pt did not want to persue with activity.     Transfers  Transfer: Yes  Transfers 2  Transfer From 2: Bed to  Transfer to 2:  Stand  Technique 2: Stand to sit, Sit to stand  Transfer Device 2: Walker (FWW)  Transfer Level of Assistance 2: Dependent, +2  Trials/Comments 2: Pt attempted STS multiple times from EOB<>FWW DEP x2 for task, unable to clear buttock.. VC required for sequencing; however did not result in improvement of task. Raised bed in attempt to provide more levarage with higher surface, pt still unable to complete task.          Outcome Measures:  [facility] Basic Mobility  Turning from your back to your side while in a flat bed without using bedrails: A lot  Moving from lying on your back to sitting on the side of a flat bed without using bedrails: A lot  Moving to and from bed to chair (including a wheelchair): A lot  Standing up from a chair using your arms (e.g. wheelchair or bedside chair): Total  To walk in hospital room: Total  Climbing 3-5 steps with railing: Total  Basic Mobility - Total Score: 9  Education Documentation  Mobility Training, taught by Sara Arroyo PTA at 4/26/2024  1:06 PM.  Learner: Patient  Readiness: Acceptance  Method: Explanation, Demonstration  Response: Verbalizes Understanding, Needs Reinforcement    Education Comments  No comments found.        EDUCATION:     Encounter Problems       Encounter Problems (Active)       Impaired mobility        Perform all bed mobility with indep. (Not Progressing)       Start:  04/24/24    Expected End:  05/05/24            Perform low pivot transfers with mod. indep.  (Not Progressing)       Start:  04/24/24    Expected End:  05/05/24            Patient will perform BLE HEP with supervision  (Not Progressing)       Start:  04/24/24    Expected End:  05/05/24               Pain - Adult

## 2024-04-26 NOTE — PROGRESS NOTES
Occupational Therapy    OT Treatment    Patient Name: Hitesh Jurado  MRN: [id number]  Today's Date: 4/26/2024  Time Calculation  Start Time: 0958  Stop Time: 1017  Time Calculation (min): 19 min         Assessment:  End of Session Communication: Bedside nurse  End of Session Patient Position: Alarm off, not on at start of session, Bed, 2 rail up (pt requesting to sit EOB)       Subjective   Previous Visit Info:  OT Last Visit  OT Received On: 04/26/24  General:  General  Co-Treatment:  (cotreat to increase pt safety and indep with functional transfers/aDLs)  Prior to Session Communication: Bedside nurse  Patient Position Received: Bed, 2 rail up, Alarm off, not on at start of session (pt seated EOB at time of OT tx)  General Comment: pt agreeable to OT tx  Precautions:  Medical Precautions: Fall precautions  Precautions Comment: L BKA    Pain:  Pain Assessment  Pain Assessment:  (increased pain in R knee, 7/10 rating)    Objective    Cognition:  Cognition  Orientation Level: Oriented X4     Activities of Daily Living: Grooming  Grooming Comments: pt demos F + unsupported sitting balance while engaging in grooming tasks, pt seated EOB ~ 10 min  Functional Standing Tolerance:     Bed Mobility/Transfers: Transfers  Transfer:  (pt dep x 2 for sit  < > stand transfers with use of gair belt and FWW, pt unable to fully clear buttocks from EOB, pt performed x 2 attempts and dep x 2 for both.)       Therapy/Activity: Therapeutic Activity  Therapeutic Activity Performed:  (pt seated engaged in lateral scooting towards HOB, pt wanting to perform on his own however unable d/t weakness. pt dep x 2 for scooting towards HOB.)      Outcome Measures:[facility] Daily Activity  Putting on and taking off regular lower body clothing: A lot  Bathing (including washing, rinsing, drying): A lot  Putting on and taking off regular upper body clothing: A little  Toileting, which includes using toilet, bedpan or urinal: A lot  Taking care of  personal grooming such as brushing teeth: A little  Eating Meals: A little  Daily Activity - Total Score: 15        Education Documentation  ADL Training, taught by CHRISTA Arevalo at 4/26/2024  1:11 PM.  Learner: Patient  Readiness: Acceptance  Method: Explanation  Response: Needs Reinforcement  Comment: pt educated on OT POC and BUE strengthening exercises    Education Comments  No comments found.        OP EDUCATION:       Goals:  Encounter Problems       Encounter Problems (Active)       OT Goals       Mod I bilat. UE and core strengthening HEP.  (Progressing)       Start:  04/24/24    Expected End:  05/05/24            Sup for bed/chair/commode transfers, scoot pivots vs. slide board. (Progressing)       Start:  04/24/24    Expected End:  05/05/24            Min assist LB dressing.  (Progressing)       Start:  04/24/24    Expected End:  05/05/24            Min assist LB bathing.  (Progressing)       Start:  04/24/24    Expected End:  05/05/24            Min assist toileting.  (Progressing)       Start:  04/24/24    Expected End:  05/05/24

## 2024-04-26 NOTE — CONSULTS
Consults      ID Consult: Multiple wounds, worsening leukocytosis    HPI 53-year-old male with worsening shortness of breath and recurrent pleural effusion.  Patient has a history of cirrhosis of the liver and CHF, recently had thoracentesis and paracentesis with negative fluid cultures.  Has a history of left BKA with bilateral wounds to the lower extremities and stump.  His left knee in particular has a wound that looks like it goes down all the way to the bone.  Purulence noted.  Photos obtained and uploaded to the chart.    Noted to be tachycardic hypoxic was previously admitted to the eighth floor but due to elevating troponins and worsening overall clinical picture patient was transferred to the intensive care unit where he is currently being seen.    He is in significant pain.  Cachectic appearing, weak in general.  Appears to be chronically ill.  Nasal cannula oxygen in place.  Seen with nurse at bedside    All 14 ROS discussed with the patient and negative other than as stated as above.  Patient keeps repeating that he feels like he is getting better although his body language is describing otherwise       has a past medical history of CHF (congestive heart failure) (Multi) and Diabetes mellitus (Multi).       reports that he has quit smoking. His smoking use included cigarettes. He has quit using smokeless tobacco. He reports that he does not drink alcohol and does not use drugs.    No particular family history at this time      Physical exam  Vitals:    04/26/24 0000   BP: 109/70   Pulse: 84   Resp: 17   Temp:    SpO2: 98%     No diaphoresis, cachexia  Patient is awake and alert, ability to move all limbs  Multiple wounds noted especially to the bilateral lower extremities, patient also has wound on the right side of the abdomen  NAD  Neck supple  Heart S1S2  Chest: Equal expansion, bilaterally diminished with right lower lobe crackles  Abdomen: soft, protuberant, some tenderness to palpation in  general  Extrem: no pain to palpation  Skin: no rashes  Neuro: CNS intact  Affect appropriate and patient is interactive    No results displayed because visit has over 200 results.          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  Diabetes mellitus with hyperglycemia  History of tobacco use  Cachexia  Peripheral arterial disease  Cirrhosis with ascites  Electrolyte imbalance, hyponatremia  Left lower extremity BKA history with multiple wounds  Status postthoracentesis on 4/22/2024, cultures negative    Plan:   Leukocytosis polyfactoral  Started empirically on meropenem for now, follow-up all culture results  Will definitely need Ortho consult regarding his left knee and possibly other areas  Wound culture would be helpful left knee since this is the area of the most purulence  Photos uploaded with the nurse to the chart of his multiple wounds  Case discussed with palliative care as well, patient does not ready for hospice per my conversation with palliative care  O2 support  Wound care consult  Optimal glycemic control    All communication directed to consulting provider.   Thank you very much for this consultation.

## 2024-04-26 NOTE — ACP (ADVANCE CARE PLANNING)
Confirming Previous Code Status:   Advance Care Planning Note     Discussion Date: 04/26/24   Discussion Participants: patient and brother and niece    The patient wishes to discuss Advance Care Planning today and the following is a brief summary of our discussion.     Patient has capacity to make their own medical decisions: Yes  Health Care Agent/Surrogate Decision Maker documented in chart: Yes    Documents on file and valid:  Advance Directive/Living Will: No   Health Care Power of : Yes  Other:DNR    Communication of Medical Status/Prognosis:   Prognosis discussed with patient, brother, and niece.  Questions asked and answered to the best of my ability which I believe was to their satisfaction.    Communication of Treatment Goals/Options:   Pt is presently ok with current quality of life as long a cognizant and can make his own medical decisions.  Everyone understand very poor prognosis long term.  Should he become incapacitated and unable to make his own medical decisions then that is a life that would be unacceptable and would want to focus on comfort at that time.      Treatment Decisions  Goals of Care: survival is prioritized, if goals for quality or survival can reasonably be achieved   [facility] [place] [facility] will need [facility] upon [facility]  Follow Up Plan  Palliative care referral at discharge.  SW referral for several social issues that pt's family have questions about.  Team Members  Romero LAND  -Updated critical care team  Time Statement: Total face to face time spent on advance care planning was 95 minutes     SHANDA Tong-CNP  4/26/2024 3:43 PM

## 2024-04-26 NOTE — PROGRESS NOTES
Nephrology Progress Note    Assessment:  53 y.o. male with history s/f HFrEF, PAD s/p LT BKA, T2DM c/b chronic non-healing wounds, cirrhosis who presented for SOB for several days.      PIPPA on CKD stage II/III: initially felt in setting of diuretics, not significantly hypotensive, no contrast, not on any other nephrotoxic medications, function was at baseline on presentation w/ Scr ~1.4 w/ eGFR high 50s/low 60s and worsened during admission, now fluid overloaded, ? Decompensated CHF vs. HRS, urine Na/Cl <10  Fluid overload: improved, has combined HFrEF 20-25%   Recurrent RT sided pleural effusion: had 3L taken off at [facility] on 4/16, present here again, now s/p RT sided thoracentesis w/ 2.5L taken off  Cirrhosis c/b ascites: now s/p paracentesis (4/25), 2.9L taken off   Hypotension: on midodrine   Hypoalbuminemia  Anemia   RT sided PNA   Hyponatremia     Plan:  - checking UA  - low threshold for RRT, pt agreeable and consented   - ok to see how he responds w/ the high dose diuretics and albumin  - if labs continue to worsen +/- has low UOP will place HD orders following line placement     Subjective:  Admit Date: 4/18/2024    Interval History: function stilll worsening, pt did however make some urine with high dose bumex/diuril given yesterday, K better today w/ medical management, currently in ICU, pt feeling better     Medications:  Scheduled Meds:albumin human, 25 g, intravenous, Once  [Held by provider] aspirin, 81 mg, oral, Daily  atorvastatin, 80 mg, oral, Nightly  bumetanide, 4 mg, intravenous, Once  chlorothiazide, 500 mg, intravenous, Once  clopidogrel, 75 mg, oral, Daily  docusate sodium, 100 mg, oral, BID  [Held by provider] furosemide, 80 mg, intravenous, Daily  heparin (porcine), 5,000 Units, subcutaneous, q8h  insulin glargine, 25 Units, subcutaneous, q24h  insulin lispro, 0-10 Units, subcutaneous, TID with meals  insulin lispro, 10 Units, subcutaneous, TID with meals  lidocaine, 1 patch, transdermal,  "Daily  meropenem, 1 g, intravenous, q12h  methIMAzole, 12.5 mg, oral, Daily  metoprolol succinate XL, 25 mg, oral, Daily  midodrine, 10 mg, oral, TID with meals  mupirocin, , Topical, TID  oxygen, , inhalation, Continuous - Inhalation  pantoprazole, 40 mg, oral, Daily before breakfast   Or  pantoprazole, 40 mg, intravenous, Daily before breakfast  perflutren lipid microspheres, 0.5-10 mL of dilution, intravenous, Once in imaging  sodium zirconium cyclosilicate, 10 g, oral, q8h  [Held by provider] spironolactone, 100 mg, oral, Daily      Continuous Infusions:       CBC:   Lab Results   Component Value Date    WBC 18.3 (H) 04/26/2024    RBC 4.25 (L) 04/26/2024    HGB 8.4 (L) 04/26/2024    HCT 27.0 (L) 04/26/2024    MCV 64 (L) 04/26/2024    MCH 19.8 (L) 04/26/2024    MCHC 31.1 (L) 04/26/2024    RDW 25.7 (H) 04/26/2024     04/26/2024     BMP:    Lab Results   Component Value Date     (L) 04/26/2024    K 5.5 (H) 04/26/2024    CL 87 (L) 04/26/2024    CO2 24 04/26/2024     (HH) 04/26/2024    CREATININE 4.75 (H) 04/26/2024    CALCIUM 6.4 (L) 04/26/2024    GLUCOSE 81 04/26/2024       Objective:  Vitals: BP 90/64   Pulse 86   Temp 36.4 °C (97.5 °F) (Temporal)   Resp 17   Ht 1.753 m (5' 9\")   Wt 69 kg (152 lb 1.9 oz)   SpO2 96%   BMI 22.46 kg/m²    Wt Readings from Last 3 Encounters:   04/25/24 69 kg (152 lb 1.9 oz)   03/24/24 65 kg (143 lb 4.8 oz)   10/30/23 55.9 kg (123 lb 3.8 oz)      24HR INTAKE/OUTPUT:    Intake/Output Summary (Last 24 hours) at 4/26/2024 1258  Last data filed at 4/26/2024 0800  Gross per 24 hour   Intake 1313 ml   Output 450 ml   Net 863 ml       General: alert, in mild distress  HEENT: normocephalic, atraumatic, anicteric  Lungs: non-labored respirations, clear to auscultation bilaterally  Heart: regular rate and rhythm, no murmurs or rubs  Abdomen: soft, non-tender, distended  Ext: no cyanosis, ++ peripheral edema, LT BKA  Neuro: lethargic, no gross " abnormalities      Electronically signed by Malgorzata Williamson MD, MD

## 2024-04-26 NOTE — CONSULTS
Inpatient consult to Orthopaedic Surgery  Consult performed by: SHANDA Carreon-CNP  Consult ordered by: SHANDA Van-CNP  Reason for consult: left hand and left kneen pain concerning for septic arthritis          Consult Note  Patient: Hitesh Jurado  Unit/Bed: 11/11-A  YOB: 1970  MRN: [id number]  Acct: [id number]   Admitting Diagnosis: Pleural effusion [J90]  Elevated troponin [R79.89]  Other ascites [R18.8]  Dyspnea, unspecified type [R06.00]  Date:  4/18/2024  Hospital Day: 8    Complaint:  Left hand and left knee pain     History of Present Illness:  Hitesh Jurado is a 53 year old male per chart review with history of CHF with most recent ejection fraction of 25 to 30%, hypertension, hyperlipidemia, PAD, s/p BKA 3/2022, diabetes, liver cirrhosis who was admitted to the hospital for shortness of breath.  He has multiple medical problems with history of noncompliance.  During hospitalization he reports decreased range of motion to left hand secondary to pain and swelling and pain in the left knee.  Orthopedics was consulted for possible septic joint located in both the hand and the knee.      PAD s/p R iliac stent, CFAe, R to L fem-fem bypass with PTFE (2019) with Dr. Galvez.  History of below-knee amputation in 2022 with vascular surgery.  Now with progression of peripheral arterial disease and new lower extremity wounds bilaterally.    PMHx:  Past Medical History:   Diagnosis Date    CHF (congestive heart failure) (Multi)     Diabetes mellitus (Multi)        PSHx:  No past surgical history on file.    Social Hx:  Social History     Socioeconomic History    Marital status: Single     Spouse name: Not on file    Number of children: Not on file    Years of education: Not on file    Highest education level: Not on file   Occupational History    Not on file   Tobacco Use    Smoking status: Former     Types: Cigarettes    Smokeless tobacco: Former   Vaping Use    Vaping  status: Never Used   Substance and Sexual Activity    Alcohol use: Never    Drug use: Never    Sexual activity: Defer   Other Topics Concern    Not on file   Social History Narrative    Not on file     Social Determinants of Health     Financial Resource Strain: Low Risk  (4/19/2024)    Overall Financial Resource Strain (CARDIA)     Difficulty of Paying Living Expenses: Not hard at all   Food Insecurity: Patient Unable To Answer (4/2/2024)    Received from [facility]    Hunger Vital Sign     Worried About Running Out of Food in the Last Year: Patient unable to answer     Ran Out of Food in the Last Year: Patient unable to answer   Transportation Needs: No Transportation Needs (4/19/2024)    PRAPARE - Transportation     Lack of Transportation (Medical): No     Lack of Transportation (Non-Medical): No   Recent Concern: Transportation Needs - Unmet Transportation Needs (3/18/2024)    PRAPARE - Transportation     Lack of Transportation (Medical): Yes     Lack of Transportation (Non-Medical): Yes   Physical Activity: Unknown (2/8/2024)    Received from [facility]    Exercise Vital Sign     Days of Exercise per Week: 0 days     Minutes of Exercise per Session: Patient declined   Recent Concern: Physical Activity - Inactive (12/14/2023)    Received from [facility]    Exercise Vital Sign     Days of Exercise per Week: 0 days     Minutes of Exercise per Session: 0 min   Stress: No Stress Concern Present (2/8/2024)    Received from [facility]    [employer] - Occupational Stress Questionnaire     Feeling of Stress : Not at all   Social Connections: Unknown (2/8/2024)    Received from [facility]    Social Connection and Isolation Panel [NHANES]     Frequency of Communication with Friends and Family: More than three times a week     Frequency of Social Gatherings with Friends and Family: More than three times a week     Attends Anabaptist Services: Patient  declined     Active Member of Clubs or Organizations: No     Attends Club or Organization Meetings: Patient declined     Marital Status: Not on file   Recent Concern: Social Connections - Socially Isolated (12/14/2023)    [facility]    Social Connection and Isolation Panel [NHANES]     Frequency of Communication with Friends and Family: More than three times a week     Frequency of Social Gatherings with Friends and Family: More than three times a week     Attends Taoist Services: Never     Active Member of Clubs or Organizations: No     Attends Club or Organization Meetings: Patient refused     Marital Status: Never    Intimate Partner Violence: Not At Risk (12/14/2023)    [facility]    Humiliation, Afraid, Rape, and Kick questionnaire     Fear of Current or Ex-Partner: No     Emotionally Abused: No     Physically Abused: No     Sexually Abused: No   Housing Stability: Low Risk  (4/19/2024)    Housing Stability Vital Sign     Unable to Pay for Housing in the Last Year: No     Number of Places Lived in the Last Year: 1     Unstable Housing in the Last Year: No       Family Hx:  No family history on file.    Review of Systems:   Review of Systems   Constitutional: Negative.  Negative for fever.   HENT: Negative.     Eyes: Negative.    Respiratory: Negative.  Negative for shortness of breath.    Cardiovascular: Negative.  Negative for leg swelling.   Gastrointestinal: Negative.    Endocrine: Negative.    Genitourinary: Negative.    Musculoskeletal:  Positive for arthralgias and joint swelling.   Skin:  Positive for color change.   Allergic/Immunologic: Negative.    Neurological: Negative.    Hematological: Negative.    Psychiatric/Behavioral: Negative.           Physical Examination:    Visit Vitals  /65   Pulse 84   Temp 36 °C (96.8 °F)   Resp 25      Physical Exam  Vitals and nursing note reviewed.   Constitutional:       General: He is not in acute distress.      Appearance: He is cachectic. He is ill-appearing.   HENT:      Head: Normocephalic.      Nose: Nose normal.   Eyes:      Extraocular Movements: Extraocular movements intact.      Pupils: Pupils are equal, round, and reactive to light.   Cardiovascular:      Rate and Rhythm: Normal rate and regular rhythm.   Abdominal:      General: Bowel sounds are normal.      Palpations: Abdomen is soft.   Musculoskeletal:         General: Tenderness (left fifth metacarpal and left medial knee with palpation) present. No swelling.      Left hand: Tenderness (fifth metacarpal with palpation) present. No deformity. Decreased range of motion (secondary to pain). Normal strength. Normal sensation. Normal pulse.      Cervical back: Normal range of motion.      Left knee: Erythema present. Tenderness present.        Legs:       Comments: Left bka with open wound to the distal tib/fib and lateral knee.    Skin:     General: Skin is warm.      Capillary Refill: Capillary refill takes less than 2 seconds.   Neurological:      General: No focal deficit present.      Mental Status: He is alert.   Psychiatric:         Mood and Affect: Mood normal.         Behavior: Behavior normal. Behavior is cooperative.         LABS:  CBC:   Lab Results   Component Value Date    WBC 18.3 (H) 04/26/2024    RBC 4.25 (L) 04/26/2024    HGB 8.4 (L) 04/26/2024    HCT 27.0 (L) 04/26/2024    MCV 64 (L) 04/26/2024    MCH 19.8 (L) 04/26/2024    MCHC 31.1 (L) 04/26/2024    RDW 25.7 (H) 04/26/2024     04/26/2024     CBC with Differential:    Lab Results   Component Value Date    WBC 18.3 (H) 04/26/2024    RBC 4.25 (L) 04/26/2024    HGB 8.4 (L) 04/26/2024    HCT 27.0 (L) 04/26/2024     04/26/2024    MCV 64 (L) 04/26/2024    MCH 19.8 (L) 04/26/2024    MCHC 31.1 (L) 04/26/2024    RDW 25.7 (H) 04/26/2024    NRBC 0.3 (H) 04/26/2024    LYMPHOPCT 5.8 04/26/2024    MONOPCT 6.3 04/26/2024    EOSPCT 0.1 04/26/2024    BASOPCT 0.1 04/26/2024    MONOSABS 1.16 (H)  "04/26/2024    LYMPHSABS 1.06 (L) 04/26/2024    EOSABS 0.01 04/26/2024    BASOSABS 0.02 04/26/2024     CMP:    Lab Results   Component Value Date     (L) 04/26/2024    K 5.5 (H) 04/26/2024    CL 87 (L) 04/26/2024    CO2 24 04/26/2024     (HH) 04/26/2024    CREATININE 4.75 (H) 04/26/2024    GLUCOSE 81 04/26/2024    PROT 6.3 (L) 04/26/2024    CALCIUM 6.4 (L) 04/26/2024    BILITOT 1.2 04/26/2024    ALKPHOS 288 (H) 04/26/2024    AST 48 (H) 04/26/2024    ALT 11 04/26/2024     BMP:    Lab Results   Component Value Date     (L) 04/26/2024    K 5.5 (H) 04/26/2024    CL 87 (L) 04/26/2024    CO2 24 04/26/2024     (HH) 04/26/2024    CREATININE 4.75 (H) 04/26/2024    CALCIUM 6.4 (L) 04/26/2024    GLUCOSE 81 04/26/2024     Magnesium:  Lab Results   Component Value Date    MG 1.98 04/26/2024     Troponin:  No results found for: \"TROPONINI\"    Imaging   US right upper quadrant    Result Date: 4/26/2024  Interpreted By:  Will Novoa, STUDY: US RIGHT UPPER QUADRANT  4/25/2024 7:10 pm   INDICATION: 52 y/o   M with  Signs/Symptoms:cirrhosis.   COMPARISON: None.   ACCESSION NUMBER(S): [id number]   ORDERING CLINICIAN: PORTER STEWARD   TECHNIQUE: Routine ultrasound of the right upper quadrant was performed. Static images were obtained for remote interpretation.   FINDINGS: LIVER: The hepatic parenchyma is coarsened and hyperechoic. It is difficult to penetrate the liver with the ultrasound beam. Nodular surface contour compatible with the provided history of cirrhosis. Liver measures 18.0 cm in greatest sagittal dimension. No focal intrahepatic mass lesion is identified on the provided images.   BILE DUCTS: No intrahepatic biliary dilatation. Common bile duct is within normal limits, measuring 4mm in diameter.   GALLBLADDER: No gallbladder wall thickening or pericholecystic fluid is observed. No shadowing gallstones are identified.   PANCREAS: The pancreas is inadequately evaluated secondary to significant " shadowing from overlying bowel gas.   RIGHT KIDNEY: Visible portions of the right kidney appears sonographically unremarkable.   PERITONEAL FLUID: Mild ascites. Pleural fluid noted on the right.       Cirrhotic morphology of the liver. Mild ascites. Right pleural effusion.   MACRO: None.   Signed by: Will Novoa 4/26/2024 7:35 AM Dictation workstation:   [id number]    ECG 12 lead    Result Date: 4/25/2024  Normal sinus rhythm Right bundle branch block Abnormal ECG When compared with ECG of 19-APR-2024 03:13, (unconfirmed) Premature ventricular complexes are no longer Present Left anterior fascicular block is no longer Present Nonspecific T wave abnormality has replaced inverted T waves in Anterior leads Confirmed by Jamey Garcia ([id number]) on 4/25/2024 3:38:12 PM    Vascular US upper extremity venous duplex left    Result Date: 4/25/2024  Interpreted By:  Obed Louie, STUDY: VASC US UPPER EXTREMITY VENOUS DUPLEX LEFT;  4/25/2024 1:58 pm   INDICATION: Signs/Symptoms:left hand swelling.   COMPARISON: None.   ACCESSION NUMBER(S): [id number]   ORDERING CLINICIAN: NERY CAMPOS   TECHNIQUE: Vascular ultrasound of the  left upper extremity was performed. Evaluation was performed with grayscale, color, and spectral Doppler. When possible, compression views of the evaluated veins was also performed.   FINDINGS: Evaluation of the visualized portions of the  left internal jugular, innominate, subclavian, axillary, and brachial cephalic, and basilic veins was performed.   All deep venous structures are patent and, where applicable, compressible   Acute thrombosis extensively in the basilic vein   Cephalic vein not identified under ultrasound       Acute, occlusive thrombosis of the left basilic vein   Exam negative for acute deep venous thrombosis in the left upper extremity   MACRO: None   Signed by: Obed Louie 4/25/2024 2:13 PM Dictation workstation:   [id number]    ECG 12 lead    Result Date: 4/25/2024  Sinus  tachycardia with frequent and consecutive Premature ventricular complexes Right bundle branch block Left anterior fascicular block  Bifascicular block  Inferior infarct , age undetermined Abnormal ECG When compared with ECG of 18-APR-2024 20:08, (unconfirmed) No significant change was found Confirmed by Jennifer Perkins ([id number]) on 4/25/2024 2:07:54 PM    US guided abdominal paracentesis    Addendum Date: 4/25/2024    Interpreted By:  Obed Louie, ADDENDUM: The original report incorrectly states that specimens were sent for lab analysis. This was not the case. Only therapeutic, not diagnostic paracentesis performed   Signed by: Obed Louie 4/25/2024 11:56 AM   -------- ORIGINAL REPORT -------- Dictation workstation:   [id number]    Result Date: 4/25/2024  Interpreted By:  Obed Louie, STUDY: US GUIDED ABDOMINAL PARACENTESIS;  4/25/2024 11:09 am   INDICATION: Signs/Symptoms:Paracentesis.   COMPARISON: CT chest without contrast 19 April 2024   ACCESSION NUMBER(S): [id number]   ORDERING CLINICIAN: NEYR CAMPOS   FINDINGS: PREPARATION: The history and physical exam pertinent to the procedure were reviewed and no updates were made   CONSENT: The risks, benefits, treatment options, personnel involved, potential complications including but not limited to internal hemorrhage, infection and damaging abdominal structures were discussed with the  patient.   All questions were answered.  The staff physician personally verified written informed consent was obtained.   TIMEOUT: Audible timeout was performed immediately prior to the procedure.  The patient, myself, as well as the support staff all together confirmed the patient's name, date of birth, patient medications, allergies, platelets and coagulation profile, procedure to be performed, reason for the procedure, laterality of the procedure (if applicable) patient position, handling of any obtained samples / specimens, patient disposition, and necessary equipment for  the procedure were confirmed.   PROCEDURE: Using ultrasound, a dominant pocket of free fluid in the  right lower quadrant was localized and images were obtained and archived.  Skin entry site was marked and then prepped and draped in the usual sterile fashion.  The skin was numbed with 2% lidocaine.   Then, under ultrasound guidance, the subcutaneous soft tissues down to and including the peritoneum was numbed with 2% lidocaine for a total of 10 mL 2% lidocaine used for the procedure.   Using continued ultrasound guidance, a Yueh-type needle/catheter was advanced into the ascites. The catheter was then attached to a vacuum system.   TOTAL VOLUME DRAINED:  2,900 mL   ESTIMATED BLOOD LOSS: < 5 mL   SPECIMEN/S:  The first 60 mL of the aspirated fluid was sent for the requested lab tests.   COMPLICATIONS: No immediate complication   SIGN-OUT DISCUSSION: completed   DR. LOUIE performed the entire procedure.       SUCCESSFUL, UNEVENTFUL ULTRASOUND-GUIDED  DIAGNOSTIC AND THERAPEUTIC PARACENTESIS PERFORMED IN THE  RIGHT LOWER QUADRANT   TOTAL VOLUME DRAINED: 2,900 mL   NO IMMEDIATE COMPLICATIONS   MACRO: None   Signed by: Obed Louie 4/25/2024 11:28 AM Dictation workstation:   [id number]    XR chest 1 view    Result Date: 4/24/2024  Interpreted By:  Obed Louie, STUDY: XR CHEST 1 VIEW;  4/24/2024 9:52 am   INDICATION: Signs/Symptoms:Leukocytosis.   COMPARISON: Portable chest 22 April 2024 and other recent chest imaging back through 1 November 2023   ACCESSION NUMBER(S): [id number]   ORDERING CLINICIAN: NERY CAMPOS   TECHNIQUE: Single frontal view of the chest; Portable technique   FINDINGS: The cardiomediastinal silhouette is unchanged   Left lung remains clear   Right basilar opacification is due to a combination of known pneumonia, effusion and atelectasis   No pneumothorax       As above   MACRO: None   Signed by: Obed Louie 4/24/2024 12:20 PM Dictation workstation:   [id number]    XR chest 1  view    Result Date: 4/22/2024  Interpreted By:  Schoenberger, Joseph, STUDY: XR CHEST 1 VIEW;  4/22/2024 10:54 am   INDICATION: Signs/Symptoms:Post Right Thoracentesis.   COMPARISON: 04/18/2024   ACCESSION NUMBER(S): [id number]   ORDERING CLINICIAN: JOSEPH SCHOENBERGER   FINDINGS: In the interval since the prior exam the patient has undergone a right thoracentesis. There is minimal residual blunting in the lateral costophrenic angle on the right. There is vague indistinct opacity in the periphery of the right lower lung. While some of this could represent infiltrate is also possible the some represent some area of loculated fluid. There is no evidence of post thoracentesis pneumothorax.       CARDIOMEDIASTINAL SILHOUETTE: Cardiomediastinal silhouette is normal in size and configuration.   LUNGS: No new focal lung opacities are seen.   ABDOMEN: No remarkable upper abdominal findings.   BONES: No acute osseous changes.       1.  Satisfactory appearance post right thoracentesis. See discussion above.       MACRO: None   Signed by: Joseph Schoenberger 4/22/2024 10:59 AM Dictation workstation:   [id number]    US thoracentesis    Result Date: 4/22/2024  Interpreted By:  Schoenberger, Joseph, STUDY: US THORACENTESIS; ;  4/22/2024 10:45 am   INDICATION: Signs/Symptoms:Thoracentesis, recurrent right pleural effusion.   COMPARISON: None.   ACCESSION NUMBER(S): [id number]   ORDERING CLINICIAN: SILAS NEGRO   CONSENT: The procedure, its indication, its risks, and alternatives were explained to the patient who understands and consents to the procedure. A time-out is completed prior to the procedure to confirm patient identity and procedure to be performed.   MODALITIES: Ultrasound   TECHNIQUE: The patient is seated leaning forward in ultrasound. The lowest right-sided intracostal space with safe percutaneous access to the patient's right pleural effusion was localized. This site was then marked.   All personnel in the  procedure suite used appropriate mask and cap. Additionally, the performing physician and assistant used maximum barrier technique to include a sterile gown and gloves as per standard hospital protocol. The marked site was sterilely prepped with chlorhexidine in the usual manner. A large sterile barrier was used to to completely draped the patient is outside of the sterilely prepped area in the usual manner per standard hospital protocol. Local anesthetic was administered. A 5 Scottish sheath needle was advanced until gold fluid was obtained. The sheath was advanced off the needle cannula into the pleural space. At this 0.60 cc of fluid was aspirated and sent to the laboratory for studies as ordered by the referring physician. Subsequently, aspiration was performed completion. A total of 2500 cc of fluid was aspirated. With the patient in suspended expiration the sheath was removed. An appropriate dressing was placed. The patient tolerated the procedure well without immediate complication.   FINDINGS: See discussion above       Successful ultrasound-guided diagnostic and therapeutic thoracentesis     MACRO: None   Signed by: Joseph Schoenberger 4/22/2024 10:50 AM Dictation workstation:   [id number]    XR cervical spine 2-3 views    Result Date: 4/21/2024  Interpreted By:  Heber Mcnair, STUDY: XR CERVICAL SPINE 2-3 VIEWS; ;  4/21/2024 12:58 am   INDICATION: Signs/Symptoms:neck pain.   COMPARISON: None.   ACCESSION NUMBER(S): [id number]   ORDERING CLINICIAN: SILAS NEGRO   FINDINGS: Suboptimal lateral view of the neck with only 4 vertebral bodies visualized on the lateral views which are intact and normal in alignment. There is degenerative disc space narrowing at C5-C6 and C6-C7 visualized on the AP view.       Suboptimal lateral radiographs without evidence of fracture in the upper or midcervical spine. Degenerative disc space narrowing partially visualized in the lower cervical spine.     MACRO: None   Signed  by: Heber Mcnair 4/21/2024 1:18 AM Dictation workstation:   [id number]    CT chest wo IV contrast    Result Date: 4/19/2024  Interpreted By:  Obed Louie, STUDY: CT CHEST WO IV CONTRAST;  4/19/2024 11:46 am   INDICATION: Signs/Symptoms:Right pleural effusion, pneumonia.   COMPARISON: Portable chest 18 April 2024; CT chest without contrast 1 November 2023 and 24 October 2023; CT abdomen and pelvis without contrast 13 November 2022; portable chest 28 February 2022 and 27 February 2022   ACCESSION NUMBER(S): [id number]   ORDERING CLINICIAN: SILAS NEGRO   TECHNIQUE: Helical CT chest from thoracic inlet through the hemidiaphragms without intravenous contrast   FINDINGS: LUNGS / AIRSPACES / AIRWAYS:   LARGE AIRWAYS Filling defect: Thin web-like secretions, one in the trachea at the thoracic inlet and another in the bronchus intermedius. No polypoid or otherwise masslike filling defect Wall thickening: Right upper lobe bronchus is thick walled. Other large airways on the right are difficult to evaluate due to obscuration due to consolidations and compression Bronchiectasis: Negative Other: N/A   AIRSPACES Fibrosis: Negative Emphysema: Negative Consolidation: Dense consolidation extensively in portions of all three lobes. None in the left lung Ground glass airspace disease: Negative Edema: Negative Nodule / Mass: Negative Other: N/A   PLEURA: Effusion:  Both sides negative Pneumothorax:  Both sides negative Other:  n/a   CARDIOVASCULAR: Heart size:  Enlarged but unchanged Pericardial effusion:  Negative Thoracic aortic aneurysm:  Negative Pulmonary arteries:  No ectasia Heart failure change:  Negative.  No sign of interstitial or alveolar edema. Other:  n/a   NONVASCULAR MEDIASTINUM: Esophagus:  Grossly normal by CT Mediastinal Mass:  Negative Hiatal hernia:  None Other:  n/a   LYMPH NODES: No thoracic adenopathy   CHEST WALL: Symmetric nonspecific mild to moderate bilateral gynecomastia unchanged   SKELETON: No acute  or contributory abnormality   UPPER ABDOMEN: Incompletely included extent of small volume perihepatic ascites, new at least around the liver from CT chest 1 November 2023       Dense consolidations extensively in all three right lobes. No cavitary component to the pneumonias   Extensively loculated, large right pleural effusion. 3 L removed at an outside institution on 16 April 2024. The right pleural effusion has even increased in size from yesterday's portable chest radiograph. Consult thoracic surgery for tube placement and management   Due to the compressive affects of the effusion, there is partial collapse of the middle and right lower lobes   No left lung pneumonia or other acute process in the left hemithorax   No left pleural effusion   No pneumothorax on either side   MACRO: None   Signed by: Obed Louie 4/19/2024 3:18 PM Dictation workstation:   [id number]    ECG 12 lead    Result Date: 4/19/2024  Sinus tachycardia with occasional Premature ventricular complexes Possible Left atrial enlargement Right bundle branch block Abnormal ECG When compared with ECG of 18-APR-2024 20:06, (unconfirmed) Sinus rhythm has replaced Electronic atrial pacemaker Right bundle branch block is now Present See ED provider note for full interpretation and clinical correlation Confirmed by Leah More ([id number]) on 4/19/2024 10:07:21 AM    ECG 12 lead    Result Date: 4/18/2024  Sinus tachycardia with occasional Premature ventricular complexes Possible Left atrial enlargement Right bundle branch block Abnormal ECG When compared with ECG of 23-MAR-2024 07:08, Inverted T waves have replaced nonspecific T wave abnormality in Anterior leads See ED provider note for full interpretation and clinical correlation Confirmed by Lisa Naidu ([id number]) on 4/18/2024 6:37:26 PM    XR chest 1 view    Result Date: 4/18/2024  Interpreted By:  Kamron Hopkins, STUDY: XR CHEST 1 VIEW;  4/18/2024 6:24 pm   INDICATION: Signs/Symptoms:sob.    COMPARISON: 03/18/2024   ACCESSION NUMBER(S): [id number]   ORDERING CLINICIAN: ASMITA CHAU   FINDINGS:         CARDIOMEDIASTINAL SILHOUETTE: Cardiomediastinal silhouette is normal in size and configuration.   LUNGS: There is a moderate-sized right pleural effusion. There is dense airspace disease in the right mid lower lung, worsening from the prior. The left lung is clear.   ABDOMEN: No remarkable upper abdominal findings.   BONES: No acute osseous changes.       1. Worsening airspace disease at the right lung base. Moderate-sized effusion present. CT can be performed further evaluation       MACRO: None   Signed by: Kamron Hopkins 4/18/2024 6:27 PM Dictation workstation:   [id number]    XR chest 1 view    Result Date: 4/18/2024  * * *Final Report* * * DATE OF EXAM: Apr 18 2024  4:49AM   VHX   [id number]  -  XR CHEST 1V FRONTAL PORT  / ACCESSION #  [id number] PROCEDURE REASON: Shortness of breath      * * * * Physician Interpretation * * * *  EXAMINATION:  CHEST RADIOGRAPH (PORTABLE SINGLE VIEW AP) Exam Date/Time:  4/18/2024 4:49 AM CLINICAL HISTORY: Shortness of breath , recent paracentesis and right-sided thoracentesis with 3 L removed on 04/16/2024. MQ:  XCPR_5 Comparison:  01/15/2024 RESULT: Lines, tubes, and devices:  None. Lungs and pleura:  Increased blunting of the RIGHT costophrenic angle and diffuse airspace opacity of the RIGHT mid to lower lung without midline shift.  LEFT lung appears clear.  No LEFT pleural effusion.  No pneumothorax.  Skin fold artifact on the LEFT. Cardiomediastinal silhouette:  Stable cardiomediastinal silhouette. Other:  No acute congestion.    IMPRESSION: 1.  No pneumothorax. 2.  Diffuse airspace opacity at the RIGHT mid to lower lung which may represent postthoracentesis edema, with moderate volume remaining pleural effusion, but no midline shift.  Infection not excluded.   : MIN[facility]   Transcribe Date/Time: Apr 18 2024  4:58A Dictated by : NATHANAEL SMITH MD This  "examination was interpreted and the report reviewed and electronically signed by: NATHANAEL SMITH MD on Apr 18 2024  5:06AM  EST    XR chest 2 views    1.Interval decrease in right-sided pleural effusion with residual moderate amount of fluid. 2.Right basilar atelectasis, underlying nodule cannot be excluded, therefore follow-up after treatment is recommended with CT or x-ray. 3.   No evidence of pneumothorax. COMPARISON: April 13, 2024 DIAGNOSIS:  post right thoracentesis - Dr. Montenegro to read COMMENTS: J90 Recurrent right pleural effusion ICD10 TECHNIQUE: XR CHEST (2 VW) FINDINGS: Right basilar atelectasis, due to compressive atelectasis, underlying nodules or infiltrates cannot be evaluated for. No evidence of pneumothorax.  Decrease in right pleural effusion with residual moderate amount of fluid.  Cardiac silhouette is enlarged.  Visualized soft tissues, and osseous structures are unremarkable.    IR GUIDED THORACENTESIS PLEURAL    Technically successful ultrasound-guided thoracentesis without immediate complications.  3030 cc of clear yellowish-brown fluid were drained. There remained a small-to-moderate amount of effusion which were unable to be drained due to the large volume and patient beginning to experience discomfort and coughing. If patient continues to have symptoms and unable to remove the remaining fluid medically, then he may require another thoracentesis. HISTORY: BROOK MORELOS is a Male of 53 years age. DIAGNOSIS: Large right pleural effusion COMMENTS: None PROCEDURE: Following the discussion of procedure, risks versus benefits, possible complications, informed consent was obtained.   Following universal protocol, patient and site verification was performed with a \"timeout\" prior to the procedure. Brief survey of the patient's right hemithorax demonstrate moderate amount of pleural effusion.   After selection of an appropriate site for thoracentesis, the thoracic skin was prepped and draped in " usual sterile fashion.  Under ultrasound guidance, using lidocaine for local anesthesia, a 19-gauge Yueh catheter was inserted in the pleural cavity until effusion was aspirated.  Using vacuum system, 3030 cc of clear yellowish turbid brownish effusion was drained.  The catheter was removed and the aspiration site dressed. The fluid was sent for laboratory analysis.   The patient tolerated procedure well.  No immediate complications were identified.  Subsequent chest radiograph demonstrated no evidence of pneumothorax .  The patient left the radiology department in stable condition.  Radiology nurse monitored patient's vital signs throughout the procedure which lasted approximately 30 minutes. Site of thoracentesis:  Right Amount of aspirated pleural effusion: 3030 cc     XR chest 1 view    Result Date: 4/13/2024  EXAMINATION: ONE XRAY VIEW OF THE CHEST 4/13/2024 10:47 am COMPARISON: 04/07/2024 HISTORY: ORDERING SYSTEM PROVIDED HISTORY: SOB TECHNOLOGIST PROVIDED HISTORY: Reason for exam:->SOB What reading provider will be dictating this exam?->CRC FINDINGS: There is a large right pleural effusion present.  This is not significantly changed when compared the previous study.  The left lung appears clear. There is however increased interstitial prominence within the lungs that may represent mild pulmonary edema or fluid overload.  There are no signs of associated pneumothorax.    1. Large right pleural effusion, unchanged. 2. Increased interstitial prominence within lungs concerning for developing pulmonary edema or fluid overload.    XR chest 1 view    Result Date: 4/7/2024  EXAMINATION: ONE XRAY VIEW OF THE CHEST 4/7/2024 6:07 am COMPARISON: 04/04/2024 HISTORY: ORDERING SYSTEM PROVIDED HISTORY: Pleural effusion TECHNOLOGIST PROVIDED HISTORY: Reason for exam:->Pleural effusion What reading provider will be dictating this exam?->CRC FINDINGS: Portable chest reveals no change seen in the moderate to large right pleural  effusion.  Heart is borderline enlarged.  The left lung is grossly clear.  No new focal parenchymal opacification present.    No change seen in the moderate to large right pleural effusion.  Some atelectatic changes again seen in the right lung base with no new focal parenchymal opacification present.    Echocardiogram    Result Date: 4/5/2024    Left Ventricle: Severely reduced left ventricular systolic function with a visually estimated EF of 20 - 25%. Left ventricle size is normal. Normal wall thickness. Normal wall motion. Grade II diastolic dysfunction with increased LAP.   Mitral Valve: Moderately thickened leaflet. Moderate regurgitation.   Tricuspid Valve: Moderate to severe regurgitation with a centrally directed jet. Moderately elevated RVSP 56 mmHg, consistent with moderate pulmonary hypertension.   Left Atrium: Left atrium is mildly dilated.   Image quality is adequate. Left Ventricle Severely reduced left ventricular systolic function with a visually estimated EF of 20 - 25%. Left ventricle size is normal. Normal wall thickness. Normal wall motion. Grade II diastolic dysfunction with increased LAP. Right Ventricle Right ventricle size is normal. Normal systolic function. Left Atrium Left atrium is mildly dilated. Right Atrium Right atrium size is normal. IVC/SVC IVC diameter is less than or equal to 21 mm and decreases less than 50% during inspiration; therefore the estimated right atrial pressure is intermediate (~8 mmHg). IVC is mildly dilated. Mitral Valve Moderately thickened leaflet. Moderate regurgitation. No stenosis noted. Tricuspid Valve Valve structure is normal. Moderate to severe regurgitation with a centrally directed jet. No stenosis noted. Moderately elevated RVSP 56 mmHg, consistent with moderate pulmonary hypertension. Aortic Valve Valve structure is normal. No regurgitation. No stenosis. Pulmonic Valve The pulmonic valve visualization is suboptimal but appears to be functioning  normally. Trace regurgitation. No stenosis noted. Ascending Aorta Normal sized aortic root and ascending aorta. Pericardium No pericardial effusion. Study Details Image quality: adequate. No contrast was given.    XR chest 1 view    Result Date: 4/4/2024  EXAMINATION: ONE XRAY VIEW OF THE CHEST 4/4/2024 6:06 am COMPARISON: April 2, 2024 HISTORY: ORDERING SYSTEM PROVIDED HISTORY: Pleural effusion TECHNOLOGIST PROVIDED HISTORY: Reason for exam:->Pleural effusion What reading provider will be dictating this exam?->CRC FINDINGS: Right basilar opacity with silhouetting of the right hemidiaphragm and right heart border compatible with moderate right effusion and associated right basilar atelectasis, unchanged from April 2, 2024..  The left lung is clear. No cardiomediastinal shift.  Osseous and body wall soft tissues unremarkable.    Moderate right effusion and associated right basilar atelectasis, unchanged from April 2, 2024.    IR US GUIDED PARACENTESIS    Result Date: 4/3/2024  PROCEDURE: PARACENTESIS WITHOUT IMAGE GUIDANCE US ABDOMEN LIMITED 4/3/2024 HISTORY: ORDERING SYSTEM PROVIDED HISTORY: ascites TECHNOLOGIST PROVIDED HISTORY: Reason for exam:->ascites TECHNIQUE: Informed consent was obtained after a detailed explanation of the procedure including risks, benefits, and alternatives.  Universal protocol was followed.  A limited ultrasound of the abdomen was performed. The left abdomen was prepped and draped in sterile fashion and local anesthesia was achieved with lidocaine.  An 5 Telugu needle sheath was advanced into ascites and paracentesis was performed.  The patient tolerated the procedure well. FINDINGS: Limited ultrasound of the abdomen demonstrates ascites. A total of 3215 mL was removed.    Successful paracentesis.    XR chest 1 view    Result Date: 4/2/2024  EXAMINATION: ONE XRAY VIEW OF THE CHEST 4/2/2024 10:50 am COMPARISON: None. HISTORY: ORDERING SYSTEM PROVIDED HISTORY: Altered LOC TECHNOLOGIST  PROVIDED HISTORY: Reason for exam:->Altered LOC What reading provider will be dictating this exam?->CRC FINDINGS: See impression.    1. Pleural and parenchymal opacities right lung base with elevation of the right hemidiaphragm. 2. Cardiomegaly. 3. Hyperinflated left lung. 4. Central bronchial wall thickening and mild interstitial prominence.    US head neck soft tissue    Result Date: 4/2/2024  EXAMINATION: ULTRASOUND OF THE THYROID WITH COLOR DOPPLER FLOW EVALUATION4/2/2024 10:14 am Ultrasound Thyroid COMPARISON: None HISTORY: ORDERING SYSTEM PROVIDED HISTORY: MASS, mass of neck, Possible pulsatile mass of right carotid artery TECHNOLOGIST PROVIDED HISTORY: Reason for exam:->mass of neck, FINDINGS: Size right thyroid lobe: 6.1 x 3.2 x 4.3 cm Size left thyroid lobe: 4.4 x 1.6 x 1.8 cm Size isthmus: 0.4 cm Texture: Homogenous Estimated total number of nodules greater than or equal to 1 cm: 1 Nodule#: # 1: Maximum size: 5.2 cm . All dimensions: 5.2 x 4.1 x 3.4 cm Location: Right Mid Composition: solid or almost completely solid: 2 points Echogenicity: hypoechoic: 2 points Shape: wider than tall: 0 points Margins: smooth: 0 points Echogenic foci: none: 0 points ACR Total Points: 4;  ACR TI-RADS risk category: TR4 -  moderately suspicious nodule. No aneurysm is seen.    1. Nodule 1: ACR TI-RADS 2017 Category 4. Recommend: Ultrasound-guided fine needle aspiration ACR TI-RADS 2017 Recommendations: TR1(0 points) : No FNA or follow up TR2 (2 points) : No FNA or follow up TR3 (3 points) : FNA if >/= 2.5 cm, follow up if 1.5 - 2.4 cm in 1, 3, and 5 years TR4 (4-6 points) : FNA if >/= 1.5 cm, follow up if 1.0 - 1.4 cm in 1, 2, 3, and 5 years TR5 (>/= 7 points) : FNA if >/= 1.0 cm, follow up if 0.5 - 0.9 cm every year for 5 years *ACR TI-RADS recommends that no more than two nodules with the highest ACR TI-RADS total point should be biopsied and no more than four nodules should be followed.        Assessment:    53-year-old  male patient admitted to the hospital for shortness of breath with complaints of left hand pain with decreased range of motion that he reports has improved today and left knee pain from an open wound.  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 PAD s/p R iliac stent, CFAe, R to L fem-fem bypass with PTFE (2019) with Dr. Galvez     Assessment show tenderness over the fifth metacarpal with palpation. There is some erythema and bruising noted. He denies any injury. He reports to have had decreased ROM to his fingers however reports that today his ROM has improved however still has some tenderness. He does not have history of gout.     He has an open wound to left medial knee and distal tib/fib. There is some purulent drainage noted to the knee. He notes pain primarily to the knee. He does not wear his prosthetic any longer and has been wheelchair bound for about two years.      Recommend MRI of left knee and xray of left hand however patient states that he is unable to get MRI due to lower extremities being contracted and it is very painful for him.     Will obtain plain films of left hand and knee and ct scan of left knee.     Further recommendations to follow completion of workup.     He continues on IV antibiotics per infectious disease.   Plan:  Plain films left hand and left knee reviewed.  Ct scan left knee for evaluation of possible abscess   He has considerable vascular history and history of right to left femorofemoral bypass in 2019.  Would recommend vascular surgery evaluation.  Based on wounds could consider above-knee amputation but I am not sure if this is feasible and he would need dedicated vascular evaluation prior to this.  Recommend vascular surgery consult  Left hand pain improving.  No evidence of septic joint on exam.  Dorsal swelling that has improved.  Weight-bear as tolerated.  Ice for comfort.            Electronically signed by HEENA Carreon on 4/26/2024 at 2:14 PM      Debbie  NENITA Parry MD

## 2024-04-26 NOTE — CARE PLAN
Problem: Skin  Goal: Promote skin healing  4/26/2024 1119 by Alessandro Mckeon RN  Outcome: Progressing  4/26/2024 1116 by Alessandro Mckeon RN  Outcome: Progressing     Problem: Heart Failure  Goal: Improved gas exchange this shift  4/26/2024 1119 by Alessandro Mckeon RN  Outcome: Progressing  4/26/2024 1116 by Alessandro Mckeon RN  Outcome: Progressing  Goal: Improved urinary output this shift  4/26/2024 1119 by Alessandro Mckeon RN  Outcome: Progressing  4/26/2024 1116 by Alessandro Mckeon RN  Outcome: Progressing  Goal: Reduction in peripheral edema within 24 hours  4/26/2024 1119 by Alessandro Mckeon RN  Outcome: Progressing  4/26/2024 1116 by Alessandro Mckeon RN  Outcome: Progressing  Goal: Report improvement of dyspnea/breathlessness this shift  4/26/2024 1119 by Alessandro Mckeon RN  Outcome: Progressing  4/26/2024 1116 by Alessandro Mckeon RN  Outcome: Progressing  Goal: Weight from fluid excess reduced over 2-3 days, then stabilize  4/26/2024 1119 by Alessandro Mckeon RN  Outcome: Progressing  4/26/2024 1116 by Alessandro Mckeon RN  Outcome: Progressing  Goal: Increase self care and/or family involvement in 24 hours  4/26/2024 1119 by Alessandro Mckeon RN  Outcome: Progressing  4/26/2024 1116 by Alessandro Mckeon RN  Outcome: Progressing     Problem: Pain - Adult  Goal: Verbalizes/displays adequate comfort level or baseline comfort level  4/26/2024 1119 by Alessandro Mckeon RN  Outcome: Progressing  4/26/2024 1116 by Alessandro Mckeon RN  Outcome: Progressing     Problem: Safety - Adult  Goal: Free from fall injury  4/26/2024 1119 by Alessandro Mckeon RN  Outcome: Progressing  4/26/2024 1116 by Alessandro Mckeon RN  Outcome: Progressing     Problem: Discharge Planning  Goal: Discharge to home or other facility with appropriate resources  4/26/2024 1119 by Alessandro Mckeon RN  Outcome: Progressing  4/26/2024 1116 by Alessandro Mckeon RN  Outcome: Progressing     Problem: Chronic Conditions and  Co-morbidities  Goal: Patient's chronic conditions and co-morbidity symptoms are monitored and maintained or improved  4/26/2024 1119 by Alessandro Mckeon RN  Outcome: Progressing  4/26/2024 1116 by Alessandro Mckeon RN  Outcome: Progressing     Problem: Diabetes  Goal: Achieve decreasing blood glucose levels by end of shift  4/26/2024 1119 by Alessandro Mckeon RN  Outcome: Progressing  4/26/2024 1116 by Alessandro Mckeon RN  Outcome: Progressing  Goal: Increase stability of blood glucose readings by end of shift  4/26/2024 1119 by Alessandro Mckeon RN  Outcome: Progressing  4/26/2024 1116 by Alessandro Mckeon RN  Outcome: Progressing  Goal: Decrease in ketones present in urine by end of shift  4/26/2024 1119 by Alessandro Mckeon RN  Outcome: Progressing  4/26/2024 1116 by Alessandro Mckeon RN  Outcome: Progressing  Goal: Maintain electrolyte levels within acceptable range throughout shift  4/26/2024 1119 by Alessandro Mckeon RN  Outcome: Progressing  4/26/2024 1116 by Alessandro Mckeon RN  Outcome: Progressing  Goal: Maintain glucose levels >70mg/dl to <250mg/dl throughout shift  4/26/2024 1119 by Alessandro Mckeon RN  Outcome: Progressing  4/26/2024 1116 by Alessandro Mckeon RN  Outcome: Progressing  Goal: Learn about and adhere to nutrition recommendations by end of shift  4/26/2024 1119 by Alessandro Mckeon RN  Outcome: Progressing  4/26/2024 1116 by Alessandro Mckeon RN  Outcome: Progressing  Goal: Increase self care and/or family involovement by end of shift  4/26/2024 1119 by Alessandro Mckeon RN  Outcome: Progressing  4/26/2024 1116 by Alessandro Mckeon RN  Outcome: Progressing  Goal: Receive DSME education by end of shift  4/26/2024 1119 by Alessandro Mckeon RN  Outcome: Progressing  4/26/2024 1116 by Alessandro Mckeon RN  Outcome: Progressing   The patient's goals for the shift include none    The clinical goals for the shift include Patient will have pain maintained at manageable levels

## 2024-04-26 NOTE — PROGRESS NOTES
HPI:      Patient is a 53 y.o. male with past medical history of systolic CHF with most recent EF of 25 to 30%, hypertension, hyperlipidemia, PAD, diabetes, liver cirrhosis, who presents initially to [facility] with a chief complaint of  SOB.  Patient with multiple medical problems history of noncompliance.  He states he has not followed up with a cardiologist as outpatient.  He has multiple recurrent admissions recently.  Apparently left AMA from [facility].  Noted to have elevated cardiac enzyme and trending down in the setting of chronic kidney disease.  He denies any chest pain chest pressure heaviness.  Per patient he states he had a coronary angiogram approximately 2 years ago at [facility], no PCI was performed.  Hemoglobin is 7.8, troponin is 743 now.  EKG with ST, RBBB.         Cardiology consulted for acute on chronic systolic heart failure     Echo on 11/2023:  - The left ventricle is severely dilated. Left ventricular systolic function is   severely decreased. EF = 27 ± 5% (2D 4-ch.) Definity contrast used for endocardial    border detection. Indeterminate left ventricular diastolic dysfunction due to >2+    MR.   - The right ventricle is dilated. Right ventricular systolic function is mildly   decreased.   - The left atrial cavity is severely dilated.   - There is moderate (2+ - 3+) mitral valve regurgitation. Regurgitant orifice area    (PISA) is 0.34 cm².   - There is moderate (2+ - 3+) tricuspid valve regurgitation.   - Estimated right ventricular systolic pressure is 35 mmHg consistent with mild   pulmonary hypertension. Estimated right atrial pressure is 8 mmHg based on IVC   assessment.      On examination today, patient resting in bed comfortably.  Denying anginal like symptoms.  Patient does report shortness of breath at rest and with exertion.  He reports shortness of breath is worse when he is laying down and at night.  Patient lives in a nursing home and will need  continuous follow-up with a cardiologist, in which she would like to establish. Patient very noncompliant.     4/21/2024: Patient with worsening renal function.  Nephrology following now.  Patient also being evaluated by cardiothoracic surgery for thoracentesis.  He denies any chest pain.   Previous cardiac catheterization report from [facility] in 2021 reviewed    4/25/2024: Patient in intensive care unit.  Awaiting dialysis.  Status post paracentesis today.  He denies any chest pain.  He is hemodynamically stable.    4/26/2024: No hemodialysis performed today.  Apparently patient is making urine.  Brother is at the bedside.  Hemodynamically stable.  Normal sinus rhythm on telemetry.  No chest pain    Cardiac catheterization at [facility] in 2021   Angiographic findings     LMCA: Very short left main.     LAD: Diffuse mild irregularity up to 20% narrowing.Late mid LAD with 30%   stenosis. Very small caliber D1 and D2 branches with severe diffuse disease.     LCx: Abnormal.Chronically occluded mid circumflex with retrogradely filling   OM branch from left to left collaterals.     RCA: Diffuse mild irregularity up to 20% narrowing.Distal 40% stenosis.                  Past Medical History   No past medical history on file.            Patient Active Problem List   Diagnosis    Hyperthyroidism    Encephalopathy    Hypoglycemia    PIPPA (acute kidney injury) (HCC)         Past Surgical History             Past Surgical History:   Procedure Laterality Date    PARACENTESIS Left 04/03/2024     3215 ml removed by Dr. Simental - diagnostics sent            Social History   Social History                Socioeconomic History    Marital status: Single   Tobacco Use    Smoking status: Never       Passive exposure: Never    Smokeless tobacco: Never   Substance and Sexual Activity    Alcohol use: Never    Drug use: Never      Social Determinants of Health              Food Insecurity: Patient Unable To Answer (4/2/2024)      Hunger Vital Sign      Worried About Running Out of Food in the Last Year: Patient unable to answer      Ran Out of Food in the Last Year: Patient unable to answer   Transportation Needs: Patient Unable To Answer (4/2/2024)     PRAPARE - Transportation      Lack of Transportation (Medical): Patient unable to answer      Lack of Transportation (Non-Medical): Patient unable to answer   Housing Stability: Patient Unable To Answer (4/2/2024)     Housing Stability Vital Sign      Unable to Pay for Housing in the Last Year: Patient unable to answer      Number of Places Lived in the Last Year: 1      Unstable Housing in the Last Year: Patient unable to answer            Family History   No family history on file.         Current Facility-Administered Medications                     Current Facility-Administered Medications   Medication Dose Route Frequency Provider Last Rate Last Admin    insulin glargine (LANTUS) injection vial 25 Units  25 Units SubCUTAneous Nightly Maicol Dela Cruz MD        insulin lispro (HUMALOG) injection vial 8 Units  8 Units SubCUTAneous TID  Maicol Dela Cruz MD   8 Units at 04/04/24 1724    epoetin tika-epbx (RETACRIT) injection 10,000 Units  10,000 Units SubCUTAneous Weekly Allen Mahajan MD   10,000 Units at 04/03/24 2114    furosemide (LASIX) injection 40 mg  40 mg IntraVENous BID Allen Mahajan MD   40 mg at 04/04/24 1724    traMADol (ULTRAM) tablet 50 mg  50 mg Oral Q6H PRN Daily Valentine MD   50 mg at 04/05/24 0554    clopidogrel (PLAVIX) tablet 75 mg  75 mg Oral Daily Ness Bird MD   75 mg at 04/04/24 0846    gabapentin (NEURONTIN) capsule 100 mg  100 mg Oral TID Ness Bird MD        atorvastatin (LIPITOR) tablet 80 mg  80 mg Oral Nightly Ness Bird MD   80 mg at 04/04/24 2152    insulin lispro (HUMALOG) injection vial 0-8 Units  0-8 Units SubCUTAneous TID  Dimitry Joshi PA   6 Units at 04/04/24 1724    insulin lispro (HUMALOG) injection vial 0-4 Units  0-4  Units SubCUTAneous Nightly Dimitry Joshi PA   4 Units at 04/03/24 2008    sodium chloride flush 0.9 % injection 5-40 mL  5-40 mL IntraVENous 2 times per day Daily Valentine MD   10 mL at 04/04/24 2153    sodium chloride flush 0.9 % injection 5-40 mL  5-40 mL IntraVENous PRN Daily Valentine MD        0.9 % sodium chloride infusion   IntraVENous PRN Daily Valentine MD        potassium chloride (KLOR-CON M) extended release tablet 40 mEq  40 mEq Oral PRN Daily Valentine MD         Or    potassium bicarb-citric acid (EFFER-K) effervescent tablet 40 mEq  40 mEq Oral PRN Daily Valentine MD         Or    potassium chloride 10 mEq/100 mL IVPB (Peripheral Line)  10 mEq IntraVENous PRN Daily Valentine MD        magnesium sulfate 2000 mg in 50 mL IVPB premix  2,000 mg IntraVENous PRN Daily Valentine MD        enoxaparin (LOVENOX) injection 40 mg  40 mg SubCUTAneous Daily Daily Valentine MD   40 mg at 04/04/24 0849    ondansetron (ZOFRAN-ODT) disintegrating tablet 4 mg  4 mg Oral Q8H PRN Daily Valentine MD         Or    ondansetron (ZOFRAN) injection 4 mg  4 mg IntraVENous Q6H PRN Daily Valentine MD        polyethylene glycol (GLYCOLAX) packet 17 g  17 g Oral Daily PRN Daily Valentine MD        acetaminophen (TYLENOL) tablet 650 mg  650 mg Oral Q6H PRN Daily Valentine MD   650 mg at 04/02/24 1643     Or    acetaminophen (TYLENOL) suppository 650 mg  650 mg Rectal Q6H PRN Daily Valentine MD        glucose chewable tablet 16 g  4 tablet Oral PRN Daily Valentine MD        dextrose bolus 10% 125 mL  125 mL IntraVENous PRN Daily Valentine MD   Stopped at 04/02/24 1248     Or    dextrose bolus 10% 250 mL  250 mL IntraVENous PRN Daily Valentine MD        glucagon injection 1 mg  1 mg SubCUTAneous PRN Daily Valentine MD        dextrose 10 % infusion   IntraVENous Continuous PRN Daily Valentine MD        midodrine (PROAMATINE) tablet 10 mg  10 mg Oral TID  Lonnie Ortiz MD   10 mg at 04/04/24 5655            ALLERGIES: Amoxicillin     Review of  "Systems   Constitutional: Negative.    HENT: Negative.     Eyes: Negative.    Respiratory:  Positive for shortness of breath. Negative for chest tightness.    Cardiovascular:  Negative for chest pain, palpitations and leg swelling.   Gastrointestinal: Negative.    Endocrine: Negative.    Genitourinary: Negative.    Musculoskeletal: Negative.    Skin: Negative.    Allergic/Immunologic: Negative.    Neurological: Negative.    Hematological: Negative.    Psychiatric/Behavioral: Negative.              VITALS:  Blood pressure 107/74, pulse 80, temperature 99 °F (37.2 °C), resp. rate 17, height 1.778 m (5' 10\"), weight 75.4 kg (166 lb 3.6 oz), SpO2 93 %.  Body mass index is 23.85 kg/m².     Physical Exam  HENT:      Head: Normocephalic.   Cardiovascular:      Rate and Rhythm: Normal rate and regular rhythm.      Heart sounds: No murmur heard.  Pulmonary:      Effort: Pulmonary effort is normal.      Breath sounds: Normal breath sounds.   Abdominal:      General: Bowel sounds are normal. There is distension.   Musculoskeletal:      Right lower leg: No edema.      Left lower leg: Left lower leg edema: BKA.   Skin:     General: Skin is warm and dry.   Neurological:      General: No focal deficit present.      Mental Status: He is alert.   Psychiatric:         Mood and Affect: Mood normal.            LABS:     Pertinent Labs:  CBC:         Recent Labs     04/04/24  0324   WBC 11.8*   HGB 7.2*   *      BMP:        Recent Labs     04/05/24  0447      K 4.0   CL 99   CO2 21   *   CREATININE 2.29*   GLUCOSE 188*   LABGLOM 33.1*      INR:         Recent Labs     04/02/24  0930   INR 1.5      BNP: No results for input(s): \"BNP\" in the last 72 hours.   TSH: No results found for: \"TSH\"   Cardiac Injury Profile: No results for input(s): \"CKTOTAL\", \"CKMB\", \"CKMBINDEX\", \"TROPONINI\" in the last 72 hours.   Troponin: No results found for: \"TROPONINI\"  Lipid Profile: No results found for: \"TRIG\", \"HDL\", \"LDLCALC\", " "\"CHOL\"   Hemoglobin A1C: No components found for: \"HGBA1C\"         Radiology:  XR CHEST PORTABLE     Result Date: 4/4/2024  EXAMINATION: ONE XRAY VIEW OF THE CHEST 4/4/2024 6:06 am COMPARISON: April 2, 2024 HISTORY: ORDERING SYSTEM PROVIDED HISTORY: Pleural effusion TECHNOLOGIST PROVIDED HISTORY: Reason for exam:->Pleural effusion What reading provider will be dictating this exam?->CRC FINDINGS: Right basilar opacity with silhouetting of the right hemidiaphragm and right heart border compatible with moderate right effusion and associated right basilar atelectasis, unchanged from April 2, 2024..  The left lung is clear. No cardiomediastinal shift.  Osseous and body wall soft tissues unremarkable.      Moderate right effusion and associated right basilar atelectasis, unchanged from April 2, 2024.      IR US GUIDED PARACENTESIS     Result Date: 4/3/2024  PROCEDURE: PARACENTESIS WITHOUT IMAGE GUIDANCE US ABDOMEN LIMITED 4/3/2024 HISTORY: ORDERING SYSTEM PROVIDED HISTORY: ascites TECHNOLOGIST PROVIDED HISTORY: Reason for exam:->ascites TECHNIQUE: Informed consent was obtained after a detailed explanation of the procedure including risks, benefits, and alternatives.  Universal protocol was followed.  A limited ultrasound of the abdomen was performed. The left abdomen was prepped and draped in sterile fashion and local anesthesia was achieved with lidocaine.  An 5 Upper sorbian needle sheath was advanced into ascites and paracentesis was performed.  The patient tolerated the procedure well. FINDINGS: Limited ultrasound of the abdomen demonstrates ascites. A total of 3215 mL was removed.      Successful paracentesis.      XR CHEST PORTABLE     Result Date: 4/2/2024  EXAMINATION: ONE XRAY VIEW OF THE CHEST 4/2/2024 10:50 am COMPARISON: None. HISTORY: ORDERING SYSTEM PROVIDED HISTORY: Altered LOC TECHNOLOGIST PROVIDED HISTORY: Reason for exam:->Altered LOC What reading provider will be dictating this exam?->CRC FINDINGS: See " impression.      1. Pleural and parenchymal opacities right lung base with elevation of the right hemidiaphragm. 2. Cardiomegaly. 3. Hyperinflated left lung. 4. Central bronchial wall thickening and mild interstitial prominence.      US HEAD NECK SOFT TISSUE THYROID     Result Date: 4/2/2024  EXAMINATION: ULTRASOUND OF THE THYROID WITH COLOR DOPPLER FLOW EVALUATION4/2/2024 10:14 am Ultrasound Thyroid COMPARISON: None HISTORY: ORDERING SYSTEM PROVIDED HISTORY: MASS, mass of neck, Possible pulsatile mass of right carotid artery TECHNOLOGIST PROVIDED HISTORY: Reason for exam:->mass of neck, FINDINGS: Size right thyroid lobe: 6.1 x 3.2 x 4.3 cm Size left thyroid lobe: 4.4 x 1.6 x 1.8 cm Size isthmus: 0.4 cm Texture: Homogenous Estimated total number of nodules greater than or equal to 1 cm: 1 Nodule#: # 1: Maximum size: 5.2 cm . All dimensions: 5.2 x 4.1 x 3.4 cm Location: Right Mid Composition: solid or almost completely solid: 2 points Echogenicity: hypoechoic: 2 points Shape: wider than tall: 0 points Margins: smooth: 0 points Echogenic foci: none: 0 points ACR Total Points: 4;  ACR TI-RADS risk category: TR4 -  moderately suspicious nodule. No aneurysm is seen.      1. Nodule 1: ACR TI-RADS 2017 Category 4. Recommend: Ultrasound-guided fine needle aspiration ACR TI-RADS 2017 Recommendations: TR1(0 points) : No FNA or follow up TR2 (2 points) : No FNA or follow up TR3 (3 points) : FNA if >/= 2.5 cm, follow up if 1.5 - 2.4 cm in 1, 3, and 5 years TR4 (4-6 points) : FNA if >/= 1.5 cm, follow up if 1.0 - 1.4 cm in 1, 2, 3, and 5 years TR5 (>/= 7 points) : FNA if >/= 1.0 cm, follow up if 0.5 - 0.9 cm every year for 5 years *ACR TI-RADS recommends that no more than two nodules with the highest ACR TI-RADS total point should be biopsied and no more than four nodules should be followed.      Vascular US ankle brachial index (MARILIA) without exercise     Result Date: 3/24/2024            [address]  [place] [address]     Tel [phone] Fax [phone] Vascular Lab Report [facility] US ANKLE BRACHIAL INDEX (MARILIA) WITHOUT EXERCISE Patient Name:      BROOK MORELOS Reading Physician:  [id number] Siena Escobar MD, NIK Study Date:        3/20/2024            Ordering Provider:  [id number] LIAM LEGGETT MRN/PID:           [id number]             Fellow: Accession#:        [id number]         Technologist:       America Hansen                                                             RDMS, RVT Date of Birth/Age: 1970 / 53 years  Technologist 2: Gender:            M                    Encounter#:         [id number] Admission Status:  Inpatient            Location Performed: [facility] Diagnosis/ICD: Atherosclerosis of native arteries of right leg with ulceration                of other part of foot-I70.235 CPT Codes:     50990.52 Peripheral artery MARILIA Only Reduced Service Pertinent History: PVD. LEFT BKA. CONCLUSIONS: Right Lower PVR: No evidence of arterial occlusive disease in the right lower extremity at rest. Decreased digital perfusion noted. Monophasic flow is noted in the right common femoral artery, right posterior tibial artery and right dorsalis pedis artery. Left Lower PVR: Left BKA. Comparison: Compared with study from 2/28/2022, improved right MARILIA from last exam. Imaging & Doppler Findings: RIGHT Lower PVR                Pressures Ratios Right Posterior Tibial (Ankle) 116 mmHg  1.10 Right Dorsalis Pedis (Ankle)   78 mmHg   0.74 Right Digit (Great Toe)        36 mmHg   0.34                    Right     Left Brachial Pressure 105 mmHg 101 mmHg [id number] Siena Escobar MD, NIK Electronically signed by [id number] NIK Sosa MD on 3/24/2024 at 6:54:08 PM ** Final **     Vascular US lower extremity arterial duplex left     Result Date: 3/24/2024            [facility]  [address] [place] [address]     Tel [phone] Fax [phone] Vascular Lab Report VASC US LOWER EXTREMITY ARTERIAL DUPLEX LEFT Patient Name:      BROOK GUZMAN JETHRO Reading Physician:  [id number] Siena Escobar MD, NIK Study Date:        3/20/2024            Ordering Provider:  [id number] JAMI TAVAREZ MRN/PID:           [id number]             Fellow: Accession#:        [id number]         Technologist:       America Hansen RDMS Date of Birth/Age: 1970 / 53 years  Technologist 2: Gender:            M                    Encounter#:         [id number] Admission Status:  Inpatient            Location Performed: [facility] Diagnosis/ICD: Other atherosclerosis of unspecified type of bypass graft(s) of                the extremities, left leg-I70.392 CPT Codes:     90261 Peripheral artery Lower arterial Duplex limited Smoker:            Former. Pertinent History: LE Bypass graft. Pt poor historian. h/o rt to left fem/fem                    bypass and failed fem pop graft (pt unsure of when this was                    done) BKA left, wound on left stump. Per order assess left                    profunda.   CONCLUSIONS: Left Lower Arterial: There is an occlusion documented at the superficial femoral artery proximal, superficial femoral artery mid. and superficial femoral artery distal. Profunda mid 93.2 cm/sec, Distal 38.1 cm/sec. Imaging & Doppler Findings: Right                    Left  PSV                      PSV              EIA        67 cm/s              CFA        56 cm/s       Profunda Proximal 63 cm/s           Popliteal     34 cm/s [id number] Siena Escobar MD, NIK Electronically signed by [id number] Siena Escobar MD, NIK on 3/24/2024 at 6:44:24 PM ** Final **     US guided abdominal paracentesis     Result Date: 3/19/2024  Interpreted By:  Schoenberger, Joseph, STUDY: US GUIDED ABDOMINAL PARACENTESIS; ;  3/19/2024 2:50 pm    INDICATION:  Signs/Symptoms:paracentesis.    COMPARISON: None.    ACCESSION NUMBER(S): [id number]    ORDERING CLINICIAN: ALEXA JUNE    CONSENT: The procedure, its indication, its risks, and alternatives were explained to the patient who understands and consents to the procedure. A time-out is completed prior to the procedure to confirm patient identity and procedure to be performed.    MODALITIES: Ultrasound    TECHNIQUE: Targeted sonographic evaluation of the abdomen demonstrates suitable pocket for paracentesis in the right lower quadrant. This site was then marked.    The marked site was sterilely prepped with chlorhexidine and sterile drapes were placed. Local anesthetic was administered. A 5 Syriac sheath needle was advanced until straw-colored fluid was obtained. The sheath was advanced off the needle cannula into the peritoneal space. At this point aspiration was performed completion. A total of 6300 cc of fluid was aspirated. The sheath was then removed. The patient tolerated the procedure well. There was no immediate complication.    FINDINGS: See discussion above        Successful ultrasound-guided therapeutic paracentesis       MACRO: None    Signed by: Joseph Schoenberger 3/19/2024 2:53 PM Dictation workstation:   [id number]     Lower extremity venous duplex right     Result Date: 3/18/2024  Interpreted By:  Schoenberger, Joseph, STUDY: [facility] US LOWER EXTREMITY VENOUS DUPLEX RIGHT  3/18/2024 2:08 pm    INDICATION: 54 y/o   M with  Signs/Symptoms:edema and decreased pulses in RLE. LMP:  Unknown.    COMPARISON: None.    ACCESSION NUMBER(S): [id number]    ORDERING CLINICIAN: COOKIE TAVAREZ    TECHNIQUE: Routine ultrasound of the  right lower extremity was performed with duplex Doppler (color and spectral) evaluation.   Static images were obtained for remote interpretation.     FINDINGS: THIGH VEINS: There is nonocclusive partially calcified thrombus in the right popliteal vein consistent with chronic  venous thrombus. The right posterior tibial and peroneal as well as superficial femoral and common femoral veins compress normally with normal color Doppler flow..    CALF VEINS:  The paired peroneal and posterior tibial calf veins are patent.        .  There is some nonacute since thrombus chronic thrombus in the right popliteal vein. Otherwise negative exam.    MACRO: None    Signed by: Joseph Schoenberger 3/18/2024 2:37 PM Dictation workstation:   [id number]     US thoracentesis     Result Date: 3/18/2024  Interpreted By:  Schoenberger, Joseph, STUDY: US THORACENTESIS; ;  3/18/2024 10:48 am    INDICATION: Signs/Symptoms:Dyspnea/to evaluate etiology of right pleural effusion.    COMPARISON: None.    ACCESSION NUMBER(S): [id number]    ORDERING CLINICIAN: TEJINDER CHAVIRA    CONSENT: The procedure, its indication, its risks, and alternatives were explained to the patient who understands and consents to the procedure. A time-out is completed prior to the procedure to confirm patient identity and procedure to be performed.    MODALITIES: Ultrasound    TECHNIQUE: Targeted sonographic evaluation of the lower right chest demonstrates a large right pleural effusion. Lowest intracostal space with suitable percutaneous access to this pleural effusion was localized using ultrasound. This site was marked. The marked site was then sterilely prepped with chlorhexidine and a sterile barrier drape was placed. Local anesthetic was administered. A 5 [place] sheath needle was advanced until light green fluid was obtained. The sheath was advanced off the needle cannula to the pleural space. 60 cc of fluid was then aspirated into a syringe and sent to the laboratory for studies as ordered by the referring physician. Subsequently aspiration was performed for therapeutic thoracentesis. After a total aspiration of 1500 cc, the patient experienced some right-sided chest pressure. Therefore the procedure was terminated. The patient  tolerated the procedure well otherwise without other immediate complication PICC    FINDINGS: See discussion above        Successful ultrasound-guided diagnostic and therapeutic thoracentesis       MACRO: None    Signed by: Joseph Schoenberger 3/18/2024 10:58 AM Dictation workstation:   [id number]     XR CHEST 1 VIEW     Result Date: 3/18/2024  Interpreted By:  Schoenberger, Joseph, STUDY: XR CHEST 1 VIEW;  3/18/2024 10:50 am    INDICATION: Signs/Symptoms:Post Right Thoracentesis.    COMPARISON: 03/15/2024    ACCESSION NUMBER(S): [id number]    ORDERING CLINICIAN: JOSEPH SCHOENBERGER    FINDINGS:             CARDIOMEDIASTINAL SILHOUETTE: Cardiac silhouette remains somewhat enlarged.    LUNGS: There is no evidence for right pneumothorax post thoracentesis. Decreased right pleural fluid. Considerable opacity in the lower right hemithorax consistent with residual pleural fluid with underlying basal atelectasis.    ABDOMEN: No remarkable upper abdominal findings.    BONES: No acute osseous changes.        1.  Satisfactory appearance post right thoracentesis. See discussion above.          MACRO: None    Signed by: Joseph Schoenberger 3/18/2024 10:56 AM Dictation workstation:   [id number]     US abdomen complete     Result Date: 3/18/2024  Interpreted By:  Schoenberger, Joseph, STUDY: US ABDOMEN COMPLETE; ;  3/18/2024 9:49 am    INDICATION: Signs/Symptoms:Distended abdomen.  Significant alcohol history. Concern for cirrhosis may need paracentesis.    COMPARISON: None.    ACCESSION NUMBER(S): [id number]    ORDERING CLINICIAN: COOKIE TAVAREZ    TECHNIQUE: 4 quadrant sonographic survey for ascites as well as sonographic evaluation of the right upper quadrant.    FINDINGS: The liver is normal in size measuring 17.9 cm in cephalocaudal dimension. The capsular margins are nodular consistent with cirrhosis. No definite focal lesion is seen.    There is no dilation of the bile ducts. The extrahepatic common duct measures 4  mm.    The gallbladder is not well distended and incompletely evaluated.    There is no right hydronephrosis. The right kidney measures 8.2 cm in longitudinal dimension.    There is no left hydronephrosis. The left kidney measures 7.3 cm in longitudinal dimension.    The spleen is not enlarged measuring 6.8 cm in cephalocaudal dimension. It is sonographically unremarkable.    There is a small to moderate volume of peritoneal fluid        Small to moderate volume of peritoneal fluid.    Cirrhosis.       MACRO: None    Signed by: Joseph Schoenberger 3/18/2024 10:46 AM Dictation workstation:   [id number]     XR TIBIA FIBULA LEFT (2 VIEWS)     Result Date: 3/15/2024  Interpreted By:  Artur Santos, STUDY: XR TIBIA FIBULA LEFT 2 VIEWS;  3/15/2024 10:36 am    INDICATION: Signs/Symptoms:fall, previous below-knee amputation, pain at left stump.    COMPARISON: 06/23/2022.    ACCESSION NUMBER(S): [id number]    ORDERING CLINICIAN: LO SHAW    FINDINGS: Post below-knee amputation changes. No acute fracture or dislocation identified. Tiny patellar osteophytes. Small knee joint effusion suspected. Tiny metallic foreign bodies lateral to the knee joint are still present. Osteopenia.    Atherosclerosis.        Intact left tibia and fibula, post below-knee amputation.    MACRO: None    Signed by: Artur Santos 3/15/2024 11:33 AM Dictation workstation:   [id number]     XR CHEST 1 VIEW     Result Date: 3/15/2024  Interpreted By:  Artur Santos, STUDY: XR CHEST 1 VIEW;  3/15/2024 10:36 am    INDICATION: Signs/Symptoms:fall.    COMPARISON: 11/01/2023    ACCESSION NUMBER(S): [id number]    ORDERING CLINICIAN: LO SHAW    FINDINGS: Large right basilar pleural effusion occupying greater than 50% of the hemithoracic volume. Overlying atelectasis. Left lung grossly clear without pleural effusion. Cardiomegaly suspected. Aortic atherosclerosis. Mild pulmonary vascular congestion.        Large right pleural effusion.     MACRO: None    Signed by: Artur Santos 3/15/2024 11:33 AM Dictation workstation:   [id number]     OCT MACULA CIRRUS OU (BOTH EYES)     Result Date: 3/6/2024  Date of Procedure 3/6/2024. Technician Information Imaging Technician: niecy. Interpretation Right Eye Findings include Cystoid macular edema, Epiretinal membrane. Left Eye Findings include Cystoid macular edema. Interval Change Right Eye Better. Left Eye Better.         EKG: sinus bradycardia, RBBB              Assessment plan:      Elevated troponin.  Rule out underlying cardiac ischemia   Acute on chronic decompensated combined heart failure with reduced ejection fraction EF 20-25%  History of coronary disease with known circumflex chronic total occlusion by cath in 2021 at [facility]  History of PAD with life AKA  Decompensated liver cirrhosis  PIPPA on CKD  Diabetes  Hypertension  Hyperlipidemia  Hypokalemia  Anemia  Right-sided pleural effusion.  Encephalopathy-resolved  History of medical noncompliance  Right bundle branch block        Plan:  ICU supportive care and management.  Ideally patient would require cardiac catheterization given elevated cardiac enzyme, history of CAD, acute congestive heart failure and multiple risk factors for CAD.  Patient with worsening renal function and I have concern regarding his noncompliance.  Patient with multiple comorbidities, not ideal PCI candidate.  Will continue to evaluate.  Not ready for cardiac catheterization  Continue telemetry  Max cardiac meds  Avoid nephrotoxic agents  Nephrology recommendations  Cardiothoracic surgery recommendations  Patient not a candidate for LifeVest given history of noncompliance  Please keep potassium between 4 and 5 magnesium above 2  Please keep hemoglobin above 8 and platelets above 50  Prognosis is poor.  Palliative care following      Electronically signed by Antony Batista DO on 4/26/2024 at 4:41 PM

## 2024-04-26 NOTE — CONSULTS
Social Work Note  The LSW received a Palliative Care referral from the Palliative Care CNP this date. The pt's brother(with his adult dtr) has traveled this date from his home in [place]. The LSW provided the pt and brother with a freedom of choice in selecting a Palliative Care agency and provided a list to review. The pt also stated that he is to the point of understanding that he can no longer live alone in his apt. The brother stated that the apt is subsidized and questioned when the best time to move the things out/stop payment of the pt's apt.  The brother has his own health issues and needs to receive a treatment in [place] on May 17th which then leaves the brother in a weakened state for 5 weeks time. The brother was considering this in having to empty the pt's apt. The brother will be in town for approximately 5 days and will then be available by phone being the HC POA.   The LSW reviewed with the pt and brother the Living Will and the pt and brother then stated the pt's wishes clearly and the brother is comfortable in following through with the pt's stated HC wishes and did not feel the need to complete a Living Will at this time.   The brother was also provided a list of [facility] SNF's to review. The pt has received care in [facility] and [facility] in the past.  The pt would NOT choose [facility] again.    The brother was also provided the Senior Living Guide with Legal resources re: a financial/durable POA to assist with the pt's financials. The brother was provided a [facility]  Notary contact information if needing a financial/durable POA notorized.   The pt and brother will choice a Palliative care agency once a nursing facility has been chosen and the facility has accepted the pt.

## 2024-04-26 NOTE — PROGRESS NOTES
"[facility] Critical Care Medicine       Date:  4/26/2024  Patient:  Hitesh Jurado  YOB: 1970  MRN:  [id number]   Admit Date:  4/18/2024  ========================================================================================================    Chief Complaint   Patient presents with    Shortness of Breath     \"I can't catch my breath since last night\"         History of Present Illness:  Hitesh Jurado is a 53 y.o. year old male patient with Past Medical History of  Type 2 diabetes, hyperthyroidism, ischemic cardiomyopathy, HFrEF (15-20%), HTN, cirrhosis, recurrent pleural effusions, COPD, CKD II, PAD with left BKA and chronic wounds, GERD, HLD, PTSD, anxiety, depression, medical non-compliance, and tobacco use who presented to  EM 1 week ago (4/18/2024) for complaints of shortness of breath. Was admitted for suspected pneumonia and initiated on Vancomycin and Levaquin. Had troponin elevation without EKG changes and initiated on heparin drip. Was evaluated by cardiology for NSTEMI with plans for ischemic evaluation. Previous cardiac cath per patient at [facility] [place] 2 years ago without intervention. Cardiac catheterization has been unable to be completed to this point due to worsening renal failure resistant to diuretic therapy. CXR with recurrent pericardial effusion with thoracic surgery consult who felt patient would benefit from optimization prior to possible offering of PleurX catheter. Thoracentesis completed 4/22 with 2.5L fluid removal (previously completed at [facility] on 4/16 with 3L fluid removal) and was initiated on antibiotic therapy with Vancomycin and Levaquin for possible pneumonia. Paracentesis completed today with 2.9 L fluid removal (previous completed 4/3 for 3.2L). Was contacted by hospital medicine to evaluate patient due to renal failure resistant to diuresis, acute fluid overload, and worsening leukocytosis.     Echo 10/2023: EF 15-20%, entire lateral wall, mid, and apical " inferior wall, apical septal segment, and apex are abnormal, mod to sev MR, mod to sev TR, RVSP 64.9  Echo 4/5/2024: EF 20-25%, Grade II diastolic dysfunction, mod MR, mod to sev TR, RVSP 56, LA mod dilated  Cardiac catheterization 2021: LMCA short left main, LAD diffuse mild irregularity up to 20% narrowing, late mid LAD with 30% stenosis, very small caliber D1 and D2 branches with severe diffuse disease, Lcx abnormal, chronically occluded mid circumflex with retrograde filling OM branch from left to left collaterals, RCA diffuse mild irregularity up to 20% narrowing, distal 40% stenosis    Interval ICU Events:  4/25: Admitted to the ICU for acute renal failure with hyperkalemia in setting of cardiorenal versus hepatorenal syndrome.   4/26: Endorses feeling better this am. On room air with SPO2 %. 200 ml UOP overnight after bumex and diuril. WBC downtrending    Medical History:  Past Medical History:   Diagnosis Date    CHF (congestive heart failure) (Multi)     Diabetes mellitus (Multi)      No past surgical history on file.  Medications Prior to Admission   Medication Sig Dispense Refill Last Dose    aspirin 81 mg EC tablet Take 1 tablet (81 mg) by mouth once daily.   4/18/2024 at 0800    atorvastatin (Lipitor) 80 mg tablet Take 1 tablet (80 mg) by mouth once daily.   4/17/2024 at 2000    clopidogrel (Plavix) 75 mg tablet Take 1 tablet (75 mg) by mouth once daily.   4/18/2024 at 0800    furosemide (Lasix) 20 mg tablet Take 3 tablets (60 mg) by mouth 2 times a day.   4/17/2024    insulin glargine (Lantus) 100 unit/mL injection Inject 20 Units under the skin once daily at bedtime. Take as directed per insulin instructions.   4/17/2024    insulin lispro (HumaLOG) 100 unit/mL injection Inject under the skin 3 times a day with meals. Take as directed per insulin instructions.   4/18/2024    methIMAzole (Tapazole) 10 mg tablet Take 2 tablets (20 mg) by mouth every 8 hours. (Patient taking differently: Take 0.5  tablets (5 mg) by mouth once daily. 2.5 tabs) 90 tablet 0 4/18/2024 at 0800    metoprolol succinate XL (Toprol-XL) 25 mg 24 hr tablet Take 1 tablet (25 mg) by mouth once daily. Do not crush or chew.   4/18/2024 at 0800    torsemide 60 mg tablet Take 60 mg by mouth once daily. Do not start before March 27, 2024.  0 4/17/2024 at 0800    acetaminophen (Tylenol) 500 mg tablet Take 1 tablet (500 mg) by mouth every 8 hours if needed.       azithromycin (Zithromax) 500 mg tablet Take 1 tablet (500 mg) by mouth once every 24 hours. Do not start before October 30, 2023. 5 tablet 0     gabapentin (Neurontin) 100 mg capsule Take 1 capsule (100 mg) by mouth 3 times a day.       melatonin 5 mg tablet Take 1 tablet (5 mg) by mouth once daily at bedtime.   Unknown    metOLazone (Zaroxolyn) 5 mg tablet Take 1 tablet (5 mg) by mouth once daily. Do not start before March 27, 2024. 30 tablet 1     midodrine (Proamatine) 10 mg tablet Take 1 tablet (10 mg) by mouth 3 times a day with meals. 90 tablet 0     potassium chloride CR 20 mEq ER tablet Take 1 tablet (20 mEq) by mouth 3 times a day. Do not crush or chew. For 10 days   Unknown    silver sulfADIAZINE (Silvadene) 1 % cream Apply topically once daily. Do not start before March 27, 2024.   Unknown     Amoxicillin  Social History     Tobacco Use    Smoking status: Former     Types: Cigarettes    Smokeless tobacco: Former   Vaping Use    Vaping status: Never Used   Substance Use Topics    Alcohol use: Never    Drug use: Never     No family history on file.    Hospital Medications:           Current Facility-Administered Medications:     acetaminophen (Tylenol) tablet 650 mg, 650 mg, oral, q4h PRN, 650 mg at 04/19/24 1247 **OR** acetaminophen (Tylenol) oral liquid 650 mg, 650 mg, nasogastric tube, q4h PRN **OR** acetaminophen (Tylenol) suppository 650 mg, 650 mg, rectal, q4h PRN, Radha Shane APRN-CNP    [Held by provider] aspirin EC tablet 81 mg, 81 mg, oral, Daily, Radha PARRISH  Gayheart, APRN-CNP, 81 mg at 04/24/24 1023    atorvastatin (Lipitor) tablet 80 mg, 80 mg, oral, Nightly, HEENA Van, 80 mg at 04/25/24 2115    clopidogrel (Plavix) tablet 75 mg, 75 mg, oral, Daily, HEENA Van, 75 mg at 04/25/24 0957    dextrose 50 % injection 12.5 g, 12.5 g, intravenous, q15 min PRN, HEENA Van    dextrose 50 % injection 25 g, 25 g, intravenous, q15 min PRN, HEENA Van    docusate sodium (Colace) capsule 100 mg, 100 mg, oral, BID, HEENA Van, 100 mg at 04/25/24 2115    [Held by provider] furosemide (Lasix) injection 80 mg, 80 mg, intravenous, Daily, HEENA Vna, 80 mg at 04/25/24 0957    glucagon (Glucagen) injection 1 mg, 1 mg, intramuscular, q15 min PRN, HEENA Van    glucagon (Glucagen) injection 1 mg, 1 mg, intramuscular, q15 min PRN, HEENA Van    heparin (porcine) injection 5,000 Units, 5,000 Units, subcutaneous, q8h, HEENA Van, 5,000 Units at 04/26/24 0057    insulin glargine (Lantus) injection 25 Units, 25 Units, subcutaneous, q24h, HEENA Van, 25 Units at 04/24/24 2039    insulin lispro (HumaLOG) injection 0-10 Units, 0-10 Units, subcutaneous, TID with meals, HEENA Van, 8 Units at 04/24/24 1138    insulin lispro (HumaLOG) injection 10 Units, 10 Units, subcutaneous, TID with meals, HEENA Van, 10 Units at 04/24/24 1142    ipratropium-albuteroL (Duo-Neb) 0.5-2.5 mg/3 mL nebulizer solution 3 mL, 3 mL, nebulization, 4x daily PRN, HEENA Van    lidocaine 4 % patch 1 patch, 1 patch, transdermal, Daily, HEENA Van, 1 patch at 04/25/24 2115    melatonin tablet 3 mg, 3 mg, oral, Nightly PRN, HEENA Van, 3 mg at 04/22/24 2115    meropenem (Merrem) in sodium chloride 0.9 % 100 mL IV 1 g, 1 g, intravenous, q12h, Radha  R Gayheart, APRN-CNP, Stopped at 04/26/24 0226    methIMAzole (Tapazole) tablet 12.5 mg, 12.5 mg, oral, Daily, SHANDA Van-CNP, 12.5 mg at 04/25/24 0957    metoprolol succinate XL (Toprol-XL) 24 hr tablet 25 mg, 25 mg, oral, Daily, HEENA Van, 25 mg at 04/25/24 0957    midodrine (Proamatine) tablet 10 mg, 10 mg, oral, TID with meals, SHANDA Van-CNP, 10 mg at 04/25/24 1659    morphine injection 2 mg, 2 mg, intravenous, q4h PRN, HEENA Van, 2 mg at 04/26/24 0538    mupirocin (Bactroban) 2 % ointment, , Topical, TID, HEENA Van, Given at 04/25/24 1658    oxygen (O2) therapy, , inhalation, Continuous - Inhalation, Navarro Hsieh MD, 4 L/min at 04/25/24 2000    pantoprazole (ProtoNix) EC tablet 40 mg, 40 mg, oral, Daily before breakfast, 40 mg at 04/25/24 0542 **OR** pantoprazole (ProtoNix) injection 40 mg, 40 mg, intravenous, Daily before breakfast, HEENA Van, 40 mg at 04/26/24 0537    perflutren lipid microspheres (Definity) injection 0.5-10 mL of dilution, 0.5-10 mL of dilution, intravenous, Once in imaging, HEENA Van    polyethylene glycol (Glycolax, Miralax) packet 17 g, 17 g, oral, Daily PRN, HEENA Van    sodium zirconium cyclosilicate (Lokelma) packet 10 g, 10 g, oral, q8h, SHANDA Van-CNP, 10 g at 04/25/24 1641    [Held by provider] spironolactone (Aldactone) tablet 100 mg, 100 mg, oral, Daily, Radha R Gayheart, APRN-CNP    trimethobenzamide (Tigan) injection 200 mg, 200 mg, intramuscular, q6h PRN, Radha SHANDA Aragon-CNP, 200 mg at 04/25/24 5667    Review of Systems:  14 point review of systems was completed and negative except for those specially mention in my HPI    Physical Exam:    Heart Rate:  [74-92]   Temp:  [36.1 °C (97 °F)-36.6 °C (97.9 °F)]   Resp:  [8-25]   BP: ()/(50-74)   Weight:  [69 kg (152 lb 1.9 oz)]   SpO2:  [93 %-100  %]     Physical Exam  Constitutional:       Appearance: He is ill-appearing.   HENT:      Head: Normocephalic.   Cardiovascular:      Rate and Rhythm: Normal rate and regular rhythm.      Pulses: Normal pulses.      Heart sounds: Normal heart sounds.      Comments: Edema of abdomen/ flank  Pulmonary:      Effort: Pulmonary effort is normal.      Breath sounds: Examination of the right-middle field reveals wheezing. Examination of the right-lower field reveals wheezing. Examination of the left-lower field reveals wheezing. Wheezing present.   Abdominal:      General: There is distension.      Palpations: Abdomen is soft.   Musculoskeletal:         General: Normal range of motion.      Cervical back: Normal range of motion.      Right lower leg: Edema present.      Comments: Left BKA, RLE with ACE wrap dressing, see images for wound documentation   Skin:     General: Skin is cool and dry.      Capillary Refill: Capillary refill takes less than 2 seconds.      Coloration: Skin is sallow.   Neurological:      General: No focal deficit present.      Mental Status: He is alert.   Psychiatric:         Mood and Affect: Mood normal.         Behavior: Behavior normal.         Objective:    I have reviewed all medications, laboratory results, and imaging pertinent for today's encounter.           Intake/Output Summary (Last 24 hours) at 4/26/2024 0748  Last data filed at 4/26/2024 0600  Gross per 24 hour   Intake 833 ml   Output 3100 ml   Net -2267 ml       Recent Imaging  US right upper quadrant   Final Result   Cirrhotic morphology of the liver. Mild ascites. Right pleural   effusion.        MACRO:   None.        Signed by: Will Novoa 4/26/2024 7:35 AM   Dictation workstation:   [id number]      Vascular US upper extremity venous duplex left   Final Result   Acute, occlusive thrombosis of the left basilic vein        Exam negative for acute deep venous thrombosis in the left upper   extremity        MACRO:   None         Signed by: Obed Louie 4/25/2024 2:13 PM   Dictation workstation:   [id number]      US guided abdominal paracentesis   Final Result   Addendum (preliminary) 1 of 1   Interpreted By:  Obed Louie,    ADDENDUM:   The original report incorrectly states that specimens were sent for   lab analysis. This was not the case. Only therapeutic, not diagnostic   paracentesis performed        Signed by: Obed Louie 4/25/2024 11:56 AM        -------- ORIGINAL REPORT --------   Dictation workstation:   [id number]      Final   SUCCESSFUL, UNEVENTFUL ULTRASOUND-GUIDED  DIAGNOSTIC AND THERAPEUTIC   PARACENTESIS PERFORMED IN THE  RIGHT LOWER QUADRANT        TOTAL VOLUME DRAINED: 2,900 mL        NO IMMEDIATE COMPLICATIONS        MACRO:   None        Signed by: Obed Louie 4/25/2024 11:28 AM   Dictation workstation:   [id number]      XR chest 1 view   Final Result   As above        MACRO:   None        Signed by: Obed Louie 4/24/2024 12:20 PM   Dictation workstation:   [id number]      XR chest 1 view   Final Result   1.  Satisfactory appearance post right thoracentesis. See discussion   above.                  MACRO:   None        Signed by: Joseph Schoenberger 4/22/2024 10:59 AM   Dictation workstation:   [id number]      US thoracentesis   Final Result   Successful ultrasound-guided diagnostic and therapeutic thoracentesis             MACRO:   None        Signed by: Joseph Schoenberger 4/22/2024 10:50 AM   Dictation workstation:   [id number]      XR cervical spine 2-3 views   Final Result   Suboptimal lateral radiographs without evidence of fracture in the   upper or midcervical spine. Degenerative disc space narrowing   partially visualized in the lower cervical spine.             MACRO:   None        Signed by: Heber Mcnair 4/21/2024 1:18 AM   Dictation workstation:   [id number]      CT chest wo IV contrast   Final Result   Dense consolidations extensively in all three right lobes. No   cavitary component to the  pneumonias        Extensively loculated, large right pleural effusion. 3 L removed at   an outside institution on 16 April 2024. The right pleural effusion   has even increased in size from yesterday's portable chest   radiograph. Consult thoracic surgery for tube placement and management        Due to the compressive affects of the effusion, there is partial   collapse of the middle and right lower lobes        No left lung pneumonia or other acute process in the left hemithorax        No left pleural effusion        No pneumothorax on either side        MACRO:   None        Signed by: Obed Louie 4/19/2024 3:18 PM   Dictation workstation:   [id number]      XR chest 1 view   Final Result   1. Worsening airspace disease at the right lung base. Moderate-sized   effusion present. CT can be performed further evaluation                  MACRO:   None        Signed by: Kamron Hopkins 4/18/2024 6:27 PM   Dictation workstation:   [id number]      Transthoracic Echo (TTE) Complete    (Results Pending)       No echocardiogram results found for the past 14 days    Assessment/Plan:    I am currently managing this critically ill patient for the following problems:    Neuro/Psych/Pain Ctrl/Sedation:  PTSD  JONA  Depression  Hx of alcohol abuse, reports sober x1 year  CAM-ICU  Delirium precautions  Pain management as needed    Respiratory/ENT:  Loculated recurrent pleural effusion, right  Pneumonia   CT chest: dense consolidations extensively in all 3 right lobes, no cavitary component to the pneumonias, extensively loculated large right pleural effusion with partial collapse of middle and lower lobes on right  Oxygen therapy as needed to maintain SPO2 greater than 92%  Duonebs  See ID    Cardiovascular:  Acute on chronic HFrEF  Ischemic cardiomyopathy  NSTEMI  PAD  Echo requested due to NSTEMI, worsening renal function, and volume status, Dr. Batista informed echo department would not be necessary as had 1 month  ago  Diuresis  Beta blocker  Cardiology following, appreciate recommendations    GI:  Cirrhosis  Trend CMP, INR  If needs additional paracentesis while hospitalized need to send for full workup, cell count, differential, cytology, culture, protein, LDH    Renal/Volume Status (Intra & Extravascular):  PIPPA on CKD II, baseline Cr ~1.2  Hyperkalemia  hyponatremia  Likely cardiorenal syndrome  Trend BMP  Nephrology following, appreciate recommendations  Diuresis, will likely need HD    Endocrine  hyperthyroidism  Tapazole    Infectious Disease:  Leukocytosis-worsening  Meropenem, ID to determine length of treatment  Will need to change Meropenem to 1 gm every 24 hours if starts dialysis  ID following, appreciate recommendations on antibiotic management  Blood culture, NTD  Urine with reflex  Ortho consult per ID recs, ?osteomyelitis    Heme/Onc:  Trend CBC    MSK:  Left BKA  Ortho consult, Joint swelling with erythema  Wound care  Flexeril as needed    Ethics/Code Status:  DNRCCA  Palliative care following, appreciate recommendations    :  DVT Prophylaxis: heparin  GI Prophylaxis: PPI  Bowel Regimen: as needed  Diet: cardiac/ diabetic  CVC: none  [place]: none  [place]: none  Restraints: none  Dispo: ICU    Critical Care Time:  40 minutes spent in preparing to see patient (I.e. review of medical records), evaluation of diagnostics (I.e. labs, imaging, etc.), documentation, discussing plan of care with patient/ family/ caregiver, and/ or coordination of care with multidisciplinary team. Time does not include completion of procedure time.     Plan discussed with Dr. Мария Shane, APRN-CNP

## 2024-04-27 ENCOUNTER — APPOINTMENT (OUTPATIENT)
Dept: RADIOLOGY | Facility: HOSPITAL | Age: 54
End: 2024-04-27
Payer: MEDICAID

## 2024-04-27 LAB
ACANTHOCYTES BLD QL SMEAR: NORMAL
ALBUMIN SERPL BCP-MCNC: 2.6 G/DL (ref 3.4–5)
ALP SERPL-CCNC: 242 U/L (ref 33–120)
ALT SERPL W P-5'-P-CCNC: 7 U/L (ref 10–52)
ANION GAP SERPL CALC-SCNC: 19 MMOL/L (ref 10–20)
ANION GAP SERPL CALC-SCNC: 21 MMOL/L (ref 10–20)
AST SERPL W P-5'-P-CCNC: 41 U/L (ref 9–39)
BASOPHILS # BLD AUTO: 0.01 X10*3/UL (ref 0–0.1)
BASOPHILS NFR BLD AUTO: 0.1 %
BILIRUB SERPL-MCNC: 1.2 MG/DL (ref 0–1.2)
BUN SERPL-MCNC: 118 MG/DL (ref 6–23)
BUN SERPL-MCNC: 122 MG/DL (ref 6–23)
BURR CELLS BLD QL SMEAR: NORMAL
CALCIUM SERPL-MCNC: 6.6 MG/DL (ref 8.6–10.3)
CALCIUM SERPL-MCNC: 6.6 MG/DL (ref 8.6–10.3)
CHLORIDE SERPL-SCNC: 86 MMOL/L (ref 98–107)
CHLORIDE SERPL-SCNC: 87 MMOL/L (ref 98–107)
CO2 SERPL-SCNC: 20 MMOL/L (ref 21–32)
CO2 SERPL-SCNC: 23 MMOL/L (ref 21–32)
CREAT SERPL-MCNC: 5.06 MG/DL (ref 0.5–1.3)
CREAT SERPL-MCNC: 5.38 MG/DL (ref 0.5–1.3)
EGFRCR SERPLBLD CKD-EPI 2021: 12 ML/MIN/1.73M*2
EGFRCR SERPLBLD CKD-EPI 2021: 13 ML/MIN/1.73M*2
EOSINOPHIL # BLD AUTO: 0.04 X10*3/UL (ref 0–0.7)
EOSINOPHIL NFR BLD AUTO: 0.3 %
ERYTHROCYTE [DISTWIDTH] IN BLOOD BY AUTOMATED COUNT: 25.6 % (ref 11.5–14.5)
GLUCOSE BLD MANUAL STRIP-MCNC: 113 MG/DL (ref 74–99)
GLUCOSE BLD MANUAL STRIP-MCNC: 115 MG/DL (ref 74–99)
GLUCOSE BLD MANUAL STRIP-MCNC: 97 MG/DL (ref 74–99)
GLUCOSE BLD MANUAL STRIP-MCNC: 97 MG/DL (ref 74–99)
GLUCOSE SERPL-MCNC: 120 MG/DL (ref 74–99)
GLUCOSE SERPL-MCNC: 67 MG/DL (ref 74–99)
HCT VFR BLD AUTO: 25.1 % (ref 41–52)
HGB BLD-MCNC: 7.8 G/DL (ref 13.5–17.5)
HYPOCHROMIA BLD QL SMEAR: NORMAL
IMM GRANULOCYTES # BLD AUTO: 0.09 X10*3/UL (ref 0–0.7)
IMM GRANULOCYTES NFR BLD AUTO: 0.6 % (ref 0–0.9)
INR PPP: 1.3 (ref 0.9–1.1)
LYMPHOCYTES # BLD AUTO: 1.29 X10*3/UL (ref 1.2–4.8)
LYMPHOCYTES NFR BLD AUTO: 8.8 %
MAGNESIUM SERPL-MCNC: 2.02 MG/DL (ref 1.6–2.4)
MCH RBC QN AUTO: 19.3 PG (ref 26–34)
MCHC RBC AUTO-ENTMCNC: 31.1 G/DL (ref 32–36)
MCV RBC AUTO: 62 FL (ref 80–100)
MONOCYTES # BLD AUTO: 1.67 X10*3/UL (ref 0.1–1)
MONOCYTES NFR BLD AUTO: 11.4 %
NEUTROPHILS # BLD AUTO: 11.6 X10*3/UL (ref 1.2–7.7)
NEUTROPHILS NFR BLD AUTO: 78.8 %
NRBC BLD-RTO: 0.1 /100 WBCS (ref 0–0)
PHOSPHATE SERPL-MCNC: 8.3 MG/DL (ref 2.5–4.9)
PLATELET # BLD AUTO: 284 X10*3/UL (ref 150–450)
POTASSIUM SERPL-SCNC: 4.5 MMOL/L (ref 3.5–5.3)
POTASSIUM SERPL-SCNC: 4.9 MMOL/L (ref 3.5–5.3)
PRELIMINARY: NORMAL
PROT SERPL-MCNC: 5.9 G/DL (ref 6.4–8.2)
PROTHROMBIN TIME: 14.4 SECONDS (ref 9.8–12.8)
RBC # BLD AUTO: 4.05 X10*6/UL (ref 4.5–5.9)
RBC MORPH BLD: NORMAL
SCHISTOCYTES BLD QL SMEAR: NORMAL
SODIUM SERPL-SCNC: 122 MMOL/L (ref 136–145)
SODIUM SERPL-SCNC: 124 MMOL/L (ref 136–145)
TARGETS BLD QL SMEAR: NORMAL
WBC # BLD AUTO: 14.7 X10*3/UL (ref 4.4–11.3)

## 2024-04-27 PROCEDURE — 36415 COLL VENOUS BLD VENIPUNCTURE: CPT

## 2024-04-27 PROCEDURE — 2500000001 HC RX 250 WO HCPCS SELF ADMINISTERED DRUGS (ALT 637 FOR MEDICARE OP): Performed by: STUDENT IN AN ORGANIZED HEALTH CARE EDUCATION/TRAINING PROGRAM

## 2024-04-27 PROCEDURE — 99221 1ST HOSP IP/OBS SF/LOW 40: CPT | Performed by: PLASTIC SURGERY

## 2024-04-27 PROCEDURE — 2500000004 HC RX 250 GENERAL PHARMACY W/ HCPCS (ALT 636 FOR OP/ED): Performed by: NURSE PRACTITIONER

## 2024-04-27 PROCEDURE — 2500000005 HC RX 250 GENERAL PHARMACY W/O HCPCS: Performed by: STUDENT IN AN ORGANIZED HEALTH CARE EDUCATION/TRAINING PROGRAM

## 2024-04-27 PROCEDURE — 73700 CT LOWER EXTREMITY W/O DYE: CPT | Mod: LT

## 2024-04-27 PROCEDURE — 99291 CRITICAL CARE FIRST HOUR: CPT | Performed by: STUDENT IN AN ORGANIZED HEALTH CARE EDUCATION/TRAINING PROGRAM

## 2024-04-27 PROCEDURE — 2500000001 HC RX 250 WO HCPCS SELF ADMINISTERED DRUGS (ALT 637 FOR MEDICARE OP): Performed by: NURSE PRACTITIONER

## 2024-04-27 PROCEDURE — 73700 CT LOWER EXTREMITY W/O DYE: CPT | Mod: LEFT SIDE | Performed by: RADIOLOGY

## 2024-04-27 PROCEDURE — 2020000001 HC ICU ROOM DAILY

## 2024-04-27 PROCEDURE — 2500000001 HC RX 250 WO HCPCS SELF ADMINISTERED DRUGS (ALT 637 FOR MEDICARE OP)

## 2024-04-27 PROCEDURE — 2500000005 HC RX 250 GENERAL PHARMACY W/O HCPCS: Performed by: NURSE PRACTITIONER

## 2024-04-27 PROCEDURE — 80053 COMPREHEN METABOLIC PANEL: CPT | Performed by: NURSE PRACTITIONER

## 2024-04-27 PROCEDURE — 2500000006 HC RX 250 W HCPCS SELF ADMINISTERED DRUGS (ALT 637 FOR ALL PAYERS): Performed by: NURSE PRACTITIONER

## 2024-04-27 PROCEDURE — 83735 ASSAY OF MAGNESIUM: CPT | Performed by: HOSPITALIST

## 2024-04-27 PROCEDURE — 85610 PROTHROMBIN TIME: CPT | Performed by: NURSE PRACTITIONER

## 2024-04-27 PROCEDURE — P9047 ALBUMIN (HUMAN), 25%, 50ML: HCPCS | Mod: JZ | Performed by: STUDENT IN AN ORGANIZED HEALTH CARE EDUCATION/TRAINING PROGRAM

## 2024-04-27 PROCEDURE — 82374 ASSAY BLOOD CARBON DIOXIDE: CPT

## 2024-04-27 PROCEDURE — 84100 ASSAY OF PHOSPHORUS: CPT | Performed by: HOSPITALIST

## 2024-04-27 PROCEDURE — 36415 COLL VENOUS BLD VENIPUNCTURE: CPT | Performed by: HOSPITALIST

## 2024-04-27 PROCEDURE — 82947 ASSAY GLUCOSE BLOOD QUANT: CPT

## 2024-04-27 PROCEDURE — 2500000004 HC RX 250 GENERAL PHARMACY W/ HCPCS (ALT 636 FOR OP/ED)

## 2024-04-27 PROCEDURE — 2500000004 HC RX 250 GENERAL PHARMACY W/ HCPCS (ALT 636 FOR OP/ED): Mod: JZ | Performed by: STUDENT IN AN ORGANIZED HEALTH CARE EDUCATION/TRAINING PROGRAM

## 2024-04-27 PROCEDURE — 2500000004 HC RX 250 GENERAL PHARMACY W/ HCPCS (ALT 636 FOR OP/ED): Performed by: STUDENT IN AN ORGANIZED HEALTH CARE EDUCATION/TRAINING PROGRAM

## 2024-04-27 PROCEDURE — 85025 COMPLETE CBC W/AUTO DIFF WBC: CPT | Performed by: HOSPITALIST

## 2024-04-27 RX ORDER — ALBUMIN HUMAN 250 G/1000ML
25 SOLUTION INTRAVENOUS ONCE
Status: COMPLETED | OUTPATIENT
Start: 2024-04-27 | End: 2024-04-27

## 2024-04-27 RX ORDER — CALCIUM ACETATE 667 MG/1
667 CAPSULE ORAL
Status: DISCONTINUED | OUTPATIENT
Start: 2024-04-27 | End: 2024-05-01

## 2024-04-27 RX ORDER — OXYCODONE HYDROCHLORIDE 5 MG/1
10 TABLET ORAL EVERY 6 HOURS PRN
Status: DISCONTINUED | OUTPATIENT
Start: 2024-04-27 | End: 2024-05-01 | Stop reason: HOSPADM

## 2024-04-27 RX ORDER — BUMETANIDE 0.25 MG/ML
4 INJECTION INTRAMUSCULAR; INTRAVENOUS 3 TIMES DAILY
Status: COMPLETED | OUTPATIENT
Start: 2024-04-27 | End: 2024-04-27

## 2024-04-27 RX ORDER — OXYCODONE HYDROCHLORIDE 5 MG/1
5 TABLET ORAL EVERY 6 HOURS PRN
Status: DISCONTINUED | OUTPATIENT
Start: 2024-04-27 | End: 2024-05-01 | Stop reason: HOSPADM

## 2024-04-27 RX ORDER — BUMETANIDE 0.25 MG/ML
4 INJECTION INTRAMUSCULAR; INTRAVENOUS 3 TIMES DAILY
Status: DISCONTINUED | OUTPATIENT
Start: 2024-04-27 | End: 2024-04-27

## 2024-04-27 RX ORDER — CHLOROTHIAZIDE SODIUM 500 MG/1
500 INJECTION INTRAVENOUS EVERY 12 HOURS
Status: COMPLETED | OUTPATIENT
Start: 2024-04-27 | End: 2024-04-27

## 2024-04-27 RX ORDER — METHOCARBAMOL 500 MG/1
500 TABLET, FILM COATED ORAL 2 TIMES DAILY
Status: DISCONTINUED | OUTPATIENT
Start: 2024-04-27 | End: 2024-05-01 | Stop reason: HOSPADM

## 2024-04-27 RX ADMIN — MUPIROCIN: 20 OINTMENT TOPICAL at 21:55

## 2024-04-27 RX ADMIN — MUPIROCIN: 20 OINTMENT TOPICAL at 09:58

## 2024-04-27 RX ADMIN — BUMETANIDE 4 MG: 0.25 INJECTION INTRAMUSCULAR; INTRAVENOUS at 09:37

## 2024-04-27 RX ADMIN — METHIMAZOLE 12.5 MG: 5 TABLET ORAL at 09:52

## 2024-04-27 RX ADMIN — BUMETANIDE 4 MG: 0.25 INJECTION INTRAMUSCULAR; INTRAVENOUS at 15:25

## 2024-04-27 RX ADMIN — ASPIRIN 81 MG: 81 TABLET, COATED ORAL at 09:53

## 2024-04-27 RX ADMIN — HYDROMORPHONE HYDROCHLORIDE 0.4 MG: 1 INJECTION, SOLUTION INTRAMUSCULAR; INTRAVENOUS; SUBCUTANEOUS at 21:56

## 2024-04-27 RX ADMIN — ALBUMIN HUMAN 25 G: 0.25 SOLUTION INTRAVENOUS at 15:27

## 2024-04-27 RX ADMIN — LIDOCAINE 4% 1 PATCH: 40 PATCH TOPICAL at 21:53

## 2024-04-27 RX ADMIN — SODIUM ZIRCONIUM CYCLOSILICATE 10 G: 10 POWDER, FOR SUSPENSION ORAL at 00:48

## 2024-04-27 RX ADMIN — MUPIROCIN: 20 OINTMENT TOPICAL at 14:37

## 2024-04-27 RX ADMIN — CLOPIDOGREL BISULFATE 75 MG: 75 TABLET, FILM COATED ORAL at 09:53

## 2024-04-27 RX ADMIN — HEPARIN SODIUM 5000 UNITS: 5000 INJECTION INTRAVENOUS; SUBCUTANEOUS at 08:00

## 2024-04-27 RX ADMIN — MIDODRINE HYDROCHLORIDE 10 MG: 5 TABLET ORAL at 12:56

## 2024-04-27 RX ADMIN — MIDODRINE HYDROCHLORIDE 10 MG: 5 TABLET ORAL at 09:53

## 2024-04-27 RX ADMIN — HYDROMORPHONE HYDROCHLORIDE 0.4 MG: 1 INJECTION, SOLUTION INTRAMUSCULAR; INTRAVENOUS; SUBCUTANEOUS at 15:35

## 2024-04-27 RX ADMIN — HEPARIN SODIUM 5000 UNITS: 5000 INJECTION INTRAVENOUS; SUBCUTANEOUS at 00:48

## 2024-04-27 RX ADMIN — METHOCARBAMOL TABLETS 500 MG: 500 TABLET, COATED ORAL at 12:55

## 2024-04-27 RX ADMIN — MEROPENEM 1 G: 1 INJECTION, POWDER, FOR SOLUTION INTRAVENOUS at 01:00

## 2024-04-27 RX ADMIN — PANTOPRAZOLE SODIUM 40 MG: 40 TABLET, DELAYED RELEASE ORAL at 06:46

## 2024-04-27 RX ADMIN — METOPROLOL SUCCINATE 25 MG: 25 TABLET, EXTENDED RELEASE ORAL at 09:58

## 2024-04-27 RX ADMIN — BUMETANIDE 4 MG: 0.25 INJECTION INTRAMUSCULAR; INTRAVENOUS at 21:55

## 2024-04-27 RX ADMIN — METHOCARBAMOL TABLETS 500 MG: 500 TABLET, COATED ORAL at 21:54

## 2024-04-27 RX ADMIN — MIDODRINE HYDROCHLORIDE 10 MG: 5 TABLET ORAL at 16:39

## 2024-04-27 RX ADMIN — MORPHINE SULFATE 2 MG: 2 INJECTION, SOLUTION INTRAMUSCULAR; INTRAVENOUS at 06:53

## 2024-04-27 RX ADMIN — ATORVASTATIN CALCIUM 80 MG: 80 TABLET, FILM COATED ORAL at 21:55

## 2024-04-27 RX ADMIN — DOCUSATE SODIUM 100 MG: 100 CAPSULE, LIQUID FILLED ORAL at 09:53

## 2024-04-27 RX ADMIN — MEROPENEM 1 G: 1 INJECTION, POWDER, FOR SOLUTION INTRAVENOUS at 15:33

## 2024-04-27 RX ADMIN — SODIUM ZIRCONIUM CYCLOSILICATE 10 G: 10 POWDER, FOR SUSPENSION ORAL at 09:54

## 2024-04-27 RX ADMIN — CHLOROTHIAZIDE SODIUM 500 MG: 500 INJECTION, POWDER, LYOPHILIZED, FOR SOLUTION INTRAVENOUS at 21:54

## 2024-04-27 RX ADMIN — HEPARIN SODIUM 5000 UNITS: 5000 INJECTION INTRAVENOUS; SUBCUTANEOUS at 16:00

## 2024-04-27 RX ADMIN — CHLOROTHIAZIDE SODIUM 500 MG: 500 INJECTION, POWDER, LYOPHILIZED, FOR SOLUTION INTRAVENOUS at 09:32

## 2024-04-27 RX ADMIN — CALCIUM ACETATE 667 MG: 667 CAPSULE ORAL at 16:39

## 2024-04-27 RX ADMIN — HYDROMORPHONE HYDROCHLORIDE 0.4 MG: 1 INJECTION, SOLUTION INTRAMUSCULAR; INTRAVENOUS; SUBCUTANEOUS at 10:38

## 2024-04-27 ASSESSMENT — PAIN SCALES - GENERAL
PAINLEVEL_OUTOF10: 7
PAINLEVEL_OUTOF10: 10 - WORST POSSIBLE PAIN
PAINLEVEL_OUTOF10: 7
PAINLEVEL_OUTOF10: 6
PAINLEVEL_OUTOF10: 7
PAINLEVEL_OUTOF10: 0 - NO PAIN
PAINLEVEL_OUTOF10: 0 - NO PAIN
PAINLEVEL_OUTOF10: 4

## 2024-04-27 ASSESSMENT — PAIN - FUNCTIONAL ASSESSMENT
PAIN_FUNCTIONAL_ASSESSMENT: 0-10

## 2024-04-27 ASSESSMENT — PAIN DESCRIPTION - LOCATION
LOCATION: KNEE
LOCATION: KNEE

## 2024-04-27 ASSESSMENT — PAIN DESCRIPTION - ORIENTATION
ORIENTATION: LEFT
ORIENTATION: LEFT

## 2024-04-27 NOTE — PROGRESS NOTES
Infectious Disease Progress Note       4/27/2024    Patient is a followup regarding  Right lower lobe pneumonia with shortness of breath  Multiple wounds  Likely osteomyelitis left knee with purulent discharge from the knee  PIPPA on CKD 2 to 3-a  Diabetes mellitus with hyperglycemia  History of tobacco use  Cachexia  Peripheral arterial disease  Cirrhosis with ascites  Electrolyte imbalance, hyponatremia  Left lower extremity BKA history with multiple wounds  Status postthoracentesis on 4/22/2024, cultures negative     Visibly looking better today  Eating grapes.   Off of o2  Seen with nurse at bedside.       WBC trends are being monitored. Antibiotic doses are being adjusted per most recent renal labs.         Patient Active Problem List   Diagnosis    Status post below-knee amputation of left lower extremity (Multi)    PAD (peripheral artery disease) (CMS-HCC)    ACC/AHA stage C acute systolic heart failure (Multi)    Coronary artery disease involving native coronary artery without angina pectoris    Mixed hyperlipidemia    Thyroid mass    Tobacco dependence with current use    PIPPA (acute kidney injury) (CMS-HCC)    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    Acute on chronic congestive heart failure (Multi)    GERD (gastroesophageal reflux disease)    Dyspepsia    Critical lower limb ischemia (Multi)    Chronic obstructive pulmonary disease, unspecified (Multi)    Primary hypertension    PTSD (post-traumatic stress disorder)    JONA (generalized anxiety disorder)    Severe episode of recurrent major depressive disorder, without psychotic features (Multi)    Type 2 diabetes mellitus with peripheral neuropathy (Multi)    COPD exacerbation (Multi)    NSTEMI (non-ST elevated myocardial infarction) (Multi)    Elevated troponin    COPD (chronic obstructive pulmonary disease) (Multi)    Acute on chronic combined systolic (congestive) and diastolic (congestive) heart failure (Multi)     Dyspnea, unspecified type    Recurrent right pleural effusion    Right lower lobe pneumonia    Acute respiratory failure with hypoxia (Multi)         PLAN:  Culture of the L knee pending  Ortho on consult  Continue abx and supportive care  Leukocytosis polyfactoral  Started empirically on meropenem for now, follow-up all culture results  Photos uploaded   Wound care  Optimal glycemic control      Imaging and labs were reviewed per medical records and any ID pertinent labs were also addressed        Alina Calderon DO

## 2024-04-27 NOTE — CONSULTS
PLASTIC  SURGERY  CONSULTATION        Reason for consultation:       Open wounds bilateral lower extremities      History of present illness:      53-year-old male with liver cirrhosis and ascites and recurrent pleural effusion being seen for open wounds of bilateral lower extremities.  Left below-knee amputation with full-thickness skin loss.  Patient with peripheral vascular disease        Past medical history:      Liver cirrhosis with ascites  Congestive heart failure  Diabetes mellitus  Peripheral neuropathy  Pleural effusion  COPD  Chronic kidney disease  Peripheral vascular disease  Gastroesophageal reflux disease  Hyperlipidemia          Past surgical history:      Paracentesis  Thoracentesis  Left below-knee amputation  PCI      Medications:       Aspirin  Lipitor  Bumex  Diarrheal  Plavix  Colace  Insulin  Meropenem  Tapazole  Robaxin  Metoprolol  Midodrine  Pantoprazole    ALLERGIES:        Amoxicillin      Social history:            Family history:                  Review of systems:  At least 10 systems reviewed and are otherwise negative except for as noted in the history of present illness                PHYSICAL  EXAMINATION       Height:     5 foot 9 inches               weight:     159 pounds                     Vital signs:    Temperature 36.2 pulse 88 blood pressure 119/68    General: Well-developed,     male        in no acute distress, alert and oriented ×3    HEENT:   Within normal limits    Neck:   Supple, no observable masses    Lungs: Clear to auscultation    Heart: Regular rate and rhythm    Abdomen: Soft, nontender; protuberant    Extremities: Full range of motion: Left below-knee amputation with areas of full-thickness skin loss as well as chronic wounds on right lower extremity with full-thickness skin loss.  Wounds are otherwise clean    Neurological: Grossly within normal limits                Impression:      Left below-knee amputation with areas of full-thickness skin loss  secondary to peripheral vascular disease  Chronic right lower extremity wounds with full-thickness skin loss secondary to peripheral vascular disease      Recommendation:      Continue Bactroban ointment 3 times daily        Thank you for the referral.    Roland Reyes, MD        *These notes are being done using Dragon voice recognition technology and may include unintended errors with respect to translation of words, typographical errors or grammar errors which may not have been identified prior to finalization of the chart note.

## 2024-04-27 NOTE — CARE PLAN
The patient's goals for the shift include none    The clinical goals for the shift include Patient will have pain maintained at manageable levels    Over the shift, the patient did not make progress toward the following goals. Barriers to progression include patient refusal with certain treatment. Recommendations to address these barriers include continue to educate the importance of treatment and include patient in treatment plan.

## 2024-04-27 NOTE — PROGRESS NOTES
Nephrology Progress Note    Assessment:  53 y.o. male with history s/f HFrEF, PAD s/p LT BKA, T2DM c/b chronic non-healing wounds, cirrhosis who presented for SOB for several days.      PIPPA on CKD stage II/III: initially felt in setting of diuretics, not significantly hypotensive, no contrast, not on any other nephrotoxic medications, function was at baseline on presentation w/ Scr ~1.4 w/ eGFR high 50s/low 60s and worsened during admission, now fluid overloaded, ? Decompensated CHF vs. HRS, urine Na/Cl <10, UA w/ hematuria   Fluid overload: improved, has combined HFrEF 20-25%   Recurrent RT sided pleural effusion: had 3L taken off at [facility] on 4/16, present here again, now s/p RT sided thoracentesis w/ 2.5L taken off  Cirrhosis c/b ascites: now s/p paracentesis (4/25), 2.9L taken off   Hypotension: on midodrine   Hypoalbuminemia  Anemia   RT sided PNA   Hyponatremia     Plan:  - starting phoslo   - low threshold for RRT, pt agreeable and consented already, making urine, should be able to wait till Monday for tunneled catheter as not urgent however will proceed differently if labs significantly worsen, clinical status changes or UOP decreases   - on very high dose diuretics, adding albumin    Subjective:  Admit Date: 4/18/2024    Interval History: function stilll worsening, UA showing hematuria, made ~600 ml yesterday, pt states feeling better, just came back from CT of the LT knee    Medications:  Scheduled Meds:aspirin, 81 mg, oral, Daily  atorvastatin, 80 mg, oral, Nightly  bumetanide, 4 mg, intravenous, TID  chlorothiazide, 500 mg, intravenous, q12h  clopidogrel, 75 mg, oral, Daily  docusate sodium, 100 mg, oral, BID  heparin (porcine), 5,000 Units, subcutaneous, q8h  insulin glargine, 25 Units, subcutaneous, q24h  insulin lispro, 0-10 Units, subcutaneous, TID with meals  insulin lispro, 10 Units, subcutaneous, TID with meals  lidocaine, 1 patch, transdermal, Daily  meropenem, 1 g, intravenous, q12h  methIMAzole,  "12.5 mg, oral, Daily  methocarbamol, 500 mg, oral, BID  metoprolol succinate XL, 25 mg, oral, Daily  midodrine, 10 mg, oral, TID with meals  mupirocin, , Topical, TID  oxygen, , inhalation, Continuous - Inhalation  pantoprazole, 40 mg, oral, Daily before breakfast   Or  pantoprazole, 40 mg, intravenous, Daily before breakfast  perflutren lipid microspheres, 0.5-10 mL of dilution, intravenous, Once in imaging  sodium zirconium cyclosilicate, 10 g, oral, q8h      Continuous Infusions:       CBC:   Lab Results   Component Value Date    WBC 14.7 (H) 04/27/2024    RBC 4.05 (L) 04/27/2024    HGB 7.8 (L) 04/27/2024    HCT 25.1 (L) 04/27/2024    MCV 62 (L) 04/27/2024    MCH 19.3 (L) 04/27/2024    MCHC 31.1 (L) 04/27/2024    RDW 25.6 (H) 04/27/2024     04/27/2024     BMP:    Lab Results   Component Value Date     (L) 04/27/2024    K 4.9 04/27/2024    CL 86 (L) 04/27/2024    CO2 20 (L) 04/27/2024     (HH) 04/27/2024    CREATININE 5.06 (H) 04/27/2024    CALCIUM 6.6 (L) 04/27/2024    GLUCOSE 120 (H) 04/27/2024       Objective:  Vitals: /68   Pulse 88   Temp 36.2 °C (97.2 °F) (Temporal)   Resp 17   Ht 1.753 m (5' 9\")   Wt 69 kg (152 lb 1.9 oz)   SpO2 94%   BMI 22.46 kg/m²    Wt Readings from Last 3 Encounters:   04/27/24 69 kg (152 lb 1.9 oz)   03/24/24 65 kg (143 lb 4.8 oz)   11/01/23 51.7 kg (114 lb)      24HR INTAKE/OUTPUT:    Intake/Output Summary (Last 24 hours) at 4/27/2024 1300  Last data filed at 4/27/2024 1100  Gross per 24 hour   Intake 440 ml   Output 650 ml   Net -210 ml       General: alert, in mild distress  HEENT: normocephalic, atraumatic, anicteric  Lungs: non-labored respirations, clear to auscultation bilaterally  Heart: regular rate and rhythm, no murmurs or rubs  Abdomen: soft, non-tender, distended  Ext: no cyanosis, + peripheral edema, LT BKA, diffuse TTP  Neuro: alert, no gross abnormalities      Electronically signed by Malgorzata Williamson MD, MD              "

## 2024-04-28 LAB
ALBUMIN SERPL BCP-MCNC: 2.5 G/DL (ref 3.4–5)
ALP SERPL-CCNC: 228 U/L (ref 33–120)
ALT SERPL W P-5'-P-CCNC: 6 U/L (ref 10–52)
ANION GAP SERPL CALC-SCNC: 18 MMOL/L (ref 10–20)
AST SERPL W P-5'-P-CCNC: 42 U/L (ref 9–39)
BACTERIA BLD CULT: NORMAL
BASOPHILS # BLD AUTO: 0.01 X10*3/UL (ref 0–0.1)
BASOPHILS NFR BLD AUTO: 0.1 %
BILIRUB SERPL-MCNC: 1 MG/DL (ref 0–1.2)
BUN SERPL-MCNC: 127 MG/DL (ref 6–23)
CALCIUM SERPL-MCNC: 7.1 MG/DL (ref 8.6–10.3)
CHLORIDE SERPL-SCNC: 85 MMOL/L (ref 98–107)
CO2 SERPL-SCNC: 24 MMOL/L (ref 21–32)
CREAT SERPL-MCNC: 5.67 MG/DL (ref 0.5–1.3)
EGFRCR SERPLBLD CKD-EPI 2021: 11 ML/MIN/1.73M*2
EOSINOPHIL # BLD AUTO: 0.04 X10*3/UL (ref 0–0.7)
EOSINOPHIL NFR BLD AUTO: 0.2 %
ERYTHROCYTE [DISTWIDTH] IN BLOOD BY AUTOMATED COUNT: 26 % (ref 11.5–14.5)
GLUCOSE BLD MANUAL STRIP-MCNC: 130 MG/DL (ref 74–99)
GLUCOSE BLD MANUAL STRIP-MCNC: 177 MG/DL (ref 74–99)
GLUCOSE SERPL-MCNC: 106 MG/DL (ref 74–99)
HCT VFR BLD AUTO: 24.4 % (ref 41–52)
HGB BLD-MCNC: 8.1 G/DL (ref 13.5–17.5)
IMM GRANULOCYTES # BLD AUTO: 0.09 X10*3/UL (ref 0–0.7)
IMM GRANULOCYTES NFR BLD AUTO: 0.6 % (ref 0–0.9)
INR PPP: 1.2 (ref 0.9–1.1)
LYMPHOCYTES # BLD AUTO: 1.49 X10*3/UL (ref 1.2–4.8)
LYMPHOCYTES NFR BLD AUTO: 9.1 %
MAGNESIUM SERPL-MCNC: 2.02 MG/DL (ref 1.6–2.4)
MCH RBC QN AUTO: 20.3 PG (ref 26–34)
MCHC RBC AUTO-ENTMCNC: 33.2 G/DL (ref 32–36)
MCV RBC AUTO: 61 FL (ref 80–100)
MONOCYTES # BLD AUTO: 1.99 X10*3/UL (ref 0.1–1)
MONOCYTES NFR BLD AUTO: 12.2 %
NEUTROPHILS # BLD AUTO: 12.73 X10*3/UL (ref 1.2–7.7)
NEUTROPHILS NFR BLD AUTO: 77.8 %
NRBC BLD-RTO: 0.1 /100 WBCS (ref 0–0)
PHOSPHATE SERPL-MCNC: 9.5 MG/DL (ref 2.5–4.9)
PLATELET # BLD AUTO: 216 X10*3/UL (ref 150–450)
POTASSIUM SERPL-SCNC: 4.3 MMOL/L (ref 3.5–5.3)
PROT SERPL-MCNC: 5.5 G/DL (ref 6.4–8.2)
PROTHROMBIN TIME: 13 SECONDS (ref 9.8–12.8)
RBC # BLD AUTO: 3.99 X10*6/UL (ref 4.5–5.9)
RBC MORPH BLD: NORMAL
SODIUM SERPL-SCNC: 123 MMOL/L (ref 136–145)
WBC # BLD AUTO: 16.4 X10*3/UL (ref 4.4–11.3)

## 2024-04-28 PROCEDURE — 2500000004 HC RX 250 GENERAL PHARMACY W/ HCPCS (ALT 636 FOR OP/ED): Performed by: HOSPITALIST

## 2024-04-28 PROCEDURE — 2500000005 HC RX 250 GENERAL PHARMACY W/O HCPCS: Performed by: STUDENT IN AN ORGANIZED HEALTH CARE EDUCATION/TRAINING PROGRAM

## 2024-04-28 PROCEDURE — 2500000004 HC RX 250 GENERAL PHARMACY W/ HCPCS (ALT 636 FOR OP/ED): Performed by: STUDENT IN AN ORGANIZED HEALTH CARE EDUCATION/TRAINING PROGRAM

## 2024-04-28 PROCEDURE — 2500000005 HC RX 250 GENERAL PHARMACY W/O HCPCS: Performed by: NURSE PRACTITIONER

## 2024-04-28 PROCEDURE — 82947 ASSAY GLUCOSE BLOOD QUANT: CPT

## 2024-04-28 PROCEDURE — 2020000001 HC ICU ROOM DAILY

## 2024-04-28 PROCEDURE — 85025 COMPLETE CBC W/AUTO DIFF WBC: CPT | Performed by: HOSPITALIST

## 2024-04-28 PROCEDURE — 2500000001 HC RX 250 WO HCPCS SELF ADMINISTERED DRUGS (ALT 637 FOR MEDICARE OP): Performed by: NURSE PRACTITIONER

## 2024-04-28 PROCEDURE — 2500000002 HC RX 250 W HCPCS SELF ADMINISTERED DRUGS (ALT 637 FOR MEDICARE OP, ALT 636 FOR OP/ED): Performed by: NURSE PRACTITIONER

## 2024-04-28 PROCEDURE — 2500000001 HC RX 250 WO HCPCS SELF ADMINISTERED DRUGS (ALT 637 FOR MEDICARE OP): Performed by: STUDENT IN AN ORGANIZED HEALTH CARE EDUCATION/TRAINING PROGRAM

## 2024-04-28 PROCEDURE — 80053 COMPREHEN METABOLIC PANEL: CPT | Performed by: NURSE PRACTITIONER

## 2024-04-28 PROCEDURE — 2500000001 HC RX 250 WO HCPCS SELF ADMINISTERED DRUGS (ALT 637 FOR MEDICARE OP)

## 2024-04-28 PROCEDURE — 36415 COLL VENOUS BLD VENIPUNCTURE: CPT | Performed by: NURSE PRACTITIONER

## 2024-04-28 PROCEDURE — 2500000001 HC RX 250 WO HCPCS SELF ADMINISTERED DRUGS (ALT 637 FOR MEDICARE OP): Performed by: HOSPITALIST

## 2024-04-28 PROCEDURE — 83735 ASSAY OF MAGNESIUM: CPT | Performed by: HOSPITALIST

## 2024-04-28 PROCEDURE — 2500000004 HC RX 250 GENERAL PHARMACY W/ HCPCS (ALT 636 FOR OP/ED): Performed by: NURSE PRACTITIONER

## 2024-04-28 PROCEDURE — 84100 ASSAY OF PHOSPHORUS: CPT | Performed by: HOSPITALIST

## 2024-04-28 PROCEDURE — 99291 CRITICAL CARE FIRST HOUR: CPT | Performed by: STUDENT IN AN ORGANIZED HEALTH CARE EDUCATION/TRAINING PROGRAM

## 2024-04-28 PROCEDURE — 85610 PROTHROMBIN TIME: CPT | Performed by: NURSE PRACTITIONER

## 2024-04-28 RX ORDER — CHLOROTHIAZIDE SODIUM 500 MG/1
500 INJECTION INTRAVENOUS EVERY 12 HOURS
Status: DISCONTINUED | OUTPATIENT
Start: 2024-04-28 | End: 2024-04-30

## 2024-04-28 RX ORDER — MAGNESIUM SULFATE HEPTAHYDRATE 40 MG/ML
2 INJECTION, SOLUTION INTRAVENOUS ONCE
Status: COMPLETED | OUTPATIENT
Start: 2024-04-28 | End: 2024-04-28

## 2024-04-28 RX ORDER — BUMETANIDE 0.25 MG/ML
4 INJECTION INTRAMUSCULAR; INTRAVENOUS EVERY 8 HOURS
Status: DISCONTINUED | OUTPATIENT
Start: 2024-04-28 | End: 2024-04-30

## 2024-04-28 RX ORDER — ACETAMINOPHEN 500 MG
10 TABLET ORAL NIGHTLY
Status: DISCONTINUED | OUTPATIENT
Start: 2024-04-28 | End: 2024-05-01 | Stop reason: HOSPADM

## 2024-04-28 RX ORDER — PREGABALIN 25 MG/1
25 CAPSULE ORAL 2 TIMES DAILY
Status: DISCONTINUED | OUTPATIENT
Start: 2024-04-28 | End: 2024-05-01 | Stop reason: HOSPADM

## 2024-04-28 RX ORDER — TRAZODONE HYDROCHLORIDE 50 MG/1
100 TABLET ORAL NIGHTLY PRN
Status: DISCONTINUED | OUTPATIENT
Start: 2024-04-28 | End: 2024-05-01 | Stop reason: HOSPADM

## 2024-04-28 RX ORDER — MORPHINE SULFATE 2 MG/ML
2 INJECTION, SOLUTION INTRAMUSCULAR; INTRAVENOUS
Status: DISCONTINUED | OUTPATIENT
Start: 2024-04-28 | End: 2024-04-30

## 2024-04-28 RX ADMIN — BUMETANIDE 4 MG: 0.25 INJECTION INTRAMUSCULAR; INTRAVENOUS at 10:48

## 2024-04-28 RX ADMIN — MAGNESIUM SULFATE HEPTAHYDRATE 2 G: 40 INJECTION, SOLUTION INTRAVENOUS at 09:06

## 2024-04-28 RX ADMIN — CALCIUM ACETATE 667 MG: 667 CAPSULE ORAL at 12:06

## 2024-04-28 RX ADMIN — HEPARIN SODIUM 5000 UNITS: 5000 INJECTION INTRAVENOUS; SUBCUTANEOUS at 23:02

## 2024-04-28 RX ADMIN — METHOCARBAMOL TABLETS 500 MG: 500 TABLET, COATED ORAL at 08:24

## 2024-04-28 RX ADMIN — INSULIN GLARGINE 25 UNITS: 100 INJECTION, SOLUTION SUBCUTANEOUS at 23:02

## 2024-04-28 RX ADMIN — TRAZODONE HYDROCHLORIDE 100 MG: 50 TABLET ORAL at 23:02

## 2024-04-28 RX ADMIN — Medication 2 L/MIN: at 08:00

## 2024-04-28 RX ADMIN — PANTOPRAZOLE SODIUM 40 MG: 40 TABLET, DELAYED RELEASE ORAL at 05:35

## 2024-04-28 RX ADMIN — OXYCODONE HYDROCHLORIDE 10 MG: 5 TABLET ORAL at 15:22

## 2024-04-28 RX ADMIN — CALCIUM ACETATE 667 MG: 667 CAPSULE ORAL at 17:15

## 2024-04-28 RX ADMIN — INSULIN LISPRO 2 UNITS: 100 INJECTION, SOLUTION INTRAVENOUS; SUBCUTANEOUS at 17:16

## 2024-04-28 RX ADMIN — MEROPENEM 1 G: 1 INJECTION, POWDER, FOR SOLUTION INTRAVENOUS at 01:51

## 2024-04-28 RX ADMIN — MUPIROCIN: 20 OINTMENT TOPICAL at 08:26

## 2024-04-28 RX ADMIN — Medication 2 L/MIN: at 01:00

## 2024-04-28 RX ADMIN — PREGABALIN 25 MG: 25 CAPSULE ORAL at 23:02

## 2024-04-28 RX ADMIN — METHIMAZOLE 12.5 MG: 5 TABLET ORAL at 08:25

## 2024-04-28 RX ADMIN — MIDODRINE HYDROCHLORIDE 10 MG: 5 TABLET ORAL at 17:15

## 2024-04-28 RX ADMIN — ATORVASTATIN CALCIUM 80 MG: 80 TABLET, FILM COATED ORAL at 23:02

## 2024-04-28 RX ADMIN — OXYCODONE HYDROCHLORIDE 10 MG: 5 TABLET ORAL at 05:54

## 2024-04-28 RX ADMIN — MIDODRINE HYDROCHLORIDE 10 MG: 5 TABLET ORAL at 08:24

## 2024-04-28 RX ADMIN — HEPARIN SODIUM 5000 UNITS: 5000 INJECTION INTRAVENOUS; SUBCUTANEOUS at 15:18

## 2024-04-28 RX ADMIN — Medication 10 MG: at 23:02

## 2024-04-28 RX ADMIN — HEPARIN SODIUM 5000 UNITS: 5000 INJECTION INTRAVENOUS; SUBCUTANEOUS at 08:25

## 2024-04-28 RX ADMIN — MUPIROCIN: 20 OINTMENT TOPICAL at 15:18

## 2024-04-28 RX ADMIN — MORPHINE SULFATE 2 MG: 2 INJECTION, SOLUTION INTRAMUSCULAR; INTRAVENOUS at 10:49

## 2024-04-28 RX ADMIN — LIDOCAINE 4% 1 PATCH: 40 PATCH TOPICAL at 19:33

## 2024-04-28 RX ADMIN — MIDODRINE HYDROCHLORIDE 10 MG: 5 TABLET ORAL at 12:06

## 2024-04-28 RX ADMIN — DOCUSATE SODIUM 100 MG: 100 CAPSULE, LIQUID FILLED ORAL at 08:24

## 2024-04-28 RX ADMIN — METHOCARBAMOL TABLETS 500 MG: 500 TABLET, COATED ORAL at 23:02

## 2024-04-28 RX ADMIN — MEROPENEM 1 G: 1 INJECTION, POWDER, FOR SOLUTION INTRAVENOUS at 15:22

## 2024-04-28 RX ADMIN — CLOPIDOGREL BISULFATE 75 MG: 75 TABLET, FILM COATED ORAL at 08:24

## 2024-04-28 RX ADMIN — BUMETANIDE 4 MG: 0.25 INJECTION INTRAMUSCULAR; INTRAVENOUS at 17:34

## 2024-04-28 RX ADMIN — CHLOROTHIAZIDE SODIUM 500 MG: 500 INJECTION, POWDER, LYOPHILIZED, FOR SOLUTION INTRAVENOUS at 12:06

## 2024-04-28 RX ADMIN — ASPIRIN 81 MG: 81 TABLET, COATED ORAL at 08:24

## 2024-04-28 RX ADMIN — Medication 2 L/MIN: at 05:00

## 2024-04-28 RX ADMIN — HEPARIN SODIUM 5000 UNITS: 5000 INJECTION INTRAVENOUS; SUBCUTANEOUS at 00:31

## 2024-04-28 RX ADMIN — METOPROLOL SUCCINATE 25 MG: 25 TABLET, EXTENDED RELEASE ORAL at 08:24

## 2024-04-28 RX ADMIN — CALCIUM ACETATE 667 MG: 667 CAPSULE ORAL at 08:24

## 2024-04-28 ASSESSMENT — PAIN SCALES - GENERAL
PAINLEVEL_OUTOF10: 5 - MODERATE PAIN
PAINLEVEL_OUTOF10: 0 - NO PAIN
PAINLEVEL_OUTOF10: 6
PAINLEVEL_OUTOF10: 7
PAINLEVEL_OUTOF10: 4
PAINLEVEL_OUTOF10: 7
PAINLEVEL_OUTOF10: 3
PAINLEVEL_OUTOF10: 0 - NO PAIN
PAINLEVEL_OUTOF10: 8

## 2024-04-28 ASSESSMENT — COGNITIVE AND FUNCTIONAL STATUS - GENERAL
MOBILITY SCORE: 9
PERSONAL GROOMING: A LITTLE
STANDING UP FROM CHAIR USING ARMS: TOTAL
DRESSING REGULAR UPPER BODY CLOTHING: A LITTLE
CLIMB 3 TO 5 STEPS WITH RAILING: TOTAL
TURNING FROM BACK TO SIDE WHILE IN FLAT BAD: A LOT
DRESSING REGULAR LOWER BODY CLOTHING: A LOT
MOVING TO AND FROM BED TO CHAIR: A LOT
HELP NEEDED FOR BATHING: A LOT
WALKING IN HOSPITAL ROOM: TOTAL
EATING MEALS: A LITTLE
TOILETING: A LOT
MOVING FROM LYING ON BACK TO SITTING ON SIDE OF FLAT BED WITH BEDRAILS: A LOT
DAILY ACTIVITIY SCORE: 15

## 2024-04-28 ASSESSMENT — PAIN - FUNCTIONAL ASSESSMENT
PAIN_FUNCTIONAL_ASSESSMENT: 0-10

## 2024-04-28 ASSESSMENT — PAIN DESCRIPTION - LOCATION: LOCATION: NECK

## 2024-04-28 NOTE — PROGRESS NOTES
"[facility] Critical Care Medicine       Date:  4/28/2024  Patient:  Hitesh Jurado  YOB: 1970  MRN:  [id number]   Admit Date:  4/18/2024  ========================================================================================================    Chief Complaint   Patient presents with    Shortness of Breath     \"I can't catch my breath since last night\"         History of Present Illness:  Hitesh Jurado is a 53 y.o. year old male patient with Past Medical History of  Type 2 diabetes, hyperthyroidism, ischemic cardiomyopathy, HFrEF (15-20%), HTN, recurrent pleural effusions, COPD, CKD II, PAD with left BKA and chronic wounds, GERD, HLD, PTSD, anxiety, depression, medical non-compliance, and tobacco use who presented to  EM 1 week ago (4/18/2024) for complaints of shortness of breath. Was admitted for suspected pneumonia and initiated on Vancomycin and Levaquin. Had troponin elevation without EKG changes and initiated on heparin drip. Was evaluated by cardiology for NSTEMI with plans for ischemic evaluation. Previous cardiac cath per patient at [facility] 2 years ago without intervention. Cardiac catheterization has been unable to be completed to this point due to worsening renal failure resistant to diuretic therapy. CXR with recurrent pericardial effusion with thoracic surgery consult who felt patient would benefit from optimization prior to possible offering of PleurX catheter. Thoracentesis completed 4/22 with 2.5L fluid removal (previously completed at [facility] on 4/16 with 3L fluid removal) and was initiated on antibiotic therapy with Vancomycin and Levaquin for possible pneumonia. Paracentesis completed today with 2.9 L fluid removal (previous completed 4/3 for 3.2L). Was contacted by hospital medicine to evaluate patient due to renal failure resistant to diuresis, acute fluid overload, and worsening leukocytosis.     Echo 10/2023: EF 15-20%, entire lateral wall, mid, and apical inferior " wall, apical septal segment, and apex are abnormal, mod to sev MR, mod to sev TR, RVSP 64.9  Echo 4/5/2024: EF 20-25%, Grade II diastolic dysfunction, mod MR, mod to sev TR, RVSP 56, LA mod dilated  Cardiac catheterization 2021: LMCA short left main, LAD diffuse mild irregularity up to 20% narrowing, late mid LAD with 30% stenosis, very small caliber D1 and D2 branches with severe diffuse disease, Lcx abnormal, chronically occluded mid circumflex with retrograde filling OM branch from left to left collaterals, RCA diffuse mild irregularity up to 20% narrowing, distal 40% stenosis    Interval ICU Events:  4/25: Admitted to the ICU for acute renal failure with hyperkalemia in setting of cardiorenal versus hepatorenal syndrome.   4/26: Endorses feeling better this am. On room air with SPO2 %. 200 ml UOP overnight after bumex and diuril. WBC downtrending  4/27: Some increased diffuse pain today titrating pain medication, 600 UOP yesterday. On review there is NO clear evidence of decompensated cirrhosis. He has RUQ US with nodularity of the liver but his liver is normal in size, bili is normal, INR low at 1.3, plts are normal. His pleural effusion and ascites are likely secondary to heart failure. Needs to have cell count and diff, albumin, and protein sent from next paracentesis to prove portal hypertension due to heart failure. Given this there is very low concern for HRS. Favor cardiorenal syndrome for progressive renal failure. Aggressive diuresis today diuril BID, Bumex TID    Medical History:  Past Medical History:   Diagnosis Date    CHF (congestive heart failure) (Multi)     Diabetes mellitus (Multi)      No past surgical history on file.  Medications Prior to Admission   Medication Sig Dispense Refill Last Dose    aspirin 81 mg EC tablet Take 1 tablet (81 mg) by mouth once daily.   4/18/2024 at 0800    atorvastatin (Lipitor) 80 mg tablet Take 1 tablet (80 mg) by mouth once daily.   4/17/2024 at 2000     clopidogrel (Plavix) 75 mg tablet Take 1 tablet (75 mg) by mouth once daily.   4/18/2024 at 0800    furosemide (Lasix) 20 mg tablet Take 3 tablets (60 mg) by mouth 2 times a day.   4/17/2024    insulin glargine (Lantus) 100 unit/mL injection Inject 20 Units under the skin once daily at bedtime. Take as directed per insulin instructions.   4/17/2024    insulin lispro (HumaLOG) 100 unit/mL injection Inject under the skin 3 times a day with meals. Take as directed per insulin instructions.   4/18/2024    methIMAzole (Tapazole) 10 mg tablet Take 2 tablets (20 mg) by mouth every 8 hours. (Patient taking differently: Take 0.5 tablets (5 mg) by mouth once daily. 2.5 tabs) 90 tablet 0 4/18/2024 at 0800    metoprolol succinate XL (Toprol-XL) 25 mg 24 hr tablet Take 1 tablet (25 mg) by mouth once daily. Do not crush or chew.   4/18/2024 at 0800    torsemide 60 mg tablet Take 60 mg by mouth once daily. Do not start before March 27, 2024.  0 4/17/2024 at 0800    acetaminophen (Tylenol) 500 mg tablet Take 1 tablet (500 mg) by mouth every 8 hours if needed.       azithromycin (Zithromax) 500 mg tablet Take 1 tablet (500 mg) by mouth once every 24 hours. Do not start before October 30, 2023. 5 tablet 0     gabapentin (Neurontin) 100 mg capsule Take 1 capsule (100 mg) by mouth 3 times a day.       melatonin 5 mg tablet Take 1 tablet (5 mg) by mouth once daily at bedtime.   Unknown    metOLazone (Zaroxolyn) 5 mg tablet Take 1 tablet (5 mg) by mouth once daily. Do not start before March 27, 2024. 30 tablet 1     midodrine (Proamatine) 10 mg tablet Take 1 tablet (10 mg) by mouth 3 times a day with meals. 90 tablet 0     potassium chloride CR 20 mEq ER tablet Take 1 tablet (20 mEq) by mouth 3 times a day. Do not crush or chew. For 10 days   Unknown    silver sulfADIAZINE (Silvadene) 1 % cream Apply topically once daily. Do not start before March 27, 2024.   Unknown     Amoxicillin  Social History     Tobacco Use    Smoking status:  Former     Types: Cigarettes    Smokeless tobacco: Former   Vaping Use    Vaping status: Never Used   Substance Use Topics    Alcohol use: Never    Drug use: Never     No family history on file.    Hospital Medications:           Current Facility-Administered Medications:     acetaminophen (Tylenol) tablet 650 mg, 650 mg, oral, q4h PRN, 650 mg at 04/26/24 0845 **OR** acetaminophen (Tylenol) oral liquid 650 mg, 650 mg, nasogastric tube, q4h PRN **OR** acetaminophen (Tylenol) suppository 650 mg, 650 mg, rectal, q4h PRN, HEENA Van    aspirin EC tablet 81 mg, 81 mg, oral, Daily, Navarro Hsieh MD, 81 mg at 04/28/24 0824    atorvastatin (Lipitor) tablet 80 mg, 80 mg, oral, Nightly, HEENA Van, 80 mg at 04/27/24 2155    bumetanide (Bumex) injection 4 mg, 4 mg, intravenous, q8h, Navarro Hsieh MD, 4 mg at 04/28/24 1048    calcium acetate (Phoslo) capsule 667 mg, 667 mg, oral, TID with meals, Malgorzata Williamson MD, 667 mg at 04/28/24 0824    chlorothiazide (Diuril) injection 500 mg, 500 mg, intravenous, q12h, Navarro Hsieh MD    clopidogrel (Plavix) tablet 75 mg, 75 mg, oral, Daily, HEENA Van, 75 mg at 04/28/24 0824    dextrose 50 % injection 12.5 g, 12.5 g, intravenous, q15 min PRN, HEENA Van    dextrose 50 % injection 25 g, 25 g, intravenous, q15 min PRN, HEENA Van    docusate sodium (Colace) capsule 100 mg, 100 mg, oral, BID, HEENA Van, 100 mg at 04/28/24 0824    glucagon (Glucagen) injection 1 mg, 1 mg, intramuscular, q15 min PRN, HEENA Van    glucagon (Glucagen) injection 1 mg, 1 mg, intramuscular, q15 min PRN, HEENA Van    heparin (porcine) injection 5,000 Units, 5,000 Units, subcutaneous, q8h, HEENA Van, 5,000 Units at 04/28/24 0825    insulin glargine (Lantus) injection 25 Units, 25 Units, subcutaneous, q24h, Radha PARRISH  SHANDA Shane-CNP, 25 Units at 04/24/24 2039    insulin lispro (HumaLOG) injection 0-10 Units, 0-10 Units, subcutaneous, TID with meals, SHANDA Van-CNP, 8 Units at 04/24/24 1138    insulin lispro (HumaLOG) injection 10 Units, 10 Units, subcutaneous, TID with meals, SHANDA Van-CNP, 10 Units at 04/24/24 1142    ipratropium-albuteroL (Duo-Neb) 0.5-2.5 mg/3 mL nebulizer solution 3 mL, 3 mL, nebulization, 4x daily PRN, HEENA Van    lidocaine 4 % patch 1 patch, 1 patch, transdermal, Daily, HEENA Van, 1 patch at 04/27/24 2153    melatonin tablet 3 mg, 3 mg, oral, Nightly PRN, HEENA Van, 3 mg at 04/26/24 2059    meropenem (Merrem) in sodium chloride 0.9 % 100 mL IV 1 g, 1 g, intravenous, q12h, POLO VanCNP, Stopped at 04/28/24 0221    methIMAzole (Tapazole) tablet 12.5 mg, 12.5 mg, oral, Daily, SHANDA Van-CNP, 12.5 mg at 04/28/24 0825    methocarbamol (Robaxin) tablet 500 mg, 500 mg, oral, BID, SHANDA Villanueva-CNP, 500 mg at 04/28/24 0824    metoprolol succinate XL (Toprol-XL) 24 hr tablet 25 mg, 25 mg, oral, Daily, SHANDA Van-CNP, 25 mg at 04/28/24 0824    midodrine (Proamatine) tablet 10 mg, 10 mg, oral, TID with meals, SHANDA Van-CNP, 10 mg at 04/28/24 0824    morphine injection 2 mg, 2 mg, intravenous, q3h PRN, Raquel Winslow, SHANDA-CNP, 2 mg at 04/28/24 1049    mupirocin (Bactroban) 2 % ointment, , Topical, TID, Radha Shane, SHANDA-CNP, Given at 04/28/24 0826    oxyCODONE (Roxicodone) immediate release tablet 10 mg, 10 mg, oral, q6h PRN, HEENA Villanueva, 10 mg at 04/28/24 0554    oxyCODONE (Roxicodone) immediate release tablet 5 mg, 5 mg, oral, q6h PRN, SHANDA Villanueva-CNP    oxygen (O2) therapy, , inhalation, Continuous - Inhalation, Navarro Hsieh MD, 2 L/min at 04/28/24 0800    pantoprazole (ProtoNix) EC tablet 40 mg, 40 mg, oral,  Daily before breakfast, 40 mg at 04/28/24 0535 **OR** pantoprazole (ProtoNix) injection 40 mg, 40 mg, intravenous, Daily before breakfast, Radha FRANCOIS SHANDA Shane-CNP, 40 mg at 04/26/24 0537    perflutren lipid microspheres (Definity) injection 0.5-10 mL of dilution, 0.5-10 mL of dilution, intravenous, Once in imaging, HEENA Van    polyethylene glycol (Glycolax, Miralax) packet 17 g, 17 g, oral, Daily PRN, HEENA Van    pregabalin (Lyrica) capsule 25 mg, 25 mg, oral, BID, HEENA Oneil    trimethobenzamide (Tigan) injection 200 mg, 200 mg, intramuscular, q6h PRN, SHANDA Van-CNP, 200 mg at 04/25/24 4898    Review of Systems:  14 point review of systems was completed and negative except for those specially mention in my HPI    Physical Exam:    Heart Rate:  [84-90]   Temp:  [36.3 °C (97.3 °F)-36.9 °C (98.4 °F)]   Resp:  [9-32]   BP: (101-122)/(60-78)   Weight:  [69.7 kg (153 lb 10.6 oz)]   SpO2:  [90 %-97 %]     Physical Exam  Constitutional:       Appearance: He is ill-appearing.   HENT:      Head: Normocephalic.   Cardiovascular:      Rate and Rhythm: Normal rate and regular rhythm.      Pulses: Normal pulses.      Heart sounds: Normal heart sounds.      Comments: Edema of abdomen/ flank  Pulmonary:      Effort: Pulmonary effort is normal.      Breath sounds: Examination of the right-middle field reveals wheezing. Examination of the right-lower field reveals wheezing. Examination of the left-lower field reveals wheezing. Wheezing present.   Abdominal:      General: There is distension.      Palpations: Abdomen is soft.   Musculoskeletal:         General: Normal range of motion.      Cervical back: Normal range of motion.      Right lower leg: Edema present.      Comments: Left BKA, RLE with ACE wrap dressing, see images for wound documentation   Skin:     General: Skin is cool and dry.      Capillary Refill: Capillary refill takes less than 2  seconds.      Coloration: Skin is sallow.   Neurological:      General: No focal deficit present.      Mental Status: He is alert.   Psychiatric:         Mood and Affect: Mood normal.         Behavior: Behavior normal.         Objective:    I have reviewed all medications, laboratory results, and imaging pertinent for today's encounter.           Intake/Output Summary (Last 24 hours) at 4/28/2024 1140  Last data filed at 4/28/2024 0855  Gross per 24 hour   Intake 1020 ml   Output 225 ml   Net 795 ml       Recent Imaging  CT knee left wo IV contrast   Final Result             Postsurgical changes of below-the-knee amputation with some   nonspecific subcutaneous edema about the stump no osseous abnormality.        Small skin wound suggested at the medial knee near the proximal   tibial metaphysis without adjacent collection.        If there is a high degree of clinical concern for either significant   soft tissue infection or osteomyelitis MRI is the recommended study   for further evaluation.        MACRO:   None        Signed by: Yunior Pickett 4/28/2024 10:33 AM   Dictation workstation:   [id number]      XR hand left 1-2 views   Final Result   No definite acute findings.             MACRO:   None        Signed by: Joseph Schoenberger 4/26/2024 2:57 PM   Dictation workstation:   [id number]      XR knee left 1-2 views   Final Result   No definite acute findings             MACRO:   None        Signed by: Joseph Schoenberger 4/26/2024 2:58 PM   Dictation workstation:   [id number]      US right upper quadrant   Final Result   Cirrhotic morphology of the liver. Mild ascites. Right pleural   effusion.        MACRO:   None.        Signed by: Will Novoa 4/26/2024 7:35 AM   Dictation workstation:   [id number]      Vascular US upper extremity venous duplex left   Final Result   Acute, occlusive thrombosis of the left basilic vein        Exam negative for acute deep venous thrombosis in the left upper   extremity         MACRO:   None        Signed by: Obed Louie 4/25/2024 2:13 PM   Dictation workstation:   [id number]      US guided abdominal paracentesis   Final Result   Addendum (preliminary) 1 of 1   Interpreted By:  Obed Louie,    ADDENDUM:   The original report incorrectly states that specimens were sent for   lab analysis. This was not the case. Only therapeutic, not diagnostic   paracentesis performed        Signed by: Obed Louie 4/25/2024 11:56 AM        -------- ORIGINAL REPORT --------   Dictation workstation:   [id number]      Final   SUCCESSFUL, UNEVENTFUL ULTRASOUND-GUIDED  DIAGNOSTIC AND THERAPEUTIC   PARACENTESIS PERFORMED IN THE  RIGHT LOWER QUADRANT        TOTAL VOLUME DRAINED: 2,900 mL        NO IMMEDIATE COMPLICATIONS        MACRO:   None        Signed by: Obed Louie 4/25/2024 11:28 AM   Dictation workstation:   [id number]      XR chest 1 view   Final Result   As above        MACRO:   None        Signed by: Obed Louie 4/24/2024 12:20 PM   Dictation workstation:   [id number]      XR chest 1 view   Final Result   1.  Satisfactory appearance post right thoracentesis. See discussion   above.                  MACRO:   None        Signed by: Joseph Schoenberger 4/22/2024 10:59 AM   Dictation workstation:   [id number]      US thoracentesis   Final Result   Successful ultrasound-guided diagnostic and therapeutic thoracentesis             MACRO:   None        Signed by: Joseph Schoenberger 4/22/2024 10:50 AM   Dictation workstation:   [id number]      XR cervical spine 2-3 views   Final Result   Suboptimal lateral radiographs without evidence of fracture in the   upper or midcervical spine. Degenerative disc space narrowing   partially visualized in the lower cervical spine.             MACRO:   None        Signed by: Heber Mcnair 4/21/2024 1:18 AM   Dictation workstation:   [id number]      CT chest wo IV contrast   Final Result   Dense consolidations extensively in all three right lobes. No    cavitary component to the pneumonias        Extensively loculated, large right pleural effusion. 3 L removed at   an outside institution on 16 April 2024. The right pleural effusion   has even increased in size from yesterday's portable chest   radiograph. Consult thoracic surgery for tube placement and management        Due to the compressive affects of the effusion, there is partial   collapse of the middle and right lower lobes        No left lung pneumonia or other acute process in the left hemithorax        No left pleural effusion        No pneumothorax on either side        MACRO:   None        Signed by: Obed Louie 4/19/2024 3:18 PM   Dictation workstation:   [id number]      XR chest 1 view   Final Result   1. Worsening airspace disease at the right lung base. Moderate-sized   effusion present. CT can be performed further evaluation                  MACRO:   None        Signed by: Kamron Hopkins 4/18/2024 6:27 PM   Dictation workstation:   [id number]      Consult to Interventional Radiology    (Results Pending)       No echocardiogram results found for the past 14 days    Assessment/Plan:    I am currently managing this critically ill patient for the following problems:    Neuro/Psych/Pain Ctrl/Sedation:  PTSD  JONA  Depression  Hx of alcohol abuse, reports sober x1 year  [facility]-ICU  Delirium precautions  Pain management as needed    Respiratory/ENT:  Loculated recurrent pleural effusion, right  Pneumonia   CT chest: dense consolidations extensively in all 3 right lobes, no cavitary component to the pneumonias, extensively loculated large right pleural effusion with partial collapse of middle and lower lobes on right  Oxygen therapy as needed to maintain SPO2 greater than 92%  Duonebs  See ID    Cardiovascular:  Acute on chronic HFrEF  Ischemic cardiomyopathy  NSTEMI  PAD  Echo requested due to NSTEMI, worsening renal function, and volume status, Dr. Batista informed echo department would not be  necessary as had 1 month ago  Diuresis  Beta blocker  Cardiology following, appreciate recommendations    GI:  On review there is NO clear evidence of decompensated cirrhosis. He has RUQ US with nodularity of the liver but his liver is normal in size, bili is normal, INR low at 1.3, plts are normal. His pleural effusion and ascites are likely secondary to heart failure. Needs to have cell count and diff, albumin, and protein sent from next paracentesis to prove portal hypertension due to heart failure. Given this there is low concern for HRS.     Renal/Volume Status (Intra & Extravascular):  PIPPA on CKD II, baseline Cr ~1.2  Hyperkalemia  hyponatremia  Likely cardiorenal syndrome  - per above very low concern for HRS, no clear evidence of decompensated cirrhosis  Trend BMP  Nephrology following, appreciate recommendations  Aggressive diuresis today diuril BID, Bumex TID    Endocrine  hyperthyroidism  Tapazole    Infectious Disease:  Leukocytosis-worsening  Meropenem, ID to determine length of treatment  Will need to change Meropenem to 1 gm every 24 hours if starts dialysis  ID following, appreciate recommendations on antibiotic management  Blood culture, NTD  Urine with reflex  Ortho consult per ID recs, osteomyelitis  - CT LLE    Heme/Onc:  Trend CBC    MSK:  Left BKA  Ortho consult, Joint swelling with erythema  Wound care  Flexeril as needed  - Vascular consult given chronic wounds    Ethics/Code Status:  DNRCCA  Palliative care following, appreciate recommendations    :  DVT Prophylaxis: heparin  GI Prophylaxis: PPI  Bowel Regimen: as needed  Diet: cardiac/ diabetic  CVC: none  Mary Lou: none  Roque: none  Restraints: none  Dispo: ICU    Critical Care Time:  60 minutes spent in preparing to see patient (I.e. review of medical records), evaluation of diagnostics (I.e. labs, imaging, etc.), documentation, discussing plan of care with patient/ family/ caregiver, and/ or coordination of care with  multidisciplinary team. Time does not include completion of procedure time.     Plan discussed with Dr. Мария Hsieh MD

## 2024-04-28 NOTE — NURSING NOTE
Cardiac monitor alert at 13:25 5 beat run of VTACH. Patient Asymptomatic, vitals WNL and no other abnormal assessment findings. Dr. Hsieh made aware. No Orders received at this time.

## 2024-04-28 NOTE — PROGRESS NOTES
Nephrology Progress Note    Assessment:  53 y.o. male with history s/f HFrEF, PAD s/p LT BKA, T2DM c/b chronic non-healing wounds, cirrhosis who presented for SOB for several days.      PIPPA on CKD stage II/III: initially felt in setting of diuretics, not significantly hypotensive, no contrast, not on any other nephrotoxic medications, function was at baseline on presentation w/ Scr ~1.4 w/ eGFR high 50s/low 60s and worsened during admission, now fluid overloaded, ? Decompensated CHF vs. HRS, urine Na/Cl <10, UA w/ hematuria   Fluid overload: improved, has combined HFrEF 20-25%   Recurrent RT sided pleural effusion: had 3L taken off at [facility] on 4/16, present here again, now s/p RT sided thoracentesis w/ 2.5L taken off  Cirrhosis c/b ascites: now s/p paracentesis (4/25), 2.9L taken off   Hypotension: on midodrine   Hypoalbuminemia  Anemia   RT sided PNA   Hyponatremia     Plan:  - continue phoslo   - plan for dialysis initiation tomorrow, order in for tunneled dialysis catheter placement   - on very high dose diuretics, continuing this in the interim     Subjective:  Admit Date: 4/18/2024    Interval History: function stilll worsening, pt c/o diffuse pain, weakness     Medications:  Scheduled Meds:aspirin, 81 mg, oral, Daily  atorvastatin, 80 mg, oral, Nightly  bumetanide, 4 mg, intravenous, q8h  calcium acetate, 667 mg, oral, TID with meals  chlorothiazide, 500 mg, intravenous, q12h  clopidogrel, 75 mg, oral, Daily  docusate sodium, 100 mg, oral, BID  heparin (porcine), 5,000 Units, subcutaneous, q8h  insulin glargine, 25 Units, subcutaneous, q24h  insulin lispro, 0-10 Units, subcutaneous, TID with meals  insulin lispro, 10 Units, subcutaneous, TID with meals  lidocaine, 1 patch, transdermal, Daily  meropenem, 1 g, intravenous, q12h  methIMAzole, 12.5 mg, oral, Daily  methocarbamol, 500 mg, oral, BID  metoprolol succinate XL, 25 mg, oral, Daily  midodrine, 10 mg, oral, TID with meals  mupirocin, , Topical,  "TID  oxygen, , inhalation, Continuous - Inhalation  pantoprazole, 40 mg, oral, Daily before breakfast   Or  pantoprazole, 40 mg, intravenous, Daily before breakfast  perflutren lipid microspheres, 0.5-10 mL of dilution, intravenous, Once in imaging  pregabalin, 25 mg, oral, BID      Continuous Infusions:       CBC:   Lab Results   Component Value Date    WBC 16.4 (H) 04/28/2024    RBC 3.99 (L) 04/28/2024    HGB 8.1 (L) 04/28/2024    HCT 24.4 (L) 04/28/2024    MCV 61 (L) 04/28/2024    MCH 20.3 (L) 04/28/2024    MCHC 33.2 04/28/2024    RDW 26.0 (H) 04/28/2024     04/28/2024     BMP:    Lab Results   Component Value Date     (L) 04/28/2024    K 4.3 04/28/2024    CL 85 (L) 04/28/2024    CO2 24 04/28/2024     (HH) 04/28/2024    CREATININE 5.67 (H) 04/28/2024    CALCIUM 7.1 (L) 04/28/2024    GLUCOSE 106 (H) 04/28/2024       Objective:  Vitals: BP 96/54   Pulse 84   Temp 36.9 °C (98.4 °F) (Temporal)   Resp 25   Ht 1.753 m (5' 9\")   Wt 69.7 kg (153 lb 10.6 oz)   SpO2 98%   BMI 22.69 kg/m²    Wt Readings from Last 3 Encounters:   04/28/24 69.7 kg (153 lb 10.6 oz)   03/24/24 65 kg (143 lb 4.8 oz)   11/01/23 51.7 kg (114 lb)      24HR INTAKE/OUTPUT:    Intake/Output Summary (Last 24 hours) at 4/28/2024 1227  Last data filed at 4/28/2024 0855  Gross per 24 hour   Intake 1020 ml   Output 225 ml   Net 795 ml       General: alert, in no acute distress  HEENT: normocephalic, atraumatic, anicteric  Lungs: non-labored respirations, clear to auscultation bilaterally  Heart: regular rate and rhythm, no murmurs or rubs  Abdomen: soft, non-tender, distended  Ext: no cyanosis, ++ peripheral edema, LT BKA  Neuro: alert, no gross abnormalities      Electronically signed by Malgorzata Williamson MD, MD              "

## 2024-04-28 NOTE — CARE PLAN
Problem: Pain - Adult  Goal: Verbalizes/displays adequate comfort level or baseline comfort level  Outcome: Progressing     Problem: Safety - Adult  Goal: Free from fall injury  Outcome: Progressing     Problem: Discharge Planning  Goal: Discharge to home or other facility with appropriate resources  Outcome: Progressing     Problem: Chronic Conditions and Co-morbidities  Goal: Patient's chronic conditions and co-morbidity symptoms are monitored and maintained or improved  Outcome: Progressing     Problem: Diabetes  Goal: Achieve decreasing blood glucose levels by end of shift  Outcome: Progressing  Goal: Increase stability of blood glucose readings by end of shift  Outcome: Progressing  Goal: Decrease in ketones present in urine by end of shift  Outcome: Progressing  Goal: Maintain electrolyte levels within acceptable range throughout shift  Outcome: Progressing  Goal: Maintain glucose levels >70mg/dl to <250mg/dl throughout shift  Outcome: Progressing  Goal: Learn about and adhere to nutrition recommendations by end of shift  Outcome: Progressing  Goal: Increase self care and/or family involovement by end of shift  Outcome: Progressing  Goal: Receive DSME education by end of shift  Outcome: Progressing     Problem: Pain  Goal: Takes deep breaths with improved pain control throughout the shift  Outcome: Progressing  Goal: Turns in bed with improved pain control throughout the shift  Outcome: Progressing  Goal: Walks with improved pain control throughout the shift  Outcome: Progressing     Problem: Fall/Injury  Goal: Not fall by end of shift  Outcome: Progressing  Goal: Be free from injury by end of the shift  Outcome: Progressing  Goal: Verbalize understanding of personal risk factors for fall in the hospital  Outcome: Progressing  Goal: Verbalize understanding of risk factor reduction measures to prevent injury from fall in the home  Outcome: Progressing  Goal: Use assistive devices by end of the shift  Outcome:  Progressing  Goal: Pace activities to prevent fatigue by end of the shift  Outcome: Progressing     Problem: Skin  Goal: Decreased wound size/increased tissue granulation at next dressing change  Outcome: Progressing  Goal: Promote/optimize nutrition  Outcome: Progressing  Goal: Promote skin healing  Outcome: Progressing     Problem: Heart Failure  Goal: Improved gas exchange this shift  Outcome: Progressing  Goal: Improved urinary output this shift  Outcome: Progressing  Goal: Reduction in peripheral edema within 24 hours  Outcome: Progressing  Goal: Report improvement of dyspnea/breathlessness this shift  Outcome: Progressing  Goal: Weight from fluid excess reduced over 2-3 days, then stabilize  Outcome: Progressing  Goal: Increase self care and/or family involvement in 24 hours  Outcome: Progressing   The patient's goals for the shift include none    The clinical goals for the shift include patient will have pain managable throughout shift

## 2024-04-28 NOTE — PROGRESS NOTES
"[facility] Critical Care Medicine       Date:  4/28/2024  Patient:  Hitesh Jurado  YOB: 1970  MRN:  [id number]   Admit Date:  4/18/2024  ========================================================================================================    Chief Complaint   Patient presents with    Shortness of Breath     \"I can't catch my breath since last night\"         History of Present Illness:  Htiesh Jurado is a 53 y.o. year old male patient with Past Medical History of  Type 2 diabetes, hyperthyroidism, ischemic cardiomyopathy, HFrEF (15-20%), HTN, recurrent pleural effusions, COPD, CKD II, PAD with left BKA and chronic wounds, GERD, HLD, PTSD, anxiety, depression, medical non-compliance, and tobacco use who presented to  EM 1 week ago (4/18/2024) for complaints of shortness of breath. Was admitted for suspected pneumonia and initiated on Vancomycin and Levaquin. Had troponin elevation without EKG changes and initiated on heparin drip. Was evaluated by cardiology for NSTEMI with plans for ischemic evaluation. Previous cardiac cath per patient at [facility] 2 years ago without intervention. Cardiac catheterization has been unable to be completed to this point due to worsening renal failure resistant to diuretic therapy. CXR with recurrent pericardial effusion with thoracic surgery consult who felt patient would benefit from optimization prior to possible offering of PleurX catheter. Thoracentesis completed 4/22 with 2.5L fluid removal (previously completed at [facility] on 4/16 with 3L fluid removal) and was initiated on antibiotic therapy with Vancomycin and Levaquin for possible pneumonia. Paracentesis completed today with 2.9 L fluid removal (previous completed 4/3 for 3.2L). Was contacted by hospital medicine to evaluate patient due to renal failure resistant to diuresis, acute fluid overload, and worsening leukocytosis.     Echo 10/2023: EF 15-20%, entire lateral wall, mid, and apical inferior " wall, apical septal segment, and apex are abnormal, mod to sev MR, mod to sev TR, RVSP 64.9  Echo 4/5/2024: EF 20-25%, Grade II diastolic dysfunction, mod MR, mod to sev TR, RVSP 56, LA mod dilated  Cardiac catheterization 2021: LMCA short left main, LAD diffuse mild irregularity up to 20% narrowing, late mid LAD with 30% stenosis, very small caliber D1 and D2 branches with severe diffuse disease, Lcx abnormal, chronically occluded mid circumflex with retrograde filling OM branch from left to left collaterals, RCA diffuse mild irregularity up to 20% narrowing, distal 40% stenosis    Interval ICU Events:  4/25: Admitted to the ICU for acute renal failure with hyperkalemia in setting of cardiorenal versus hepatorenal syndrome.   4/26: Endorses feeling better this am. On room air with SPO2 %. 200 ml UOP overnight after bumex and diuril. WBC downtrending  4/27: Some increased diffuse pain today titrating pain medication, 600 UOP yesterday. On review there is NO clear evidence of decompensated cirrhosis. He has RUQ US with nodularity of the liver but his liver is normal in size, bili is normal, INR low at 1.3, plts are normal. His pleural effusion and ascites are likely secondary to heart failure. Needs to have cell count and diff, albumin, and protein sent from next paracentesis to prove portal hypertension due to heart failure. Given this there is very low concern for HRS. Favor cardiorenal syndrome for progressive renal failure. Aggressive diuresis today diuril BID, Bumex TID  4/28: Pain similar to yesterday will add lyrica low dose,  yesterday despite aggressive diuresis. Will need dialysis. IR consulted for tunneled line. Plan to keep in ICU till after first dialysis session.    Medical History:  Past Medical History:   Diagnosis Date    CHF (congestive heart failure) (Multi)     Diabetes mellitus (Multi)      No past surgical history on file.  Medications Prior to Admission   Medication Sig Dispense  Refill Last Dose    aspirin 81 mg EC tablet Take 1 tablet (81 mg) by mouth once daily.   4/18/2024 at 0800    atorvastatin (Lipitor) 80 mg tablet Take 1 tablet (80 mg) by mouth once daily.   4/17/2024 at 2000    clopidogrel (Plavix) 75 mg tablet Take 1 tablet (75 mg) by mouth once daily.   4/18/2024 at 0800    furosemide (Lasix) 20 mg tablet Take 3 tablets (60 mg) by mouth 2 times a day.   4/17/2024    insulin glargine (Lantus) 100 unit/mL injection Inject 20 Units under the skin once daily at bedtime. Take as directed per insulin instructions.   4/17/2024    insulin lispro (HumaLOG) 100 unit/mL injection Inject under the skin 3 times a day with meals. Take as directed per insulin instructions.   4/18/2024    methIMAzole (Tapazole) 10 mg tablet Take 2 tablets (20 mg) by mouth every 8 hours. (Patient taking differently: Take 0.5 tablets (5 mg) by mouth once daily. 2.5 tabs) 90 tablet 0 4/18/2024 at 0800    metoprolol succinate XL (Toprol-XL) 25 mg 24 hr tablet Take 1 tablet (25 mg) by mouth once daily. Do not crush or chew.   4/18/2024 at 0800    torsemide 60 mg tablet Take 60 mg by mouth once daily. Do not start before March 27, 2024.  0 4/17/2024 at 0800    acetaminophen (Tylenol) 500 mg tablet Take 1 tablet (500 mg) by mouth every 8 hours if needed.       azithromycin (Zithromax) 500 mg tablet Take 1 tablet (500 mg) by mouth once every 24 hours. Do not start before October 30, 2023. 5 tablet 0     gabapentin (Neurontin) 100 mg capsule Take 1 capsule (100 mg) by mouth 3 times a day.       melatonin 5 mg tablet Take 1 tablet (5 mg) by mouth once daily at bedtime.   Unknown    metOLazone (Zaroxolyn) 5 mg tablet Take 1 tablet (5 mg) by mouth once daily. Do not start before March 27, 2024. 30 tablet 1     midodrine (Proamatine) 10 mg tablet Take 1 tablet (10 mg) by mouth 3 times a day with meals. 90 tablet 0     potassium chloride CR 20 mEq ER tablet Take 1 tablet (20 mEq) by mouth 3 times a day. Do not crush or chew.  For 10 days   Unknown    silver sulfADIAZINE (Silvadene) 1 % cream Apply topically once daily. Do not start before March 27, 2024.   Unknown     Amoxicillin  Social History     Tobacco Use    Smoking status: Former     Types: Cigarettes    Smokeless tobacco: Former   Vaping Use    Vaping status: Never Used   Substance Use Topics    Alcohol use: Never    Drug use: Never     No family history on file.    Hospital Medications:           Current Facility-Administered Medications:     acetaminophen (Tylenol) tablet 650 mg, 650 mg, oral, q4h PRN, 650 mg at 04/26/24 0845 **OR** acetaminophen (Tylenol) oral liquid 650 mg, 650 mg, nasogastric tube, q4h PRN **OR** acetaminophen (Tylenol) suppository 650 mg, 650 mg, rectal, q4h PRN, HEENA Van    aspirin EC tablet 81 mg, 81 mg, oral, Daily, Navarro Hsieh MD, 81 mg at 04/28/24 0824    atorvastatin (Lipitor) tablet 80 mg, 80 mg, oral, Nightly, HEENA Van, 80 mg at 04/27/24 2155    bumetanide (Bumex) injection 4 mg, 4 mg, intravenous, q8h, Navarro Hsieh MD, 4 mg at 04/28/24 1048    calcium acetate (Phoslo) capsule 667 mg, 667 mg, oral, TID with meals, Malgorzata Williamson MD, 667 mg at 04/28/24 0824    chlorothiazide (Diuril) injection 500 mg, 500 mg, intravenous, q12h, Navarro Hsieh MD    clopidogrel (Plavix) tablet 75 mg, 75 mg, oral, Daily, HEENA Van, 75 mg at 04/28/24 0824    dextrose 50 % injection 12.5 g, 12.5 g, intravenous, q15 min PRN, HEENA Van    dextrose 50 % injection 25 g, 25 g, intravenous, q15 min PRN, HEENA Van    docusate sodium (Colace) capsule 100 mg, 100 mg, oral, BID, HEENA Van, 100 mg at 04/28/24 0824    glucagon (Glucagen) injection 1 mg, 1 mg, intramuscular, q15 min PRN, SHANDA Van-CNP    glucagon (Glucagen) injection 1 mg, 1 mg, intramuscular, q15 min PRN, SHANDA Van-CNP    heparin (porcine)  injection 5,000 Units, 5,000 Units, subcutaneous, q8h, HEENA Van, 5,000 Units at 04/28/24 0825    insulin glargine (Lantus) injection 25 Units, 25 Units, subcutaneous, q24h, HEENA Van, 25 Units at 04/24/24 2039    insulin lispro (HumaLOG) injection 0-10 Units, 0-10 Units, subcutaneous, TID with meals, HEENA Van, 8 Units at 04/24/24 1138    insulin lispro (HumaLOG) injection 10 Units, 10 Units, subcutaneous, TID with meals, HEENA Van, 10 Units at 04/24/24 1142    ipratropium-albuteroL (Duo-Neb) 0.5-2.5 mg/3 mL nebulizer solution 3 mL, 3 mL, nebulization, 4x daily PRN, HEENA Van    lidocaine 4 % patch 1 patch, 1 patch, transdermal, Daily, HEENA Van, 1 patch at 04/27/24 2153    melatonin tablet 3 mg, 3 mg, oral, Nightly PRN, HEENA Van, 3 mg at 04/26/24 2059    meropenem (Merrem) in sodium chloride 0.9 % 100 mL IV 1 g, 1 g, intravenous, q12h, HEENA aVn, Stopped at 04/28/24 0221    methIMAzole (Tapazole) tablet 12.5 mg, 12.5 mg, oral, Daily, HEENA Van, 12.5 mg at 04/28/24 0825    methocarbamol (Robaxin) tablet 500 mg, 500 mg, oral, BID, HEENA Villanueva, 500 mg at 04/28/24 0824    metoprolol succinate XL (Toprol-XL) 24 hr tablet 25 mg, 25 mg, oral, Daily, HEENA Van, 25 mg at 04/28/24 0824    midodrine (Proamatine) tablet 10 mg, 10 mg, oral, TID with meals, SHANDA Van-CNP, 10 mg at 04/28/24 0824    morphine injection 2 mg, 2 mg, intravenous, q3h PRN, SHANDA Damian-CNP, 2 mg at 04/28/24 1049    mupirocin (Bactroban) 2 % ointment, , Topical, TID, SHANDA Van-CNP, Given at 04/28/24 0826    oxyCODONE (Roxicodone) immediate release tablet 10 mg, 10 mg, oral, q6h PRN, Evi Rivera, SHANDA-CNP, 10 mg at 04/28/24 0554    oxyCODONE (Roxicodone) immediate release tablet 5 mg, 5 mg, oral,  q6h PRN, HEENA Villanueva    oxygen (O2) therapy, , inhalation, Continuous - Inhalation, Navarro Hsieh MD, 2 L/min at 04/28/24 0800    pantoprazole (ProtoNix) EC tablet 40 mg, 40 mg, oral, Daily before breakfast, 40 mg at 04/28/24 0535 **OR** pantoprazole (ProtoNix) injection 40 mg, 40 mg, intravenous, Daily before breakfast, SHANDA Van-CNP, 40 mg at 04/26/24 0537    perflutren lipid microspheres (Definity) injection 0.5-10 mL of dilution, 0.5-10 mL of dilution, intravenous, Once in imaging, HEENA Van    polyethylene glycol (Glycolax, Miralax) packet 17 g, 17 g, oral, Daily PRN, HEENA Van    pregabalin (Lyrica) capsule 25 mg, 25 mg, oral, BID, HEENA Oneil    trimethobenzamide (Tigan) injection 200 mg, 200 mg, intramuscular, q6h PRN, SHANDA Van-CNP, 200 mg at 04/25/24 2317    Review of Systems:  14 point review of systems was completed and negative except for those specially mention in my HPI    Physical Exam:    Heart Rate:  [84-90]   Temp:  [36.3 °C (97.3 °F)-36.9 °C (98.4 °F)]   Resp:  [9-32]   BP: (101-122)/(60-78)   Weight:  [69.7 kg (153 lb 10.6 oz)]   SpO2:  [90 %-97 %]     Physical Exam  Constitutional:       Appearance: He is ill-appearing.   HENT:      Head: Normocephalic.   Cardiovascular:      Rate and Rhythm: Normal rate and regular rhythm.      Pulses: Normal pulses.      Heart sounds: Normal heart sounds.      Comments: Edema of abdomen/ flank  Pulmonary:      Effort: Pulmonary effort is normal.      Breath sounds: Examination of the right-middle field reveals wheezing. Examination of the right-lower field reveals wheezing. Examination of the left-lower field reveals wheezing. Wheezing present.   Abdominal:      General: There is distension.      Palpations: Abdomen is soft.   Musculoskeletal:         General: Normal range of motion.      Cervical back: Normal range of motion.      Right lower leg: Edema  present.      Comments: Left BKA, RLE with ACE wrap dressing, see images for wound documentation   Skin:     General: Skin is cool and dry.      Capillary Refill: Capillary refill takes less than 2 seconds.      Coloration: Skin is sallow.   Neurological:      General: No focal deficit present.      Mental Status: He is alert.   Psychiatric:         Mood and Affect: Mood normal.         Behavior: Behavior normal.         Objective:    I have reviewed all medications, laboratory results, and imaging pertinent for today's encounter.           Intake/Output Summary (Last 24 hours) at 4/28/2024 1151  Last data filed at 4/28/2024 0855  Gross per 24 hour   Intake 1020 ml   Output 225 ml   Net 795 ml       Recent Imaging  CT knee left wo IV contrast   Final Result             Postsurgical changes of below-the-knee amputation with some   nonspecific subcutaneous edema about the stump no osseous abnormality.        Small skin wound suggested at the medial knee near the proximal   tibial metaphysis without adjacent collection.        If there is a high degree of clinical concern for either significant   soft tissue infection or osteomyelitis MRI is the recommended study   for further evaluation.        MACRO:   None        Signed by: Yunior Pickett 4/28/2024 10:33 AM   Dictation workstation:   [id number]      XR hand left 1-2 views   Final Result   No definite acute findings.             MACRO:   None        Signed by: Joseph Schoenberger 4/26/2024 2:57 PM   Dictation workstation:   [id number]      XR knee left 1-2 views   Final Result   No definite acute findings             MACRO:   None        Signed by: Joseph Schoenberger 4/26/2024 2:58 PM   Dictation workstation:   [id number]      US right upper quadrant   Final Result   Cirrhotic morphology of the liver. Mild ascites. Right pleural   effusion.        MACRO:   None.        Signed by: Will Novoa 4/26/2024 7:35 AM   Dictation workstation:   [id number]      Vascular  US upper extremity venous duplex left   Final Result   Acute, occlusive thrombosis of the left basilic vein        Exam negative for acute deep venous thrombosis in the left upper   extremity        MACRO:   None        Signed by: Obed Louie 4/25/2024 2:13 PM   Dictation workstation:   [id number]      US guided abdominal paracentesis   Final Result   Addendum (preliminary) 1 of 1   Interpreted By:  Obed Louie,    ADDENDUM:   The original report incorrectly states that specimens were sent for   lab analysis. This was not the case. Only therapeutic, not diagnostic   paracentesis performed        Signed by: Obed Louie 4/25/2024 11:56 AM        -------- ORIGINAL REPORT --------   Dictation workstation:   [id number]      Final   SUCCESSFUL, UNEVENTFUL ULTRASOUND-GUIDED  DIAGNOSTIC AND THERAPEUTIC   PARACENTESIS PERFORMED IN THE  RIGHT LOWER QUADRANT        TOTAL VOLUME DRAINED: 2,900 mL        NO IMMEDIATE COMPLICATIONS        MACRO:   None        Signed by: Obed Louie 4/25/2024 11:28 AM   Dictation workstation:   [id number]      XR chest 1 view   Final Result   As above        MACRO:   None        Signed by: Obed Louie 4/24/2024 12:20 PM   Dictation workstation:   [id number]      XR chest 1 view   Final Result   1.  Satisfactory appearance post right thoracentesis. See discussion   above.                  MACRO:   None        Signed by: Joseph Schoenberger 4/22/2024 10:59 AM   Dictation workstation:   [id number]      US thoracentesis   Final Result   Successful ultrasound-guided diagnostic and therapeutic thoracentesis             MACRO:   None        Signed by: Joseph Schoenberger 4/22/2024 10:50 AM   Dictation workstation:   [id number]      XR cervical spine 2-3 views   Final Result   Suboptimal lateral radiographs without evidence of fracture in the   upper or midcervical spine. Degenerative disc space narrowing   partially visualized in the lower cervical spine.             MACRO:   None         Signed by: Heber Mcnair 4/21/2024 1:18 AM   Dictation workstation:   [id number]      CT chest wo IV contrast   Final Result   Dense consolidations extensively in all three right lobes. No   cavitary component to the pneumonias        Extensively loculated, large right pleural effusion. 3 L removed at   an outside institution on 16 April 2024. The right pleural effusion   has even increased in size from yesterday's portable chest   radiograph. Consult thoracic surgery for tube placement and management        Due to the compressive affects of the effusion, there is partial   collapse of the middle and right lower lobes        No left lung pneumonia or other acute process in the left hemithorax        No left pleural effusion        No pneumothorax on either side        MACRO:   None        Signed by: Obed Louie 4/19/2024 3:18 PM   Dictation workstation:   [id number]      XR chest 1 view   Final Result   1. Worsening airspace disease at the right lung base. Moderate-sized   effusion present. CT can be performed further evaluation                  MACRO:   None        Signed by: Kamron Hopkins 4/18/2024 6:27 PM   Dictation workstation:   [id number]      Consult to Interventional Radiology    (Results Pending)       No echocardiogram results found for the past 14 days    Assessment/Plan:    I am currently managing this critically ill patient for the following problems:    Neuro/Psych/Pain Ctrl/Sedation:  PTSD  JONA  Depression  Hx of alcohol abuse, reports sober x1 year  CAM-ICU  Delirium precautions  Pain management as needed  APAP  Oxy  Dilaudid  Add lyrica low dose    Respiratory/ENT:  Loculated recurrent pleural effusion, right  Pneumonia   CT chest: dense consolidations extensively in all 3 right lobes, no cavitary component to the pneumonias, extensively loculated large right pleural effusion with partial collapse of middle and lower lobes on right  Oxygen therapy as needed to maintain SPO2 greater than  92%  Duonebs  See ID    Cardiovascular:  Acute on chronic HFrEF  Ischemic cardiomyopathy  NSTEMI  PAD  Echo requested due to NSTEMI, worsening renal function, and volume status, Dr. Batista informed echo department would not be necessary as had 1 month ago  Diuresis  Beta blocker  Cardiology following, appreciate recommendations    GI:  On review there is NO clear evidence of decompensated cirrhosis. He has RUQ US with nodularity of the liver but his liver is normal in size, bili is normal, INR low at 1.3, plts are normal. His pleural effusion and ascites are likely secondary to heart failure. Needs to have cell count and diff, albumin, and protein sent from next paracentesis to prove portal hypertension due to heart failure. Given this there is low concern for HRS.     Renal/Volume Status (Intra & Extravascular):  PIPPA on CKD II, baseline Cr ~1.2  Hyperkalemia  hyponatremia  Likely cardiorenal syndrome  - per above very low concern for HRS, no clear evidence of decompensated cirrhosis  Trend BMP  Nephrology following, appreciate recommendations  Aggressive diuresis today diuril BID, Bumex TID    Endocrine  hyperthyroidism  Tapazole    Infectious Disease:  Leukocytosis-worsening  Meropenem, ID to determine length of treatment  Will need to change Meropenem to 1 gm every 24 hours if starts dialysis  ID following, appreciate recommendations on antibiotic management  Blood culture, NTD  Urine with reflex  Ortho consult per ID recs, osteomyelitis  - CT LLE    Heme/Onc:  Trend CBC    MSK:  Left BKA  Ortho consult, Joint swelling with erythema  Wound care  Flexeril as needed  - Vascular consult given chronic wounds    Ethics/Code Status:  DNRCCA  Palliative care following, appreciate recommendations    :  DVT Prophylaxis: heparin  GI Prophylaxis: PPI  Bowel Regimen: as needed  Diet: cardiac/ diabetic  CVC: none  Mary Lou: none  Roque: none  Restraints: none  Dispo: ICU    Critical Care Time:  60 minutes spent  in preparing to see patient (I.e. review of medical records), evaluation of diagnostics (I.e. labs, imaging, etc.), documentation, discussing plan of care with patient/ family/ caregiver, and/ or coordination of care with multidisciplinary team. Time does not include completion of procedure time.     Plan discussed with Dr. Мария Hsieh MD

## 2024-04-29 ENCOUNTER — APPOINTMENT (OUTPATIENT)
Dept: DIALYSIS | Facility: HOSPITAL | Age: 54
End: 2024-04-29
Payer: MEDICAID

## 2024-04-29 ENCOUNTER — ANESTHESIA EVENT (OUTPATIENT)
Dept: OPERATING ROOM | Facility: HOSPITAL | Age: 54
End: 2024-04-29
Payer: MEDICAID

## 2024-04-29 ENCOUNTER — OUTSIDE SERVICES (OUTPATIENT)
Dept: INFECTIOUS DISEASES | Age: 54
End: 2024-04-29
Payer: MEDICAID

## 2024-04-29 DIAGNOSIS — J18.9 PNEUMONIA OF RIGHT LOWER LOBE DUE TO INFECTIOUS ORGANISM: ICD-10-CM

## 2024-04-29 DIAGNOSIS — M86.9 OSTEOMYELITIS OF LEFT KNEE REGION (HCC): Primary | ICD-10-CM

## 2024-04-29 LAB
ACANTHOCYTES BLD QL SMEAR: NORMAL
ALBUMIN SERPL BCP-MCNC: 2.2 G/DL (ref 3.4–5)
ALP SERPL-CCNC: 205 U/L (ref 33–120)
ALT SERPL W P-5'-P-CCNC: 3 U/L (ref 10–52)
ANION GAP SERPL CALC-SCNC: 21 MMOL/L (ref 10–20)
AST SERPL W P-5'-P-CCNC: 38 U/L (ref 9–39)
BASOPHILS # BLD AUTO: 0.01 X10*3/UL (ref 0–0.1)
BASOPHILS NFR BLD AUTO: 0.1 %
BILIRUB SERPL-MCNC: 0.8 MG/DL (ref 0–1.2)
BUN SERPL-MCNC: 129 MG/DL (ref 6–23)
BURR CELLS BLD QL SMEAR: NORMAL
CALCIUM SERPL-MCNC: 7.5 MG/DL (ref 8.6–10.3)
CHLORIDE SERPL-SCNC: 86 MMOL/L (ref 98–107)
CO2 SERPL-SCNC: 22 MMOL/L (ref 21–32)
CREAT SERPL-MCNC: 6.71 MG/DL (ref 0.5–1.3)
EGFRCR SERPLBLD CKD-EPI 2021: 9 ML/MIN/1.73M*2
EOSINOPHIL # BLD AUTO: 0.02 X10*3/UL (ref 0–0.7)
EOSINOPHIL NFR BLD AUTO: 0.1 %
ERYTHROCYTE [DISTWIDTH] IN BLOOD BY AUTOMATED COUNT: 25.2 % (ref 11.5–14.5)
GLUCOSE BLD MANUAL STRIP-MCNC: 109 MG/DL (ref 74–99)
GLUCOSE BLD MANUAL STRIP-MCNC: 124 MG/DL (ref 74–99)
GLUCOSE BLD MANUAL STRIP-MCNC: 158 MG/DL (ref 74–99)
GLUCOSE BLD MANUAL STRIP-MCNC: 163 MG/DL (ref 74–99)
GLUCOSE SERPL-MCNC: 145 MG/DL (ref 74–99)
HCT VFR BLD AUTO: 25.7 % (ref 41–52)
HGB BLD-MCNC: 8.3 G/DL (ref 13.5–17.5)
HOLD SPECIMEN: NORMAL
IMM GRANULOCYTES # BLD AUTO: 0.06 X10*3/UL (ref 0–0.7)
IMM GRANULOCYTES NFR BLD AUTO: 0.4 % (ref 0–0.9)
INR PPP: 1.1 (ref 0.9–1.1)
LYMPHOCYTES # BLD AUTO: 1.21 X10*3/UL (ref 1.2–4.8)
LYMPHOCYTES NFR BLD AUTO: 8.8 %
MAGNESIUM SERPL-MCNC: 2.43 MG/DL (ref 1.6–2.4)
MCH RBC QN AUTO: 19.6 PG (ref 26–34)
MCHC RBC AUTO-ENTMCNC: 32.3 G/DL (ref 32–36)
MCV RBC AUTO: 61 FL (ref 80–100)
MONOCYTES # BLD AUTO: 1.72 X10*3/UL (ref 0.1–1)
MONOCYTES NFR BLD AUTO: 12.5 %
NEUTROPHILS # BLD AUTO: 10.72 X10*3/UL (ref 1.2–7.7)
NEUTROPHILS NFR BLD AUTO: 78.1 %
NRBC BLD-RTO: 0.1 /100 WBCS (ref 0–0)
OVALOCYTES BLD QL SMEAR: NORMAL
PHOSPHATE SERPL-MCNC: 10.1 MG/DL (ref 2.5–4.9)
PLATELET # BLD AUTO: 163 X10*3/UL (ref 150–450)
POTASSIUM SERPL-SCNC: 4.5 MMOL/L (ref 3.5–5.3)
PROT SERPL-MCNC: 5.1 G/DL (ref 6.4–8.2)
PROTHROMBIN TIME: 12.9 SECONDS (ref 9.8–12.8)
RBC # BLD AUTO: 4.23 X10*6/UL (ref 4.5–5.9)
RBC MORPH BLD: NORMAL
SCHISTOCYTES BLD QL SMEAR: NORMAL
SODIUM SERPL-SCNC: 124 MMOL/L (ref 136–145)
TARGETS BLD QL SMEAR: NORMAL
WBC # BLD AUTO: 13.7 X10*3/UL (ref 4.4–11.3)

## 2024-04-29 PROCEDURE — 99221 1ST HOSP IP/OBS SF/LOW 40: CPT | Performed by: SURGERY

## 2024-04-29 PROCEDURE — 2500000004 HC RX 250 GENERAL PHARMACY W/ HCPCS (ALT 636 FOR OP/ED): Performed by: HOSPITALIST

## 2024-04-29 PROCEDURE — 2500000004 HC RX 250 GENERAL PHARMACY W/ HCPCS (ALT 636 FOR OP/ED): Mod: JZ

## 2024-04-29 PROCEDURE — 2500000001 HC RX 250 WO HCPCS SELF ADMINISTERED DRUGS (ALT 637 FOR MEDICARE OP): Performed by: NURSE PRACTITIONER

## 2024-04-29 PROCEDURE — 99231 SBSQ HOSP IP/OBS SF/LOW 25: CPT | Performed by: PLASTIC SURGERY

## 2024-04-29 PROCEDURE — 80053 COMPREHEN METABOLIC PANEL: CPT | Performed by: NURSE PRACTITIONER

## 2024-04-29 PROCEDURE — 82947 ASSAY GLUCOSE BLOOD QUANT: CPT

## 2024-04-29 PROCEDURE — 84100 ASSAY OF PHOSPHORUS: CPT | Performed by: HOSPITALIST

## 2024-04-29 PROCEDURE — 2500000001 HC RX 250 WO HCPCS SELF ADMINISTERED DRUGS (ALT 637 FOR MEDICARE OP)

## 2024-04-29 PROCEDURE — 2500000001 HC RX 250 WO HCPCS SELF ADMINISTERED DRUGS (ALT 637 FOR MEDICARE OP): Performed by: HOSPITALIST

## 2024-04-29 PROCEDURE — 83735 ASSAY OF MAGNESIUM: CPT | Performed by: HOSPITALIST

## 2024-04-29 PROCEDURE — 2500000001 HC RX 250 WO HCPCS SELF ADMINISTERED DRUGS (ALT 637 FOR MEDICARE OP): Performed by: STUDENT IN AN ORGANIZED HEALTH CARE EDUCATION/TRAINING PROGRAM

## 2024-04-29 PROCEDURE — C9113 INJ PANTOPRAZOLE SODIUM, VIA: HCPCS | Performed by: NURSE PRACTITIONER

## 2024-04-29 PROCEDURE — 2500000004 HC RX 250 GENERAL PHARMACY W/ HCPCS (ALT 636 FOR OP/ED): Performed by: NURSE PRACTITIONER

## 2024-04-29 PROCEDURE — P9047 ALBUMIN (HUMAN), 25%, 50ML: HCPCS | Mod: JZ

## 2024-04-29 PROCEDURE — 85610 PROTHROMBIN TIME: CPT | Performed by: NURSE PRACTITIONER

## 2024-04-29 PROCEDURE — 2020000001 HC ICU ROOM DAILY

## 2024-04-29 PROCEDURE — 2500000005 HC RX 250 GENERAL PHARMACY W/O HCPCS: Performed by: NURSE PRACTITIONER

## 2024-04-29 PROCEDURE — 2500000004 HC RX 250 GENERAL PHARMACY W/ HCPCS (ALT 636 FOR OP/ED): Performed by: STUDENT IN AN ORGANIZED HEALTH CARE EDUCATION/TRAINING PROGRAM

## 2024-04-29 PROCEDURE — 2500000005 HC RX 250 GENERAL PHARMACY W/O HCPCS: Performed by: STUDENT IN AN ORGANIZED HEALTH CARE EDUCATION/TRAINING PROGRAM

## 2024-04-29 PROCEDURE — 36415 COLL VENOUS BLD VENIPUNCTURE: CPT | Performed by: NURSE PRACTITIONER

## 2024-04-29 PROCEDURE — 85025 COMPLETE CBC W/AUTO DIFF WBC: CPT | Performed by: HOSPITALIST

## 2024-04-29 PROCEDURE — 2500000006 HC RX 250 W HCPCS SELF ADMINISTERED DRUGS (ALT 637 FOR ALL PAYERS): Performed by: STUDENT IN AN ORGANIZED HEALTH CARE EDUCATION/TRAINING PROGRAM

## 2024-04-29 PROCEDURE — 99233 SBSQ HOSP IP/OBS HIGH 50: CPT | Performed by: INTERNAL MEDICINE

## 2024-04-29 RX ORDER — TOLVAPTAN 15 MG/1
15 TABLET ORAL ONCE
Status: COMPLETED | OUTPATIENT
Start: 2024-04-29 | End: 2024-04-29

## 2024-04-29 RX ORDER — LEVOFLOXACIN 5 MG/ML
500 INJECTION, SOLUTION INTRAVENOUS
Status: DISCONTINUED | OUTPATIENT
Start: 2024-04-29 | End: 2024-04-30

## 2024-04-29 RX ORDER — ALBUMIN HUMAN 250 G/1000ML
25 SOLUTION INTRAVENOUS EVERY 12 HOURS
Status: COMPLETED | OUTPATIENT
Start: 2024-04-29 | End: 2024-04-29

## 2024-04-29 RX ADMIN — BUMETANIDE 4 MG: 0.25 INJECTION INTRAMUSCULAR; INTRAVENOUS at 09:52

## 2024-04-29 RX ADMIN — OXYCODONE HYDROCHLORIDE 10 MG: 5 TABLET ORAL at 09:46

## 2024-04-29 RX ADMIN — MIDODRINE HYDROCHLORIDE 10 MG: 5 TABLET ORAL at 12:16

## 2024-04-29 RX ADMIN — PANTOPRAZOLE SODIUM 40 MG: 40 INJECTION, POWDER, FOR SOLUTION INTRAVENOUS at 09:47

## 2024-04-29 RX ADMIN — TOLVAPTAN 15 MG: 15 TABLET ORAL at 11:09

## 2024-04-29 RX ADMIN — INSULIN LISPRO 2 UNITS: 100 INJECTION, SOLUTION INTRAVENOUS; SUBCUTANEOUS at 17:23

## 2024-04-29 RX ADMIN — CHLOROTHIAZIDE SODIUM 500 MG: 500 INJECTION, POWDER, LYOPHILIZED, FOR SOLUTION INTRAVENOUS at 10:02

## 2024-04-29 RX ADMIN — LIDOCAINE 4% 1 PATCH: 40 PATCH TOPICAL at 22:14

## 2024-04-29 RX ADMIN — METHOCARBAMOL TABLETS 500 MG: 500 TABLET, COATED ORAL at 09:47

## 2024-04-29 RX ADMIN — Medication 10 MG: at 22:15

## 2024-04-29 RX ADMIN — ASPIRIN 81 MG: 81 TABLET, COATED ORAL at 09:46

## 2024-04-29 RX ADMIN — CALCIUM ACETATE 667 MG: 667 CAPSULE ORAL at 09:47

## 2024-04-29 RX ADMIN — MIDODRINE HYDROCHLORIDE 10 MG: 5 TABLET ORAL at 17:32

## 2024-04-29 RX ADMIN — MIDODRINE HYDROCHLORIDE 10 MG: 5 TABLET ORAL at 09:46

## 2024-04-29 RX ADMIN — ALBUMIN HUMAN 25 G: 0.25 SOLUTION INTRAVENOUS at 11:08

## 2024-04-29 RX ADMIN — OXYCODONE HYDROCHLORIDE 10 MG: 5 TABLET ORAL at 16:09

## 2024-04-29 RX ADMIN — MEROPENEM 1 G: 1 INJECTION, POWDER, FOR SOLUTION INTRAVENOUS at 02:38

## 2024-04-29 RX ADMIN — CHLOROTHIAZIDE SODIUM 500 MG: 500 INJECTION, POWDER, LYOPHILIZED, FOR SOLUTION INTRAVENOUS at 22:12

## 2024-04-29 RX ADMIN — CHLOROTHIAZIDE SODIUM 500 MG: 500 INJECTION, POWDER, LYOPHILIZED, FOR SOLUTION INTRAVENOUS at 02:38

## 2024-04-29 RX ADMIN — MUPIROCIN: 20 OINTMENT TOPICAL at 09:52

## 2024-04-29 RX ADMIN — INSULIN LISPRO 2 UNITS: 100 INJECTION, SOLUTION INTRAVENOUS; SUBCUTANEOUS at 12:10

## 2024-04-29 RX ADMIN — CALCIUM ACETATE 667 MG: 667 CAPSULE ORAL at 17:32

## 2024-04-29 RX ADMIN — Medication 2 L/MIN: at 08:00

## 2024-04-29 RX ADMIN — BUMETANIDE 4 MG: 0.25 INJECTION INTRAMUSCULAR; INTRAVENOUS at 17:32

## 2024-04-29 RX ADMIN — PREGABALIN 25 MG: 25 CAPSULE ORAL at 09:47

## 2024-04-29 RX ADMIN — METHIMAZOLE 12.5 MG: 5 TABLET ORAL at 09:56

## 2024-04-29 RX ADMIN — MORPHINE SULFATE 2 MG: 2 INJECTION, SOLUTION INTRAMUSCULAR; INTRAVENOUS at 02:38

## 2024-04-29 RX ADMIN — Medication 2 L/MIN: at 20:00

## 2024-04-29 RX ADMIN — CALCIUM ACETATE 667 MG: 667 CAPSULE ORAL at 12:16

## 2024-04-29 RX ADMIN — BUMETANIDE 4 MG: 0.25 INJECTION INTRAMUSCULAR; INTRAVENOUS at 02:38

## 2024-04-29 RX ADMIN — DOCUSATE SODIUM 100 MG: 100 CAPSULE, LIQUID FILLED ORAL at 09:47

## 2024-04-29 RX ADMIN — ALBUMIN HUMAN 25 G: 0.25 SOLUTION INTRAVENOUS at 22:19

## 2024-04-29 ASSESSMENT — ENCOUNTER SYMPTOMS
SHORTNESS OF BREATH: 0
MYALGIAS: 1
ABDOMINAL DISTENTION: 1
FEVER: 0
NAUSEA: 0
VOMITING: 0

## 2024-04-29 ASSESSMENT — PAIN - FUNCTIONAL ASSESSMENT
PAIN_FUNCTIONAL_ASSESSMENT: 0-10

## 2024-04-29 ASSESSMENT — PAIN SCALES - GENERAL
PAINLEVEL_OUTOF10: 8
PAINLEVEL_OUTOF10: 7
PAINLEVEL_OUTOF10: 10 - WORST POSSIBLE PAIN
PAINLEVEL_OUTOF10: 6

## 2024-04-29 ASSESSMENT — PAIN DESCRIPTION - LOCATION: LOCATION: OTHER (COMMENT)

## 2024-04-29 NOTE — PROGRESS NOTES
"Hitesh Jurado is a 53 y.o. male on day 11 of admission presenting with Right lower lobe pneumonia.    Subjective       Objective     Physical Exam  GEN: chronically ill appearing,   HEENT: MMM, no scleral icterus  CV: RRR  RESP: even and regular  ABD: distended, +tender throughout entire abdomen  NEURO: alert and oriented      Last Recorded Vitals  Blood pressure 82/55, pulse 78, temperature 36.2 °C (97.2 °F), resp. rate (!) 7, height 1.753 m (5' 9\"), weight 69.7 kg (153 lb 10.6 oz), SpO2 97%.  Intake/Output last 3 Shifts:  I/O last 3 completed shifts:  In: 1110 (15.9 mL/kg) [P.O.:760; I.V.:50 (0.7 mL/kg); IV Piggyback:300]  Out: 225 (3.2 mL/kg) [Urine:225 (0.1 mL/kg/hr)]  Weight: 69.7 kg     Relevant Results  Scheduled medications  albumin human, 25 g, intravenous, q12h  aspirin, 81 mg, oral, Daily  atorvastatin, 80 mg, oral, Nightly  bumetanide, 4 mg, intravenous, q8h  calcium acetate, 667 mg, oral, TID with meals  chlorothiazide, 500 mg, intravenous, q12h  [Held by provider] clopidogrel, 75 mg, oral, Daily  docusate sodium, 100 mg, oral, BID  epoetin tika or biosimilar, 10,000 Units, subcutaneous, Every Sunday  [Held by provider] heparin (porcine), 5,000 Units, subcutaneous, q8h  insulin lispro, 0-10 Units, subcutaneous, TID with meals  insulin lispro, 10 Units, subcutaneous, TID with meals  levoFLOXacin, 500 mg, intravenous, Once in OR  lidocaine, 1 patch, transdermal, Daily  melatonin, 10 mg, oral, Nightly  methIMAzole, 12.5 mg, oral, Daily  methocarbamol, 500 mg, oral, BID  metoprolol succinate XL, 25 mg, oral, Daily  midodrine, 10 mg, oral, TID with meals  mupirocin, , Topical, TID  oxygen, , inhalation, Continuous - Inhalation  perflutren lipid microspheres, 0.5-10 mL of dilution, intravenous, Once in imaging  pregabalin, 25 mg, oral, BID      Continuous medications     PRN medications  PRN medications: acetaminophen **OR** acetaminophen **OR** acetaminophen, dextrose, dextrose, glucagon, glucagon, " ipratropium-albuteroL, morphine, oxyCODONE, oxyCODONE, polyethylene glycol, traZODone, trimethobenzamide     Results for orders placed or performed during the hospital encounter of 04/18/24 (from the past 24 hour(s))   POCT GLUCOSE   Result Value Ref Range    POCT Glucose 177 (H) 74 - 99 mg/dL   POCT GLUCOSE   Result Value Ref Range    POCT Glucose 109 (H) 74 - 99 mg/dL   POCT GLUCOSE   Result Value Ref Range    POCT Glucose 124 (H) 74 - 99 mg/dL   SST TOP   Result Value Ref Range    Extra Tube Hold for add-ons.    Comprehensive metabolic panel   Result Value Ref Range    Glucose 145 (H) 74 - 99 mg/dL    Sodium 124 (L) 136 - 145 mmol/L    Potassium 4.5 3.5 - 5.3 mmol/L    Chloride 86 (L) 98 - 107 mmol/L    Bicarbonate 22 21 - 32 mmol/L    Anion Gap 21 (H) 10 - 20 mmol/L    Urea Nitrogen 129 (HH) 6 - 23 mg/dL    Creatinine 6.71 (H) 0.50 - 1.30 mg/dL    eGFR 9 (L) >60 mL/min/1.73m*2    Calcium 7.5 (L) 8.6 - 10.3 mg/dL    Albumin 2.2 (L) 3.4 - 5.0 g/dL    Alkaline Phosphatase 205 (H) 33 - 120 U/L    Total Protein 5.1 (L) 6.4 - 8.2 g/dL    AST 38 9 - 39 U/L    Bilirubin, Total 0.8 0.0 - 1.2 mg/dL    ALT 3 (L) 10 - 52 U/L   Protime-INR   Result Value Ref Range    Protime 12.9 (H) 9.8 - 12.8 seconds    INR 1.1 0.9 - 1.1   CBC and Auto Differential   Result Value Ref Range    WBC 13.7 (H) 4.4 - 11.3 x10*3/uL    nRBC 0.1 (H) 0.0 - 0.0 /100 WBCs    RBC 4.23 (L) 4.50 - 5.90 x10*6/uL    Hemoglobin 8.3 (L) 13.5 - 17.5 g/dL    Hematocrit 25.7 (L) 41.0 - 52.0 %    MCV 61 (L) 80 - 100 fL    MCH 19.6 (L) 26.0 - 34.0 pg    MCHC 32.3 32.0 - 36.0 g/dL    RDW 25.2 (H) 11.5 - 14.5 %    Platelets 163 150 - 450 x10*3/uL    Neutrophils % 78.1 40.0 - 80.0 %    Immature Granulocytes %, Automated 0.4 0.0 - 0.9 %    Lymphocytes % 8.8 13.0 - 44.0 %    Monocytes % 12.5 2.0 - 10.0 %    Eosinophils % 0.1 0.0 - 6.0 %    Basophils % 0.1 0.0 - 2.0 %    Neutrophils Absolute 10.72 (H) 1.20 - 7.70 x10*3/uL    Immature Granulocytes Absolute, Automated  "0.06 0.00 - 0.70 x10*3/uL    Lymphocytes Absolute 1.21 1.20 - 4.80 x10*3/uL    Monocytes Absolute 1.72 (H) 0.10 - 1.00 x10*3/uL    Eosinophils Absolute 0.02 0.00 - 0.70 x10*3/uL    Basophils Absolute 0.01 0.00 - 0.10 x10*3/uL   Phosphorus   Result Value Ref Range    Phosphorus 10.1 (H) 2.5 - 4.9 mg/dL   Magnesium   Result Value Ref Range    Magnesium 2.43 (H) 1.60 - 2.40 mg/dL   Morphology   Result Value Ref Range    RBC Morphology See Below     RBC Fragments Few     Target Cells Few     Ovalocytes Few     Tai Cells Many     Acanthocytes Few    POCT GLUCOSE   Result Value Ref Range    POCT Glucose 158 (H) 74 - 99 mg/dL         Assessment/Plan   -notified by SW that patient and brother would like to revisit goals of care today and discuss hospice care.  I was approached by pt's brother upon arrival to ICU as pt was in the middle of personal care.  Pt has expressed to him that he does not wish to pursue dialysis and inquired about next steps.  We then met with patient together.  He has not made a formal decision to not undergo dialysis but had many questions about how he may be treated by staff should he choose not undergo dialysis.  He recalls his father having hospice care at home and was worried \"they would just dump him at home\".  Hospice v. Palliative was discussed again.  He would like to meet with hospice, hopefully today, before proceeding with dialysis.     I spent 45 minutes in the professional and overall care of this patient.      Romero Todd, APRN-CNP      "

## 2024-04-29 NOTE — PROGRESS NOTES
HPI:      Patient is a 53 y.o. male with past medical history of systolic CHF with most recent EF of 25 to 30%, hypertension, hyperlipidemia, PAD, diabetes, liver cirrhosis, who presents initially to [facility] with a chief complaint of  SOB.  Patient with multiple medical problems history of noncompliance.  He states he has not followed up with a cardiologist as outpatient.  He has multiple recurrent admissions recently.  Apparently left AMA from [facility].  Noted to have elevated cardiac enzyme and trending down in the setting of chronic kidney disease.  He denies any chest pain chest pressure heaviness.  Per patient he states he had a coronary angiogram approximately 2 years ago at [facility], no PCI was performed.  Hemoglobin is 7.8, troponin is 743 now.  EKG with ST, RBBB.         Cardiology consulted for acute on chronic systolic heart failure     Echo on 11/2023:  - The left ventricle is severely dilated. Left ventricular systolic function is   severely decreased. EF = 27 ± 5% (2D 4-ch.) Definity contrast used for endocardial    border detection. Indeterminate left ventricular diastolic dysfunction due to >2+    MR.   - The right ventricle is dilated. Right ventricular systolic function is mildly   decreased.   - The left atrial cavity is severely dilated.   - There is moderate (2+ - 3+) mitral valve regurgitation. Regurgitant orifice area    (PISA) is 0.34 cm².   - There is moderate (2+ - 3+) tricuspid valve regurgitation.   - Estimated right ventricular systolic pressure is 35 mmHg consistent with mild   pulmonary hypertension. Estimated right atrial pressure is 8 mmHg based on IVC   assessment.      On examination today, patient resting in bed comfortably.  Denying anginal like symptoms.  Patient does report shortness of breath at rest and with exertion.  He reports shortness of breath is worse when he is laying down and at night.  Patient lives in a nursing home and will need  continuous follow-up with a cardiologist, in which she would like to establish. Patient very noncompliant.     4/21/2024: Patient with worsening renal function.  Nephrology following now.  Patient also being evaluated by cardiothoracic surgery for thoracentesis.  He denies any chest pain.   Previous cardiac catheterization report from [facility] in 2021 reviewed    4/25/2024: Patient in intensive care unit.  Awaiting dialysis.  Status post paracentesis today.  He denies any chest pain.  He is hemodynamically stable.    4/26/2024: No hemodialysis performed today.  Apparently patient is making urine.  Brother is at the bedside.  Hemodynamically stable.  Normal sinus rhythm on telemetry.  No chest pain      4/29/2024: Family and patient considering hospice.  Evaluation in progress.  Normal sinus rhythm on telemetry.      Cardiac catheterization at [facility] in 2021   Angiographic findings     LMCA: Very short left main.     LAD: Diffuse mild irregularity up to 20% narrowing.Late mid LAD with 30%   stenosis. Very small caliber D1 and D2 branches with severe diffuse disease.     LCx: Abnormal.Chronically occluded mid circumflex with retrogradely filling   OM branch from left to left collaterals.     RCA: Diffuse mild irregularity up to 20% narrowing.Distal 40% stenosis.                  Past Medical History   No past medical history on file.            Patient Active Problem List   Diagnosis    Hyperthyroidism    Encephalopathy    Hypoglycemia    PIPPA (acute kidney injury) (HCC)         Past Surgical History             Past Surgical History:   Procedure Laterality Date    PARACENTESIS Left 04/03/2024     3215 ml removed by Dr. Simental - diagnostics sent            Social History   Social History                Socioeconomic History    Marital status: Single   Tobacco Use    Smoking status: Never       Passive exposure: Never    Smokeless tobacco: Never   Substance and Sexual Activity    Alcohol use: Never     Drug use: Never      Social Determinants of Health              Food Insecurity: Patient Unable To Answer (4/2/2024)     Hunger Vital Sign      Worried About Running Out of Food in the Last Year: Patient unable to answer      Ran Out of Food in the Last Year: Patient unable to answer   Transportation Needs: Patient Unable To Answer (4/2/2024)     PRAPARE - Transportation      Lack of Transportation (Medical): Patient unable to answer      Lack of Transportation (Non-Medical): Patient unable to answer   Housing Stability: Patient Unable To Answer (4/2/2024)     Housing Stability Vital Sign      Unable to Pay for Housing in the Last Year: Patient unable to answer      Number of Places Lived in the Last Year: 1      Unstable Housing in the Last Year: Patient unable to answer            Family History   No family history on file.         Current Facility-Administered Medications                     Current Facility-Administered Medications   Medication Dose Route Frequency Provider Last Rate Last Admin    insulin glargine (LANTUS) injection vial 25 Units  25 Units SubCUTAneous Nightly Maicol Dela Cruz MD        insulin lispro (HUMALOG) injection vial 8 Units  8 Units SubCUTAneous TID WC Maicol Dela Cruz MD   8 Units at 04/04/24 1724    epoetin tika-epbx (RETACRIT) injection 10,000 Units  10,000 Units SubCUTAneous Weekly Allen Mahajan MD   10,000 Units at 04/03/24 2114    furosemide (LASIX) injection 40 mg  40 mg IntraVENous BID Allen Mahajan MD   40 mg at 04/04/24 1724    traMADol (ULTRAM) tablet 50 mg  50 mg Oral Q6H PRN Daily Valentine MD   50 mg at 04/05/24 0554    clopidogrel (PLAVIX) tablet 75 mg  75 mg Oral Daily Ness Bird MD   75 mg at 04/04/24 0846    gabapentin (NEURONTIN) capsule 100 mg  100 mg Oral TID Ness Bird MD        atorvastatin (LIPITOR) tablet 80 mg  80 mg Oral Nightly Ness Bird MD   80 mg at 04/04/24 2152    insulin lispro (HUMALOG) injection vial 0-8 Units  0-8 Units  SubCUTAneous TID WC Dimitry Joshi PA   6 Units at 04/04/24 1724    insulin lispro (HUMALOG) injection vial 0-4 Units  0-4 Units SubCUTAneous Nightly Dimitry Joshi PA   4 Units at 04/03/24 2008    sodium chloride flush 0.9 % injection 5-40 mL  5-40 mL IntraVENous 2 times per day Daily Valentine MD   10 mL at 04/04/24 2153    sodium chloride flush 0.9 % injection 5-40 mL  5-40 mL IntraVENous PRN Daily Valentine MD        0.9 % sodium chloride infusion   IntraVENous PRN Daily Valentine MD        potassium chloride (KLOR-CON M) extended release tablet 40 mEq  40 mEq Oral PRN Daily Valentine MD         Or    potassium bicarb-citric acid (EFFER-K) effervescent tablet 40 mEq  40 mEq Oral PRN Daily Valentine MD         Or    potassium chloride 10 mEq/100 mL IVPB (Peripheral Line)  10 mEq IntraVENous PRN Daily Valentine MD        magnesium sulfate 2000 mg in 50 mL IVPB premix  2,000 mg IntraVENous PRN Daily Valentine MD        enoxaparin (LOVENOX) injection 40 mg  40 mg SubCUTAneous Daily Daily Valentine MD   40 mg at 04/04/24 0849    ondansetron (ZOFRAN-ODT) disintegrating tablet 4 mg  4 mg Oral Q8H PRN Daily Valentine MD         Or    ondansetron (ZOFRAN) injection 4 mg  4 mg IntraVENous Q6H PRN Daily Valentine MD        polyethylene glycol (GLYCOLAX) packet 17 g  17 g Oral Daily PRN Daily Valentine MD        acetaminophen (TYLENOL) tablet 650 mg  650 mg Oral Q6H PRN Daily Valentine MD   650 mg at 04/02/24 1643     Or    acetaminophen (TYLENOL) suppository 650 mg  650 mg Rectal Q6H PRN Daily Valentine MD        glucose chewable tablet 16 g  4 tablet Oral PRN Daily Valentine MD        dextrose bolus 10% 125 mL  125 mL IntraVENous PRN Daily Valentine MD   Stopped at 04/02/24 1248     Or    dextrose bolus 10% 250 mL  250 mL IntraVENous PRN Daily Valentine MD        glucagon injection 1 mg  1 mg SubCUTAneous PRN Daily Valentine MD        dextrose 10 % infusion   IntraVENous Continuous PRN Daily Valentine MD        midodrine (PROAMATINE)  "tablet 10 mg  10 mg Oral TID  Lonnie Ortiz MD   10 mg at 04/04/24 172            ALLERGIES: Amoxicillin     Review of Systems   Constitutional: Negative.    HENT: Negative.     Eyes: Negative.    Respiratory:  Positive for shortness of breath. Negative for chest tightness.    Cardiovascular:  Negative for chest pain, palpitations and leg swelling.   Gastrointestinal: Negative.    Endocrine: Negative.    Genitourinary: Negative.    Musculoskeletal: Negative.    Skin: Negative.    Allergic/Immunologic: Negative.    Neurological: Negative.    Hematological: Negative.    Psychiatric/Behavioral: Negative.              VITALS:  Blood pressure 107/74, pulse 80, temperature 99 °F (37.2 °C), resp. rate 17, height 1.778 m (5' 10\"), weight 75.4 kg (166 lb 3.6 oz), SpO2 93 %.  Body mass index is 23.85 kg/m².     Physical Exam  HENT:      Head: Normocephalic.   Cardiovascular:      Rate and Rhythm: Normal rate and regular rhythm.      Heart sounds: No murmur heard.  Pulmonary:      Effort: Pulmonary effort is normal.      Breath sounds: Normal breath sounds.   Abdominal:      General: Bowel sounds are normal. There is distension.   Musculoskeletal:      Right lower leg: No edema.      Left lower leg: Left lower leg edema: BKA.   Skin:     General: Skin is warm and dry.   Neurological:      General: No focal deficit present.      Mental Status: He is alert.   Psychiatric:         Mood and Affect: Mood normal.            LABS:     Pertinent Labs:  CBC:         Recent Labs     04/04/24  0324   WBC 11.8*   HGB 7.2*   *      BMP:        Recent Labs     04/05/24  0447      K 4.0   CL 99   CO2 21   *   CREATININE 2.29*   GLUCOSE 188*   LABGLOM 33.1*      INR:         Recent Labs     04/02/24  0930   INR 1.5      BNP: No results for input(s): \"BNP\" in the last 72 hours.   TSH: No results found for: \"TSH\"   Cardiac Injury Profile: No results for input(s): \"CKTOTAL\", \"CKMB\", \"CKMBINDEX\", \"TROPONINI\" in the last 72 " "hours.   Troponin: No results found for: \"TROPONINI\"  Lipid Profile: No results found for: \"TRIG\", \"HDL\", \"LDLCALC\", \"CHOL\"   Hemoglobin A1C: No components found for: \"HGBA1C\"         Radiology:  XR CHEST PORTABLE     Result Date: 4/4/2024  EXAMINATION: ONE XRAY VIEW OF THE CHEST 4/4/2024 6:06 am COMPARISON: April 2, 2024 HISTORY: ORDERING SYSTEM PROVIDED HISTORY: Pleural effusion TECHNOLOGIST PROVIDED HISTORY: Reason for exam:->Pleural effusion What reading provider will be dictating this exam?->CRC FINDINGS: Right basilar opacity with silhouetting of the right hemidiaphragm and right heart border compatible with moderate right effusion and associated right basilar atelectasis, unchanged from April 2, 2024..  The left lung is clear. No cardiomediastinal shift.  Osseous and body wall soft tissues unremarkable.      Moderate right effusion and associated right basilar atelectasis, unchanged from April 2, 2024.      IR US GUIDED PARACENTESIS     Result Date: 4/3/2024  PROCEDURE: PARACENTESIS WITHOUT IMAGE GUIDANCE US ABDOMEN LIMITED 4/3/2024 HISTORY: ORDERING SYSTEM PROVIDED HISTORY: ascites TECHNOLOGIST PROVIDED HISTORY: Reason for exam:->ascites TECHNIQUE: Informed consent was obtained after a detailed explanation of the procedure including risks, benefits, and alternatives.  Universal protocol was followed.  A limited ultrasound of the abdomen was performed. The left abdomen was prepped and draped in sterile fashion and local anesthesia was achieved with lidocaine.  An 5 Tongan needle sheath was advanced into ascites and paracentesis was performed.  The patient tolerated the procedure well. FINDINGS: Limited ultrasound of the abdomen demonstrates ascites. A total of 3215 mL was removed.      Successful paracentesis.      XR CHEST PORTABLE     Result Date: 4/2/2024  EXAMINATION: ONE XRAY VIEW OF THE CHEST 4/2/2024 10:50 am COMPARISON: None. HISTORY: ORDERING SYSTEM PROVIDED HISTORY: Altered LOC TECHNOLOGIST PROVIDED " HISTORY: Reason for exam:->Altered LOC What reading provider will be dictating this exam?->CRC FINDINGS: See impression.      1. Pleural and parenchymal opacities right lung base with elevation of the right hemidiaphragm. 2. Cardiomegaly. 3. Hyperinflated left lung. 4. Central bronchial wall thickening and mild interstitial prominence.      US HEAD NECK SOFT TISSUE THYROID     Result Date: 4/2/2024  EXAMINATION: ULTRASOUND OF THE THYROID WITH COLOR DOPPLER FLOW EVALUATION4/2/2024 10:14 am Ultrasound Thyroid COMPARISON: None HISTORY: ORDERING SYSTEM PROVIDED HISTORY: MASS, mass of neck, Possible pulsatile mass of right carotid artery TECHNOLOGIST PROVIDED HISTORY: Reason for exam:->mass of neck, FINDINGS: Size right thyroid lobe: 6.1 x 3.2 x 4.3 cm Size left thyroid lobe: 4.4 x 1.6 x 1.8 cm Size isthmus: 0.4 cm Texture: Homogenous Estimated total number of nodules greater than or equal to 1 cm: 1 Nodule#: # 1: Maximum size: 5.2 cm . All dimensions: 5.2 x 4.1 x 3.4 cm Location: Right Mid Composition: solid or almost completely solid: 2 points Echogenicity: hypoechoic: 2 points Shape: wider than tall: 0 points Margins: smooth: 0 points Echogenic foci: none: 0 points ACR Total Points: 4;  ACR TI-RADS risk category: TR4 -  moderately suspicious nodule. No aneurysm is seen.      1. Nodule 1: ACR TI-RADS 2017 Category 4. Recommend: Ultrasound-guided fine needle aspiration ACR TI-RADS 2017 Recommendations: TR1(0 points) : No FNA or follow up TR2 (2 points) : No FNA or follow up TR3 (3 points) : FNA if >/= 2.5 cm, follow up if 1.5 - 2.4 cm in 1, 3, and 5 years TR4 (4-6 points) : FNA if >/= 1.5 cm, follow up if 1.0 - 1.4 cm in 1, 2, 3, and 5 years TR5 (>/= 7 points) : FNA if >/= 1.0 cm, follow up if 0.5 - 0.9 cm every year for 5 years *ACR TI-RADS recommends that no more than two nodules with the highest ACR TI-RADS total point should be biopsied and no more than four nodules should be followed.      Vascular US ankle  brachial index (MARILIA) without exercise     Result Date: 3/24/2024            [place] [address]     Tel [phone] Fax [phone] Vascular Lab Report [facility] ANKLE BRACHIAL INDEX (MARILIA) WITHOUT EXERCISE Patient Name:      BROOK MORELOS Reading Physician:  [id number] NIK Sosa MD Study Date:        3/20/2024            Ordering Provider:  [id number] LIAM LEGGETT MRN/PID:           [id number]             Fellow: Accession#:        [id number]         Technologist:       America Hansen                                                             RDMS, RVT Date of Birth/Age: 1970 / 53 years  Technologist 2: Gender:            M                    Encounter#:         [id number] Admission Status:  Inpatient            Location Performed: [facility] Diagnosis/ICD: Atherosclerosis of native arteries of right leg with ulceration                of other part of foot-I70.235 CPT Codes:     18760.52 Peripheral artery MARILIA Only Reduced Service Pertinent History: PVD. LEFT BKA. CONCLUSIONS: Right Lower PVR: No evidence of arterial occlusive disease in the right lower extremity at rest. Decreased digital perfusion noted. Monophasic flow is noted in the right common femoral artery, right posterior tibial artery and right dorsalis pedis artery. Left Lower PVR: Left BKA. Comparison: Compared with study from 2/28/2022, improved right MARILIA from last exam. Imaging & Doppler Findings: RIGHT Lower PVR                Pressures Ratios Right Posterior Tibial (Ankle) 116 mmHg  1.10 Right Dorsalis Pedis (Ankle)   78 mmHg   0.74 Right Digit (Great Toe)        36 mmHg   0.34                    Right     Left Brachial Pressure 105 mmHg 101 mmHg [id number] Siena Escobar MD, NIK Electronically signed by [id number] Liz MD on 3/24/2024 at 6:54:08 PM ** Final **      Vascular US lower extremity arterial duplex left     Result Date: 3/24/2024            [place] [address]     Tel [phone] Fax [phone] Vascular Lab Report [facility] US LOWER EXTREMITY ARTERIAL DUPLEX LEFT Patient Name:      BROOK MORELOS Reading Physician:  [id number] Siena Escobar MD, NIK Study Date:        3/20/2024            Ordering Provider:  [id number] JAMI TAVAREZ MRN/PID:           [id number]             Fellow: Accession#:        [id number]         Technologist:       America Hansen RDMS Date of Birth/Age: 1970 / 53 years  Technologist 2: Gender:            M                    Encounter#:         [id number] Admission Status:  Inpatient            Location Performed: [facility] Diagnosis/ICD: Other atherosclerosis of unspecified type of bypass graft(s) of                the extremities, left leg-I70.392 CPT Codes:     85813 Peripheral artery Lower arterial Duplex limited Smoker:            Former. Pertinent History: LE Bypass graft. Pt poor historian. h/o rt to left fem/fem                    bypass and failed fem pop graft (pt unsure of when this was                    done) BKA left, wound on left stump. Per order assess left                    profunda.   CONCLUSIONS: Left Lower Arterial: There is an occlusion documented at the superficial femoral artery proximal, superficial femoral artery mid. and superficial femoral artery distal. Profunda mid 93.2 cm/sec, Distal 38.1 cm/sec. Imaging & Doppler Findings: Right                    Left  PSV                      PSV              EIA        67 cm/s              CFA        56 cm/s       Profunda Proximal 63 cm/s           Popliteal     34 cm/s [id number] Siena Escobar MD, NIK Electronically signed by [id number] Siena Escobar MD, NIK on 3/24/2024 at 6:44:24 PM ** Final **     US guided abdominal paracentesis     Result Date: 3/19/2024  Interpreted  By:  Schoenberger, Joseph, STUDY: US GUIDED ABDOMINAL PARACENTESIS; ;  3/19/2024 2:50 pm    INDICATION: Signs/Symptoms:paracentesis.    COMPARISON: None.    ACCESSION NUMBER(S): [id number]    ORDERING CLINICIAN: ALEXA JUNE    CONSENT: The procedure, its indication, its risks, and alternatives were explained to the patient who understands and consents to the procedure. A time-out is completed prior to the procedure to confirm patient identity and procedure to be performed.    MODALITIES: Ultrasound    TECHNIQUE: Targeted sonographic evaluation of the abdomen demonstrates suitable pocket for paracentesis in the right lower quadrant. This site was then marked.    The marked site was sterilely prepped with chlorhexidine and sterile drapes were placed. Local anesthetic was administered. A 5 Vietnamese sheath needle was advanced until straw-colored fluid was obtained. The sheath was advanced off the needle cannula into the peritoneal space. At this point aspiration was performed completion. A total of 6300 cc of fluid was aspirated. The sheath was then removed. The patient tolerated the procedure well. There was no immediate complication.    FINDINGS: See discussion above        Successful ultrasound-guided therapeutic paracentesis       MACRO: None    Signed by: Joseph Schoenberger 3/19/2024 2:53 PM Dictation workstation:   [id number]     Lower extremity venous duplex right     Result Date: 3/18/2024  Interpreted By:  Schoenberger, Joseph, STUDY: [facility] US LOWER EXTREMITY VENOUS DUPLEX RIGHT  3/18/2024 2:08 pm    INDICATION: 52 y/o   M with  Signs/Symptoms:edema and decreased pulses in RLE. LMP:  Unknown.    COMPARISON: None.    ACCESSION NUMBER(S): [id number]    ORDERING CLINICIAN: COOKIE TAVAREZ    TECHNIQUE: Routine ultrasound of the  right lower extremity was performed with duplex Doppler (color and spectral) evaluation.   Static images were obtained for remote interpretation.     FINDINGS: THIGH VEINS:  There is nonocclusive partially calcified thrombus in the right popliteal vein consistent with chronic venous thrombus. The right posterior tibial and peroneal as well as superficial femoral and common femoral veins compress normally with normal color Doppler flow..    CALF VEINS:  The paired peroneal and posterior tibial calf veins are patent.        .  There is some nonacute since thrombus chronic thrombus in the right popliteal vein. Otherwise negative exam.    MACRO: None    Signed by: Joseph Schoenberger 3/18/2024 2:37 PM Dictation workstation:   [id number]     US thoracentesis     Result Date: 3/18/2024  Interpreted By:  Schoenberger, Joseph, STUDY: US THORACENTESIS; ;  3/18/2024 10:48 am    INDICATION: Signs/Symptoms:Dyspnea/to evaluate etiology of right pleural effusion.    COMPARISON: None.    ACCESSION NUMBER(S): [id number]    ORDERING CLINICIAN: TEJINDER CHAVIRA    CONSENT: The procedure, its indication, its risks, and alternatives were explained to the patient who understands and consents to the procedure. A time-out is completed prior to the procedure to confirm patient identity and procedure to be performed.    MODALITIES: Ultrasound    TECHNIQUE: Targeted sonographic evaluation of the lower right chest demonstrates a large right pleural effusion. Lowest intracostal space with suitable percutaneous access to this pleural effusion was localized using ultrasound. This site was marked. The marked site was then sterilely prepped with chlorhexidine and a sterile barrier drape was placed. Local anesthetic was administered. A 5 Yi sheath needle was advanced until light green fluid was obtained. The sheath was advanced off the needle cannula to the pleural space. 60 cc of fluid was then aspirated into a syringe and sent to the laboratory for studies as ordered by the referring physician. Subsequently aspiration was performed for therapeutic thoracentesis. After a total aspiration of 1500 cc, the patient  experienced some right-sided chest pressure. Therefore the procedure was terminated. The patient tolerated the procedure well otherwise without other immediate complication PICC    FINDINGS: See discussion above        Successful ultrasound-guided diagnostic and therapeutic thoracentesis       MACRO: None    Signed by: Joseph Schoenberger 3/18/2024 10:58 AM Dictation workstation:   [id number]     XR CHEST 1 VIEW     Result Date: 3/18/2024  Interpreted By:  Schoenberger, Joseph, STUDY: XR CHEST 1 VIEW;  3/18/2024 10:50 am    INDICATION: Signs/Symptoms:Post Right Thoracentesis.    COMPARISON: 03/15/2024    ACCESSION NUMBER(S): [id number]    ORDERING CLINICIAN: JOSEPH SCHOENBERGER    FINDINGS:             CARDIOMEDIASTINAL SILHOUETTE: Cardiac silhouette remains somewhat enlarged.    LUNGS: There is no evidence for right pneumothorax post thoracentesis. Decreased right pleural fluid. Considerable opacity in the lower right hemithorax consistent with residual pleural fluid with underlying basal atelectasis.    ABDOMEN: No remarkable upper abdominal findings.    BONES: No acute osseous changes.        1.  Satisfactory appearance post right thoracentesis. See discussion above.          MACRO: None    Signed by: Joseph Schoenberger 3/18/2024 10:56 AM Dictation workstation:   [id number]     US abdomen complete     Result Date: 3/18/2024  Interpreted By:  Schoenberger, Joseph, STUDY: US ABDOMEN COMPLETE; ;  3/18/2024 9:49 am    INDICATION: Signs/Symptoms:Distended abdomen.  Significant alcohol history. Concern for cirrhosis may need paracentesis.    COMPARISON: None.    ACCESSION NUMBER(S): [id number]    ORDERING CLINICIAN: COOKIE TAVRAEZ    TECHNIQUE: 4 quadrant sonographic survey for ascites as well as sonographic evaluation of the right upper quadrant.    FINDINGS: The liver is normal in size measuring 17.9 cm in cephalocaudal dimension. The capsular margins are nodular consistent with cirrhosis. No definite focal  lesion is seen.    There is no dilation of the bile ducts. The extrahepatic common duct measures 4 mm.    The gallbladder is not well distended and incompletely evaluated.    There is no right hydronephrosis. The right kidney measures 8.2 cm in longitudinal dimension.    There is no left hydronephrosis. The left kidney measures 7.3 cm in longitudinal dimension.    The spleen is not enlarged measuring 6.8 cm in cephalocaudal dimension. It is sonographically unremarkable.    There is a small to moderate volume of peritoneal fluid        Small to moderate volume of peritoneal fluid.    Cirrhosis.       MACRO: None    Signed by: Joseph Schoenberger 3/18/2024 10:46 AM Dictation workstation:   [id number]     XR TIBIA FIBULA LEFT (2 VIEWS)     Result Date: 3/15/2024  Interpreted By:  Artur Santos, STUDY: XR TIBIA FIBULA LEFT 2 VIEWS;  3/15/2024 10:36 am    INDICATION: Signs/Symptoms:fall, previous below-knee amputation, pain at left stump.    COMPARISON: 06/23/2022.    ACCESSION NUMBER(S): [id number]    ORDERING CLINICIAN: LO SHAW    FINDINGS: Post below-knee amputation changes. No acute fracture or dislocation identified. Tiny patellar osteophytes. Small knee joint effusion suspected. Tiny metallic foreign bodies lateral to the knee joint are still present. Osteopenia.    Atherosclerosis.        Intact left tibia and fibula, post below-knee amputation.    MACRO: None    Signed by: Artur Santos 3/15/2024 11:33 AM Dictation workstation:   [id number]     XR CHEST 1 VIEW     Result Date: 3/15/2024  Interpreted By:  Artur Santos, STUDY: XR CHEST 1 VIEW;  3/15/2024 10:36 am    INDICATION: Signs/Symptoms:fall.    COMPARISON: 11/01/2023    ACCESSION NUMBER(S): [id number]    ORDERING CLINICIAN: LO SHAW    FINDINGS: Large right basilar pleural effusion occupying greater than 50% of the hemithoracic volume. Overlying atelectasis. Left lung grossly clear without pleural effusion. Cardiomegaly suspected.  Aortic atherosclerosis. Mild pulmonary vascular congestion.        Large right pleural effusion.    MACRO: None    Signed by: Artur Santos 3/15/2024 11:33 AM Dictation workstation:   [id number]     OCT MACULA CIRRUS OU (BOTH EYES)     Result Date: 3/6/2024  Date of Procedure 3/6/2024. Technician Information Imaging Technician: niecy. Interpretation Right Eye Findings include Cystoid macular edema, Epiretinal membrane. Left Eye Findings include Cystoid macular edema. Interval Change Right Eye Better. Left Eye Better.         EKG: sinus bradycardia, RBBB              Assessment plan:      Elevated troponin.  Rule out underlying cardiac ischemia   Acute on chronic decompensated combined heart failure with reduced ejection fraction EF 20-25%  History of coronary disease with known circumflex chronic total occlusion by cath in 2021 at [facility]  History of PAD with life AKA  Decompensated liver cirrhosis  PIPPA on CKD  Diabetes  Hypertension  Hyperlipidemia  Hypokalemia  Anemia  Right-sided pleural effusion.  Encephalopathy-resolved  History of medical noncompliance  Right bundle branch block        Plan:  ICU supportive care and management.  Ideally patient would require cardiac catheterization given elevated cardiac enzyme, history of CAD, acute congestive heart failure and multiple risk factors for CAD.  Patient with worsening renal function and I have concern regarding his noncompliance.  Patient with multiple comorbidities, not ideal PCI candidate.  Will continue to evaluate.  Not ready for cardiac catheterization.  Patient also considering hospice  Continue telemetry  Max cardiac meds  Avoid nephrotoxic agents  Nephrology recommendations  Cardiothoracic surgery recommendations  Patient not a candidate for LifeVest given history of noncompliance  Please keep potassium between 4 and 5 magnesium above 2  Please keep hemoglobin above 8 and platelets above 50  Prognosis is poor.  Hospice evaluation in progress.       Electronically signed by Antony Batista DO on 4/29/2024 at 4:18 PM

## 2024-04-29 NOTE — PROGRESS NOTES
Nephrology Progress Note    Assessment:  53 y.o. male with history s/f HFrEF, PAD s/p LT BKA, T2DM c/b chronic non-healing wounds, cirrhosis who presented for SOB for several days.      PIPPA on CKD stage II/III: initially felt in setting of diuretics, not significantly hypotensive, no contrast, not on any other nephrotoxic medications, function was at baseline on presentation w/ Scr ~1.4 w/ eGFR high 50s/low 60s and worsened during admission, now fluid overloaded, ? Decompensated CHF vs. HRS, urine Na/Cl <10, UA w/ hematuria   Fluid overload: improved, has combined HFrEF 20-25%   Recurrent RT sided pleural effusion: had 3L taken off at [facility] on 4/16, present here again, now s/p RT sided thoracentesis w/ 2.5L taken off  Cirrhosis c/b ascites: now s/p paracentesis (4/25), 2.9L taken off   Hypotension: on midodrine   Hypoalbuminemia  Anemia   RT sided PNA   Hyponatremia     Plan:  - plan for tunnelled permcath tomorrow  - cont phoslo  - ok for diuretics as ordered but don't think he will respond to them given low bp and PIPPA  - hd tomorrow after line placement.  As long as he agrees (has gone back and forth)    Subjective:  Admit Date: 4/18/2024    Interval History: function stilll worsening,  tunnelled permcath held off as got asa and plavix.  On diuril, bumex 4 q 8 and samsca.  Frustrated.  Says he is considering DNRCC.      Medications:  Scheduled Meds:albumin human, 25 g, intravenous, q12h  aspirin, 81 mg, oral, Daily  atorvastatin, 80 mg, oral, Nightly  bumetanide, 4 mg, intravenous, q8h  calcium acetate, 667 mg, oral, TID with meals  chlorothiazide, 500 mg, intravenous, q12h  [Held by provider] clopidogrel, 75 mg, oral, Daily  docusate sodium, 100 mg, oral, BID  [Held by provider] heparin (porcine), 5,000 Units, subcutaneous, q8h  insulin lispro, 0-10 Units, subcutaneous, TID with meals  insulin lispro, 10 Units, subcutaneous, TID with meals  lidocaine, 1 patch, transdermal, Daily  melatonin, 10 mg, oral,  "Nightly  methIMAzole, 12.5 mg, oral, Daily  methocarbamol, 500 mg, oral, BID  metoprolol succinate XL, 25 mg, oral, Daily  midodrine, 10 mg, oral, TID with meals  mupirocin, , Topical, TID  oxygen, , inhalation, Continuous - Inhalation  perflutren lipid microspheres, 0.5-10 mL of dilution, intravenous, Once in imaging  pregabalin, 25 mg, oral, BID  tolvaptan, 15 mg, oral, Once      Continuous Infusions:       CBC:   Lab Results   Component Value Date    WBC 16.4 (H) 04/28/2024    RBC 3.99 (L) 04/28/2024    HGB 8.1 (L) 04/28/2024    HCT 24.4 (L) 04/28/2024    MCV 61 (L) 04/28/2024    MCH 20.3 (L) 04/28/2024    MCHC 33.2 04/28/2024    RDW 26.0 (H) 04/28/2024     04/28/2024     BMP:    Lab Results   Component Value Date     (L) 04/28/2024    K 4.3 04/28/2024    CL 85 (L) 04/28/2024    CO2 24 04/28/2024     (HH) 04/28/2024    CREATININE 5.67 (H) 04/28/2024    CALCIUM 7.1 (L) 04/28/2024    GLUCOSE 106 (H) 04/28/2024       Objective:  Vitals: BP 82/55   Pulse 78   Temp 36.2 °C (97.2 °F)   Resp (!) 7   Ht 1.753 m (5' 9\")   Wt 69.7 kg (153 lb 10.6 oz)   SpO2 97%   BMI 22.69 kg/m²    Wt Readings from Last 3 Encounters:   04/28/24 69.7 kg (153 lb 10.6 oz)   03/24/24 65 kg (143 lb 4.8 oz)   10/30/23 55.9 kg (123 lb 3.8 oz)      24HR INTAKE/OUTPUT:    Intake/Output Summary (Last 24 hours) at 4/29/2024 1038  Last data filed at 4/29/2024 0600  Gross per 24 hour   Intake 650 ml   Output 0 ml   Net 650 ml       General: alert, in no acute distress  HEENT: normocephalic, atraumatic, anicteric  Lungs: non-labored respirations, clear to auscultation bilaterally  Heart: regular rate and rhythm, no murmurs or rubs  Abdomen: soft, non-tender, distended  Ext: no cyanosis, ++ peripheral edema, LT BKA  Neuro: alert, no gross abnormalities      Electronically signed by Allen Mahajan MD, MD              "

## 2024-04-29 NOTE — PROGRESS NOTES
Plastic Surgery Note    Afebrile, vital signs stable  Bilateral lower extremity wound clean  Areas of full-thickness skin loss secondary to peripheral vascular disease  Continue current dressing changes

## 2024-04-29 NOTE — PROGRESS NOTES
Social Work Note Spoke with pt's brother, Remigio, and inquired about SNF choice.  Per Remigio, pt has been expressing ambivalence about SNF and is possibly interested in comfort care and hospice and that he will discuss further with pt but that they have decided that if the plan is hospice, that they want referral to [facility].  Notified attending and \[facility]\"" care accordingly.  Later rcvd msg from \[facility]\"" care NP, Romero, stating that pt is wanting hospice consult today if at all possible.  Referral completed and submitted in Care Port for [facility].  BARBARA Andersen

## 2024-04-29 NOTE — NURSING NOTE
RN Hospice Note    Hitesh Jurado is a prospective Hospice Patient.   Hospice terminal diagnosis: HF/ESRD/PL EFF/cirrhosis  Physician: Dr Packer  Visit type: Admission    Comments/recommendations: This RN met with pt and his brother/steve Flood at bedside.  Presented hospice philosophy of care, services, all options with [facility] hospice benefit.  Pt confirmed goc/poc/code status, desires to have additional procedure/paracentesis, possibly have pleurix or pigtail cath placed for future fluid removal if able before initiating hospice services.  We discussed all options with hospice care, pt and brother are interested in the [facility] hospice unit, we discussed the LOC, pt would qualify for GIP at [facility], also discussed residential/transitions loc with r/b charges, aware could request financial assessment for these charges or [facility] could assist with further discharge planning if preferred.  Pt/brother aware and agree for full DNRCC status upon discharge from [facility] with hospice care.  Pt completed hospice consent form only this visit, with encouragement of his brother, Remigio, to enable initial assessment.  Pt/brother aware there will be no changes in his current tx's/plan until pt is ready to initiate hospice services, aware will need to complete additional hospice admission forms - HES for hospice billing, CSA, O2 safety agreement and [facility] admission forms if desires [facility].  Updated medical team on visit outcome.   [facility] will follow up daily and will continue discharge planning.  Thanks for the consult.     Discharge Planning:  Patient to be discharged to [facility]    Plan of care reviewed with patient/family members pt and Remigio hicks   Plan of care reviewed with hospital staff members: Dr. Packer/Romero pall care cnp/MACKENZIE Cheema/RASHMI Hernandez     Please notify [facility] of any changes in condition. Thank you.  Office: [phone] (8 am-6:30 pm M-F and 8 am-4:30 pm weekends and holidays)   [phone] (6:30  pm-8 am M-F and 4:30 pm-8 am weekends and holidays)    Yola Hoyos, RN

## 2024-04-29 NOTE — PROGRESS NOTES
"[facility] Critical Care Medicine Progress Note      Date:  4/29/2024  Patient:  Hitesh Jurado  YOB: 1970  MRN:  [id number]   Admit Date:  4/18/2024  ========================================================================================================    Chief Complaint   Patient presents with    Shortness of Breath     \"I can't catch my breath since last night\"     Interval ICU Events:  Pt overnight without any current active issues and was afebrile overnight and with some low blood pressures but otherwise HD stable and without evidence of shock and on Room Air. Pt's UOP 100cc over 24 hours despite diuretics and Net +550ccs with pending labs for this morning. The patient planned for possible Tunneled Dialysis catheter today. The patient states that he is having pain everywhere. The patient denies fevers, nausea, vomiting, shortness of breath. Pt does state that he feels like his abdomen is more distended and tender today. Pending possible IR TDC placement.    Medical History:  Past Medical History:   Diagnosis Date    CHF (congestive heart failure) (Multi)     Diabetes mellitus (Multi)      No past surgical history on file.  Medications Prior to Admission   Medication Sig Dispense Refill Last Dose    aspirin 81 mg EC tablet Take 1 tablet (81 mg) by mouth once daily.   4/18/2024 at 0800    atorvastatin (Lipitor) 80 mg tablet Take 1 tablet (80 mg) by mouth once daily.   4/17/2024 at 2000    clopidogrel (Plavix) 75 mg tablet Take 1 tablet (75 mg) by mouth once daily.   4/18/2024 at 0800    furosemide (Lasix) 20 mg tablet Take 3 tablets (60 mg) by mouth 2 times a day.   4/17/2024    insulin glargine (Lantus) 100 unit/mL injection Inject 20 Units under the skin once daily at bedtime. Take as directed per insulin instructions.   4/17/2024    insulin lispro (HumaLOG) 100 unit/mL injection Inject under the skin 3 times a day with meals. Take as directed per insulin instructions.   4/18/2024    " methIMAzole (Tapazole) 10 mg tablet Take 2 tablets (20 mg) by mouth every 8 hours. (Patient taking differently: Take 0.5 tablets (5 mg) by mouth once daily. 2.5 tabs) 90 tablet 0 4/18/2024 at 0800    metoprolol succinate XL (Toprol-XL) 25 mg 24 hr tablet Take 1 tablet (25 mg) by mouth once daily. Do not crush or chew.   4/18/2024 at 0800    torsemide 60 mg tablet Take 60 mg by mouth once daily. Do not start before March 27, 2024.  0 4/17/2024 at 0800    acetaminophen (Tylenol) 500 mg tablet Take 1 tablet (500 mg) by mouth every 8 hours if needed.       azithromycin (Zithromax) 500 mg tablet Take 1 tablet (500 mg) by mouth once every 24 hours. Do not start before October 30, 2023. 5 tablet 0     gabapentin (Neurontin) 100 mg capsule Take 1 capsule (100 mg) by mouth 3 times a day.       melatonin 5 mg tablet Take 1 tablet (5 mg) by mouth once daily at bedtime.   Unknown    metOLazone (Zaroxolyn) 5 mg tablet Take 1 tablet (5 mg) by mouth once daily. Do not start before March 27, 2024. 30 tablet 1     midodrine (Proamatine) 10 mg tablet Take 1 tablet (10 mg) by mouth 3 times a day with meals. 90 tablet 0     potassium chloride CR 20 mEq ER tablet Take 1 tablet (20 mEq) by mouth 3 times a day. Do not crush or chew. For 10 days   Unknown    silver sulfADIAZINE (Silvadene) 1 % cream Apply topically once daily. Do not start before March 27, 2024.   Unknown     Amoxicillin  Social History     Tobacco Use    Smoking status: Former     Types: Cigarettes    Smokeless tobacco: Former   Vaping Use    Vaping status: Never Used   Substance Use Topics    Alcohol use: Never    Drug use: Never     No family history on file.    Review of Systems:  14 point review of systems was completed and negative except for those specially mention in my HPI    Physical Exam:    Heart Rate:  [76-90]   Temp:  [36.4 °C (97.5 °F)-36.9 °C (98.4 °F)]   Resp:  [10-25]   BP: ()/(54-67)   SpO2:  [92 %-98 %]     Physical Exam  Constitutional:        Comments: Appears thin and chronically ill  No acute distress   HENT:      Mouth/Throat:      Mouth: Mucous membranes are moist.   Eyes:      General: No scleral icterus.     Extraocular Movements: Extraocular movements intact.      Pupils: Pupils are equal, round, and reactive to light.   Cardiovascular:      Rate and Rhythm: Normal rate and regular rhythm.      Heart sounds: No murmur heard.  Pulmonary:      Effort: Pulmonary effort is normal. No respiratory distress.      Breath sounds: Rales present.      Comments: Mild rales at bases  Good air entry and movement bilaterally  Abdominal:      General: Abdomen is flat. There is distension.      Palpations: Abdomen is soft.      Tenderness: There is abdominal tenderness.      Comments: Diffusely tender   Musculoskeletal:      Right lower leg: Edema present.      Left lower leg: Edema present.   Skin:     General: Skin is warm and dry.      Capillary Refill: Capillary refill takes less than 2 seconds.      Coloration: Skin is not jaundiced.   Neurological:      General: No focal deficit present.      Mental Status: He is oriented to person, place, and time. Mental status is at baseline.      Cranial Nerves: No cranial nerve deficit.      Motor: No weakness.   Psychiatric:         Mood and Affect: Mood normal.         Behavior: Behavior normal.         Objective:    I have reviewed all medications, laboratory results, and imaging pertinent for today's encounter    Assessment/Plan:  Pt is a 54 y/o M w/ a PMHx of Type 2 diabetes, hyperthyroidism, ischemic cardiomyopathy, HFrEF (15-20%), HTN, recurrent pleural effusions, COPD, CKD II, PAD with left BKA and chronic wounds, GERD, HLD, PTSD, anxiety, depression, medical non-compliance, and tobacco use who presented to [facility] 1 week ago (4/18/2024) for complaints of shortness of breath in the setting of acute mild hypoxemic resp failure due to volume overload and without current evidence of infection brought to the ICU for  worsening renal function and volume overload.     Neuro/Psych/Pain Ctrl/Sedation:  #History of anxiety  #History of Depression  #History of PTSD  - No current active issues  - Will treat pain as needed PRN with Tylenol and tramadol  - C/W Standing methocarbamol - renally dosed  - CAM-ICU and delirium precautions  - Melatonin at night for sleep  - C/W home pregabalin    Respiratory/ENT:  #Mild Acute hypoxemic respiratory failure - due to volume overload  - Wean nasal cannula as tolerated to SpO2>90%  - Diuresis as below    Cardiovascular:  #Acute decompensated HFrEF - with volume overload  #Ischemic CM  #CAD  #PAD s/p Left BKA  #HLD  - C/W ASA and Statin and metoprolol  - Will wean midodrine as tolerated  - Will do Bedside POCUS today and consider attempt at dobutamine assisted diuresis for subclinical shock and possible cardiorenal syndrome    Renal/Volume Status (Intra & Extravascular):  #PIPPA on CKD - Possible Cardio renal syndrome  - Very low suspicion for hepatorenal syndrome  as patient Ascitic protein 3.1 on last lab work and if SAAG>1.1 then this is likely cardiac ascites  - Will consider dobutamine assisted diuresis as above  - Will Trial Bumex and Diurill and one dose of tolvaptan today and 2 doses of 25% (25g) albumin  - Discussed with IR and would not do TDC while on plavix despite patient being at low bleeding risk and so underwent second opioin by general surgery and will plan for TDC tomorrow and will hold one day of plavix and NPO after Midnight tonight  - Will follow-up labs for electrolytes today    GI:  #Ascites - likely cardiac in etiology  - Will consider paracentesis today for comfort and albumin    Infectious Disease:  - Low suspicion for active infectious etiology and will discontinue Abx at this time  - Will monitor off Abx and consider re-culturing and restarting as needed.     Heme/Onc:  - Hold HSQ for VTE Ppx and will hold at midnight  - Monitor Hgb Daily and transfuse for Hgb<87or bleeding  with hemodynamic instability    Endocrine  #Type 2 Diabetes  #Hyperthyroidism   - Will c/w Methimazole   - Finger sticks AC/at bedtime and will continue with ISS and pre-meal insulin  - Pt refusing basal bolus suibQ insulin and will discontinue especially given worsened renal function and Finger sticks in goal range of 140-180    Ethics/Code Status:  - DNR and will continue further GOC today as the patient has expressed desire to explore possible comfort care given his poor quality of life and slow deterioration    :  DVT Prophylaxis: HSQ  GI Prophylaxis: Will discontinue PPI  Bowel Regimen: Yes  Diet: Renal/Cardiac/Carb controlled  CVC: None  [place]: None  [place]: Yes  Restraints: None  Dispo: ICU for today - possible transfer in next 24 hours    James Packer MD

## 2024-04-29 NOTE — CARE PLAN
The patient's goals for the shift include none    The clinical goals for the shift include Pt to have systollic BP above 90 throgh out shift      Problem: Pain - Adult  Goal: Verbalizes/displays adequate comfort level or baseline comfort level  Outcome: Progressing     Problem: Safety - Adult  Goal: Free from fall injury  Outcome: Progressing     Problem: Discharge Planning  Goal: Discharge to home or other facility with appropriate resources  Outcome: Progressing     Problem: Chronic Conditions and Co-morbidities  Goal: Patient's chronic conditions and co-morbidity symptoms are monitored and maintained or improved  Outcome: Progressing     Problem: Diabetes  Goal: Achieve decreasing blood glucose levels by end of shift  Outcome: Progressing  Goal: Increase stability of blood glucose readings by end of shift  Outcome: Progressing  Goal: Decrease in ketones present in urine by end of shift  Outcome: Progressing  Goal: Maintain electrolyte levels within acceptable range throughout shift  Outcome: Progressing  Goal: Maintain glucose levels >70mg/dl to <250mg/dl throughout shift  Outcome: Progressing  Goal: Learn about and adhere to nutrition recommendations by end of shift  Outcome: Progressing  Goal: Increase self care and/or family involovement by end of shift  Outcome: Progressing  Goal: Receive DSME education by end of shift  Outcome: Progressing     Problem: Pain  Goal: Takes deep breaths with improved pain control throughout the shift  Outcome: Progressing  Goal: Turns in bed with improved pain control throughout the shift  Outcome: Progressing  Goal: Walks with improved pain control throughout the shift  Outcome: Progressing     Problem: Fall/Injury  Goal: Not fall by end of shift  Outcome: Progressing  Goal: Be free from injury by end of the shift  Outcome: Progressing  Goal: Verbalize understanding of personal risk factors for fall in the hospital  Outcome: Progressing  Goal: Verbalize understanding of risk  factor reduction measures to prevent injury from fall in the home  Outcome: Progressing  Goal: Use assistive devices by end of the shift  Outcome: Progressing  Goal: Pace activities to prevent fatigue by end of the shift  Outcome: Progressing     Problem: Skin  Goal: Decreased wound size/increased tissue granulation at next dressing change  Outcome: Progressing  Flowsheets (Taken 4/29/2024 1134)  Decreased wound size/increased tissue granulation at next dressing change: Promote sleep for wound healing  Goal: Promote/optimize nutrition  Outcome: Progressing  Flowsheets (Taken 4/29/2024 1134)  Promote/optimize nutrition: Consume > 50% meals/supplements  Goal: Promote skin healing  Outcome: Progressing  Flowsheets (Taken 4/29/2024 1134)  Promote skin healing: Assess skin/pad under line(s)/device(s)     Problem: Heart Failure  Goal: Improved gas exchange this shift  Outcome: Progressing  Goal: Improved urinary output this shift  Outcome: Progressing  Goal: Reduction in peripheral edema within 24 hours  Outcome: Progressing  Goal: Report improvement of dyspnea/breathlessness this shift  Outcome: Progressing  Goal: Weight from fluid excess reduced over 2-3 days, then stabilize  Outcome: Progressing  Goal: Increase self care and/or family involvement in 24 hours  Outcome: Progressing

## 2024-04-29 NOTE — NURSING NOTE
Order was received for tunneled HD catheter placement from Anupam Blankenship CNP.  Spoke with Dr. Schoenberger and he states he will place non tunneled HD catheter as patient had ASA and Plavix 4/28/24 @ 0824 and they both need held for 5 days, but it they were held starting today, he would be willing to exchange non tunneled to tunneled on Friday.  Dr. Packer and Anupam Blankenship sent a secure chat and were made aware.  Dr. Packer stated that the patient does not need a non tunneled HD at this time and if patient does, he will place himself in ICU.  Dr. Packer is aware that if line is not urgent that Dr. Schoenberger would like to place or one of our radiologists as it is easier to exchange out and place tunneled HD.  At this time, non tunneled HD catheter is not needed.

## 2024-04-29 NOTE — CONSULTS
"Consults  General Surgery  Reason: tunneled dialysis catheter  General Surgery Consult Note  Patient: Hitesh Jurado  Unit/Bed: 11/11-A  YOB: 1970  MRN: [id number]  Acct: [id number]   Admitting Diagnosis: Pleural effusion [J90]  Elevated troponin [R79.89]  Other ascites [R18.8]  Dyspnea, unspecified type [R06.00]  Date:  4/18/2024  Hospital Day: 11  Attending: James Packer MD    Complaint:  Chief Complaint   Patient presents with    Shortness of Breath     \"I can't catch my breath since last night\"      History of Present Illness:  53-year-old male with history of HFrEF, PAD s/p LT BKA, T2DM c/b chronic non-healing wounds, hypothyroidism, ischemic cardiomyopathy, COPD, CKD 2 cirrhosis who presented to [facility] on 4/18/2024 for shortness of breath in the setting of acute mild hypoxemic respiratory failure due to volume overload and without evidence of infection.  He was brought to the ICU for worsening renal function and volume overload.  General Surgery was consulted for tunneled dialysis catheter placement.    Allergies:  Allergies   Allergen Reactions    Amoxicillin Anaphylaxis      PMHx:  Past Medical History:   Diagnosis Date    CHF (congestive heart failure) (Multi)     Diabetes mellitus (Multi)      PSHx:  No past surgical history on file.  Social Hx:  Social History     Socioeconomic History    Marital status: Single     Spouse name: Not on file    Number of children: Not on file    Years of education: Not on file    Highest education level: Not on file   Occupational History    Not on file   Tobacco Use    Smoking status: Former     Types: Cigarettes    Smokeless tobacco: Former   Vaping Use    Vaping status: Never Used   Substance and Sexual Activity    Alcohol use: Never    Drug use: Never    Sexual activity: Defer   Other Topics Concern    Not on file   Social History Narrative    Not on file     Social Determinants of Health     Financial Resource Strain: Low Risk  (4/19/2024) "    Overall Financial Resource Strain (CARDIA)     Difficulty of Paying Living Expenses: Not hard at all   Food Insecurity: Patient Unable To Answer (4/2/2024)    Received from [facility]    Hunger Vital Sign     Worried About Running Out of Food in the Last Year: Patient unable to answer     Ran Out of Food in the Last Year: Patient unable to answer   Transportation Needs: No Transportation Needs (4/19/2024)    PRAPARE - Transportation     Lack of Transportation (Medical): No     Lack of Transportation (Non-Medical): No   Recent Concern: Transportation Needs - Unmet Transportation Needs (3/18/2024)    PRAPARE - Transportation     Lack of Transportation (Medical): Yes     Lack of Transportation (Non-Medical): Yes   Physical Activity: Unknown (2/8/2024)    Received from [facility]    Exercise Vital Sign     Days of Exercise per Week: 0 days     Minutes of Exercise per Session: Patient declined   Recent Concern: Physical Activity - Inactive (12/14/2023)    Received from [facility]    Exercise Vital Sign     Days of Exercise per Week: 0 days     Minutes of Exercise per Session: 0 min   Stress: No Stress Concern Present (2/8/2024)    Received from [facility]    [employer] - Occupational Stress Questionnaire     Feeling of Stress : Not at all   Social Connections: Unknown (2/8/2024)    Received from [facility]    Social Connection and Isolation Panel [NHANES]     Frequency of Communication with Friends and Family: More than three times a week     Frequency of Social Gatherings with Friends and Family: More than three times a week     Attends Nondenominational Services: Patient declined     Active Member of Clubs or Organizations: No     Attends Club or Organization Meetings: Patient declined     Marital Status: Not on file   Recent Concern: Social Connections - Socially Isolated (12/14/2023)    Received from [facility]    Social Connection and Isolation  Panel [NHANES]     Frequency of Communication with Friends and Family: More than three times a week     Frequency of Social Gatherings with Friends and Family: More than three times a week     Attends Taoism Services: Never     Active Member of Clubs or Organizations: No     Attends Club or Organization Meetings: Patient refused     Marital Status: Never    Intimate Partner Violence: Not At Risk (12/14/2023)    Received from [facility]    Humiliation, Afraid, Rape, and Kick questionnaire     Fear of Current or Ex-Partner: No     Emotionally Abused: No     Physically Abused: No     Sexually Abused: No   Housing Stability: Low Risk  (4/19/2024)    Housing Stability Vital Sign     Unable to Pay for Housing in the Last Year: No     Number of Places Lived in the Last Year: 1     Unstable Housing in the Last Year: No     Family Hx:  No family history on file.  Review of Systems:   Review of Systems   Constitutional:  Negative for fever.   Respiratory:  Negative for shortness of breath.    Gastrointestinal:  Positive for abdominal distention. Negative for nausea and vomiting.   Musculoskeletal:  Positive for myalgias.   All other systems reviewed and are negative.    Physical Examination:    Visit Vitals  BP 82/55   Pulse 78   Temp 36.2 °C (97.2 °F)   Resp (!) 7      Physical Exam  Constitutional:       General: He is not in acute distress.     Appearance: He is ill-appearing.   HENT:      Head: Normocephalic and atraumatic.   Eyes:      Conjunctiva/sclera: Conjunctivae normal.   Pulmonary:      Effort: Pulmonary effort is normal. No respiratory distress.   Abdominal:      General: There is distension.   Musculoskeletal:      Right lower leg: Edema present.      Left lower leg: Edema present.   Skin:     General: Skin is warm and dry.      Capillary Refill: Capillary refill takes less than 2 seconds.      Findings: No rash.   Neurological:      Mental Status: He is alert and oriented to person, place, and time.  "  Psychiatric:         Mood and Affect: Mood normal.         Behavior: Behavior normal.       LABS:  CBC:   Lab Results   Component Value Date    WBC 13.7 (H) 04/29/2024    RBC 4.23 (L) 04/29/2024    HGB 8.3 (L) 04/29/2024    HCT 25.7 (L) 04/29/2024    MCV 61 (L) 04/29/2024    MCH 19.6 (L) 04/29/2024    MCHC 32.3 04/29/2024    RDW 25.2 (H) 04/29/2024     04/29/2024     CBC with Differential:    Lab Results   Component Value Date    WBC 13.7 (H) 04/29/2024    RBC 4.23 (L) 04/29/2024    HGB 8.3 (L) 04/29/2024    HCT 25.7 (L) 04/29/2024     04/29/2024    MCV 61 (L) 04/29/2024    MCH 19.6 (L) 04/29/2024    MCHC 32.3 04/29/2024    RDW 25.2 (H) 04/29/2024    NRBC 0.1 (H) 04/29/2024    LYMPHOPCT 8.8 04/29/2024    MONOPCT 12.5 04/29/2024    EOSPCT 0.1 04/29/2024    BASOPCT 0.1 04/29/2024    MONOSABS 1.72 (H) 04/29/2024    LYMPHSABS 1.21 04/29/2024    EOSABS 0.02 04/29/2024    BASOSABS 0.01 04/29/2024     CMP:    Lab Results   Component Value Date     (L) 04/29/2024    K 4.5 04/29/2024    CL 86 (L) 04/29/2024    CO2 22 04/29/2024     (HH) 04/29/2024    CREATININE 6.71 (H) 04/29/2024    GLUCOSE 145 (H) 04/29/2024    PROT 5.1 (L) 04/29/2024    CALCIUM 7.5 (L) 04/29/2024    BILITOT 0.8 04/29/2024    ALKPHOS 205 (H) 04/29/2024    AST 38 04/29/2024    ALT 3 (L) 04/29/2024     BMP:    Lab Results   Component Value Date     (L) 04/29/2024    K 4.5 04/29/2024    CL 86 (L) 04/29/2024    CO2 22 04/29/2024     (HH) 04/29/2024    CREATININE 6.71 (H) 04/29/2024    CALCIUM 7.5 (L) 04/29/2024    GLUCOSE 145 (H) 04/29/2024     Magnesium:  Lab Results   Component Value Date    MG 2.43 (H) 04/29/2024     Troponin:  No results found for: \"TROPHS\"  Lipid Panel:  No results found for: \"HDL\", \"CHHDL\", \"VLDL\", \"TRIG\", \"NHDL\"   Current Medications:    Current Facility-Administered Medications:     acetaminophen (Tylenol) tablet 650 mg, 650 mg, oral, q4h PRN, 650 mg at 04/26/24 0845 **OR** acetaminophen " (Tylenol) oral liquid 650 mg, 650 mg, nasogastric tube, q4h PRN **OR** acetaminophen (Tylenol) suppository 650 mg, 650 mg, rectal, q4h PRN, HEENA Van    albumin human 25 % solution 25 g, 25 g, intravenous, q12h, Anupam Blankenship, APRN-CNP, Stopped at 04/29/24 1138    aspirin EC tablet 81 mg, 81 mg, oral, Daily, Navarro Hsieh MD, 81 mg at 04/29/24 0946    atorvastatin (Lipitor) tablet 80 mg, 80 mg, oral, Nightly, HEENA Van, 80 mg at 04/28/24 2302    bumetanide (Bumex) injection 4 mg, 4 mg, intravenous, q8h, Navarro Hsieh MD, 4 mg at 04/29/24 0952    calcium acetate (Phoslo) capsule 667 mg, 667 mg, oral, TID with meals, Malgorzata Williamson MD, 667 mg at 04/29/24 1216    chlorothiazide (Diuril) injection 500 mg, 500 mg, intravenous, q12h, Navarro Hsieh MD, 500 mg at 04/29/24 1002    [Held by provider] clopidogrel (Plavix) tablet 75 mg, 75 mg, oral, Daily, HEENA Van, 75 mg at 04/28/24 0824    dextrose 50 % injection 12.5 g, 12.5 g, intravenous, q15 min PRN, HEENA Van    dextrose 50 % injection 25 g, 25 g, intravenous, q15 min PRN, HEENA Van    docusate sodium (Colace) capsule 100 mg, 100 mg, oral, BID, HEENA Van, 100 mg at 04/29/24 0947    epoetin tika-epbx (Retacrit) injection 10,000 Units, 10,000 Units, subcutaneous, Every Sunday, Allen Mahajan MD    glucagon (Glucagen) injection 1 mg, 1 mg, intramuscular, q15 min PRN, HEENA Van    glucagon (Glucagen) injection 1 mg, 1 mg, intramuscular, q15 min PRN, HEENA Van    [Held by provider] heparin (porcine) injection 5,000 Units, 5,000 Units, subcutaneous, q8h, HEENA Van, 5,000 Units at 04/28/24 2302    insulin lispro (HumaLOG) injection 0-10 Units, 0-10 Units, subcutaneous, TID with meals, HEENA Van, 2 Units at 04/29/24 1210    insulin lispro (HumaLOG)  injection 10 Units, 10 Units, subcutaneous, TID with meals, SHANDA Van-CNP, 10 Units at 04/24/24 1142    ipratropium-albuteroL (Duo-Neb) 0.5-2.5 mg/3 mL nebulizer solution 3 mL, 3 mL, nebulization, 4x daily PRN, HEENA Van    levoFLOXacin (Levaquin)  mg, 500 mg, intravenous, Once in OR, Deni Castillo MD    lidocaine 4 % patch 1 patch, 1 patch, transdermal, Daily, HEENA Van, 1 patch at 04/28/24 1933    melatonin tablet 10 mg, 10 mg, oral, Nightly, SHANDA Damian-CNP, 10 mg at 04/28/24 2302    methIMAzole (Tapazole) tablet 12.5 mg, 12.5 mg, oral, Daily, HEENA Van, 12.5 mg at 04/29/24 0956    methocarbamol (Robaxin) tablet 500 mg, 500 mg, oral, BID, HEENA Villanueva, 500 mg at 04/29/24 0947    metoprolol succinate XL (Toprol-XL) 24 hr tablet 25 mg, 25 mg, oral, Daily, SHANDA Van-CNP, 25 mg at 04/28/24 0824    midodrine (Proamatine) tablet 10 mg, 10 mg, oral, TID with meals, HEENA Van, 10 mg at 04/29/24 1216    morphine injection 2 mg, 2 mg, intravenous, q3h PRN, SHANDA Damian-CNP, 2 mg at 04/29/24 0238    mupirocin (Bactroban) 2 % ointment, , Topical, TID, HEENA Van, Given at 04/29/24 0952    oxyCODONE (Roxicodone) immediate release tablet 10 mg, 10 mg, oral, q6h PRN, SHANDA Villanueva-CNP, 10 mg at 04/29/24 0946    oxyCODONE (Roxicodone) immediate release tablet 5 mg, 5 mg, oral, q6h PRN, HEENA Villanueva    oxygen (O2) therapy, , inhalation, Continuous - Inhalation, Navarro Hsieh MD, 2 L/min at 04/29/24 0800    perflutren lipid microspheres (Definity) injection 0.5-10 mL of dilution, 0.5-10 mL of dilution, intravenous, Once in imaging, HEENA Van    polyethylene glycol (Glycolax, Miralax) packet 17 g, 17 g, oral, Daily PRN, HEENA Van    pregabalin (Lyrica) capsule 25 mg, 25 mg, oral, BID, Anupam MORRISSEY  Winner, APRN-CNP, 25 mg at 04/29/24 0947    traZODone (Desyrel) tablet 100 mg, 100 mg, oral, Nightly PRN, Raquel GUTIERRES Nanlinda, APRN-CNP, 100 mg at 04/28/24 2302    trimethobenzamide (Tigan) injection 200 mg, 200 mg, intramuscular, q6h PRN, Radha Shane, APRN-CNP, 200 mg at 04/25/24 2315        Assessment:    53-year-old male with history of HFrEF, PAD s/p LT BKA, T2DM c/b chronic non-healing wounds, hypothyroidism, ischemic cardiomyopathy, COPD, CKD 2 cirrhosis who presented to [facility] on 4/18/2024 for shortness of breath in the setting of acute mild hypoxemic respiratory failure due to volume overload and without evidence of infection.  He was brought to the ICU for worsening renal function and volume overload.  General Surgery was consulted for tunneled dialysis catheter placement.     Later in the day, on my discussion with the patient and his brother who is his POA in the room, the patient decided against tunneled dialysis catheter and hemodialysis.  He states he is opting for palliative care.    Plan:  -Dialysis catheter surgery was canceled  -General Surgery will sign off. If patient later decides he would like the dialysis catheter, please reach out to us again, and he would need Plavix held.    Further recommendations per Dr. Castillo     Time spent  60  minutes obtaining labs, imaging, recommendations, interview, assessment, examination, medication review/ordering, and EMR review.    Plan of care was discussed extensively with patient. Patient verbalized understanding through teach back method. All questions and concerns addressed upon examination.     Of note, this documentation is completed using the Dragon Dictation system (voice recognition software). There may be spelling and/or grammatical errors that were not corrected prior to final submission.    Electronically signed by Yamilet Wallace PA-C on 4/29/2024 at 2:58 PM

## 2024-04-30 ENCOUNTER — ANESTHESIA (OUTPATIENT)
Dept: OPERATING ROOM | Facility: HOSPITAL | Age: 54
End: 2024-04-30
Payer: MEDICAID

## 2024-04-30 LAB
GLUCOSE BLD MANUAL STRIP-MCNC: 154 MG/DL (ref 74–99)
GLUCOSE BLD MANUAL STRIP-MCNC: 177 MG/DL (ref 74–99)

## 2024-04-30 PROCEDURE — 2500000001 HC RX 250 WO HCPCS SELF ADMINISTERED DRUGS (ALT 637 FOR MEDICARE OP)

## 2024-04-30 PROCEDURE — 2500000001 HC RX 250 WO HCPCS SELF ADMINISTERED DRUGS (ALT 637 FOR MEDICARE OP): Performed by: STUDENT IN AN ORGANIZED HEALTH CARE EDUCATION/TRAINING PROGRAM

## 2024-04-30 PROCEDURE — 2500000001 HC RX 250 WO HCPCS SELF ADMINISTERED DRUGS (ALT 637 FOR MEDICARE OP): Performed by: NURSE PRACTITIONER

## 2024-04-30 PROCEDURE — 82947 ASSAY GLUCOSE BLOOD QUANT: CPT

## 2024-04-30 PROCEDURE — 49083 ABD PARACENTESIS W/IMAGING: CPT | Performed by: STUDENT IN AN ORGANIZED HEALTH CARE EDUCATION/TRAINING PROGRAM

## 2024-04-30 PROCEDURE — 2500000004 HC RX 250 GENERAL PHARMACY W/ HCPCS (ALT 636 FOR OP/ED): Performed by: STUDENT IN AN ORGANIZED HEALTH CARE EDUCATION/TRAINING PROGRAM

## 2024-04-30 PROCEDURE — 2500000005 HC RX 250 GENERAL PHARMACY W/O HCPCS: Performed by: STUDENT IN AN ORGANIZED HEALTH CARE EDUCATION/TRAINING PROGRAM

## 2024-04-30 PROCEDURE — 99233 SBSQ HOSP IP/OBS HIGH 50: CPT | Performed by: STUDENT IN AN ORGANIZED HEALTH CARE EDUCATION/TRAINING PROGRAM

## 2024-04-30 PROCEDURE — 1200000002 HC GENERAL ROOM WITH TELEMETRY DAILY

## 2024-04-30 PROCEDURE — 99231 SBSQ HOSP IP/OBS SF/LOW 25: CPT | Performed by: PLASTIC SURGERY

## 2024-04-30 RX ORDER — HYDROMORPHONE HYDROCHLORIDE 1 MG/ML
1 INJECTION, SOLUTION INTRAMUSCULAR; INTRAVENOUS; SUBCUTANEOUS
Status: DISCONTINUED | OUTPATIENT
Start: 2024-04-30 | End: 2024-05-01 | Stop reason: HOSPADM

## 2024-04-30 RX ORDER — LIDOCAINE HYDROCHLORIDE AND EPINEPHRINE 10; 10 MG/ML; UG/ML
10 INJECTION, SOLUTION INFILTRATION; PERINEURAL ONCE
Status: COMPLETED | OUTPATIENT
Start: 2024-04-30 | End: 2024-04-30

## 2024-04-30 RX ORDER — TORSEMIDE 100 MG/1
100 TABLET ORAL DAILY
Status: DISCONTINUED | OUTPATIENT
Start: 2024-04-30 | End: 2024-04-30

## 2024-04-30 RX ADMIN — DOCUSATE SODIUM 100 MG: 100 CAPSULE, LIQUID FILLED ORAL at 08:56

## 2024-04-30 RX ADMIN — HYDROMORPHONE HYDROCHLORIDE 0.5 MG: 1 INJECTION, SOLUTION INTRAMUSCULAR; INTRAVENOUS; SUBCUTANEOUS at 13:55

## 2024-04-30 RX ADMIN — INSULIN LISPRO 2 UNITS: 100 INJECTION, SOLUTION INTRAVENOUS; SUBCUTANEOUS at 17:52

## 2024-04-30 RX ADMIN — METHIMAZOLE 12.5 MG: 5 TABLET ORAL at 08:57

## 2024-04-30 RX ADMIN — OXYCODONE HYDROCHLORIDE 10 MG: 5 TABLET ORAL at 08:56

## 2024-04-30 RX ADMIN — METOPROLOL SUCCINATE 25 MG: 25 TABLET, EXTENDED RELEASE ORAL at 08:57

## 2024-04-30 RX ADMIN — ASPIRIN 81 MG: 81 TABLET, COATED ORAL at 08:57

## 2024-04-30 RX ADMIN — LIDOCAINE HYDROCHLORIDE,EPINEPHRINE BITARTRATE 10 ML: 10; .01 INJECTION, SOLUTION INFILTRATION; PERINEURAL at 12:00

## 2024-04-30 RX ADMIN — BUMETANIDE 4 MG: 0.25 INJECTION INTRAMUSCULAR; INTRAVENOUS at 03:00

## 2024-04-30 RX ADMIN — METHOCARBAMOL TABLETS 500 MG: 500 TABLET, COATED ORAL at 08:57

## 2024-04-30 RX ADMIN — CALCIUM ACETATE 667 MG: 667 CAPSULE ORAL at 17:54

## 2024-04-30 RX ADMIN — MIDODRINE HYDROCHLORIDE 10 MG: 5 TABLET ORAL at 17:54

## 2024-04-30 RX ADMIN — LIDOCAINE 4% 1 PATCH: 40 PATCH TOPICAL at 20:15

## 2024-04-30 RX ADMIN — HYDROMORPHONE HYDROCHLORIDE 1 MG: 1 INJECTION, SOLUTION INTRAMUSCULAR; INTRAVENOUS; SUBCUTANEOUS at 23:34

## 2024-04-30 RX ADMIN — MIDODRINE HYDROCHLORIDE 10 MG: 5 TABLET ORAL at 08:57

## 2024-04-30 RX ADMIN — METHOCARBAMOL TABLETS 500 MG: 500 TABLET, COATED ORAL at 20:15

## 2024-04-30 ASSESSMENT — PAIN - FUNCTIONAL ASSESSMENT: PAIN_FUNCTIONAL_ASSESSMENT: 0-10

## 2024-04-30 ASSESSMENT — PAIN SCALES - GENERAL: PAINLEVEL_OUTOF10: 10 - WORST POSSIBLE PAIN

## 2024-04-30 NOTE — CONSULTS
Nutrition Progress Note    RD consulted for LOS. Per ICU interdisciplinary rounds, plans for hospice following palliative abdominal pleurex and/or paracentesis. Currently NPO. Once able to advance diet, recommend comfort feedings as tolerated. Please re-consult if plan of care changes.

## 2024-04-30 NOTE — PROGRESS NOTES
Infectious Disease Progress Note       4/29/2024    Patient is a followup regarding  Right lower lobe pneumonia with shortness of breath  Multiple wounds  Likely osteomyelitis left knee with purulent discharge from the knee  PIPPA on CKD 2 to 3-a  Diabetes mellitus with hyperglycemia  History of tobacco use  Cachexia  Peripheral arterial disease  Cirrhosis with ascites  Electrolyte imbalance, hyponatremia  Left lower extremity BKA history with multiple wounds  Status postthoracentesis on 4/22/2024, cultures negative       Patient Active Problem List   Diagnosis    Status post below-knee amputation of left lower extremity (Multi)    PAD (peripheral artery disease) (CMS-HCC)    ACC/AHA stage C acute systolic heart failure (Multi)    Coronary artery disease involving native coronary artery without angina pectoris    Mixed hyperlipidemia    Thyroid mass    Tobacco dependence with current use    PIPAP (acute kidney injury) (CMS-HCC)    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    Acute on chronic congestive heart failure (Multi)    GERD (gastroesophageal reflux disease)    Dyspepsia    Critical lower limb ischemia (Multi)    Chronic obstructive pulmonary disease, unspecified (Multi)    Primary hypertension    PTSD (post-traumatic stress disorder)    JONA (generalized anxiety disorder)    Severe episode of recurrent major depressive disorder, without psychotic features (Multi)    Type 2 diabetes mellitus with peripheral neuropathy (Multi)    COPD exacerbation (Multi)    NSTEMI (non-ST elevated myocardial infarction) (Multi)    Elevated troponin    COPD (chronic obstructive pulmonary disease) (Multi)    Acute on chronic combined systolic (congestive) and diastolic (congestive) heart failure (Multi)    Dyspnea, unspecified type    Recurrent right pleural effusion    Right lower lobe pneumonia    Acute respiratory failure with hypoxia (Multi)         PLAN:  ID will sign off.   Family decisions  leaning towards hospice.     Imaging and labs were reviewed per medical records and any ID pertinent labs were also addressed        Alina Calderon DO

## 2024-04-30 NOTE — PROGRESS NOTES
Nephrology Progress Note    Assessment:  53 y.o. male with history s/f HFrEF, PAD s/p LT BKA, T2DM c/b chronic non-healing wounds, cirrhosis who presented for SOB for several days.      PIPPA on CKD stage II/III: initially felt in setting of diuretics, not significantly hypotensive, no contrast, not on any other nephrotoxic medications, function was at baseline on presentation w/ Scr ~1.4 w/ eGFR high 50s/low 60s and worsened during admission, now fluid overloaded, ? Decompensated CHF vs. HRS, urine Na/Cl <10, UA w/ hematuria   Fluid overload: improved, has combined HFrEF 20-25%   Recurrent RT sided pleural effusion: had 3L taken off at [facility] on 4/16, present here again, now s/p RT sided thoracentesis w/ 2.5L taken off  Cirrhosis c/b ascites: now s/p paracentesis (4/25), 2.9L taken off   Hypotension: on midodrine   Hypoalbuminemia  Anemia   RT sided PNA   Hyponatremia     Plan:  - confirmed pt wants dnrcc and no dialysis  - dc diuril and bumex and just do oral torsemide  - prognosis is likely days to a week at most given PIPPA unless his PIPPA recovered  - hospice meeting today    Subjective:  Admit Date: 4/18/2024    Interval History: function stilll worsening,  not making much urine.  On diuril, bumex.  Chose hospice/ comfort care.  I went over dialysis in detail with him again and he doesn't want it.  Some tremors but mental status/ memory is good    Medications:  Scheduled Meds:aspirin, 81 mg, oral, Daily  atorvastatin, 80 mg, oral, Nightly  bumetanide, 4 mg, intravenous, q8h  calcium acetate, 667 mg, oral, TID with meals  chlorothiazide, 500 mg, intravenous, q12h  [Held by provider] clopidogrel, 75 mg, oral, Daily  docusate sodium, 100 mg, oral, BID  epoetin tika or biosimilar, 10,000 Units, subcutaneous, Every Sunday  [Held by provider] heparin (porcine), 5,000 Units, subcutaneous, q8h  insulin lispro, 0-10 Units, subcutaneous, TID with meals  insulin lispro, 10 Units, subcutaneous, TID with meals  levoFLOXacin, 500  "mg, intravenous, Once in OR  lidocaine, 1 patch, transdermal, Daily  melatonin, 10 mg, oral, Nightly  methIMAzole, 12.5 mg, oral, Daily  methocarbamol, 500 mg, oral, BID  metoprolol succinate XL, 25 mg, oral, Daily  midodrine, 10 mg, oral, TID with meals  mupirocin, , Topical, TID  oxygen, , inhalation, Continuous - Inhalation  perflutren lipid microspheres, 0.5-10 mL of dilution, intravenous, Once in imaging  pregabalin, 25 mg, oral, BID      Continuous Infusions:       CBC:   Lab Results   Component Value Date    WBC 13.7 (H) 04/29/2024    RBC 4.23 (L) 04/29/2024    HGB 8.3 (L) 04/29/2024    HCT 25.7 (L) 04/29/2024    MCV 61 (L) 04/29/2024    MCH 19.6 (L) 04/29/2024    MCHC 32.3 04/29/2024    RDW 25.2 (H) 04/29/2024     04/29/2024     BMP:    Lab Results   Component Value Date     (L) 04/29/2024    K 4.5 04/29/2024    CL 86 (L) 04/29/2024    CO2 22 04/29/2024     (HH) 04/29/2024    CREATININE 6.71 (H) 04/29/2024    CALCIUM 7.5 (L) 04/29/2024    GLUCOSE 145 (H) 04/29/2024       Objective:  Vitals: /59   Pulse 80   Temp 36.3 °C (97.3 °F) (Temporal)   Resp 11   Ht 1.753 m (5' 9\")   Wt 69.7 kg (153 lb 10.6 oz)   SpO2 94%   BMI 22.69 kg/m²    Wt Readings from Last 3 Encounters:   04/28/24 69.7 kg (153 lb 10.6 oz)   03/24/24 65 kg (143 lb 4.8 oz)   10/30/23 55.9 kg (123 lb 3.8 oz)      24HR INTAKE/OUTPUT:    Intake/Output Summary (Last 24 hours) at 4/30/2024 1003  Last data filed at 4/29/2024 1375  Gross per 24 hour   Intake 100 ml   Output --   Net 100 ml       General: alert, in no acute distress  HEENT: normocephalic, atraumatic, anicteric  Lungs: non-labored respirations, clear to auscultation bilaterally  Heart: regular rate and rhythm, no murmurs or rubs  Abdomen: soft, non-tender, distended  Ext: no cyanosis, ++ peripheral edema, LT BKA  Neuro: alert, no gross abnormalities      Electronically signed by Allen Mahajan MD, MD              "

## 2024-04-30 NOTE — PROGRESS NOTES
"[facility] Critical Care Medicine Progress Note      Date:  4/30/2024  Patient:  Hitesh Jurado  YOB: 1970  MRN:  [id number]   Admit Date:  4/18/2024  ========================================================================================================    Chief Complaint   Patient presents with    Shortness of Breath     \"I can't catch my breath since last night\"     Interval ICU Events:  Pt yesterday discussed options with hospice staff. Pt has elected to have assessment for hospice with intent to eventually accept hospice placement but pt desires to the evaluated for abdominal pleurex and/or paracentesis for comfort reasons. Pt does not want any lab work and pain medication adjusted this morning given renal function. Pt this morning states that he feels ok but still with some abdominal discomfort and distension but does want to got o hospice facility after possible pleurex placement. Pt does not want and more lab draws and just wants to be made comfortable.     Medical History:  Past Medical History:   Diagnosis Date    CHF (congestive heart failure) (Multi)     Diabetes mellitus (Multi)      No past surgical history on file.  Medications Prior to Admission   Medication Sig Dispense Refill Last Dose    aspirin 81 mg EC tablet Take 1 tablet (81 mg) by mouth once daily.   4/18/2024 at 0800    atorvastatin (Lipitor) 80 mg tablet Take 1 tablet (80 mg) by mouth once daily.   4/17/2024 at 2000    clopidogrel (Plavix) 75 mg tablet Take 1 tablet (75 mg) by mouth once daily.   4/18/2024 at 0800    furosemide (Lasix) 20 mg tablet Take 3 tablets (60 mg) by mouth 2 times a day.   4/17/2024    insulin glargine (Lantus) 100 unit/mL injection Inject 20 Units under the skin once daily at bedtime. Take as directed per insulin instructions.   4/17/2024    insulin lispro (HumaLOG) 100 unit/mL injection Inject under the skin 3 times a day with meals. Take as directed per insulin instructions.   4/18/2024    " methIMAzole (Tapazole) 10 mg tablet Take 2 tablets (20 mg) by mouth every 8 hours. (Patient taking differently: Take 0.5 tablets (5 mg) by mouth once daily. 2.5 tabs) 90 tablet 0 4/18/2024 at 0800    metoprolol succinate XL (Toprol-XL) 25 mg 24 hr tablet Take 1 tablet (25 mg) by mouth once daily. Do not crush or chew.   4/18/2024 at 0800    torsemide 60 mg tablet Take 60 mg by mouth once daily. Do not start before March 27, 2024.  0 4/17/2024 at 0800    acetaminophen (Tylenol) 500 mg tablet Take 1 tablet (500 mg) by mouth every 8 hours if needed.       azithromycin (Zithromax) 500 mg tablet Take 1 tablet (500 mg) by mouth once every 24 hours. Do not start before October 30, 2023. 5 tablet 0     gabapentin (Neurontin) 100 mg capsule Take 1 capsule (100 mg) by mouth 3 times a day.       melatonin 5 mg tablet Take 1 tablet (5 mg) by mouth once daily at bedtime.   Unknown    metOLazone (Zaroxolyn) 5 mg tablet Take 1 tablet (5 mg) by mouth once daily. Do not start before March 27, 2024. 30 tablet 1     midodrine (Proamatine) 10 mg tablet Take 1 tablet (10 mg) by mouth 3 times a day with meals. 90 tablet 0     potassium chloride CR 20 mEq ER tablet Take 1 tablet (20 mEq) by mouth 3 times a day. Do not crush or chew. For 10 days   Unknown    silver sulfADIAZINE (Silvadene) 1 % cream Apply topically once daily. Do not start before March 27, 2024.   Unknown     Amoxicillin  Social History     Tobacco Use    Smoking status: Former     Types: Cigarettes    Smokeless tobacco: Former   Vaping Use    Vaping status: Never Used   Substance Use Topics    Alcohol use: Never    Drug use: Never     No family history on file.    Review of Systems:  14 point review of systems was completed and negative except for those specially mention in my HPI    Physical Exam:    Heart Rate:  [76-92]   Temp:  [36.3 °C (97.3 °F)-36.4 °C (97.5 °F)]   Resp:  [8-41]   BP: ()/(41-66)   SpO2:  [92 %-100 %]     Physical Exam  Constitutional:        Comments: Appears thin and chronically ill  No acute distress   HENT:      Mouth/Throat:      Mouth: Mucous membranes are moist.   Eyes:      General: No scleral icterus.     Extraocular Movements: Extraocular movements intact.      Pupils: Pupils are equal, round, and reactive to light.   Cardiovascular:      Rate and Rhythm: Normal rate and regular rhythm.      Heart sounds: No murmur heard.  Pulmonary:      Effort: Pulmonary effort is normal. No respiratory distress.      Breath sounds: Rales present.      Comments: Mild rales at bases  Good air entry and movement bilaterally  Abdominal:      General: Abdomen is flat. There is distension.      Palpations: Abdomen is soft.      Tenderness: There is abdominal tenderness.      Comments: Diffusely tender   Musculoskeletal:      Right lower leg: Edema present.      Left lower leg: Edema present.   Skin:     General: Skin is warm and dry.      Capillary Refill: Capillary refill takes less than 2 seconds.      Coloration: Skin is not jaundiced.   Neurological:      General: No focal deficit present.      Mental Status: He is oriented to person, place, and time. Mental status is at baseline.      Cranial Nerves: No cranial nerve deficit.      Motor: No weakness.   Psychiatric:         Mood and Affect: Mood normal.         Behavior: Behavior normal.         Objective:    I have reviewed all medications, laboratory results, and imaging pertinent for today's encounter    Assessment/Plan:  Pt is a 54 y/o M w/ a PMHx of Type 2 diabetes, hyperthyroidism, ischemic cardiomyopathy, HFrEF (15-20%), HTN, recurrent pleural effusions, COPD, CKD II, PAD with left BKA and chronic wounds, GERD, HLD, PTSD, anxiety, depression, medical non-compliance, and tobacco use who presented to [facility] 1 week ago (4/18/2024) for complaints of shortness of breath in the setting of acute mild hypoxemic resp failure due to volume overload and without current evidence of infection brought to the ICU for  worsening renal function and volume overload. Pt currently being transitioned to hospice and comfort care after evaluation and coordination of services for possible abdominal pleurex for palliative reasons.    Neuro/Psych/Pain Ctrl/Sedation:  #History of anxiety  #History of Depression  #History of PTSD  - No current active issues  - Will treat pain as needed PRN with Tylenol and switched to dilaudid per palliative care and hospice recs  - C/W Standing methocarbamol - renally dosed  - CAM-ICU and delirium precautions  - Melatonin at night for sleep  - C/W home pregabalin    Respiratory/ENT:  #Mild Acute hypoxemic respiratory failure - due to volume overload  - Wean nasal cannula as tolerated to SpO2>90%  - No further diuresis at this time    Cardiovascular:  #Acute decompensated HFrEF - with volume overload  #Ischemic CM  #CAD  #PAD s/p Left BKA  #HLD  - C/W ASA and Statin and metoprolol for now    Renal/Volume Status (Intra & Extravascular):  #PIPPA on CKD - Possible Cardio renal syndrome  - No further diuresis  - Pt does not want dialysis or catheter placement at this time  - No need to check lab work anymore as patient understands consequences of worsening renal function and wants to focus on comfort    GI:  #Ascites - likely cardiac in etiology  - Coordinating and with surgical service and will follow-up on ability to have abdominal pleurex catheter placed here vs paracentesis for palliation.     Infectious Disease:  - No further need for Abx at this time    Heme/Onc:  - Hold HSQ for VTE Ppx on hold for possible abdominal pleurex today  - Monitor Hgb Daily and transfuse for Hgb<87or bleeding with hemodynamic instability    Endocrine  #Type 2 Diabetes  #Hyperthyroidism   - Will c/w Methimazole   - Finger sticks AC/at bedtime if patient desires and will continue with ISS and pre-meal insulin if patient desires    Ethics/Code Status:  - DNR and DNI and currently transitioning to comfort care and hospice pending  coordination and possible placement of abdominal pleurex for palliative reasons.     :  DVT Prophylaxis: HSQ - on hold and can discontinue once on full Comfort measures and hospice  GI Prophylaxis: None  Bowel Regimen: Yes  Diet: Renal/Cardiac/Carb controlled  CVC: None  [place]: None  [place]: Yes - can remove if patient feels it is not causing comfort  Restraints: None  Dispo: Transfer out of ICU tocliff Packer MD

## 2024-04-30 NOTE — PROGRESS NOTES
Occupational Therapy                 Therapy Communication Note    Patient Name: Hitesh Jurado  MRN: [id number]  Today's Date: 4/30/2024     Discipline: Occupational Therapy    Missed Visit Reason: Missed Visit Reason:  (pt requesting to be taken off of therapy services as he will be switching over to hospice services and movement is causing him pain . RN aware. will d/c from OT services)    Missed Time: Attempt    Comment:

## 2024-04-30 NOTE — PROGRESS NOTES
Social Work Note Per [facility], consents were signed but pt will not be able to discharge to [facility] until after plurex drain is placed.  Pt later had plurex placed.  Notified [facility] who states that they do not have a bed at [facility] available today for pt.  Requested that RN from [facility] come to admit pt to [facility] hospice until a bed opens at [facility].  BARBARA Andersen

## 2024-04-30 NOTE — PROGRESS NOTES
Physical Therapy                 Therapy Communication Note    Patient Name: Hitesh Jurado  MRN: [id number]  Today's Date: 4/30/2024     Discipline: Physical Therapy    Missed Visit Reason: Missed Visit Reason:  (Pt. requesting to be taken off therapy list because he is switching over to hospice today. Pt. states that moving just causes him more pain. RN notified.)    Missed Time: Attempt at 1157

## 2024-04-30 NOTE — PROGRESS NOTES
Plastic Surgery Note    Afebrile, vital signs stable  Bilateral lower extremity with left below-knee amputation wounds continue to demarcate and are full-thickness.  No expressible purulence.  Wounds are otherwise clean  Impression: Full-thickness skin loss right lower extremity  Left below-knee amputation  Continue current dressing changes

## 2024-04-30 NOTE — PROGRESS NOTES
Patient is a 53 y.o. male with past medical history of systolic CHF with most recent EF of 25 to 30%, hypertension, hyperlipidemia, PAD, diabetes, liver cirrhosis, who presents initially to [facility] with a chief complaint of  SOB.  Patient with multiple medical problems history of noncompliance.  He states he has not followed up with a cardiologist as outpatient.  He has multiple recurrent admissions recently.  Apparently left AMA from [facility].  Noted to have elevated cardiac enzyme and trending down in the setting of chronic kidney disease.  He denies any chest pain chest pressure heaviness.  Per patient he states he had a coronary angiogram approximately 2 years ago at [facility], no PCI was performed.  Hemoglobin is 7.8, troponin is 743 now.  EKG with ST, RBBB.         Cardiology consulted for acute on chronic systolic heart failure     Echo on 11/2023:  - The left ventricle is severely dilated. Left ventricular systolic function is   severely decreased. EF = 27 ± 5% (2D 4-ch.) Definity contrast used for endocardial    border detection. Indeterminate left ventricular diastolic dysfunction due to >2+    MR.   - The right ventricle is dilated. Right ventricular systolic function is mildly   decreased.   - The left atrial cavity is severely dilated.   - There is moderate (2+ - 3+) mitral valve regurgitation. Regurgitant orifice area    (PISA) is 0.34 cm².   - There is moderate (2+ - 3+) tricuspid valve regurgitation.   - Estimated right ventricular systolic pressure is 35 mmHg consistent with mild   pulmonary hypertension. Estimated right atrial pressure is 8 mmHg based on IVC   assessment.      On examination today, patient resting in bed comfortably.  Denying anginal like symptoms.  Patient does report shortness of breath at rest and with exertion.  He reports shortness of breath is worse when he is laying down and at night.  Patient lives in a nursing home and will need continuous  follow-up with a cardiologist, in which she would like to establish. Patient very noncompliant.    Cardiac catheterization at [facility] in 2021   Angiographic findings     LMCA: Very short left main.     LAD: Diffuse mild irregularity up to 20% narrowing.Late mid LAD with 30%   stenosis. Very small caliber D1 and D2 branches with severe diffuse disease.     LCx: Abnormal.Chronically occluded mid circumflex with retrogradely filling   OM branch from left to left collaterals.     RCA: Diffuse mild irregularity up to 20% narrowing.Distal 40% stenosis.      4/21/2024: Patient with worsening renal function.  Nephrology following now.  Patient also being evaluated by cardiothoracic surgery for thoracentesis.  He denies any chest pain.   Previous cardiac catheterization report from [facility] in 2021 reviewed    4/25/2024: Patient in intensive care unit.  Awaiting dialysis.  Status post paracentesis today.  He denies any chest pain.  He is hemodynamically stable.    4/26/2024: No hemodialysis performed today.  Apparently patient is making urine.  Brother is at the bedside.  Hemodynamically stable.  Normal sinus rhythm on telemetry.  No chest pain      4/29/2024: Family and patient considering hospice.  Evaluation in progress.  Normal sinus rhythm on telemetry.    4/30/2024  Patient laying in bed looks comfortable, sleeping  Creatinine/GFR worsening  Plan for hospice later on today  Cardiology will sign off please reconsult if further questions arise       Past Medical History   No past medical history on file.            Patient Active Problem List   Diagnosis    Hyperthyroidism    Encephalopathy    Hypoglycemia    PIPPA (acute kidney injury) (HCC)         Past Surgical History             Past Surgical History:   Procedure Laterality Date    PARACENTESIS Left 04/03/2024     3215 ml removed by Dr. Simental - diagnostics sent            Social History   Social History                Socioeconomic History     Marital status: Single   Tobacco Use    Smoking status: Never       Passive exposure: Never    Smokeless tobacco: Never   Substance and Sexual Activity    Alcohol use: Never    Drug use: Never      Social Determinants of Health              Food Insecurity: Patient Unable To Answer (4/2/2024)     Hunger Vital Sign      Worried About Running Out of Food in the Last Year: Patient unable to answer      Ran Out of Food in the Last Year: Patient unable to answer   Transportation Needs: Patient Unable To Answer (4/2/2024)     PRAPARE - Transportation      Lack of Transportation (Medical): Patient unable to answer      Lack of Transportation (Non-Medical): Patient unable to answer   Housing Stability: Patient Unable To Answer (4/2/2024)     Housing Stability Vital Sign      Unable to Pay for Housing in the Last Year: Patient unable to answer      Number of Places Lived in the Last Year: 1      Unstable Housing in the Last Year: Patient unable to answer            Family History   No family history on file.         Current Facility-Administered Medications                     Current Facility-Administered Medications   Medication Dose Route Frequency Provider Last Rate Last Admin    insulin glargine (LANTUS) injection vial 25 Units  25 Units SubCUTAneous Nightly Maicol Dela Cruz MD        insulin lispro (HUMALOG) injection vial 8 Units  8 Units SubCUTAneous TID WC Maicol Dela Cruz MD   8 Units at 04/04/24 1724    epoetin tika-epbx (RETACRIT) injection 10,000 Units  10,000 Units SubCUTAneous Weekly Allen Mahajan MD   10,000 Units at 04/03/24 2114    furosemide (LASIX) injection 40 mg  40 mg IntraVENous BID Allen Mahajan MD   40 mg at 04/04/24 1724    traMADol (ULTRAM) tablet 50 mg  50 mg Oral Q6H PRN Daily Valentine MD   50 mg at 04/05/24 0554    clopidogrel (PLAVIX) tablet 75 mg  75 mg Oral Daily Ness Bird MD   75 mg at 04/04/24 0846    gabapentin (NEURONTIN) capsule 100 mg  100 mg Oral TID Ness Bird MD         atorvastatin (LIPITOR) tablet 80 mg  80 mg Oral Nightly Ness Bird MD   80 mg at 04/04/24 2152    insulin lispro (HUMALOG) injection vial 0-8 Units  0-8 Units SubCUTAneous TID  Dimitry Joshi PA   6 Units at 04/04/24 1724    insulin lispro (HUMALOG) injection vial 0-4 Units  0-4 Units SubCUTAneous Nightly Dimitry Joshi PA   4 Units at 04/03/24 2008    sodium chloride flush 0.9 % injection 5-40 mL  5-40 mL IntraVENous 2 times per day Daily Valentine MD   10 mL at 04/04/24 2153    sodium chloride flush 0.9 % injection 5-40 mL  5-40 mL IntraVENous PRN Daily Valentine MD        0.9 % sodium chloride infusion   IntraVENous PRN Daily Valentine MD        potassium chloride (KLOR-CON M) extended release tablet 40 mEq  40 mEq Oral PRN Daily Valentine MD         Or    potassium bicarb-citric acid (EFFER-K) effervescent tablet 40 mEq  40 mEq Oral PRN Daily Valentine MD         Or    potassium chloride 10 mEq/100 mL IVPB (Peripheral Line)  10 mEq IntraVENous PRN Daily Valentine MD        magnesium sulfate 2000 mg in 50 mL IVPB premix  2,000 mg IntraVENous PRN Daily Valentine MD        enoxaparin (LOVENOX) injection 40 mg  40 mg SubCUTAneous Daily Daily Valentine MD   40 mg at 04/04/24 0849    ondansetron (ZOFRAN-ODT) disintegrating tablet 4 mg  4 mg Oral Q8H PRN Daily Valentine MD         Or    ondansetron (ZOFRAN) injection 4 mg  4 mg IntraVENous Q6H PRN Daily Valentine MD        polyethylene glycol (GLYCOLAX) packet 17 g  17 g Oral Daily PRN Daily Valentine MD        acetaminophen (TYLENOL) tablet 650 mg  650 mg Oral Q6H PRN Daily Valentine MD   650 mg at 04/02/24 1643     Or    acetaminophen (TYLENOL) suppository 650 mg  650 mg Rectal Q6H PRN Daily Valentine MD        glucose chewable tablet 16 g  4 tablet Oral PRN Daily Valentine MD        dextrose bolus 10% 125 mL  125 mL IntraVENous PRN Daily Valentine MD   Stopped at 04/02/24 1248     Or    dextrose bolus 10% 250 mL  250 mL IntraVENous PRN Daily Valentine MD         "glucagon injection 1 mg  1 mg SubCUTAneous PRN Daily Valentine MD        dextrose 10 % infusion   IntraVENous Continuous PRN Daily Valentine MD        midodrine (PROAMATINE) tablet 10 mg  10 mg Oral TID  Lonnie Ortiz MD   10 mg at 04/04/24 1725            ALLERGIES: Amoxicillin     Review of Systems   Constitutional: Negative.    HENT: Negative.     Eyes: Negative.    Respiratory:  Positive for shortness of breath. Negative for chest tightness.    Cardiovascular:  Negative for chest pain, palpitations and leg swelling.   Gastrointestinal: Negative.    Endocrine: Negative.    Genitourinary: Negative.    Musculoskeletal: Negative.    Skin: Negative.    Allergic/Immunologic: Negative.    Neurological: Negative.    Hematological: Negative.    Psychiatric/Behavioral: Negative.              VITALS:  Blood pressure 107/74, pulse 80, temperature 99 °F (37.2 °C), resp. rate 17, height 1.778 m (5' 10\"), weight 75.4 kg (166 lb 3.6 oz), SpO2 93 %.  Body mass index is 23.85 kg/m².     Physical Exam  HENT:      Head: Normocephalic.   Cardiovascular:      Rate and Rhythm: Normal rate and regular rhythm.      Heart sounds: No murmur heard.  Pulmonary:      Effort: Pulmonary effort is normal.      Breath sounds: Normal breath sounds.   Abdominal:      General: Bowel sounds are normal. There is distension.   Musculoskeletal:      Right lower leg: No edema.      Left lower leg: Left lower leg edema: BKA.   Skin:     General: Skin is warm and dry.   Neurological:      General: No focal deficit present.      Mental Status: He is alert.   Psychiatric:         Mood and Affect: Mood normal.            LABS:     Pertinent Labs:  CBC:         Recent Labs     04/04/24  0324   WBC 11.8*   HGB 7.2*   *      BMP:        Recent Labs     04/05/24  0447      K 4.0   CL 99   CO2 21   *   CREATININE 2.29*   GLUCOSE 188*   LABGLOM 33.1*      INR:         Recent Labs     04/02/24  0930   INR 1.5      BNP: No results for input(s): " "\"BNP\" in the last 72 hours.   TSH: No results found for: \"TSH\"   Cardiac Injury Profile: No results for input(s): \"CKTOTAL\", \"CKMB\", \"CKMBINDEX\", \"TROPONINI\" in the last 72 hours.   Troponin: No results found for: \"TROPONINI\"  Lipid Profile: No results found for: \"TRIG\", \"HDL\", \"LDLCALC\", \"CHOL\"   Hemoglobin A1C: No components found for: \"HGBA1C\"         Radiology:  XR CHEST PORTABLE     Result Date: 4/4/2024  EXAMINATION: ONE XRAY VIEW OF THE CHEST 4/4/2024 6:06 am COMPARISON: April 2, 2024 HISTORY: ORDERING SYSTEM PROVIDED HISTORY: Pleural effusion TECHNOLOGIST PROVIDED HISTORY: Reason for exam:->Pleural effusion What reading provider will be dictating this exam?->CRC FINDINGS: Right basilar opacity with silhouetting of the right hemidiaphragm and right heart border compatible with moderate right effusion and associated right basilar atelectasis, unchanged from April 2, 2024..  The left lung is clear. No cardiomediastinal shift.  Osseous and body wall soft tissues unremarkable.      Moderate right effusion and associated right basilar atelectasis, unchanged from April 2, 2024.      IR US GUIDED PARACENTESIS     Result Date: 4/3/2024  PROCEDURE: PARACENTESIS WITHOUT IMAGE GUIDANCE US ABDOMEN LIMITED 4/3/2024 HISTORY: ORDERING SYSTEM PROVIDED HISTORY: ascites TECHNOLOGIST PROVIDED HISTORY: Reason for exam:->ascites TECHNIQUE: Informed consent was obtained after a detailed explanation of the procedure including risks, benefits, and alternatives.  Universal protocol was followed.  A limited ultrasound of the abdomen was performed. The left abdomen was prepped and draped in sterile fashion and local anesthesia was achieved with lidocaine.  An 5 [place] citizen needle sheath was advanced into ascites and paracentesis was performed.  The patient tolerated the procedure well. FINDINGS: Limited ultrasound of the abdomen demonstrates ascites. A total of 3215 mL was removed.      Successful paracentesis.      XR CHEST PORTABLE   "   Result Date: 4/2/2024  EXAMINATION: ONE XRAY VIEW OF THE CHEST 4/2/2024 10:50 am COMPARISON: None. HISTORY: ORDERING SYSTEM PROVIDED HISTORY: Altered LOC TECHNOLOGIST PROVIDED HISTORY: Reason for exam:->Altered LOC What reading provider will be dictating this exam?->CRC FINDINGS: See impression.      1. Pleural and parenchymal opacities right lung base with elevation of the right hemidiaphragm. 2. Cardiomegaly. 3. Hyperinflated left lung. 4. Central bronchial wall thickening and mild interstitial prominence.      US HEAD NECK SOFT TISSUE THYROID     Result Date: 4/2/2024  EXAMINATION: ULTRASOUND OF THE THYROID WITH COLOR DOPPLER FLOW EVALUATION4/2/2024 10:14 am Ultrasound Thyroid COMPARISON: None HISTORY: ORDERING SYSTEM PROVIDED HISTORY: MASS, mass of neck, Possible pulsatile mass of right carotid artery TECHNOLOGIST PROVIDED HISTORY: Reason for exam:->mass of neck, FINDINGS: Size right thyroid lobe: 6.1 x 3.2 x 4.3 cm Size left thyroid lobe: 4.4 x 1.6 x 1.8 cm Size isthmus: 0.4 cm Texture: Homogenous Estimated total number of nodules greater than or equal to 1 cm: 1 Nodule#: # 1: Maximum size: 5.2 cm . All dimensions: 5.2 x 4.1 x 3.4 cm Location: Right Mid Composition: solid or almost completely solid: 2 points Echogenicity: hypoechoic: 2 points Shape: wider than tall: 0 points Margins: smooth: 0 points Echogenic foci: none: 0 points ACR Total Points: 4;  ACR TI-RADS risk category: TR4 -  moderately suspicious nodule. No aneurysm is seen.      1. Nodule 1: ACR TI-RADS 2017 Category 4. Recommend: Ultrasound-guided fine needle aspiration ACR TI-RADS 2017 Recommendations: TR1(0 points) : No FNA or follow up TR2 (2 points) : No FNA or follow up TR3 (3 points) : FNA if >/= 2.5 cm, follow up if 1.5 - 2.4 cm in 1, 3, and 5 years TR4 (4-6 points) : FNA if >/= 1.5 cm, follow up if 1.0 - 1.4 cm in 1, 2, 3, and 5 years TR5 (>/= 7 points) : FNA if >/= 1.0 cm, follow up if 0.5 - 0.9 cm every year for 5 years *ACR TI-RADS  recommends that no more than two nodules with the highest ACR TI-RADS total point should be biopsied and no more than four nodules should be followed.      Vascular US ankle brachial index (MARILIA) without exercise     Result Date: 3/24/2024            [place] [address]     Tel [phone] Fax [phone] Vascular Lab Report [facility] US ANKLE BRACHIAL INDEX (MARILIA) WITHOUT EXERCISE Patient Name:      BROOK MORELOS Reading Physician:  [id number] Siena Escobar MD, RPVI Study Date:        3/20/2024            Ordering Provider:  [id number] LIAM LEGGETT MRN/PID:           [id number]             Fellow: Accession#:        [id number]         Technologist:       America Hansen RDMS, RVT Date of Birth/Age: 1970 / 53 years  Technologist 2: Gender:            M                    Encounter#:         [id number] Admission Status:  Inpatient            Location Performed: [facility] Diagnosis/ICD: Atherosclerosis of native arteries of right leg with ulceration                of other part of foot-I70.235 CPT Codes:     88150.52 Peripheral artery MARILIA Only Reduced Service Pertinent History: PVD. LEFT BKA. CONCLUSIONS: Right Lower PVR: No evidence of arterial occlusive disease in the right lower extremity at rest. Decreased digital perfusion noted. Monophasic flow is noted in the right common femoral artery, right posterior tibial artery and right dorsalis pedis artery. Left Lower PVR: Left BKA. Comparison: Compared with study from 2/28/2022, improved right MARILIA from last exam. Imaging & Doppler Findings: RIGHT Lower PVR                Pressures Ratios Right Posterior Tibial (Ankle) 116 mmHg  1.10 Right Dorsalis Pedis (Ankle)   78 mmHg   0.74 Right Digit (Great Toe)        36 mmHg   0.34                     Right     Left Brachial Pressure 105 mmHg 101 mmHg [id number] NIK Sosa MD Electronically signed by [id number] NIK Sosa MD on 3/24/2024 at 6:54:08 PM ** Final **     Vascular US lower extremity arterial duplex left     Result Date: 3/24/2024            [address] [address]     Tel [phone] Fax [phone] Vascular Lab Report [facility] US LOWER EXTREMITY ARTERIAL DUPLEX LEFT Patient Name:      BROOK MORELOS Reading Physician:  [id number] Siena Escobar MD, NIK Study Date:        3/20/2024            Ordering Provider:  [id number] JAMI TAVAREZ MRN/PID:           [id number]             Fellow: Accession#:        [id number]         Technologist:       America Hansen RDMS Date of Birth/Age: 1970 / 53 years  Technologist 2: Gender:            M                    Encounter#:         [id number] Admission Status:  Inpatient            Location Performed: [facility] Diagnosis/ICD: Other atherosclerosis of unspecified type of bypass graft(s) of                the extremities, left leg-I70.392 CPT Codes:     39230 Peripheral artery Lower arterial Duplex limited Smoker:            Former. Pertinent History: LE Bypass graft. Pt poor historian. h/o rt to left fem/fem                    bypass and failed fem pop graft (pt unsure of when this was                    done) BKA left, wound on left stump. Per order assess left                    profunda.   CONCLUSIONS: Left Lower Arterial: There is an occlusion documented at the superficial femoral artery proximal, superficial femoral artery mid. and superficial femoral artery distal. Profunda mid 93.2 cm/sec, Distal 38.1 cm/sec. Imaging & Doppler Findings: Right                    Left  PSV                      PSV              EIA        67 cm/s              CFA        56 cm/s       Profunda Proximal 63 cm/s           Popliteal     34 cm/s [id number] Siena Escobar  MD, NIK Electronically signed by [id number] Siena Escobar MD, NIK on 3/24/2024 at 6:44:24 PM ** Final **     US guided abdominal paracentesis     Result Date: 3/19/2024  Interpreted By:  Schoenberger, Joseph, STUDY: US GUIDED ABDOMINAL PARACENTESIS; ;  3/19/2024 2:50 pm    INDICATION: Signs/Symptoms:paracentesis.    COMPARISON: None.    ACCESSION NUMBER(S): [id number]    ORDERING CLINICIAN: ALEXA JUNE    CONSENT: The procedure, its indication, its risks, and alternatives were explained to the patient who understands and consents to the procedure. A time-out is completed prior to the procedure to confirm patient identity and procedure to be performed.    MODALITIES: Ultrasound    TECHNIQUE: Targeted sonographic evaluation of the abdomen demonstrates suitable pocket for paracentesis in the right lower quadrant. This site was then marked.    The marked site was sterilely prepped with chlorhexidine and sterile drapes were placed. Local anesthetic was administered. A 5 Syriac sheath needle was advanced until straw-colored fluid was obtained. The sheath was advanced off the needle cannula into the peritoneal space. At this point aspiration was performed completion. A total of 6300 cc of fluid was aspirated. The sheath was then removed. The patient tolerated the procedure well. There was no immediate complication.    FINDINGS: See discussion above        Successful ultrasound-guided therapeutic paracentesis       MACRO: None    Signed by: Joseph Schoenberger 3/19/2024 2:53 PM Dictation workstation:   [id number]     Lower extremity venous duplex right     Result Date: 3/18/2024  Interpreted By:  Schoenberger, Joseph, STUDY: [facility] US LOWER EXTREMITY VENOUS DUPLEX RIGHT  3/18/2024 2:08 pm    INDICATION: 52 y/o   M with  Signs/Symptoms:edema and decreased pulses in RLE. LMP:  Unknown.    COMPARISON: None.    ACCESSION NUMBER(S): [id number]    ORDERING CLINICIAN: COKOIE TAVAREZ    TECHNIQUE: Routine ultrasound of  the  right lower extremity was performed with duplex Doppler (color and spectral) evaluation.   Static images were obtained for remote interpretation.     FINDINGS: THIGH VEINS: There is nonocclusive partially calcified thrombus in the right popliteal vein consistent with chronic venous thrombus. The right posterior tibial and peroneal as well as superficial femoral and common femoral veins compress normally with normal color Doppler flow..    CALF VEINS:  The paired peroneal and posterior tibial calf veins are patent.        .  There is some nonacute since thrombus chronic thrombus in the right popliteal vein. Otherwise negative exam.    MACRO: None    Signed by: Joseph Schoenberger 3/18/2024 2:37 PM Dictation workstation:   [id number]     US thoracentesis     Result Date: 3/18/2024  Interpreted By:  Schoenberger, Joseph, STUDY: US THORACENTESIS; ;  3/18/2024 10:48 am    INDICATION: Signs/Symptoms:Dyspnea/to evaluate etiology of right pleural effusion.    COMPARISON: None.    ACCESSION NUMBER(S): [id number]    ORDERING CLINICIAN: TEJINDER CHAVIRA    CONSENT: The procedure, its indication, its risks, and alternatives were explained to the patient who understands and consents to the procedure. A time-out is completed prior to the procedure to confirm patient identity and procedure to be performed.    MODALITIES: Ultrasound    TECHNIQUE: Targeted sonographic evaluation of the lower right chest demonstrates a large right pleural effusion. Lowest intracostal space with suitable percutaneous access to this pleural effusion was localized using ultrasound. This site was marked. The marked site was then sterilely prepped with chlorhexidine and a sterile barrier drape was placed. Local anesthetic was administered. A 5 Romanian sheath needle was advanced until light green fluid was obtained. The sheath was advanced off the needle cannula to the pleural space. 60 cc of fluid was then aspirated into a syringe and sent to the  laboratory for studies as ordered by the referring physician. Subsequently aspiration was performed for therapeutic thoracentesis. After a total aspiration of 1500 cc, the patient experienced some right-sided chest pressure. Therefore the procedure was terminated. The patient tolerated the procedure well otherwise without other immediate complication PICC    FINDINGS: See discussion above        Successful ultrasound-guided diagnostic and therapeutic thoracentesis       MACRO: None    Signed by: Joseph Schoenberger 3/18/2024 10:58 AM Dictation workstation:   [id number]     XR CHEST 1 VIEW     Result Date: 3/18/2024  Interpreted By:  Schoenberger, Joseph, STUDY: XR CHEST 1 VIEW;  3/18/2024 10:50 am    INDICATION: Signs/Symptoms:Post Right Thoracentesis.    COMPARISON: 03/15/2024    ACCESSION NUMBER(S): [id number]    ORDERING CLINICIAN: JOSEPH SCHOENBERGER    FINDINGS:             CARDIOMEDIASTINAL SILHOUETTE: Cardiac silhouette remains somewhat enlarged.    LUNGS: There is no evidence for right pneumothorax post thoracentesis. Decreased right pleural fluid. Considerable opacity in the lower right hemithorax consistent with residual pleural fluid with underlying basal atelectasis.    ABDOMEN: No remarkable upper abdominal findings.    BONES: No acute osseous changes.        1.  Satisfactory appearance post right thoracentesis. See discussion above.          MACRO: None    Signed by: Joseph Schoenberger 3/18/2024 10:56 AM Dictation workstation:   [id number]     US abdomen complete     Result Date: 3/18/2024  Interpreted By:  Schoenberger, Joseph, STUDY: US ABDOMEN COMPLETE; ;  3/18/2024 9:49 am    INDICATION: Signs/Symptoms:Distended abdomen.  Significant alcohol history. Concern for cirrhosis may need paracentesis.    COMPARISON: None.    ACCESSION NUMBER(S): [id number]    ORDERING CLINICIAN: COOKIE TAVAREZ    TECHNIQUE: 4 quadrant sonographic survey for ascites as well as sonographic evaluation of the right  upper quadrant.    FINDINGS: The liver is normal in size measuring 17.9 cm in cephalocaudal dimension. The capsular margins are nodular consistent with cirrhosis. No definite focal lesion is seen.    There is no dilation of the bile ducts. The extrahepatic common duct measures 4 mm.    The gallbladder is not well distended and incompletely evaluated.    There is no right hydronephrosis. The right kidney measures 8.2 cm in longitudinal dimension.    There is no left hydronephrosis. The left kidney measures 7.3 cm in longitudinal dimension.    The spleen is not enlarged measuring 6.8 cm in cephalocaudal dimension. It is sonographically unremarkable.    There is a small to moderate volume of peritoneal fluid        Small to moderate volume of peritoneal fluid.    Cirrhosis.       MACRO: None    Signed by: Joseph Schoenberger 3/18/2024 10:46 AM Dictation workstation:   [id number]     XR TIBIA FIBULA LEFT (2 VIEWS)     Result Date: 3/15/2024  Interpreted By:  Artur Santos, STUDY: XR TIBIA FIBULA LEFT 2 VIEWS;  3/15/2024 10:36 am    INDICATION: Signs/Symptoms:fall, previous below-knee amputation, pain at left stump.    COMPARISON: 06/23/2022.    ACCESSION NUMBER(S): [id number]    ORDERING CLINICIAN: LO SHAW    FINDINGS: Post below-knee amputation changes. No acute fracture or dislocation identified. Tiny patellar osteophytes. Small knee joint effusion suspected. Tiny metallic foreign bodies lateral to the knee joint are still present. Osteopenia.    Atherosclerosis.        Intact left tibia and fibula, post below-knee amputation.    MACRO: None    Signed by: Artur Santos 3/15/2024 11:33 AM Dictation workstation:   [id number]     XR CHEST 1 VIEW     Result Date: 3/15/2024  Interpreted By:  Artur Santos, STUDY: XR CHEST 1 VIEW;  3/15/2024 10:36 am    INDICATION: Signs/Symptoms:fall.    COMPARISON: 11/01/2023    ACCESSION NUMBER(S): [id number]    ORDERING CLINICIAN: LO SHAW    FINDINGS: Large right  basilar pleural effusion occupying greater than 50% of the hemithoracic volume. Overlying atelectasis. Left lung grossly clear without pleural effusion. Cardiomegaly suspected. Aortic atherosclerosis. Mild pulmonary vascular congestion.        Large right pleural effusion.    MACRO: None    Signed by: Artur Santos 3/15/2024 11:33 AM Dictation workstation:   [id number]     OCT MACULA CIRRUS OU (BOTH EYES)     Result Date: 3/6/2024  Date of Procedure 3/6/2024. Technician Information Imaging Technician: niecy. Interpretation Right Eye Findings include Cystoid macular edema, Epiretinal membrane. Left Eye Findings include Cystoid macular edema. Interval Change Right Eye Better. Left Eye Better.         EKG: sinus bradycardia, RBBB              Assessment plan:      Elevated troponin.  Rule out underlying cardiac ischemia   Acute on chronic decompensated combined heart failure with reduced ejection fraction EF 20-25%  History of coronary disease with known circumflex chronic total occlusion by cath in 2021 at [facility]  History of PAD with life AKA  Decompensated liver cirrhosis  PIPPA on CKD  Diabetes  Hypertension  Hyperlipidemia  Hypokalemia  Anemia  Right-sided pleural effusion.  Encephalopathy-resolved  History of medical noncompliance  Right bundle branch block        Plan:  ICU supportive care and management.  Ideally patient would require cardiac catheterization given elevated cardiac enzyme, history of CAD, acute congestive heart failure and multiple risk factors for CAD.  Patient with worsening renal function and I have concern regarding his noncompliance.  Patient with multiple comorbidities, not ideal PCI candidate.  Will continue to evaluate.  Not ready for cardiac catheterization.    Plan for hospice later on today  Cardiology will sign off please reconsult if further questions arise  Please keep potassium between 4 and 5 magnesium above 2  Please keep hemoglobin above 8 and platelets above 50  Prognosis is  poor.  Hospice evaluation in progress.      Electronically signed by Sivakumar Hayes MD on 4/30/2024 at 7:09 AM

## 2024-04-30 NOTE — PROCEDURES
Paracentesis    Date/Time: 4/30/2024 3:00 PM    Performed by: James Packer MD  Authorized by: James Packer MD    Consent:     Consent obtained:  Written    Consent given by:  Patient    Risks, benefits, and alternatives were discussed: yes      Risks discussed:  Bleeding, bowel perforation, infection and pain    Alternatives discussed:  No treatment and alternative treatment  Universal protocol:     Procedure explained and questions answered to patient or proxy's satisfaction: yes      Relevant documents present and verified: yes      Test results available: yes      Imaging studies available: yes      Required blood products, implants, devices, and special equipment available: yes      Patient identity confirmed:  Arm band  Pre-procedure details:     Procedure purpose:  Therapeutic    Preparation: Patient was prepped and draped in usual sterile fashion    Anesthesia:     Anesthesia method:  Local infiltration    Local anesthetic:  Lidocaine 1% WITH epi  Procedure details:     Needle gauge:  20    Ultrasound guidance: yes      Puncture site:  L lower quadrant    Fluid removed amount:  1000mL    Fluid appearance:  Lori    Dressing:  4x4 sterile gauze and adhesive bandage  Post-procedure details:     Procedure completion:  Tolerated well, no immediate complications  Comments:      Prior to procedure using ultrasound the site and tract was marked and fluid pocket identified which was 5cm away from nearest boweling using phased array probe. Using linear probe no major blood vessels were visualized and superficial vessels were avoided. Using typical sterile fashion the patient was sterilized with chlorhexidine x 2. In usual fashion I dawned sterile gown and gloves and cap in my usual fashion. Using 7cc of 1% lido with epi I anesthetized the skin and tract of catheter insertion. Using Seldinger technique with a 20G needle the peritoneal space was entered with return of lori colored ascitic fluid and  wire was inserted through needle. This procedure had to be done x 2 as when 8.5F pigtail catheter went to be inserted wire was kinked twice inhibiting it from being inserted effectively. No issues on third insertion and pigtail was identified away from bowel and in middle of fluid pocket. Pigtail was inserted to 13cm and secured with two sutures and sterile dressing. Upon completion 1L of lori fluid was removed via Pleurex suction kit. All needles and wires were removed and patient tolerated procedure well.

## 2024-05-01 VITALS
RESPIRATION RATE: 16 BRPM | BODY MASS INDEX: 22.76 KG/M2 | WEIGHT: 153.66 LBS | HEIGHT: 69 IN | HEART RATE: 80 BPM | TEMPERATURE: 97.5 F | OXYGEN SATURATION: 93 % | DIASTOLIC BLOOD PRESSURE: 53 MMHG | SYSTOLIC BLOOD PRESSURE: 107 MMHG

## 2024-05-01 LAB
GLUCOSE BLD MANUAL STRIP-MCNC: 177 MG/DL (ref 74–99)
GLUCOSE BLD MANUAL STRIP-MCNC: 188 MG/DL (ref 74–99)

## 2024-05-01 PROCEDURE — 99239 HOSP IP/OBS DSCHRG MGMT >30: CPT | Performed by: INTERNAL MEDICINE

## 2024-05-01 PROCEDURE — 2500000005 HC RX 250 GENERAL PHARMACY W/O HCPCS: Performed by: STUDENT IN AN ORGANIZED HEALTH CARE EDUCATION/TRAINING PROGRAM

## 2024-05-01 PROCEDURE — 2500000004 HC RX 250 GENERAL PHARMACY W/ HCPCS (ALT 636 FOR OP/ED): Performed by: STUDENT IN AN ORGANIZED HEALTH CARE EDUCATION/TRAINING PROGRAM

## 2024-05-01 PROCEDURE — 82947 ASSAY GLUCOSE BLOOD QUANT: CPT

## 2024-05-01 RX ORDER — POLYETHYLENE GLYCOL 3350 17 G/17G
17 POWDER, FOR SOLUTION ORAL DAILY PRN
Start: 2024-05-01

## 2024-05-01 RX ORDER — METHOCARBAMOL 500 MG/1
500 TABLET, FILM COATED ORAL 2 TIMES DAILY
Start: 2024-05-01

## 2024-05-01 RX ORDER — LIDOCAINE 560 MG/1
1 PATCH PERCUTANEOUS; TOPICAL; TRANSDERMAL DAILY
Start: 2024-05-01

## 2024-05-01 RX ORDER — TRAZODONE HYDROCHLORIDE 100 MG/1
100 TABLET ORAL NIGHTLY PRN
Start: 2024-05-01

## 2024-05-01 RX ORDER — PREGABALIN 25 MG/1
25 CAPSULE ORAL 2 TIMES DAILY
Start: 2024-05-01

## 2024-05-01 RX ADMIN — Medication 2 L/MIN: at 08:00

## 2024-05-01 RX ADMIN — INSULIN LISPRO 2 UNITS: 100 INJECTION, SOLUTION INTRAVENOUS; SUBCUTANEOUS at 11:48

## 2024-05-01 RX ADMIN — HYDROMORPHONE HYDROCHLORIDE 0.5 MG: 1 INJECTION, SOLUTION INTRAMUSCULAR; INTRAVENOUS; SUBCUTANEOUS at 13:17

## 2024-05-01 RX ADMIN — INSULIN LISPRO 2 UNITS: 100 INJECTION, SOLUTION INTRAVENOUS; SUBCUTANEOUS at 08:35

## 2024-05-01 ASSESSMENT — COGNITIVE AND FUNCTIONAL STATUS - GENERAL
STANDING UP FROM CHAIR USING ARMS: TOTAL
TURNING FROM BACK TO SIDE WHILE IN FLAT BAD: TOTAL
CLIMB 3 TO 5 STEPS WITH RAILING: TOTAL
EATING MEALS: A LOT
DRESSING REGULAR UPPER BODY CLOTHING: TOTAL
DRESSING REGULAR LOWER BODY CLOTHING: TOTAL
HELP NEEDED FOR BATHING: TOTAL
MOVING TO AND FROM BED TO CHAIR: TOTAL
MOVING FROM LYING ON BACK TO SITTING ON SIDE OF FLAT BED WITH BEDRAILS: A LOT
MOBILITY SCORE: 7
TOILETING: TOTAL
DAILY ACTIVITIY SCORE: 7
WALKING IN HOSPITAL ROOM: TOTAL
PERSONAL GROOMING: TOTAL

## 2024-05-01 ASSESSMENT — PAIN SCALES - GENERAL
PAINLEVEL_OUTOF10: 0 - NO PAIN
PAINLEVEL_OUTOF10: 10 - WORST POSSIBLE PAIN

## 2024-05-01 ASSESSMENT — PAIN - FUNCTIONAL ASSESSMENT
PAIN_FUNCTIONAL_ASSESSMENT: 0-10
PAIN_FUNCTIONAL_ASSESSMENT: 0-10

## 2024-05-01 ASSESSMENT — PAIN DESCRIPTION - LOCATION: LOCATION: HIP

## 2024-05-01 ASSESSMENT — PAIN DESCRIPTION - ORIENTATION: ORIENTATION: RIGHT;LEFT

## 2024-05-01 NOTE — PROGRESS NOTES
Asymptomatic, check CBC for stability Nephrology Progress Note    Assessment:  53 y.o. male with history s/f HFrEF, PAD s/p LT BKA, T2DM c/b chronic non-healing wounds, cirrhosis who presented for SOB for several days.      PIPPA on CKD stage II/III: initially felt in setting of diuretics, not significantly hypotensive, no contrast, not on any other nephrotoxic medications, function was at baseline on presentation w/ Scr ~1.4 w/ eGFR high 50s/low 60s and worsened during admission, now fluid overloaded, ? Decompensated CHF vs. HRS, urine Na/Cl <10, UA w/ hematuria   Fluid overload: improved, has combined HFrEF 20-25%   Recurrent RT sided pleural effusion: had 3L taken off at [facility] on 4/16, present here again, now s/p RT sided thoracentesis w/ 2.5L taken off  Cirrhosis c/b ascites: now s/p paracentesis (4/25), 2.9L taken off   Hypotension: on midodrine   Hypoalbuminemia  Anemia   RT sided PNA   Hyponatremia     Plan:  - ok with comfort care  - will discontinue phoslo  - sign off renal    Subjective:  Admit Date: 4/18/2024    Interval History: pt transferred out of icu.  Doesn't want dialysis and currently DNR CC    Medications:  Scheduled Meds:aspirin, 81 mg, oral, Daily  atorvastatin, 80 mg, oral, Nightly  calcium acetate, 667 mg, oral, TID with meals  [Held by provider] clopidogrel, 75 mg, oral, Daily  docusate sodium, 100 mg, oral, BID  [Held by provider] heparin (porcine), 5,000 Units, subcutaneous, q8h  insulin lispro, 0-10 Units, subcutaneous, TID with meals  insulin lispro, 10 Units, subcutaneous, TID with meals  lidocaine, 1 patch, transdermal, Daily  melatonin, 10 mg, oral, Nightly  methIMAzole, 12.5 mg, oral, Daily  methocarbamol, 500 mg, oral, BID  metoprolol succinate XL, 25 mg, oral, Daily  midodrine, 10 mg, oral, TID with meals  mupirocin, , Topical, TID  oxygen, , inhalation, Continuous - Inhalation  perflutren lipid microspheres, 0.5-10 mL of dilution, intravenous, Once in imaging  pregabalin, 25 mg, oral, BID      Continuous Infusions:  "      CBC:   Lab Results   Component Value Date    WBC 13.7 (H) 04/29/2024    RBC 4.23 (L) 04/29/2024    HGB 8.3 (L) 04/29/2024    HCT 25.7 (L) 04/29/2024    MCV 61 (L) 04/29/2024    MCH 19.6 (L) 04/29/2024    MCHC 32.3 04/29/2024    RDW 25.2 (H) 04/29/2024     04/29/2024     BMP:    Lab Results   Component Value Date     (L) 04/29/2024    K 4.5 04/29/2024    CL 86 (L) 04/29/2024    CO2 22 04/29/2024     (HH) 04/29/2024    CREATININE 6.71 (H) 04/29/2024    CALCIUM 7.5 (L) 04/29/2024    GLUCOSE 145 (H) 04/29/2024       Objective:  Vitals: BP 99/55 (BP Location: Left arm, Patient Position: Lying)   Pulse 81   Temp 36.4 °C (97.5 °F) (Temporal)   Resp 14   Ht 1.753 m (5' 9\")   Wt 69.7 kg (153 lb 10.6 oz)   SpO2 91%   BMI 22.69 kg/m²    Wt Readings from Last 3 Encounters:   04/28/24 69.7 kg (153 lb 10.6 oz)   03/24/24 65 kg (143 lb 4.8 oz)   10/30/23 55.9 kg (123 lb 3.8 oz)      24HR INTAKE/OUTPUT:  No intake or output data in the 24 hours ending 05/01/24 0826      General: alert, in no acute distress  HEENT: normocephalic, atraumatic, anicteric  Lungs: non-labored respirations, clear to auscultation bilaterally  Heart: regular rate and rhythm, no murmurs or rubs  Abdomen: soft, non-tender, distended  Ext: no cyanosis, ++ peripheral edema, LT BKA  Neuro: alert, no gross abnormalities      Electronically signed by Allen Mahajan MD, MD              "

## 2024-05-01 NOTE — PROGRESS NOTES
"Hitesh Jurado is a 53 y.o. male on day 13 of admission presenting with Right lower lobe pneumonia.    Subjective   Having pain today, recently medicated according to patient.  Confirms plans for comfort care only and transfer to inpatient hospice    Objective     Physical Exam  GEN: drowsy  RESP: even and regular  ABD: distended, soft    Last Recorded Vitals  Blood pressure 99/55, pulse 81, temperature 36.4 °C (97.5 °F), temperature source Temporal, resp. rate 16, height 1.753 m (5' 9\"), weight 69.7 kg (153 lb 10.6 oz), SpO2 91%.  Intake/Output last 3 Shifts:  I/O last 3 completed shifts:  In: 100 (1.4 mL/kg) [Blood:100]  Out: - (0 mL/kg)   Weight: 69.7 kg     Relevant Results  No current facility-administered medications on file prior to encounter.     Current Outpatient Medications on File Prior to Encounter   Medication Sig Dispense Refill    aspirin 81 mg EC tablet Take 1 tablet (81 mg) by mouth once daily.      atorvastatin (Lipitor) 80 mg tablet Take 1 tablet (80 mg) by mouth once daily.      clopidogrel (Plavix) 75 mg tablet Take 1 tablet (75 mg) by mouth once daily.      furosemide (Lasix) 20 mg tablet Take 3 tablets (60 mg) by mouth 2 times a day.      insulin glargine (Lantus) 100 unit/mL injection Inject 20 Units under the skin once daily at bedtime. Take as directed per insulin instructions.      insulin lispro (HumaLOG) 100 unit/mL injection Inject under the skin 3 times a day with meals. Take as directed per insulin instructions.      methIMAzole (Tapazole) 10 mg tablet Take 2 tablets (20 mg) by mouth every 8 hours. (Patient taking differently: Take 0.5 tablets (5 mg) by mouth once daily. 2.5 tabs) 90 tablet 0    metoprolol succinate XL (Toprol-XL) 25 mg 24 hr tablet Take 1 tablet (25 mg) by mouth once daily. Do not crush or chew.      torsemide 60 mg tablet Take 60 mg by mouth once daily. Do not start before March 27, 2024.  0    acetaminophen (Tylenol) 500 mg tablet Take 1 tablet (500 mg) by " mouth every 8 hours if needed.      azithromycin (Zithromax) 500 mg tablet Take 1 tablet (500 mg) by mouth once every 24 hours. Do not start before October 30, 2023. 5 tablet 0    gabapentin (Neurontin) 100 mg capsule Take 1 capsule (100 mg) by mouth 3 times a day.      melatonin 5 mg tablet Take 1 tablet (5 mg) by mouth once daily at bedtime.      metOLazone (Zaroxolyn) 5 mg tablet Take 1 tablet (5 mg) by mouth once daily. Do not start before March 27, 2024. 30 tablet 1    midodrine (Proamatine) 10 mg tablet Take 1 tablet (10 mg) by mouth 3 times a day with meals. 90 tablet 0    potassium chloride CR 20 mEq ER tablet Take 1 tablet (20 mEq) by mouth 3 times a day. Do not crush or chew. For 10 days      silver sulfADIAZINE (Silvadene) 1 % cream Apply topically once daily. Do not start before March 27, 2024.        Results for orders placed or performed during the hospital encounter of 04/18/24 (from the past 24 hour(s))   POCT GLUCOSE   Result Value Ref Range    POCT Glucose 177 (H) 74 - 99 mg/dL   POCT GLUCOSE   Result Value Ref Range    POCT Glucose 188 (H) 74 - 99 mg/dL     Assessment/Plan   -pt drowsy, says he was recently medicated for pain in abdomen.  Doesn't feel hungry.  Brother not present this am.  Per RN, called report to [facility] as there is a bed available.    I spent 15 minutes in the professional and overall care of this patient.      Romero Todd, APRN-CNP

## 2024-05-01 NOTE — DISCHARGE SUMMARY
Discharge Diagnosis  Acute respiratory failure with hypoxia  PIPPA on CKD  Acute on chronic systolic CHF  CAD  PAD  Recurrent ascites    Issues Requiring Follow-Up  Hospice    Discharge Meds     Your medication list        START taking these medications        Instructions Last Dose Given Next Dose Due   lidocaine 4 % patch      Place 1 patch over 12 hours on the skin once daily. Remove & discard patch within 12 hours or as directed by MD.       methocarbamol 500 mg tablet  Commonly known as: Robaxin      Take 1 tablet (500 mg) by mouth 2 times a day.       polyethylene glycol 17 gram packet  Commonly known as: Glycolax, Miralax      Take 17 g by mouth once daily as needed (pain).       pregabalin 25 mg capsule  Commonly known as: Lyrica      Take 1 capsule (25 mg) by mouth 2 times a day.       traZODone 100 mg tablet  Commonly known as: Desyrel      Take 1 tablet (100 mg) by mouth as needed at bedtime for sleep.              CONTINUE taking these medications        Instructions Last Dose Given Next Dose Due   melatonin 5 mg tablet           methIMAzole 10 mg tablet  Commonly known as: Tapazole      Take 2 tablets (20 mg) by mouth every 8 hours.       metoprolol succinate XL 25 mg 24 hr tablet  Commonly known as: Toprol-XL           midodrine 10 mg tablet  Commonly known as: Proamatine      Take 1 tablet (10 mg) by mouth 3 times a day with meals.              STOP taking these medications      acetaminophen 500 mg tablet  Commonly known as: Tylenol        aspirin 81 mg EC tablet        atorvastatin 80 mg tablet  Commonly known as: Lipitor        azithromycin 500 mg tablet  Commonly known as: Zithromax        clopidogrel 75 mg tablet  Commonly known as: Plavix        furosemide 20 mg tablet  Commonly known as: Lasix        gabapentin 100 mg capsule  Commonly known as: Neurontin        insulin glargine 100 unit/mL injection  Commonly known as: Lantus        insulin lispro 100 unit/mL injection  Commonly known as:  HumaLOG        metOLazone 5 mg tablet  Commonly known as: Zaroxolyn        potassium chloride CR 20 mEq ER tablet  Commonly known as: Klor-Con M20        silver sulfADIAZINE 1 % cream  Commonly known as: Silvadene        torsemide 60 mg tablet                  Where to Get Your Medications        Information about where to get these medications is not yet available    Ask your nurse or doctor about these medications  lidocaine 4 % patch  methocarbamol 500 mg tablet  polyethylene glycol 17 gram packet  pregabalin 25 mg capsule  traZODone 100 mg tablet         Test Results Pending At Discharge  Pending Labs       No current pending labs.            Hospital Course   53-year-old male with past medical history of type 2 diabetes, cirrhosis, hypothyroidism, ischemic cardiomyopathy, chronic systolic CHF, PAD status post left BKA, CKD, who presented to the emergency department with shortness of breath.  He was found to be in acute respiratory failure due to possible pneumonia as well as CHF and he was admitted to the ICU.  He was found to have acute kidney injury on top of his chronic kidney disease. He was also found to have possible NSTEMI.  He underwent multiple procedures including paracentesis and thoracentesis for management of his volume.  Unfortunately his renal failure continue to progress with associated hyperkalemia.  Nephrology was involved and ultimately recommended hemodialysis.  Given his multiple comorbidities and poor prognosis patient decided that he would not like to pursue hemodialysis and instead would rather pursue hospice.  Hospice team was consulted and he has been accepted to their inpatient facility with plans to transfer today.  He remains on comfort medications and is not in any acute distress.  He is stable for transfer to inpatient hospice today.  Greater than 30 minutes spent discharge activities.    Pertinent Physical Exam At Time of Discharge  Physical Exam  GEN: chronically ill appearing,  confused  HEENT: NCAT, PERRLA, EOMI, moist mucous membranes  NECK: supple, no masses  CV: RRR, no m/r/g, no LE edema  LUNGS: CTAB, no w/r/c  ABD: distended, tenderness to palpation  SKIN: no rashes  MSK; no gross deformities, normal joints; edema  NEURO: A+Ox1, no FND      Outpatient Follow-Up  No future appointments.      Ld Ayala MD

## 2024-05-18 LAB
FINAL REPORT: NORMAL
PRELIMINARY: NORMAL

## 2024-06-12 LAB
FINAL REPORT: NORMAL
PRELIMINARY: NORMAL

## 2024-07-18 ENCOUNTER — TELEPHONE (OUTPATIENT)
Dept: WOUND CARE | Age: 54
End: 2024-07-18